# Patient Record
Sex: MALE | Race: WHITE | NOT HISPANIC OR LATINO | Employment: OTHER | URBAN - METROPOLITAN AREA
[De-identification: names, ages, dates, MRNs, and addresses within clinical notes are randomized per-mention and may not be internally consistent; named-entity substitution may affect disease eponyms.]

---

## 2023-03-01 PROBLEM — M19.90 ARTHRITIS: Status: ACTIVE | Noted: 2023-03-01

## 2023-03-01 PROBLEM — J34.89 NASAL DRYNESS: Status: ACTIVE | Noted: 2023-03-01

## 2023-03-01 PROBLEM — I25.10 ASHD (ARTERIOSCLEROTIC HEART DISEASE): Status: ACTIVE | Noted: 2023-03-01

## 2023-03-01 PROBLEM — L30.9 DERMATITIS: Status: ACTIVE | Noted: 2023-03-01

## 2023-03-01 PROBLEM — K64.8 HEMORRHOIDS, INTERNAL: Status: ACTIVE | Noted: 2023-03-01

## 2023-03-01 PROBLEM — H35.3211 EXUDATIVE AGE-RELATED MACULAR DEGENERATION, RIGHT EYE, WITH ACTIVE CHOROIDAL NEOVASCULARIZATION (MULTI): Status: ACTIVE | Noted: 2023-03-01

## 2023-03-01 PROBLEM — E78.5 HYPERLIPIDEMIA: Status: ACTIVE | Noted: 2023-03-01

## 2023-03-01 PROBLEM — K64.9 BLEEDING HEMORRHOIDS: Status: ACTIVE | Noted: 2023-03-01

## 2023-03-01 PROBLEM — R10.9 FLANK PAIN: Status: ACTIVE | Noted: 2023-03-01

## 2023-03-01 PROBLEM — M54.50 LUMBAR PAIN: Status: ACTIVE | Noted: 2023-03-01

## 2023-03-01 PROBLEM — S70.11XA HEMATOMA OF RIGHT THIGH: Status: ACTIVE | Noted: 2023-03-01

## 2023-03-01 PROBLEM — R43.8 METALLIC TASTE: Status: ACTIVE | Noted: 2023-03-01

## 2023-03-01 PROBLEM — R19.7 INTERMITTENT DIARRHEA: Status: ACTIVE | Noted: 2023-03-01

## 2023-03-01 PROBLEM — Z86.2 HISTORY OF HEMOPHILIA: Status: ACTIVE | Noted: 2023-03-01

## 2023-03-01 PROBLEM — R33.9 INCOMPLETE EMPTYING OF BLADDER: Status: ACTIVE | Noted: 2023-03-01

## 2023-03-01 PROBLEM — E83.42 HYPOMAGNESEMIA: Status: ACTIVE | Noted: 2023-03-01

## 2023-03-01 PROBLEM — M25.522 LEFT ELBOW PAIN: Status: ACTIVE | Noted: 2023-03-01

## 2023-03-01 PROBLEM — M53.3 PAIN OF RIGHT SACROILIAC JOINT: Status: ACTIVE | Noted: 2023-03-01

## 2023-03-01 PROBLEM — K59.09 CHRONIC CONSTIPATION: Status: ACTIVE | Noted: 2023-03-01

## 2023-03-01 PROBLEM — J18.9 CAP (COMMUNITY ACQUIRED PNEUMONIA): Status: ACTIVE | Noted: 2023-03-01

## 2023-03-01 PROBLEM — R39.198 ABNORMAL URINATION: Status: ACTIVE | Noted: 2023-03-01

## 2023-03-01 PROBLEM — J45.909 REACTIVE AIRWAY DISEASE (HHS-HCC): Status: ACTIVE | Noted: 2023-03-01

## 2023-03-01 PROBLEM — R31.0 GROSS HEMATURIA: Status: ACTIVE | Noted: 2023-03-01

## 2023-03-01 PROBLEM — K21.9 ESOPHAGEAL REFLUX: Status: ACTIVE | Noted: 2023-03-01

## 2023-03-01 PROBLEM — E55.9 MILD VITAMIN D DEFICIENCY: Status: ACTIVE | Noted: 2023-03-01

## 2023-03-01 PROBLEM — N39.0 URINARY TRACT INFECTION: Status: ACTIVE | Noted: 2023-03-01

## 2023-03-01 PROBLEM — I10 HYPERTENSION: Status: ACTIVE | Noted: 2023-03-01

## 2023-03-01 PROBLEM — M17.12 PRIMARY OSTEOARTHRITIS OF LEFT KNEE: Status: ACTIVE | Noted: 2023-03-01

## 2023-03-01 PROBLEM — R43.2 DYSGEUSIA: Status: ACTIVE | Noted: 2023-03-01

## 2023-03-01 PROBLEM — M79.641 HAND PAIN, RIGHT: Status: ACTIVE | Noted: 2023-03-01

## 2023-03-01 PROBLEM — E87.6 HYPOKALEMIA: Status: ACTIVE | Noted: 2023-03-01

## 2023-03-01 PROBLEM — A08.4 VIRAL GASTROENTERITIS: Status: ACTIVE | Noted: 2023-03-01

## 2023-03-01 PROBLEM — N47.1 PHIMOSIS: Status: ACTIVE | Noted: 2023-03-01

## 2023-03-01 PROBLEM — I21.4 NON-ST ELEVATION MYOCARDIAL INFARCTION (NSTEMI) IN RECOVERY PHASE (MULTI): Status: ACTIVE | Noted: 2023-03-01

## 2023-03-01 PROBLEM — S72.001A FRACTURE OF FEMORAL NECK, RIGHT (MULTI): Status: ACTIVE | Noted: 2023-03-01

## 2023-03-01 PROBLEM — R73.02 IGT (IMPAIRED GLUCOSE TOLERANCE): Status: ACTIVE | Noted: 2023-03-01

## 2023-03-01 PROBLEM — M51.36 LUMBAR DEGENERATIVE DISC DISEASE: Status: ACTIVE | Noted: 2023-03-01

## 2023-03-01 PROBLEM — K62.5 RECTAL BLEEDING: Status: ACTIVE | Noted: 2023-03-01

## 2023-03-01 PROBLEM — K64.4 ANAL SKIN TAG: Status: ACTIVE | Noted: 2023-03-01

## 2023-03-01 PROBLEM — G62.9 NEUROPATHY: Status: ACTIVE | Noted: 2023-03-01

## 2023-03-01 PROBLEM — D66 HEMOPHILIA A (MULTI): Status: ACTIVE | Noted: 2023-03-01

## 2023-03-01 PROBLEM — H35.3120 NONEXUDATIVE AGE-RELATED MACULAR DEGENERATION OF LEFT EYE: Status: ACTIVE | Noted: 2023-03-01

## 2023-03-01 PROBLEM — M25.462 KNEE EFFUSION, LEFT: Status: ACTIVE | Noted: 2023-03-01

## 2023-03-01 PROBLEM — N20.0 NEPHROLITHIASIS: Status: ACTIVE | Noted: 2023-03-01

## 2023-03-01 PROBLEM — Z95.1 HISTORY OF CORONARY ARTERY BYPASS GRAFT: Status: ACTIVE | Noted: 2023-03-01

## 2023-03-01 PROBLEM — N40.1 ENLARGED PROSTATE WITH LOWER URINARY TRACT SYMPTOMS (LUTS): Status: ACTIVE | Noted: 2023-03-01

## 2023-03-01 PROBLEM — S76.911A MUSCLE STRAIN OF RIGHT THIGH: Status: ACTIVE | Noted: 2023-03-01

## 2023-03-01 PROBLEM — M51.369 LUMBAR DEGENERATIVE DISC DISEASE: Status: ACTIVE | Noted: 2023-03-01

## 2023-03-01 PROBLEM — H61.23 IMPACTED CERUMEN OF BOTH EARS: Status: ACTIVE | Noted: 2023-03-01

## 2023-03-01 PROBLEM — R04.0 EPISTAXIS: Status: ACTIVE | Noted: 2023-03-01

## 2023-03-01 PROBLEM — L98.9 SKIN LESION: Status: ACTIVE | Noted: 2023-03-01

## 2023-03-01 RX ORDER — METOPROLOL SUCCINATE 50 MG/1
1 TABLET, EXTENDED RELEASE ORAL DAILY
COMMUNITY
Start: 2013-10-28 | End: 2023-05-12 | Stop reason: WASHOUT

## 2023-03-01 RX ORDER — ACETAMINOPHEN 500 MG
1 TABLET ORAL DAILY
COMMUNITY
Start: 2013-10-28 | End: 2023-05-12 | Stop reason: WASHOUT

## 2023-03-01 RX ORDER — TAMSULOSIN HYDROCHLORIDE 0.4 MG/1
1 CAPSULE ORAL 2 TIMES DAILY
COMMUNITY
Start: 2013-06-10 | End: 2023-05-12 | Stop reason: SDUPTHER

## 2023-03-01 RX ORDER — NYSTATIN 100000 [USP'U]/ML
SUSPENSION ORAL
COMMUNITY
End: 2023-05-12 | Stop reason: WASHOUT

## 2023-03-01 RX ORDER — POLYETHYLENE GLYCOL 3350 17 G/17G
17 POWDER, FOR SOLUTION ORAL
COMMUNITY
Start: 2013-10-28 | End: 2023-05-12 | Stop reason: WASHOUT

## 2023-03-01 RX ORDER — FINASTERIDE 5 MG/1
1 TABLET, FILM COATED ORAL DAILY
COMMUNITY
Start: 2014-05-22 | End: 2023-11-20 | Stop reason: SDUPTHER

## 2023-03-01 RX ORDER — MUPIROCIN 20 MG/G
OINTMENT TOPICAL 3 TIMES DAILY
COMMUNITY
Start: 2021-03-24 | End: 2023-05-12 | Stop reason: WASHOUT

## 2023-03-01 RX ORDER — TRAMADOL HYDROCHLORIDE 50 MG/1
1 TABLET ORAL 4 TIMES DAILY
COMMUNITY
Start: 2021-10-23 | End: 2023-05-12 | Stop reason: WASHOUT

## 2023-03-01 RX ORDER — POTASSIUM CHLORIDE 750 MG/1
1 TABLET, FILM COATED, EXTENDED RELEASE ORAL DAILY
COMMUNITY
Start: 2022-07-17 | End: 2023-04-28 | Stop reason: SDUPTHER

## 2023-03-01 RX ORDER — LANOLIN ALCOHOL/MO/W.PET/CERES
1 CREAM (GRAM) TOPICAL 2 TIMES DAILY
COMMUNITY
Start: 2022-08-10 | End: 2023-11-17 | Stop reason: WASHOUT

## 2023-03-01 RX ORDER — LOPERAMIDE HCL 2 MG
2 TABLET ORAL AS NEEDED
COMMUNITY
End: 2023-05-12 | Stop reason: WASHOUT

## 2023-03-01 RX ORDER — PANTOPRAZOLE SODIUM 40 MG/1
1 TABLET, DELAYED RELEASE ORAL DAILY
COMMUNITY
Start: 2013-01-10 | End: 2023-05-12 | Stop reason: WASHOUT

## 2023-03-01 RX ORDER — HYDROCHLOROTHIAZIDE 12.5 MG/1
1 CAPSULE ORAL DAILY
COMMUNITY
Start: 2017-04-21 | End: 2023-05-12 | Stop reason: WASHOUT

## 2023-03-01 RX ORDER — CYCLOBENZAPRINE HCL 5 MG
1 TABLET ORAL 3 TIMES DAILY PRN
COMMUNITY
Start: 2021-08-24 | End: 2023-05-12 | Stop reason: WASHOUT

## 2023-03-01 RX ORDER — CARBOXYMETHYLCELLULOSE SODIUM 5 MG/ML
1 SOLUTION/ DROPS OPHTHALMIC 4 TIMES DAILY
Status: ON HOLD | COMMUNITY
Start: 2022-01-23 | End: 2024-01-19 | Stop reason: ALTCHOICE

## 2023-03-01 RX ORDER — POTASSIUM CHLORIDE 20 MEQ/1
20 TABLET, EXTENDED RELEASE ORAL DAILY
COMMUNITY
Start: 2022-05-29 | End: 2023-07-07 | Stop reason: ALTCHOICE

## 2023-03-01 RX ORDER — ATORVASTATIN CALCIUM 40 MG/1
1 TABLET, FILM COATED ORAL DAILY
COMMUNITY
Start: 2016-04-28 | End: 2024-01-17

## 2023-03-09 ENCOUNTER — NURSING HOME VISIT (OUTPATIENT)
Dept: POST ACUTE CARE | Facility: EXTERNAL LOCATION | Age: 88
End: 2023-03-09
Payer: MEDICARE

## 2023-03-09 DIAGNOSIS — D66 HEMOPHILIA A (MULTI): Primary | ICD-10-CM

## 2023-03-09 PROCEDURE — 99309 SBSQ NF CARE MODERATE MDM 30: CPT | Performed by: FAMILY MEDICINE

## 2023-03-09 NOTE — LETTER
Patient: Ramón Flores  : 1928    Encounter Date: 2023    Nursing Home Visit  Name: Ramón Flores  YOB: 1928  MRN: [id number]    Chief Complaint  Right knee pain  Subjective  HPI:   89 year old male with hx of hemophilia A, protein C deficiency, HTN, HLD, nephrolithiasis, CAD s/p CABG, and BPH admitted for urgent Rt NOF fracture. S/p Rt Femur fixation by Ortho * Hypokalemia- received 40meq last night. Reordered 10meq daily * Anemia- Improved See below * Acute blood loss post surgery- HGB dropped to 7, had 1 unit PRBC, HGB - 9.6 * Factor VIII- scheduled repletion set up by hematology for 2x weekly, NO ANTICOAGULANT OR ASA per hematology. Patient is aware of the dates he is to have transfusions * Debility- therapy to maximize safety and independence with functional mobility and self-care. * Right Femur Fracture 2/2 Mechanical Fall s/p Rt Femur fixation, f/u as outpatient after 2 weeks, dressing will remain for 2 weeks * HTN- currently SBP 120s is not taking anything at this time * CAD s/p CABG- unable to take anticoags at this time * HLD- c/w atorvastatin 40mg daily * BPH- c/w Flomax Finasteride * GERD- c/w pantoprazole * Insomnia- c/w melatonin * Vitamin D deficiency- c/w Vitamin D supplement * Knee pain- Voltaren to bilateral knees at bedtime. Discussed with patient and niece discharge this weekend patient stated he did not need any medications for discharge. He and niece were concerned about the weight difference in the hospital and here and why was in 130's one day and 140s the next Explained to them could have been different scales, different times of the day, scale may not have been zeroed. Stated that we follow trends and keep an eye on outliers.   Review of Systems:  Reviewed chart looking at current medications, treatment, labs and x-rays and note pertinent positives or negatives, otherwise 10 point system review negative except for what is noted in  HPI.    Objective    VS: 117/70, 97.4, 78, 18, 97%There were no vitals taken for this visit.    Physical exam:   Physical Exam    Constitutional: Well developed, well nourished, no apparent distress. comfortable, underweight, clothing appropriate, well groomed and appears younger than stated age.   Head and Face: NC, AT.   Eyes: Sclera clear; conjunctiva without pallor or inflammation. No drainage.   Ears, Nose, Mouth, and Throat: No ear or nasal drainage. MMM.   Pulmonary: Air exchange and excursion adequate.   Cardiovascular: Pedal pulses palpable.No BLE edema, DPs 1-2+.   Abdomen: Soft, NT, (+) BS x 4 quads.   Musculoskeletal: No gross bony deformities, no kyphosis. RIght hip intact.   Skin: Pink, warm and dry; turgor good.   Neurologic: A & O x 3. Speech clear and conversant. Gait not observed.   Psychiatric: Calm and pleasant. Adequate insight and judgement.    Assessment/Plan    * Hypokalemia- order labs   * Anemia- Improved See below   * Acute blood loss post surgery- HGB 3/17- 11.4   * Factor VIII- scheduled repletion set up by hematology for 2x weekly, NO ANTICOAGULANT OR ASA per hematology. Patient is awareof the dates he is to have transfusions   * Debility- therapy to maximize safety and independence with functional mobility and self-care.   * Right Femur Fracture 2/2 Mechanical Fall s/p Rt Femur fixation, f/u as outpatient after 2 weeks, dressing will remain for 2 weeks   * HTN- currently SBP 120s is not taking anything at this time   * CAD s/p CABG- unable to take anticoags at this time   * HLD- c/w atorvastatin 40mg daily   * BPH- c/w Flomax Finasteride   * GERD- c/w pantoprazole   * Insomnia- c/w melatonin* Vitamin D deficiency- c/w Vitamin D supplement   * Knee pain- Voltaren to bilateral knees at bedtime.      Electronically Signed By: SUSI Ramirez-CNP   4/11/23  7:11 PM

## 2023-03-12 ENCOUNTER — NURSING HOME VISIT (OUTPATIENT)
Dept: POST ACUTE CARE | Facility: EXTERNAL LOCATION | Age: 88
End: 2023-03-12
Payer: MEDICARE

## 2023-03-12 DIAGNOSIS — M25.462 KNEE EFFUSION, LEFT: ICD-10-CM

## 2023-03-12 DIAGNOSIS — J45.20 MILD INTERMITTENT REACTIVE AIRWAY DISEASE WITHOUT COMPLICATION (HHS-HCC): Primary | ICD-10-CM

## 2023-03-12 DIAGNOSIS — I10 HYPERTENSION, UNSPECIFIED TYPE: ICD-10-CM

## 2023-03-12 DIAGNOSIS — D66 HEMOPHILIA A (MULTI): ICD-10-CM

## 2023-03-12 DIAGNOSIS — Z86.2 HISTORY OF HEMOPHILIA: ICD-10-CM

## 2023-03-12 DIAGNOSIS — E87.6 HYPOKALEMIA: ICD-10-CM

## 2023-03-12 DIAGNOSIS — I21.4 NON-ST ELEVATION MYOCARDIAL INFARCTION (NSTEMI) IN RECOVERY PHASE (MULTI): ICD-10-CM

## 2023-03-12 DIAGNOSIS — K59.09 CHRONIC CONSTIPATION: ICD-10-CM

## 2023-03-12 DIAGNOSIS — I25.10 ASHD (ARTERIOSCLEROTIC HEART DISEASE): ICD-10-CM

## 2023-03-12 PROBLEM — F48.8 PSYCHOGENIC SYNCOPE: Status: ACTIVE | Noted: 2023-03-12

## 2023-03-12 PROCEDURE — 99306 1ST NF CARE HIGH MDM 50: CPT | Performed by: FAMILY MEDICINE

## 2023-03-12 NOTE — PROGRESS NOTES
Problem List Items Addressed This Visit          Respiratory    Reactive airway disease - Primary       Circulatory    ASHD (arteriosclerotic heart disease)     Cbc bmp weekly         Hypertension     C/w meds monitor and adjust         Non-ST elevation myocardial infarction (NSTEMI) in recovery phase (CMS/[facility])       Digestive    Chronic constipation       Musculoskeletal    Knee effusion, left       Hematologic    Hemophilia A (CMS/[facility])       Other    History of hemophilia    Hypokalemia       Chief complete Syncope    HPIHPI:   89 year old male with a hx of hemophilia A, protein C deficiency, HTN, HLD, nephrolithiasis, CAD s/p CABG (2002), and BPH. Recent admission for RT NOF fracture (discharged 1/30). Presents with syncope vs seizure with left facial droop and dysarthria. Stroke workup negative, but was found to have subdural hematoma. Per neurosurgery no acute intervention needed. S/p Keppra load, 6300 u Factor VIII (per heme). EEG no epileptiform change, per neurology no AED needed. EKG and tele found to be in sinus rhythm. Orthostatic negative. However patient's BP on the softer side and received 1L LR. Per heme, currently on advate 3412 u q12 hrs, no need for factor 8 monitoring. This is more likely to be an syncope event due to soft BP/volume depletion instead of seizure. Doppler US showed acute occlusive deep vein thrombosis in the posterior tibial, peroneal and soleal veins; acute occlusive superficial thrombophlebitis within the great saphenous at the junction in right lower extremity; acute occlusive deep vein thrombosis in the posterior tibial and peroneal veins in left lower extremity. Patient was started on eliquis 2.5 BID for at least 3 months; will go home on Advate 40 units/kg daily dose until seen by Dr. Dillon and likely to decrease the dose to every other day for 3 months. PICC line places; will arrange hemophilia home nurse for daily infusion. During hospitalization, patient's Mg, K, Ca and  P was low intermittently, was repleted PRN. HCTZ DCed due to hypokalemia. He was discharged to SNF on 3/7.     Review of Systems  ROS: Reviewed chart looking at current medications, treatment, labs and x-rays and note pertinent positives or negatives, otherwise 10 point system review negative except for what is noted in HPI.       Vitals  124/66, 97.6, 70, 18, 95%, 147#      Physical Exam    Abdomen: Soft, NT, (+) BS x 4 quads. No masses, bruits.   Constitutional: Well developed, well nourished, no apparent distress. comfortable, underweight, clothing appropriate, well groomed and appears younger than stated age.  Head and Face: NC, AT.  Eyes: Sclera clear; conjunctiva without pallor or inflammation. No drainage.  Ears, Nose, Mouth, and Throat: No ear or nasal drainage. MMM.  Pulmonary: Air exchange and excursion adequate.  Cardiovascular: Pedal pulses palpable.No BLE edema, DPs 1-2+.  Abdomen: Soft, NT, (+) BS x 4 quads.  Musculoskeletal: No gross bony deformities, no kyphosis. RIght hip intact.  Skin: Pink, warm and dry; turgor good.  Neurologic: A & O x 3. Speech clear and conversant. Gait not observed.  Psychiatric: Calm and pleasant. Adequate insight and judgement.

## 2023-03-12 NOTE — LETTER
Problem List Items Addressed This Visit          Respiratory    Reactive airway disease - Primary       Circulatory    ASHD (arteriosclerotic heart disease)     Cbc bmp weekly         Hypertension     C/w meds monitor and adjust         Non-ST elevation myocardial infarction (NSTEMI) in recovery phase (CMS/[facility])       Digestive    Chronic constipation       Musculoskeletal    Knee effusion, left       Hematologic    Hemophilia A (CMS/[facility])       Other    History of hemophilia    Hypokalemia       Chief complete Syncope    HPIHPI:   89 year old male with a hx of hemophilia A, protein C deficiency, HTN, HLD, nephrolithiasis, CAD s/p CABG (2002), and BPH. Recent admission for RT NOF fracture (discharged 1/30). Presents with syncope vs seizure with left facial droop and dysarthria. Stroke workup negative, but was found to have subdural hematoma. Per neurosurgery no acute intervention needed. S/p Keppra load, 6300 u Factor VIII (per heme). EEG no epileptiform change, per neurology no AED needed. EKG and tele found to be in sinus rhythm. Orthostatic negative. However patient's BP on the softer side and received 1L LR. Per heme, currently on advate 3412 u q12 hrs, no need for factor 8 monitoring. This is more likely to be an syncope event due to soft BP/volume depletion instead of seizure. Doppler US showed acute occlusive deep vein thrombosis in the posterior tibial, peroneal and soleal veins; acute occlusive superficial thrombophlebitis within the great saphenous at the junction in right lower extremity; acute occlusive deep vein thrombosis in the posterior tibial and peroneal veins in left lower extremity. Patient was started on eliquis 2.5 BID for at least 3 months; will go home on Advate 40 units/kg daily dose until seen by Dr. Dillon and likely to decrease the dose to every other day for 3 months. PICC line places; will arrange hemophilia home nurse for daily infusion. During hospitalization, patient's Mg, K, Ca and  P was low intermittently, was repleted PRN. HCTZ DCed due to hypokalemia. He was discharged to SNF on 3/7.     Review of Systems  ROS: Reviewed chart looking at current medications, treatment, labs and x-rays and note pertinent positives or negatives, otherwise 10 point system review negative except for what is noted in HPI.       Vitals  124/66, 97.6, 70, 18, 95%, 147#      Physical Exam    Abdomen: Soft, NT, (+) BS x 4 quads. No masses, bruits.   Constitutional: Well developed, well nourished, no apparent distress. comfortable, underweight, clothing appropriate, well groomed and appears younger than stated age.  Head and Face: NC, AT.  Eyes: Sclera clear; conjunctiva without pallor or inflammation. No drainage.  Ears, Nose, Mouth, and Throat: No ear or nasal drainage. MMM.  Pulmonary: Air exchange and excursion adequate.  Cardiovascular: Pedal pulses palpable.No BLE edema, DPs 1-2+.  Abdomen: Soft, NT, (+) BS x 4 quads.  Musculoskeletal: No gross bony deformities, no kyphosis. RIght hip intact.  Skin: Pink, warm and dry; turgor good.  Neurologic: A & O x 3. Speech clear and conversant. Gait not observed.  Psychiatric: Calm and pleasant. Adequate insight and judgement.

## 2023-03-30 ENCOUNTER — NURSING HOME VISIT (OUTPATIENT)
Dept: POST ACUTE CARE | Facility: EXTERNAL LOCATION | Age: 88
End: 2023-03-30
Payer: MEDICARE

## 2023-03-30 DIAGNOSIS — D66 HEMOPHILIA A (MULTI): Primary | ICD-10-CM

## 2023-03-30 DIAGNOSIS — I10 HYPERTENSION, UNSPECIFIED TYPE: ICD-10-CM

## 2023-03-30 DIAGNOSIS — R53.81 DEBILITY: ICD-10-CM

## 2023-03-30 DIAGNOSIS — D64.9 ANEMIA, UNSPECIFIED TYPE: ICD-10-CM

## 2023-03-30 PROCEDURE — 99309 SBSQ NF CARE MODERATE MDM 30: CPT | Performed by: FAMILY MEDICINE

## 2023-03-30 NOTE — LETTER
Patient: Ramón Flores  : 1928    Encounter Date: 2023    Nursing Home Visit  Name: Ramón Flores  YOB: 1928  MRN: [id number]    Chief Complaint  Right knee pain  Subjective  HPI:   89 year old male with hx of hemophilia A, protein C deficiency, HTN, HLD, nephrolithiasis, CAD s/p CABG, and BPH admitted for urgent Rt NOF fracture. S/p Rt Femur fixation by Ortho * Hypokalemia- received 40meq last night. Reordered 10meq daily * Anemia- Improved See below * Acute blood loss post surgery- HGB dropped to 7, had 1 unit PRBC, HGB - 9.6 * Factor VIII- scheduled repletion set up by hematology for 2x weekly, NO ANTICOAGULANT OR ASA per hematology. Patient is aware of the dates he is to have transfusions * Debility- therapy to maximize safety and independence with functional mobility and self-care. * Right Femur Fracture 2/2 Mechanical Fall s/p Rt Femur fixation, f/u as outpatient after 2 weeks, dressing will remain for 2 weeks * HTN- currently SBP 120s is not taking anything at this time * CAD s/p CABG- unable to take anticoags at this time * HLD- c/w atorvastatin 40mg daily * BPH- c/w Flomax Finasteride * GERD- c/w pantoprazole * Insomnia- c/w melatonin * Vitamin D deficiency- c/w Vitamin D supplement * Knee pain- Voltaren to bilateral knees at bedtime. Discussed with patient and niece discharge this weekend patient stated he did not need any medications for discharge. He and niece were concerned about the weight difference in the hospital and here and why was in 130's one day and 140s the next Explained to them could have been different scales, different times of the day, scale may not have been zeroed. Stated that we follow trends and keep an eye on outliers.   Review of Systems:  Reviewed chart looking at current medications, treatment, labs and x-rays and note pertinent positives or negatives, otherwise 10 point system review negative except for what is noted in  HPI.    Objective    VS: 117/70, 97.4, 78, 18, 97%    Physical exam:   Physical Exam    Constitutional: Well developed, well nourished, no apparent distress. comfortable, underweight, clothing appropriate, well groomed and appears younger than stated age.   Head and Face: NC, AT.   Eyes: Sclera clear; conjunctiva without pallor or inflammation. No drainage.   Ears, Nose, Mouth, and Throat: No ear or nasal drainage. MMM.   Pulmonary: Air exchange and excursion adequate.   Cardiovascular: Pedal pulses palpable.No BLE edema, DPs 1-2+.   Abdomen: Soft, NT, (+) BS x 4 quads.   Musculoskeletal: No gross bony deformities, no kyphosis. RIght hip intact.   Skin: Pink, warm and dry; turgor good.   Neurologic: A & O x 3. Speech clear and conversant. Gait not observed.   Psychiatric: Calm and pleasant. Adequate insight and judgement.    Assessment/Plan    * Hypokalemia- order labs   * Anemia- Improved See below   * Acute blood loss post surgery- HGB 3/17- 11.4   * Factor VIII- scheduled repletion set up by hematology for 2x weekly, NO ANTICOAGULANT OR ASA per hematology. Patient is awareof the dates he is to have transfusions   * Debility- therapy to maximize safety and independence with functional mobility and self-care.   * Right Femur Fracture 2/2 Mechanical Fall s/p Rt Femur fixation, f/u as outpatient after 2 weeks, dressing will remain for 2 weeks   * HTN- currently SBP 120s is not taking anything at this time   * CAD s/p CABG- unable to take anticoags at this time   * HLD- c/w atorvastatin 40mg daily   * BPH- c/w Flomax Finasteride   * GERD- c/w pantoprazole   * Insomnia- c/w melatonin* Vitamin D deficiency- c/w Vitamin D supplement   * Knee pain- Voltaren to bilateral knees at bedtime.      Electronically Signed By: SUSI Ramirez-CNP   4/11/23  7:16 PM

## 2023-04-04 ENCOUNTER — NURSING HOME VISIT (OUTPATIENT)
Dept: POST ACUTE CARE | Facility: EXTERNAL LOCATION | Age: 88
End: 2023-04-04
Payer: MEDICARE

## 2023-04-04 DIAGNOSIS — R53.81 PHYSICAL DEBILITY: ICD-10-CM

## 2023-04-04 DIAGNOSIS — I50.33 DIASTOLIC CHF, ACUTE ON CHRONIC (MULTI): ICD-10-CM

## 2023-04-04 DIAGNOSIS — I50.31 ACUTE DIASTOLIC HEART FAILURE (MULTI): ICD-10-CM

## 2023-04-04 DIAGNOSIS — D66 HEMOPHILIA A (MULTI): Primary | ICD-10-CM

## 2023-04-04 DIAGNOSIS — I21.4 NON-ST ELEVATION MYOCARDIAL INFARCTION (NSTEMI) IN RECOVERY PHASE (MULTI): ICD-10-CM

## 2023-04-04 DIAGNOSIS — J18.9 PNEUMONIA OF LOWER LOBE DUE TO INFECTIOUS ORGANISM, UNSPECIFIED LATERALITY: ICD-10-CM

## 2023-04-04 DIAGNOSIS — I25.10 ASHD (ARTERIOSCLEROTIC HEART DISEASE): ICD-10-CM

## 2023-04-04 DIAGNOSIS — M17.12 PRIMARY OSTEOARTHRITIS OF LEFT KNEE: ICD-10-CM

## 2023-04-04 DIAGNOSIS — M25.462 KNEE EFFUSION, LEFT: ICD-10-CM

## 2023-04-04 DIAGNOSIS — Z95.1 HISTORY OF CORONARY ARTERY BYPASS GRAFT: ICD-10-CM

## 2023-04-04 PROCEDURE — 99310 SBSQ NF CARE HIGH MDM 45: CPT | Performed by: FAMILY MEDICINE

## 2023-04-04 ASSESSMENT — PAIN SCALES - GENERAL: PAINLEVEL: 0-NO PAIN

## 2023-04-04 NOTE — LETTER
Patient: Ramón Flores  : 1928    Encounter Date: 2023    Nursing Home Visit  Name: Ramón Flores  YOB: 1928  MRN: [id number]    Chief Complaint  Fall  Subjective  HPI:   89 year old male with hx of hemophilia A, protein C deficiency, HTN, HLD, nephrolithiasis, CAD s/p CABG, and BPH admitted for urgent Rt NOF fracture. S/p Rt Femur fixation by Ortho on , but had insufficient factor replacement prior to procedure. Postop patient had a hemoglobin drop, slightly more than expected but likely reasonable for having suboptimals factor replacement. He received 1 unit of packed red blood cells post-op with appropriate response and H&H is now stable. Discussed with his OP hematology and likely expected from blood loss from surgery given hemophilia A and not full repletion of factors rather than ongoing bleeding. Hematology on consult, patient was receiving twice daily therapy with Adv ate now transitioned to once daily and factor levels are stable. CT angio showed no evidence of blood extravasation. After discussion with hematology, patient will need to go to SNF with daily Adv ate for a period before transitioning to his home prophylactic therapy. Hematology is arranging. Patient should not be on any blood thinning agents or aspirin. Patient has been awaiting placement, on  working with physical therapy on  he had a syncopal episode in the setting of diarrhea. His blood pressure and mentation improved after getting back in bed and he is getting IV fluids. Denies diarrhea since resolved and he has had no further dizziness. His blood pressure is stable, however his hydrochlorothiazide and metoprolol are continued be on hold. Arrangements made by hematology to deliver Adv ate to his nursing facility where he will be doing PT OT and receiving Adv ate infusions. Per hematology, patient will continue Adv ate infusions for 5 more days and then transition to every other day  infusions. He did have a CBC monitored weekly. He will need post discharge follow-up with orthopedics and hematology. please note probably hematology service patient should never be started on aspirin or other blood thinners/anticoagulants or chemical DVT prophylaxis, if there is concern that patient does need to be started on any of these medications that should be thoroughly discussed with the hematology service and his hematologist   1/31 Patient is somewhat anxious. He states his right hip feels fine, he denies pain currently. Appetite fair, patient states he does not like the food all that much. He is aware of the schedule for his infusion, knows who is coming and when   2/2 Patient is in good spirits. Denies pain or discomfort. Patient reports he is getting his infusions as scheduled. Appetite fair, Denies HA, dizziness, SOB, CP, N&V or constipation   2/14 Patient in good spirits, saw Ortho on 2/9. staples removed. He was surprised how much bruising he had. Appetite good, denies pain or discomfort. Denies HA, dizziness, SOB, CP, N&V or constipation   2/16 Notified yesterday evening, patient's K+ was 3.1 down from 3.4 on 2/14 Ordered to give 40meq now and repeat labs in am. Today patient in great spirits, he will most likely discharge home over weekend. His niece was visiting today. Appetite is good he c/o some knee pain, he stated he had some gel that relieved it. Denies HA, dizziness, SOB, CP, N&V or constipation  3/9   Patient in fair mood, is not in any pain. Appetite only fair due to not liking the food.  States he picks and chooses.   4/4--  On 3/27 patient went to ortho to have his knee injected, he reported it did not work really at all.  On 3/29 patient was coughing ordered CXR results reported Lung emphysema, Hazy bibasilar airspace opacities. Slightly enlarged cardiac silhouette with congestion. New order for Z-pack.  He continued to worsen, on 3/31 ordered repeat CXR, flu swab, CBC/BMP  Also started  Levaquin and probiotic. Covid test, done earlier was negative.  Last evening (4/3) patient slid out of his wheelchair, no injuries.  Today patient talkative, in good spirits.  Appetite remains fair.  Weight is stable.  Denies pain or discomfort. Denies HA, dizziness, SOB, CP, N&V or constipation. Labs from yesterday, essentially stable, did not drop in HGB from 11-->8.      Review of Systems:  Reviewed chart looking at current medications, treatment, labs and x-rays and note pertinent positives or negatives, otherwise 10 point system review negative except for what is noted in HPI.    Objective  VS: There were no vitals taken for this visit.    Physical exam:   Physical Exam  Vitals reviewed.   Constitutional:       Appearance: He is normal weight.   HENT:      Head: Normocephalic.      Nose: Nose normal.      Mouth/Throat:      Mouth: Mucous membranes are moist.      Pharynx: No oropharyngeal exudate or posterior oropharyngeal erythema.   Cardiovascular:      Rate and Rhythm: Normal rate and regular rhythm.      Pulses: Normal pulses.      Heart sounds: Normal heart sounds.   Pulmonary:      Effort: Pulmonary effort is normal.      Breath sounds: Examination of the right-lower field reveals decreased breath sounds. Examination of the left-lower field reveals decreased breath sounds. Decreased breath sounds present.   Musculoskeletal:      Right knee: Decreased range of motion.      Left knee: Decreased range of motion.   Skin:     General: Skin is warm and dry.   Neurological:      Mental Status: He is alert and oriented to person, place, and time. Mental status is at baseline.      Motor: Weakness present.   Psychiatric:         Mood and Affect: Mood normal.         Thought Content: Thought content normal.    Assessment/Plan    89 year old male with hx of hemophilia A, protein C deficiency, HTN, HLD, nephrolithiasis, CAD s/p CABG, and BPH admitted for urgent Rt NOF fracture. S/p Rt Femur fixation   Physical  debility  Continue PT/OT    Hemophilia A (CMS/HCC)  C/w treatement       ASHD (arteriosclerotic heart disease)  Nov. 15, 2012: LV EF normal . Cbc bmp weekly     Acute diastolic heart failure (CMS/HCC)  C/w meds    Pneumonia  C/w ATB    Hypertension  C/w same meds, monitor labs    Diastolic CHF, acute on chronic (CMS/HCC)  Monitor labs    Persistent atrial fibrillation (CMS/HCC)  Rate controlled, no a/c 2/2 hemophilia       Electronically Signed By: SUSI Ramirez-CNP   4/22/23  2:55 PM

## 2023-04-05 ENCOUNTER — NURSING HOME VISIT (OUTPATIENT)
Dept: POST ACUTE CARE | Facility: EXTERNAL LOCATION | Age: 88
End: 2023-04-05
Payer: MEDICARE

## 2023-04-05 DIAGNOSIS — S72.001D CLOSED FRACTURE OF NECK OF RIGHT FEMUR WITH ROUTINE HEALING, SUBSEQUENT ENCOUNTER: ICD-10-CM

## 2023-04-05 DIAGNOSIS — D66 HEMOPHILIA A (MULTI): ICD-10-CM

## 2023-04-05 DIAGNOSIS — N40.1 BENIGN PROSTATIC HYPERPLASIA WITH INCOMPLETE BLADDER EMPTYING: ICD-10-CM

## 2023-04-05 DIAGNOSIS — R33.9 URINARY RETENTION: Primary | ICD-10-CM

## 2023-04-05 DIAGNOSIS — S06.5XAA SUBDURAL HEMATOMA (MULTI): ICD-10-CM

## 2023-04-05 DIAGNOSIS — K21.9 GASTROESOPHAGEAL REFLUX DISEASE WITHOUT ESOPHAGITIS: ICD-10-CM

## 2023-04-05 DIAGNOSIS — R39.14 BENIGN PROSTATIC HYPERPLASIA WITH INCOMPLETE BLADDER EMPTYING: ICD-10-CM

## 2023-04-05 PROCEDURE — 99309 SBSQ NF CARE MODERATE MDM 30: CPT | Performed by: FAMILY MEDICINE

## 2023-04-05 NOTE — LETTER
Patient: Ramón Flores  : 1928    Encounter Date: 2023    Problem List Items Addressed This Visit          Nervous    Subdural hematoma (CMS/HCC)     Monitor closely.             Digestive    Esophageal reflux       Genitourinary    Urinary retention - Primary     Kelley laced 700 ml urine drained Will leave kelley in and refer to urology. UA c and s sent         Enlarged prostate with lower urinary tract symptoms (LUTS)     C/w tamsulosin and finesteride            Musculoskeletal    Fracture of femoral neck, right (CMS/HCC)       Hematologic    Hemophilia A (CMS/HCC)     C/w treatement              Chief complete Syncope    HPIHPI:   89 year old male with a hx of hemophilia A, protein C deficiency, HTN, HLD, nephrolithiasis, CAD s/p CABG (), and BPH. Recent admission for RT NOF fracture (discharged ). Presents with syncope vs seizure with left facial droop and dysarthria. Stroke workup negative, but was found to have subdural hematoma. Per neurosurgery no acute intervention needed. S/p Keppra load, 6300 u Factor VIII (per heme). EEG no epileptiform change, per neurology no AED needed. EKG and tele found to be in sinus rhythm. Orthostatic negative. However patient's BP on the softer side and received 1L LR. Per heme, currently on advate 3412 u q12 hrs, no need for factor 8 monitoring. This is more likely to be an syncope event due to soft BP/volume depletion instead of seizure. Doppler US showed acute occlusive deep vein thrombosis in the posterior tibial, peroneal and soleal veins; acute occlusive superficial thrombophlebitis within the great saphenous at the junction in right lower extremity; acute occlusive deep vein thrombosis in the posterior tibial and peroneal veins in left lower extremity. Patient was started on eliquis 2.5 BID for at least 3 months; will go home on Advate 40 units/kg daily dose until seen by Dr. Dillon and likely to decrease the dose to every other day for 3  months. PICC line places; will arrange hemophilia home nurse for daily infusion. During hospitalization, patient's Mg, K, Ca and P was low intermittently, was repleted PRN. HCTZ DCed due to hypokalemia. He was discharged to SNF on 3/7.     4/5/2023 c/o urinary frequency and urgency. Bladder scan done shows 732 ml residue Archuleta placed clear urine drauned.     Review of Systems  ROS: Reviewed chart looking at current medications, treatment, labs and x-rays and note pertinent positives or negatives, otherwise 10 point system review negative except for what is noted in HPI.       Vitals  124/66, 97.6, 70, 18, 95%, 147#      Physical Exam    Abdomen: Soft, NT, (+) BS x 4 quads. No masses, bruits.   Constitutional: Well developed, well nourished, no apparent distress. comfortable, underweight, clothing appropriate, well groomed and appears younger than stated age.  Head and Face: NC, AT.  Eyes: Sclera clear; conjunctiva without pallor or inflammation. No drainage.  Ears, Nose, Mouth, and Throat: No ear or nasal drainage. MMM.  Pulmonary: Air exchange and excursion adequate.  Cardiovascular: Pedal pulses palpable.No BLE edema, DPs 1-2+.  Abdomen: Soft, NT, (+) BS x 4 quads.  Musculoskeletal: No gross bony deformities, no kyphosis. RIght hip intact.  Skin: Pink, warm and dry; turgor good.  Neurologic: A & O x 3. Speech clear and conversant. Gait not observed.  Psychiatric: Calm and pleasant. Adequate insight and judgement.       Electronically Signed By: Mau Al MD   4/9/23 12:31 PM

## 2023-04-06 ENCOUNTER — NURSING HOME VISIT (OUTPATIENT)
Dept: POST ACUTE CARE | Facility: EXTERNAL LOCATION | Age: 88
End: 2023-04-06
Payer: MEDICARE

## 2023-04-06 DIAGNOSIS — N13.9 OBSTRUCTIVE UROPATHY: Primary | ICD-10-CM

## 2023-04-06 DIAGNOSIS — H35.3211 EXUDATIVE AGE-RELATED MACULAR DEGENERATION, RIGHT EYE, WITH ACTIVE CHOROIDAL NEOVASCULARIZATION (MULTI): ICD-10-CM

## 2023-04-06 PROCEDURE — 99309 SBSQ NF CARE MODERATE MDM 30: CPT | Performed by: FAMILY MEDICINE

## 2023-04-06 NOTE — LETTER
Patient: Ramón Flores  : 1928    Encounter Date: 2023    Nursing Home Visit  Name: Ramón Flores  YOB: 1928  MRN: [id number]    Chief Complaint  Lab review/urinary retention  Subjective  HPI:   89 year old male with hx of hemophilia A, protein C deficiency, HTN, HLD, nephrolithiasis, CAD s/p CABG, and BPH admitted for urgent Rt NOF fracture. S/p Rt Femur fixation by Ortho on , but had insufficient factor replacement prior to procedure. Postop patient had a hemoglobin drop, slightly more than expected but likely reasonable for having suboptimals factor replacement. He received 1 unit of packed red blood cells post-op with appropriate response and H&H is now stable. Discussed with his OP hematology and likely expected from blood loss from surgery given hemophilia A and not full repletion of factors rather than ongoing bleeding. Hematology on consult, patient was receiving twice daily therapy with Adv ate now transitioned to once daily and factor levels are stable. CT angio showed no evidence of blood extravasation. After discussion with hematology, patient will need to go to SNF with daily Adv ate for a period before transitioning to his home prophylactic therapy. Hematology is arranging. Patient should not be on any blood thinning agents or aspirin. Patient has been awaiting placement, on  working with physical therapy on  he had a syncopal episode in the setting of diarrhea. His blood pressure and mentation improved after getting back in bed and he is getting IV fluids. Denies diarrhea since resolved and he has had no further dizziness. His blood pressure is stable, however his hydrochlorothiazide and metoprolol are continued be on hold. Arrangements made by hematology to deliver Adv ate to his nursing facility where he will be doing PT OT and receiving Adv ate infusions. Per hematology, patient will continue Adv ate infusions for 5 more days and then transition to  every other day infusions. He did have a CBC monitored weekly. He will need post discharge follow-up with orthopedics and hematology. please note probably hematology service patient should never be started on aspirin or other blood thinners/anticoagulants or chemical DVT prophylaxis, if there is concern that patient does need to be started on any of these medications that should be thoroughly discussed with the hematology service and his hematologist   1/31 Patient is somewhat anxious. He states his right hip feels fine, he denies pain currently. Appetite fair, patient states he does not like the food all that much. He is aware of the schedule for his infusion, knows who is coming and when   2/2 Patient is in good spirits. Denies pain or discomfort. Patient reports he is getting his infusions as scheduled. Appetite fair, Denies HA, dizziness, SOB, CP, N&V or constipation   2/14 Patient in good spirits, saw Ortho on 2/9. staples removed. He was surprised how much bruising he had. Appetite good, denies pain or discomfort. Denies HA, dizziness, SOB, CP, N&V or constipation   2/16 Notified yesterday evening, patient's K+ was 3.1 down from 3.4 on 2/14 Ordered to give 40meq now and repeat labs in am. Today patient in great spirits, he will most likely discharge home over weekend. His niece was visiting today. Appetite is good he c/o some knee pain, he stated he had some gel that relieved it. Denies HA, dizziness, SOB, CP, N&V or constipation  3/9   Patient in fair mood, is not in any pain. Appetite only fair due to not liking the food.  States he picks and chooses.   4/4--  On 3/27 patient went to ortho to have his knee injected, he reported it did not work really at all.  On 3/29 patient was coughing ordered CXR results reported Lung emphysema, Hazy bibasilar airspace opacities. Slightly enlarged cardiac silhouette with congestion. New order for Z-pack.  He continued to worsen, on 3/31 ordered repeat CXR, flu swab,  CBC/BMP  Also started Levaquin and probiotic. Covid test, done earlier was negative.  Last evening (4/3) patient slid out of his wheelchair, no injuries.  Today patient talkative, in good spirits.  Appetite remains fair.  Weight is stable.  Denies pain or discomfort. Denies HA, dizziness, SOB, CP, N&V or constipation. Labs from yesterday, essentially stable, did not drop in HGB from 11-->8.    4/6  Patient in fair mood, per nsg he  was complaining about having a kelley catheter placed.  Stated he was having difficulty urinating.  Per PCC he had 700+ residual.  Appetite is fair, 25-75% mostly.  Denies pain or discomfort.  Denies HA, dizziness, SOB, CP, N&V or constipation    Review of Systems:  Reviewed chart looking at current medications, treatment, labs and x-rays and note pertinent positives or negatives, otherwise 10 point system review negative except for what is noted in HPI.    Objective  VS: Blood pressure 114/83, pulse 106, temperature 36.7 °C (98 °F), resp. rate 20, weight 61.7 kg (136 lb), SpO2 96 %.  Physical exam:   Physical Exam  Vitals reviewed.   Constitutional:       Appearance: He is normal weight.   HENT:      Head: Normocephalic.      Nose: Nose normal.      Mouth/Throat:      Mouth: Mucous membranes are moist.      Pharynx: No oropharyngeal exudate or posterior oropharyngeal erythema.   Cardiovascular:      Rate and Rhythm: Normal rate and regular rhythm.      Pulses: Normal pulses.      Heart sounds: Normal heart sounds.   Pulmonary:      Effort: Pulmonary effort is normal.   Musculoskeletal:      Right knee: Decreased range of motion.      Left knee: Decreased range of motion.   Skin:     General: Skin is warm and dry.   Neurological:      Mental Status: He is alert and oriented to person, place, and time. Mental status is at baseline.      Motor: Weakness present.   Psychiatric:         Mood and Affect: Mood normal.         Thought Content: Thought content normal.      Assessment/Plan    89  year old male with hx of hemophilia A, protein C deficiency, HTN, HLD, nephrolithiasis, CAD s/p CABG, and BPH admitted for urgent Rt NOF fracture. S/p Rt Femur fixation    Physical debility  Continue PT/OT    Hemophilia A (CMS/HCC)  C/w treatement       ASHD (arteriosclerotic heart disease)  Nov. 15, 2012: LV EF normal . Cbc bmp weekly     Acute diastolic heart failure (CMS/HCC)  C/w meds    Pneumonia  C/w ATB    Hypertension  C/w same meds, monitor labs    Diastolic CHF, acute on chronic (CMS/HCC)  Monitor labs    Persistent atrial fibrillation (CMS/HCC)  Rate controlled, no a/c 2/2 hemophilia     Anemia  HGB back to baseline 10.7    Obstructive uropathy  Consult renal, c/w flomax and finasteride      Electronically Signed By: SSUI Ramirez-CNP   4/28/23  5:24 PM

## 2023-04-09 PROBLEM — S06.5XAA SUBDURAL HEMATOMA (MULTI): Status: ACTIVE | Noted: 2023-04-09

## 2023-04-09 NOTE — PROGRESS NOTES
Nursing Home Visit  Name: Ramón Flores  YOB: 1928  MRN: [id number]    Chief Complaint  Right knee pain  Subjective  HPI:   89 year old male with hx of hemophilia A, protein C deficiency, HTN, HLD, nephrolithiasis, CAD s/p CABG, and BPH admitted for urgent Rt NOF fracture. S/p Rt Femur fixation by Ortho * Hypokalemia- received 40meq last night. Reordered 10meq daily * Anemia- Improved See below * Acute blood loss post surgery- HGB dropped to 7, had 1 unit PRBC, HGB 2/14- 9.6 * Factor VIII- scheduled repletion set up by hematology for 2x weekly, NO ANTICOAGULANT OR ASA per hematology. Patient is aware of the dates he is to have transfusions * Debility- therapy to maximize safety and independence with functional mobility and self-care. * Right Femur Fracture 2/2 Mechanical Fall s/p Rt Femur fixation, f/u as outpatient after 2 weeks, dressing will remain for 2 weeks * HTN- currently SBP 120s is not taking anything at this time * CAD s/p CABG- unable to take anticoags at this time * HLD- c/w atorvastatin 40mg daily * BPH- c/w Flomax Finasteride * GERD- c/w pantoprazole * Insomnia- c/w melatonin * Vitamin D deficiency- c/w Vitamin D supplement * Knee pain- Voltaren to bilateral knees at bedtime. Discussed with patient and niece discharge this weekend patient stated he did not need any medications for discharge. He and niece were concerned about the weight difference in the hospital and here and why was in 130's one day and 140s the next Explained to them could have been different scales, different times of the day, scale may not have been zeroed. Stated that we follow trends and keep an eye on outliers.   Review of Systems:  Reviewed chart looking at current medications, treatment, labs and x-rays and note pertinent positives or negatives, otherwise 10 point system review negative except for what is noted in HPI.    Objective    VS: 117/70, 97.4, 78, 18, 97%There were no vitals taken for this  visit.    Physical exam:   Physical Exam    Constitutional: Well developed, well nourished, no apparent distress. comfortable, underweight, clothing appropriate, well groomed and appears younger than stated age.   Head and Face: NC, AT.   Eyes: Sclera clear; conjunctiva without pallor or inflammation. No drainage.   Ears, Nose, Mouth, and Throat: No ear or nasal drainage. MMM.   Pulmonary: Air exchange and excursion adequate.   Cardiovascular: Pedal pulses palpable.No BLE edema, DPs 1-2+.   Abdomen: Soft, NT, (+) BS x 4 quads.   Musculoskeletal: No gross bony deformities, no kyphosis. RIght hip intact.   Skin: Pink, warm and dry; turgor good.   Neurologic: A & O x 3. Speech clear and conversant. Gait not observed.   Psychiatric: Calm and pleasant. Adequate insight and judgement.    Assessment/Plan    * Hypokalemia- order labs   * Anemia- Improved See below   * Acute blood loss post surgery- HGB 3/17- 11.4   * Factor VIII- scheduled repletion set up by hematology for 2x weekly, NO ANTICOAGULANT OR ASA per hematology. Patient is awareof the dates he is to have transfusions   * Debility- therapy to maximize safety and independence with functional mobility and self-care.   * Right Femur Fracture 2/2 Mechanical Fall s/p Rt Femur fixation, f/u as outpatient after 2 weeks, dressing will remain for 2 weeks   * HTN- currently SBP 120s is not taking anything at this time   * CAD s/p CABG- unable to take anticoags at this time   * HLD- c/w atorvastatin 40mg daily   * BPH- c/w Flomax Finasteride   * GERD- c/w pantoprazole   * Insomnia- c/w melatonin* Vitamin D deficiency- c/w Vitamin D supplement   * Knee pain- Voltaren to bilateral knees at bedtime.

## 2023-04-09 NOTE — PROGRESS NOTES
Problem List Items Addressed This Visit          Nervous    Subdural hematoma (CMS/HCC)     Monitor closely.             Digestive    Esophageal reflux       Genitourinary    Urinary retention - Primary     Kelley laced 700 ml urine drained Will leave kelley in and refer to urology. UA c and s sent         Enlarged prostate with lower urinary tract symptoms (LUTS)     C/w tamsulosin and finesteride            Musculoskeletal    Fracture of femoral neck, right (CMS/HCC)       Hematologic    Hemophilia A (CMS/[facility])     C/w treatement              Chief complete Syncope    HPIHPI:   89 year old male with a hx of hemophilia A, protein C deficiency, HTN, HLD, nephrolithiasis, CAD s/p CABG (2002), and BPH. Recent admission for RT NOF fracture (discharged 1/30). Presents with syncope vs seizure with left facial droop and dysarthria. Stroke workup negative, but was found to have subdural hematoma. Per neurosurgery no acute intervention needed. S/p Keppra load, 6300 u Factor VIII (per heme). EEG no epileptiform change, per neurology no AED needed. EKG and tele found to be in sinus rhythm. Orthostatic negative. However patient's BP on the softer side and received 1L LR. Per heme, currently on advate 3412 u q12 hrs, no need for factor 8 monitoring. This is more likely to be an syncope event due to soft BP/volume depletion instead of seizure. Doppler US showed acute occlusive deep vein thrombosis in the posterior tibial, peroneal and soleal veins; acute occlusive superficial thrombophlebitis within the great saphenous at the junction in right lower extremity; acute occlusive deep vein thrombosis in the posterior tibial and peroneal veins in left lower extremity. Patient was started on eliquis 2.5 BID for at least 3 months; will go home on Advate 40 units/kg daily dose until seen by Dr. Dillon and likely to decrease the dose to every other day for 3 months. PICC line places; will arrange hemophilia home nurse for daily infusion.  During hospitalization, patient's Mg, K, Ca and P was low intermittently, was repleted PRN. HCTZ DCed due to hypokalemia. He was discharged to SNF on 3/7.     4/5/2023 c/o urinary frequency and urgency. Bladder scan done shows 732 ml residue Archuleta placed clear urine drauned.     Review of Systems  ROS: Reviewed chart looking at current medications, treatment, labs and x-rays and note pertinent positives or negatives, otherwise 10 point system review negative except for what is noted in HPI.       Vitals  124/66, 97.6, 70, 18, 95%, 147#      Physical Exam    Abdomen: Soft, NT, (+) BS x 4 quads. No masses, bruits.   Constitutional: Well developed, well nourished, no apparent distress. comfortable, underweight, clothing appropriate, well groomed and appears younger than stated age.  Head and Face: NC, AT.  Eyes: Sclera clear; conjunctiva without pallor or inflammation. No drainage.  Ears, Nose, Mouth, and Throat: No ear or nasal drainage. MMM.  Pulmonary: Air exchange and excursion adequate.  Cardiovascular: Pedal pulses palpable.No BLE edema, DPs 1-2+.  Abdomen: Soft, NT, (+) BS x 4 quads.  Musculoskeletal: No gross bony deformities, no kyphosis. RIght hip intact.  Skin: Pink, warm and dry; turgor good.  Neurologic: A & O x 3. Speech clear and conversant. Gait not observed.  Psychiatric: Calm and pleasant. Adequate insight and judgement.

## 2023-04-11 NOTE — PROGRESS NOTES
Nursing Home Visit  Name: Ramón Flores  YOB: 1928  MRN: [id number]    Chief Complaint  Right knee pain  Subjective  HPI:   89 year old male with hx of hemophilia A, protein C deficiency, HTN, HLD, nephrolithiasis, CAD s/p CABG, and BPH admitted for urgent Rt NOF fracture. S/p Rt Femur fixation by Ortho * Hypokalemia- received 40meq last night. Reordered 10meq daily * Anemia- Improved See below * Acute blood loss post surgery- HGB dropped to 7, had 1 unit PRBC, HGB 2/14- 9.6 * Factor VIII- scheduled repletion set up by hematology for 2x weekly, NO ANTICOAGULANT OR ASA per hematology. Patient is aware of the dates he is to have transfusions * Debility- therapy to maximize safety and independence with functional mobility and self-care. * Right Femur Fracture 2/2 Mechanical Fall s/p Rt Femur fixation, f/u as outpatient after 2 weeks, dressing will remain for 2 weeks * HTN- currently SBP 120s is not taking anything at this time * CAD s/p CABG- unable to take anticoags at this time * HLD- c/w atorvastatin 40mg daily * BPH- c/w Flomax Finasteride * GERD- c/w pantoprazole * Insomnia- c/w melatonin * Vitamin D deficiency- c/w Vitamin D supplement * Knee pain- Voltaren to bilateral knees at bedtime. Discussed with patient and niece discharge this weekend patient stated he did not need any medications for discharge. He and niece were concerned about the weight difference in the hospital and here and why was in 130's one day and 140s the next Explained to them could have been different scales, different times of the day, scale may not have been zeroed. Stated that we follow trends and keep an eye on outliers.   Review of Systems:  Reviewed chart looking at current medications, treatment, labs and x-rays and note pertinent positives or negatives, otherwise 10 point system review negative except for what is noted in HPI.    Objective    VS: 117/70, 97.4, 78, 18, 97%    Physical exam:   Physical Exam     Constitutional: Well developed, well nourished, no apparent distress. comfortable, underweight, clothing appropriate, well groomed and appears younger than stated age.   Head and Face: NC, AT.   Eyes: Sclera clear; conjunctiva without pallor or inflammation. No drainage.   Ears, Nose, Mouth, and Throat: No ear or nasal drainage. MMM.   Pulmonary: Air exchange and excursion adequate.   Cardiovascular: Pedal pulses palpable.No BLE edema, DPs 1-2+.   Abdomen: Soft, NT, (+) BS x 4 quads.   Musculoskeletal: No gross bony deformities, no kyphosis. RIght hip intact.   Skin: Pink, warm and dry; turgor good.   Neurologic: A & O x 3. Speech clear and conversant. Gait not observed.   Psychiatric: Calm and pleasant. Adequate insight and judgement.    Assessment/Plan    * Hypokalemia- order labs   * Anemia- Improved See below   * Acute blood loss post surgery- HGB 3/17- 11.4   * Factor VIII- scheduled repletion set up by hematology for 2x weekly, NO ANTICOAGULANT OR ASA per hematology. Patient is awareof the dates he is to have transfusions   * Debility- therapy to maximize safety and independence with functional mobility and self-care.   * Right Femur Fracture 2/2 Mechanical Fall s/p Rt Femur fixation, f/u as outpatient after 2 weeks, dressing will remain for 2 weeks   * HTN- currently SBP 120s is not taking anything at this time   * CAD s/p CABG- unable to take anticoags at this time   * HLD- c/w atorvastatin 40mg daily   * BPH- c/w Flomax Finasteride   * GERD- c/w pantoprazole   * Insomnia- c/w melatonin* Vitamin D deficiency- c/w Vitamin D supplement   * Knee pain- Voltaren to bilateral knees at bedtime.

## 2023-04-16 ENCOUNTER — NURSING HOME VISIT (OUTPATIENT)
Dept: POST ACUTE CARE | Facility: EXTERNAL LOCATION | Age: 88
End: 2023-04-16
Payer: MEDICARE

## 2023-04-16 DIAGNOSIS — I48.19 PERSISTENT ATRIAL FIBRILLATION (MULTI): ICD-10-CM

## 2023-04-16 DIAGNOSIS — I50.33 DIASTOLIC CHF, ACUTE ON CHRONIC (MULTI): ICD-10-CM

## 2023-04-16 DIAGNOSIS — E83.42 HYPOMAGNESEMIA: ICD-10-CM

## 2023-04-16 DIAGNOSIS — J96.01 ACUTE RESPIRATORY FAILURE WITH HYPOXIA AND HYPERCAPNIA (MULTI): ICD-10-CM

## 2023-04-16 DIAGNOSIS — J96.02 ACUTE RESPIRATORY FAILURE WITH HYPOXIA AND HYPERCAPNIA (MULTI): ICD-10-CM

## 2023-04-16 DIAGNOSIS — I21.4 NON-ST ELEVATION MYOCARDIAL INFARCTION (NSTEMI) IN RECOVERY PHASE (MULTI): ICD-10-CM

## 2023-04-16 DIAGNOSIS — M17.12 PRIMARY OSTEOARTHRITIS OF LEFT KNEE: ICD-10-CM

## 2023-04-16 DIAGNOSIS — N13.8 BENIGN PROSTATIC HYPERPLASIA WITH URINARY OBSTRUCTION: ICD-10-CM

## 2023-04-16 DIAGNOSIS — I25.10 ASHD (ARTERIOSCLEROTIC HEART DISEASE): ICD-10-CM

## 2023-04-16 DIAGNOSIS — D66 HEMOPHILIA A (MULTI): ICD-10-CM

## 2023-04-16 DIAGNOSIS — E87.6 HYPOKALEMIA: ICD-10-CM

## 2023-04-16 DIAGNOSIS — N40.1 BENIGN PROSTATIC HYPERPLASIA WITH URINARY OBSTRUCTION: ICD-10-CM

## 2023-04-16 DIAGNOSIS — J18.9 COMMUNITY ACQUIRED PNEUMONIA, UNSPECIFIED LATERALITY: Primary | ICD-10-CM

## 2023-04-16 DIAGNOSIS — S72.001D CLOSED FRACTURE OF NECK OF RIGHT FEMUR WITH ROUTINE HEALING, SUBSEQUENT ENCOUNTER: ICD-10-CM

## 2023-04-16 DIAGNOSIS — I50.31 ACUTE DIASTOLIC HEART FAILURE (MULTI): ICD-10-CM

## 2023-04-16 PROBLEM — J96.00 ACUTE RESPIRATORY FAILURE (MULTI): Status: ACTIVE | Noted: 2023-04-16

## 2023-04-16 PROCEDURE — 99306 1ST NF CARE HIGH MDM 50: CPT | Performed by: FAMILY MEDICINE

## 2023-04-16 NOTE — LETTER
Patient: Ramón Flores  : 1928    Encounter Date: 2023    Subjective  Patient ID: Ramón Flores is a 89 y.o. male who presents for Congestive Heart Failure (diaGNOSIS).      HPI  89 year old male with a PMHx of hemophilia A, protein C deficiency, HTN, HLD, nephrolithiasis, CAD s/p CABG (), BPH, #R NOF s/p THR, recent SDH who presentED TO er with hx of fall and forehead hematoma/laceration.  Extensively worked up in previous admission, neg EEG, TTE wnl, orthostats positive but thought to be due to dehydration given soft BPs. This admission, workup for syncope including video EEG was negative for epileptiform activity. CT PE negative. Patient found to be fluid overloaded on admission. Also found to be in new afib, rate controlled in 80s, holding apixaban given patient's high bleeding risk. Started on levofloxacin on admission for potential CAP. Heme consulted for Hemophilia A, started on advate 40 u/kg daily while inpatient. Patient's ethanol level elevated on admission  started on thiamine and folate  LISA REPLACED   HAS CARDIOLOGY FOLLOW UP  DENIES COMPLAINTS Appears confused and lethargic.   Current Outpatient Medications on File Prior to Visit   Medication Sig Dispense Refill   • antihemophil.FVIII,full length (ADVATE IV) Infuse into a venous catheter once daily as needed (hemophilia). kit; 50 unit/kg     • atorvastatin (Lipitor) 40 mg tablet Take 1 tablet (40 mg) by mouth once daily.     • carboxymethylcellulose (Refresh Plus) 0.5 % ophthalmic solution Administer 1 drop into both eyes in the morning and 1 drop at noon and 1 drop in the evening and 1 drop before bedtime.     • cholecalciferol (Vitamin D-3) 50 mcg (2,000 unit) capsule Take 1 capsule (50 mcg) by mouth once daily.     • cyclobenzaprine (Flexeril) 5 mg tablet Take 1 tablet (5 mg) by mouth 3 times a day as needed.     • finasteride (Proscar) 5 mg tablet Take 1 tablet (5 mg) by mouth once daily.     • hydroCHLOROthiazide  (Microzide) 12.5 mg capsule Take 1 capsule (12.5 mg) by mouth once daily.     • loperamide (Imodium A-D) 2 mg tablet Take 1 tablet (2 mg) by mouth if needed (after each loose stool up to 8 tabs daily).     • magnesium oxide (Mag-Ox) 400 mg (241.3 mg magnesium) tablet Take 1 tablet (400 mg) by mouth in the morning and 1 tablet (400 mg) before bedtime.     • metoprolol succinate XL (Toprol-XL) 50 mg 24 hr tablet Take 1 tablet (50 mg) by mouth once daily.     • mupirocin (Bactroban) 2 % ointment 3 times a day. apply small amount; as directed     • nystatin (Mycostatin) 100,000 unit/mL suspension Mouth/Throat     • pantoprazole (ProtoNix) 40 mg EC tablet Take 1 tablet (40 mg) by mouth once daily.     • polyethylene glycol (Miralax) 17 gram/dose powder Take 17 g by mouth. mix in 8 ounces of liquid and drink 1 to 2 times daily for constipation     • potassium chloride CR (Klor-Con M20) 20 mEq ER tablet Take 1 tablet (20 mEq) by mouth once daily. while on hydroclorothiazide     • potassium chloride CR 10 mEq ER tablet Take 1 tablet (10 mEq) by mouth once daily.     • tamsulosin (Flomax) 0.4 mg 24 hr capsule Take 1 capsule (0.4 mg) by mouth in the morning and 1 capsule (0.4 mg) before bedtime.     • traMADol (Ultram) 50 mg tablet Take 1 tablet (50 mg) by mouth in the morning and 1 tablet (50 mg) at noon and 1 tablet (50 mg) in the evening and 1 tablet (50 mg) before bedtime.     • vit A/vit C/vit E/zinc/copper (PRESERVISION AREDS ORAL) Take 1 capsule by mouth once daily.       No current facility-administered medications on file prior to visit.        Review of Systems  ROS . 14 systems reviewed and negative except as in HPI . Hospital and nursing notes reviewed.      Objective  There were no vitals taken for this visit.  BSA: There is no height or weight on file to calculate BSA.  Growth percentiles: Facility age limit for growth %shani is 20 years. Facility age limit for growth %shani is 20 years.   No visits with results  within 1 Week(s) from this visit.   Latest known visit with results is:   Legacy Encounter on 10/12/2022   Component Date Value Ref Range Status   • Retic % 10/12/2022 2.3 (H)  0.5 - 2.0 % Final   • Retic Absolute 10/12/2022 0.087  0.017 - 0.110 x10E12/L Final   • Immature Retic fraction 10/12/2022 15.7  0.0 - 16.0 % Final   • Reticulocyte Hemoglobin 10/12/2022 38  28 - 38 pg Final   • Glucose 10/12/2022 98  74 - 99 mg/dL Final   • Sodium 10/12/2022 141  136 - 145 mmol/L Final   • Potassium 10/12/2022 3.7  3.5 - 5.3 mmol/L Final   • Chloride 10/12/2022 103  98 - 107 mmol/L Final   • Bicarbonate 10/12/2022 30  21 - 32 mmol/L Final   • Anion Gap 10/12/2022 12  10 - 20 mmol/L Final   • Urea Nitrogen 10/12/2022 22  6 - 23 mg/dL Final   • Creatinine 10/12/2022 1.05  0.50 - 1.30 mg/dL Final   • GFR MALE 10/12/2022 66  >90 mL/min/1.73m2 Final    Comment:  CALCULATIONS OF ESTIMATED GFR ARE PERFORMED   USING THE 2021 CKD-EPI STUDY REFIT EQUATION   WITHOUT THE RACE VARIABLE FOR THE IDMS-TRACEABLE   CREATININE METHODS.    https://jasn.asnjournals.org/content/early/2021/09/22/ASN.9629276508     • Calcium 10/12/2022 8.7  8.6 - 10.3 mg/dL Final   • Albumin 10/12/2022 4.2  3.4 - 5.0 g/dL Final   • Alkaline Phosphatase 10/12/2022 63  33 - 136 U/L Final   • Total Protein 10/12/2022 6.8  6.4 - 8.2 g/dL Final   • AST 10/12/2022 19  9 - 39 U/L Final   • Total Bilirubin 10/12/2022 1.7 (H)  0.0 - 1.2 mg/dL Final   • ALT (SGPT) 10/12/2022 15  10 - 52 U/L Final    Comment:  Patients treated with Sulfasalazine may generate    falsely decreased results for ALT.     • Protime 10/12/2022 14.5 (H)  9.8 - 13.4 sec Final   • INR 10/12/2022 1.3 (H)  0.9 - 1.1 Final   • aPTT 10/12/2022 48 (H)  26 - 39 sec Final    Comment:   THE APTT IS NO LONGER USED FOR MONITORING     UNFRACTIONATED HEPARIN THERAPY.    FOR MONITORING HEPARIN THERAPY,     USE THE HEPARIN ASSAY.     • Uric Acid 10/12/2022 5.4  4.0 - 7.5 mg/dL Final    Comment:  Venipuncture  immediately after or during the    administration of Metamizole may lead to falsely   low results. Testing should be performed immediately   prior to Metamizole dosing.     • WBC 10/12/2022 5.9  4.4 - 11.3 x10E9/L Final   • RBC 10/12/2022 3.88 (L)  4.50 - 5.90 x10E12/L Final   • Hemoglobin 10/12/2022 12.9 (L)  13.5 - 17.5 g/dL Final   • Hematocrit 10/12/2022 37.8 (L)  41.0 - 52.0 % Final   • MCV 10/12/2022 97  80 - 100 fL Final   • MCHC 10/12/2022 34.1  32.0 - 36.0 g/dL Final   • Platelets 10/12/2022 142 (L)  150 - 450 x10E9/L Final   • RDW 10/12/2022 13.7  11.5 - 14.5 % Final   • Neutrophils % 10/12/2022 63.7  40.0 - 80.0 % Final   • Immature Granulocytes %, Automated 10/12/2022 0.3  0.0 - 0.9 % Final    Comment:  Immature Granulocyte Count (IG) includes promyelocytes,    myelocytes and metamyelocytes but does not include bands.   Percent differential counts (%) should be interpreted in the   context of the absolute cell counts (cells/L).     • Lymphocytes % 10/12/2022 29.9  13.0 - 44.0 % Final   • Monocytes % 10/12/2022 4.9  2.0 - 10.0 % Final   • Eosinophils % 10/12/2022 0.7  0.0 - 6.0 % Final   • Basophils % 10/12/2022 0.5  0.0 - 2.0 % Final   • Neutrophils Absolute 10/12/2022 3.74  1.60 - 5.50 x10E9/L Final   • Lymphocytes Absolute 10/12/2022 1.76  0.80 - 3.00 x10E9/L Final   • Monocytes Absolute 10/12/2022 0.29  0.05 - 0.80 x10E9/L Final   • Eosinophils Absolute 10/12/2022 0.04  0.00 - 0.40 x10E9/L Final   • Basophils Absolute 10/12/2022 0.03  0.00 - 0.10 x10E9/L Final   • Factor VIII Activity 10/12/2022 14 (L)  55 - 180 % Final   • Sedimentation Rate 10/12/2022 2  0 - 20 mm/h Final   • Ferritin 10/12/2022 85  20 - 300 ug/L Final   • TSH 10/12/2022 4.88 (H)  0.44 - 3.98 mIU/L Final    Comment:  TSH testing is performed using different testing    methodology at [facility] than at other    [facility] hospitals. Direct result comparisons should    only be made within the same method.     • Iron  10/12/2022 88  35 - 150 ug/dL Final   • TIBC 10/12/2022 324  240 - 445 ug/dL Final   • Iron Saturation 10/12/2022 27  25 - 45 % Final      Physical Exam  Constitutional: NAD, laying in bed Eyes: EOMI, clear sclera, normal conjunctiva ENMT: moist mucous dry Head/Neck: no appreciable JVD, no carotid bruit, stellate laceration over left forehead w/o active bleeding with slight hematoma around it, small ecchymosis over glabella, erythema over nose and ecchymosis right eyelid Respiratory/Thorax: AE equal bilaterally, +mild , adequate air movement B/L, normal WOB on NC, no wheezes Cardiovascular: +S1, +S2, irregular, no m/r/g Gastrointestinal: soft, NT, ND, +BS, no appreciable HSM Extremities: B/L 1+ edema till knee, improved Neurological: AOx2, confused, endorsing visual hallucinations, tangential speech, no signs of inattention Skin: warm and dry     Assessment:      89 year old male with a PMHx of hemophilia A, protein C deficiency, HTN, HLD,      nephrolithiasis, CAD s/p CABG (2002), BPH, #R NOF s/p THR, recent SDH who      presents with hx of fall and forehead hematoma/laceration. This is his 3rd fall      in 4 months, 1st in Jan reportedly mechanical with R hip fracture, 2nd in Feb      thought to be syncope from dehydration and now for the 3rd time without any      memory of the events around the fall. Extensively worked up in previous      admission, neg EEG, TTE wnl, orthostats positive but thought to be due to      dehydration given soft BPs.             This admission, workup for syncope including video EEG was negative for      epileptiform activity. CT PE negative. Patient found to be fluid overloaded on      admission. Also found to be in new afib, rate controlled in 80s, holding      apixaban given patient's high bleeding risk. Started on levofloxacin on      admission for potential CAP. Heme consulted for Hemophilia A, started on advate      40 u/kg daily while inpatient. Patient's ethanol level elevated  on admission,      started on thiamine and folate.               #Syncope      - Evaluated for a fall while working with PT in Feb 2022 with dysarthria/left      facial droop. CTH showed SDH. EKG NSR. ECHO no valvular lesion/EF WNL. EEG      negative, keppra was discontinued per neurology recs. Orthostats negative but      BPs soft so event was thought to be 2/2 dehydration. PT/OT recommended SNF but      discharge to home on family request.      - video EEG on this admission without evidence of seizure      - This is his 3rd fall/syncope in 4 months. CT PE negative. CTH w/o e/o      intracranial bleed.      Plan:      -     - CT PE negative for PE      - Carotid doppler with <50% stenosis bilaterally      - r ziopatch with close cardiology follow up             #Acute hypoxic respiratory failure      #Community Acquired Pneumonia, resolved      Presented to [place] ED with syncope, tachycardia, spo2 93% started on 4L O2.      CXR did nto show any consolidative process, possible pulmonary edema, left      pleural effusion      Reports cough with expectorate for 1 week. No fever      Plan:      -CT with concern for fluid overload, mesenteric edema, anasarca      -patient started on levofloxacin 04/08 - 4/10      -c/w guaifenesin q12hrs             #HFpEF (EF 60-65% in 02/2023)      - 02/2023 TTE with EF 60-65%, spectral doppler with impaired LV diastolic      filling      - 40 IV lasix on 4/10 with good UOP      - Monitor weight and labs  Not on lasix at presentn             #New onset atrial fibrillation, rate controlled      - Noted to be in atrial fibrillation on telemetry and EKG this admission, has      been noted in a prior EKG but this is largely a new diagnosis      - Largely rate controlled in 80s on metoprolol this admission      - Risk of AC outweighs benefit in this patient with falls and hemophilia      -            #Hemophilia A      #hx of BLE below knee DVT      #Recent SDH 2/23      # Scalp  hematoma and laceration      -c/w advate 40 units/ kg daily      - will check factor VIII levels once, otherwise no need for monitoring while on      advate      - SUSPENDED Eliquis 2.5 bid (planned to cont for at least 3 months which would      be till June 2023) in setting of recent fall with scalp hematoma and laceration      and below knee DVTs have usually have low risk for PE      - T+S ordered      - Started PO iron     - Per hematology: Advate 40 units/ kg daily. aPTT for dosage adjustment.                  #Electrolyte imbalance      -Had low K/Mg/Ca/Pi during last admission      - Admit Mg 0.77, K 3.0      - C/w potassium chloride 10mg daily      -Monitor electrolytes      - Replete as needed      - Monitor for refeeding syndrome             #Elevated ethanol level      -Utox ordered given elevated ethanol levels at [facility] ED      -HNO alcohol use and he has been at a SNF      - Started on thiamine and folic acid             #CAD      #HTN      #HLD      -C/w Metoprolol 50mg,      -C/w atorvastatin 40mg.             #BPH And [facility]ropathy and [facility]        -C/w finasteride 5mg,      -Will restart tamsulosin 0.4mg while inpatient and assess orthostatic BP                   Assessment/Plan  Problem List Items Addressed This Visit          Respiratory    CAP (community acquired pneumonia) - Primary    Acute respiratory failure (CMS/HCC)     Community Acquired Pneumonia, resolved      Presented to [place] ED with syncope, tachycardia, spo2 93% started on 4L O2.      CXR did nto show any consolidative process, possible pulmonary edema, left      pleural effusion      Reports cough with expectorate for 1 week. No fever      Plan:      -CT with concern for fluid overload, mesenteric edema, anasarca      -patient started on levofloxacin 04/08 - 4/10      -c/w guaifenesin q12hrs                Circulatory    ASHD (arteriosclerotic heart disease)     Non-ST elevation myocardial infarction (NSTEMI) in recovery phase (CMS/[facility])     C/w meds         Acute diastolic heart failure (CMS/[facility])    Diastolic CHF, acute on chronic (CMS/[facility])     02/2023 TTE with EF 60-65%, spectral doppler with impaired LV diastolic      filling      - 40 IV lasix on 4/10 with good UOP      - Monitor weight and labs  Not on lasix at presentn          Persistent atrial fibrillation (CMS/[facility])     - Noted to be in atrial fibrillation on telemetry and EKG this admission, has      been noted in a prior EKG but this is largely a new diagnosis      - Largely rate controlled in 80s on metoprolol this admission      - Risk of AC outweighs benefit in this patient with falls and hemophilia            Genitourinary    Enlarged prostate with lower urinary tract symptoms (LUTS)       Musculoskeletal    Primary osteoarthritis of left knee    Fracture of femoral neck, right (CMS/HCC)       Hematologic    Hemophilia A (CMS/[facility])       Other    Hypokalemia    Hypomagnesemia                  Electronically Signed By: Mau Al MD   4/16/23  2:29 PM

## 2023-04-16 NOTE — ASSESSMENT & PLAN NOTE
Community Acquired Pneumonia, resolved      Presented to [place] ED with syncope, tachycardia, spo2 93% started on 4L O2.      CXR did nto show any consolidative process, possible pulmonary edema, left      pleural effusion      Reports cough with expectorate for 1 week. No fever      Plan:      -CT with concern for fluid overload, mesenteric edema, anasarca      -patient started on levofloxacin 04/08 - 4/10      -c/w guaifenesin q12hrs

## 2023-04-16 NOTE — ASSESSMENT & PLAN NOTE
- Noted to be in atrial fibrillation on telemetry and EKG this admission, has      been noted in a prior EKG but this is largely a new diagnosis      - Largely rate controlled in 80s on metoprolol this admission      - Risk of AC outweighs benefit in this patient with falls and hemophilia

## 2023-04-16 NOTE — PROGRESS NOTES
Subjective   Patient ID: Ramón Flores is a 89 y.o. male who presents for Congestive Heart Failure (diaGNOSIS).      HPI  89 year old male with a PMHx of hemophilia A, protein C deficiency, HTN, HLD, nephrolithiasis, CAD s/p CABG (2002), BPH, #R NOF s/p THR, recent SDH who presentED TO er with hx of fall and forehead hematoma/laceration.  Extensively worked up in previous admission, neg EEG, TTE wnl, orthostats positive but thought to be due to dehydration given soft BPs. This admission, workup for syncope including video EEG was negative for epileptiform activity. CT PE negative. Patient found to be fluid overloaded on admission. Also found to be in new afib, rate controlled in 80s, holding apixaban given patient's high bleeding risk. Started on levofloxacin on admission for potential CAP. Heme consulted for Hemophilia A, started on advate 40 u/kg daily while inpatient. Patient's ethanol level elevated on admission  started on thiamine and folate  LISA REPLACED 4/12  HAS CARDIOLOGY FOLLOW UP  DENIES COMPLAINTS Appears confused and lethargic.   Current Outpatient Medications on File Prior to Visit   Medication Sig Dispense Refill    antihemophil.FVIII,full length (ADVATE IV) Infuse into a venous catheter once daily as needed (hemophilia). kit; 50 unit/kg      atorvastatin (Lipitor) 40 mg tablet Take 1 tablet (40 mg) by mouth once daily.      carboxymethylcellulose (Refresh Plus) 0.5 % ophthalmic solution Administer 1 drop into both eyes in the morning and 1 drop at noon and 1 drop in the evening and 1 drop before bedtime.      cholecalciferol (Vitamin D-3) 50 mcg (2,000 unit) capsule Take 1 capsule (50 mcg) by mouth once daily.      cyclobenzaprine (Flexeril) 5 mg tablet Take 1 tablet (5 mg) by mouth 3 times a day as needed.      finasteride (Proscar) 5 mg tablet Take 1 tablet (5 mg) by mouth once daily.      hydroCHLOROthiazide (Microzide) 12.5 mg capsule Take 1 capsule (12.5 mg) by mouth once daily.       loperamide (Imodium A-D) 2 mg tablet Take 1 tablet (2 mg) by mouth if needed (after each loose stool up to 8 tabs daily).      magnesium oxide (Mag-Ox) 400 mg (241.3 mg magnesium) tablet Take 1 tablet (400 mg) by mouth in the morning and 1 tablet (400 mg) before bedtime.      metoprolol succinate XL (Toprol-XL) 50 mg 24 hr tablet Take 1 tablet (50 mg) by mouth once daily.      mupirocin (Bactroban) 2 % ointment 3 times a day. apply small amount; as directed      nystatin (Mycostatin) 100,000 unit/mL suspension Mouth/Throat      pantoprazole (ProtoNix) 40 mg EC tablet Take 1 tablet (40 mg) by mouth once daily.      polyethylene glycol (Miralax) 17 gram/dose powder Take 17 g by mouth. mix in 8 ounces of liquid and drink 1 to 2 times daily for constipation      potassium chloride CR (Klor-Con M20) 20 mEq ER tablet Take 1 tablet (20 mEq) by mouth once daily. while on hydroclorothiazide      potassium chloride CR 10 mEq ER tablet Take 1 tablet (10 mEq) by mouth once daily.      tamsulosin (Flomax) 0.4 mg 24 hr capsule Take 1 capsule (0.4 mg) by mouth in the morning and 1 capsule (0.4 mg) before bedtime.      traMADol (Ultram) 50 mg tablet Take 1 tablet (50 mg) by mouth in the morning and 1 tablet (50 mg) at noon and 1 tablet (50 mg) in the evening and 1 tablet (50 mg) before bedtime.      vit A/vit C/vit E/zinc/copper (PRESERVISION AREDS ORAL) Take 1 capsule by mouth once daily.       No current facility-administered medications on file prior to visit.        Review of Systems  ROS . 14 systems reviewed and negative except as in HPI . Hospital and nursing notes reviewed.      Objective   There were no vitals taken for this visit.  BSA: There is no height or weight on file to calculate BSA.  Growth percentiles: Facility age limit for growth %shani is 20 years. Facility age limit for growth %shani is 20 years.   No visits with results within 1 Week(s) from this visit.   Latest known visit with results is:   Legacy Encounter  on 10/12/2022   Component Date Value Ref Range Status    Retic % 10/12/2022 2.3 (H)  0.5 - 2.0 % Final    Retic Absolute 10/12/2022 0.087  0.017 - 0.110 x10E12/L Final    Immature Retic fraction 10/12/2022 15.7  0.0 - 16.0 % Final    Reticulocyte Hemoglobin 10/12/2022 38  28 - 38 pg Final    Glucose 10/12/2022 98  74 - 99 mg/dL Final    Sodium 10/12/2022 141  136 - 145 mmol/L Final    Potassium 10/12/2022 3.7  3.5 - 5.3 mmol/L Final    Chloride 10/12/2022 103  98 - 107 mmol/L Final    Bicarbonate 10/12/2022 30  21 - 32 mmol/L Final    Anion Gap 10/12/2022 12  10 - 20 mmol/L Final    Urea Nitrogen 10/12/2022 22  6 - 23 mg/dL Final    Creatinine 10/12/2022 1.05  0.50 - 1.30 mg/dL Final    GFR MALE 10/12/2022 66  >90 mL/min/1.73m2 Final    Comment:  CALCULATIONS OF ESTIMATED GFR ARE PERFORMED   USING THE 2021 CKD-EPI STUDY REFIT EQUATION   WITHOUT THE RACE VARIABLE FOR THE IDMS-TRACEABLE   CREATININE METHODS.    https://jasn.asnjournals.org/content/early/2021/09/22/ASN.7594294758      Calcium 10/12/2022 8.7  8.6 - 10.3 mg/dL Final    Albumin 10/12/2022 4.2  3.4 - 5.0 g/dL Final    Alkaline Phosphatase 10/12/2022 63  33 - 136 U/L Final    Total Protein 10/12/2022 6.8  6.4 - 8.2 g/dL Final    AST 10/12/2022 19  9 - 39 U/L Final    Total Bilirubin 10/12/2022 1.7 (H)  0.0 - 1.2 mg/dL Final    ALT (SGPT) 10/12/2022 15  10 - 52 U/L Final    Comment:  Patients treated with Sulfasalazine may generate    falsely decreased results for ALT.      Protime 10/12/2022 14.5 (H)  9.8 - 13.4 sec Final    INR 10/12/2022 1.3 (H)  0.9 - 1.1 Final    aPTT 10/12/2022 48 (H)  26 - 39 sec Final    Comment:   THE APTT IS NO LONGER USED FOR MONITORING     UNFRACTIONATED HEPARIN THERAPY.    FOR MONITORING HEPARIN THERAPY,     USE THE HEPARIN ASSAY.      Uric Acid 10/12/2022 5.4  4.0 - 7.5 mg/dL Final    Comment:  Venipuncture immediately after or during the    administration of Metamizole may lead to falsely   low results. Testing should be  performed immediately   prior to Metamizole dosing.      WBC 10/12/2022 5.9  4.4 - 11.3 x10E9/L Final    RBC 10/12/2022 3.88 (L)  4.50 - 5.90 x10E12/L Final    Hemoglobin 10/12/2022 12.9 (L)  13.5 - 17.5 g/dL Final    Hematocrit 10/12/2022 37.8 (L)  41.0 - 52.0 % Final    MCV 10/12/2022 97  80 - 100 fL Final    MCHC 10/12/2022 34.1  32.0 - 36.0 g/dL Final    Platelets 10/12/2022 142 (L)  150 - 450 x10E9/L Final    RDW 10/12/2022 13.7  11.5 - 14.5 % Final    Neutrophils % 10/12/2022 63.7  40.0 - 80.0 % Final    Immature Granulocytes %, Automated 10/12/2022 0.3  0.0 - 0.9 % Final    Comment:  Immature Granulocyte Count (IG) includes promyelocytes,    myelocytes and metamyelocytes but does not include bands.   Percent differential counts (%) should be interpreted in the   context of the absolute cell counts (cells/L).      Lymphocytes % 10/12/2022 29.9  13.0 - 44.0 % Final    Monocytes % 10/12/2022 4.9  2.0 - 10.0 % Final    Eosinophils % 10/12/2022 0.7  0.0 - 6.0 % Final    Basophils % 10/12/2022 0.5  0.0 - 2.0 % Final    Neutrophils Absolute 10/12/2022 3.74  1.60 - 5.50 x10E9/L Final    Lymphocytes Absolute 10/12/2022 1.76  0.80 - 3.00 x10E9/L Final    Monocytes Absolute 10/12/2022 0.29  0.05 - 0.80 x10E9/L Final    Eosinophils Absolute 10/12/2022 0.04  0.00 - 0.40 x10E9/L Final    Basophils Absolute 10/12/2022 0.03  0.00 - 0.10 x10E9/L Final    Factor VIII Activity 10/12/2022 14 (L)  55 - 180 % Final    Sedimentation Rate 10/12/2022 2  0 - 20 mm/h Final    Ferritin 10/12/2022 85  20 - 300 ug/L Final    TSH 10/12/2022 4.88 (H)  0.44 - 3.98 mIU/L Final    Comment:  TSH testing is performed using different testing    methodology at [facility] than at other    [facility]. Direct result comparisons should    only be made within the same method.      Iron 10/12/2022 88  35 - 150 ug/dL Final    TIBC 10/12/2022 324  240 - 445 ug/dL Final    Iron Saturation 10/12/2022 27  25 - 45 % Final      Physical  Exam  Constitutional: NAD, laying in bed Eyes: EOMI, clear sclera, normal conjunctiva ENMT: moist mucous dry Head/Neck: no appreciable JVD, no carotid bruit, stellate laceration over left forehead w/o active bleeding with slight hematoma around it, small ecchymosis over glabella, erythema over nose and ecchymosis right eyelid Respiratory/Thorax: AE equal bilaterally, +mild , adequate air movement B/L, normal WOB on NC, no wheezes Cardiovascular: +S1, +S2, irregular, no m/r/g Gastrointestinal: soft, NT, ND, +BS, no appreciable HSM Extremities: B/L 1+ edema till knee, improved Neurological: AOx2, confused, endorsing visual hallucinations, tangential speech, no signs of inattention Skin: warm and dry     Assessment:      89 year old male with a PMHx of hemophilia A, protein C deficiency, HTN, HLD,      nephrolithiasis, CAD s/p CABG (2002), BPH, #R NOF s/p THR, recent SDH who      presents with hx of fall and forehead hematoma/laceration. This is his 3rd fall      in 4 months, 1st in Jan reportedly mechanical with R hip fracture, 2nd in Feb      thought to be syncope from dehydration and now for the 3rd time without any      memory of the events around the fall. Extensively worked up in previous      admission, neg EEG, TTE wnl, orthostats positive but thought to be due to      dehydration given soft BPs.             This admission, workup for syncope including video EEG was negative for      epileptiform activity. CT PE negative. Patient found to be fluid overloaded on      admission. Also found to be in new afib, rate controlled in 80s, holding      apixaban given patient's high bleeding risk. Started on levofloxacin on      admission for potential CAP. Heme consulted for Hemophilia A, started on advate      40 u/kg daily while inpatient. Patient's ethanol level elevated on admission,      started on thiamine and folate.               #Syncope      - Evaluated for a fall while working with PT in Feb 2022 with  dysarthria/left      facial droop. CTH showed SDH. EKG NSR. ECHO no valvular lesion/EF WNL. EEG      negative, keppra was discontinued per neurology recs. Orthostats negative but      BPs soft so event was thought to be 2/2 dehydration. PT/OT recommended SNF but      discharge to home on family request.      - video EEG on this admission without evidence of seizure      - This is his 3rd fall/syncope in 4 months. CT PE negative. CTH w/o e/o      intracranial bleed.      Plan:      -     - CT PE negative for PE      - Carotid doppler with <50% stenosis bilaterally      - r ziopatch with close cardiology follow up             #Acute hypoxic respiratory failure      #Community Acquired Pneumonia, resolved      Presented to [place] ED with syncope, tachycardia, spo2 93% started on 4L O2.      CXR did nto show any consolidative process, possible pulmonary edema, left      pleural effusion      Reports cough with expectorate for 1 week. No fever      Plan:      -CT with concern for fluid overload, mesenteric edema, anasarca      -patient started on levofloxacin 04/08 - 4/10      -c/w guaifenesin q12hrs             #HFpEF (EF 60-65% in 02/2023)      - 02/2023 TTE with EF 60-65%, spectral doppler with impaired LV diastolic      filling      - 40 IV lasix on 4/10 with good UOP      - Monitor weight and labs  Not on lasix at presentn             #New onset atrial fibrillation, rate controlled      - Noted to be in atrial fibrillation on telemetry and EKG this admission, has      been noted in a prior EKG but this is largely a new diagnosis      - Largely rate controlled in 80s on metoprolol this admission      - Risk of AC outweighs benefit in this patient with falls and hemophilia      -            #Hemophilia A      #hx of BLE below knee DVT      #Recent SDH 2/23      # Scalp hematoma and laceration      -c/w advate 40 units/ kg daily      - will check factor VIII levels once, otherwise no need for monitoring while  on      advate      - SUSPENDED Eliquis 2.5 bid (planned to cont for at least 3 months which would      be till June 2023) in setting of recent fall with scalp hematoma and laceration      and below knee DVTs have usually have low risk for PE      - T+S ordered      - Started PO iron     - Per hematology: Advate 40 units/ kg daily. aPTT for dosage adjustment.                  #Electrolyte imbalance      -Had low K/Mg/Ca/Pi during last admission      - Admit Mg 0.77, K 3.0      - C/w potassium chloride 10mg daily      -Monitor electrolytes      - Replete as needed      - Monitor for refeeding syndrome             #Elevated ethanol level      -Utox ordered given elevated ethanol levels at [facility] ED      -HNO alcohol use and he has been at a SNF      - Started on thiamine and folic acid             #CAD      #HTN      #HLD      -C/w Metoprolol 50mg,      -C/w atorvastatin 40mg.             #BPH And obstructive uropathy and urinary retention [facility]  [place] in [facility]        -C/w finasteride 5mg,      -Will restart tamsulosin 0.4mg while inpatient and assess orthostatic BP                   Assessment/Plan   Problem List Items Addressed This Visit          Respiratory    CAP (community acquired pneumonia) - Primary    Acute respiratory failure (CMS/HCC)     Community Acquired Pneumonia, resolved      Presented to [place] ED with syncope, tachycardia, spo2 93% started on 4L O2.      CXR did nto show any consolidative process, possible pulmonary edema, left      pleural effusion      Reports cough with expectorate for 1 week. No fever      Plan:      -CT with concern for fluid overload, mesenteric edema, anasarca      -patient started on levofloxacin 04/08 - 4/10      -c/w guaifenesin q12hrs                Circulatory    ASHD (arteriosclerotic heart disease)    Non-ST elevation myocardial infarction (NSTEMI) in recovery phase (CMS/[facility])     C/w meds         Acute diastolic heart failure (CMS/[facility])     Diastolic CHF, acute on chronic (CMS/HCC)     02/2023 TTE with EF 60-65%, spectral doppler with impaired LV diastolic      filling      - 40 IV lasix on 4/10 with good UOP      - Monitor weight and labs  Not on lasix at presentn          Persistent atrial fibrillation (CMS/[facility])     - Noted to be in atrial fibrillation on telemetry and EKG this admission, has      been noted in a prior EKG but this is largely a new diagnosis      - Largely rate controlled in 80s on metoprolol this admission      - Risk of AC outweighs benefit in this patient with falls and hemophilia            Genitourinary    Enlarged prostate with lower urinary tract symptoms (LUTS)       Musculoskeletal    Primary osteoarthritis of left knee    Fracture of femoral neck, right (CMS/HCC)       Hematologic    Hemophilia A (CMS/[facility])       Other    Hypokalemia    Hypomagnesemia

## 2023-04-16 NOTE — ASSESSMENT & PLAN NOTE
02/2023 TTE with EF 60-65%, spectral doppler with impaired LV diastolic      filling      - 40 IV lasix on 4/10 with good UOP      - Monitor weight and labs  Not on lasix at presentn

## 2023-04-16 NOTE — ASSESSMENT & PLAN NOTE
BPH And obstructive uropathy and urinary retention Failed voiding at [facility]  [place] in place        -C/w finasteride 5mg,      -Will restart tamsulosin 0.4mg while inpatient and assess orthostatic BP

## 2023-04-18 ENCOUNTER — NURSING HOME VISIT (OUTPATIENT)
Dept: POST ACUTE CARE | Facility: EXTERNAL LOCATION | Age: 88
End: 2023-04-18
Payer: MEDICARE

## 2023-04-18 DIAGNOSIS — Z86.2 HISTORY OF HEMOPHILIA: ICD-10-CM

## 2023-04-18 DIAGNOSIS — R53.81 PHYSICAL DEBILITY: Primary | ICD-10-CM

## 2023-04-18 PROCEDURE — 99309 SBSQ NF CARE MODERATE MDM 30: CPT | Performed by: FAMILY MEDICINE

## 2023-04-18 NOTE — LETTER
Patient: Ramón Flores  : 1928    Encounter Date: 2023    Nursing Home Visit  Name: Ramón Flores  YOB: 1928  MRN: [id number]    Chief Complaint  Lab review/urinary retention  Subjective  HPI:   89 year old male with hx of hemophilia A, protein C deficiency, HTN, HLD, nephrolithiasis, CAD s/p CABG, and BPH admitted for urgent Rt NOF fracture. S/p Rt Femur fixation by Ortho on , but had insufficient factor replacement prior to procedure. Postop patient had a hemoglobin drop, slightly more than expected but likely reasonable for having suboptimals factor replacement. He received 1 unit of packed red blood cells post-op with appropriate response and H&H is now stable. Discussed with his OP hematology and likely expected from blood loss from surgery given hemophilia A and not full repletion of factors rather than ongoing bleeding. Hematology on consult, patient was receiving twice daily therapy with Adv ate now transitioned to once daily and factor levels are stable. CT angio showed no evidence of blood extravasation. After discussion with hematology, patient will need to go to SNF with daily Adv ate for a period before transitioning to his home prophylactic therapy. Hematology is arranging. Patient should not be on any blood thinning agents or aspirin. Patient has been awaiting placement, on  working with physical therapy on  he had a syncopal episode in the setting of diarrhea. His blood pressure and mentation improved after getting back in bed and he is getting IV fluids. Denies diarrhea since resolved and he has had no further dizziness. His blood pressure is stable, however his hydrochlorothiazide and metoprolol are continued be on hold. Arrangements made by hematology to deliver Adv ate to his nursing facility where he will be doing PT OT and receiving Adv ate infusions. Per hematology, patient will continue Adv ate infusions for 5 more days and then transition to  every other day infusions. He did have a CBC monitored weekly. He will need post discharge follow-up with orthopedics and hematology. please note probably hematology service patient should never be started on aspirin or other blood thinners/anticoagulants or chemical DVT prophylaxis, if there is concern that patient does need to be started on any of these medications that should be thoroughly discussed with the hematology service and his hematologist   1/31 Patient is somewhat anxious. He states his right hip feels fine, he denies pain currently. Appetite fair, patient states he does not like the food all that much. He is aware of the schedule for his infusion, knows who is coming and when   2/2 Patient is in good spirits. Denies pain or discomfort. Patient reports he is getting his infusions as scheduled. Appetite fair, Denies HA, dizziness, SOB, CP, N&V or constipation   2/14 Patient in good spirits, saw Ortho on 2/9. staples removed. He was surprised how much bruising he had. Appetite good, denies pain or discomfort. Denies HA, dizziness, SOB, CP, N&V or constipation   2/16 Notified yesterday evening, patient's K+ was 3.1 down from 3.4 on 2/14 Ordered to give 40meq now and repeat labs in am. Today patient in great spirits, he will most likely discharge home over weekend. His niece was visiting today. Appetite is good he c/o some knee pain, he stated he had some gel that relieved it. Denies HA, dizziness, SOB, CP, N&V or constipation  3/9   Patient in fair mood, is not in any pain. Appetite only fair due to not liking the food.  States he picks and chooses.   4/4--  On 3/27 patient went to ortho to have his knee injected, he reported it did not work really at all.  On 3/29 patient was coughing ordered CXR results reported Lung emphysema, Hazy bibasilar airspace opacities. Slightly enlarged cardiac silhouette with congestion. New order for Z-pack.  He continued to worsen, on 3/31 ordered repeat CXR, flu swab,  CBC/BMP  Also started Levaquin and probiotic. Covid test, done earlier was negative.  Last evening (4/3) patient slid out of his wheelchair, no injuries.  Today patient talkative, in good spirits.  Appetite remains fair.  Weight is stable.  Denies pain or discomfort. Denies HA, dizziness, SOB, CP, N&V or constipation. Labs from yesterday, essentially stable, did not drop in HGB from 11-->8.    4/6  Patient in fair mood, per nsg he  was complaining about having a kelley catheter placed.  Stated he was having difficulty urinating.  Per PCC he had 700+ residual.  Appetite is fair, 25-75% mostly.  Denies pain or discomfort.  Denies HA, dizziness, SOB, CP, N&V or constipation    Review of Systems:  Reviewed chart looking at current medications, treatment, labs and x-rays and note pertinent positives or negatives, otherwise 10 point system review negative except for what is noted in HPI.    Objective  VS: There were no vitals taken for this visit.  Physical exam:   Physical Exam  Vitals reviewed.   Constitutional:       Appearance: He is normal weight.   HENT:      Head: Normocephalic.      Nose: Nose normal.      Mouth/Throat:      Mouth: Mucous membranes are moist.      Pharynx: No oropharyngeal exudate or posterior oropharyngeal erythema.   Cardiovascular:      Rate and Rhythm: Normal rate and regular rhythm.      Pulses: Normal pulses.      Heart sounds: Normal heart sounds.   Pulmonary:      Effort: Pulmonary effort is normal.   Musculoskeletal:      Right knee: Decreased range of motion.      Left knee: Decreased range of motion.   Skin:     General: Skin is warm and dry.   Neurological:      Mental Status: He is alert and oriented to person, place, and time. Mental status is at baseline.      Motor: Weakness present.   Psychiatric:         Mood and Affect: Mood normal.         Thought Content: Thought content normal.      Assessment/Plan    89 year old male with hx of hemophilia A, protein C deficiency, HTN, HLD,  nephrolithiasis, CAD s/p CABG, and BPH admitted for urgent Rt NOF fracture. S/p Rt Femur fixation    Physical debility  Continue PT/OT    Hemophilia A (CMS/HCC)  C/w treatement       ASHD (arteriosclerotic heart disease)  Nov. 15, 2012: LV EF normal . Cbc bmp weekly     Acute diastolic heart failure (CMS/HCC)  C/w meds    Pneumonia  C/w ATB    Hypertension  C/w same meds, monitor labs    Diastolic CHF, acute on chronic (CMS/HCC)  Monitor labs    Persistent atrial fibrillation (CMS/HCC)  Rate controlled, no a/c 2/2 hemophilia     Anemia  HGB back to baseline 10.7    Obstructive uropathy  Consult renal, c/w flomax and finasteride      Electronically Signed By: SUSI Ramirez-CNP   5/12/23  4:57 PM

## 2023-04-20 ENCOUNTER — NURSING HOME VISIT (OUTPATIENT)
Dept: POST ACUTE CARE | Facility: EXTERNAL LOCATION | Age: 88
End: 2023-04-20
Payer: MEDICARE

## 2023-04-20 DIAGNOSIS — N13.9 OBSTRUCTIVE UROPATHY: ICD-10-CM

## 2023-04-20 DIAGNOSIS — I25.10 ASHD (ARTERIOSCLEROTIC HEART DISEASE): ICD-10-CM

## 2023-04-20 DIAGNOSIS — I50.31 ACUTE DIASTOLIC HEART FAILURE (MULTI): ICD-10-CM

## 2023-04-20 DIAGNOSIS — D66 HEMOPHILIA A (MULTI): Primary | ICD-10-CM

## 2023-04-20 DIAGNOSIS — I48.19 PERSISTENT ATRIAL FIBRILLATION (MULTI): ICD-10-CM

## 2023-04-20 DIAGNOSIS — D63.8 ANEMIA IN OTHER CHRONIC DISEASES CLASSIFIED ELSEWHERE: ICD-10-CM

## 2023-04-20 DIAGNOSIS — I10 HYPERTENSION, UNSPECIFIED TYPE: ICD-10-CM

## 2023-04-20 PROCEDURE — 99310 SBSQ NF CARE HIGH MDM 45: CPT | Performed by: FAMILY MEDICINE

## 2023-04-20 PROCEDURE — G0317 PROLONG NURSING FAC EVAL 15M: HCPCS | Performed by: FAMILY MEDICINE

## 2023-04-20 NOTE — LETTER
Patient: Ramón Flores  : 1928    Encounter Date: 2023    Nursing Home Visit  Name: Ramón Flores  YOB: 1928  MRN: [id number]    Chief Complaint  Lab review/urinary retention  Subjective  HPI:   89 year old male with hx of hemophilia A, protein C deficiency, HTN, HLD, nephrolithiasis, CAD s/p CABG, and BPH admitted for urgent Rt NOF fracture. S/p Rt Femur fixation by Ortho on , but had insufficient factor replacement prior to procedure. Postop patient had a hemoglobin drop, slightly more than expected but likely reasonable for having suboptimals factor replacement. He received 1 unit of packed red blood cells post-op with appropriate response and H&H is now stable. Discussed with his OP hematology and likely expected from blood loss from surgery given hemophilia A and not full repletion of factors rather than ongoing bleeding. Hematology on consult, patient was receiving twice daily therapy with Adv ate now transitioned to once daily and factor levels are stable. CT angio showed no evidence of blood extravasation. After discussion with hematology, patient will need to go to SNF with daily Adv ate for a period before transitioning to his home prophylactic therapy. Hematology is arranging. Patient should not be on any blood thinning agents or aspirin. Patient has been awaiting placement, on  working with physical therapy on  he had a syncopal episode in the setting of diarrhea. His blood pressure and mentation improved after getting back in bed and he is getting IV fluids. Denies diarrhea since resolved and he has had no further dizziness. His blood pressure is stable, however his hydrochlorothiazide and metoprolol are continued be on hold. Arrangements made by hematology to deliver Adv ate to his nursing facility where he will be doing PT OT and receiving Adv ate infusions. Per hematology, patient will continue Adv ate infusions for 5 more days and then transition to  every other day infusions. He did have a CBC monitored weekly. He will need post discharge follow-up with orthopedics and hematology. please note probably hematology service patient should never be started on aspirin or other blood thinners/anticoagulants or chemical DVT prophylaxis, if there is concern that patient does need to be started on any of these medications that should be thoroughly discussed with the hematology service and his hematologist   --1/31 Patient is somewhat anxious. He states his right hip feels fine, he denies pain currently. Appetite fair, patient states he does not like the food all that much. He is aware of the schedule for his infusion, knows who is coming and when   --2/2 Patient is in good spirits. Denies pain or discomfort. Patient reports he is getting his infusions as scheduled. Appetite fair, Denies HA, dizziness, SOB, CP, N&V or constipation   --2/14 Patient in good spirits, saw Ortho on 2/9. staples removed. He was surprised how much bruising he had. Appetite good, denies pain or discomfort. Denies HA, dizziness, SOB, CP, N&V or constipation   --2/16 Notified yesterday evening, patient's K+ was 3.1 down from 3.4 on 2/14 Ordered to give 40meq now and repeat labs in am. Today patient in great spirits, he will most likely discharge home over weekend. His niece was visiting today. Appetite is good he c/o some knee pain, he stated he had some gel that relieved it. Denies HA, dizziness, SOB, CP, N&V or constipation  --3/9   Patient in fair mood, is not in any pain. Appetite only fair due to not liking the food.  States he picks and chooses.   --4/4-  On 3/27 patient went to ortho to have his knee injected, he reported it did not work really at all.  On 3/29 patient was coughing ordered CXR results reported Lung emphysema, Hazy bibasilar airspace opacities. Slightly enlarged cardiac silhouette with congestion. New order for Z-pack.  He continued to worsen, on 3/31 ordered repeat CXR, flu  swab, CBC/BMP  Also started Levaquin and probiotic. Covid test, done earlier was negative.  Last evening (4/3) patient slid out of his wheelchair, no injuries.  Today patient talkative, in good spirits.  Appetite remains fair.  Weight is stable.  Denies pain or discomfort. Denies HA, dizziness, SOB, CP, N&V or constipation. Labs from yesterday, essentially stable, did not drop in HGB from 11-->8.    --4/6  Patient in fair mood, per nsg he  was complaining about having a kelley catheter placed.  Stated he was having difficulty urinating.  Per PCC he had 700+ residual.  Appetite is fair, 25-75% mostly.  Denies pain or discomfort.  Denies HA, dizziness, SOB, CP, N&V or constipation  --4/18  Patient returned from hospital 4/14   ### Per hospital records: Patient presents with hx of fall and forehead hematoma/laceration. This is his 3rd fall in 4 months, 1st in Jan reportedly mechanical with R hip fracture, 2nd in Feb thought to be syncope from dehydration and now for the 3rd time without any memory of the events around the fall. Extensively worked up in previous admission, neg EEG, orthostats positive but thought to be due to dehydration given soft BPs. 02/24/23 Echo showed EF 60-65%, spectral doppler with impaired LV diastolic filling. This admission, workup for syncope including video EEG was negative for epileptiform activity. CT PE negative. Patient found to be fluid overloaded on admission, CT showed moderate bilateral pleural effusions with adjacent atelectasis, pulmonary edema, mesenteric edema and body wall anasarca.  without a baseline. Also found to be in new afib, rates controlled in 80s on metoprolol succinate 75 mg. Neuro was consulted, there was low suspicion for neurogenic cause of syncope or seizure, patient's subdural bleeds were deemed to be chronic. Hematology was consulted for patient's hemophilia, continued on Advate daily. Patient's ethanol level elevated on admission, started on thiamine and  folate, though he denied ETOH use. Also found with several electrolyte abnormalities including hypokalemia, hypomagnesemia, hypophosphatemia. Treated for CAP with three days of levofloxacin, patient weaned to RA. Diuresed with IV lasix. Patient had evidence of delirium on 4/10, UA without signs of infection, replaced kelley since patient still retaining. Delirium improved --4/11- 4/12. For his syncope, lower concern for cardiac etiology given echo in 02/24 without AS, no pauses on telemetry. However patient does have new afib and new diagnosis of HFpEF. Currently we suspect that patient's syncope is due to orthostatic hypotension, as suspected in previous admission. Repeat orthostats wnl while inpatient. Will discharge patient with event monitor and close cardiology follow up. For his afib, we decided not to start anticoagulation given his high bleeding risk from hemophilia, history of falls. We chose a rate control strategy with metoprolol succinate. For his heart failure, we deferred starting GDMT due to patient's age, history of orthostatic hypotension.  Discharge back to SNF  Today patient is fair, he does not like his kelley.  He denies pain other than his right knee that he states gives him trouble.  Appetite fair to good.  Denies HA, dizziness, SOB, CP, N&V or constipation    Review of Systems:  Reviewed chart looking at current medications, treatment, labs and x-rays and note pertinent positives or negatives, otherwise 10 point system review negative except for what is noted in HPI.    Objective  VS: Blood pressure 150/80, pulse 101, temperature 36.6 °C (97.8 °F), resp. rate 18, weight 60.4 kg (133 lb 3.2 oz), SpO2 98 %.    Physical exam:   Physical Exam  Vitals reviewed.   Constitutional:       Appearance: He is normal weight.   HENT:      Head: Normocephalic.      Comments: Laceration to left frontal head, sutures intact, no erythema or or drainage     Nose: Nose normal.      Comments: Bruised tip of nose  2/2 fall     Mouth/Throat:      Mouth: Mucous membranes are moist.   Eyes:      Comments: Left eye is ecchymotic 2/2 fall   Cardiovascular:      Rate and Rhythm: Normal rate and regular rhythm.      Pulses: Normal pulses.      Heart sounds: Normal heart sounds.   Pulmonary:      Effort: Pulmonary effort is normal.      Breath sounds: Normal breath sounds.   Abdominal:      General: Bowel sounds are normal.      Palpations: Abdomen is soft.   Musculoskeletal:         General: Normal range of motion.      Comments: Generalized weakness   Skin:     General: Skin is warm and dry.   Neurological:      Mental Status: He is alert and oriented to person, place, and time. Mental status is at baseline.      Motor: Weakness present.   Psychiatric:         Mood and Affect: Mood normal.         Behavior: Behavior normal.         Thought Content: Thought content normal.      Assessment/Plan    89 year old male with hx of hemophilia A, protein C deficiency, HTN, HLD, nephrolithiasis, CAD s/p CABG, and BPH admitted for urgent Rt NOF fracture. S/p Rt Femur fixation    --Physical debility  Continue PT/OT    --Hemophilia A (CMS/HCC)  C/w treatement     --ASHD (arteriosclerotic heart disease)  Nov. 15, 2012: LV EF normal . Cbc bmp weekly.  F/u with Cardiology     --Acute diastolic heart failure (CMS/HCC)  C/w meds.    --Hypertension  C/w same meds, monitor labs    --Persistent atrial fibrillation (CMS/HCC)  Rate controlled, no a/c 2/2 hemophilia     --Anemia  Labs for 4/18    --Obstructive uropathy  Consult renal, c/w flomax and finasteride      Electronically Signed By: SHERRY Ramirez   5/12/23  6:52 PM

## 2023-04-21 VITALS
HEART RATE: 105 BPM | RESPIRATION RATE: 20 BRPM | BODY MASS INDEX: 22.08 KG/M2 | TEMPERATURE: 97.6 F | DIASTOLIC BLOOD PRESSURE: 100 MMHG | WEIGHT: 136.8 LBS | SYSTOLIC BLOOD PRESSURE: 161 MMHG | OXYGEN SATURATION: 95 %

## 2023-04-21 PROBLEM — J01.90 ACUTE SINUSITIS: Status: ACTIVE | Noted: 2023-04-16

## 2023-04-21 PROBLEM — R53.81 PHYSICAL DEBILITY: Status: ACTIVE | Noted: 2023-04-21

## 2023-04-21 NOTE — PROGRESS NOTES
Nursing Home Visit  Name: Ramón Flores  YOB: 1928  MRN: [id number]    Chief Complaint  Fall  Subjective  HPI:   89 year old male with hx of hemophilia A, protein C deficiency, HTN, HLD, nephrolithiasis, CAD s/p CABG, and BPH admitted for urgent Rt NOF fracture. S/p Rt Femur fixation by Ortho on Jan 13th, but had insufficient factor replacement prior to procedure. Postop patient had a hemoglobin drop, slightly more than expected but likely reasonable for having suboptimals factor replacement. He received 1 unit of packed red blood cells post-op with appropriate response and H&H is now stable. Discussed with his OP hematology and likely expected from blood loss from surgery given hemophilia A and not full repletion of factors rather than ongoing bleeding. Hematology on consult, patient was receiving twice daily therapy with Adv ate now transitioned to once daily and factor levels are stable. CT angio showed no evidence of blood extravasation. After discussion with hematology, patient will need to go to SNF with daily Adv ate for a period before transitioning to his home prophylactic therapy. Hematology is arranging. Patient should not be on any blood thinning agents or aspirin. Patient has been awaiting placement, on 1/22 working with physical therapy on 1/22 he had a syncopal episode in the setting of diarrhea. His blood pressure and mentation improved after getting back in bed and he is getting IV fluids. Denies diarrhea since resolved and he has had no further dizziness. His blood pressure is stable, however his hydrochlorothiazide and metoprolol are continued be on hold. Arrangements made by hematology to deliver Adv ate to his nursing facility where he will be doing PT OT and receiving Adv ate infusions. Per hematology, patient will continue Adv ate infusions for 5 more days and then transition to every other day infusions. He did have a CBC monitored weekly. He will need post discharge  follow-up with orthopedics and hematology. please note probably hematology service patient should never be started on aspirin or other blood thinners/anticoagulants or chemical DVT prophylaxis, if there is concern that patient does need to be started on any of these medications that should be thoroughly discussed with the hematology service and his hematologist   1/31 Patient is somewhat anxious. He states his right hip feels fine, he denies pain currently. Appetite fair, patient states he does not like the food all that much. He is aware of the schedule for his infusion, knows who is coming and when   2/2 Patient is in good spirits. Denies pain or discomfort. Patient reports he is getting his infusions as scheduled. Appetite fair, Denies HA, dizziness, SOB, CP, N&V or constipation   2/14 Patient in good spirits, saw Ortho on 2/9. staples removed. He was surprised how much bruising he had. Appetite good, denies pain or discomfort. Denies HA, dizziness, SOB, CP, N&V or constipation   2/16 Notified yesterday evening, patient's K+ was 3.1 down from 3.4 on 2/14 Ordered to give 40meq now and repeat labs in am. Today patient in great spirits, he will most likely discharge home over weekend. His niece was visiting today. Appetite is good he c/o some knee pain, he stated he had some gel that relieved it. Denies HA, dizziness, SOB, CP, N&V or constipation  3/9   Patient in fair mood, is not in any pain. Appetite only fair due to not liking the food.  States he picks and chooses.   4/4--  On 3/27 patient went to ortho to have his knee injected, he reported it did not work really at all.  On 3/29 patient was coughing ordered CXR results reported Lung emphysema, Hazy bibasilar airspace opacities. Slightly enlarged cardiac silhouette with congestion. New order for Z-pack.  He continued to worsen, on 3/31 ordered repeat CXR, flu swab, CBC/BMP  Also started Levaquin and probiotic. Covid test, done earlier was negative.  Last  evening (4/3) patient slid out of his wheelchair, no injuries.  Today patient talkative, in good spirits.  Appetite remains fair.  Weight is stable.  Denies pain or discomfort. Denies HA, dizziness, SOB, CP, N&V or constipation. Labs from yesterday, essentially stable, did not drop in HGB from 11-->8.      Review of Systems:  Reviewed chart looking at current medications, treatment, labs and x-rays and note pertinent positives or negatives, otherwise 10 point system review negative except for what is noted in HPI.    Objective  VS: There were no vitals taken for this visit.    Physical exam:   Physical Exam  Vitals reviewed.   Constitutional:       Appearance: He is normal weight.   HENT:      Head: Normocephalic.      Nose: Nose normal.      Mouth/Throat:      Mouth: Mucous membranes are moist.      Pharynx: No oropharyngeal exudate or posterior oropharyngeal erythema.   Cardiovascular:      Rate and Rhythm: Normal rate and regular rhythm.      Pulses: Normal pulses.      Heart sounds: Normal heart sounds.   Pulmonary:      Effort: Pulmonary effort is normal.      Breath sounds: Examination of the right-lower field reveals decreased breath sounds. Examination of the left-lower field reveals decreased breath sounds. Decreased breath sounds present.   Musculoskeletal:      Right knee: Decreased range of motion.      Left knee: Decreased range of motion.   Skin:     General: Skin is warm and dry.   Neurological:      Mental Status: He is alert and oriented to person, place, and time. Mental status is at baseline.      Motor: Weakness present.   Psychiatric:         Mood and Affect: Mood normal.         Thought Content: Thought content normal.    Assessment/Plan    89 year old male with hx of hemophilia A, protein C deficiency, HTN, HLD, nephrolithiasis, CAD s/p CABG, and BPH admitted for urgent Rt NOF fracture. S/p Rt Femur fixation   Physical debility  Continue PT/OT    Hemophilia A (CMS/[facility])  C/w treatement        ASHD (arteriosclerotic heart disease)  Nov. 15, 2012: LV EF normal . Cbc bmp weekly     Acute diastolic heart failure (CMS/HCC)  C/w meds    Pneumonia  C/w ATB    Hypertension  C/w same meds, monitor labs    Diastolic CHF, acute on chronic (CMS/HCC)  Monitor labs    Persistent atrial fibrillation (CMS/HCC)  Rate controlled, no a/c 2/2 hemophilia

## 2023-04-27 ENCOUNTER — NURSING HOME VISIT (OUTPATIENT)
Dept: POST ACUTE CARE | Facility: EXTERNAL LOCATION | Age: 88
End: 2023-04-27
Payer: MEDICARE

## 2023-04-27 DIAGNOSIS — N39.0 URINARY TRACT INFECTION ASSOCIATED WITH INDWELLING URETHRAL CATHETER, INITIAL ENCOUNTER (CMS-HCC): Primary | ICD-10-CM

## 2023-04-27 DIAGNOSIS — R53.81 PHYSICAL DEBILITY: ICD-10-CM

## 2023-04-27 DIAGNOSIS — D66 HEMOPHILIA A (MULTI): ICD-10-CM

## 2023-04-27 DIAGNOSIS — I25.10 ASHD (ARTERIOSCLEROTIC HEART DISEASE): ICD-10-CM

## 2023-04-27 DIAGNOSIS — T83.511A URINARY TRACT INFECTION ASSOCIATED WITH INDWELLING URETHRAL CATHETER, INITIAL ENCOUNTER (CMS-HCC): Primary | ICD-10-CM

## 2023-04-27 DIAGNOSIS — I50.31 ACUTE DIASTOLIC HEART FAILURE (MULTI): ICD-10-CM

## 2023-04-27 DIAGNOSIS — Z91.81 HISTORY OF RECENT FALL: ICD-10-CM

## 2023-04-27 DIAGNOSIS — I10 HYPERTENSION, UNSPECIFIED TYPE: ICD-10-CM

## 2023-04-27 DIAGNOSIS — J18.9 PNEUMONIA OF RIGHT LOWER LOBE DUE TO INFECTIOUS ORGANISM: ICD-10-CM

## 2023-04-27 PROCEDURE — 99309 SBSQ NF CARE MODERATE MDM 30: CPT | Performed by: FAMILY MEDICINE

## 2023-04-27 NOTE — LETTER
Patient: Ramón Flores  : 1928    Encounter Date: 2023    Nursing Home Visit  Name: Ramón Flores  YOB: 1928  MRN: [id number]    Chief Complaint  Fall  Subjective  HPI:   89 year old male with hx of hemophilia A, protein C deficiency, HTN, HLD, nephrolithiasis, CAD s/p CABG, and BPH admitted for urgent Rt NOF fracture. S/p Rt Femur fixation by Ortho on , but had insufficient factor replacement prior to procedure. Postop patient had a hemoglobin drop, slightly more than expected but likely reasonable for having suboptimals factor replacement. He received 1 unit of packed red blood cells post-op with appropriate response and H&H is now stable. Discussed with his OP hematology and likely expected from blood loss from surgery given hemophilia A and not full repletion of factors rather than ongoing bleeding. Hematology on consult, patient was receiving twice daily therapy with Adv ate now transitioned to once daily and factor levels are stable. CT angio showed no evidence of blood extravasation. After discussion with hematology, patient will need to go to SNF with daily Adv ate for a period before transitioning to his home prophylactic therapy. Hematology is arranging. Patient should not be on any blood thinning agents or aspirin. Patient has been awaiting placement, on  working with physical therapy on  he had a syncopal episode in the setting of diarrhea. His blood pressure and mentation improved after getting back in bed and he is getting IV fluids. Denies diarrhea since resolved and he has had no further dizziness. His blood pressure is stable, however his hydrochlorothiazide and metoprolol are continued be on hold. Arrangements made by hematology to deliver Adv ate to his nursing facility where he will be doing PT OT and receiving Adv ate infusions. Per hematology, patient will continue Adv ate infusions for 5 more days and then transition to every other day  infusions. He did have a CBC monitored weekly. He will need post discharge follow-up with orthopedics and hematology. please note probably hematology service patient should never be started on aspirin or other blood thinners/anticoagulants or chemical DVT prophylaxis, if there is concern that patient does need to be started on any of these medications that should be thoroughly discussed with the hematology service and his hematologist   --1/31 Patient is somewhat anxious. He states his right hip feels fine, he denies pain currently. Appetite fair, patient states he does not like the food all that much. He is aware of the schedule for his infusion, knows who is coming and when   --2/2 Patient is in good spirits. Denies pain or discomfort. Patient reports he is getting his infusions as scheduled. Appetite fair, Denies HA, dizziness, SOB, CP, N&V or constipation   --2/14 Patient in good spirits, saw Ortho on 2/9. staples removed. He was surprised how much bruising he had. Appetite good, denies pain or discomfort. Denies HA, dizziness, SOB, CP, N&V or constipation   --2/16 Notified yesterday evening, patient's K+ was 3.1 down from 3.4 on 2/14 Ordered to give 40meq now and repeat labs in am. Today patient in great spirits, he will most likely discharge home over weekend. His niece was visiting today. Appetite is good he c/o some knee pain, he stated he had some gel that relieved it. Denies HA, dizziness, SOB, CP, N&V or constipation  --3/9   Patient in fair mood, is not in any pain. Appetite only fair due to not liking the food.  States he picks and chooses.   --4/4-  On 3/27 patient went to ortho to have his knee injected, he reported it did not work really at all.  On 3/29 patient was coughing ordered CXR results reported Lung emphysema, Hazy bibasilar airspace opacities. Slightly enlarged cardiac silhouette with congestion. New order for Z-pack.  He continued to worsen, on 3/31 ordered repeat CXR, flu swab, CBC/BMP   Also started Levaquin and probiotic. Covid test, done earlier was negative.  Last evening (4/3) patient slid out of his wheelchair, no injuries.  Today patient talkative, in good spirits.  Appetite remains fair.  Weight is stable.  Denies pain or discomfort. Denies HA, dizziness, SOB, CP, N&V or constipation. Labs from yesterday, essentially stable, did not drop in HGB from 11-->8.    --4/6  Patient in fair mood, per nsg he  was complaining about having a kelley catheter placed.  Stated he was having difficulty urinating.  Per PCC he had 700+ residual.  Appetite is fair, 25-75% mostly.  Denies pain or discomfort.  Denies HA, dizziness, SOB, CP, N&V or constipation  --4/18  Patient returned from hospital 4/14   ### Per hospital records: Patient presents with hx of fall and forehead hematoma/laceration. This is his 3rd fall in 4 months, 1st in Jan reportedly mechanical with R hip fracture, 2nd in Feb thought to be syncope from dehydration and now for the 3rd time without any memory of the events around the fall. Extensively worked up in previous admission, neg EEG, orthostats positive but thought to be due to dehydration given soft BPs. 02/24/23 Echo showed EF 60-65%, spectral doppler with impaired LV diastolic filling. This admission, workup for syncope including video EEG was negative for epileptiform activity. CT PE negative. Patient found to be fluid overloaded on admission, CT showed moderate bilateral pleural effusions with adjacent atelectasis, pulmonary edema, mesenteric edema and body wall anasarca.  without a baseline. Also found to be in new afib, rates controlled in 80s on metoprolol succinate 75 mg. Neuro was consulted, there was low suspicion for neurogenic cause of syncope or seizure, patient's subdural bleeds were deemed to be chronic. Hematology was consulted for patient's hemophilia, continued on Advate daily. Patient's ethanol level elevated on admission, started on thiamine and folate, though  he denied ETOH use. Also found with several electrolyte abnormalities including hypokalemia, hypomagnesemia, hypophosphatemia. Treated for CAP with three days of levofloxacin, patient weaned to RA. Diuresed with IV lasix. Patient had evidence of delirium on 4/10, UA without signs of infection, replaced kelley since patient still retaining. Delirium improved --4/11- 4/12. For his syncope, lower concern for cardiac etiology given echo in 02/24 without AS, no pauses on telemetry. However patient does have new afib and new diagnosis of HFpEF. Currently we suspect that patient's syncope is due to orthostatic hypotension, as suspected in previous admission. Repeat orthostats wnl while inpatient. Will discharge patient with event monitor and close cardiology follow up. For his afib, we decided not to start anticoagulation given his high bleeding risk from hemophilia, history of falls. We chose a rate control strategy with metoprolol succinate. For his heart failure, we deferred starting GDMT due to patient's age, history of orthostatic hypotension.  Discharge back to SNF  4/18  Today patient is fair, he does not like his kelley.  He denies pain other than his right knee that he states gives him trouble.  Appetite fair to good.  Denies HA, dizziness, SOB, CP, N&V or constipation  4/20  Remains stable, no c/o dizziness.  Does c/o about his kelley catheter,  Appetite fair.  Denies pain or discomfort. Denies HA, dizziness, SOB, CP, N&V or constipation  4/27 4/27: Patient in fair mood, leaning back in bed. States he is doing ok, appetite is good. Catheter is bothering him. Explained to patient that we are trialing his urinary voiding and so the balloon is deflated so moving around may cause some discomfort. Denies HA, dizziness, SOB, CP, N&V or constipation.     Review of Systems:  Reviewed chart looking at current medications, treatment, labs and x-rays and note pertinent positives or negatives, otherwise 10 point system review  negative except for what is noted in HPI.    Objective  VS: 92/67. 97.8, 81, 18, 94%, 133lbs    Physical exam:   Physical Exam  Vitals reviewed.   Constitutional:       Appearance: He is normal weight.   HENT:      Head: Normocephalic.      Comments: Laceration to left frontal head, sutures intact, no erythema or or drainage     Nose: Nose normal.      Comments: Bruised tip of nose 2/2 fall     Mouth/Throat:      Mouth: Mucous membranes are moist.   Eyes:      Comments: Left eye is ecchymotic 2/2 fall   Cardiovascular:      Rate and Rhythm: Normal rate and regular rhythm.      Pulses: Normal pulses.      Heart sounds: Normal heart sounds.   Pulmonary:      Effort: Pulmonary effort is normal.      Breath sounds: Normal breath sounds.   Abdominal:      General: Bowel sounds are normal.      Palpations: Abdomen is soft.   Musculoskeletal:         General: Normal range of motion.      Comments: Generalized weakness   Skin:     General: Skin is warm and dry.   Neurological:      Mental Status: He is alert and oriented to person, place, and time. Mental status is at baseline.      Motor: Weakness present.   Psychiatric:         Mood and Affect: Mood normal.         Behavior: Behavior normal.         Thought Content: Thought content normal.      Assessment/Plan    89 year old male with hx of hemophilia A, protein C deficiency, HTN, HLD, nephrolithiasis, CAD s/p CABG, and BPH admitted for urgent Rt NOF fracture. S/p Rt Femur fixation     #Physical debility  Continue PT/OT  Voltaren on right foot 4x a day  #Hemophilia A (CMS/HCC)  C/w treatment  Removed stitches on forehead  #ASHD (arteriosclerotic heart disease)  Nov. 15, 2012: LV EF normal  Cbc bmp weekly  #Acute diastolic heart failure (CMS/HCC)  C/w meds  #Pneumonia  C/w ATB  #Hypertension  C/w same meds, monitor labs  #Diastolic CHF, acute on chronic (CMS/HCC)  Monitor labs  #Persistent atrial fibrillation (CMS/HCC)  Rate controlled, no a/c 2/2 hemophilia          --Physical debility  Continue PT/OT    --Hemophilia A (CMS/[facility])  C/w treatement     --ASHD (arteriosclerotic heart disease)  Nov. 15, 2012: LV EF normal . Cbc bmp weekly.  F/u with Cardiology     --Acute diastolic heart failure (CMS/HCC)  C/w meds.    --Hypertension  C/w same meds, monitor labs    --Persistent atrial fibrillation (CMS/[facility])  Rate controlled, no a/c 2/2 hemophilia     --Anemia  Labs for 4/18    --Obstructive uropathy  Consult renal, c/w flomax and finasteride      Electronically Signed By: SUSI Ramirez-CNP   5/31/23  4:43 PM

## 2023-04-28 VITALS
BODY MASS INDEX: 21.95 KG/M2 | RESPIRATION RATE: 20 BRPM | HEART RATE: 106 BPM | SYSTOLIC BLOOD PRESSURE: 114 MMHG | OXYGEN SATURATION: 96 % | DIASTOLIC BLOOD PRESSURE: 83 MMHG | WEIGHT: 136 LBS | TEMPERATURE: 98 F

## 2023-04-28 PROBLEM — N13.9 OBSTRUCTIVE UROPATHY: Status: ACTIVE | Noted: 2023-04-28

## 2023-04-28 NOTE — PROGRESS NOTES
Nursing Home Visit  Name: Ramón Flores  YOB: 1928  MRN: [id number]    Chief Complaint  Lab review/urinary retention  Subjective  HPI:   89 year old male with hx of hemophilia A, protein C deficiency, HTN, HLD, nephrolithiasis, CAD s/p CABG, and BPH admitted for urgent Rt NOF fracture. S/p Rt Femur fixation by Ortho on Jan 13th, but had insufficient factor replacement prior to procedure. Postop patient had a hemoglobin drop, slightly more than expected but likely reasonable for having suboptimals factor replacement. He received 1 unit of packed red blood cells post-op with appropriate response and H&H is now stable. Discussed with his OP hematology and likely expected from blood loss from surgery given hemophilia A and not full repletion of factors rather than ongoing bleeding. Hematology on consult, patient was receiving twice daily therapy with Adv ate now transitioned to once daily and factor levels are stable. CT angio showed no evidence of blood extravasation. After discussion with hematology, patient will need to go to SNF with daily Adv ate for a period before transitioning to his home prophylactic therapy. Hematology is arranging. Patient should not be on any blood thinning agents or aspirin. Patient has been awaiting placement, on 1/22 working with physical therapy on 1/22 he had a syncopal episode in the setting of diarrhea. His blood pressure and mentation improved after getting back in bed and he is getting IV fluids. Denies diarrhea since resolved and he has had no further dizziness. His blood pressure is stable, however his hydrochlorothiazide and metoprolol are continued be on hold. Arrangements made by hematology to deliver Adv ate to his nursing facility where he will be doing PT OT and receiving Adv ate infusions. Per hematology, patient will continue Adv ate infusions for 5 more days and then transition to every other day infusions. He did have a CBC monitored weekly. He will need  post discharge follow-up with orthopedics and hematology. please note probably hematology service patient should never be started on aspirin or other blood thinners/anticoagulants or chemical DVT prophylaxis, if there is concern that patient does need to be started on any of these medications that should be thoroughly discussed with the hematology service and his hematologist   1/31 Patient is somewhat anxious. He states his right hip feels fine, he denies pain currently. Appetite fair, patient states he does not like the food all that much. He is aware of the schedule for his infusion, knows who is coming and when   2/2 Patient is in good spirits. Denies pain or discomfort. Patient reports he is getting his infusions as scheduled. Appetite fair, Denies HA, dizziness, SOB, CP, N&V or constipation   2/14 Patient in good spirits, saw Ortho on 2/9. staples removed. He was surprised how much bruising he had. Appetite good, denies pain or discomfort. Denies HA, dizziness, SOB, CP, N&V or constipation   2/16 Notified yesterday evening, patient's K+ was 3.1 down from 3.4 on 2/14 Ordered to give 40meq now and repeat labs in am. Today patient in great spirits, he will most likely discharge home over weekend. His niece was visiting today. Appetite is good he c/o some knee pain, he stated he had some gel that relieved it. Denies HA, dizziness, SOB, CP, N&V or constipation  3/9   Patient in fair mood, is not in any pain. Appetite only fair due to not liking the food.  States he picks and chooses.   4/4--  On 3/27 patient went to ortho to have his knee injected, he reported it did not work really at all.  On 3/29 patient was coughing ordered CXR results reported Lung emphysema, Hazy bibasilar airspace opacities. Slightly enlarged cardiac silhouette with congestion. New order for Z-pack.  He continued to worsen, on 3/31 ordered repeat CXR, flu swab, CBC/BMP  Also started Levaquin and probiotic. Covid test, done earlier was  negative.  Last evening (4/3) patient slid out of his wheelchair, no injuries.  Today patient talkative, in good spirits.  Appetite remains fair.  Weight is stable.  Denies pain or discomfort. Denies HA, dizziness, SOB, CP, N&V or constipation. Labs from yesterday, essentially stable, did not drop in HGB from 11-->8.    4/6  Patient in fair mood, per nsg he  was complaining about having a kelley catheter placed.  Stated he was having difficulty urinating.  Per PCC he had 700+ residual.  Appetite is fair, 25-75% mostly.  Denies pain or discomfort.  Denies HA, dizziness, SOB, CP, N&V or constipation    Review of Systems:  Reviewed chart looking at current medications, treatment, labs and x-rays and note pertinent positives or negatives, otherwise 10 point system review negative except for what is noted in HPI.    Objective  VS: Blood pressure 114/83, pulse 106, temperature 36.7 °C (98 °F), resp. rate 20, weight 61.7 kg (136 lb), SpO2 96 %.  Physical exam:   Physical Exam  Vitals reviewed.   Constitutional:       Appearance: He is normal weight.   HENT:      Head: Normocephalic.      Nose: Nose normal.      Mouth/Throat:      Mouth: Mucous membranes are moist.      Pharynx: No oropharyngeal exudate or posterior oropharyngeal erythema.   Cardiovascular:      Rate and Rhythm: Normal rate and regular rhythm.      Pulses: Normal pulses.      Heart sounds: Normal heart sounds.   Pulmonary:      Effort: Pulmonary effort is normal.   Musculoskeletal:      Right knee: Decreased range of motion.      Left knee: Decreased range of motion.   Skin:     General: Skin is warm and dry.   Neurological:      Mental Status: He is alert and oriented to person, place, and time. Mental status is at baseline.      Motor: Weakness present.   Psychiatric:         Mood and Affect: Mood normal.         Thought Content: Thought content normal.      Assessment/Plan    89 year old male with hx of hemophilia A, protein C deficiency, HTN, HLD,  nephrolithiasis, CAD s/p CABG, and BPH admitted for urgent Rt NOF fracture. S/p Rt Femur fixation    Physical debility  Continue PT/OT    Hemophilia A (CMS/HCC)  C/w treatement       ASHD (arteriosclerotic heart disease)  Nov. 15, 2012: LV EF normal . Cbc bmp weekly     Acute diastolic heart failure (CMS/HCC)  C/w meds    Pneumonia  C/w ATB    Hypertension  C/w same meds, monitor labs    Diastolic CHF, acute on chronic (CMS/HCC)  Monitor labs    Persistent atrial fibrillation (CMS/HCC)  Rate controlled, no a/c 2/2 hemophilia     Anemia  HGB back to baseline 10.7    Obstructive uropathy  Consult renal, c/w flomax and finasteride

## 2023-05-09 ENCOUNTER — NURSING HOME VISIT (OUTPATIENT)
Dept: POST ACUTE CARE | Facility: EXTERNAL LOCATION | Age: 88
End: 2023-05-09
Payer: MEDICARE

## 2023-05-09 DIAGNOSIS — I48.20 ATRIAL FIBRILLATION, CHRONIC (MULTI): ICD-10-CM

## 2023-05-09 DIAGNOSIS — D66 HEMOPHILIA A (MULTI): ICD-10-CM

## 2023-05-09 DIAGNOSIS — I25.10 ASHD (ARTERIOSCLEROTIC HEART DISEASE): ICD-10-CM

## 2023-05-09 DIAGNOSIS — R53.81 PHYSICAL DEBILITY: ICD-10-CM

## 2023-05-09 DIAGNOSIS — N13.9 OBSTRUCTIVE UROPATHY: ICD-10-CM

## 2023-05-09 DIAGNOSIS — I50.32 CHRONIC HEART FAILURE WITH PRESERVED EJECTION FRACTION (MULTI): Primary | ICD-10-CM

## 2023-05-09 PROCEDURE — 99309 SBSQ NF CARE MODERATE MDM 30: CPT | Performed by: FAMILY MEDICINE

## 2023-05-09 NOTE — LETTER
Patient: Ramón Flores  : 1928    Encounter Date: 2023    Nursing Home Visit  Name: Ramón Flores  YOB: 1928  MRN: [id number]    Chief Complaint  Lab Review  Subjective  HPI:   89 year old male with hx of hemophilia A, protein C deficiency, HTN, HLD, nephrolithiasis, CAD s/p CABG, and BPH admitted for urgent Rt NOF fracture. S/p Rt Femur fixation by Ortho on , but had insufficient factor replacement prior to procedure. Postop patient had a hemoglobin drop, slightly more than expected but likely reasonable for having suboptimals factor replacement. He received 1 unit of packed red blood cells post-op with appropriate response and H&H is now stable. Discussed with his OP hematology and likely expected from blood loss from surgery given hemophilia A and not full repletion of factors rather than ongoing bleeding. Hematology on consult, patient was receiving twice daily therapy with Adv ate now transitioned to once daily and factor levels are stable. CT angio showed no evidence of blood extravasation. After discussion with hematology, patient will need to go to SNF with daily Adv ate for a period before transitioning to his home prophylactic therapy. Hematology is arranging. Patient should not be on any blood thinning agents or aspirin. Patient has been awaiting placement, on  working with physical therapy on  he had a syncopal episode in the setting of diarrhea. His blood pressure and mentation improved after getting back in bed and he is getting IV fluids. Denies diarrhea since resolved and he has had no further dizziness. His blood pressure is stable, however his hydrochlorothiazide and metoprolol are continued be on hold. Arrangements made by hematology to deliver Adv ate to his nursing facility where he will be doing PT OT and receiving Adv ate infusions. Per hematology, patient will continue Adv ate infusions for 5 more days and then transition to every other day  infusions. He did have a CBC monitored weekly. He will need post discharge follow-up with orthopedics and hematology. please note probably hematology service patient should never be started on aspirin or other blood thinners/anticoagulants or chemical DVT prophylaxis, if there is concern that patient does need to be started on any of these medications that should be thoroughly discussed with the hematology service and his hematologist   --1/31 Patient is somewhat anxious. He states his right hip feels fine, he denies pain currently. Appetite fair, patient states he does not like the food all that much. He is aware of the schedule for his infusion, knows who is coming and when   --2/2 Patient is in good spirits. Denies pain or discomfort. Patient reports he is getting his infusions as scheduled. Appetite fair, Denies HA, dizziness, SOB, CP, N&V or constipation   --2/14 Patient in good spirits, saw Ortho on 2/9. staples removed. He was surprised how much bruising he had. Appetite good, denies pain or discomfort. Denies HA, dizziness, SOB, CP, N&V or constipation   --2/16 Notified yesterday evening, patient's K+ was 3.1 down from 3.4 on 2/14 Ordered to give 40meq now and repeat labs in am. Today patient in great spirits, he will most likely discharge home over weekend. His niece was visiting today. Appetite is good he c/o some knee pain, he stated he had some gel that relieved it. Denies HA, dizziness, SOB, CP, N&V or constipation  --3/9   Patient in fair mood, is not in any pain. Appetite only fair due to not liking the food.  States he picks and chooses.   --4/4-  On 3/27 patient went to ortho to have his knee injected, he reported it did not work really at all.  On 3/29 patient was coughing ordered CXR results reported Lung emphysema, Hazy bibasilar airspace opacities. Slightly enlarged cardiac silhouette with congestion. New order for Z-pack.  He continued to worsen, on 3/31 ordered repeat CXR, flu swab, CBC/BMP   Also started Levaquin and probiotic. Covid test, done earlier was negative.  Last evening (4/3) patient slid out of his wheelchair, no injuries.  Today patient talkative, in good spirits.  Appetite remains fair.  Weight is stable.  Denies pain or discomfort. Denies HA, dizziness, SOB, CP, N&V or constipation. Labs from yesterday, essentially stable, did not drop in HGB from 11-->8.    --4/6  Patient in fair mood, per nsg he  was complaining about having a kelley catheter placed.  Stated he was having difficulty urinating.  Per PCC he had 700+ residual.  Appetite is fair, 25-75% mostly.  Denies pain or discomfort.  Denies HA, dizziness, SOB, CP, N&V or constipation  --4/18  Patient returned from hospital 4/14   ### Per hospital records: Patient presents with hx of fall and forehead hematoma/laceration. This is his 3rd fall in 4 months, 1st in Jan reportedly mechanical with R hip fracture, 2nd in Feb thought to be syncope from dehydration and now for the 3rd time without any memory of the events around the fall. Extensively worked up in previous admission, neg EEG, orthostats positive but thought to be due to dehydration given soft BPs. 02/24/23 Echo showed EF 60-65%, spectral doppler with impaired LV diastolic filling. This admission, workup for syncope including video EEG was negative for epileptiform activity. CT PE negative. Patient found to be fluid overloaded on admission, CT showed moderate bilateral pleural effusions with adjacent atelectasis, pulmonary edema, mesenteric edema and body wall anasarca.  without a baseline. Also found to be in new afib, rates controlled in 80s on metoprolol succinate 75 mg. Neuro was consulted, there was low suspicion for neurogenic cause of syncope or seizure, patient's subdural bleeds were deemed to be chronic. Hematology was consulted for patient's hemophilia, continued on Advate daily. Patient's ethanol level elevated on admission, started on thiamine and folate, though  he denied ETOH use. Also found with several electrolyte abnormalities including hypokalemia, hypomagnesemia, hypophosphatemia. Treated for CAP with three days of levofloxacin, patient weaned to RA. Diuresed with IV lasix. Patient had evidence of delirium on 4/10, UA without signs of infection, replaced kelley since patient still retaining. Delirium improved --4/11- 4/12. For his syncope, lower concern for cardiac etiology given echo in 02/24 without AS, no pauses on telemetry. However patient does have new afib and new diagnosis of HFpEF. Currently we suspect that patient's syncope is due to orthostatic hypotension, as suspected in previous admission. Repeat orthostats wnl while inpatient. Will discharge patient with event monitor and close cardiology follow up. For his afib, we decided not to start anticoagulation given his high bleeding risk from hemophilia, history of falls. We chose a rate control strategy with metoprolol succinate. For his heart failure, we deferred starting GDMT due to patient's age, history of orthostatic hypotension.  Discharge back to SNF  --4/18  Today patient is fair, he does not like his kelley.  He denies pain other than his right knee that he states gives him trouble.  Appetite fair to good.  Denies HA, dizziness, SOB, CP, N&V or constipation  --4/20  Remains stable, no c/o dizziness.  Does c/o about his kelley catheter,  Appetite fair.  Denies pain or discomfort. Denies HA, dizziness, SOB, CP, N&V or constipation  --4/27 4/27: Patient in fair mood, leaning back in bed. States he is doing ok, appetite is good. Catheter is bothering him. Explained to patient that we are trialing his urinary voiding and so the balloon is deflated so moving around may cause some discomfort. Denies HA, dizziness, SOB, CP, N&V or constipation.   --5/9  Patient in fair spirits.  Had lab draw on today, BNP 6000+   Also was hypokalemic.  Appetite is good he denies pain discomfort    Review of Systems:  Reviewed  chart looking at current medications, treatment, labs and x-rays and note pertinent positives or negatives, otherwise 10 point system review negative except for what is noted in HPI.    Objective  VS: 92/67. 97.8, 81, 18, 94%, 133lbs    Physical exam:   Physical Exam  Vitals reviewed.   Constitutional:       Appearance: He is normal weight.   HENT:      Head: Normocephalic.      Comments: Laceration healed     Nose: Nose normal.      Mouth/Throat:      Mouth: Mucous membranes are moist.   Cardiovascular:      Rate and Rhythm: Normal rate and regular rhythm.      Pulses: Normal pulses.      Heart sounds: Normal heart sounds.   Pulmonary:      Effort: Pulmonary effort is normal.      Breath sounds: Normal breath sounds.   Abdominal:      General: Bowel sounds are normal.      Palpations: Abdomen is soft.   Musculoskeletal:         General: Normal range of motion.      Right lower le+ Edema present.      Left lower le+ Edema present.      Comments: Generalized weakness   Skin:     General: Skin is warm and dry.   Neurological:      Mental Status: He is alert and oriented to person, place, and time. Mental status is at baseline.      Motor: Weakness present.   Psychiatric:         Mood and Affect: Mood normal.         Behavior: Behavior normal.         Thought Content: Thought content normal.      Assessment/Plan    89 year old male with hx of hemophilia A, protein C deficiency, HTN, HLD, nephrolithiasis, CAD s/p CABG, and BPH admitted for urgent Rt NOF fracture. S/p Rt Femur fixation     Hypokalemia  KCL relpeted    (HFpEF) heart failure with preserved ejection fraction (CMS/HCC)  BNP 6,000+  Bumex ordered, monitor weight    Physical debility  Continue PT/OT    Hemophilia A (CMS/HCC)  C/w infusions, labs have been stable    ASHD (arteriosclerotic heart disease)  Nov. 15, 2012: LV EF normal . Cbc bmp weekly.  F/u with Cardiology       Hypertension  C/w same meds, monitor labs    Atrial fibrillation, chronic  (CMS/[facility])  Rate controlled, no a/c 2/2 hemophilia     Obstructive uropathy  Will consult renal,     Acute pain of right knee  Saw ortho, injected      Electronically Signed By: SHERRY Ramirez   6/8/23  4:23 PM

## 2023-05-10 ENCOUNTER — NURSING HOME VISIT (OUTPATIENT)
Dept: POST ACUTE CARE | Facility: EXTERNAL LOCATION | Age: 88
End: 2023-05-10
Payer: MEDICARE

## 2023-05-10 DIAGNOSIS — I50.31 ACUTE DIASTOLIC HEART FAILURE (MULTI): ICD-10-CM

## 2023-05-10 DIAGNOSIS — S06.5XAA SUBDURAL HEMATOMA (MULTI): ICD-10-CM

## 2023-05-10 DIAGNOSIS — I25.10 ASHD (ARTERIOSCLEROTIC HEART DISEASE): ICD-10-CM

## 2023-05-10 DIAGNOSIS — D66 HEMOPHILIA A (MULTI): ICD-10-CM

## 2023-05-10 DIAGNOSIS — R53.81 PHYSICAL DEBILITY: ICD-10-CM

## 2023-05-10 DIAGNOSIS — I48.19 PERSISTENT ATRIAL FIBRILLATION (MULTI): ICD-10-CM

## 2023-05-10 DIAGNOSIS — J18.9 PNEUMONIA OF LOWER LOBE DUE TO INFECTIOUS ORGANISM, UNSPECIFIED LATERALITY: ICD-10-CM

## 2023-05-10 DIAGNOSIS — M25.561 ACUTE PAIN OF RIGHT KNEE: ICD-10-CM

## 2023-05-10 DIAGNOSIS — I50.33 DIASTOLIC CHF, ACUTE ON CHRONIC (MULTI): ICD-10-CM

## 2023-05-10 DIAGNOSIS — F48.8 PSYCHOGENIC SYNCOPE: Primary | ICD-10-CM

## 2023-05-10 DIAGNOSIS — I10 HYPERTENSION, UNSPECIFIED TYPE: ICD-10-CM

## 2023-05-10 DIAGNOSIS — S72.001D CLOSED FRACTURE OF NECK OF RIGHT FEMUR WITH ROUTINE HEALING, SUBSEQUENT ENCOUNTER: ICD-10-CM

## 2023-05-10 DIAGNOSIS — M17.11 PRIMARY OSTEOARTHRITIS OF RIGHT KNEE: ICD-10-CM

## 2023-05-10 PROCEDURE — 99309 SBSQ NF CARE MODERATE MDM 30: CPT | Performed by: FAMILY MEDICINE

## 2023-05-10 NOTE — LETTER
Patient: Ramón Flores  : 1928    Encounter Date: 05/10/2023    Subjective  Patient ID: Ramón Flores is a 89 y.o. male who presents forCHF    HPI  89 year old male with a PMHx of hemophilia A, protein C deficiency, HTN, HLD, nephrolithiasis, CAD s/p CABG (), BPH, #R NOF s/p THR, recent SDH who presentED TO er with hx of fall and forehead hematoma/laceration.  Extensively worked up in previous admission, neg EEG, TTE wnl, orthostats positive but thought to be due to dehydration given soft BPs. This admission, workup for syncope including video EEG was negative for epileptiform activity. CT PE negative. Patient found to be fluid overloaded on admission. Also found to be in new afib, rate controlled in 80s, holding apixaban given patient's high bleeding risk. Started on levofloxacin on admission for potential CAP. Heme consulted for Hemophilia A, started on advate 40 u/kg daily while inpatient. Patient's ethanol level elevated on admission  started on thiamine and folate  Wants diuretic stopped  Has been more SOB weight up.   Current Outpatient Medications on File Prior to Visit   Medication Sig Dispense Refill   • acetaminophen (Tylenol) 325 mg tablet Take 3 tablets (975 mg) by mouth every 8 hours.     • albuterol 5 mg/mL nebulizer solution Take 0.5 mL (2.5 mg) by nebulization every 6 hours if needed for wheezing or shortness of breath.     • antihemophil.FVIII,full length (ADVATE IV) Infuse into a venous catheter once daily as needed (hemophilia). kit; 50 unit/kg     • atorvastatin (Lipitor) 40 mg tablet Take 1 tablet (40 mg) by mouth once daily.     • benzonatate (Tessalon) 200 mg capsule Take 1 capsule (200 mg) by mouth 3 times a day as needed for cough.     • carboxymethylcellulose (Refresh Plus) 0.5 % ophthalmic solution Administer 1 drop into both eyes in the morning and 1 drop at noon and 1 drop in the evening and 1 drop before bedtime.     • diclofenac sodium 1 % kit Apply 4 g topically  once daily. To bilateral knees     • ferrous sulfate 325 (65 Fe) MG tablet Take 1 tablet (65 mg of iron) by mouth once daily.     • finasteride (Proscar) 5 mg tablet Take 1 tablet (5 mg) by mouth once daily.     • folic acid (Folvite) 1 mg tablet Take 1 tablet (1 mg) by mouth once daily.     • Lactobacillus acidoph-L.bulgar 1 million cell tablet tablet Take 1 tablet by mouth once daily.     • magnesium oxide (Mag-Ox) 400 mg (241.3 mg magnesium) tablet Take 1 tablet (400 mg) by mouth in the morning and 1 tablet (400 mg) before bedtime.     • metoprolol succinate XL (Toprol-XL) 50 mg 24 hr tablet Take 1.5 tablets (75 mg) by mouth once daily.     • multivitamin with minerals (multivitamin-iron-folic acid) tablet Take 1 tablet by mouth once daily.     • omeprazole OTC (PriLOSEC OTC) 20 mg EC tablet Take 1 tablet (20 mg) by mouth once daily.     • potassium chloride CR (Klor-Con M20) 20 mEq ER tablet Take 1 tablet (20 mEq) by mouth once daily. while on hydroclorothiazide     • tamsulosin (Flomax) 0.4 mg 24 hr capsule Take 1 capsule (0.4 mg) by mouth twice a day.     • vit A/vit C/vit E/zinc/copper (PRESERVISION AREDS ORAL) Take 1 capsule by mouth once daily.     • [DISCONTINUED] cholecalciferol (Vitamin D-3) 50 mcg (2,000 unit) capsule Take 1 capsule (50 mcg) by mouth once daily.     • [DISCONTINUED] cyclobenzaprine (Flexeril) 5 mg tablet Take 1 tablet (5 mg) by mouth 3 times a day as needed.     • [DISCONTINUED] hydroCHLOROthiazide (Microzide) 12.5 mg capsule Take 1 capsule (12.5 mg) by mouth once daily.     • [DISCONTINUED] loperamide (Imodium A-D) 2 mg tablet Take 1 tablet (2 mg) by mouth if needed (after each loose stool up to 8 tabs daily).     • [DISCONTINUED] metoprolol succinate XL (Toprol-XL) 50 mg 24 hr tablet Take 1 tablet (50 mg) by mouth once daily.     • [DISCONTINUED] mupirocin (Bactroban) 2 % ointment 3 times a day. apply small amount; as directed     • [DISCONTINUED] nystatin (Mycostatin) 100,000 unit/mL  suspension Mouth/Throat     • [DISCONTINUED] pantoprazole (ProtoNix) 40 mg EC tablet Take 1 tablet (40 mg) by mouth once daily.     • [DISCONTINUED] polyethylene glycol (Miralax) 17 gram/dose powder Take 17 g by mouth. mix in 8 ounces of liquid and drink 1 to 2 times daily for constipation     • [DISCONTINUED] tamsulosin (Flomax) 0.4 mg 24 hr capsule Take 1 capsule (0.4 mg) by mouth in the morning and 1 capsule (0.4 mg) before bedtime.     • [DISCONTINUED] traMADol (Ultram) 50 mg tablet Take 1 tablet (50 mg) by mouth in the morning and 1 tablet (50 mg) at noon and 1 tablet (50 mg) in the evening and 1 tablet (50 mg) before bedtime.       No current facility-administered medications on file prior to visit.        Review of Systems  ROS . 14 systems reviewed and negative except as in HPI . Hospital and nursing notes reviewed.      Objective  There were no vitals taken for this visit.  BSA: There is no height or weight on file to calculate BSA.  Growth percentiles: Facility age limit for growth %shani is 20 years. Facility age limit for growth %shani is 20 years.   No visits with results within 1 Week(s) from this visit.   Latest known visit with results is:   Legacy Encounter on 10/12/2022   Component Date Value Ref Range Status   • Retic % 10/12/2022 2.3 (H)  0.5 - 2.0 % Final   • Retic Absolute 10/12/2022 0.087  0.017 - 0.110 x10E12/L Final   • Immature Retic fraction 10/12/2022 15.7  0.0 - 16.0 % Final   • Reticulocyte Hemoglobin 10/12/2022 38  28 - 38 pg Final   • Glucose 10/12/2022 98  74 - 99 mg/dL Final   • Sodium 10/12/2022 141  136 - 145 mmol/L Final   • Potassium 10/12/2022 3.7  3.5 - 5.3 mmol/L Final   • Chloride 10/12/2022 103  98 - 107 mmol/L Final   • Bicarbonate 10/12/2022 30  21 - 32 mmol/L Final   • Anion Gap 10/12/2022 12  10 - 20 mmol/L Final   • Urea Nitrogen 10/12/2022 22  6 - 23 mg/dL Final   • Creatinine 10/12/2022 1.05  0.50 - 1.30 mg/dL Final   • GFR MALE 10/12/2022 66  >90 mL/min/1.73m2 Final     Comment:  CALCULATIONS OF ESTIMATED GFR ARE PERFORMED   USING THE 2021 CKD-EPI STUDY REFIT EQUATION   WITHOUT THE RACE VARIABLE FOR THE IDMS-TRACEABLE   CREATININE METHODS.    https://jasn.asnjournals.org/content/early/2021/09/22/ASN.9934769808     • Calcium 10/12/2022 8.7  8.6 - 10.3 mg/dL Final   • Albumin 10/12/2022 4.2  3.4 - 5.0 g/dL Final   • Alkaline Phosphatase 10/12/2022 63  33 - 136 U/L Final   • Total Protein 10/12/2022 6.8  6.4 - 8.2 g/dL Final   • AST 10/12/2022 19  9 - 39 U/L Final   • Total Bilirubin 10/12/2022 1.7 (H)  0.0 - 1.2 mg/dL Final   • ALT (SGPT) 10/12/2022 15  10 - 52 U/L Final    Comment:  Patients treated with Sulfasalazine may generate    falsely decreased results for ALT.     • Protime 10/12/2022 14.5 (H)  9.8 - 13.4 sec Final   • INR 10/12/2022 1.3 (H)  0.9 - 1.1 Final   • aPTT 10/12/2022 48 (H)  26 - 39 sec Final    Comment:   THE APTT IS NO LONGER USED FOR MONITORING     UNFRACTIONATED HEPARIN THERAPY.    FOR MONITORING HEPARIN THERAPY,     USE THE HEPARIN ASSAY.     • Uric Acid 10/12/2022 5.4  4.0 - 7.5 mg/dL Final    Comment:  Venipuncture immediately after or during the    administration of Metamizole may lead to falsely   low results. Testing should be performed immediately   prior to Metamizole dosing.     • WBC 10/12/2022 5.9  4.4 - 11.3 x10E9/L Final   • RBC 10/12/2022 3.88 (L)  4.50 - 5.90 x10E12/L Final   • Hemoglobin 10/12/2022 12.9 (L)  13.5 - 17.5 g/dL Final   • Hematocrit 10/12/2022 37.8 (L)  41.0 - 52.0 % Final   • MCV 10/12/2022 97  80 - 100 fL Final   • MCHC 10/12/2022 34.1  32.0 - 36.0 g/dL Final   • Platelets 10/12/2022 142 (L)  150 - 450 x10E9/L Final   • RDW 10/12/2022 13.7  11.5 - 14.5 % Final   • Neutrophils % 10/12/2022 63.7  40.0 - 80.0 % Final   • Immature Granulocytes %, Automated 10/12/2022 0.3  0.0 - 0.9 % Final    Comment:  Immature Granulocyte Count (IG) includes promyelocytes,    myelocytes and metamyelocytes but does not include bands.   Percent  differential counts (%) should be interpreted in the   context of the absolute cell counts (cells/L).     • Lymphocytes % 10/12/2022 29.9  13.0 - 44.0 % Final   • Monocytes % 10/12/2022 4.9  2.0 - 10.0 % Final   • Eosinophils % 10/12/2022 0.7  0.0 - 6.0 % Final   • Basophils % 10/12/2022 0.5  0.0 - 2.0 % Final   • Neutrophils Absolute 10/12/2022 3.74  1.60 - 5.50 x10E9/L Final   • Lymphocytes Absolute 10/12/2022 1.76  0.80 - 3.00 x10E9/L Final   • Monocytes Absolute 10/12/2022 0.29  0.05 - 0.80 x10E9/L Final   • Eosinophils Absolute 10/12/2022 0.04  0.00 - 0.40 x10E9/L Final   • Basophils Absolute 10/12/2022 0.03  0.00 - 0.10 x10E9/L Final   • Factor VIII Activity 10/12/2022 14 (L)  55 - 180 % Final   • Sedimentation Rate 10/12/2022 2  0 - 20 mm/h Final   • Ferritin 10/12/2022 85  20 - 300 ug/L Final   • TSH 10/12/2022 4.88 (H)  0.44 - 3.98 mIU/L Final    Comment:  TSH testing is performed using different testing    methodology at [facility] than at other    [facility]. Direct result comparisons should    only be made within the same method.     • Iron 10/12/2022 88  35 - 150 ug/dL Final   • TIBC 10/12/2022 324  240 - 445 ug/dL Final   • Iron Saturation 10/12/2022 27  25 - 45 % Final      Physical Exam  Constitutional: NAD, laying in bed Eyes: EOMI, clear sclera, normal conjunctiva ENMT: moist mucous dry Head/Neck: no appreciable JVD, no carotid bruit, stellate laceration over left forehead w/o active bleeding with slight hematoma around it, small ecchymosis over glabella, erythema over nose and ecchymosis right eyelid Respiratory/Thorax: AE equal bilaterally, +mild , adequate air movement B/L, normal WOB on NC, no wheezes Cardiovascular: +S1, +S2, irregular, no m/r/g Gastrointestinal: soft, NT, ND, +BS, no appreciable HSM Extremities: B/L 1+ edema till knee, improved Neurological: AOx2, confused, endorsing visual hallucinations, tangential speech, no signs of inattention Skin: warm and dry      Assessment:      89 year old male with a PMHx of hemophilia A, protein C deficiency, HTN, HLD,      nephrolithiasis, CAD s/p CABG (2002), BPH, #R NOF s/p THR, recent SDH who      presents with hx of fall and forehead hematoma/laceration. This is his 3rd fall      in 4 months, 1st in Jan reportedly mechanical with R hip fracture, 2nd in Feb      thought to be syncope from dehydration and now for the 3rd time without any      memory of the events around the fall. Extensively worked up in previous      admission, neg EEG, TTE wnl, orthostats positive but thought to be due to      dehydration given soft BPs.             This admission, workup for syncope including video EEG was negative for      epileptiform activity. CT PE negative. Patient found to be fluid overloaded on      admission. Also found to be in new afib, rate controlled in 80s, holding      apixaban given patient's high bleeding risk. Started on levofloxacin on      admission for potential CAP. Heme consulted for Hemophilia A, started on advate      40 u/kg daily while inpatient. Patient's ethanol level elevated on admission,      started on thiamine and folate.               #Syncope      - Evaluated for a fall while working with PT in Feb 2022 with dysarthria/left      facial droop. CTH showed SDH. EKG NSR. ECHO no valvular lesion/EF WNL. EEG      negative, keppra was discontinued per neurology recs. Orthostats negative but      BPs soft so event was thought to be 2/2 dehydration. PT/OT recommended SNF but      discharge to home on family request.      - video EEG on this admission without evidence of seizure      - This is his 3rd fall/syncope in 4 months. CT PE negative. CTH w/o e/o      intracranial bleed.      Plan:      -     - CT PE negative for PE      - Carotid doppler with <50% stenosis bilaterally      - r ziopatch with close cardiology follow up             #Acute hypoxic respiratory failure      #Community Acquired Pneumonia,  resolved      Presented to [facility] with syncope, tachycardia, spo2 93% started on 4L O2.      CXR did nto show any consolidative process, possible pulmonary edema, left      pleural effusion      Reports cough with expectorate for 1 week. No fever      Plan:      -CT with concern for fluid overload, mesenteric edema, anasarca      -patient started on levofloxacin 04/08 - 4/10      -c/w guaifenesin q12hrs             #HFpEF (EF 60-65% in 02/2023)      - 02/2023 TTE with EF 60-65%, spectral doppler with impaired LV diastolic      filling      - c/w BUMEX 1 mg daily CBC BMP Monday. P      - Monitor weight and labs  Not on lasix at presentn             #New onset atrial fibrillation, rate controlled      - Noted to be in atrial fibrillation on telemetry and EKG this admission, has      been noted in a prior EKG but this is largely a new diagnosis      - Largely rate controlled in 80s on metoprolol this admission      - Risk of AC outweighs benefit in this patient with falls and hemophilia      -            #Hemophilia A      #hx of BLE below knee DVT      #Recent SDH 2/23      # Scalp hematoma and laceration      -c/w advate 40 units/ kg daily      - will check factor VIII levels once, otherwise no need for monitoring while on      advate      - SUSPENDED Eliquis 2.5 bid (planned to cont for at least 3 months which would      be till June 2023) in setting of recent fall with scalp hematoma and laceration      and below knee DVTs have usually have low risk for PE      - T+S ordered      - Started PO iron     - Per hematology: Advate 40 units/ kg daily. aPTT for dosage adjustment.                  #Electrolyte imbalance      -Had low K/Mg/Ca/Pi during last admission      - Admit Mg 0.77, K 3.0      - C/w potassium chloride 10mg daily      -Monitor electrolytes      - Replete as needed      - Monitor for refeeding syndrome             #Elevated ethanol level      -Utox ordered given elevated ethanol levels at  [facility]      -HNO alcohol use and he has been at a SNF      - Started on thiamine and folic acid             #CAD      #HTN      #HLD      -C/w Metoprolol 50mg,      -C/w atorvastatin 40mg.             #BPH And obstructive uropathy and urinary retention [facility]  [place] [facility]        -C/w finasteride 5mg,      -Will restart tamsulosin 0.4mg while inpatient and assess orthostatic BP                   Assessment/Plan  Problem List Items Addressed This Visit       ASHD (arteriosclerotic heart disease)    Pneumonia    Hemophilia A (CMS/HCC)    Hypertension    Fracture of femoral neck, right (CMS/HCC)    Psychogenic syncope - Primary    Subdural hematoma (CMS/HCC)    Acute diastolic heart failure (CMS/HCC)    Diastolic CHF, acute on chronic (CMS/HCC)    Persistent atrial fibrillation (CMS/HCC)    Physical debility    Acute pain of right knee    Primary osteoarthritis of right knee         Time Spent  Prep time on day of patient encounter: 25 minutes  Time spent directly with patient, family or caregiver: 15 minutes  Additional Time Spent on Patient Care Activities: 10 minutes  Documentation Time: 30 minutes  Other Time Spent: 5 minutes  Total: 85 minutes        Electronically Signed By: Mau Al MD   5/14/23  6:46 PM

## 2023-05-11 NOTE — PROGRESS NOTES
Nursing Home Visit  Name: Ramón Flores  YOB: 1928  MRN: [id number]    Chief Complaint  Fall  Subjective  HPI:   89 year old male with hx of hemophilia A, protein C deficiency, HTN, HLD, nephrolithiasis, CAD s/p CABG, and BPH admitted for urgent Rt NOF fracture. S/p Rt Femur fixation by Ortho on Jan 13th, but had insufficient factor replacement prior to procedure. Postop patient had a hemoglobin drop, slightly more than expected but likely reasonable for having suboptimals factor replacement. He received 1 unit of packed red blood cells post-op with appropriate response and H&H is now stable. Discussed with his OP hematology and likely expected from blood loss from surgery given hemophilia A and not full repletion of factors rather than ongoing bleeding. Hematology on consult, patient was receiving twice daily therapy with Adv ate now transitioned to once daily and factor levels are stable. CT angio showed no evidence of blood extravasation. After discussion with hematology, patient will need to go to SNF with daily Adv ate for a period before transitioning to his home prophylactic therapy. Hematology is arranging. Patient should not be on any blood thinning agents or aspirin. Patient has been awaiting placement, on 1/22 working with physical therapy on 1/22 he had a syncopal episode in the setting of diarrhea. His blood pressure and mentation improved after getting back in bed and he is getting IV fluids. Denies diarrhea since resolved and he has had no further dizziness. His blood pressure is stable, however his hydrochlorothiazide and metoprolol are continued be on hold. Arrangements made by hematology to deliver Adv ate to his nursing facility where he will be doing PT OT and receiving Adv ate infusions. Per hematology, patient will continue Adv ate infusions for 5 more days and then transition to every other day infusions. He did have a CBC monitored weekly. He will need post discharge  follow-up with orthopedics and hematology. please note probably hematology service patient should never be started on aspirin or other blood thinners/anticoagulants or chemical DVT prophylaxis, if there is concern that patient does need to be started on any of these medications that should be thoroughly discussed with the hematology service and his hematologist   --1/31 Patient is somewhat anxious. He states his right hip feels fine, he denies pain currently. Appetite fair, patient states he does not like the food all that much. He is aware of the schedule for his infusion, knows who is coming and when   --2/2 Patient is in good spirits. Denies pain or discomfort. Patient reports he is getting his infusions as scheduled. Appetite fair, Denies HA, dizziness, SOB, CP, N&V or constipation   --2/14 Patient in good spirits, saw Ortho on 2/9. staples removed. He was surprised how much bruising he had. Appetite good, denies pain or discomfort. Denies HA, dizziness, SOB, CP, N&V or constipation   --2/16 Notified yesterday evening, patient's K+ was 3.1 down from 3.4 on 2/14 Ordered to give 40meq now and repeat labs in am. Today patient in great spirits, he will most likely discharge home over weekend. His niece was visiting today. Appetite is good he c/o some knee pain, he stated he had some gel that relieved it. Denies HA, dizziness, SOB, CP, N&V or constipation  --3/9   Patient in fair mood, is not in any pain. Appetite only fair due to not liking the food.  States he picks and chooses.   --4/4-  On 3/27 patient went to ortho to have his knee injected, he reported it did not work really at all.  On 3/29 patient was coughing ordered CXR results reported Lung emphysema, Hazy bibasilar airspace opacities. Slightly enlarged cardiac silhouette with congestion. New order for Z-pack.  He continued to worsen, on 3/31 ordered repeat CXR, flu swab, CBC/BMP  Also started Levaquin and probiotic. Covid test, done earlier was negative.   Last evening (4/3) patient slid out of his wheelchair, no injuries.  Today patient talkative, in good spirits.  Appetite remains fair.  Weight is stable.  Denies pain or discomfort. Denies HA, dizziness, SOB, CP, N&V or constipation. Labs from yesterday, essentially stable, did not drop in HGB from 11-->8.    --4/6  Patient in fair mood, per nsg he  was complaining about having a kelley catheter placed.  Stated he was having difficulty urinating.  Per PCC he had 700+ residual.  Appetite is fair, 25-75% mostly.  Denies pain or discomfort.  Denies HA, dizziness, SOB, CP, N&V or constipation  --4/18  Patient returned from hospital 4/14   ### Per hospital records: Patient presents with hx of fall and forehead hematoma/laceration. This is his 3rd fall in 4 months, 1st in Jan reportedly mechanical with R hip fracture, 2nd in Feb thought to be syncope from dehydration and now for the 3rd time without any memory of the events around the fall. Extensively worked up in previous admission, neg EEG, orthostats positive but thought to be due to dehydration given soft BPs. 02/24/23 Echo showed EF 60-65%, spectral doppler with impaired LV diastolic filling. This admission, workup for syncope including video EEG was negative for epileptiform activity. CT PE negative. Patient found to be fluid overloaded on admission, CT showed moderate bilateral pleural effusions with adjacent atelectasis, pulmonary edema, mesenteric edema and body wall anasarca.  without a baseline. Also found to be in new afib, rates controlled in 80s on metoprolol succinate 75 mg. Neuro was consulted, there was low suspicion for neurogenic cause of syncope or seizure, patient's subdural bleeds were deemed to be chronic. Hematology was consulted for patient's hemophilia, continued on Advate daily. Patient's ethanol level elevated on admission, started on thiamine and folate, though he denied ETOH use. Also found with several electrolyte abnormalities  including hypokalemia, hypomagnesemia, hypophosphatemia. Treated for CAP with three days of levofloxacin, patient weaned to RA. Diuresed with IV lasix. Patient had evidence of delirium on 4/10, UA without signs of infection, replaced kelley since patient still retaining. Delirium improved --4/11- 4/12. For his syncope, lower concern for cardiac etiology given echo in 02/24 without AS, no pauses on telemetry. However patient does have new afib and new diagnosis of HFpEF. Currently we suspect that patient's syncope is due to orthostatic hypotension, as suspected in previous admission. Repeat orthostats wnl while inpatient. Will discharge patient with event monitor and close cardiology follow up. For his afib, we decided not to start anticoagulation given his high bleeding risk from hemophilia, history of falls. We chose a rate control strategy with metoprolol succinate. For his heart failure, we deferred starting GDMT due to patient's age, history of orthostatic hypotension.  Discharge back to SNF  4/18  Today patient is fair, he does not like his kelley.  He denies pain other than his right knee that he states gives him trouble.  Appetite fair to good.  Denies HA, dizziness, SOB, CP, N&V or constipation  4/20  Remains stable, no c/o dizziness.  Does c/o about his kelley catheter,  Appetite fair.  Denies pain or discomfort. Denies HA, dizziness, SOB, CP, N&V or constipation  4/27 4/27: Patient in fair mood, leaning back in bed. States he is doing ok, appetite is good. Catheter is bothering him. Explained to patient that we are trialing his urinary voiding and so the balloon is deflated so moving around may cause some discomfort. Denies HA, dizziness, SOB, CP, N&V or constipation.     Review of Systems:  Reviewed chart looking at current medications, treatment, labs and x-rays and note pertinent positives or negatives, otherwise 10 point system review negative except for what is noted in HPI.    Objective  VS: 92/67.  97.8, 81, 18, 94%, 133lbs    Physical exam:   Physical Exam  Vitals reviewed.   Constitutional:       Appearance: He is normal weight.   HENT:      Head: Normocephalic.      Comments: Laceration to left frontal head, sutures intact, no erythema or or drainage     Nose: Nose normal.      Comments: Bruised tip of nose 2/2 fall     Mouth/Throat:      Mouth: Mucous membranes are moist.   Eyes:      Comments: Left eye is ecchymotic 2/2 fall   Cardiovascular:      Rate and Rhythm: Normal rate and regular rhythm.      Pulses: Normal pulses.      Heart sounds: Normal heart sounds.   Pulmonary:      Effort: Pulmonary effort is normal.      Breath sounds: Normal breath sounds.   Abdominal:      General: Bowel sounds are normal.      Palpations: Abdomen is soft.   Musculoskeletal:         General: Normal range of motion.      Comments: Generalized weakness   Skin:     General: Skin is warm and dry.   Neurological:      Mental Status: He is alert and oriented to person, place, and time. Mental status is at baseline.      Motor: Weakness present.   Psychiatric:         Mood and Affect: Mood normal.         Behavior: Behavior normal.         Thought Content: Thought content normal.      Assessment/Plan    89 year old male with hx of hemophilia A, protein C deficiency, HTN, HLD, nephrolithiasis, CAD s/p CABG, and BPH admitted for urgent Rt NOF fracture. S/p Rt Femur fixation     #Physical debility  Continue PT/OT  Voltaren on right foot 4x a day  #Hemophilia A (CMS/HCC)  C/w treatment  Removed stitches on forehead  #ASHD (arteriosclerotic heart disease)  Nov. 15, 2012: LV EF normal  Cbc bmp weekly  #Acute diastolic heart failure (CMS/HCC)  C/w meds  #Pneumonia  C/w ATB  #Hypertension  C/w same meds, monitor labs  #Diastolic CHF, acute on chronic (CMS/HCC)  Monitor labs  #Persistent atrial fibrillation (CMS/HCC)  Rate controlled, no a/c 2/2 hemophilia         --Physical debility  Continue PT/OT    --Hemophilia A (CMS/HCC)  C/w  treatement     --ASHD (arteriosclerotic heart disease)  Nov. 15, 2012: LV EF normal . Cbc bmp weekly.  F/u with Cardiology     --Acute diastolic heart failure (CMS/HCC)  C/w meds.    --Hypertension  C/w same meds, monitor labs    --Persistent atrial fibrillation (CMS/HCC)  Rate controlled, no a/c 2/2 hemophilia     --Anemia  Labs for 4/18    --Obstructive uropathy  Consult renal, c/w flomax and finasteride

## 2023-05-11 NOTE — PROGRESS NOTES
Nursing Home Visit  Name: Ramón Flores  YOB: 1928  MRN: [id number]    Chief Complaint  Lab review/urinary retention  Subjective  HPI:   89 year old male with hx of hemophilia A, protein C deficiency, HTN, HLD, nephrolithiasis, CAD s/p CABG, and BPH admitted for urgent Rt NOF fracture. S/p Rt Femur fixation by Ortho on Jan 13th, but had insufficient factor replacement prior to procedure. Postop patient had a hemoglobin drop, slightly more than expected but likely reasonable for having suboptimals factor replacement. He received 1 unit of packed red blood cells post-op with appropriate response and H&H is now stable. Discussed with his OP hematology and likely expected from blood loss from surgery given hemophilia A and not full repletion of factors rather than ongoing bleeding. Hematology on consult, patient was receiving twice daily therapy with Adv ate now transitioned to once daily and factor levels are stable. CT angio showed no evidence of blood extravasation. After discussion with hematology, patient will need to go to SNF with daily Adv ate for a period before transitioning to his home prophylactic therapy. Hematology is arranging. Patient should not be on any blood thinning agents or aspirin. Patient has been awaiting placement, on 1/22 working with physical therapy on 1/22 he had a syncopal episode in the setting of diarrhea. His blood pressure and mentation improved after getting back in bed and he is getting IV fluids. Denies diarrhea since resolved and he has had no further dizziness. His blood pressure is stable, however his hydrochlorothiazide and metoprolol are continued be on hold. Arrangements made by hematology to deliver Adv ate to his nursing facility where he will be doing PT OT and receiving Adv ate infusions. Per hematology, patient will continue Adv ate infusions for 5 more days and then transition to every other day infusions. He did have a CBC monitored weekly. He will need  post discharge follow-up with orthopedics and hematology. please note probably hematology service patient should never be started on aspirin or other blood thinners/anticoagulants or chemical DVT prophylaxis, if there is concern that patient does need to be started on any of these medications that should be thoroughly discussed with the hematology service and his hematologist   --1/31 Patient is somewhat anxious. He states his right hip feels fine, he denies pain currently. Appetite fair, patient states he does not like the food all that much. He is aware of the schedule for his infusion, knows who is coming and when   --2/2 Patient is in good spirits. Denies pain or discomfort. Patient reports he is getting his infusions as scheduled. Appetite fair, Denies HA, dizziness, SOB, CP, N&V or constipation   --2/14 Patient in good spirits, saw Ortho on 2/9. staples removed. He was surprised how much bruising he had. Appetite good, denies pain or discomfort. Denies HA, dizziness, SOB, CP, N&V or constipation   --2/16 Notified yesterday evening, patient's K+ was 3.1 down from 3.4 on 2/14 Ordered to give 40meq now and repeat labs in am. Today patient in great spirits, he will most likely discharge home over weekend. His niece was visiting today. Appetite is good he c/o some knee pain, he stated he had some gel that relieved it. Denies HA, dizziness, SOB, CP, N&V or constipation  --3/9   Patient in fair mood, is not in any pain. Appetite only fair due to not liking the food.  States he picks and chooses.   --4/4-  On 3/27 patient went to ortho to have his knee injected, he reported it did not work really at all.  On 3/29 patient was coughing ordered CXR results reported Lung emphysema, Hazy bibasilar airspace opacities. Slightly enlarged cardiac silhouette with congestion. New order for Z-pack.  He continued to worsen, on 3/31 ordered repeat CXR, flu swab, CBC/BMP  Also started Levaquin and probiotic. Covid test, done earlier  was negative.  Last evening (4/3) patient slid out of his wheelchair, no injuries.  Today patient talkative, in good spirits.  Appetite remains fair.  Weight is stable.  Denies pain or discomfort. Denies HA, dizziness, SOB, CP, N&V or constipation. Labs from yesterday, essentially stable, did not drop in HGB from 11-->8.    --4/6  Patient in fair mood, per nsg he  was complaining about having a kelley catheter placed.  Stated he was having difficulty urinating.  Per PCC he had 700+ residual.  Appetite is fair, 25-75% mostly.  Denies pain or discomfort.  Denies HA, dizziness, SOB, CP, N&V or constipation  --4/18  Patient returned from hospital 4/14   ### Per hospital records: Patient presents with hx of fall and forehead hematoma/laceration. This is his 3rd fall in 4 months, 1st in Jan reportedly mechanical with R hip fracture, 2nd in Feb thought to be syncope from dehydration and now for the 3rd time without any memory of the events around the fall. Extensively worked up in previous admission, neg EEG, orthostats positive but thought to be due to dehydration given soft BPs. 02/24/23 Echo showed EF 60-65%, spectral doppler with impaired LV diastolic filling. This admission, workup for syncope including video EEG was negative for epileptiform activity. CT PE negative. Patient found to be fluid overloaded on admission, CT showed moderate bilateral pleural effusions with adjacent atelectasis, pulmonary edema, mesenteric edema and body wall anasarca.  without a baseline. Also found to be in new afib, rates controlled in 80s on metoprolol succinate 75 mg. Neuro was consulted, there was low suspicion for neurogenic cause of syncope or seizure, patient's subdural bleeds were deemed to be chronic. Hematology was consulted for patient's hemophilia, continued on Advate daily. Patient's ethanol level elevated on admission, started on thiamine and folate, though he denied ETOH use. Also found with several electrolyte  abnormalities including hypokalemia, hypomagnesemia, hypophosphatemia. Treated for CAP with three days of levofloxacin, patient weaned to RA. Diuresed with IV lasix. Patient had evidence of delirium on 4/10, UA without signs of infection, replaced kelley since patient still retaining. Delirium improved --4/11- 4/12. For his syncope, lower concern for cardiac etiology given echo in 02/24 without AS, no pauses on telemetry. However patient does have new afib and new diagnosis of HFpEF. Currently we suspect that patient's syncope is due to orthostatic hypotension, as suspected in previous admission. Repeat orthostats wnl while inpatient. Will discharge patient with event monitor and close cardiology follow up. For his afib, we decided not to start anticoagulation given his high bleeding risk from hemophilia, history of falls. We chose a rate control strategy with metoprolol succinate. For his heart failure, we deferred starting GDMT due to patient's age, history of orthostatic hypotension.  Discharge back to SNF  Today patient is fair, he does not like his kelley.  He denies pain other than his right knee that he states gives him trouble.  Appetite fair to good.  Denies HA, dizziness, SOB, CP, N&V or constipation    Review of Systems:  Reviewed chart looking at current medications, treatment, labs and x-rays and note pertinent positives or negatives, otherwise 10 point system review negative except for what is noted in HPI.    Objective  VS: Blood pressure 150/80, pulse 101, temperature 36.6 °C (97.8 °F), resp. rate 18, weight 60.4 kg (133 lb 3.2 oz), SpO2 98 %.    Physical exam:   Physical Exam  Vitals reviewed.   Constitutional:       Appearance: He is normal weight.   HENT:      Head: Normocephalic.      Comments: Laceration to left frontal head, sutures intact, no erythema or or drainage     Nose: Nose normal.      Comments: Bruised tip of nose 2/2 fall     Mouth/Throat:      Mouth: Mucous membranes are moist.    Eyes:      Comments: Left eye is ecchymotic 2/2 fall   Cardiovascular:      Rate and Rhythm: Normal rate and regular rhythm.      Pulses: Normal pulses.      Heart sounds: Normal heart sounds.   Pulmonary:      Effort: Pulmonary effort is normal.      Breath sounds: Normal breath sounds.   Abdominal:      General: Bowel sounds are normal.      Palpations: Abdomen is soft.   Musculoskeletal:         General: Normal range of motion.      Comments: Generalized weakness   Skin:     General: Skin is warm and dry.   Neurological:      Mental Status: He is alert and oriented to person, place, and time. Mental status is at baseline.      Motor: Weakness present.   Psychiatric:         Mood and Affect: Mood normal.         Behavior: Behavior normal.         Thought Content: Thought content normal.      Assessment/Plan    89 year old male with hx of hemophilia A, protein C deficiency, HTN, HLD, nephrolithiasis, CAD s/p CABG, and BPH admitted for urgent Rt NOF fracture. S/p Rt Femur fixation    --Physical debility  Continue PT/OT    --Hemophilia A (CMS/HCC)  C/w treatement     --ASHD (arteriosclerotic heart disease)  Nov. 15, 2012: LV EF normal . Cbc bmp weekly.  F/u with Cardiology     --Acute diastolic heart failure (CMS/HCC)  C/w meds.    --Hypertension  C/w same meds, monitor labs    --Persistent atrial fibrillation (CMS/HCC)  Rate controlled, no a/c 2/2 hemophilia     --Anemia  Labs for 4/18    --Obstructive uropathy  Consult renal, c/w flomax and finasteride

## 2023-05-11 NOTE — PROGRESS NOTES
Nursing Home Visit  Name: Ramón Flores  YOB: 1928  MRN: [id number]    Chief Complaint  Lab review/urinary retention  Subjective  HPI:   89 year old male with hx of hemophilia A, protein C deficiency, HTN, HLD, nephrolithiasis, CAD s/p CABG, and BPH admitted for urgent Rt NOF fracture. S/p Rt Femur fixation by Ortho on Jan 13th, but had insufficient factor replacement prior to procedure. Postop patient had a hemoglobin drop, slightly more than expected but likely reasonable for having suboptimals factor replacement. He received 1 unit of packed red blood cells post-op with appropriate response and H&H is now stable. Discussed with his OP hematology and likely expected from blood loss from surgery given hemophilia A and not full repletion of factors rather than ongoing bleeding. Hematology on consult, patient was receiving twice daily therapy with Adv ate now transitioned to once daily and factor levels are stable. CT angio showed no evidence of blood extravasation. After discussion with hematology, patient will need to go to SNF with daily Adv ate for a period before transitioning to his home prophylactic therapy. Hematology is arranging. Patient should not be on any blood thinning agents or aspirin. Patient has been awaiting placement, on 1/22 working with physical therapy on 1/22 he had a syncopal episode in the setting of diarrhea. His blood pressure and mentation improved after getting back in bed and he is getting IV fluids. Denies diarrhea since resolved and he has had no further dizziness. His blood pressure is stable, however his hydrochlorothiazide and metoprolol are continued be on hold. Arrangements made by hematology to deliver Adv ate to his nursing facility where he will be doing PT OT and receiving Adv ate infusions. Per hematology, patient will continue Adv ate infusions for 5 more days and then transition to every other day infusions. He did have a CBC monitored weekly. He will need  post discharge follow-up with orthopedics and hematology. please note probably hematology service patient should never be started on aspirin or other blood thinners/anticoagulants or chemical DVT prophylaxis, if there is concern that patient does need to be started on any of these medications that should be thoroughly discussed with the hematology service and his hematologist   1/31 Patient is somewhat anxious. He states his right hip feels fine, he denies pain currently. Appetite fair, patient states he does not like the food all that much. He is aware of the schedule for his infusion, knows who is coming and when   2/2 Patient is in good spirits. Denies pain or discomfort. Patient reports he is getting his infusions as scheduled. Appetite fair, Denies HA, dizziness, SOB, CP, N&V or constipation   2/14 Patient in good spirits, saw Ortho on 2/9. staples removed. He was surprised how much bruising he had. Appetite good, denies pain or discomfort. Denies HA, dizziness, SOB, CP, N&V or constipation   2/16 Notified yesterday evening, patient's K+ was 3.1 down from 3.4 on 2/14 Ordered to give 40meq now and repeat labs in am. Today patient in great spirits, he will most likely discharge home over weekend. His niece was visiting today. Appetite is good he c/o some knee pain, he stated he had some gel that relieved it. Denies HA, dizziness, SOB, CP, N&V or constipation  3/9   Patient in fair mood, is not in any pain. Appetite only fair due to not liking the food.  States he picks and chooses.   4/4--  On 3/27 patient went to ortho to have his knee injected, he reported it did not work really at all.  On 3/29 patient was coughing ordered CXR results reported Lung emphysema, Hazy bibasilar airspace opacities. Slightly enlarged cardiac silhouette with congestion. New order for Z-pack.  He continued to worsen, on 3/31 ordered repeat CXR, flu swab, CBC/BMP  Also started Levaquin and probiotic. Covid test, done earlier was  negative.  Last evening (4/3) patient slid out of his wheelchair, no injuries.  Today patient talkative, in good spirits.  Appetite remains fair.  Weight is stable.  Denies pain or discomfort. Denies HA, dizziness, SOB, CP, N&V or constipation. Labs from yesterday, essentially stable, did not drop in HGB from 11-->8.    4/6  Patient in fair mood, per nsg he  was complaining about having a kelley catheter placed.  Stated he was having difficulty urinating.  Per PCC he had 700+ residual.  Appetite is fair, 25-75% mostly.  Denies pain or discomfort.  Denies HA, dizziness, SOB, CP, N&V or constipation    Review of Systems:  Reviewed chart looking at current medications, treatment, labs and x-rays and note pertinent positives or negatives, otherwise 10 point system review negative except for what is noted in HPI.    Objective  VS: There were no vitals taken for this visit.  Physical exam:   Physical Exam  Vitals reviewed.   Constitutional:       Appearance: He is normal weight.   HENT:      Head: Normocephalic.      Nose: Nose normal.      Mouth/Throat:      Mouth: Mucous membranes are moist.      Pharynx: No oropharyngeal exudate or posterior oropharyngeal erythema.   Cardiovascular:      Rate and Rhythm: Normal rate and regular rhythm.      Pulses: Normal pulses.      Heart sounds: Normal heart sounds.   Pulmonary:      Effort: Pulmonary effort is normal.   Musculoskeletal:      Right knee: Decreased range of motion.      Left knee: Decreased range of motion.   Skin:     General: Skin is warm and dry.   Neurological:      Mental Status: He is alert and oriented to person, place, and time. Mental status is at baseline.      Motor: Weakness present.   Psychiatric:         Mood and Affect: Mood normal.         Thought Content: Thought content normal.      Assessment/Plan    89 year old male with hx of hemophilia A, protein C deficiency, HTN, HLD, nephrolithiasis, CAD s/p CABG, and BPH admitted for urgent Rt NOF fracture.  S/p Rt Femur fixation    Physical debility  Continue PT/OT    Hemophilia A (CMS/HCC)  C/w treatement       ASHD (arteriosclerotic heart disease)  Nov. 15, 2012: LV EF normal . Cbc bmp weekly     Acute diastolic heart failure (CMS/HCC)  C/w meds    Pneumonia  C/w ATB    Hypertension  C/w same meds, monitor labs    Diastolic CHF, acute on chronic (CMS/HCC)  Monitor labs    Persistent atrial fibrillation (CMS/HCC)  Rate controlled, no a/c 2/2 hemophilia     Anemia  HGB back to baseline 10.7    Obstructive uropathy  Consult renal, c/w flomax and finasteride

## 2023-05-12 VITALS
OXYGEN SATURATION: 98 % | RESPIRATION RATE: 18 BRPM | BODY MASS INDEX: 21.5 KG/M2 | TEMPERATURE: 97.8 F | WEIGHT: 133.2 LBS | DIASTOLIC BLOOD PRESSURE: 80 MMHG | SYSTOLIC BLOOD PRESSURE: 150 MMHG | HEART RATE: 101 BPM

## 2023-05-12 PROBLEM — R55 SYNCOPE: Status: ACTIVE | Noted: 2023-05-12

## 2023-05-12 PROBLEM — R41.82 ALTERED MENTAL STATUS: Status: ACTIVE | Noted: 2023-05-12

## 2023-05-12 PROBLEM — M17.9 OSTEOARTHRITIS OF KNEE: Status: ACTIVE | Noted: 2023-05-12

## 2023-05-12 PROBLEM — M25.561 ACUTE PAIN OF RIGHT KNEE: Status: ACTIVE | Noted: 2023-05-12

## 2023-05-12 PROBLEM — I50.30 (HFPEF) HEART FAILURE WITH PRESERVED EJECTION FRACTION (MULTI): Status: ACTIVE | Noted: 2023-05-12

## 2023-05-12 PROBLEM — M17.11 PRIMARY OSTEOARTHRITIS OF RIGHT KNEE: Status: ACTIVE | Noted: 2023-05-12

## 2023-05-12 PROBLEM — D63.8 ANEMIA IN OTHER CHRONIC DISEASES CLASSIFIED ELSEWHERE: Status: ACTIVE | Noted: 2023-05-12

## 2023-05-12 RX ORDER — FOLIC ACID 1 MG/1
1 TABLET ORAL DAILY
COMMUNITY
Start: 2023-04-12

## 2023-05-12 RX ORDER — L. ACIDOPHILUS/L.BULGARICUS 1MM CELL
1 TABLET ORAL DAILY
COMMUNITY
Start: 2023-04-21

## 2023-05-12 RX ORDER — ALBUTEROL SULFATE 5 MG/ML
2.5 SOLUTION RESPIRATORY (INHALATION) EVERY 6 HOURS PRN
COMMUNITY
Start: 2023-04-21 | End: 2024-01-25 | Stop reason: WASHOUT

## 2023-05-12 RX ORDER — FERROUS SULFATE 325(65) MG
65 TABLET ORAL DAILY
COMMUNITY
Start: 2023-04-12

## 2023-05-12 RX ORDER — METOPROLOL SUCCINATE 50 MG/1
25 TABLET, EXTENDED RELEASE ORAL DAILY
COMMUNITY
Start: 2013-10-28 | End: 2024-01-25 | Stop reason: SDUPTHER

## 2023-05-12 RX ORDER — BENZONATATE 200 MG/1
200 CAPSULE ORAL 3 TIMES DAILY PRN
COMMUNITY
Start: 2023-04-12 | End: 2023-11-17 | Stop reason: WASHOUT

## 2023-05-12 RX ORDER — OMEPRAZOLE 20 MG/1
1 TABLET, DELAYED RELEASE ORAL DAILY
COMMUNITY
End: 2023-11-17 | Stop reason: WASHOUT

## 2023-05-12 RX ORDER — ACETAMINOPHEN 325 MG/1
975 TABLET ORAL EVERY 8 HOURS
COMMUNITY
Start: 2023-04-21 | End: 2024-03-19 | Stop reason: WASHOUT

## 2023-05-12 RX ORDER — TAMSULOSIN HYDROCHLORIDE 0.4 MG/1
0.4 CAPSULE ORAL 2 TIMES DAILY
COMMUNITY
End: 2023-11-16

## 2023-05-14 NOTE — PROGRESS NOTES
Subjective   Patient ID: Ramón Flores is a 89 y.o. male who presents forCHF    HPI  89 year old male with a PMHx of hemophilia A, protein C deficiency, HTN, HLD, nephrolithiasis, CAD s/p CABG (2002), BPH, #R NOF s/p THR, recent SDH who presentED TO er with hx of fall and forehead hematoma/laceration.  Extensively worked up in previous admission, neg EEG, TTE wnl, orthostats positive but thought to be due to dehydration given soft BPs. This admission, workup for syncope including video EEG was negative for epileptiform activity. CT PE negative. Patient found to be fluid overloaded on admission. Also found to be in new afib, rate controlled in 80s, holding apixaban given patient's high bleeding risk. Started on levofloxacin on admission for potential CAP. Heme consulted for Hemophilia A, started on advate 40 u/kg daily while inpatient. Patient's ethanol level elevated on admission  started on thiamine and folate  Wants diuretic stopped  Has been more SOB weight up.   Current Outpatient Medications on File Prior to Visit   Medication Sig Dispense Refill    acetaminophen (Tylenol) 325 mg tablet Take 3 tablets (975 mg) by mouth every 8 hours.      albuterol 5 mg/mL nebulizer solution Take 0.5 mL (2.5 mg) by nebulization every 6 hours if needed for wheezing or shortness of breath.      antihemophil.FVIII,full length (ADVATE IV) Infuse into a venous catheter once daily as needed (hemophilia). kit; 50 unit/kg      atorvastatin (Lipitor) 40 mg tablet Take 1 tablet (40 mg) by mouth once daily.      benzonatate (Tessalon) 200 mg capsule Take 1 capsule (200 mg) by mouth 3 times a day as needed for cough.      carboxymethylcellulose (Refresh Plus) 0.5 % ophthalmic solution Administer 1 drop into both eyes in the morning and 1 drop at noon and 1 drop in the evening and 1 drop before bedtime.      diclofenac sodium 1 % kit Apply 4 g topically once daily. To bilateral knees      ferrous sulfate 325 (65 Fe) MG tablet Take 1  tablet (65 mg of iron) by mouth once daily.      finasteride (Proscar) 5 mg tablet Take 1 tablet (5 mg) by mouth once daily.      folic acid (Folvite) 1 mg tablet Take 1 tablet (1 mg) by mouth once daily.      Lactobacillus acidoph-L.bulgar 1 million cell tablet tablet Take 1 tablet by mouth once daily.      magnesium oxide (Mag-Ox) 400 mg (241.3 mg magnesium) tablet Take 1 tablet (400 mg) by mouth in the morning and 1 tablet (400 mg) before bedtime.      metoprolol succinate XL (Toprol-XL) 50 mg 24 hr tablet Take 1.5 tablets (75 mg) by mouth once daily.      multivitamin with minerals (multivitamin-iron-folic acid) tablet Take 1 tablet by mouth once daily.      omeprazole OTC (PriLOSEC OTC) 20 mg EC tablet Take 1 tablet (20 mg) by mouth once daily.      potassium chloride CR (Klor-Con M20) 20 mEq ER tablet Take 1 tablet (20 mEq) by mouth once daily. while on hydroclorothiazide      tamsulosin (Flomax) 0.4 mg 24 hr capsule Take 1 capsule (0.4 mg) by mouth twice a day.      vit A/vit C/vit E/zinc/copper (PRESERVISION AREDS ORAL) Take 1 capsule by mouth once daily.      [DISCONTINUED] cholecalciferol (Vitamin D-3) 50 mcg (2,000 unit) capsule Take 1 capsule (50 mcg) by mouth once daily.      [DISCONTINUED] cyclobenzaprine (Flexeril) 5 mg tablet Take 1 tablet (5 mg) by mouth 3 times a day as needed.      [DISCONTINUED] hydroCHLOROthiazide (Microzide) 12.5 mg capsule Take 1 capsule (12.5 mg) by mouth once daily.      [DISCONTINUED] loperamide (Imodium A-D) 2 mg tablet Take 1 tablet (2 mg) by mouth if needed (after each loose stool up to 8 tabs daily).      [DISCONTINUED] metoprolol succinate XL (Toprol-XL) 50 mg 24 hr tablet Take 1 tablet (50 mg) by mouth once daily.      [DISCONTINUED] mupirocin (Bactroban) 2 % ointment 3 times a day. apply small amount; as directed      [DISCONTINUED] nystatin (Mycostatin) 100,000 unit/mL suspension Mouth/Throat      [DISCONTINUED] pantoprazole (ProtoNix) 40 mg EC tablet Take 1 tablet  (40 mg) by mouth once daily.      [DISCONTINUED] polyethylene glycol (Miralax) 17 gram/dose powder Take 17 g by mouth. mix in 8 ounces of liquid and drink 1 to 2 times daily for constipation      [DISCONTINUED] tamsulosin (Flomax) 0.4 mg 24 hr capsule Take 1 capsule (0.4 mg) by mouth in the morning and 1 capsule (0.4 mg) before bedtime.      [DISCONTINUED] traMADol (Ultram) 50 mg tablet Take 1 tablet (50 mg) by mouth in the morning and 1 tablet (50 mg) at noon and 1 tablet (50 mg) in the evening and 1 tablet (50 mg) before bedtime.       No current facility-administered medications on file prior to visit.        Review of Systems  ROS . 14 systems reviewed and negative except as in HPI . Hospital and nursing notes reviewed.      Objective   There were no vitals taken for this visit.  BSA: There is no height or weight on file to calculate BSA.  Growth percentiles: Facility age limit for growth %shani is 20 years. Facility age limit for growth %shani is 20 years.   No visits with results within 1 Week(s) from this visit.   Latest known visit with results is:   Legacy Encounter on 10/12/2022   Component Date Value Ref Range Status    Retic % 10/12/2022 2.3 (H)  0.5 - 2.0 % Final    Retic Absolute 10/12/2022 0.087  0.017 - 0.110 x10E12/L Final    Immature Retic fraction 10/12/2022 15.7  0.0 - 16.0 % Final    Reticulocyte Hemoglobin 10/12/2022 38  28 - 38 pg Final    Glucose 10/12/2022 98  74 - 99 mg/dL Final    Sodium 10/12/2022 141  136 - 145 mmol/L Final    Potassium 10/12/2022 3.7  3.5 - 5.3 mmol/L Final    Chloride 10/12/2022 103  98 - 107 mmol/L Final    Bicarbonate 10/12/2022 30  21 - 32 mmol/L Final    Anion Gap 10/12/2022 12  10 - 20 mmol/L Final    Urea Nitrogen 10/12/2022 22  6 - 23 mg/dL Final    Creatinine 10/12/2022 1.05  0.50 - 1.30 mg/dL Final    GFR MALE 10/12/2022 66  >90 mL/min/1.73m2 Final    Comment:  CALCULATIONS OF ESTIMATED GFR ARE PERFORMED   USING THE 2021 CKD-EPI STUDY REFIT EQUATION   WITHOUT  THE RACE VARIABLE FOR THE IDMS-TRACEABLE   CREATININE METHODS.    https://jasn.asnjournals.org/content/early/2021/09/22/ASN.3332064298      Calcium 10/12/2022 8.7  8.6 - 10.3 mg/dL Final    Albumin 10/12/2022 4.2  3.4 - 5.0 g/dL Final    Alkaline Phosphatase 10/12/2022 63  33 - 136 U/L Final    Total Protein 10/12/2022 6.8  6.4 - 8.2 g/dL Final    AST 10/12/2022 19  9 - 39 U/L Final    Total Bilirubin 10/12/2022 1.7 (H)  0.0 - 1.2 mg/dL Final    ALT (SGPT) 10/12/2022 15  10 - 52 U/L Final    Comment:  Patients treated with Sulfasalazine may generate    falsely decreased results for ALT.      Protime 10/12/2022 14.5 (H)  9.8 - 13.4 sec Final    INR 10/12/2022 1.3 (H)  0.9 - 1.1 Final    aPTT 10/12/2022 48 (H)  26 - 39 sec Final    Comment:   THE APTT IS NO LONGER USED FOR MONITORING     UNFRACTIONATED HEPARIN THERAPY.    FOR MONITORING HEPARIN THERAPY,     USE THE HEPARIN ASSAY.      Uric Acid 10/12/2022 5.4  4.0 - 7.5 mg/dL Final    Comment:  Venipuncture immediately after or during the    administration of Metamizole may lead to falsely   low results. Testing should be performed immediately   prior to Metamizole dosing.      WBC 10/12/2022 5.9  4.4 - 11.3 x10E9/L Final    RBC 10/12/2022 3.88 (L)  4.50 - 5.90 x10E12/L Final    Hemoglobin 10/12/2022 12.9 (L)  13.5 - 17.5 g/dL Final    Hematocrit 10/12/2022 37.8 (L)  41.0 - 52.0 % Final    MCV 10/12/2022 97  80 - 100 fL Final    MCHC 10/12/2022 34.1  32.0 - 36.0 g/dL Final    Platelets 10/12/2022 142 (L)  150 - 450 x10E9/L Final    RDW 10/12/2022 13.7  11.5 - 14.5 % Final    Neutrophils % 10/12/2022 63.7  40.0 - 80.0 % Final    Immature Granulocytes %, Automated 10/12/2022 0.3  0.0 - 0.9 % Final    Comment:  Immature Granulocyte Count (IG) includes promyelocytes,    myelocytes and metamyelocytes but does not include bands.   Percent differential counts (%) should be interpreted in the   context of the absolute cell counts (cells/L).      Lymphocytes % 10/12/2022 29.9   13.0 - 44.0 % Final    Monocytes % 10/12/2022 4.9  2.0 - 10.0 % Final    Eosinophils % 10/12/2022 0.7  0.0 - 6.0 % Final    Basophils % 10/12/2022 0.5  0.0 - 2.0 % Final    Neutrophils Absolute 10/12/2022 3.74  1.60 - 5.50 x10E9/L Final    Lymphocytes Absolute 10/12/2022 1.76  0.80 - 3.00 x10E9/L Final    Monocytes Absolute 10/12/2022 0.29  0.05 - 0.80 x10E9/L Final    Eosinophils Absolute 10/12/2022 0.04  0.00 - 0.40 x10E9/L Final    Basophils Absolute 10/12/2022 0.03  0.00 - 0.10 x10E9/L Final    Factor VIII Activity 10/12/2022 14 (L)  55 - 180 % Final    Sedimentation Rate 10/12/2022 2  0 - 20 mm/h Final    Ferritin 10/12/2022 85  20 - 300 ug/L Final    TSH 10/12/2022 4.88 (H)  0.44 - 3.98 mIU/L Final    Comment:  TSH testing is performed using different testing    methodology at [facility] than at other    [facility] hospitals. Direct result comparisons should    only be made within the same method.      Iron 10/12/2022 88  35 - 150 ug/dL Final    TIBC 10/12/2022 324  240 - 445 ug/dL Final    Iron Saturation 10/12/2022 27  25 - 45 % Final      Physical Exam  Constitutional: NAD, laying in bed Eyes: EOMI, clear sclera, normal conjunctiva ENMT: moist mucous dry Head/Neck: no appreciable JVD, no carotid bruit, stellate laceration over left forehead w/o active bleeding with slight hematoma around it, small ecchymosis over glabella, erythema over nose and ecchymosis right eyelid Respiratory/Thorax: AE equal bilaterally, +mild , adequate air movement B/L, normal WOB on NC, no wheezes Cardiovascular: +S1, +S2, irregular, no m/r/g Gastrointestinal: soft, NT, ND, +BS, no appreciable HSM Extremities: B/L 1+ edema till knee, improved Neurological: AOx2, confused, endorsing visual hallucinations, tangential speech, no signs of inattention Skin: warm and dry     Assessment:      89 year old male with a PMHx of hemophilia A, protein C deficiency, HTN, HLD,      nephrolithiasis, CAD s/p CABG (2002), BPH, #R NOF s/p  THR, recent SDH who      presents with hx of fall and forehead hematoma/laceration. This is his 3rd fall      in 4 months, 1st in Jan reportedly mechanical with R hip fracture, 2nd in Feb      thought to be syncope from dehydration and now for the 3rd time without any      memory of the events around the fall. Extensively worked up in previous      admission, neg EEG, TTE wnl, orthostats positive but thought to be due to      dehydration given soft BPs.             This admission, workup for syncope including video EEG was negative for      epileptiform activity. CT PE negative. Patient found to be fluid overloaded on      admission. Also found to be in new afib, rate controlled in 80s, holding      apixaban given patient's high bleeding risk. Started on levofloxacin on      admission for potential CAP. Heme consulted for Hemophilia A, started on advate      40 u/kg daily while inpatient. Patient's ethanol level elevated on admission,      started on thiamine and folate.               #Syncope      - Evaluated for a fall while working with PT in Feb 2022 with dysarthria/left      facial droop. CTH showed SDH. EKG NSR. ECHO no valvular lesion/EF WNL. EEG      negative, keppra was discontinued per neurology recs. Orthostats negative but      BPs soft so event was thought to be 2/2 dehydration. PT/OT recommended SNF but      discharge to home on family request.      - video EEG on this admission without evidence of seizure      - This is his 3rd fall/syncope in 4 months. CT PE negative. CTH w/o e/o      intracranial bleed.      Plan:      -     - CT PE negative for PE      - Carotid doppler with <50% stenosis bilaterally      - r ziopatch with close cardiology follow up             #Acute hypoxic respiratory failure      #Community Acquired Pneumonia, resolved      Presented to [place] ED with syncope, tachycardia, spo2 93% started on 4L O2.      CXR did nto show any consolidative process, possible pulmonary edema,  left      pleural effusion      Reports cough with expectorate for 1 week. No fever      Plan:      -CT with concern for fluid overload, mesenteric edema, anasarca      -patient started on levofloxacin 04/08 - 4/10      -c/w guaifenesin q12hrs             #HFpEF (EF 60-65% in 02/2023)      - 02/2023 TTE with EF 60-65%, spectral doppler with impaired LV diastolic      filling      - c/w BUMEX 1 mg daily CBC BMP Monday. P      - Monitor weight and labs  Not on lasix at presentn             #New onset atrial fibrillation, rate controlled      - Noted to be in atrial fibrillation on telemetry and EKG this admission, has      been noted in a prior EKG but this is largely a new diagnosis      - Largely rate controlled in 80s on metoprolol this admission      - Risk of AC outweighs benefit in this patient with falls and hemophilia      -            #Hemophilia A      #hx of BLE below knee DVT      #Recent SDH 2/23      # Scalp hematoma and laceration      -c/w advate 40 units/ kg daily      - will check factor VIII levels once, otherwise no need for monitoring while on      advate      - SUSPENDED Eliquis 2.5 bid (planned to cont for at least 3 months which would      be till June 2023) in setting of recent fall with scalp hematoma and laceration      and below knee DVTs have usually have low risk for PE      - T+S ordered      - Started PO iron     - Per hematology: Advate 40 units/ kg daily. aPTT for dosage adjustment.                  #Electrolyte imbalance      -Had low K/Mg/Ca/Pi during last admission      - Admit Mg 0.77, K 3.0      - C/w potassium chloride 10mg daily      -Monitor electrolytes      - Replete as needed      - Monitor for refeeding syndrome             #Elevated ethanol level      -Utox ordered given elevated ethanol levels at [facility] ED      -HNO alcohol use and he has been at a SNF      - Started on thiamine and folic acid             #CAD      #HTN      #HLD      -C/w Metoprolol 50mg,      -C/w  atorvastatin 40mg.             #BPH And obstructive uropathy and urinary retention Failed voiding at [facility]  [place] in place        -C/w finasteride 5mg,      -Will restart tamsulosin 0.4mg while inpatient and assess orthostatic BP                   Assessment/Plan   Problem List Items Addressed This Visit       ASHD (arteriosclerotic heart disease)    Pneumonia    Hemophilia A (CMS/[facility])    Hypertension    Fracture of femoral neck, right (CMS/[facility])    Psychogenic syncope - Primary    Subdural hematoma (CMS/[facility])    Acute diastolic heart failure (CMS/[facility])    Diastolic CHF, acute on chronic (CMS/[facility])    Persistent atrial fibrillation (CMS/[facility])    Physical debility    Acute pain of right knee    Primary osteoarthritis of right knee         Time Spent  Prep time on day of patient encounter: 25 minutes  Time spent directly with patient, family or caregiver: 15 minutes  Additional Time Spent on Patient Care Activities: 10 minutes  Documentation Time: 30 minutes  Other Time Spent: 5 minutes  Total: 85 minutes

## 2023-05-16 ENCOUNTER — NURSING HOME VISIT (OUTPATIENT)
Dept: POST ACUTE CARE | Facility: EXTERNAL LOCATION | Age: 88
End: 2023-05-16
Payer: MEDICARE

## 2023-05-16 DIAGNOSIS — I48.20 ATRIAL FIBRILLATION, CHRONIC (MULTI): ICD-10-CM

## 2023-05-16 DIAGNOSIS — R33.9 URINARY RETENTION: ICD-10-CM

## 2023-05-16 DIAGNOSIS — N13.9 OBSTRUCTIVE UROPATHY: ICD-10-CM

## 2023-05-16 DIAGNOSIS — I95.9 HYPOTENSION, UNSPECIFIED HYPOTENSION TYPE: Primary | ICD-10-CM

## 2023-05-16 DIAGNOSIS — I25.10 ASHD (ARTERIOSCLEROTIC HEART DISEASE): ICD-10-CM

## 2023-05-16 DIAGNOSIS — I50.32 CHRONIC HEART FAILURE WITH PRESERVED EJECTION FRACTION (MULTI): ICD-10-CM

## 2023-05-16 PROCEDURE — 99309 SBSQ NF CARE MODERATE MDM 30: CPT | Performed by: FAMILY MEDICINE

## 2023-05-16 NOTE — LETTER
Patient: Ramón Flores  : 1928    Encounter Date: 2023    Nursing Home Visit  Name: Ramón Flores  YOB: 1928  MRN: [id number]    Chief Complaint  Lab Review  Subjective  HPI:   89 year old male with hx of hemophilia A, protein C deficiency, HTN, HLD, nephrolithiasis, CAD s/p CABG, and BPH admitted for urgent Rt NOF fracture. S/p Rt Femur fixation by Ortho on , but had insufficient factor replacement prior to procedure. Postop patient had a hemoglobin drop, slightly more than expected but likely reasonable for having suboptimals factor replacement. He received 1 unit of packed red blood cells post-op with appropriate response and H&H is now stable. Discussed with his OP hematology and likely expected from blood loss from surgery given hemophilia A and not full repletion of factors rather than ongoing bleeding. Hematology on consult, patient was receiving twice daily therapy with Adv ate now transitioned to once daily and factor levels are stable. CT angio showed no evidence of blood extravasation. After discussion with hematology, patient will need to go to SNF with daily Adv ate for a period before transitioning to his home prophylactic therapy. Hematology is arranging. Patient should not be on any blood thinning agents or aspirin. Patient has been awaiting placement, on  working with physical therapy on  he had a syncopal episode in the setting of diarrhea. His blood pressure and mentation improved after getting back in bed and he is getting IV fluids. Denies diarrhea since resolved and he has had no further dizziness. His blood pressure is stable, however his hydrochlorothiazide and metoprolol are continued be on hold. Arrangements made by hematology to deliver Adv ate to his nursing facility where he will be doing PT OT and receiving Adv ate infusions. Per hematology, patient will continue Adv ate infusions for 5 more days and then transition to every other day  infusions. He did have a CBC monitored weekly. He will need post discharge follow-up with orthopedics and hematology. please note probably hematology service patient should never be started on aspirin or other blood thinners/anticoagulants or chemical DVT prophylaxis, if there is concern that patient does need to be started on any of these medications that should be thoroughly discussed with the hematology service and his hematologist   --1/31 Patient is somewhat anxious. He states his right hip feels fine, he denies pain currently. Appetite fair, patient states he does not like the food all that much. He is aware of the schedule for his infusion, knows who is coming and when   --2/2 Patient is in good spirits. Denies pain or discomfort. Patient reports he is getting his infusions as scheduled. Appetite fair, Denies HA, dizziness, SOB, CP, N&V or constipation   --2/14 Patient in good spirits, saw Ortho on 2/9. staples removed. He was surprised how much bruising he had. Appetite good, denies pain or discomfort. Denies HA, dizziness, SOB, CP, N&V or constipation   --2/16 Notified yesterday evening, patient's K+ was 3.1 down from 3.4 on 2/14 Ordered to give 40meq now and repeat labs in am. Today patient in great spirits, he will most likely discharge home over weekend. His niece was visiting today. Appetite is good he c/o some knee pain, he stated he had some gel that relieved it. Denies HA, dizziness, SOB, CP, N&V or constipation  --3/9   Patient in fair mood, is not in any pain. Appetite only fair due to not liking the food.  States he picks and chooses.   --4/4-  On 3/27 patient went to ortho to have his knee injected, he reported it did not work really at all.  On 3/29 patient was coughing ordered CXR results reported Lung emphysema, Hazy bibasilar airspace opacities. Slightly enlarged cardiac silhouette with congestion. New order for Z-pack.  He continued to worsen, on 3/31 ordered repeat CXR, flu swab, CBC/BMP   Also started Levaquin and probiotic. Covid test, done earlier was negative.  Last evening (4/3) patient slid out of his wheelchair, no injuries.  Today patient talkative, in good spirits.  Appetite remains fair.  Weight is stable.  Denies pain or discomfort. Denies HA, dizziness, SOB, CP, N&V or constipation. Labs from yesterday, essentially stable, did not drop in HGB from 11-->8.    --4/6  Patient in fair mood, per nsg he  was complaining about having a kelley catheter placed.  Stated he was having difficulty urinating.  Per PCC he had 700+ residual.  Appetite is fair, 25-75% mostly.  Denies pain or discomfort.  Denies HA, dizziness, SOB, CP, N&V or constipation  --4/18  Patient returned from hospital 4/14   ### Per hospital records: Patient presents with hx of fall and forehead hematoma/laceration. This is his 3rd fall in 4 months, 1st in Jan reportedly mechanical with R hip fracture, 2nd in Feb thought to be syncope from dehydration and now for the 3rd time without any memory of the events around the fall. Extensively worked up in previous admission, neg EEG, orthostats positive but thought to be due to dehydration given soft BPs. 02/24/23 Echo showed EF 60-65%, spectral doppler with impaired LV diastolic filling. This admission, workup for syncope including video EEG was negative for epileptiform activity. CT PE negative. Patient found to be fluid overloaded on admission, CT showed moderate bilateral pleural effusions with adjacent atelectasis, pulmonary edema, mesenteric edema and body wall anasarca.  without a baseline. Also found to be in new afib, rates controlled in 80s on metoprolol succinate 75 mg. Neuro was consulted, there was low suspicion for neurogenic cause of syncope or seizure, patient's subdural bleeds were deemed to be chronic. Hematology was consulted for patient's hemophilia, continued on Advate daily. Patient's ethanol level elevated on admission, started on thiamine and folate, though  he denied ETOH use. Also found with several electrolyte abnormalities including hypokalemia, hypomagnesemia, hypophosphatemia. Treated for CAP with three days of levofloxacin, patient weaned to RA. Diuresed with IV lasix. Patient had evidence of delirium on 4/10, UA without signs of infection, replaced kelley since patient still retaining. Delirium improved --4/11- 4/12. For his syncope, lower concern for cardiac etiology given echo in 02/24 without AS, no pauses on telemetry. However patient does have new afib and new diagnosis of HFpEF. Currently we suspect that patient's syncope is due to orthostatic hypotension, as suspected in previous admission. Repeat orthostats wnl while inpatient. Will discharge patient with event monitor and close cardiology follow up. For his afib, we decided not to start anticoagulation given his high bleeding risk from hemophilia, history of falls. We chose a rate control strategy with metoprolol succinate. For his heart failure, we deferred starting GDMT due to patient's age, history of orthostatic hypotension.  Discharge back to SNF  --4/18  Today patient is fair, he does not like his kelley.  He denies pain other than his right knee that he states gives him trouble.  Appetite fair to good.  Denies HA, dizziness, SOB, CP, N&V or constipation  --4/20  Remains stable, no c/o dizziness.  Does c/o about his kelley catheter,  Appetite fair.  Denies pain or discomfort. Denies HA, dizziness, SOB, CP, N&V or constipation  --4/27 4/27: Patient in fair mood, leaning back in bed. States he is doing ok, appetite is good. Catheter is bothering him. Explained to patient that we are trialing his urinary voiding and so the balloon is deflated so moving around may cause some discomfort. Denies HA, dizziness, SOB, CP, N&V or constipation.   --5/9  Patient in fair spirits.  Had lab draw on today, BNP 6000+   Also was hypokalemic.  Appetite is good he denies pain discomfort  --  Patient sitting in room is  stable , he denies pain or discomfort, Appetite fair.  Blood pressure this morning 88/58.  Is asymptomatic, is currently taking Bumex, Aldactone and metoprolol.  Labs drawn today    Review of Systems:  Reviewed chart looking at current medications, treatment, labs and x-rays and note pertinent positives or negatives, otherwise 10 point system review negative except for what is noted in HPI.    Objective  VS: 92/67. 97.8, 81, 18, 94%, 133lbs    Physical exam:   Physical Exam  Vitals reviewed.   Constitutional:       Appearance: He is normal weight.   HENT:      Head: Normocephalic.      Comments: Laceration healed     Nose: Nose normal.      Mouth/Throat:      Mouth: Mucous membranes are moist.   Cardiovascular:      Rate and Rhythm: Normal rate and regular rhythm.      Pulses: Normal pulses.      Heart sounds: Normal heart sounds.   Pulmonary:      Effort: Pulmonary effort is normal.      Breath sounds: Normal breath sounds.   Abdominal:      General: Bowel sounds are normal.      Palpations: Abdomen is soft.   Musculoskeletal:         General: Normal range of motion.      Right lower leg: Edema present.      Left lower leg: Edema present.      Comments: Generalized weakness improving, trace BLE edema   Skin:     General: Skin is warm and dry.   Neurological:      Mental Status: He is alert and oriented to person, place, and time. Mental status is at baseline.      Motor: Weakness present.   Psychiatric:         Mood and Affect: Mood normal.         Behavior: Behavior normal.         Thought Content: Thought content normal.      Assessment/Plan    89 year old male with hx of hemophilia A, protein C deficiency, HTN, HLD, nephrolithiasis, CAD s/p CABG, and BPH admitted for urgent Rt NOF fracture. S/p Rt Femur fixation   Hypotension  Repeat BP better 114/78. Monitor closely.      Atrial fibrillation, chronic (CMS/HCC)  Rate controlled, no a/c 2/2 hemophilia     (HFpEF) heart failure with preserved ejection fraction  (CMS/HCC)  Weight unchanged edema improving    Hemophilia A (CMS/HCC)  C/w infusions, labs have been stable    ASHD (arteriosclerotic heart disease)  Nov. 15, 2012: LV EF normal . Cbc bmp weekly.  F/u with Cardiology       Urinary retention  Archuleta in place    Obstructive uropathy  Will consult renal,     Electronically Signed By: SHERRY Ramirez   6/9/23 11:36 AM

## 2023-05-18 ENCOUNTER — NURSING HOME VISIT (OUTPATIENT)
Dept: POST ACUTE CARE | Facility: EXTERNAL LOCATION | Age: 88
End: 2023-05-18
Payer: MEDICARE

## 2023-05-18 DIAGNOSIS — N39.0 URINARY TRACT INFECTION ASSOCIATED WITH INDWELLING URETHRAL CATHETER, INITIAL ENCOUNTER (CMS-HCC): Primary | ICD-10-CM

## 2023-05-18 DIAGNOSIS — T83.511A URINARY TRACT INFECTION ASSOCIATED WITH INDWELLING URETHRAL CATHETER, INITIAL ENCOUNTER (CMS-HCC): Primary | ICD-10-CM

## 2023-05-18 PROCEDURE — 99310 SBSQ NF CARE HIGH MDM 45: CPT | Performed by: FAMILY MEDICINE

## 2023-05-18 NOTE — LETTER
Patient: Ramón Flores  : 1928    Encounter Date: 2023    Nursing Home Visit  Name: Ramón Flores  YOB: 1928  MRN: [id number]    Chief Complaint  Lab Review  Subjective  HPI:   89 year old male with hx of hemophilia A, protein C deficiency, HTN, HLD, nephrolithiasis, CAD s/p CABG, and BPH admitted for urgent Rt NOF fracture. S/p Rt Femur fixation by Ortho on , but had insufficient factor replacement prior to procedure. Postop patient had a hemoglobin drop, slightly more than expected but likely reasonable for having suboptimals factor replacement. He received 1 unit of packed red blood cells post-op with appropriate response and H&H is now stable. Discussed with his OP hematology and likely expected from blood loss from surgery given hemophilia A and not full repletion of factors rather than ongoing bleeding. Hematology on consult, patient was receiving twice daily therapy with Adv ate now transitioned to once daily and factor levels are stable. CT angio showed no evidence of blood extravasation. After discussion with hematology, patient will need to go to SNF with daily Adv ate for a period before transitioning to his home prophylactic therapy. Hematology is arranging. Patient should not be on any blood thinning agents or aspirin. Patient has been awaiting placement, on  working with physical therapy on  he had a syncopal episode in the setting of diarrhea. His blood pressure and mentation improved after getting back in bed and he is getting IV fluids. Denies diarrhea since resolved and he has had no further dizziness. His blood pressure is stable, however his hydrochlorothiazide and metoprolol are continued be on hold. Arrangements made by hematology to deliver Adv ate to his nursing facility where he will be doing PT OT and receiving Adv ate infusions. Per hematology, patient will continue Adv ate infusions for 5 more days and then transition to every other day  infusions. He did have a CBC monitored weekly. He will need post discharge follow-up with orthopedics and hematology. please note probably hematology service patient should never be started on aspirin or other blood thinners/anticoagulants or chemical DVT prophylaxis, if there is concern that patient does need to be started on any of these medications that should be thoroughly discussed with the hematology service and his hematologist   --1/31 Patient is somewhat anxious. He states his right hip feels fine, he denies pain currently. Appetite fair, patient states he does not like the food all that much. He is aware of the schedule for his infusion, knows who is coming and when   --2/2 Patient is in good spirits. Denies pain or discomfort. Patient reports he is getting his infusions as scheduled. Appetite fair, Denies HA, dizziness, SOB, CP, N&V or constipation   --2/14 Patient in good spirits, saw Ortho on 2/9. staples removed. He was surprised how much bruising he had. Appetite good, denies pain or discomfort. Denies HA, dizziness, SOB, CP, N&V or constipation   --2/16 Notified yesterday evening, patient's K+ was 3.1 down from 3.4 on 2/14 Ordered to give 40meq now and repeat labs in am. Today patient in great spirits, he will most likely discharge home over weekend. His niece was visiting today. Appetite is good he c/o some knee pain, he stated he had some gel that relieved it. Denies HA, dizziness, SOB, CP, N&V or constipation  --3/9   Patient in fair mood, is not in any pain. Appetite only fair due to not liking the food.  States he picks and chooses.   --4/4-  On 3/27 patient went to ortho to have his knee injected, he reported it did not work really at all.  On 3/29 patient was coughing ordered CXR results reported Lung emphysema, Hazy bibasilar airspace opacities. Slightly enlarged cardiac silhouette with congestion. New order for Z-pack.  He continued to worsen, on 3/31 ordered repeat CXR, flu swab, CBC/BMP   Also started Levaquin and probiotic. Covid test, done earlier was negative.  Last evening (4/3) patient slid out of his wheelchair, no injuries.  Today patient talkative, in good spirits.  Appetite remains fair.  Weight is stable.  Denies pain or discomfort. Denies HA, dizziness, SOB, CP, N&V or constipation. Labs from yesterday, essentially stable, did not drop in HGB from 11-->8.    --4/6  Patient in fair mood, per nsg he  was complaining about having a kelley catheter placed.  Stated he was having difficulty urinating.  Per PCC he had 700+ residual.  Appetite is fair, 25-75% mostly.  Denies pain or discomfort.  Denies HA, dizziness, SOB, CP, N&V or constipation  --4/18  Patient returned from hospital 4/14   ### Per hospital records: Patient presents with hx of fall and forehead hematoma/laceration. This is his 3rd fall in 4 months, 1st in Jan reportedly mechanical with R hip fracture, 2nd in Feb thought to be syncope from dehydration and now for the 3rd time without any memory of the events around the fall. Extensively worked up in previous admission, neg EEG, orthostats positive but thought to be due to dehydration given soft BPs. 02/24/23 Echo showed EF 60-65%, spectral doppler with impaired LV diastolic filling. This admission, workup for syncope including video EEG was negative for epileptiform activity. CT PE negative. Patient found to be fluid overloaded on admission, CT showed moderate bilateral pleural effusions with adjacent atelectasis, pulmonary edema, mesenteric edema and body wall anasarca.  without a baseline. Also found to be in new afib, rates controlled in 80s on metoprolol succinate 75 mg. Neuro was consulted, there was low suspicion for neurogenic cause of syncope or seizure, patient's subdural bleeds were deemed to be chronic. Hematology was consulted for patient's hemophilia, continued on Advate daily. Patient's ethanol level elevated on admission, started on thiamine and folate, though  he denied ETOH use. Also found with several electrolyte abnormalities including hypokalemia, hypomagnesemia, hypophosphatemia. Treated for CAP with three days of levofloxacin, patient weaned to RA. Diuresed with IV lasix. Patient had evidence of delirium on 4/10, UA without signs of infection, replaced kelley since patient still retaining. Delirium improved --4/11- 4/12. For his syncope, lower concern for cardiac etiology given echo in 02/24 without AS, no pauses on telemetry. However patient does have new afib and new diagnosis of HFpEF. Currently we suspect that patient's syncope is due to orthostatic hypotension, as suspected in previous admission. Repeat orthostats wnl while inpatient. Will discharge patient with event monitor and close cardiology follow up. For his afib, we decided not to start anticoagulation given his high bleeding risk from hemophilia, history of falls. We chose a rate control strategy with metoprolol succinate. For his heart failure, we deferred starting GDMT due to patient's age, history of orthostatic hypotension.  Discharge back to SNF  --4/18  Today patient is fair, he does not like his kelley.  He denies pain other than his right knee that he states gives him trouble.  Appetite fair to good.  Denies HA, dizziness, SOB, CP, N&V or constipation  --4/20  Remains stable, no c/o dizziness.  Does c/o about his kelley catheter,  Appetite fair.  Denies pain or discomfort. Denies HA, dizziness, SOB, CP, N&V or constipation  --4/27 4/27: Patient in fair mood, leaning back in bed. States he is doing ok, appetite is good. Catheter is bothering him. Explained to patient that we are trialing his urinary voiding and so the balloon is deflated so moving around may cause some discomfort. Denies HA, dizziness, SOB, CP, N&V or constipation.   --5/9  Patient in fair spirits.  Had lab draw on today, BNP 6000+   Also was hypokalemic.  Appetite is good he denies pain discomfort  -- 5/16 Patient sitting in room  is stable , he denies pain or discomfort, Appetite fair.  Blood pressure this morning 88/58.  Is asymptomatic, is currently taking Bumex, Aldactone and metoprolol.  Labs drawn today  -- 5/18  UA/C&S done 5/16.  Patient states, he feels fair.  Appetite good.  Currently feels a bit under the weather.  Review of Systems:  Reviewed chart looking at current medications, treatment, labs and x-rays and note pertinent positives or negatives, otherwise 10 point system review negative except for what is noted in HPI.    Objective  VS: Blood pressure 120/80, pulse 78, temperature 36.6 °C (97.8 °F), resp. rate 18, SpO2 99 %.      Physical exam:   Physical Exam  Vitals reviewed.   Constitutional:       Appearance: He is normal weight.   HENT:      Head: Normocephalic.      Comments: Laceration healed     Nose: Nose normal.      Mouth/Throat:      Mouth: Mucous membranes are moist.   Cardiovascular:      Rate and Rhythm: Normal rate and regular rhythm.      Pulses: Normal pulses.      Heart sounds: Normal heart sounds.   Pulmonary:      Effort: Pulmonary effort is normal.      Breath sounds: Decreased breath sounds present.      Comments: Fine rales bilat bases  Abdominal:      General: Bowel sounds are normal.      Palpations: Abdomen is soft.   Musculoskeletal:         General: Normal range of motion.      Right lower leg: Edema present.      Left lower leg: Edema present.      Comments: Generalized weakness improving, trace BLE edema   Skin:     General: Skin is warm and dry.   Neurological:      Mental Status: He is alert and oriented to person, place, and time. Mental status is at baseline.      Motor: Weakness present.   Psychiatric:         Mood and Affect: Mood normal.         Behavior: Behavior normal.         Thought Content: Thought content normal.      Assessment/Plan    89 year old male with hx of hemophilia A, protein C deficiency, HTN, HLD, nephrolithiasis, CAD s/p CABG, and BPH admitted for urgent Rt NOF fracture.  S/p Rt Femur fixation   UTI  Start doxy    Hypotension  Repeat BP better 114/78. Monitor closely.       Atrial fibrillation, chronic (CMS/HCC)  Rate controlled, no a/c 2/2 hemophilia      (HFpEF) heart failure with preserved ejection fraction (CMS/HCC)  Weight unchanged edema improving     Hemophilia A (CMS/HCC)  C/w infusions, labs have been stable     ASHD (arteriosclerotic heart disease)  Nov. 15, 2012: LV EF normal . Cbc bmp weekly.  F/u with Cardiology          Electronically Signed By: SUSI Ramirez-CNP   6/9/23 12:21 PM

## 2023-05-21 ENCOUNTER — NURSING HOME VISIT (OUTPATIENT)
Dept: POST ACUTE CARE | Facility: EXTERNAL LOCATION | Age: 88
End: 2023-05-21
Payer: MEDICARE

## 2023-05-21 DIAGNOSIS — I50.31 ACUTE HEART FAILURE WITH PRESERVED EJECTION FRACTION (MULTI): ICD-10-CM

## 2023-05-21 DIAGNOSIS — I48.19 PERSISTENT ATRIAL FIBRILLATION (MULTI): ICD-10-CM

## 2023-05-21 DIAGNOSIS — J18.9 PNEUMONIA OF LOWER LOBE DUE TO INFECTIOUS ORGANISM, UNSPECIFIED LATERALITY: ICD-10-CM

## 2023-05-21 DIAGNOSIS — I50.33 DIASTOLIC CHF, ACUTE ON CHRONIC (MULTI): ICD-10-CM

## 2023-05-21 DIAGNOSIS — N40.1 BENIGN PROSTATIC HYPERPLASIA WITH URINARY OBSTRUCTION: ICD-10-CM

## 2023-05-21 DIAGNOSIS — D66 HEMOPHILIA A (MULTI): ICD-10-CM

## 2023-05-21 DIAGNOSIS — F48.8 PSYCHOGENIC SYNCOPE: ICD-10-CM

## 2023-05-21 DIAGNOSIS — U07.1 COVID-19: Primary | ICD-10-CM

## 2023-05-21 DIAGNOSIS — N13.8 BENIGN PROSTATIC HYPERPLASIA WITH URINARY OBSTRUCTION: ICD-10-CM

## 2023-05-21 PROBLEM — I48.20 ATRIAL FIBRILLATION, CHRONIC (MULTI): Status: ACTIVE | Noted: 2023-05-21

## 2023-05-21 PROCEDURE — 99310 SBSQ NF CARE HIGH MDM 45: CPT | Performed by: FAMILY MEDICINE

## 2023-05-21 NOTE — LETTER
Patient: Ramón Flores  : 1928    Encounter Date: 2023    Note Text: COVID 19  HPI  89 year old male with a PMHx of hemophilia A, protein C deficiency, HTN, HLD, nephrolithiasis, CAD s/p CABG (), BPH, #R NOF s/p THR, recent SDH who presentED TO er with hx of fall and forehead hematoma/laceration. Extensively worked up in previous admission, neg EEG, TTE wnl, orthostats positive but thought to be due to dehydration given soft BPs. This admission, workup for syncope including video EEG was negative for epileptiform activity. CT PE negative. Patient found to be fluid overloaded on admission. Also found to be in new afib, rate controlled in 80s, holding apixaban given patient's high bleeding risk. Started on levofloxacin on admission for potential CAP. Heme consulted for Hemophilia A, started on advate 40 u/kg daily while inpatient. Patient's ethanol level elevated on admission  started on thiamine and folate  Wants diuretic stopped  Has been more SOB weight up.  COVID 19 diagnosed t9oday presented with cough. No SOB more fatigue  Current Outpatient Medications on File Prior to Visit  Medication Sig Dispense Refill  • acetaminophen (Tylenol) 325 mg tablet Take 3 tablets (975 mg) by mouth every 8 hours.  • albuterol 5 mg/mL nebulizer solution Take 0.5 mL (2.5 mg) by nebulization every 6 hours if needed for wheezing or shortness of breath.  • antihemophil.FVIII,full length (ADVATE IV) Infuse into a venous catheter once daily as needed (hemophilia). kit; 50 unit/kg  • atorvastatin (Lipitor) 40 mg tablet Take 1 tablet (40 mg) by mouth once daily.  • benzonatate (Tessalon) 200 mg capsule Take 1 capsule (200 mg) by mouth 3 times a day as needed for cough.  • carboxymethylcellulose (Refresh Plus) 0.5 % ophthalmic solution Administer 1 drop into both eyes in the morning and 1 drop at noon and 1 drop in the evening and 1 drop before bedtime.  • diclofenac sodium 1 % kit Apply 4 g topically once daily. To  bilateral knees  • ferrous sulfate 325 (65 Fe) MG tablet Take 1 tablet (65 mg of iron) by mouth once daily.  • finasteride (Proscar) 5 mg tablet Take 1 tablet (5 mg) by mouth once daily.  • folic acid (Folvite) 1 mg tablet Take 1 tablet (1 mg) by mouth once daily.  • Lactobacillus acidoph-L.bulgar 1 million cell tablet tablet Take 1 tablet by mouth once daily.  • magnesium oxide (Mag-Ox) 400 mg (241.3 mg magnesium) tablet Take 1 tablet (400 mg) by mouth in the morning and 1 tablet (400 mg) before bedtime.  • metoprolol succinate XL (Toprol-XL) 50 mg 24 hr tablet Take 1.5 tablets (75 mg) by mouth once daily.  • multivitamin with minerals (multivitamin-iron-folic acid) tablet Take 1 tablet by mouth once daily.  • omeprazole OTC (PriLOSEC OTC) 20 mg EC tablet Take 1 tablet (20 mg) by mouth once daily.  • potassium chloride CR (Klor-Con M20) 20 mEq ER tablet Take 1 tablet (20 mEq) by mouth once daily. while on hydroclorothiazide  • tamsulosin (Flomax) 0.4 mg 24 hr capsule Take 1 capsule (0.4 mg) by mouth twice a day.  • Lacie/vit C/vit E/zinc/copper (PRESERVISION AREDS ORAL) Take 1 capsule by mouth once daily.  • No current facility-administered medications on file prior to visit.  Review of Systems  ROS . 14 systems reviewed and negative except as in HPI . Covid 19  •  Physical Exam  Constitutional:NAD, laying in bed Eyes: EOMI, clear sclera, normal conjunctiva ENMT: moist mucous dry Head/Neck: no appreciable JVD, no carotid bruit, stellate laceration over left forehead w/o active bleeding with slight hematoma around it, small ecchymosis over glabella, erythema over nose and ecchymosis right eyelid Respiratory/Thorax: AE equal bilaterally, +mild , adequate air movement B/L, normal WOB on NC, no wheezes Cardiovascular: +S1, +S2, irregular, no m/r/g Gastrointestinal: soft, NT, ND, +BS, no appreciableHSM Extremities: B/L 1+ edema till knee, improved Neurological: AOx2, confused, endorsing visual hallucinations, tangential  speech, no signs of inattention Skin: warm and dry  Assessment:  89 year old male with a PMHx of hemophilia A, protein C deficiency, HTN, HLD,  nephrolithiasis, CAD s/p CABG (2002), BPH, #R NOF s/p THR, recent SDH who  presents with hx of fall and forehead hematoma/laceration. This is his 3rd fall  in 4 months, 1st in Jan reportedly mechanical with R hip fracture, 2nd in Feb  thought to be syncope from dehydration and now for the 3rd time without any  memory of the events around the fall. Extensively worked up in previous  admission, neg EEG, TTE wnl, orthostats positive but thought to be due to  dehydration given soft BPs.  COVID 19 Start Paxlovid 150 mg bid for 5 days/ Vitals q shidt Monitor O2  [place] for cough monitor O2  new afib, rate controlled in 80s, holding  apixaban given patient's high bleeding risk. Started on levofloxacin on  admission for potential CAP. Heme consulted for Hemophilia A, started on advate  40 u/kg daily while inpatient. Patient's ethanol level elevated on admission,  started on thiamine and folate.  #Syncope  - Evaluated for a fall while working with PT in Feb 2022 with dysarthria/left  facial droop. CTH showed SDH. EKG NSR. ECHO no valvular lesion/EF WNL. EEG  negative, keppra was discontinued per neurology recs. Orthostats negative but  BPs soft so event was thought to be 2/2 dehydration. PT/OT recommended SNF but  discharge to home on family request.  - video EEG on this admission without evidence of seizure  - This is his 3rd fall/syncope in 4 months. CT PE negative. CTH w/o e/o  intracranial bleed.  Plan:  -  - CT PE negative for PE  - Carotid doppler with <50% stenosis bilaterally  - r ziopatch with close cardiologyfollow up  #Acute hypoxic respiratory failure  #Community Acquired Pneumonia, resolved  Presented to [place] ED with syncope, tachycardia, spo2 93% started on 4L O2.  CXR did nto show any consolidative process, possible pulmonary edema, left  pleural  effusion  Reports cough with expectorate for 1 week. No fever  Plan:  -CT with concern for fluid overload, mesenteric edema, anasarca  -patient started on levofloxacin 04/08 - 4/10  -c/w guaifenesin q12hrs  #HFpEF (EF 60-65% in 02/2023)  - 02/2023 TTE with EF 60-65%, spectral doppler with impaired LV diastolic  filling  - c/w BUMEX 1mg daily CBC BMP Monday. P  - Monitor weight and labs  Not on lasix at presentn  #New onset atrial fibrillation, rate controlled  - Noted to be in atrial fibrillation on telemetry and EKG this admission, has  been noted in a prior EKG but this is largely a new diagnosis  - Largely rate controlled in 80s on metoprolol this admission  - Risk of AC outweighs benefit in this patient with falls and hemophilia  -  #Hemophilia A  #hx of BLE below knee DVT  #Recent SDH 2/23  # Scalp hematoma and laceration  -c/w advate 40 units/ kg daily  - will check factor VIII levels once, otherwise no need for monitoring while on  advate  - SUSPENDED Eliquis 2.5 bid (planned to cont for at least 3 months which would  be till June 2023) in setting of recent fall with scalp hematoma and laceration  and below knee DVTs have usually have low risk for PE  - T+S ordered  - Started PO iron  - Per hematology: Advate 40 units/ kg daily. aPTT for dosage adjustment.  #Electrolyte imbalance  -Had low K/Mg/Ca/Pi during last admission  - Admit Mg 0.77, K 3.0  - C/w potassium chloride 10mg daily  -Monitor electrolytes  - Replete as needed  - Monitor for refeeding syndrome  #Elevated ethanol level  -Utox ordered given elevated ethanol levels at [facility] ED  -HNO alcohol use and he has been at a SNF  - Started on thiamine and folic acid  #CAD  #HTN  #HLD  -C/w Metoprolol 50mg,  -C/w atorvastatin 40mg.  #BPH And obstructive uropathy and urinary retention Failed voiding at [facility] in place  -C/w finasteride 5mg,  -Will restart tamsulosin 0.4mg while inpatient and assess orthostatic BP    Problem List Items Addressed  This Visit       Pneumonia    Hemophilia A (CMS/HCC)    Enlarged prostate with lower urinary tract symptoms (LUTS)    Psychogenic syncope    Diastolic CHF, acute on chronic (CMS/HCC)    Persistent atrial fibrillation (CMS/HCC)    (HFpEF) heart failure with preserved ejection fraction (CMS/HCC)    COVID-19 - Primary       Assessment/Plan  Problem List Items Addressed This Visit  COVID 19  ASHD (arteriosclerotic heart disease)  Pneumonia  Hemophilia A (CMS/HCC)  Hypertension  Fracture of femoral neck, right (CMS/HCC)  Psychogenic syncope - Primary  Subdural hematoma (CMS/HCC)  Acute diastolic heart failure (CMS/HCC)  Diastolic CHF, acute on chronic (CMS/HCC)  Persistent atrial fibrillation (CMS/HCC)  Physical debility  Acute pain of right knee  Primary osteoarthritis of right knee  Mau Al MD      Electronically Signed By: Mau Al MD   5/21/23  6:45 PM

## 2023-05-21 NOTE — PROGRESS NOTES
Note Text: COVID 19  HPI  89 year old male with a PMHx of hemophilia A, protein C deficiency, HTN, HLD, nephrolithiasis, CAD s/p CABG (2002), BPH, #R NOF s/p THR, recent SDH who presentED TO er with hx of fall and forehead hematoma/laceration. Extensively worked up in previous admission, neg EEG, TTE wnl, orthostats positive but thought to be due to dehydration given soft BPs. This admission, workup for syncope including video EEG was negative for epileptiform activity. CT PE negative. Patient found to be fluid overloaded on admission. Also found to be in new afib, rate controlled in 80s, holding apixaban given patient's high bleeding risk. Started on levofloxacin on admission for potential CAP. Heme consulted for Hemophilia A, started on advate 40 u/kg daily while inpatient. Patient's ethanol level elevated on admission  started on thiamine and folate  Wants diuretic stopped  Has been more SOB weight up.  COVID 19 diagnosed t9oday presented with cough. No SOB more fatigue  Current Outpatient Medications on File Prior to Visit  Medication Sig Dispense Refill   acetaminophen (Tylenol) 325 mg tablet Take 3 tablets (975 mg) by mouth every 8 hours.   albuterol 5 mg/mL nebulizer solution Take 0.5 mL (2.5 mg) by nebulization every 6 hours if needed for wheezing or shortness of breath.   antihemophil.FVIII,full length (ADVATE IV) Infuse into a venous catheter once daily as needed (hemophilia). kit; 50 unit/kg   atorvastatin (Lipitor) 40 mg tablet Take 1 tablet (40 mg) by mouth once daily.   benzonatate (Tessalon) 200 mg capsule Take 1 capsule (200 mg) by mouth 3 times a day as needed for cough.   carboxymethylcellulose (Refresh Plus) 0.5 % ophthalmic solution Administer 1 drop into both eyes in the morning and 1 drop at noon and 1 drop in the evening and 1 drop before bedtime.   diclofenac sodium 1 % kit Apply 4 g topically once daily. To bilateral knees   ferrous sulfate 325 (65 Fe) MG tablet Take 1 tablet (65 mg of iron)  by mouth once daily.   finasteride (Proscar) 5 mg tablet Take 1 tablet (5 mg) by mouth once daily.   folic acid (Folvite) 1 mg tablet Take 1 tablet (1 mg) by mouth once daily.   Lactobacillus acidoph-L.bulgar 1 million cell tablet tablet Take 1 tablet by mouth once daily.   magnesium oxide (Mag-Ox) 400 mg (241.3 mg magnesium) tablet Take 1 tablet (400 mg) by mouth in the morning and 1 tablet (400 mg) before bedtime.   metoprolol succinate XL (Toprol-XL) 50 mg 24 hr tablet Take 1.5 tablets (75 mg) by mouth once daily.   multivitamin with minerals (multivitamin-iron-folic acid) tablet Take 1 tablet by mouth once daily.   omeprazole OTC (PriLOSEC OTC) 20 mg EC tablet Take 1 tablet (20 mg) by mouth once daily.   potassium chloride CR (Klor-Con M20) 20 mEq ER tablet Take 1 tablet (20 mEq) by mouth once daily. while on hydroclorothiazide   tamsulosin (Flomax) 0.4 mg 24 hr capsule Take 1 capsule (0.4 mg) by mouth twice a day.   Lacie/vit C/vit E/zinc/copper (PRESERVISION AREDS ORAL) Take 1 capsule by mouth once daily.   No current facility-administered medications on file prior to visit.  Review of Systems  ROS . 14 systems reviewed and negative except as in HPI . Covid 19    Physical Exam  Constitutional:NAD, laying in bed Eyes: EOMI, clear sclera, normal conjunctiva ENMT: moist mucous dry Head/Neck: no appreciable JVD, no carotid bruit, stellate laceration over left forehead w/o active bleeding with slight hematoma around it, small ecchymosis over glabella, erythema over nose and ecchymosis right eyelid Respiratory/Thorax: AE equal bilaterally, +mild , adequate air movement B/L, normal WOB on NC, no wheezes Cardiovascular: +S1, +S2, irregular, no m/r/g Gastrointestinal: soft, NT, ND, +BS, no appreciableHSM Extremities: B/L 1+ edema till knee, improved Neurological: AOx2, confused, endorsing visual hallucinations, tangential speech, no signs of inattention Skin: warm and dry  Assessment:  89 year old male with a PMHx of  hemophilia A, protein C deficiency, HTN, HLD,  nephrolithiasis, CAD s/p CABG (2002), BPH, #R NOF s/p THR, recent SDH who  presents with hx of fall and forehead hematoma/laceration. This is his 3rd fall  in 4 months, 1st in Jan reportedly mechanical with R hip fracture, 2nd in Feb  thought to be syncope from dehydration and now for the 3rd time without any  memory of the events around the fall. Extensively worked up in previous  admission, neg EEG, TTE wnl, orthostats positive but thought to be due to  dehydration given soft BPs.  COVID 19 Start Paxlovid 150 mg bid for 5 days/ Vitals q shidt Monitor O2  [place] for cough monitor O2  new afib, rate controlled in 80s, holding  apixaban given patient's high bleeding risk. Started on levofloxacin on  admission for potential CAP. Heme consulted for Hemophilia A, started on advate  40 u/kg daily while inpatient. Patient's ethanol level elevated on admission,  started on thiamine and folate.  #Syncope  - Evaluated for a fall while working with PT in Feb 2022 with dysarthria/left  facial droop. CTH showed SDH. EKG NSR. ECHO no valvular lesion/EF WNL. EEG  negative, keppra was discontinued per neurology recs. Orthostats negative but  BPs soft so event was thought to be 2/2 dehydration. PT/OT recommended SNF but  discharge to home on family request.  - video EEG on this admission without evidence of seizure  - This is his 3rd fall/syncope in 4 months. CT PE negative. CTH w/o e/o  intracranial bleed.  Plan:  -  - CT PE negative for PE  - Carotid doppler with <50% stenosis bilaterally  - r ziopatch with close cardiologyfollow up  #Acute hypoxic respiratory failure  #Community Acquired Pneumonia, resolved  Presented to [place] ED with syncope, tachycardia, spo2 93% started on 4L O2.  CXR did nto show any consolidative process, possible pulmonary edema, left  pleural effusion  Reports cough with expectorate for 1 week. No fever  Plan:  -CT with concern for fluid overload,  mesenteric edema, anasarca  -patient started on levofloxacin 04/08 - 4/10  -c/w guaifenesin q12hrs  #HFpEF (EF 60-65% in 02/2023)  - 02/2023 TTE with EF 60-65%, spectral doppler with impaired LV diastolic  filling  - c/w BUMEX 1mg daily CBC BMP Monday. P  - Monitor weight and labs  Not on lasix at presentn  #New onset atrial fibrillation, rate controlled  - Noted to be in atrial fibrillation on telemetry and EKG this admission, has  been noted in a prior EKG but this is largely a new diagnosis  - Largely rate controlled in 80s on metoprolol this admission  - Risk of AC outweighs benefit in this patient with falls and hemophilia  -  #Hemophilia A  #hx of BLE below knee DVT  #Recent SDH 2/23  # Scalp hematoma and laceration  -c/w advate 40 units/ kg daily  - will check factor VIII levels once, otherwise no need for monitoring while on  advate  - SUSPENDED Eliquis 2.5 bid (planned to cont for at least 3 months which would  be till June 2023) in setting of recent fall with scalp hematoma and laceration  and below knee DVTs have usually have low risk for PE  - T+S ordered  - Started PO iron  - Per hematology: Advate 40 units/ kg daily. aPTT for dosage adjustment.  #Electrolyte imbalance  -Had low K/Mg/Ca/Pi during last admission  - Admit Mg 0.77, K 3.0  - C/w potassium chloride 10mg daily  -Monitor electrolytes  - Replete as needed  - Monitor for refeeding syndrome  #Elevated ethanol level  -Utox ordered given elevated ethanol levels at [facility] ED  -HNO alcohol use and he has been at a SNF  - Started on thiamine and folic acid  #CAD  #HTN  #HLD  -C/w Metoprolol 50mg,  -C/w atorvastatin 40mg.  #BPH And obstructive [facility]ropathy and [facility]  -C/w finasteride 5mg,  -Will restart tamsulosin 0.4mg while inpatient and assess orthostatic BP    Problem List Items Addressed This Visit       Pneumonia    Hemophilia A (CMS/HCC)    Enlarged prostate with lower urinary tract symptoms  (LUTS)    Psychogenic syncope    Diastolic CHF, acute on chronic (CMS/HCC)    Persistent atrial fibrillation (CMS/HCC)    (HFpEF) heart failure with preserved ejection fraction (CMS/HCC)    COVID-19 - Primary       Assessment/Plan  Problem List Items Addressed This Visit  COVID 19  ASHD (arteriosclerotic heart disease)  Pneumonia  Hemophilia A (CMS/HCC)  Hypertension  Fracture of femoral neck, right (CMS/HCC)  Psychogenic syncope - Primary  Subdural hematoma (CMS/HCC)  Acute diastolic heart failure (CMS/HCC)  Diastolic CHF, acute on chronic (CMS/HCC)  Persistent atrial fibrillation (CMS/HCC)  Physical debility  Acute pain of right knee  Primary osteoarthritis of right knee  Mau Al MD

## 2023-05-23 ENCOUNTER — NURSING HOME VISIT (OUTPATIENT)
Dept: POST ACUTE CARE | Facility: EXTERNAL LOCATION | Age: 88
End: 2023-05-23
Payer: MEDICARE

## 2023-05-23 DIAGNOSIS — T83.511A URINARY TRACT INFECTION ASSOCIATED WITH INDWELLING URETHRAL CATHETER, INITIAL ENCOUNTER (CMS-HCC): ICD-10-CM

## 2023-05-23 DIAGNOSIS — N39.0 URINARY TRACT INFECTION ASSOCIATED WITH INDWELLING URETHRAL CATHETER, INITIAL ENCOUNTER (CMS-HCC): ICD-10-CM

## 2023-05-23 DIAGNOSIS — I95.9 HYPOTENSION, UNSPECIFIED HYPOTENSION TYPE: ICD-10-CM

## 2023-05-23 DIAGNOSIS — D66 HEMOPHILIA A (MULTI): ICD-10-CM

## 2023-05-23 DIAGNOSIS — I48.20 ATRIAL FIBRILLATION, CHRONIC (MULTI): ICD-10-CM

## 2023-05-23 DIAGNOSIS — I25.10 ASHD (ARTERIOSCLEROTIC HEART DISEASE): ICD-10-CM

## 2023-05-23 DIAGNOSIS — L30.9 DERMATITIS: Primary | ICD-10-CM

## 2023-05-23 DIAGNOSIS — I50.33 DIASTOLIC CHF, ACUTE ON CHRONIC (MULTI): ICD-10-CM

## 2023-05-23 PROCEDURE — 99309 SBSQ NF CARE MODERATE MDM 30: CPT | Performed by: FAMILY MEDICINE

## 2023-05-23 NOTE — LETTER
Patient: Ramón Flores  : 1928    Encounter Date: 2023    Nursing Home Visit  Name: Ramón Flores  YOB: 1928  MRN: [id number]    Chief Complaint  Lab Review  Subjective  HPI:   89 year old male with hx of hemophilia A, protein C deficiency, HTN, HLD, nephrolithiasis, CAD s/p CABG, and BPH admitted for urgent Rt NOF fracture. S/p Rt Femur fixation by Ortho on , but had insufficient factor replacement prior to procedure. Postop patient had a hemoglobin drop, slightly more than expected but likely reasonable for having suboptimals factor replacement. He received 1 unit of packed red blood cells post-op with appropriate response and H&H is now stable. Discussed with his OP hematology and likely expected from blood loss from surgery given hemophilia A and not full repletion of factors rather than ongoing bleeding. Hematology on consult, patient was receiving twice daily therapy with Adv ate now transitioned to once daily and factor levels are stable. CT angio showed no evidence of blood extravasation. After discussion with hematology, patient will need to go to SNF with daily Adv ate for a period before transitioning to his home prophylactic therapy. Hematology is arranging. Patient should not be on any blood thinning agents or aspirin. Patient has been awaiting placement, on  working with physical therapy on  he had a syncopal episode in the setting of diarrhea. His blood pressure and mentation improved after getting back in bed and he is getting IV fluids. Denies diarrhea since resolved and he has had no further dizziness. His blood pressure is stable, however his hydrochlorothiazide and metoprolol are continued be on hold. Arrangements made by hematology to deliver Adv ate to his nursing facility where he will be doing PT OT and receiving Adv ate infusions. Per hematology, patient will continue Adv ate infusions for 5 more days and then transition to every other day  infusions. He did have a CBC monitored weekly. He will need post discharge follow-up with orthopedics and hematology. please note probably hematology service patient should never be started on aspirin or other blood thinners/anticoagulants or chemical DVT prophylaxis, if there is concern that patient does need to be started on any of these medications that should be thoroughly discussed with the hematology service and his hematologist   --1/31 Patient is somewhat anxious. He states his right hip feels fine, he denies pain currently. Appetite fair, patient states he does not like the food all that much. He is aware of the schedule for his infusion, knows who is coming and when   --2/2 Patient is in good spirits. Denies pain or discomfort. Patient reports he is getting his infusions as scheduled. Appetite fair, Denies HA, dizziness, SOB, CP, N&V or constipation   --2/14 Patient in good spirits, saw Ortho on 2/9. staples removed. He was surprised how much bruising he had. Appetite good, denies pain or discomfort. Denies HA, dizziness, SOB, CP, N&V or constipation   --2/16 Notified yesterday evening, patient's K+ was 3.1 down from 3.4 on 2/14 Ordered to give 40meq now and repeat labs in am. Today patient in great spirits, he will most likely discharge home over weekend. His niece was visiting today. Appetite is good he c/o some knee pain, he stated he had some gel that relieved it. Denies HA, dizziness, SOB, CP, N&V or constipation  --3/9   Patient in fair mood, is not in any pain. Appetite only fair due to not liking the food.  States he picks and chooses.   --4/4-  On 3/27 patient went to ortho to have his knee injected, he reported it did not work really at all.  On 3/29 patient was coughing ordered CXR results reported Lung emphysema, Hazy bibasilar airspace opacities. Slightly enlarged cardiac silhouette with congestion. New order for Z-pack.  He continued to worsen, on 3/31 ordered repeat CXR, flu swab, CBC/BMP   Also started Levaquin and probiotic. Covid test, done earlier was negative.  Last evening (4/3) patient slid out of his wheelchair, no injuries.  Today patient talkative, in good spirits.  Appetite remains fair.  Weight is stable.  Denies pain or discomfort. Denies HA, dizziness, SOB, CP, N&V or constipation. Labs from yesterday, essentially stable, did not drop in HGB from 11-->8.    --4/6  Patient in fair mood, per nsg he  was complaining about having a kelley catheter placed.  Stated he was having difficulty urinating.  Per PCC he had 700+ residual.  Appetite is fair, 25-75% mostly.  Denies pain or discomfort.  Denies HA, dizziness, SOB, CP, N&V or constipation  --4/18  Patient returned from hospital 4/14   ### Per hospital records: Patient presents with hx of fall and forehead hematoma/laceration. This is his 3rd fall in 4 months, 1st in Jan reportedly mechanical with R hip fracture, 2nd in Feb thought to be syncope from dehydration and now for the 3rd time without any memory of the events around the fall. Extensively worked up in previous admission, neg EEG, orthostats positive but thought to be due to dehydration given soft BPs. 02/24/23 Echo showed EF 60-65%, spectral doppler with impaired LV diastolic filling. This admission, workup for syncope including video EEG was negative for epileptiform activity. CT PE negative. Patient found to be fluid overloaded on admission, CT showed moderate bilateral pleural effusions with adjacent atelectasis, pulmonary edema, mesenteric edema and body wall anasarca.  without a baseline. Also found to be in new afib, rates controlled in 80s on metoprolol succinate 75 mg. Neuro was consulted, there was low suspicion for neurogenic cause of syncope or seizure, patient's subdural bleeds were deemed to be chronic. Hematology was consulted for patient's hemophilia, continued on Advate daily. Patient's ethanol level elevated on admission, started on thiamine and folate, though  he denied ETOH use. Also found with several electrolyte abnormalities including hypokalemia, hypomagnesemia, hypophosphatemia. Treated for CAP with three days of levofloxacin, patient weaned to RA. Diuresed with IV lasix. Patient had evidence of delirium on 4/10, UA without signs of infection, replaced kelley since patient still retaining. Delirium improved --4/11- 4/12. For his syncope, lower concern for cardiac etiology given echo in 02/24 without AS, no pauses on telemetry. However patient does have new afib and new diagnosis of HFpEF. Currently we suspect that patient's syncope is due to orthostatic hypotension, as suspected in previous admission. Repeat orthostats wnl while inpatient. Will discharge patient with event monitor and close cardiology follow up. For his afib, we decided not to start anticoagulation given his high bleeding risk from hemophilia, history of falls. We chose a rate control strategy with metoprolol succinate. For his heart failure, we deferred starting GDMT due to patient's age, history of orthostatic hypotension.  Discharge back to SNF  --4/18  Today patient is fair, he does not like his kelley.  He denies pain other than his right knee that he states gives him trouble.  Appetite fair to good.  Denies HA, dizziness, SOB, CP, N&V or constipation  --4/20  Remains stable, no c/o dizziness.  Does c/o about his kelley catheter,  Appetite fair.  Denies pain or discomfort. Denies HA, dizziness, SOB, CP, N&V or constipation  --4/27 4/27: Patient in fair mood, leaning back in bed. States he is doing ok, appetite is good. Catheter is bothering him. Explained to patient that we are trialing his urinary voiding and so the balloon is deflated so moving around may cause some discomfort. Denies HA, dizziness, SOB, CP, N&V or constipation.   --5/9  Patient in fair spirits.  Had lab draw on today, BNP 6000+   Also was hypokalemic.  Appetite is good he denies pain discomfort  -- 5/16 Patient sitting in room  is stable , he denies pain or discomfort, Appetite fair.  Blood pressure this morning 88/58.  Is asymptomatic, is currently taking Bumex, Aldactone and metoprolol.  Labs drawn today  -- 5/18  UA/C&S done 5/16.  Patient states, he feels fair.  Appetite good.  Currently feels a bit under the weather.   -- 5/23 Is positive for COVID and is on isolation. Is stable, on Paxlovid. + cough. He is refusing being weights  Review of Systems:  Reviewed chart looking at current medications, treatment, labs and x-rays and note pertinent positives or negatives, otherwise 10 point system review negative except for what is noted in HPI.    Objective  VS: 100/56, 98.5, 72, 20, 97%    Physical exam:   Physical Exam  Vitals reviewed.   Constitutional:       Appearance: He is normal weight.   HENT:      Head: Normocephalic.      Nose: Nose normal.      Mouth/Throat:      Mouth: Mucous membranes are moist.   Cardiovascular:      Rate and Rhythm: Normal rate and regular rhythm.      Pulses: Normal pulses.      Heart sounds: Normal heart sounds.   Pulmonary:      Effort: Pulmonary effort is normal.      Breath sounds: Decreased breath sounds present.      Comments: +cough  Abdominal:      General: Bowel sounds are normal.      Palpations: Abdomen is soft.   Musculoskeletal:         General: Normal range of motion.      Right lower leg: Edema present.      Left lower leg: Edema present.      Comments: Generalized weakness improving, trace BLE edema   Skin:     General: Skin is warm and dry.   Neurological:      Mental Status: He is alert and oriented to person, place, and time. Mental status is at baseline.      Motor: Weakness present.   Psychiatric:         Mood and Affect: Mood normal.         Behavior: Behavior normal.         Thought Content: Thought content normal.      Assessment/Plan    89 year old male with hx of hemophilia A, protein C deficiency, HTN, HLD, nephrolithiasis, CAD s/p CABG, and BPH admitted for urgent Rt NOF  fracture. S/p Rt Femur fixation     COVID  Paxlovid started +cough, tessalon Perles tid already ordered    UTI  C/w doxycycline    Hypotension  BP better, continue to monitor     Atrial fibrillation, chronic (CMS/HCC)  Rate controlled, no a/c 2/2 hemophilia      (HFpEF) heart failure with preserved ejection fraction (CMS/HCC)  Weight stable, 128#, is underweight, patient always small man, he states     Hemophilia A (CMS/HCC)  C/w infusions, labs have been stable     ASHD (arteriosclerotic heart disease)  Nov. 15, 2012: LV EF normal . Cbc bmp weekly.  F/u with Cardiology after discharge          Electronically Signed By: Petra Romero, SUSI-CNP   6/9/23  1:44 PM

## 2023-05-25 ENCOUNTER — NURSING HOME VISIT (OUTPATIENT)
Dept: POST ACUTE CARE | Facility: EXTERNAL LOCATION | Age: 88
End: 2023-05-25
Payer: MEDICARE

## 2023-05-25 DIAGNOSIS — D66 HEMOPHILIA A (MULTI): ICD-10-CM

## 2023-05-25 DIAGNOSIS — I95.9 HYPOTENSION, UNSPECIFIED HYPOTENSION TYPE: ICD-10-CM

## 2023-05-25 DIAGNOSIS — I48.20 ATRIAL FIBRILLATION, CHRONIC (MULTI): ICD-10-CM

## 2023-05-25 DIAGNOSIS — T83.511A URINARY TRACT INFECTION ASSOCIATED WITH INDWELLING URETHRAL CATHETER, INITIAL ENCOUNTER (CMS-HCC): ICD-10-CM

## 2023-05-25 DIAGNOSIS — U07.1 COVID-19: Primary | ICD-10-CM

## 2023-05-25 DIAGNOSIS — I50.32 CHRONIC HEART FAILURE WITH PRESERVED EJECTION FRACTION (MULTI): ICD-10-CM

## 2023-05-25 DIAGNOSIS — I25.10 ASHD (ARTERIOSCLEROTIC HEART DISEASE): ICD-10-CM

## 2023-05-25 DIAGNOSIS — N39.0 URINARY TRACT INFECTION ASSOCIATED WITH INDWELLING URETHRAL CATHETER, INITIAL ENCOUNTER (CMS-HCC): ICD-10-CM

## 2023-05-25 PROCEDURE — 99309 SBSQ NF CARE MODERATE MDM 30: CPT | Performed by: FAMILY MEDICINE

## 2023-05-25 NOTE — LETTER
Patient: Ramón Flores  : 1928    Encounter Date: 2023    Nursing Home Visit  Name: Ramón Flores  YOB: 1928  MRN: [id number]    Chief Complaint  Lab Review  Subjective  HPI:   89 year old male with hx of hemophilia A, protein C deficiency, HTN, HLD, nephrolithiasis, CAD s/p CABG, and BPH admitted for urgent Rt NOF fracture. S/p Rt Femur fixation by Ortho on , but had insufficient factor replacement prior to procedure. Postop patient had a hemoglobin drop, slightly more than expected but likely reasonable for having suboptimals factor replacement. He received 1 unit of packed red blood cells post-op with appropriate response and H&H is now stable. Discussed with his OP hematology and likely expected from blood loss from surgery given hemophilia A and not full repletion of factors rather than ongoing bleeding. Hematology on consult, patient was receiving twice daily therapy with Adv ate now transitioned to once daily and factor levels are stable. CT angio showed no evidence of blood extravasation. After discussion with hematology, patient will need to go to SNF with daily Adv ate for a period before transitioning to his home prophylactic therapy. Hematology is arranging. Patient should not be on any blood thinning agents or aspirin. Patient has been awaiting placement, on  working with physical therapy on  he had a syncopal episode in the setting of diarrhea. His blood pressure and mentation improved after getting back in bed and he is getting IV fluids. Denies diarrhea since resolved and he has had no further dizziness. His blood pressure is stable, however his hydrochlorothiazide and metoprolol are continued be on hold. Arrangements made by hematology to deliver Adv ate to his nursing facility where he will be doing PT OT and receiving Adv ate infusions. Per hematology, patient will continue Adv ate infusions for 5 more days and then transition to every other day  infusions. He did have a CBC monitored weekly. He will need post discharge follow-up with orthopedics and hematology. please note probably hematology service patient should never be started on aspirin or other blood thinners/anticoagulants or chemical DVT prophylaxis, if there is concern that patient does need to be started on any of these medications that should be thoroughly discussed with the hematology service and his hematologist   --1/31 Patient is somewhat anxious. He states his right hip feels fine, he denies pain currently. Appetite fair, patient states he does not like the food all that much. He is aware of the schedule for his infusion, knows who is coming and when   --2/2 Patient is in good spirits. Denies pain or discomfort. Patient reports he is getting his infusions as scheduled. Appetite fair, Denies HA, dizziness, SOB, CP, N&V or constipation   --2/14 Patient in good spirits, saw Ortho on 2/9. staples removed. He was surprised how much bruising he had. Appetite good, denies pain or discomfort. Denies HA, dizziness, SOB, CP, N&V or constipation   --2/16 Notified yesterday evening, patient's K+ was 3.1 down from 3.4 on 2/14 Ordered to give 40meq now and repeat labs in am. Today patient in great spirits, he will most likely discharge home over weekend. His niece was visiting today. Appetite is good he c/o some knee pain, he stated he had some gel that relieved it. Denies HA, dizziness, SOB, CP, N&V or constipation  --3/9   Patient in fair mood, is not in any pain. Appetite only fair due to not liking the food.  States he picks and chooses.   --4/4-  On 3/27 patient went to ortho to have his knee injected, he reported it did not work really at all.  On 3/29 patient was coughing ordered CXR results reported Lung emphysema, Hazy bibasilar airspace opacities. Slightly enlarged cardiac silhouette with congestion. New order for Z-pack.  He continued to worsen, on 3/31 ordered repeat CXR, flu swab, CBC/BMP   Also started Levaquin and probiotic. Covid test, done earlier was negative.  Last evening (4/3) patient slid out of his wheelchair, no injuries.  Today patient talkative, in good spirits.  Appetite remains fair.  Weight is stable.  Denies pain or discomfort. Denies HA, dizziness, SOB, CP, N&V or constipation. Labs from yesterday, essentially stable, did not drop in HGB from 11-->8.    --4/6  Patient in fair mood, per nsg he  was complaining about having a kelley catheter placed.  Stated he was having difficulty urinating.  Per PCC he had 700+ residual.  Appetite is fair, 25-75% mostly.  Denies pain or discomfort.  Denies HA, dizziness, SOB, CP, N&V or constipation  --4/18  Patient returned from hospital 4/14   ### Per hospital records: Patient presents with hx of fall and forehead hematoma/laceration. This is his 3rd fall in 4 months, 1st in Jan reportedly mechanical with R hip fracture, 2nd in Feb thought to be syncope from dehydration and now for the 3rd time without any memory of the events around the fall. Extensively worked up in previous admission, neg EEG, orthostats positive but thought to be due to dehydration given soft BPs. 02/24/23 Echo showed EF 60-65%, spectral doppler with impaired LV diastolic filling. This admission, workup for syncope including video EEG was negative for epileptiform activity. CT PE negative. Patient found to be fluid overloaded on admission, CT showed moderate bilateral pleural effusions with adjacent atelectasis, pulmonary edema, mesenteric edema and body wall anasarca.  without a baseline. Also found to be in new afib, rates controlled in 80s on metoprolol succinate 75 mg. Neuro was consulted, there was low suspicion for neurogenic cause of syncope or seizure, patient's subdural bleeds were deemed to be chronic. Hematology was consulted for patient's hemophilia, continued on Advate daily. Patient's ethanol level elevated on admission, started on thiamine and folate, though  he denied ETOH use. Also found with several electrolyte abnormalities including hypokalemia, hypomagnesemia, hypophosphatemia. Treated for CAP with three days of levofloxacin, patient weaned to RA. Diuresed with IV lasix. Patient had evidence of delirium on 4/10, UA without signs of infection, replaced kelley since patient still retaining. Delirium improved --4/11- 4/12. For his syncope, lower concern for cardiac etiology given echo in 02/24 without AS, no pauses on telemetry. However patient does have new afib and new diagnosis of HFpEF. Currently we suspect that patient's syncope is due to orthostatic hypotension, as suspected in previous admission. Repeat orthostats wnl while inpatient. Will discharge patient with event monitor and close cardiology follow up. For his afib, we decided not to start anticoagulation given his high bleeding risk from hemophilia, history of falls. We chose a rate control strategy with metoprolol succinate. For his heart failure, we deferred starting GDMT due to patient's age, history of orthostatic hypotension.  Discharge back to SNF  --4/18  Today patient is fair, he does not like his kelley.  He denies pain other than his right knee that he states gives him trouble.  Appetite fair to good.  Denies HA, dizziness, SOB, CP, N&V or constipation  --4/20  Remains stable, no c/o dizziness.  Does c/o about his kelley catheter,  Appetite fair.  Denies pain or discomfort. Denies HA, dizziness, SOB, CP, N&V or constipation  --4/27 4/27: Patient in fair mood, leaning back in bed. States he is doing ok, appetite is good. Catheter is bothering him. Explained to patient that we are trialing his urinary voiding and so the balloon is deflated so moving around may cause some discomfort. Denies HA, dizziness, SOB, CP, N&V or constipation.   --5/9  Patient in fair spirits.  Had lab draw on today, BNP 6000+   Also was hypokalemic.  Appetite is good he denies pain discomfort  -- 5/16 Patient sitting in room  is stable , he denies pain or discomfort, Appetite fair.  Blood pressure this morning 88/58.  Is asymptomatic, is currently taking Bumex, Aldactone and metoprolol.  Labs drawn today  -- 5/18  UA/C&S done 5/16.  Patient states, he feels fair.  Appetite good.  Currently feels a bit under the weather.   -- 5/23 Is positive for COVID and is on isolation. Is stable, on Paxlovid. + cough. He is refusing being weights  -- 5/25  Patient feeling good.  Planning for discharge soon  Review of Systems:  Reviewed chart looking at current medications, treatment, labs and x-rays and note pertinent positives or negatives, otherwise 10 point system review negative except for what is noted in HPI.    Objective  VS: 106/73, 97.7, 86, 20, 97%    Physical exam:   Physical Exam  Vitals reviewed.   Constitutional:       Appearance: He is normal weight.   HENT:      Head: Normocephalic.      Nose: Nose normal.      Mouth/Throat:      Mouth: Mucous membranes are moist.   Cardiovascular:      Rate and Rhythm: Normal rate and regular rhythm.      Pulses: Normal pulses.      Heart sounds: Normal heart sounds.   Pulmonary:      Effort: Pulmonary effort is normal.      Comments: +cough  Abdominal:      General: Bowel sounds are normal.      Palpations: Abdomen is soft.   Musculoskeletal:         General: Normal range of motion.      Right lower leg: Edema present.      Left lower leg: Edema present.      Comments: Minimal BLE edema   Skin:     General: Skin is warm and dry.   Neurological:      Mental Status: He is alert and oriented to person, place, and time. Mental status is at baseline.      Motor: Weakness present.   Psychiatric:         Mood and Affect: Mood normal.         Behavior: Behavior normal.         Thought Content: Thought content normal.      Assessment/Plan    89 year old male with hx of hemophilia A, protein C deficiency, HTN, HLD, nephrolithiasis, CAD s/p CABG, and BPH admitted for urgent Rt NOF fracture. S/p Rt Femur  fixation     COVID  Paxlovid started +cough, tessalon Perles tid already ordered    UTI  C/w doxycycline    Hypotension  BP better, continue to monitor     Atrial fibrillation, chronic (CMS/HCC)  Rate controlled, no a/c 2/2 hemophilia      (HFpEF) heart failure with preserved ejection fraction (CMS/HCC)  Weight stable, 128#, is underweight, patient always small man, he states     Hemophilia A (CMS/HCC)  C/w infusions, labs have been stable     ASHD (arteriosclerotic heart disease)  Nov. 15, 2012: LV EF normal . Cbc bmp weekly.  F/u with Cardiology after discharge          Electronically Signed By: SUSI Ramirez-CNP   6/9/23  2:05 PM

## 2023-05-30 ENCOUNTER — NURSING HOME VISIT (OUTPATIENT)
Dept: POST ACUTE CARE | Facility: EXTERNAL LOCATION | Age: 88
End: 2023-05-30
Payer: MEDICARE

## 2023-05-30 DIAGNOSIS — T83.511A URINARY TRACT INFECTION ASSOCIATED WITH INDWELLING URETHRAL CATHETER, INITIAL ENCOUNTER (CMS-HCC): ICD-10-CM

## 2023-05-30 DIAGNOSIS — E46 PROTEIN-CALORIE MALNUTRITION, UNSPECIFIED SEVERITY (MULTI): Primary | ICD-10-CM

## 2023-05-30 DIAGNOSIS — I95.9 HYPOTENSION, UNSPECIFIED HYPOTENSION TYPE: ICD-10-CM

## 2023-05-30 DIAGNOSIS — I50.33 DIASTOLIC CHF, ACUTE ON CHRONIC (MULTI): ICD-10-CM

## 2023-05-30 DIAGNOSIS — I50.31 ACUTE DIASTOLIC HEART FAILURE (MULTI): ICD-10-CM

## 2023-05-30 DIAGNOSIS — I50.32 CHRONIC HEART FAILURE WITH PRESERVED EJECTION FRACTION (MULTI): ICD-10-CM

## 2023-05-30 DIAGNOSIS — I25.10 ASHD (ARTERIOSCLEROTIC HEART DISEASE): ICD-10-CM

## 2023-05-30 DIAGNOSIS — I48.20 ATRIAL FIBRILLATION, CHRONIC (MULTI): ICD-10-CM

## 2023-05-30 DIAGNOSIS — N39.0 URINARY TRACT INFECTION ASSOCIATED WITH INDWELLING URETHRAL CATHETER, INITIAL ENCOUNTER (CMS-HCC): ICD-10-CM

## 2023-05-30 DIAGNOSIS — D66 HEMOPHILIA A (MULTI): ICD-10-CM

## 2023-05-30 PROCEDURE — 99309 SBSQ NF CARE MODERATE MDM 30: CPT | Performed by: FAMILY MEDICINE

## 2023-05-30 NOTE — LETTER
Patient: Ramón Flores  : 1928    Encounter Date: 2023    Nursing Home Visit  Name: Ramón Flores  YOB: 1928  MRN: [id number]    Chief Complaint  Lab Review  Subjective  HPI:   89 year old male with hx of hemophilia A, protein C deficiency, HTN, HLD, nephrolithiasis, CAD s/p CABG, and BPH admitted for urgent Rt NOF fracture. S/p Rt Femur fixation by Ortho on , but had insufficient factor replacement prior to procedure. Postop patient had a hemoglobin drop, slightly more than expected but likely reasonable for having suboptimals factor replacement. He received 1 unit of packed red blood cells post-op with appropriate response and H&H is now stable. Discussed with his OP hematology and likely expected from blood loss from surgery given hemophilia A and not full repletion of factors rather than ongoing bleeding. Hematology on consult, patient was receiving twice daily therapy with Adv ate now transitioned to once daily and factor levels are stable. CT angio showed no evidence of blood extravasation. After discussion with hematology, patient will need to go to SNF with daily Adv ate for a period before transitioning to his home prophylactic therapy. Hematology is arranging. Patient should not be on any blood thinning agents or aspirin. Patient has been awaiting placement, on  working with physical therapy on  he had a syncopal episode in the setting of diarrhea. His blood pressure and mentation improved after getting back in bed and he is getting IV fluids. Denies diarrhea since resolved and he has had no further dizziness. His blood pressure is stable, however his hydrochlorothiazide and metoprolol are continued be on hold. Arrangements made by hematology to deliver Adv ate to his nursing facility where he will be doing PT OT and receiving Adv ate infusions. Per hematology, patient will continue Adv ate infusions for 5 more days and then transition to every other day  infusions. He did have a CBC monitored weekly. He will need post discharge follow-up with orthopedics and hematology. please note probably hematology service patient should never be started on aspirin or other blood thinners/anticoagulants or chemical DVT prophylaxis, if there is concern that patient does need to be started on any of these medications that should be thoroughly discussed with the hematology service and his hematologist   --1/31 Patient is somewhat anxious. He states his right hip feels fine, he denies pain currently. Appetite fair, patient states he does not like the food all that much. He is aware of the schedule for his infusion, knows who is coming and when   --2/2 Patient is in good spirits. Denies pain or discomfort. Patient reports he is getting his infusions as scheduled. Appetite fair, Denies HA, dizziness, SOB, CP, N&V or constipation   --2/14 Patient in good spirits, saw Ortho on 2/9. staples removed. He was surprised how much bruising he had. Appetite good, denies pain or discomfort. Denies HA, dizziness, SOB, CP, N&V or constipation   --2/16 Notified yesterday evening, patient's K+ was 3.1 down from 3.4 on 2/14 Ordered to give 40meq now and repeat labs in am. Today patient in great spirits, he will most likely discharge home over weekend. His niece was visiting today. Appetite is good he c/o some knee pain, he stated he had some gel that relieved it. Denies HA, dizziness, SOB, CP, N&V or constipation  --3/9   Patient in fair mood, is not in any pain. Appetite only fair due to not liking the food.  States he picks and chooses.   --4/4-  On 3/27 patient went to ortho to have his knee injected, he reported it did not work really at all.  On 3/29 patient was coughing ordered CXR results reported Lung emphysema, Hazy bibasilar airspace opacities. Slightly enlarged cardiac silhouette with congestion. New order for Z-pack.  He continued to worsen, on 3/31 ordered repeat CXR, flu swab, CBC/BMP   Also started Levaquin and probiotic. Covid test, done earlier was negative.  Last evening (4/3) patient slid out of his wheelchair, no injuries.  Today patient talkative, in good spirits.  Appetite remains fair.  Weight is stable.  Denies pain or discomfort. Denies HA, dizziness, SOB, CP, N&V or constipation. Labs from yesterday, essentially stable, did not drop in HGB from 11-->8.    --4/6  Patient in fair mood, per nsg he  was complaining about having a kelley catheter placed.  Stated he was having difficulty urinating.  Per PCC he had 700+ residual.  Appetite is fair, 25-75% mostly.  Denies pain or discomfort.  Denies HA, dizziness, SOB, CP, N&V or constipation  --4/18  Patient returned from hospital 4/14   ### Per hospital records: Patient presents with hx of fall and forehead hematoma/laceration. This is his 3rd fall in 4 months, 1st in Jan reportedly mechanical with R hip fracture, 2nd in Feb thought to be syncope from dehydration and now for the 3rd time without any memory of the events around the fall. Extensively worked up in previous admission, neg EEG, orthostats positive but thought to be due to dehydration given soft BPs. 02/24/23 Echo showed EF 60-65%, spectral doppler with impaired LV diastolic filling. This admission, workup for syncope including video EEG was negative for epileptiform activity. CT PE negative. Patient found to be fluid overloaded on admission, CT showed moderate bilateral pleural effusions with adjacent atelectasis, pulmonary edema, mesenteric edema and body wall anasarca.  without a baseline. Also found to be in new afib, rates controlled in 80s on metoprolol succinate 75 mg. Neuro was consulted, there was low suspicion for neurogenic cause of syncope or seizure, patient's subdural bleeds were deemed to be chronic. Hematology was consulted for patient's hemophilia, continued on Advate daily. Patient's ethanol level elevated on admission, started on thiamine and folate, though  he denied ETOH use. Also found with several electrolyte abnormalities including hypokalemia, hypomagnesemia, hypophosphatemia. Treated for CAP with three days of levofloxacin, patient weaned to RA. Diuresed with IV lasix. Patient had evidence of delirium on 4/10, UA without signs of infection, replaced kelley since patient still retaining. Delirium improved --4/11- 4/12. For his syncope, lower concern for cardiac etiology given echo in 02/24 without AS, no pauses on telemetry. However patient does have new afib and new diagnosis of HFpEF. Currently we suspect that patient's syncope is due to orthostatic hypotension, as suspected in previous admission. Repeat orthostats wnl while inpatient. Will discharge patient with event monitor and close cardiology follow up. For his afib, we decided not to start anticoagulation given his high bleeding risk from hemophilia, history of falls. We chose a rate control strategy with metoprolol succinate. For his heart failure, we deferred starting GDMT due to patient's age, history of orthostatic hypotension.  Discharge back to SNF  --4/18  Today patient is fair, he does not like his kelley.  He denies pain other than his right knee that he states gives him trouble.  Appetite fair to good.  Denies HA, dizziness, SOB, CP, N&V or constipation  --4/20  Remains stable, no c/o dizziness.  Does c/o about his kelley catheter,  Appetite fair.  Denies pain or discomfort. Denies HA, dizziness, SOB, CP, N&V or constipation  --4/27 4/27: Patient in fair mood, leaning back in bed. States he is doing ok, appetite is good. Catheter is bothering him. Explained to patient that we are trialing his urinary voiding and so the balloon is deflated so moving around may cause some discomfort. Denies HA, dizziness, SOB, CP, N&V or constipation.   --5/9  Patient in fair spirits.  Had lab draw on today, BNP 6000+   Also was hypokalemic.  Appetite is good he denies pain discomfort  -- 5/16 Patient sitting in room  is stable , he denies pain or discomfort, Appetite fair.  Blood pressure this morning 88/58.  Is asymptomatic, is currently taking Bumex, Aldactone and metoprolol.  Labs drawn today  -- 5/18  UA/C&S done 5/16.  Patient states, he feels fair.  Appetite good.  Currently feels a bit under the weather.   -- 5/23 Is positive for COVID and is on isolation. Is stable, on Paxlovid. + cough. He is refusing being weights  -- 5/25  Patient feeling good.  Planning for discharge soon  -- 5/30 Patient is comfortable, visiting with friends, has no complaints    Review of Systems:  Reviewed chart looking at current medications, treatment, labs and x-rays and note pertinent positives or negatives, otherwise 10 point system review negative except for what is noted in HPI.    Objective  VS: Blood pressure 118/61, pulse 60, temperature 36.4 °C (97.6 °F), resp. rate 18, weight 57.6 kg (127 lb), SpO2 97 %.    Physical exam:   Physical Exam  Vitals reviewed.   Constitutional:       Appearance: He is normal weight.   HENT:      Head: Normocephalic.      Nose: Nose normal.      Mouth/Throat:      Mouth: Mucous membranes are moist.   Cardiovascular:      Rate and Rhythm: Normal rate and regular rhythm.      Pulses: Normal pulses.      Heart sounds: Normal heart sounds.   Pulmonary:      Effort: Pulmonary effort is normal.      Comments: +cough  Abdominal:      General: Bowel sounds are normal.      Palpations: Abdomen is soft.   Musculoskeletal:         General: Normal range of motion.      Right lower leg: Edema present.      Left lower leg: Edema present.      Comments: Minimal BLE edema   Skin:     General: Skin is warm and dry.   Neurological:      Mental Status: He is alert and oriented to person, place, and time. Mental status is at baseline.      Motor: Weakness present.   Psychiatric:         Mood and Affect: Mood normal.         Behavior: Behavior normal.         Thought Content: Thought content normal.      Assessment/Plan    89  year old male with hx of hemophilia A, protein C deficiency, HTN, HLD, nephrolithiasis, CAD s/p CABG, and BPH admitted for urgent Rt NOF fracture. S/p Rt Femur fixation     COVID  Paxlovid started +cough, tessalon Perles tid already ordered    UTI  C/w doxycycline    Hypotension  BP better, continue to monitor     Atrial fibrillation, chronic (CMS/HCC)  Rate controlled, no a/c 2/2 hemophilia      (HFpEF) heart failure with preserved ejection fraction (CMS/HCC)  Weight stable, 128#, is underweight, patient always small man, he states     Hemophilia A (CMS/HCC)  C/w infusions, labs have been stable     ASHD (arteriosclerotic heart disease)  Nov. 15, 2012: LV EF normal . Cbc bmp weekly.  F/u with Cardiology after discharge          Electronically Signed By: SUSI Ramirez-CNP   6/9/23  3:00 PM

## 2023-05-31 VITALS
SYSTOLIC BLOOD PRESSURE: 92 MMHG | TEMPERATURE: 97.8 F | RESPIRATION RATE: 18 BRPM | WEIGHT: 133 LBS | BODY MASS INDEX: 21.47 KG/M2 | DIASTOLIC BLOOD PRESSURE: 67 MMHG | OXYGEN SATURATION: 94 % | HEART RATE: 81 BPM

## 2023-05-31 PROBLEM — T83.511A URINARY TRACT INFECTION ASSOCIATED WITH INDWELLING URETHRAL CATHETER (CMS-HCC): Status: ACTIVE | Noted: 2023-03-01

## 2023-05-31 PROBLEM — Z91.81 HISTORY OF RECENT FALL: Status: ACTIVE | Noted: 2023-05-31

## 2023-05-31 PROBLEM — I48.19 PERSISTENT ATRIAL FIBRILLATION (MULTI): Status: RESOLVED | Noted: 2023-04-16 | Resolved: 2023-05-31

## 2023-05-31 NOTE — ASSESSMENT & PLAN NOTE
Undergoing trial for urinary voiding to remove catheter to prevent further catheter trauma. Awaiting urine culture, for ATB choice

## 2023-06-01 ENCOUNTER — NURSING HOME VISIT (OUTPATIENT)
Dept: POST ACUTE CARE | Facility: EXTERNAL LOCATION | Age: 88
End: 2023-06-01
Payer: MEDICARE

## 2023-06-01 DIAGNOSIS — E87.6 HYPOKALEMIA: ICD-10-CM

## 2023-06-01 DIAGNOSIS — U07.1 COVID-19: ICD-10-CM

## 2023-06-01 DIAGNOSIS — M25.561 ACUTE PAIN OF RIGHT KNEE: ICD-10-CM

## 2023-06-01 DIAGNOSIS — I25.10 ASHD (ARTERIOSCLEROTIC HEART DISEASE): ICD-10-CM

## 2023-06-01 DIAGNOSIS — M17.12 PRIMARY OSTEOARTHRITIS OF LEFT KNEE: ICD-10-CM

## 2023-06-01 DIAGNOSIS — I48.20 ATRIAL FIBRILLATION, CHRONIC (MULTI): ICD-10-CM

## 2023-06-01 DIAGNOSIS — R53.81 PHYSICAL DEBILITY: Primary | ICD-10-CM

## 2023-06-01 DIAGNOSIS — E55.9 MILD VITAMIN D DEFICIENCY: ICD-10-CM

## 2023-06-01 DIAGNOSIS — D66 HEMOPHILIA A (MULTI): ICD-10-CM

## 2023-06-01 DIAGNOSIS — I10 HYPERTENSION, UNSPECIFIED TYPE: ICD-10-CM

## 2023-06-01 DIAGNOSIS — I50.32 CHRONIC HEART FAILURE WITH PRESERVED EJECTION FRACTION (MULTI): ICD-10-CM

## 2023-06-01 DIAGNOSIS — E78.5 HYPERLIPIDEMIA, UNSPECIFIED HYPERLIPIDEMIA TYPE: ICD-10-CM

## 2023-06-01 PROCEDURE — 99316 NF DSCHRG MGMT 30 MIN+: CPT | Performed by: FAMILY MEDICINE

## 2023-06-01 NOTE — LETTER
Patient: Ramón Flores  : 1928    Encounter Date: 2023    Nursing Home Visit  Name: Ramón Flores  YOB: 1928  MRN: [id number]    Chief Complaint  Lab Review  Subjective  HPI:   89 year old male with hx of hemophilia A, protein C deficiency, HTN, HLD, nephrolithiasis, CAD s/p CABG, and BPH admitted for urgent Rt NOF fracture. S/p Rt Femur fixation by Ortho on , but had insufficient factor replacement prior to procedure. Postop patient had a hemoglobin drop, slightly more than expected but likely reasonable for having suboptimals factor replacement. He received 1 unit of packed red blood cells post-op with appropriate response and H&H is now stable. Discussed with his OP hematology and likely expected from blood loss from surgery given hemophilia A and not full repletion of factors rather than ongoing bleeding. Hematology on consult, patient was receiving twice daily therapy with Adv ate now transitioned to once daily and factor levels are stable. CT angio showed no evidence of blood extravasation. After discussion with hematology, patient will need to go to SNF with daily Adv ate for a period before transitioning to his home prophylactic therapy. Hematology is arranging. Patient should not be on any blood thinning agents or aspirin. Patient has been awaiting placement, on  working with physical therapy on  he had a syncopal episode in the setting of diarrhea. His blood pressure and mentation improved after getting back in bed and he is getting IV fluids. Denies diarrhea since resolved and he has had no further dizziness. His blood pressure is stable, however his hydrochlorothiazide and metoprolol are continued be on hold. Arrangements made by hematology to deliver Adv ate to his nursing facility where he will be doing PT OT and receiving Adv ate infusions. Per hematology, patient will continue Adv ate infusions for 5 more days and then transition to every other day  infusions. He did have a CBC monitored weekly. He will need post discharge follow-up with orthopedics and hematology. please note probably hematology service patient should never be started on aspirin or other blood thinners/anticoagulants or chemical DVT prophylaxis, if there is concern that patient does need to be started on any of these medications that should be thoroughly discussed with the hematology service and his hematologist   --1/31 Patient is somewhat anxious. He states his right hip feels fine, he denies pain currently. Appetite fair, patient states he does not like the food all that much. He is aware of the schedule for his infusion, knows who is coming and when   --2/2 Patient is in good spirits. Denies pain or discomfort. Patient reports he is getting his infusions as scheduled. Appetite fair, Denies HA, dizziness, SOB, CP, N&V or constipation   --2/14 Patient in good spirits, saw Ortho on 2/9. staples removed. He was surprised how much bruising he had. Appetite good, denies pain or discomfort. Denies HA, dizziness, SOB, CP, N&V or constipation   --2/16 Notified yesterday evening, patient's K+ was 3.1 down from 3.4 on 2/14 Ordered to give 40meq now and repeat labs in am. Today patient in great spirits, he will most likely discharge home over weekend. His niece was visiting today. Appetite is good he c/o some knee pain, he stated he had some gel that relieved it. Denies HA, dizziness, SOB, CP, N&V or constipation  --3/9   Patient in fair mood, is not in any pain. Appetite only fair due to not liking the food.  States he picks and chooses.   --4/4-  On 3/27 patient went to ortho to have his knee injected, he reported it did not work really at all.  On 3/29 patient was coughing ordered CXR results reported Lung emphysema, Hazy bibasilar airspace opacities. Slightly enlarged cardiac silhouette with congestion. New order for Z-pack.  He continued to worsen, on 3/31 ordered repeat CXR, flu swab, CBC/BMP   Also started Levaquin and probiotic. Covid test, done earlier was negative.  Last evening (4/3) patient slid out of his wheelchair, no injuries.  Today patient talkative, in good spirits.  Appetite remains fair.  Weight is stable.  Denies pain or discomfort. Denies HA, dizziness, SOB, CP, N&V or constipation. Labs from yesterday, essentially stable, did not drop in HGB from 11-->8.    --4/6  Patient in fair mood, per nsg he  was complaining about having a kelley catheter placed.  Stated he was having difficulty urinating.  Per PCC he had 700+ residual.  Appetite is fair, 25-75% mostly.  Denies pain or discomfort.  Denies HA, dizziness, SOB, CP, N&V or constipation  --4/18  Patient returned from hospital 4/14   ### Per hospital records: Patient presents with hx of fall and forehead hematoma/laceration. This is his 3rd fall in 4 months, 1st in Jan reportedly mechanical with R hip fracture, 2nd in Feb thought to be syncope from dehydration and now for the 3rd time without any memory of the events around the fall. Extensively worked up in previous admission, neg EEG, orthostats positive but thought to be due to dehydration given soft BPs. 02/24/23 Echo showed EF 60-65%, spectral doppler with impaired LV diastolic filling. This admission, workup for syncope including video EEG was negative for epileptiform activity. CT PE negative. Patient found to be fluid overloaded on admission, CT showed moderate bilateral pleural effusions with adjacent atelectasis, pulmonary edema, mesenteric edema and body wall anasarca.  without a baseline. Also found to be in new afib, rates controlled in 80s on metoprolol succinate 75 mg. Neuro was consulted, there was low suspicion for neurogenic cause of syncope or seizure, patient's subdural bleeds were deemed to be chronic. Hematology was consulted for patient's hemophilia, continued on Advate daily. Patient's ethanol level elevated on admission, started on thiamine and folate, though  he denied ETOH use. Also found with several electrolyte abnormalities including hypokalemia, hypomagnesemia, hypophosphatemia. Treated for CAP with three days of levofloxacin, patient weaned to RA. Diuresed with IV lasix. Patient had evidence of delirium on 4/10, UA without signs of infection, replaced kelley since patient still retaining. Delirium improved --4/11- 4/12. For his syncope, lower concern for cardiac etiology given echo in 02/24 without AS, no pauses on telemetry. However patient does have new afib and new diagnosis of HFpEF. Currently we suspect that patient's syncope is due to orthostatic hypotension, as suspected in previous admission. Repeat orthostats wnl while inpatient. Will discharge patient with event monitor and close cardiology follow up. For his afib, we decided not to start anticoagulation given his high bleeding risk from hemophilia, history of falls. We chose a rate control strategy with metoprolol succinate. For his heart failure, we deferred starting GDMT due to patient's age, history of orthostatic hypotension.  Discharge back to SNF  --4/18  Today patient is fair, he does not like his kelley.  He denies pain other than his right knee that he states gives him trouble.  Appetite fair to good.  Denies HA, dizziness, SOB, CP, N&V or constipation  --4/20  Remains stable, no c/o dizziness.  Does c/o about his kelley catheter,  Appetite fair.  Denies pain or discomfort. Denies HA, dizziness, SOB, CP, N&V or constipation  --4/27 4/27: Patient in fair mood, leaning back in bed. States he is doing ok, appetite is good. Catheter is bothering him. Explained to patient that we are trialing his urinary voiding and so the balloon is deflated so moving around may cause some discomfort. Denies HA, dizziness, SOB, CP, N&V or constipation.   --5/9  Patient in fair spirits.  Had lab draw on today, BNP 6000+   Also was hypokalemic.  Appetite is good he denies pain discomfort  -- 5/16 Patient sitting in room  is stable , he denies pain or discomfort, Appetite fair.  Blood pressure this morning 88/58.  Is asymptomatic, is currently taking Bumex, Aldactone and metoprolol.  Labs drawn today  -- 5/18  UA/C&S done 5/16.  Patient states, he feels fair.  Appetite good.  Currently feels a bit under the weather.   -- 5/23 Is positive for COVID and is on isolation. Is stable, on Paxlovid. + cough. He is refusing being weights  -- 5/25  Patient feeling good.  Planning for discharge soon  -- 5/30 Patient is comfortable, visiting with friends, has no complaints  -- 6/1 Patient planned for discharge next week, he believes he is now ready. Denies pain or discomfort. Denies HA, dizziness, SOB, CP, N&V or constipation    Review of Systems:  Reviewed chart looking at current medications, treatment, labs and x-rays and note pertinent positives or negatives, otherwise 10 point system review negative except for what is noted in HPI.    Objective  VS: Blood pressure 106/64, pulse 62, temperature 36.6 °C (97.8 °F), resp. rate 18, weight 57.6 kg (127 lb), SpO2 98 %.    Physical exam:   Physical Exam  Vitals reviewed.   HENT:      Head: Normocephalic.      Nose: Nose normal.      Mouth/Throat:      Mouth: Mucous membranes are moist.   Cardiovascular:      Rate and Rhythm: Normal rate and regular rhythm.      Pulses: Normal pulses.      Heart sounds: Normal heart sounds.   Pulmonary:      Effort: Pulmonary effort is normal.   Abdominal:      General: Bowel sounds are normal.      Palpations: Abdomen is soft.   Musculoskeletal:         General: Normal range of motion.      Comments: Minimal BLE edema   Skin:     General: Skin is warm and dry.   Neurological:      Mental Status: He is alert and oriented to person, place, and time. Mental status is at baseline.      Motor: Weakness present.   Psychiatric:         Mood and Affect: Mood normal.         Behavior: Behavior normal.         Thought Content: Thought content normal.      Assessment/Plan     89 year old male with hx of hemophilia A, protein C deficiency, HTN, HLD, nephrolithiasis, CAD s/p CABG, and BPH admitted for urgent Rt NOF fracture. S/p Rt Femur fixation     COVID  Paxlovid started +cough, tessalon Perles tid already ordered    UTI  C/w doxycycline    Hypotension  BP better, continue to monitor     Atrial fibrillation, chronic (CMS/HCC)  Rate controlled, no a/c 2/2 hemophilia      (HFpEF) heart failure with preserved ejection fraction (CMS/HCC)  Weight stable, 128#, is underweight, patient always small man, he states     Hemophilia A (CMS/HCC)  C/w infusions, labs have been stable     ASHD (arteriosclerotic heart disease)  Nov. 15, 2012: LV EF normal . Cbc bmp weekly.  F/u with Cardiology after discharge          Electronically Signed By: SUSI Ramirez-CNP   6/10/23  1:00 PM

## 2023-06-08 ENCOUNTER — NURSING HOME VISIT (OUTPATIENT)
Dept: POST ACUTE CARE | Facility: EXTERNAL LOCATION | Age: 88
End: 2023-06-08
Payer: MEDICARE

## 2023-06-08 VITALS
TEMPERATURE: 97.3 F | RESPIRATION RATE: 18 BRPM | HEART RATE: 78 BPM | SYSTOLIC BLOOD PRESSURE: 104 MMHG | OXYGEN SATURATION: 96 % | DIASTOLIC BLOOD PRESSURE: 55 MMHG

## 2023-06-08 DIAGNOSIS — U07.1 COVID-19: Primary | ICD-10-CM

## 2023-06-08 DIAGNOSIS — I50.31 ACUTE DIASTOLIC HEART FAILURE (MULTI): ICD-10-CM

## 2023-06-08 DIAGNOSIS — I48.19 PERSISTENT ATRIAL FIBRILLATION (MULTI): ICD-10-CM

## 2023-06-08 DIAGNOSIS — D66 HEMOPHILIA A (MULTI): ICD-10-CM

## 2023-06-08 DIAGNOSIS — I25.10 ASHD (ARTERIOSCLEROTIC HEART DISEASE): ICD-10-CM

## 2023-06-08 DIAGNOSIS — I50.32 CHRONIC HEART FAILURE WITH PRESERVED EJECTION FRACTION (MULTI): ICD-10-CM

## 2023-06-08 DIAGNOSIS — E87.6 HYPOKALEMIA: ICD-10-CM

## 2023-06-08 DIAGNOSIS — I10 HYPERTENSION, UNSPECIFIED TYPE: ICD-10-CM

## 2023-06-08 PROCEDURE — 99316 NF DSCHRG MGMT 30 MIN+: CPT | Performed by: FAMILY MEDICINE

## 2023-06-08 NOTE — LETTER
Patient: Ramón Flores  : 1928    Encounter Date: 2023    Nursing Home Visit  Name: Ramón Flores  YOB: 1928  MRN: [id number]    Chief Complaint  Lab Review  Subjective  HPI:   89 year old male with hx of hemophilia A, protein C deficiency, HTN, HLD, nephrolithiasis, CAD s/p CABG, and BPH admitted for urgent Rt NOF fracture. S/p Rt Femur fixation by Ortho on , but had insufficient factor replacement prior to procedure. Postop patient had a hemoglobin drop, slightly more than expected but likely reasonable for having suboptimals factor replacement. He received 1 unit of packed red blood cells post-op with appropriate response and H&H is now stable. Discussed with his OP hematology and likely expected from blood loss from surgery given hemophilia A and not full repletion of factors rather than ongoing bleeding. Hematology on consult, patient was receiving twice daily therapy with Adv ate now transitioned to once daily and factor levels are stable. CT angio showed no evidence of blood extravasation. After discussion with hematology, patient will need to go to SNF with daily Adv ate for a period before transitioning to his home prophylactic therapy. Hematology is arranging. Patient should not be on any blood thinning agents or aspirin. Patient has been awaiting placement, on  working with physical therapy on  he had a syncopal episode in the setting of diarrhea. His blood pressure and mentation improved after getting back in bed and he is getting IV fluids. Denies diarrhea since resolved and he has had no further dizziness. His blood pressure is stable, however his hydrochlorothiazide and metoprolol are continued be on hold. Arrangements made by hematology to deliver Adv ate to his nursing facility where he will be doing PT OT and receiving Adv ate infusions. Per hematology, patient will continue Adv ate infusions for 5 more days and then transition to every other day  infusions. He did have a CBC monitored weekly. He will need post discharge follow-up with orthopedics and hematology. please note probably hematology service patient should never be started on aspirin or other blood thinners/anticoagulants or chemical DVT prophylaxis, if there is concern that patient does need to be started on any of these medications that should be thoroughly discussed with the hematology service and his hematologist   --1/31 Patient is somewhat anxious. He states his right hip feels fine, he denies pain currently. Appetite fair, patient states he does not like the food all that much. He is aware of the schedule for his infusion, knows who is coming and when   --2/2 Patient is in good spirits. Denies pain or discomfort. Patient reports he is getting his infusions as scheduled. Appetite fair, Denies HA, dizziness, SOB, CP, N&V or constipation   --2/14 Patient in good spirits, saw Ortho on 2/9. staples removed. He was surprised how much bruising he had. Appetite good, denies pain or discomfort. Denies HA, dizziness, SOB, CP, N&V or constipation   --2/16 Notified yesterday evening, patient's K+ was 3.1 down from 3.4 on 2/14 Ordered to give 40meq now and repeat labs in am. Today patient in great spirits, he will most likely discharge home over weekend. His niece was visiting today. Appetite is good he c/o some knee pain, he stated he had some gel that relieved it. Denies HA, dizziness, SOB, CP, N&V or constipation  --3/9   Patient in fair mood, is not in any pain. Appetite only fair due to not liking the food.  States he picks and chooses.   --4/4-  On 3/27 patient went to ortho to have his knee injected, he reported it did not work really at all.  On 3/29 patient was coughing ordered CXR results reported Lung emphysema, Hazy bibasilar airspace opacities. Slightly enlarged cardiac silhouette with congestion. New order for Z-pack.  He continued to worsen, on 3/31 ordered repeat CXR, flu swab, CBC/BMP   Also started Levaquin and probiotic. Covid test, done earlier was negative.  Last evening (4/3) patient slid out of his wheelchair, no injuries.  Today patient talkative, in good spirits.  Appetite remains fair.  Weight is stable.  Denies pain or discomfort. Denies HA, dizziness, SOB, CP, N&V or constipation. Labs from yesterday, essentially stable, did not drop in HGB from 11-->8.    --4/6  Patient in fair mood, per nsg he  was complaining about having a kelley catheter placed.  Stated he was having difficulty urinating.  Per PCC he had 700+ residual.  Appetite is fair, 25-75% mostly.  Denies pain or discomfort.  Denies HA, dizziness, SOB, CP, N&V or constipation  --4/18  Patient returned from hospital 4/14   ### Per hospital records: Patient presents with hx of fall and forehead hematoma/laceration. This is his 3rd fall in 4 months, 1st in Jan reportedly mechanical with R hip fracture, 2nd in Feb thought to be syncope from dehydration and now for the 3rd time without any memory of the events around the fall. Extensively worked up in previous admission, neg EEG, orthostats positive but thought to be due to dehydration given soft BPs. 02/24/23 Echo showed EF 60-65%, spectral doppler with impaired LV diastolic filling. This admission, workup for syncope including video EEG was negative for epileptiform activity. CT PE negative. Patient found to be fluid overloaded on admission, CT showed moderate bilateral pleural effusions with adjacent atelectasis, pulmonary edema, mesenteric edema and body wall anasarca.  without a baseline. Also found to be in new afib, rates controlled in 80s on metoprolol succinate 75 mg. Neuro was consulted, there was low suspicion for neurogenic cause of syncope or seizure, patient's subdural bleeds were deemed to be chronic. Hematology was consulted for patient's hemophilia, continued on Advate daily. Patient's ethanol level elevated on admission, started on thiamine and folate, though  he denied ETOH use. Also found with several electrolyte abnormalities including hypokalemia, hypomagnesemia, hypophosphatemia. Treated for CAP with three days of levofloxacin, patient weaned to RA. Diuresed with IV lasix. Patient had evidence of delirium on 4/10, UA without signs of infection, replaced kelley since patient still retaining. Delirium improved --4/11- 4/12. For his syncope, lower concern for cardiac etiology given echo in 02/24 without AS, no pauses on telemetry. However patient does have new afib and new diagnosis of HFpEF. Currently we suspect that patient's syncope is due to orthostatic hypotension, as suspected in previous admission. Repeat orthostats wnl while inpatient. Will discharge patient with event monitor and close cardiology follow up. For his afib, we decided not to start anticoagulation given his high bleeding risk from hemophilia, history of falls. We chose a rate control strategy with metoprolol succinate. For his heart failure, we deferred starting GDMT due to patient's age, history of orthostatic hypotension.  Discharge back to SNF  --4/18  Today patient is fair, he does not like his kelley.  He denies pain other than his right knee that he states gives him trouble.  Appetite fair to good.  Denies HA, dizziness, SOB, CP, N&V or constipation  --4/20  Remains stable, no c/o dizziness.  Does c/o about his kelley catheter,  Appetite fair.  Denies pain or discomfort. Denies HA, dizziness, SOB, CP, N&V or constipation  --4/27 4/27: Patient in fair mood, leaning back in bed. States he is doing ok, appetite is good. Catheter is bothering him. Explained to patient that we are trialing his urinary voiding and so the balloon is deflated so moving around may cause some discomfort. Denies HA, dizziness, SOB, CP, N&V or constipation.   --5/9  Patient in fair spirits.  Had lab draw on today, BNP 6000+   Also was hypokalemic.  Appetite is good he denies pain discomfort  -- 5/16 Patient sitting in room  "is stable , he denies pain or discomfort, Appetite fair.  Blood pressure this morning 88/58.  Is asymptomatic, is currently taking Bumex, Aldactone and metoprolol.  Labs drawn today  -- 5/18  UA/C&S done 5/16.  Patient states, he feels fair.  Appetite good.  Currently feels a bit under the weather.  -- 5/23  Is positive for COVID and is on isolation.  Is stable, on Paxlovid.  + cough.  He is refusing being weights  -- 5/25  Feeling slightly better.  Labs reveal hyponatremia and elevated creatinine.  He feels \"ok\"  denies pain or discomfort.  Denies HA, dizziness, SOB, CP, N&V or constipation  -- 5/30  Patient is comfortable, visiting with friends, has no complaints  -- 6/1  Patient planned for discharge next week, he believes he is now ready.    Review of Systems:  Reviewed chart looking at current medications, treatment, labs and x-rays and note pertinent positives or negatives, otherwise 10 point system review negative except for what is noted in HPI.    Objective  VS: 116/68, 98.2, 74, 16, 95%    Physical exam:   Physical Exam  Vitals reviewed.   Constitutional:       Appearance: He is normal weight.   HENT:      Head: Normocephalic.      Comments: Laceration healed     Nose: Nose normal.      Mouth/Throat:      Mouth: Mucous membranes are moist.   Cardiovascular:      Rate and Rhythm: Normal rate and regular rhythm.      Pulses: Normal pulses.      Heart sounds: Normal heart sounds.   Pulmonary:      Effort: Pulmonary effort is normal.   Abdominal:      General: Bowel sounds are normal.      Palpations: Abdomen is soft.   Musculoskeletal:         General: Normal range of motion.      Left lower leg: No edema.      Comments: trace BLE edema   Skin:     General: Skin is warm and dry.   Neurological:      Mental Status: He is alert and oriented to person, place, and time. Mental status is at baseline.      Motor: No weakness.   Psychiatric:         Mood and Affect: Mood normal.         Behavior: Behavior normal.    "      Thought Content: Thought content normal.      Assessment/Plan    89 year old male with hx of hemophilia A, protein C deficiency, HTN, HLD, nephrolithiasis, CAD s/p CABG, and BPH admitted for urgent Rt NOF fracture. S/p Rt Femur fixation     COVID  Resolved.    UTI  resolved    Hypotension  Resolved      Atrial fibrillation, chronic (CMS/HCC)  Rate controlled, no a/c 2/2 hemophilia      (HFpEF) heart failure with preserved ejection fraction (CMS/HCC)  Weight unchanged edema improving     Hemophilia A (CMS/HCC)  C/w infusions, labs have been stable     ASHD (arteriosclerotic heart disease)  Nov. 15, 2012: LV EF normal . Cbc bmp weekly.  F/u with Cardiology      Patient requires and meets criteria for bedside commode as he is confined to a level of his multi-story home without access to a bathroom. Patient requires and meets criteria for standard manual wheelchair for use inside and/or outside the home. The following ADLs are currently impaired and would benefit from use of wheelchair: dressing, grooming, toileting, bathing. Patient suffers from respiratory failure, abnormalities of gait and mobility, muscle weakness (generalized), history of multiple falls with head injury, a-fib, subdural hemorrhage, iron deficiency, syncope and collapse, seizures, HTN, hx right femur fracture (healing). Because of all these, his mobility limitations cannot be safely managed with walker or cane accordingly. Patient is not safely ambulatory for household distances and requires wheelchair for safe mobility. Patient has a 24/7 caregiver and their home environment does accommodate wheelchair use.           Electronically Signed By: SHERRY Ramirez   6/9/23 12:47 PM

## 2023-06-08 NOTE — PROGRESS NOTES
Nursing Home Visit  Name: Ramón Flores  YOB: 1928  MRN: [id number]    Chief Complaint  Lab Review  Subjective  HPI:   89 year old male with hx of hemophilia A, protein C deficiency, HTN, HLD, nephrolithiasis, CAD s/p CABG, and BPH admitted for urgent Rt NOF fracture. S/p Rt Femur fixation by Ortho on Jan 13th, but had insufficient factor replacement prior to procedure. Postop patient had a hemoglobin drop, slightly more than expected but likely reasonable for having suboptimals factor replacement. He received 1 unit of packed red blood cells post-op with appropriate response and H&H is now stable. Discussed with his OP hematology and likely expected from blood loss from surgery given hemophilia A and not full repletion of factors rather than ongoing bleeding. Hematology on consult, patient was receiving twice daily therapy with Adv ate now transitioned to once daily and factor levels are stable. CT angio showed no evidence of blood extravasation. After discussion with hematology, patient will need to go to SNF with daily Adv ate for a period before transitioning to his home prophylactic therapy. Hematology is arranging. Patient should not be on any blood thinning agents or aspirin. Patient has been awaiting placement, on 1/22 working with physical therapy on 1/22 he had a syncopal episode in the setting of diarrhea. His blood pressure and mentation improved after getting back in bed and he is getting IV fluids. Denies diarrhea since resolved and he has had no further dizziness. His blood pressure is stable, however his hydrochlorothiazide and metoprolol are continued be on hold. Arrangements made by hematology to deliver Adv ate to his nursing facility where he will be doing PT OT and receiving Adv ate infusions. Per hematology, patient will continue Adv ate infusions for 5 more days and then transition to every other day infusions. He did have a CBC monitored weekly. He will need post discharge  follow-up with orthopedics and hematology. please note probably hematology service patient should never be started on aspirin or other blood thinners/anticoagulants or chemical DVT prophylaxis, if there is concern that patient does need to be started on any of these medications that should be thoroughly discussed with the hematology service and his hematologist   --1/31 Patient is somewhat anxious. He states his right hip feels fine, he denies pain currently. Appetite fair, patient states he does not like the food all that much. He is aware of the schedule for his infusion, knows who is coming and when   --2/2 Patient is in good spirits. Denies pain or discomfort. Patient reports he is getting his infusions as scheduled. Appetite fair, Denies HA, dizziness, SOB, CP, N&V or constipation   --2/14 Patient in good spirits, saw Ortho on 2/9. staples removed. He was surprised how much bruising he had. Appetite good, denies pain or discomfort. Denies HA, dizziness, SOB, CP, N&V or constipation   --2/16 Notified yesterday evening, patient's K+ was 3.1 down from 3.4 on 2/14 Ordered to give 40meq now and repeat labs in am. Today patient in great spirits, he will most likely discharge home over weekend. His niece was visiting today. Appetite is good he c/o some knee pain, he stated he had some gel that relieved it. Denies HA, dizziness, SOB, CP, N&V or constipation  --3/9   Patient in fair mood, is not in any pain. Appetite only fair due to not liking the food.  States he picks and chooses.   --4/4-  On 3/27 patient went to ortho to have his knee injected, he reported it did not work really at all.  On 3/29 patient was coughing ordered CXR results reported Lung emphysema, Hazy bibasilar airspace opacities. Slightly enlarged cardiac silhouette with congestion. New order for Z-pack.  He continued to worsen, on 3/31 ordered repeat CXR, flu swab, CBC/BMP  Also started Levaquin and probiotic. Covid test, done earlier was negative.   Last evening (4/3) patient slid out of his wheelchair, no injuries.  Today patient talkative, in good spirits.  Appetite remains fair.  Weight is stable.  Denies pain or discomfort. Denies HA, dizziness, SOB, CP, N&V or constipation. Labs from yesterday, essentially stable, did not drop in HGB from 11-->8.    --4/6  Patient in fair mood, per nsg he  was complaining about having a kelley catheter placed.  Stated he was having difficulty urinating.  Per PCC he had 700+ residual.  Appetite is fair, 25-75% mostly.  Denies pain or discomfort.  Denies HA, dizziness, SOB, CP, N&V or constipation  --4/18  Patient returned from hospital 4/14   ### Per hospital records: Patient presents with hx of fall and forehead hematoma/laceration. This is his 3rd fall in 4 months, 1st in Jan reportedly mechanical with R hip fracture, 2nd in Feb thought to be syncope from dehydration and now for the 3rd time without any memory of the events around the fall. Extensively worked up in previous admission, neg EEG, orthostats positive but thought to be due to dehydration given soft BPs. 02/24/23 Echo showed EF 60-65%, spectral doppler with impaired LV diastolic filling. This admission, workup for syncope including video EEG was negative for epileptiform activity. CT PE negative. Patient found to be fluid overloaded on admission, CT showed moderate bilateral pleural effusions with adjacent atelectasis, pulmonary edema, mesenteric edema and body wall anasarca.  without a baseline. Also found to be in new afib, rates controlled in 80s on metoprolol succinate 75 mg. Neuro was consulted, there was low suspicion for neurogenic cause of syncope or seizure, patient's subdural bleeds were deemed to be chronic. Hematology was consulted for patient's hemophilia, continued on Advate daily. Patient's ethanol level elevated on admission, started on thiamine and folate, though he denied ETOH use. Also found with several electrolyte abnormalities  including hypokalemia, hypomagnesemia, hypophosphatemia. Treated for CAP with three days of levofloxacin, patient weaned to RA. Diuresed with IV lasix. Patient had evidence of delirium on 4/10, UA without signs of infection, replaced kelley since patient still retaining. Delirium improved --4/11- 4/12. For his syncope, lower concern for cardiac etiology given echo in 02/24 without AS, no pauses on telemetry. However patient does have new afib and new diagnosis of HFpEF. Currently we suspect that patient's syncope is due to orthostatic hypotension, as suspected in previous admission. Repeat orthostats wnl while inpatient. Will discharge patient with event monitor and close cardiology follow up. For his afib, we decided not to start anticoagulation given his high bleeding risk from hemophilia, history of falls. We chose a rate control strategy with metoprolol succinate. For his heart failure, we deferred starting GDMT due to patient's age, history of orthostatic hypotension.  Discharge back to SNF  --4/18  Today patient is fair, he does not like his kelley.  He denies pain other than his right knee that he states gives him trouble.  Appetite fair to good.  Denies HA, dizziness, SOB, CP, N&V or constipation  --4/20  Remains stable, no c/o dizziness.  Does c/o about his kelley catheter,  Appetite fair.  Denies pain or discomfort. Denies HA, dizziness, SOB, CP, N&V or constipation  --4/27 4/27: Patient in fair mood, leaning back in bed. States he is doing ok, appetite is good. Catheter is bothering him. Explained to patient that we are trialing his urinary voiding and so the balloon is deflated so moving around may cause some discomfort. Denies HA, dizziness, SOB, CP, N&V or constipation.   --5/9  Patient in fair spirits.  Had lab draw on today, BNP 6000+   Also was hypokalemic.  Appetite is good he denies pain discomfort    Review of Systems:  Reviewed chart looking at current medications, treatment, labs and x-rays and  note pertinent positives or negatives, otherwise 10 point system review negative except for what is noted in HPI.    Objective  VS: 92/67. 97.8, 81, 18, 94%, 133lbs    Physical exam:   Physical Exam  Vitals reviewed.   Constitutional:       Appearance: He is normal weight.   HENT:      Head: Normocephalic.      Comments: Laceration healed     Nose: Nose normal.      Mouth/Throat:      Mouth: Mucous membranes are moist.   Cardiovascular:      Rate and Rhythm: Normal rate and regular rhythm.      Pulses: Normal pulses.      Heart sounds: Normal heart sounds.   Pulmonary:      Effort: Pulmonary effort is normal.      Breath sounds: Normal breath sounds.   Abdominal:      General: Bowel sounds are normal.      Palpations: Abdomen is soft.   Musculoskeletal:         General: Normal range of motion.      Right lower le+ Edema present.      Left lower le+ Edema present.      Comments: Generalized weakness   Skin:     General: Skin is warm and dry.   Neurological:      Mental Status: He is alert and oriented to person, place, and time. Mental status is at baseline.      Motor: Weakness present.   Psychiatric:         Mood and Affect: Mood normal.         Behavior: Behavior normal.         Thought Content: Thought content normal.      Assessment/Plan    89 year old male with hx of hemophilia A, protein C deficiency, HTN, HLD, nephrolithiasis, CAD s/p CABG, and BPH admitted for urgent Rt NOF fracture. S/p Rt Femur fixation     Hypokalemia  KCL relpeted    (HFpEF) heart failure with preserved ejection fraction (CMS/HCC)  BNP 6,000+  Bumex ordered, monitor weight    Physical debility  Continue PT/OT    Hemophilia A (CMS/HCC)  C/w infusions, labs have been stable    ASHD (arteriosclerotic heart disease)  Nov. 15, 2012: LV EF normal . Cbc bmp weekly.  F/u with Cardiology       Hypertension  C/w same meds, monitor labs    Atrial fibrillation, chronic (CMS/HCC)  Rate controlled, no a/c 2/2 hemophilia     Obstructive  uropathy  Will consult renal,     Acute pain of right knee  Saw ortho, injected

## 2023-06-09 VITALS
DIASTOLIC BLOOD PRESSURE: 64 MMHG | OXYGEN SATURATION: 98 % | BODY MASS INDEX: 20.5 KG/M2 | RESPIRATION RATE: 18 BRPM | SYSTOLIC BLOOD PRESSURE: 106 MMHG | TEMPERATURE: 97.8 F | HEART RATE: 62 BPM | WEIGHT: 127 LBS

## 2023-06-09 VITALS
WEIGHT: 128.6 LBS | DIASTOLIC BLOOD PRESSURE: 78 MMHG | RESPIRATION RATE: 20 BRPM | SYSTOLIC BLOOD PRESSURE: 114 MMHG | HEART RATE: 76 BPM | OXYGEN SATURATION: 95 % | BODY MASS INDEX: 20.76 KG/M2 | TEMPERATURE: 97.6 F

## 2023-06-09 VITALS
RESPIRATION RATE: 18 BRPM | HEART RATE: 72 BPM | TEMPERATURE: 98.5 F | SYSTOLIC BLOOD PRESSURE: 100 MMHG | OXYGEN SATURATION: 97 % | DIASTOLIC BLOOD PRESSURE: 56 MMHG

## 2023-06-09 VITALS
OXYGEN SATURATION: 99 % | HEART RATE: 78 BPM | RESPIRATION RATE: 18 BRPM | SYSTOLIC BLOOD PRESSURE: 120 MMHG | DIASTOLIC BLOOD PRESSURE: 80 MMHG | TEMPERATURE: 97.8 F

## 2023-06-09 VITALS
RESPIRATION RATE: 18 BRPM | OXYGEN SATURATION: 97 % | TEMPERATURE: 97.6 F | HEART RATE: 60 BPM | SYSTOLIC BLOOD PRESSURE: 118 MMHG | DIASTOLIC BLOOD PRESSURE: 61 MMHG | BODY MASS INDEX: 20.5 KG/M2 | WEIGHT: 127 LBS

## 2023-06-09 VITALS
DIASTOLIC BLOOD PRESSURE: 73 MMHG | BODY MASS INDEX: 20.6 KG/M2 | RESPIRATION RATE: 20 BRPM | WEIGHT: 127.6 LBS | OXYGEN SATURATION: 97 % | TEMPERATURE: 97.7 F | HEART RATE: 86 BPM | SYSTOLIC BLOOD PRESSURE: 106 MMHG

## 2023-06-09 PROBLEM — T83.511A URINARY TRACT INFECTION ASSOCIATED WITH INDWELLING URETHRAL CATHETER (CMS-HCC): Status: RESOLVED | Noted: 2023-03-01 | Resolved: 2023-06-09

## 2023-06-09 PROBLEM — N39.0 URINARY TRACT INFECTION ASSOCIATED WITH INDWELLING URETHRAL CATHETER (CMS-HCC): Status: RESOLVED | Noted: 2023-03-01 | Resolved: 2023-06-09

## 2023-06-09 PROBLEM — E46 PROTEIN-CALORIE MALNUTRITION, UNSPECIFIED SEVERITY (MULTI): Status: ACTIVE | Noted: 2023-06-09

## 2023-06-09 PROBLEM — I95.9 HYPOTENSION: Status: ACTIVE | Noted: 2023-06-09

## 2023-06-09 NOTE — ASSESSMENT & PLAN NOTE
Patient states too much food is served.  He would rather eat smaller portions more frequently.  Discuss with dietician

## 2023-06-09 NOTE — PROGRESS NOTES
Nursing Home Visit  Name: Ramón Flores  YOB: 1928  MRN: [id number]    Chief Complaint  Lab Review  Subjective  HPI:   89 year old male with hx of hemophilia A, protein C deficiency, HTN, HLD, nephrolithiasis, CAD s/p CABG, and BPH admitted for urgent Rt NOF fracture. S/p Rt Femur fixation by Ortho on Jan 13th, but had insufficient factor replacement prior to procedure. Postop patient had a hemoglobin drop, slightly more than expected but likely reasonable for having suboptimals factor replacement. He received 1 unit of packed red blood cells post-op with appropriate response and H&H is now stable. Discussed with his OP hematology and likely expected from blood loss from surgery given hemophilia A and not full repletion of factors rather than ongoing bleeding. Hematology on consult, patient was receiving twice daily therapy with Adv ate now transitioned to once daily and factor levels are stable. CT angio showed no evidence of blood extravasation. After discussion with hematology, patient will need to go to SNF with daily Adv ate for a period before transitioning to his home prophylactic therapy. Hematology is arranging. Patient should not be on any blood thinning agents or aspirin. Patient has been awaiting placement, on 1/22 working with physical therapy on 1/22 he had a syncopal episode in the setting of diarrhea. His blood pressure and mentation improved after getting back in bed and he is getting IV fluids. Denies diarrhea since resolved and he has had no further dizziness. His blood pressure is stable, however his hydrochlorothiazide and metoprolol are continued be on hold. Arrangements made by hematology to deliver Adv ate to his nursing facility where he will be doing PT OT and receiving Adv ate infusions. Per hematology, patient will continue Adv ate infusions for 5 more days and then transition to every other day infusions. He did have a CBC monitored weekly. He will need post discharge  follow-up with orthopedics and hematology. please note probably hematology service patient should never be started on aspirin or other blood thinners/anticoagulants or chemical DVT prophylaxis, if there is concern that patient does need to be started on any of these medications that should be thoroughly discussed with the hematology service and his hematologist   --1/31 Patient is somewhat anxious. He states his right hip feels fine, he denies pain currently. Appetite fair, patient states he does not like the food all that much. He is aware of the schedule for his infusion, knows who is coming and when   --2/2 Patient is in good spirits. Denies pain or discomfort. Patient reports he is getting his infusions as scheduled. Appetite fair, Denies HA, dizziness, SOB, CP, N&V or constipation   --2/14 Patient in good spirits, saw Ortho on 2/9. staples removed. He was surprised how much bruising he had. Appetite good, denies pain or discomfort. Denies HA, dizziness, SOB, CP, N&V or constipation   --2/16 Notified yesterday evening, patient's K+ was 3.1 down from 3.4 on 2/14 Ordered to give 40meq now and repeat labs in am. Today patient in great spirits, he will most likely discharge home over weekend. His niece was visiting today. Appetite is good he c/o some knee pain, he stated he had some gel that relieved it. Denies HA, dizziness, SOB, CP, N&V or constipation  --3/9   Patient in fair mood, is not in any pain. Appetite only fair due to not liking the food.  States he picks and chooses.   --4/4-  On 3/27 patient went to ortho to have his knee injected, he reported it did not work really at all.  On 3/29 patient was coughing ordered CXR results reported Lung emphysema, Hazy bibasilar airspace opacities. Slightly enlarged cardiac silhouette with congestion. New order for Z-pack.  He continued to worsen, on 3/31 ordered repeat CXR, flu swab, CBC/BMP  Also started Levaquin and probiotic. Covid test, done earlier was negative.   Last evening (4/3) patient slid out of his wheelchair, no injuries.  Today patient talkative, in good spirits.  Appetite remains fair.  Weight is stable.  Denies pain or discomfort. Denies HA, dizziness, SOB, CP, N&V or constipation. Labs from yesterday, essentially stable, did not drop in HGB from 11-->8.    --4/6  Patient in fair mood, per nsg he  was complaining about having a kelley catheter placed.  Stated he was having difficulty urinating.  Per PCC he had 700+ residual.  Appetite is fair, 25-75% mostly.  Denies pain or discomfort.  Denies HA, dizziness, SOB, CP, N&V or constipation  --4/18  Patient returned from hospital 4/14   ### Per hospital records: Patient presents with hx of fall and forehead hematoma/laceration. This is his 3rd fall in 4 months, 1st in Jan reportedly mechanical with R hip fracture, 2nd in Feb thought to be syncope from dehydration and now for the 3rd time without any memory of the events around the fall. Extensively worked up in previous admission, neg EEG, orthostats positive but thought to be due to dehydration given soft BPs. 02/24/23 Echo showed EF 60-65%, spectral doppler with impaired LV diastolic filling. This admission, workup for syncope including video EEG was negative for epileptiform activity. CT PE negative. Patient found to be fluid overloaded on admission, CT showed moderate bilateral pleural effusions with adjacent atelectasis, pulmonary edema, mesenteric edema and body wall anasarca.  without a baseline. Also found to be in new afib, rates controlled in 80s on metoprolol succinate 75 mg. Neuro was consulted, there was low suspicion for neurogenic cause of syncope or seizure, patient's subdural bleeds were deemed to be chronic. Hematology was consulted for patient's hemophilia, continued on Advate daily. Patient's ethanol level elevated on admission, started on thiamine and folate, though he denied ETOH use. Also found with several electrolyte abnormalities  including hypokalemia, hypomagnesemia, hypophosphatemia. Treated for CAP with three days of levofloxacin, patient weaned to RA. Diuresed with IV lasix. Patient had evidence of delirium on 4/10, UA without signs of infection, replaced kelley since patient still retaining. Delirium improved --4/11- 4/12. For his syncope, lower concern for cardiac etiology given echo in 02/24 without AS, no pauses on telemetry. However patient does have new afib and new diagnosis of HFpEF. Currently we suspect that patient's syncope is due to orthostatic hypotension, as suspected in previous admission. Repeat orthostats wnl while inpatient. Will discharge patient with event monitor and close cardiology follow up. For his afib, we decided not to start anticoagulation given his high bleeding risk from hemophilia, history of falls. We chose a rate control strategy with metoprolol succinate. For his heart failure, we deferred starting GDMT due to patient's age, history of orthostatic hypotension.  Discharge back to SNF  --4/18  Today patient is fair, he does not like his kelley.  He denies pain other than his right knee that he states gives him trouble.  Appetite fair to good.  Denies HA, dizziness, SOB, CP, N&V or constipation  --4/20  Remains stable, no c/o dizziness.  Does c/o about his kelley catheter,  Appetite fair.  Denies pain or discomfort. Denies HA, dizziness, SOB, CP, N&V or constipation  --4/27 4/27: Patient in fair mood, leaning back in bed. States he is doing ok, appetite is good. Catheter is bothering him. Explained to patient that we are trialing his urinary voiding and so the balloon is deflated so moving around may cause some discomfort. Denies HA, dizziness, SOB, CP, N&V or constipation.   --5/9  Patient in fair spirits.  Had lab draw on today, BNP 6000+   Also was hypokalemic.  Appetite is good he denies pain discomfort  -- 5/16 Patient sitting in room is stable , he denies pain or discomfort, Appetite fair.  Blood  pressure this morning 88/58.  Is asymptomatic, is currently taking Bumex, Aldactone and metoprolol.  Labs drawn today  -- 5/18  UA/C&S done 5/16.  Patient states, he feels fair.  Appetite good.  Currently feels a bit under the weather.   -- 5/23 Is positive for COVID and is on isolation. Is stable, on Paxlovid. + cough. He is refusing being weights  -- 5/25  Patient feeling good.  Planning for discharge soon  -- 5/30 Patient is comfortable, visiting with friends, has no complaints    Review of Systems:  Reviewed chart looking at current medications, treatment, labs and x-rays and note pertinent positives or negatives, otherwise 10 point system review negative except for what is noted in HPI.    Objective  VS: Blood pressure 118/61, pulse 60, temperature 36.4 °C (97.6 °F), resp. rate 18, weight 57.6 kg (127 lb), SpO2 97 %.    Physical exam:   Physical Exam  Vitals reviewed.   Constitutional:       Appearance: He is normal weight.   HENT:      Head: Normocephalic.      Nose: Nose normal.      Mouth/Throat:      Mouth: Mucous membranes are moist.   Cardiovascular:      Rate and Rhythm: Normal rate and regular rhythm.      Pulses: Normal pulses.      Heart sounds: Normal heart sounds.   Pulmonary:      Effort: Pulmonary effort is normal.      Comments: +cough  Abdominal:      General: Bowel sounds are normal.      Palpations: Abdomen is soft.   Musculoskeletal:         General: Normal range of motion.      Right lower leg: Edema present.      Left lower leg: Edema present.      Comments: Minimal BLE edema   Skin:     General: Skin is warm and dry.   Neurological:      Mental Status: He is alert and oriented to person, place, and time. Mental status is at baseline.      Motor: Weakness present.   Psychiatric:         Mood and Affect: Mood normal.         Behavior: Behavior normal.         Thought Content: Thought content normal.      Assessment/Plan    89 year old male with hx of hemophilia A, protein C deficiency, HTN,  HLD, nephrolithiasis, CAD s/p CABG, and BPH admitted for urgent Rt NOF fracture. S/p Rt Femur fixation     COVID  Paxlovid started +cough, tessalon Perles tid already ordered    UTI  C/w doxycycline    Hypotension  BP better, continue to monitor     Atrial fibrillation, chronic (CMS/HCC)  Rate controlled, no a/c 2/2 hemophilia      (HFpEF) heart failure with preserved ejection fraction (CMS/HCC)  Weight stable, 128#, is underweight, patient always small man, he states     Hemophilia A (CMS/HCC)  C/w infusions, labs have been stable     ASHD (arteriosclerotic heart disease)  Nov. 15, 2012: LV EF normal . Cbc bmp weekly.  F/u with Cardiology after discharge

## 2023-06-09 NOTE — PROGRESS NOTES
Nursing Home Visit  Name: Ramón Flores  YOB: 1928  MRN: [id number]    Chief Complaint  Lab Review  Subjective  HPI:   89 year old male with hx of hemophilia A, protein C deficiency, HTN, HLD, nephrolithiasis, CAD s/p CABG, and BPH admitted for urgent Rt NOF fracture. S/p Rt Femur fixation by Ortho on Jan 13th, but had insufficient factor replacement prior to procedure. Postop patient had a hemoglobin drop, slightly more than expected but likely reasonable for having suboptimals factor replacement. He received 1 unit of packed red blood cells post-op with appropriate response and H&H is now stable. Discussed with his OP hematology and likely expected from blood loss from surgery given hemophilia A and not full repletion of factors rather than ongoing bleeding. Hematology on consult, patient was receiving twice daily therapy with Adv ate now transitioned to once daily and factor levels are stable. CT angio showed no evidence of blood extravasation. After discussion with hematology, patient will need to go to SNF with daily Adv ate for a period before transitioning to his home prophylactic therapy. Hematology is arranging. Patient should not be on any blood thinning agents or aspirin. Patient has been awaiting placement, on 1/22 working with physical therapy on 1/22 he had a syncopal episode in the setting of diarrhea. His blood pressure and mentation improved after getting back in bed and he is getting IV fluids. Denies diarrhea since resolved and he has had no further dizziness. His blood pressure is stable, however his hydrochlorothiazide and metoprolol are continued be on hold. Arrangements made by hematology to deliver Adv ate to his nursing facility where he will be doing PT OT and receiving Adv ate infusions. Per hematology, patient will continue Adv ate infusions for 5 more days and then transition to every other day infusions. He did have a CBC monitored weekly. He will need post discharge  follow-up with orthopedics and hematology. please note probably hematology service patient should never be started on aspirin or other blood thinners/anticoagulants or chemical DVT prophylaxis, if there is concern that patient does need to be started on any of these medications that should be thoroughly discussed with the hematology service and his hematologist   --1/31 Patient is somewhat anxious. He states his right hip feels fine, he denies pain currently. Appetite fair, patient states he does not like the food all that much. He is aware of the schedule for his infusion, knows who is coming and when   --2/2 Patient is in good spirits. Denies pain or discomfort. Patient reports he is getting his infusions as scheduled. Appetite fair, Denies HA, dizziness, SOB, CP, N&V or constipation   --2/14 Patient in good spirits, saw Ortho on 2/9. staples removed. He was surprised how much bruising he had. Appetite good, denies pain or discomfort. Denies HA, dizziness, SOB, CP, N&V or constipation   --2/16 Notified yesterday evening, patient's K+ was 3.1 down from 3.4 on 2/14 Ordered to give 40meq now and repeat labs in am. Today patient in great spirits, he will most likely discharge home over weekend. His niece was visiting today. Appetite is good he c/o some knee pain, he stated he had some gel that relieved it. Denies HA, dizziness, SOB, CP, N&V or constipation  --3/9   Patient in fair mood, is not in any pain. Appetite only fair due to not liking the food.  States he picks and chooses.   --4/4-  On 3/27 patient went to ortho to have his knee injected, he reported it did not work really at all.  On 3/29 patient was coughing ordered CXR results reported Lung emphysema, Hazy bibasilar airspace opacities. Slightly enlarged cardiac silhouette with congestion. New order for Z-pack.  He continued to worsen, on 3/31 ordered repeat CXR, flu swab, CBC/BMP  Also started Levaquin and probiotic. Covid test, done earlier was negative.   Last evening (4/3) patient slid out of his wheelchair, no injuries.  Today patient talkative, in good spirits.  Appetite remains fair.  Weight is stable.  Denies pain or discomfort. Denies HA, dizziness, SOB, CP, N&V or constipation. Labs from yesterday, essentially stable, did not drop in HGB from 11-->8.    --4/6  Patient in fair mood, per nsg he  was complaining about having a kelley catheter placed.  Stated he was having difficulty urinating.  Per PCC he had 700+ residual.  Appetite is fair, 25-75% mostly.  Denies pain or discomfort.  Denies HA, dizziness, SOB, CP, N&V or constipation  --4/18  Patient returned from hospital 4/14   ### Per hospital records: Patient presents with hx of fall and forehead hematoma/laceration. This is his 3rd fall in 4 months, 1st in Jan reportedly mechanical with R hip fracture, 2nd in Feb thought to be syncope from dehydration and now for the 3rd time without any memory of the events around the fall. Extensively worked up in previous admission, neg EEG, orthostats positive but thought to be due to dehydration given soft BPs. 02/24/23 Echo showed EF 60-65%, spectral doppler with impaired LV diastolic filling. This admission, workup for syncope including video EEG was negative for epileptiform activity. CT PE negative. Patient found to be fluid overloaded on admission, CT showed moderate bilateral pleural effusions with adjacent atelectasis, pulmonary edema, mesenteric edema and body wall anasarca.  without a baseline. Also found to be in new afib, rates controlled in 80s on metoprolol succinate 75 mg. Neuro was consulted, there was low suspicion for neurogenic cause of syncope or seizure, patient's subdural bleeds were deemed to be chronic. Hematology was consulted for patient's hemophilia, continued on Advate daily. Patient's ethanol level elevated on admission, started on thiamine and folate, though he denied ETOH use. Also found with several electrolyte abnormalities  including hypokalemia, hypomagnesemia, hypophosphatemia. Treated for CAP with three days of levofloxacin, patient weaned to RA. Diuresed with IV lasix. Patient had evidence of delirium on 4/10, UA without signs of infection, replaced kelley since patient still retaining. Delirium improved --4/11- 4/12. For his syncope, lower concern for cardiac etiology given echo in 02/24 without AS, no pauses on telemetry. However patient does have new afib and new diagnosis of HFpEF. Currently we suspect that patient's syncope is due to orthostatic hypotension, as suspected in previous admission. Repeat orthostats wnl while inpatient. Will discharge patient with event monitor and close cardiology follow up. For his afib, we decided not to start anticoagulation given his high bleeding risk from hemophilia, history of falls. We chose a rate control strategy with metoprolol succinate. For his heart failure, we deferred starting GDMT due to patient's age, history of orthostatic hypotension.  Discharge back to SNF  --4/18  Today patient is fair, he does not like his kelley.  He denies pain other than his right knee that he states gives him trouble.  Appetite fair to good.  Denies HA, dizziness, SOB, CP, N&V or constipation  --4/20  Remains stable, no c/o dizziness.  Does c/o about his kelley catheter,  Appetite fair.  Denies pain or discomfort. Denies HA, dizziness, SOB, CP, N&V or constipation  --4/27 4/27: Patient in fair mood, leaning back in bed. States he is doing ok, appetite is good. Catheter is bothering him. Explained to patient that we are trialing his urinary voiding and so the balloon is deflated so moving around may cause some discomfort. Denies HA, dizziness, SOB, CP, N&V or constipation.   --5/9  Patient in fair spirits.  Had lab draw on today, BNP 6000+   Also was hypokalemic.  Appetite is good he denies pain discomfort  -- 5/16 Patient sitting in room is stable , he denies pain or discomfort, Appetite fair.  Blood  pressure this morning 88/58.  Is asymptomatic, is currently taking Bumex, Aldactone and metoprolol.  Labs drawn today  -- 5/18  UA/C&S done 5/16.  Patient states, he feels fair.  Appetite good.  Currently feels a bit under the weather.   -- 5/23 Is positive for COVID and is on isolation. Is stable, on Paxlovid. + cough. He is refusing being weights  Review of Systems:  Reviewed chart looking at current medications, treatment, labs and x-rays and note pertinent positives or negatives, otherwise 10 point system review negative except for what is noted in HPI.    Objective  VS: 100/56, 98.5, 72, 20, 97%    Physical exam:   Physical Exam  Vitals reviewed.   Constitutional:       Appearance: He is normal weight.   HENT:      Head: Normocephalic.      Nose: Nose normal.      Mouth/Throat:      Mouth: Mucous membranes are moist.   Cardiovascular:      Rate and Rhythm: Normal rate and regular rhythm.      Pulses: Normal pulses.      Heart sounds: Normal heart sounds.   Pulmonary:      Effort: Pulmonary effort is normal.      Breath sounds: Decreased breath sounds present.      Comments: +cough  Abdominal:      General: Bowel sounds are normal.      Palpations: Abdomen is soft.   Musculoskeletal:         General: Normal range of motion.      Right lower leg: Edema present.      Left lower leg: Edema present.      Comments: Generalized weakness improving, trace BLE edema   Skin:     General: Skin is warm and dry.   Neurological:      Mental Status: He is alert and oriented to person, place, and time. Mental status is at baseline.      Motor: Weakness present.   Psychiatric:         Mood and Affect: Mood normal.         Behavior: Behavior normal.         Thought Content: Thought content normal.      Assessment/Plan    89 year old male with hx of hemophilia A, protein C deficiency, HTN, HLD, nephrolithiasis, CAD s/p CABG, and BPH admitted for urgent Rt NOF fracture. S/p Rt Femur fixation     COVID  Paxlovid started +cough,  tessalon Perles tid already ordered    UTI  C/w doxycycline    Hypotension  BP better, continue to monitor     Atrial fibrillation, chronic (CMS/HCC)  Rate controlled, no a/c 2/2 hemophilia      (HFpEF) heart failure with preserved ejection fraction (CMS/HCC)  Weight stable, 128#, is underweight, patient always small man, he states     Hemophilia A (CMS/HCC)  C/w infusions, labs have been stable     ASHD (arteriosclerotic heart disease)  Nov. 15, 2012: LV EF normal . Cbc bmp weekly.  F/u with Cardiology after discharge

## 2023-06-09 NOTE — PROGRESS NOTES
Nursing Home Visit  Name: Ramón Flores  YOB: 1928  MRN: [id number]    Chief Complaint  Lab Review  Subjective  HPI:   89 year old male with hx of hemophilia A, protein C deficiency, HTN, HLD, nephrolithiasis, CAD s/p CABG, and BPH admitted for urgent Rt NOF fracture. S/p Rt Femur fixation by Ortho on Jan 13th, but had insufficient factor replacement prior to procedure. Postop patient had a hemoglobin drop, slightly more than expected but likely reasonable for having suboptimals factor replacement. He received 1 unit of packed red blood cells post-op with appropriate response and H&H is now stable. Discussed with his OP hematology and likely expected from blood loss from surgery given hemophilia A and not full repletion of factors rather than ongoing bleeding. Hematology on consult, patient was receiving twice daily therapy with Adv ate now transitioned to once daily and factor levels are stable. CT angio showed no evidence of blood extravasation. After discussion with hematology, patient will need to go to SNF with daily Adv ate for a period before transitioning to his home prophylactic therapy. Hematology is arranging. Patient should not be on any blood thinning agents or aspirin. Patient has been awaiting placement, on 1/22 working with physical therapy on 1/22 he had a syncopal episode in the setting of diarrhea. His blood pressure and mentation improved after getting back in bed and he is getting IV fluids. Denies diarrhea since resolved and he has had no further dizziness. His blood pressure is stable, however his hydrochlorothiazide and metoprolol are continued be on hold. Arrangements made by hematology to deliver Adv ate to his nursing facility where he will be doing PT OT and receiving Adv ate infusions. Per hematology, patient will continue Adv ate infusions for 5 more days and then transition to every other day infusions. He did have a CBC monitored weekly. He will need post discharge  follow-up with orthopedics and hematology. please note probably hematology service patient should never be started on aspirin or other blood thinners/anticoagulants or chemical DVT prophylaxis, if there is concern that patient does need to be started on any of these medications that should be thoroughly discussed with the hematology service and his hematologist   --1/31 Patient is somewhat anxious. He states his right hip feels fine, he denies pain currently. Appetite fair, patient states he does not like the food all that much. He is aware of the schedule for his infusion, knows who is coming and when   --2/2 Patient is in good spirits. Denies pain or discomfort. Patient reports he is getting his infusions as scheduled. Appetite fair, Denies HA, dizziness, SOB, CP, N&V or constipation   --2/14 Patient in good spirits, saw Ortho on 2/9. staples removed. He was surprised how much bruising he had. Appetite good, denies pain or discomfort. Denies HA, dizziness, SOB, CP, N&V or constipation   --2/16 Notified yesterday evening, patient's K+ was 3.1 down from 3.4 on 2/14 Ordered to give 40meq now and repeat labs in am. Today patient in great spirits, he will most likely discharge home over weekend. His niece was visiting today. Appetite is good he c/o some knee pain, he stated he had some gel that relieved it. Denies HA, dizziness, SOB, CP, N&V or constipation  --3/9   Patient in fair mood, is not in any pain. Appetite only fair due to not liking the food.  States he picks and chooses.   --4/4-  On 3/27 patient went to ortho to have his knee injected, he reported it did not work really at all.  On 3/29 patient was coughing ordered CXR results reported Lung emphysema, Hazy bibasilar airspace opacities. Slightly enlarged cardiac silhouette with congestion. New order for Z-pack.  He continued to worsen, on 3/31 ordered repeat CXR, flu swab, CBC/BMP  Also started Levaquin and probiotic. Covid test, done earlier was negative.   Last evening (4/3) patient slid out of his wheelchair, no injuries.  Today patient talkative, in good spirits.  Appetite remains fair.  Weight is stable.  Denies pain or discomfort. Denies HA, dizziness, SOB, CP, N&V or constipation. Labs from yesterday, essentially stable, did not drop in HGB from 11-->8.    --4/6  Patient in fair mood, per nsg he  was complaining about having a kelley catheter placed.  Stated he was having difficulty urinating.  Per PCC he had 700+ residual.  Appetite is fair, 25-75% mostly.  Denies pain or discomfort.  Denies HA, dizziness, SOB, CP, N&V or constipation  --4/18  Patient returned from hospital 4/14   ### Per hospital records: Patient presents with hx of fall and forehead hematoma/laceration. This is his 3rd fall in 4 months, 1st in Jan reportedly mechanical with R hip fracture, 2nd in Feb thought to be syncope from dehydration and now for the 3rd time without any memory of the events around the fall. Extensively worked up in previous admission, neg EEG, orthostats positive but thought to be due to dehydration given soft BPs. 02/24/23 Echo showed EF 60-65%, spectral doppler with impaired LV diastolic filling. This admission, workup for syncope including video EEG was negative for epileptiform activity. CT PE negative. Patient found to be fluid overloaded on admission, CT showed moderate bilateral pleural effusions with adjacent atelectasis, pulmonary edema, mesenteric edema and body wall anasarca.  without a baseline. Also found to be in new afib, rates controlled in 80s on metoprolol succinate 75 mg. Neuro was consulted, there was low suspicion for neurogenic cause of syncope or seizure, patient's subdural bleeds were deemed to be chronic. Hematology was consulted for patient's hemophilia, continued on Advate daily. Patient's ethanol level elevated on admission, started on thiamine and folate, though he denied ETOH use. Also found with several electrolyte abnormalities  including hypokalemia, hypomagnesemia, hypophosphatemia. Treated for CAP with three days of levofloxacin, patient weaned to RA. Diuresed with IV lasix. Patient had evidence of delirium on 4/10, UA without signs of infection, replaced kelley since patient still retaining. Delirium improved --4/11- 4/12. For his syncope, lower concern for cardiac etiology given echo in 02/24 without AS, no pauses on telemetry. However patient does have new afib and new diagnosis of HFpEF. Currently we suspect that patient's syncope is due to orthostatic hypotension, as suspected in previous admission. Repeat orthostats wnl while inpatient. Will discharge patient with event monitor and close cardiology follow up. For his afib, we decided not to start anticoagulation given his high bleeding risk from hemophilia, history of falls. We chose a rate control strategy with metoprolol succinate. For his heart failure, we deferred starting GDMT due to patient's age, history of orthostatic hypotension.  Discharge back to SNF  --4/18  Today patient is fair, he does not like his kelley.  He denies pain other than his right knee that he states gives him trouble.  Appetite fair to good.  Denies HA, dizziness, SOB, CP, N&V or constipation  --4/20  Remains stable, no c/o dizziness.  Does c/o about his kelley catheter,  Appetite fair.  Denies pain or discomfort. Denies HA, dizziness, SOB, CP, N&V or constipation  --4/27 4/27: Patient in fair mood, leaning back in bed. States he is doing ok, appetite is good. Catheter is bothering him. Explained to patient that we are trialing his urinary voiding and so the balloon is deflated so moving around may cause some discomfort. Denies HA, dizziness, SOB, CP, N&V or constipation.   --5/9  Patient in fair spirits.  Had lab draw on today, BNP 6000+   Also was hypokalemic.  Appetite is good he denies pain discomfort  -- 5/16 Patient sitting in room is stable , he denies pain or discomfort, Appetite fair.  Blood  "pressure this morning 88/58.  Is asymptomatic, is currently taking Bumex, Aldactone and metoprolol.  Labs drawn today  -- 5/18  UA/C&S done 5/16.  Patient states, he feels fair.  Appetite good.  Currently feels a bit under the weather.  -- 5/23  Is positive for COVID and is on isolation.  Is stable, on Paxlovid.  + cough.  He is refusing being weights  -- 5/25  Feeling slightly better.  Labs reveal hyponatremia and elevated creatinine.  He feels \"ok\"  denies pain or discomfort.  Denies HA, dizziness, SOB, CP, N&V or constipation  -- 5/30  Patient is comfortable, visiting with friends, has no complaints  -- 6/1  Patient planned for discharge next week, he believes he is now ready.    Review of Systems:  Reviewed chart looking at current medications, treatment, labs and x-rays and note pertinent positives or negatives, otherwise 10 point system review negative except for what is noted in HPI.    Objective  VS: 116/68, 98.2, 74, 16, 95%    Physical exam:   Physical Exam  Vitals reviewed.   Constitutional:       Appearance: He is normal weight.   HENT:      Head: Normocephalic.      Comments: Laceration healed     Nose: Nose normal.      Mouth/Throat:      Mouth: Mucous membranes are moist.   Cardiovascular:      Rate and Rhythm: Normal rate and regular rhythm.      Pulses: Normal pulses.      Heart sounds: Normal heart sounds.   Pulmonary:      Effort: Pulmonary effort is normal.   Abdominal:      General: Bowel sounds are normal.      Palpations: Abdomen is soft.   Musculoskeletal:         General: Normal range of motion.      Left lower leg: No edema.      Comments: trace BLE edema   Skin:     General: Skin is warm and dry.   Neurological:      Mental Status: He is alert and oriented to person, place, and time. Mental status is at baseline.      Motor: No weakness.   Psychiatric:         Mood and Affect: Mood normal.         Behavior: Behavior normal.         Thought Content: Thought content normal.      " Assessment/Plan    89 year old male with hx of hemophilia A, protein C deficiency, HTN, HLD, nephrolithiasis, CAD s/p CABG, and BPH admitted for urgent Rt NOF fracture. S/p Rt Femur fixation     COVID  Resolved.    UTI  resolved    Hypotension  Resolved      Atrial fibrillation, chronic (CMS/HCC)  Rate controlled, no a/c 2/2 hemophilia      (HFpEF) heart failure with preserved ejection fraction (CMS/HCC)  Weight unchanged edema improving     Hemophilia A (CMS/HCC)  C/w infusions, labs have been stable     ASHD (arteriosclerotic heart disease)  Nov. 15, 2012: LV EF normal . Cbc bmp weekly.  F/u with Cardiology      Patient requires and meets criteria for bedside commode as he is confined to a level of his multi-story home without access to a bathroom. Patient requires and meets criteria for standard manual wheelchair for use inside and/or outside the home. The following ADLs are currently impaired and would benefit from use of wheelchair: dressing, grooming, toileting, bathing. Patient suffers from respiratory failure, abnormalities of gait and mobility, muscle weakness (generalized), history of multiple falls with head injury, a-fib, subdural hemorrhage, iron deficiency, syncope and collapse, seizures, HTN, hx right femur fracture (healing). Because of all these, his mobility limitations cannot be safely managed with walker or cane accordingly. Patient is not safely ambulatory for household distances and requires wheelchair for safe mobility. Patient has a 24/7 caregiver and their home environment does accommodate wheelchair use.

## 2023-06-09 NOTE — PROGRESS NOTES
Nursing Home Visit  Name: Ramón Flores  YOB: 1928  MRN: [id number]    Chief Complaint  Lab Review  Subjective  HPI:   89 year old male with hx of hemophilia A, protein C deficiency, HTN, HLD, nephrolithiasis, CAD s/p CABG, and BPH admitted for urgent Rt NOF fracture. S/p Rt Femur fixation by Ortho on Jan 13th, but had insufficient factor replacement prior to procedure. Postop patient had a hemoglobin drop, slightly more than expected but likely reasonable for having suboptimals factor replacement. He received 1 unit of packed red blood cells post-op with appropriate response and H&H is now stable. Discussed with his OP hematology and likely expected from blood loss from surgery given hemophilia A and not full repletion of factors rather than ongoing bleeding. Hematology on consult, patient was receiving twice daily therapy with Adv ate now transitioned to once daily and factor levels are stable. CT angio showed no evidence of blood extravasation. After discussion with hematology, patient will need to go to SNF with daily Adv ate for a period before transitioning to his home prophylactic therapy. Hematology is arranging. Patient should not be on any blood thinning agents or aspirin. Patient has been awaiting placement, on 1/22 working with physical therapy on 1/22 he had a syncopal episode in the setting of diarrhea. His blood pressure and mentation improved after getting back in bed and he is getting IV fluids. Denies diarrhea since resolved and he has had no further dizziness. His blood pressure is stable, however his hydrochlorothiazide and metoprolol are continued be on hold. Arrangements made by hematology to deliver Adv ate to his nursing facility where he will be doing PT OT and receiving Adv ate infusions. Per hematology, patient will continue Adv ate infusions for 5 more days and then transition to every other day infusions. He did have a CBC monitored weekly. He will need post discharge  follow-up with orthopedics and hematology. please note probably hematology service patient should never be started on aspirin or other blood thinners/anticoagulants or chemical DVT prophylaxis, if there is concern that patient does need to be started on any of these medications that should be thoroughly discussed with the hematology service and his hematologist   --1/31 Patient is somewhat anxious. He states his right hip feels fine, he denies pain currently. Appetite fair, patient states he does not like the food all that much. He is aware of the schedule for his infusion, knows who is coming and when   --2/2 Patient is in good spirits. Denies pain or discomfort. Patient reports he is getting his infusions as scheduled. Appetite fair, Denies HA, dizziness, SOB, CP, N&V or constipation   --2/14 Patient in good spirits, saw Ortho on 2/9. staples removed. He was surprised how much bruising he had. Appetite good, denies pain or discomfort. Denies HA, dizziness, SOB, CP, N&V or constipation   --2/16 Notified yesterday evening, patient's K+ was 3.1 down from 3.4 on 2/14 Ordered to give 40meq now and repeat labs in am. Today patient in great spirits, he will most likely discharge home over weekend. His niece was visiting today. Appetite is good he c/o some knee pain, he stated he had some gel that relieved it. Denies HA, dizziness, SOB, CP, N&V or constipation  --3/9   Patient in fair mood, is not in any pain. Appetite only fair due to not liking the food.  States he picks and chooses.   --4/4-  On 3/27 patient went to ortho to have his knee injected, he reported it did not work really at all.  On 3/29 patient was coughing ordered CXR results reported Lung emphysema, Hazy bibasilar airspace opacities. Slightly enlarged cardiac silhouette with congestion. New order for Z-pack.  He continued to worsen, on 3/31 ordered repeat CXR, flu swab, CBC/BMP  Also started Levaquin and probiotic. Covid test, done earlier was negative.   Last evening (4/3) patient slid out of his wheelchair, no injuries.  Today patient talkative, in good spirits.  Appetite remains fair.  Weight is stable.  Denies pain or discomfort. Denies HA, dizziness, SOB, CP, N&V or constipation. Labs from yesterday, essentially stable, did not drop in HGB from 11-->8.    --4/6  Patient in fair mood, per nsg he  was complaining about having a kelley catheter placed.  Stated he was having difficulty urinating.  Per PCC he had 700+ residual.  Appetite is fair, 25-75% mostly.  Denies pain or discomfort.  Denies HA, dizziness, SOB, CP, N&V or constipation  --4/18  Patient returned from hospital 4/14   ### Per hospital records: Patient presents with hx of fall and forehead hematoma/laceration. This is his 3rd fall in 4 months, 1st in Jan reportedly mechanical with R hip fracture, 2nd in Feb thought to be syncope from dehydration and now for the 3rd time without any memory of the events around the fall. Extensively worked up in previous admission, neg EEG, orthostats positive but thought to be due to dehydration given soft BPs. 02/24/23 Echo showed EF 60-65%, spectral doppler with impaired LV diastolic filling. This admission, workup for syncope including video EEG was negative for epileptiform activity. CT PE negative. Patient found to be fluid overloaded on admission, CT showed moderate bilateral pleural effusions with adjacent atelectasis, pulmonary edema, mesenteric edema and body wall anasarca.  without a baseline. Also found to be in new afib, rates controlled in 80s on metoprolol succinate 75 mg. Neuro was consulted, there was low suspicion for neurogenic cause of syncope or seizure, patient's subdural bleeds were deemed to be chronic. Hematology was consulted for patient's hemophilia, continued on Advate daily. Patient's ethanol level elevated on admission, started on thiamine and folate, though he denied ETOH use. Also found with several electrolyte abnormalities  including hypokalemia, hypomagnesemia, hypophosphatemia. Treated for CAP with three days of levofloxacin, patient weaned to RA. Diuresed with IV lasix. Patient had evidence of delirium on 4/10, UA without signs of infection, replaced kelley since patient still retaining. Delirium improved --4/11- 4/12. For his syncope, lower concern for cardiac etiology given echo in 02/24 without AS, no pauses on telemetry. However patient does have new afib and new diagnosis of HFpEF. Currently we suspect that patient's syncope is due to orthostatic hypotension, as suspected in previous admission. Repeat orthostats wnl while inpatient. Will discharge patient with event monitor and close cardiology follow up. For his afib, we decided not to start anticoagulation given his high bleeding risk from hemophilia, history of falls. We chose a rate control strategy with metoprolol succinate. For his heart failure, we deferred starting GDMT due to patient's age, history of orthostatic hypotension.  Discharge back to SNF  --4/18  Today patient is fair, he does not like his kelley.  He denies pain other than his right knee that he states gives him trouble.  Appetite fair to good.  Denies HA, dizziness, SOB, CP, N&V or constipation  --4/20  Remains stable, no c/o dizziness.  Does c/o about his kelley catheter,  Appetite fair.  Denies pain or discomfort. Denies HA, dizziness, SOB, CP, N&V or constipation  --4/27 4/27: Patient in fair mood, leaning back in bed. States he is doing ok, appetite is good. Catheter is bothering him. Explained to patient that we are trialing his urinary voiding and so the balloon is deflated so moving around may cause some discomfort. Denies HA, dizziness, SOB, CP, N&V or constipation.   --5/9  Patient in fair spirits.  Had lab draw on today, BNP 6000+   Also was hypokalemic.  Appetite is good he denies pain discomfort  -- 5/16 Patient sitting in room is stable , he denies pain or discomfort, Appetite fair.  Blood  pressure this morning 88/58.  Is asymptomatic, is currently taking Bumex, Aldactone and metoprolol.  Labs drawn today  -- 5/18  UA/C&S done 5/16.  Patient states, he feels fair.  Appetite good.  Currently feels a bit under the weather.   -- 5/23 Is positive for COVID and is on isolation. Is stable, on Paxlovid. + cough. He is refusing being weights  -- 5/25  Patient feeling good.  Planning for discharge soon  Review of Systems:  Reviewed chart looking at current medications, treatment, labs and x-rays and note pertinent positives or negatives, otherwise 10 point system review negative except for what is noted in HPI.    Objective  VS: 106/73, 97.7, 86, 20, 97%    Physical exam:   Physical Exam  Vitals reviewed.   Constitutional:       Appearance: He is normal weight.   HENT:      Head: Normocephalic.      Nose: Nose normal.      Mouth/Throat:      Mouth: Mucous membranes are moist.   Cardiovascular:      Rate and Rhythm: Normal rate and regular rhythm.      Pulses: Normal pulses.      Heart sounds: Normal heart sounds.   Pulmonary:      Effort: Pulmonary effort is normal.      Comments: +cough  Abdominal:      General: Bowel sounds are normal.      Palpations: Abdomen is soft.   Musculoskeletal:         General: Normal range of motion.      Right lower leg: Edema present.      Left lower leg: Edema present.      Comments: Minimal BLE edema   Skin:     General: Skin is warm and dry.   Neurological:      Mental Status: He is alert and oriented to person, place, and time. Mental status is at baseline.      Motor: Weakness present.   Psychiatric:         Mood and Affect: Mood normal.         Behavior: Behavior normal.         Thought Content: Thought content normal.      Assessment/Plan    89 year old male with hx of hemophilia A, protein C deficiency, HTN, HLD, nephrolithiasis, CAD s/p CABG, and BPH admitted for urgent Rt NOF fracture. S/p Rt Femur fixation     COVID  Paxlovid started +cough, tessalon Perles tid  already ordered    UTI  C/w doxycycline    Hypotension  BP better, continue to monitor     Atrial fibrillation, chronic (CMS/[facility])  Rate controlled, no a/c 2/2 hemophilia      (HFpEF) heart failure with preserved ejection fraction (CMS/[facility])  Weight stable, 128#, is underweight, patient always small man, he states     Hemophilia A (CMS/HCC)  C/w infusions, labs have been stable     ASHD (arteriosclerotic heart disease)  Nov. 15, 2012: LV EF normal . Cbc bmp weekly.  F/u with Cardiology after discharge

## 2023-06-09 NOTE — PROGRESS NOTES
Nursing Home Visit  Name: Ramón Flores  YOB: 1928  MRN: [id number]    Chief Complaint  Lab Review  Subjective  HPI:   89 year old male with hx of hemophilia A, protein C deficiency, HTN, HLD, nephrolithiasis, CAD s/p CABG, and BPH admitted for urgent Rt NOF fracture. S/p Rt Femur fixation by Ortho on Jan 13th, but had insufficient factor replacement prior to procedure. Postop patient had a hemoglobin drop, slightly more than expected but likely reasonable for having suboptimals factor replacement. He received 1 unit of packed red blood cells post-op with appropriate response and H&H is now stable. Discussed with his OP hematology and likely expected from blood loss from surgery given hemophilia A and not full repletion of factors rather than ongoing bleeding. Hematology on consult, patient was receiving twice daily therapy with Adv ate now transitioned to once daily and factor levels are stable. CT angio showed no evidence of blood extravasation. After discussion with hematology, patient will need to go to SNF with daily Adv ate for a period before transitioning to his home prophylactic therapy. Hematology is arranging. Patient should not be on any blood thinning agents or aspirin. Patient has been awaiting placement, on 1/22 working with physical therapy on 1/22 he had a syncopal episode in the setting of diarrhea. His blood pressure and mentation improved after getting back in bed and he is getting IV fluids. Denies diarrhea since resolved and he has had no further dizziness. His blood pressure is stable, however his hydrochlorothiazide and metoprolol are continued be on hold. Arrangements made by hematology to deliver Adv ate to his nursing facility where he will be doing PT OT and receiving Adv ate infusions. Per hematology, patient will continue Adv ate infusions for 5 more days and then transition to every other day infusions. He did have a CBC monitored weekly. He will need post discharge  follow-up with orthopedics and hematology. please note probably hematology service patient should never be started on aspirin or other blood thinners/anticoagulants or chemical DVT prophylaxis, if there is concern that patient does need to be started on any of these medications that should be thoroughly discussed with the hematology service and his hematologist   --1/31 Patient is somewhat anxious. He states his right hip feels fine, he denies pain currently. Appetite fair, patient states he does not like the food all that much. He is aware of the schedule for his infusion, knows who is coming and when   --2/2 Patient is in good spirits. Denies pain or discomfort. Patient reports he is getting his infusions as scheduled. Appetite fair, Denies HA, dizziness, SOB, CP, N&V or constipation   --2/14 Patient in good spirits, saw Ortho on 2/9. staples removed. He was surprised how much bruising he had. Appetite good, denies pain or discomfort. Denies HA, dizziness, SOB, CP, N&V or constipation   --2/16 Notified yesterday evening, patient's K+ was 3.1 down from 3.4 on 2/14 Ordered to give 40meq now and repeat labs in am. Today patient in great spirits, he will most likely discharge home over weekend. His niece was visiting today. Appetite is good he c/o some knee pain, he stated he had some gel that relieved it. Denies HA, dizziness, SOB, CP, N&V or constipation  --3/9   Patient in fair mood, is not in any pain. Appetite only fair due to not liking the food.  States he picks and chooses.   --4/4-  On 3/27 patient went to ortho to have his knee injected, he reported it did not work really at all.  On 3/29 patient was coughing ordered CXR results reported Lung emphysema, Hazy bibasilar airspace opacities. Slightly enlarged cardiac silhouette with congestion. New order for Z-pack.  He continued to worsen, on 3/31 ordered repeat CXR, flu swab, CBC/BMP  Also started Levaquin and probiotic. Covid test, done earlier was negative.   Last evening (4/3) patient slid out of his wheelchair, no injuries.  Today patient talkative, in good spirits.  Appetite remains fair.  Weight is stable.  Denies pain or discomfort. Denies HA, dizziness, SOB, CP, N&V or constipation. Labs from yesterday, essentially stable, did not drop in HGB from 11-->8.    --4/6  Patient in fair mood, per nsg he  was complaining about having a kelley catheter placed.  Stated he was having difficulty urinating.  Per PCC he had 700+ residual.  Appetite is fair, 25-75% mostly.  Denies pain or discomfort.  Denies HA, dizziness, SOB, CP, N&V or constipation  --4/18  Patient returned from hospital 4/14   ### Per hospital records: Patient presents with hx of fall and forehead hematoma/laceration. This is his 3rd fall in 4 months, 1st in Jan reportedly mechanical with R hip fracture, 2nd in Feb thought to be syncope from dehydration and now for the 3rd time without any memory of the events around the fall. Extensively worked up in previous admission, neg EEG, orthostats positive but thought to be due to dehydration given soft BPs. 02/24/23 Echo showed EF 60-65%, spectral doppler with impaired LV diastolic filling. This admission, workup for syncope including video EEG was negative for epileptiform activity. CT PE negative. Patient found to be fluid overloaded on admission, CT showed moderate bilateral pleural effusions with adjacent atelectasis, pulmonary edema, mesenteric edema and body wall anasarca.  without a baseline. Also found to be in new afib, rates controlled in 80s on metoprolol succinate 75 mg. Neuro was consulted, there was low suspicion for neurogenic cause of syncope or seizure, patient's subdural bleeds were deemed to be chronic. Hematology was consulted for patient's hemophilia, continued on Advate daily. Patient's ethanol level elevated on admission, started on thiamine and folate, though he denied ETOH use. Also found with several electrolyte abnormalities  including hypokalemia, hypomagnesemia, hypophosphatemia. Treated for CAP with three days of levofloxacin, patient weaned to RA. Diuresed with IV lasix. Patient had evidence of delirium on 4/10, UA without signs of infection, replaced kelley since patient still retaining. Delirium improved --4/11- 4/12. For his syncope, lower concern for cardiac etiology given echo in 02/24 without AS, no pauses on telemetry. However patient does have new afib and new diagnosis of HFpEF. Currently we suspect that patient's syncope is due to orthostatic hypotension, as suspected in previous admission. Repeat orthostats wnl while inpatient. Will discharge patient with event monitor and close cardiology follow up. For his afib, we decided not to start anticoagulation given his high bleeding risk from hemophilia, history of falls. We chose a rate control strategy with metoprolol succinate. For his heart failure, we deferred starting GDMT due to patient's age, history of orthostatic hypotension.  Discharge back to SNF  --4/18  Today patient is fair, he does not like his kelley.  He denies pain other than his right knee that he states gives him trouble.  Appetite fair to good.  Denies HA, dizziness, SOB, CP, N&V or constipation  --4/20  Remains stable, no c/o dizziness.  Does c/o about his kelley catheter,  Appetite fair.  Denies pain or discomfort. Denies HA, dizziness, SOB, CP, N&V or constipation  --4/27 4/27: Patient in fair mood, leaning back in bed. States he is doing ok, appetite is good. Catheter is bothering him. Explained to patient that we are trialing his urinary voiding and so the balloon is deflated so moving around may cause some discomfort. Denies HA, dizziness, SOB, CP, N&V or constipation.   --5/9  Patient in fair spirits.  Had lab draw on today, BNP 6000+   Also was hypokalemic.  Appetite is good he denies pain discomfort  -- 5/16 Patient sitting in room is stable , he denies pain or discomfort, Appetite fair.  Blood  pressure this morning 88/58.  Is asymptomatic, is currently taking Bumex, Aldactone and metoprolol.  Labs drawn today  -- 5/18  UA/C&S done 5/16.  Patient states, he feels fair.  Appetite good.  Currently feels a bit under the weather.   -- 5/23 Is positive for COVID and is on isolation. Is stable, on Paxlovid. + cough. He is refusing being weights  -- 5/25  Patient feeling good.  Planning for discharge soon  -- 5/30 Patient is comfortable, visiting with friends, has no complaints  -- 6/1 Patient planned for discharge next week, he believes he is now ready. Denies pain or discomfort. Denies HA, dizziness, SOB, CP, N&V or constipation    Review of Systems:  Reviewed chart looking at current medications, treatment, labs and x-rays and note pertinent positives or negatives, otherwise 10 point system review negative except for what is noted in HPI.    Objective  VS: Blood pressure 106/64, pulse 62, temperature 36.6 °C (97.8 °F), resp. rate 18, weight 57.6 kg (127 lb), SpO2 98 %.    Physical exam:   Physical Exam  Vitals reviewed.   HENT:      Head: Normocephalic.      Nose: Nose normal.      Mouth/Throat:      Mouth: Mucous membranes are moist.   Cardiovascular:      Rate and Rhythm: Normal rate and regular rhythm.      Pulses: Normal pulses.      Heart sounds: Normal heart sounds.   Pulmonary:      Effort: Pulmonary effort is normal.   Abdominal:      General: Bowel sounds are normal.      Palpations: Abdomen is soft.   Musculoskeletal:         General: Normal range of motion.      Comments: Minimal BLE edema   Skin:     General: Skin is warm and dry.   Neurological:      Mental Status: He is alert and oriented to person, place, and time. Mental status is at baseline.      Motor: Weakness present.   Psychiatric:         Mood and Affect: Mood normal.         Behavior: Behavior normal.         Thought Content: Thought content normal.      Assessment/Plan    89 year old male with hx of hemophilia A, protein C deficiency,  HTN, HLD, nephrolithiasis, CAD s/p CABG, and BPH admitted for urgent Rt NOF fracture. S/p Rt Femur fixation     COVID  Paxlovid started +cough, tessalon Perles tid already ordered    UTI  C/w doxycycline    Hypotension  BP better, continue to monitor     Atrial fibrillation, chronic (CMS/HCC)  Rate controlled, no a/c 2/2 hemophilia      (HFpEF) heart failure with preserved ejection fraction (CMS/HCC)  Weight stable, 128#, is underweight, patient always small man, he states     Hemophilia A (CMS/HCC)  C/w infusions, labs have been stable     ASHD (arteriosclerotic heart disease)  Nov. 15, 2012: LV EF normal . Cbc bmp weekly.  F/u with Cardiology after discharge

## 2023-06-09 NOTE — PROGRESS NOTES
Nursing Home Visit  Name: Ramón Flores  YOB: 1928  MRN: [id number]    Chief Complaint  Lab Review  Subjective  HPI:   89 year old male with hx of hemophilia A, protein C deficiency, HTN, HLD, nephrolithiasis, CAD s/p CABG, and BPH admitted for urgent Rt NOF fracture. S/p Rt Femur fixation by Ortho on Jan 13th, but had insufficient factor replacement prior to procedure. Postop patient had a hemoglobin drop, slightly more than expected but likely reasonable for having suboptimals factor replacement. He received 1 unit of packed red blood cells post-op with appropriate response and H&H is now stable. Discussed with his OP hematology and likely expected from blood loss from surgery given hemophilia A and not full repletion of factors rather than ongoing bleeding. Hematology on consult, patient was receiving twice daily therapy with Adv ate now transitioned to once daily and factor levels are stable. CT angio showed no evidence of blood extravasation. After discussion with hematology, patient will need to go to SNF with daily Adv ate for a period before transitioning to his home prophylactic therapy. Hematology is arranging. Patient should not be on any blood thinning agents or aspirin. Patient has been awaiting placement, on 1/22 working with physical therapy on 1/22 he had a syncopal episode in the setting of diarrhea. His blood pressure and mentation improved after getting back in bed and he is getting IV fluids. Denies diarrhea since resolved and he has had no further dizziness. His blood pressure is stable, however his hydrochlorothiazide and metoprolol are continued be on hold. Arrangements made by hematology to deliver Adv ate to his nursing facility where he will be doing PT OT and receiving Adv ate infusions. Per hematology, patient will continue Adv ate infusions for 5 more days and then transition to every other day infusions. He did have a CBC monitored weekly. He will need post discharge  follow-up with orthopedics and hematology. please note probably hematology service patient should never be started on aspirin or other blood thinners/anticoagulants or chemical DVT prophylaxis, if there is concern that patient does need to be started on any of these medications that should be thoroughly discussed with the hematology service and his hematologist   --1/31 Patient is somewhat anxious. He states his right hip feels fine, he denies pain currently. Appetite fair, patient states he does not like the food all that much. He is aware of the schedule for his infusion, knows who is coming and when   --2/2 Patient is in good spirits. Denies pain or discomfort. Patient reports he is getting his infusions as scheduled. Appetite fair, Denies HA, dizziness, SOB, CP, N&V or constipation   --2/14 Patient in good spirits, saw Ortho on 2/9. staples removed. He was surprised how much bruising he had. Appetite good, denies pain or discomfort. Denies HA, dizziness, SOB, CP, N&V or constipation   --2/16 Notified yesterday evening, patient's K+ was 3.1 down from 3.4 on 2/14 Ordered to give 40meq now and repeat labs in am. Today patient in great spirits, he will most likely discharge home over weekend. His niece was visiting today. Appetite is good he c/o some knee pain, he stated he had some gel that relieved it. Denies HA, dizziness, SOB, CP, N&V or constipation  --3/9   Patient in fair mood, is not in any pain. Appetite only fair due to not liking the food.  States he picks and chooses.   --4/4-  On 3/27 patient went to ortho to have his knee injected, he reported it did not work really at all.  On 3/29 patient was coughing ordered CXR results reported Lung emphysema, Hazy bibasilar airspace opacities. Slightly enlarged cardiac silhouette with congestion. New order for Z-pack.  He continued to worsen, on 3/31 ordered repeat CXR, flu swab, CBC/BMP  Also started Levaquin and probiotic. Covid test, done earlier was negative.   Last evening (4/3) patient slid out of his wheelchair, no injuries.  Today patient talkative, in good spirits.  Appetite remains fair.  Weight is stable.  Denies pain or discomfort. Denies HA, dizziness, SOB, CP, N&V or constipation. Labs from yesterday, essentially stable, did not drop in HGB from 11-->8.    --4/6  Patient in fair mood, per nsg he  was complaining about having a kelley catheter placed.  Stated he was having difficulty urinating.  Per PCC he had 700+ residual.  Appetite is fair, 25-75% mostly.  Denies pain or discomfort.  Denies HA, dizziness, SOB, CP, N&V or constipation  --4/18  Patient returned from hospital 4/14   ### Per hospital records: Patient presents with hx of fall and forehead hematoma/laceration. This is his 3rd fall in 4 months, 1st in Jan reportedly mechanical with R hip fracture, 2nd in Feb thought to be syncope from dehydration and now for the 3rd time without any memory of the events around the fall. Extensively worked up in previous admission, neg EEG, orthostats positive but thought to be due to dehydration given soft BPs. 02/24/23 Echo showed EF 60-65%, spectral doppler with impaired LV diastolic filling. This admission, workup for syncope including video EEG was negative for epileptiform activity. CT PE negative. Patient found to be fluid overloaded on admission, CT showed moderate bilateral pleural effusions with adjacent atelectasis, pulmonary edema, mesenteric edema and body wall anasarca.  without a baseline. Also found to be in new afib, rates controlled in 80s on metoprolol succinate 75 mg. Neuro was consulted, there was low suspicion for neurogenic cause of syncope or seizure, patient's subdural bleeds were deemed to be chronic. Hematology was consulted for patient's hemophilia, continued on Advate daily. Patient's ethanol level elevated on admission, started on thiamine and folate, though he denied ETOH use. Also found with several electrolyte abnormalities  including hypokalemia, hypomagnesemia, hypophosphatemia. Treated for CAP with three days of levofloxacin, patient weaned to RA. Diuresed with IV lasix. Patient had evidence of delirium on 4/10, UA without signs of infection, replaced kelley since patient still retaining. Delirium improved --4/11- 4/12. For his syncope, lower concern for cardiac etiology given echo in 02/24 without AS, no pauses on telemetry. However patient does have new afib and new diagnosis of HFpEF. Currently we suspect that patient's syncope is due to orthostatic hypotension, as suspected in previous admission. Repeat orthostats wnl while inpatient. Will discharge patient with event monitor and close cardiology follow up. For his afib, we decided not to start anticoagulation given his high bleeding risk from hemophilia, history of falls. We chose a rate control strategy with metoprolol succinate. For his heart failure, we deferred starting GDMT due to patient's age, history of orthostatic hypotension.  Discharge back to SNF  --4/18  Today patient is fair, he does not like his kelley.  He denies pain other than his right knee that he states gives him trouble.  Appetite fair to good.  Denies HA, dizziness, SOB, CP, N&V or constipation  --4/20  Remains stable, no c/o dizziness.  Does c/o about his kelley catheter,  Appetite fair.  Denies pain or discomfort. Denies HA, dizziness, SOB, CP, N&V or constipation  --4/27 4/27: Patient in fair mood, leaning back in bed. States he is doing ok, appetite is good. Catheter is bothering him. Explained to patient that we are trialing his urinary voiding and so the balloon is deflated so moving around may cause some discomfort. Denies HA, dizziness, SOB, CP, N&V or constipation.   --5/9  Patient in fair spirits.  Had lab draw on today, BNP 6000+   Also was hypokalemic.  Appetite is good he denies pain discomfort  -- 5/16 Patient sitting in room is stable , he denies pain or discomfort, Appetite fair.  Blood  pressure this morning 88/58.  Is asymptomatic, is currently taking Bumex, Aldactone and metoprolol.  Labs drawn today  -- 5/18  UA/C&S done 5/16.  Patient appears tired.  Appetite good.  Currently feels a bit under the weather.  Review of Systems:  Reviewed chart looking at current medications, treatment, labs and x-rays and note pertinent positives or negatives, otherwise 10 point system review negative except for what is noted in HPI.    Objective  VS: Blood pressure 120/80, pulse 78, temperature 36.6 °C (97.8 °F), resp. rate 18, SpO2 99 %.      Physical exam:   Physical Exam  Vitals reviewed.   Constitutional:       Appearance: He is normal weight.   HENT:      Head: Normocephalic.      Comments: Laceration healed     Nose: Nose normal.      Mouth/Throat:      Mouth: Mucous membranes are moist.   Cardiovascular:      Rate and Rhythm: Normal rate and regular rhythm.      Pulses: Normal pulses.      Heart sounds: Normal heart sounds.   Pulmonary:      Effort: Pulmonary effort is normal.      Breath sounds: Decreased breath sounds present.      Comments: Fine rales bilat bases  Abdominal:      General: Bowel sounds are normal.      Palpations: Abdomen is soft.   Musculoskeletal:         General: Normal range of motion.      Right lower leg: Edema present.      Left lower leg: Edema present.      Comments: Generalized weakness improving, trace BLE edema   Skin:     General: Skin is warm and dry.   Neurological:      Mental Status: He is alert and oriented to person, place, and time. Mental status is at baseline.      Motor: Weakness present.   Psychiatric:         Mood and Affect: Mood normal.         Behavior: Behavior normal.         Thought Content: Thought content normal.      Assessment/Plan    89 year old male with hx of hemophilia A, protein C deficiency, HTN, HLD, nephrolithiasis, CAD s/p CABG, and BPH admitted for urgent Rt NOF fracture. S/p Rt Femur fixation   UTI  Start doxy    Hypotension  Repeat BP  better 114/78. Monitor closely.       Atrial fibrillation, chronic (CMS/HCC)  Rate controlled, no a/c 2/2 hemophilia      (HFpEF) heart failure with preserved ejection fraction (CMS/HCC)  Weight unchanged edema improving     Hemophilia A (CMS/HCC)  C/w infusions, labs have been stable     ASHD (arteriosclerotic heart disease)  Nov. 15, 2012: LV EF normal . Cbc bmp weekly.  F/u with Cardiology

## 2023-06-09 NOTE — PROGRESS NOTES
Nursing Home Visit  Name: Ramón Flores  YOB: 1928  MRN: [id number]    Chief Complaint  Lab Review  Subjective  HPI:   89 year old male with hx of hemophilia A, protein C deficiency, HTN, HLD, nephrolithiasis, CAD s/p CABG, and BPH admitted for urgent Rt NOF fracture. S/p Rt Femur fixation by Ortho on Jan 13th, but had insufficient factor replacement prior to procedure. Postop patient had a hemoglobin drop, slightly more than expected but likely reasonable for having suboptimals factor replacement. He received 1 unit of packed red blood cells post-op with appropriate response and H&H is now stable. Discussed with his OP hematology and likely expected from blood loss from surgery given hemophilia A and not full repletion of factors rather than ongoing bleeding. Hematology on consult, patient was receiving twice daily therapy with Adv ate now transitioned to once daily and factor levels are stable. CT angio showed no evidence of blood extravasation. After discussion with hematology, patient will need to go to SNF with daily Adv ate for a period before transitioning to his home prophylactic therapy. Hematology is arranging. Patient should not be on any blood thinning agents or aspirin. Patient has been awaiting placement, on 1/22 working with physical therapy on 1/22 he had a syncopal episode in the setting of diarrhea. His blood pressure and mentation improved after getting back in bed and he is getting IV fluids. Denies diarrhea since resolved and he has had no further dizziness. His blood pressure is stable, however his hydrochlorothiazide and metoprolol are continued be on hold. Arrangements made by hematology to deliver Adv ate to his nursing facility where he will be doing PT OT and receiving Adv ate infusions. Per hematology, patient will continue Adv ate infusions for 5 more days and then transition to every other day infusions. He did have a CBC monitored weekly. He will need post discharge  follow-up with orthopedics and hematology. please note probably hematology service patient should never be started on aspirin or other blood thinners/anticoagulants or chemical DVT prophylaxis, if there is concern that patient does need to be started on any of these medications that should be thoroughly discussed with the hematology service and his hematologist   --1/31 Patient is somewhat anxious. He states his right hip feels fine, he denies pain currently. Appetite fair, patient states he does not like the food all that much. He is aware of the schedule for his infusion, knows who is coming and when   --2/2 Patient is in good spirits. Denies pain or discomfort. Patient reports he is getting his infusions as scheduled. Appetite fair, Denies HA, dizziness, SOB, CP, N&V or constipation   --2/14 Patient in good spirits, saw Ortho on 2/9. staples removed. He was surprised how much bruising he had. Appetite good, denies pain or discomfort. Denies HA, dizziness, SOB, CP, N&V or constipation   --2/16 Notified yesterday evening, patient's K+ was 3.1 down from 3.4 on 2/14 Ordered to give 40meq now and repeat labs in am. Today patient in great spirits, he will most likely discharge home over weekend. His niece was visiting today. Appetite is good he c/o some knee pain, he stated he had some gel that relieved it. Denies HA, dizziness, SOB, CP, N&V or constipation  --3/9   Patient in fair mood, is not in any pain. Appetite only fair due to not liking the food.  States he picks and chooses.   --4/4-  On 3/27 patient went to ortho to have his knee injected, he reported it did not work really at all.  On 3/29 patient was coughing ordered CXR results reported Lung emphysema, Hazy bibasilar airspace opacities. Slightly enlarged cardiac silhouette with congestion. New order for Z-pack.  He continued to worsen, on 3/31 ordered repeat CXR, flu swab, CBC/BMP  Also started Levaquin and probiotic. Covid test, done earlier was negative.   Last evening (4/3) patient slid out of his wheelchair, no injuries.  Today patient talkative, in good spirits.  Appetite remains fair.  Weight is stable.  Denies pain or discomfort. Denies HA, dizziness, SOB, CP, N&V or constipation. Labs from yesterday, essentially stable, did not drop in HGB from 11-->8.    --4/6  Patient in fair mood, per nsg he  was complaining about having a kelley catheter placed.  Stated he was having difficulty urinating.  Per PCC he had 700+ residual.  Appetite is fair, 25-75% mostly.  Denies pain or discomfort.  Denies HA, dizziness, SOB, CP, N&V or constipation  --4/18  Patient returned from hospital 4/14   ### Per hospital records: Patient presents with hx of fall and forehead hematoma/laceration. This is his 3rd fall in 4 months, 1st in Jan reportedly mechanical with R hip fracture, 2nd in Feb thought to be syncope from dehydration and now for the 3rd time without any memory of the events around the fall. Extensively worked up in previous admission, neg EEG, orthostats positive but thought to be due to dehydration given soft BPs. 02/24/23 Echo showed EF 60-65%, spectral doppler with impaired LV diastolic filling. This admission, workup for syncope including video EEG was negative for epileptiform activity. CT PE negative. Patient found to be fluid overloaded on admission, CT showed moderate bilateral pleural effusions with adjacent atelectasis, pulmonary edema, mesenteric edema and body wall anasarca.  without a baseline. Also found to be in new afib, rates controlled in 80s on metoprolol succinate 75 mg. Neuro was consulted, there was low suspicion for neurogenic cause of syncope or seizure, patient's subdural bleeds were deemed to be chronic. Hematology was consulted for patient's hemophilia, continued on Advate daily. Patient's ethanol level elevated on admission, started on thiamine and folate, though he denied ETOH use. Also found with several electrolyte abnormalities  including hypokalemia, hypomagnesemia, hypophosphatemia. Treated for CAP with three days of levofloxacin, patient weaned to RA. Diuresed with IV lasix. Patient had evidence of delirium on 4/10, UA without signs of infection, replaced kelley since patient still retaining. Delirium improved --4/11- 4/12. For his syncope, lower concern for cardiac etiology given echo in 02/24 without AS, no pauses on telemetry. However patient does have new afib and new diagnosis of HFpEF. Currently we suspect that patient's syncope is due to orthostatic hypotension, as suspected in previous admission. Repeat orthostats wnl while inpatient. Will discharge patient with event monitor and close cardiology follow up. For his afib, we decided not to start anticoagulation given his high bleeding risk from hemophilia, history of falls. We chose a rate control strategy with metoprolol succinate. For his heart failure, we deferred starting GDMT due to patient's age, history of orthostatic hypotension.  Discharge back to SNF  --4/18  Today patient is fair, he does not like his kelley.  He denies pain other than his right knee that he states gives him trouble.  Appetite fair to good.  Denies HA, dizziness, SOB, CP, N&V or constipation  --4/20  Remains stable, no c/o dizziness.  Does c/o about his kelley catheter,  Appetite fair.  Denies pain or discomfort. Denies HA, dizziness, SOB, CP, N&V or constipation  --4/27 4/27: Patient in fair mood, leaning back in bed. States he is doing ok, appetite is good. Catheter is bothering him. Explained to patient that we are trialing his urinary voiding and so the balloon is deflated so moving around may cause some discomfort. Denies HA, dizziness, SOB, CP, N&V or constipation.   --5/9  Patient in fair spirits.  Had lab draw on today, BNP 6000+   Also was hypokalemic.  Appetite is good he denies pain discomfort  -- 5/16 Patient sitting in room is stable , he denies pain or discomfort, Appetite fair.  Blood  pressure this morning 88/58.  Is asymptomatic, is currently taking Bumex, Aldactone and metoprolol.  Labs drawn today    Review of Systems:  Reviewed chart looking at current medications, treatment, labs and x-rays and note pertinent positives or negatives, otherwise 10 point system review negative except for what is noted in HPI.    Objective  VS: 92/67. 97.8, 81, 18, 94%, 133lbs    Physical exam:   Physical Exam  Vitals reviewed.   Constitutional:       Appearance: He is normal weight.   HENT:      Head: Normocephalic.      Comments: Laceration healed     Nose: Nose normal.      Mouth/Throat:      Mouth: Mucous membranes are moist.   Cardiovascular:      Rate and Rhythm: Normal rate and regular rhythm.      Pulses: Normal pulses.      Heart sounds: Normal heart sounds.   Pulmonary:      Effort: Pulmonary effort is normal.      Breath sounds: Normal breath sounds.   Abdominal:      General: Bowel sounds are normal.      Palpations: Abdomen is soft.   Musculoskeletal:         General: Normal range of motion.      Right lower leg: Edema present.      Left lower leg: Edema present.      Comments: Generalized weakness improving, trace BLE edema   Skin:     General: Skin is warm and dry.   Neurological:      Mental Status: He is alert and oriented to person, place, and time. Mental status is at baseline.      Motor: Weakness present.   Psychiatric:         Mood and Affect: Mood normal.         Behavior: Behavior normal.         Thought Content: Thought content normal.      Assessment/Plan    89 year old male with hx of hemophilia A, protein C deficiency, HTN, HLD, nephrolithiasis, CAD s/p CABG, and BPH admitted for urgent Rt NOF fracture. S/p Rt Femur fixation     Hypotension  Repeat BP better 114/78. Monitor closely.      Atrial fibrillation, chronic (CMS/HCC)  Rate controlled, no a/c 2/2 hemophilia     (HFpEF) heart failure with preserved ejection fraction (CMS/HCC)  Weight unchanged edema improving    Hemophilia A  (CMS/HCC)  C/w infusions, labs have been stable    ASHD (arteriosclerotic heart disease)  Nov. 15, 2012: LV EF normal . Cbc bmp weekly.  F/u with Cardiology       Urinary retention  [place]ta in place    Obstructive uropathy  Will consult renal,

## 2023-06-28 ENCOUNTER — TELEMEDICINE (OUTPATIENT)
Dept: PRIMARY CARE | Facility: CLINIC | Age: 88
End: 2023-06-28
Payer: MEDICARE

## 2023-06-28 DIAGNOSIS — I50.32 CHRONIC HEART FAILURE WITH PRESERVED EJECTION FRACTION (MULTI): ICD-10-CM

## 2023-06-28 DIAGNOSIS — Z91.81 HISTORY OF RECENT FALL: ICD-10-CM

## 2023-06-28 DIAGNOSIS — I48.20 ATRIAL FIBRILLATION, CHRONIC (MULTI): ICD-10-CM

## 2023-06-28 DIAGNOSIS — R53.81 PHYSICAL DEBILITY: ICD-10-CM

## 2023-06-28 DIAGNOSIS — E87.6 HYPOKALEMIA: Primary | ICD-10-CM

## 2023-06-28 DIAGNOSIS — D66 HEMOPHILIA A (MULTI): ICD-10-CM

## 2023-06-28 PROBLEM — A08.4 VIRAL GASTROENTERITIS: Status: RESOLVED | Noted: 2023-03-01 | Resolved: 2023-06-28

## 2023-06-28 PROBLEM — N39.0 UTI (URINARY TRACT INFECTION): Status: RESOLVED | Noted: 2023-03-01 | Resolved: 2023-06-28

## 2023-06-28 PROBLEM — U07.1 COVID-19: Status: RESOLVED | Noted: 2023-05-21 | Resolved: 2023-06-28

## 2023-06-28 PROCEDURE — 99214 OFFICE O/P EST MOD 30 MIN: CPT | Performed by: FAMILY MEDICINE

## 2023-06-28 NOTE — ASSESSMENT & PLAN NOTE
Significant limitations in mobility secondary to recent hospitalization, fall and fracture  Discussed with pt and his family that he would be a great candidate for in home physician care, will try to establish with [facility] home program   Goal is to avoid hospitalization and further deconditioning

## 2023-06-28 NOTE — ASSESSMENT & PLAN NOTE
Appears euvolemic and denies any symptoms but difficult to determine via virtual visit  Would benefit from in person visitation

## 2023-06-28 NOTE — ASSESSMENT & PLAN NOTE
Discussed at length with pt the concern from home health and the risk of bathing alone  Encourage him to allow assistance

## 2023-06-28 NOTE — PROGRESS NOTES
Subjective   Patient ID: Ramón Flores is a 89 y.o. male who presents for \[facility]\"" Care.    HPI Virtual visit done secondary to patient's ambulatory limitations    Patient presents to [facility] as his previous physician is no longer with .    Patient has medical history significant for Hemophilia A, protein C deficiency  (followed by hematology), BPH, GERD, HLD, HTN, nephrolithiasis and CAD s/p CABG.   His recent history was complicated by a fall that resulted in hip fracture requiring surgical repair.  He then had complications related to his hemophilia A and insufficient factor replacement during surgery.  He was in the SNF from 1/2023-6/1/2023.  During his course at SNF he had CAP as well as course of COVID.  He was found to have Afib during the stay as well.  Goal was for rate control because of inability to take AC due to increased risk for bleed.     Pt was discharge home with 24/7 assistance.    Today, he voices frustration in his limited mobility compared to previous.  Frustrated that he cannot ambulate as easily and not able to get up and down the stairs.  Does not like having someone there to bathe him and prefers to do it on his own.    There is some question of his medications and what doses he should be taking.      Comparison between previous and current list shows some changes in his potassium supplement and metoprolol.    Pt denies any chest pain, SOB, LE swelling.  Denies any increased bleeding.    Review of Systems  Negative unless noted in HPI    Objective   Physical Exam  Exam limited due to virtual appointment    Gen: NAD, A&Ox3  Pulm: no conversational dyspnea  Skin: well perfused, no pallor noted  Psych: appropriate mood and affect    Assessment/Plan   Problem List Items Addressed This Visit       Hemophilia A (CMS/[facility])     Follows with hematology regularly         Relevant Orders    CBC    Hypokalemia - Primary     Recheck potassium         Relevant Orders    Basic Metabolic Panel     Physical debility     Significant limitations in mobility secondary to recent hospitalization, fall and fracture  Discussed with pt and his family that he would be a great candidate for in home physician care, will try to establish with  home program   Goal is to avoid hospitalization and further deconditioning         (HFpEF) heart failure with preserved ejection fraction (CMS/HCC)     Appears euvolemic and denies any symptoms but difficult to determine via virtual visit  Would benefit from in person visitation         Atrial fibrillation, chronic (CMS/HCC)     Not able to anticoagulate  Metoprolol increased during hospitalization therefore should be monitored regularly for appropriate rate control to avoid bradycardia or hypotension         History of recent fall     Discussed at length with pt the concern from home health and the risk of bathing alone  Encourage him to allow assistance

## 2023-06-28 NOTE — ASSESSMENT & PLAN NOTE
Not able to anticoagulate  Metoprolol increased during hospitalization therefore should be monitored regularly for appropriate rate control to avoid bradycardia or hypotension

## 2023-06-28 NOTE — ASSESSMENT & PLAN NOTE
No vitals available  Would benefit from regular monitoring to avoid hypotension as pt is at risk for fall  Currently on essential meds based on review

## 2023-06-30 ENCOUNTER — LAB (OUTPATIENT)
Dept: LAB | Facility: LAB | Age: 88
End: 2023-06-30
Payer: MEDICARE

## 2023-06-30 DIAGNOSIS — D66 HEMOPHILIA A (MULTI): ICD-10-CM

## 2023-06-30 DIAGNOSIS — E87.6 HYPOKALEMIA: ICD-10-CM

## 2023-06-30 LAB
ERYTHROCYTE DISTRIBUTION WIDTH (RATIO) BY AUTOMATED COUNT: 16.7 % (ref 11.5–14.5)
ERYTHROCYTE MEAN CORPUSCULAR HEMOGLOBIN CONCENTRATION (G/DL) BY AUTOMATED: 32.6 G/DL (ref 32–36)
ERYTHROCYTE MEAN CORPUSCULAR VOLUME (FL) BY AUTOMATED COUNT: 96 FL (ref 80–100)
ERYTHROCYTES (10*6/UL) IN BLOOD BY AUTOMATED COUNT: 3.56 X10E12/L (ref 4.5–5.9)
HEMATOCRIT (%) IN BLOOD BY AUTOMATED COUNT: 34 % (ref 41–52)
HEMOGLOBIN (G/DL) IN BLOOD: 11.1 G/DL (ref 13.5–17.5)
LEUKOCYTES (10*3/UL) IN BLOOD BY AUTOMATED COUNT: 6.3 X10E9/L (ref 4.4–11.3)
NRBC (PER 100 WBCS) BY AUTOMATED COUNT: 0 /100 WBC (ref 0–0)
PLATELETS (10*3/UL) IN BLOOD AUTOMATED COUNT: 164 X10E9/L (ref 150–450)

## 2023-06-30 PROCEDURE — 36415 COLL VENOUS BLD VENIPUNCTURE: CPT

## 2023-06-30 PROCEDURE — 80048 BASIC METABOLIC PNL TOTAL CA: CPT

## 2023-06-30 PROCEDURE — 85027 COMPLETE CBC AUTOMATED: CPT

## 2023-07-01 LAB
ANION GAP IN SER/PLAS: 17 MMOL/L (ref 10–20)
CALCIUM (MG/DL) IN SER/PLAS: 6.2 MG/DL (ref 8.6–10.6)
CARBON DIOXIDE, TOTAL (MMOL/L) IN SER/PLAS: 23 MMOL/L (ref 21–32)
CHLORIDE (MMOL/L) IN SER/PLAS: 105 MMOL/L (ref 98–107)
CREATININE (MG/DL) IN SER/PLAS: 0.98 MG/DL (ref 0.5–1.3)
GFR MALE: 71 ML/MIN/1.73M2
GLUCOSE (MG/DL) IN SER/PLAS: 87 MG/DL (ref 74–99)
POTASSIUM (MMOL/L) IN SER/PLAS: 3.5 MMOL/L (ref 3.5–5.3)
SODIUM (MMOL/L) IN SER/PLAS: 141 MMOL/L (ref 136–145)
UREA NITROGEN (MG/DL) IN SER/PLAS: 16 MG/DL (ref 6–23)

## 2023-07-07 ENCOUNTER — TELEPHONE (OUTPATIENT)
Dept: PRIMARY CARE | Facility: CLINIC | Age: 88
End: 2023-07-07
Payer: COMMERCIAL

## 2023-07-07 DIAGNOSIS — E87.6 HYPOKALEMIA: Primary | ICD-10-CM

## 2023-07-07 RX ORDER — POTASSIUM CHLORIDE 20 MEQ/1
40 TABLET, EXTENDED RELEASE ORAL DAILY
Qty: 60 TABLET | Refills: 1 | Status: SHIPPED | OUTPATIENT
Start: 2023-07-07 | End: 2023-11-17 | Stop reason: WASHOUT

## 2023-07-24 LAB
AMORPHOUS CRYSTALS, URINE: ABNORMAL /HPF
APPEARANCE, URINE: ABNORMAL
BILIRUBIN, URINE: NEGATIVE
BLOOD, URINE: ABNORMAL
COLOR, URINE: ABNORMAL
ERYTHROCYTE DISTRIBUTION WIDTH (RATIO) BY AUTOMATED COUNT: 16.5 % (ref 11.5–14.5)
ERYTHROCYTE MEAN CORPUSCULAR HEMOGLOBIN CONCENTRATION (G/DL) BY AUTOMATED: 32.4 G/DL (ref 32–36)
ERYTHROCYTE MEAN CORPUSCULAR VOLUME (FL) BY AUTOMATED COUNT: 99 FL (ref 80–100)
ERYTHROCYTES (10*6/UL) IN BLOOD BY AUTOMATED COUNT: 3.55 X10E12/L (ref 4.5–5.9)
GLUCOSE, URINE: NEGATIVE MG/DL
HEMATOCRIT (%) IN BLOOD BY AUTOMATED COUNT: 35.2 % (ref 41–52)
HEMOGLOBIN (G/DL) IN BLOOD: 11.4 G/DL (ref 13.5–17.5)
KETONES, URINE: NEGATIVE MG/DL
LEUKOCYTE ESTERASE, URINE: ABNORMAL
LEUKOCYTES (10*3/UL) IN BLOOD BY AUTOMATED COUNT: 4.8 X10E9/L (ref 4.4–11.3)
NATRIURETIC PEPTIDE B (PG/ML) IN SER/PLAS: 524 PG/ML (ref 0–99)
NITRITE, URINE: POSITIVE
NRBC (PER 100 WBCS) BY AUTOMATED COUNT: 0 /100 WBC (ref 0–0)
PH, URINE: 6 (ref 5–8)
PLATELETS (10*3/UL) IN BLOOD AUTOMATED COUNT: 167 X10E9/L (ref 150–450)
PROTEIN, URINE: ABNORMAL MG/DL
RBC, URINE: 3 /HPF (ref 0–5)
SPECIFIC GRAVITY, URINE: 1.02 (ref 1–1.03)
THYROTROPIN (MIU/L) IN SER/PLAS BY DETECTION LIMIT <= 0.05 MIU/L: 6.8 MIU/L (ref 0.44–3.98)
UROBILINOGEN, URINE: <2 MG/DL (ref 0–1.9)
WBC CLUMPS, URINE: ABNORMAL /HPF
WBC, URINE: 488 /HPF (ref 0–5)

## 2023-07-25 ENCOUNTER — TELEPHONE (OUTPATIENT)
Dept: PRIMARY CARE | Facility: CLINIC | Age: 88
End: 2023-07-25
Payer: COMMERCIAL

## 2023-07-25 LAB
ALANINE AMINOTRANSFERASE (SGPT) (U/L) IN SER/PLAS: 19 U/L (ref 10–52)
ALBUMIN (G/DL) IN SER/PLAS: 3.5 G/DL (ref 3.4–5)
ALKALINE PHOSPHATASE (U/L) IN SER/PLAS: 70 U/L (ref 33–136)
ANION GAP IN SER/PLAS: 15 MMOL/L (ref 10–20)
ASPARTATE AMINOTRANSFERASE (SGOT) (U/L) IN SER/PLAS: 15 U/L (ref 9–39)
BILIRUBIN TOTAL (MG/DL) IN SER/PLAS: 1.8 MG/DL (ref 0–1.2)
CALCIUM (MG/DL) IN SER/PLAS: 5.7 MG/DL (ref 8.6–10.6)
CARBON DIOXIDE, TOTAL (MMOL/L) IN SER/PLAS: 23 MMOL/L (ref 21–32)
CHLORIDE (MMOL/L) IN SER/PLAS: 107 MMOL/L (ref 98–107)
CREATININE (MG/DL) IN SER/PLAS: 1.04 MG/DL (ref 0.5–1.3)
GFR MALE: 66 ML/MIN/1.73M2
GLUCOSE (MG/DL) IN SER/PLAS: 122 MG/DL (ref 74–99)
POTASSIUM (MMOL/L) IN SER/PLAS: 3.5 MMOL/L (ref 3.5–5.3)
PROTEIN TOTAL: 5.7 G/DL (ref 6.4–8.2)
SODIUM (MMOL/L) IN SER/PLAS: 141 MMOL/L (ref 136–145)
THYROXINE (T4) FREE (NG/DL) IN SER/PLAS: 1.04 NG/DL (ref 0.78–1.48)
UREA NITROGEN (MG/DL) IN SER/PLAS: 22 MG/DL (ref 6–23)

## 2023-07-25 NOTE — TELEPHONE ENCOUNTER
RECEIVED CALL FROM PHYS THERAPIST IN REGARDS TO [facility]IN[facility] AND LOOSE STOOLS. THIS IS NOT LIKE HIM AND THEY WOULD LIKE SOME GUIDANCE.

## 2023-07-26 LAB — URINE CULTURE: ABNORMAL

## 2023-07-27 LAB
ALBUMIN (G/DL) IN SER/PLAS: 3.4 G/DL (ref 3.4–5)
ANION GAP IN SER/PLAS: 16 MMOL/L (ref 10–20)
CALCIDIOL (25 OH VITAMIN D3) (NG/ML) IN SER/PLAS: 43 NG/ML
CALCIUM (MG/DL) IN SER/PLAS: 5.6 MG/DL (ref 8.6–10.3)
CARBON DIOXIDE, TOTAL (MMOL/L) IN SER/PLAS: 24 MMOL/L (ref 21–32)
CHLORIDE (MMOL/L) IN SER/PLAS: 103 MMOL/L (ref 98–107)
CREATININE (MG/DL) IN SER/PLAS: 0.92 MG/DL (ref 0.5–1.3)
GFR MALE: 77 ML/MIN/1.73M2
GLUCOSE (MG/DL) IN SER/PLAS: 99 MG/DL (ref 74–99)
PHOSPHATE (MG/DL) IN SER/PLAS: 3.8 MG/DL (ref 2.5–4.9)
POTASSIUM (MMOL/L) IN SER/PLAS: 3.3 MMOL/L (ref 3.5–5.3)
SODIUM (MMOL/L) IN SER/PLAS: 140 MMOL/L (ref 136–145)
UREA NITROGEN (MG/DL) IN SER/PLAS: 16 MG/DL (ref 6–23)

## 2023-08-01 LAB — PARATHYRIN INTACT (PG/ML) IN SER/PLAS: NORMAL

## 2023-08-02 LAB
ALBUMIN (G/DL) IN SER/PLAS: 3.6 G/DL (ref 3.4–5)
ANION GAP IN SER/PLAS: 15 MMOL/L (ref 10–20)
CALCIUM (MG/DL) IN SER/PLAS: 6.3 MG/DL (ref 8.6–10.3)
CARBON DIOXIDE, TOTAL (MMOL/L) IN SER/PLAS: 30 MMOL/L (ref 21–32)
CHLORIDE (MMOL/L) IN SER/PLAS: 99 MMOL/L (ref 98–107)
CREATININE (MG/DL) IN SER/PLAS: 0.91 MG/DL (ref 0.5–1.3)
GFR MALE: 78 ML/MIN/1.73M2
GLUCOSE (MG/DL) IN SER/PLAS: 95 MG/DL (ref 74–99)
PARATHYRIN INTACT (PG/ML) IN SER/PLAS: 30 PG/ML (ref 18.5–88)
PHOSPHATE (MG/DL) IN SER/PLAS: 3.9 MG/DL (ref 2.5–4.9)
POTASSIUM (MMOL/L) IN SER/PLAS: 3.9 MMOL/L (ref 3.5–5.3)
SODIUM (MMOL/L) IN SER/PLAS: 140 MMOL/L (ref 136–145)
UREA NITROGEN (MG/DL) IN SER/PLAS: 19 MG/DL (ref 6–23)

## 2023-08-28 LAB
ALBUMIN (G/DL) IN SER/PLAS: 4 G/DL (ref 3.4–5)
ANION GAP IN SER/PLAS: 10 MMOL/L (ref 10–20)
CALCIUM (MG/DL) IN SER/PLAS: 9.7 MG/DL (ref 8.6–10.6)
CARBON DIOXIDE, TOTAL (MMOL/L) IN SER/PLAS: 28 MMOL/L (ref 21–32)
CHLORIDE (MMOL/L) IN SER/PLAS: 99 MMOL/L (ref 98–107)
CREATININE (MG/DL) IN SER/PLAS: 1.09 MG/DL (ref 0.5–1.3)
GFR MALE: 62 ML/MIN/1.73M2
GLUCOSE (MG/DL) IN SER/PLAS: 94 MG/DL (ref 74–99)
PHOSPHATE (MG/DL) IN SER/PLAS: 4 MG/DL (ref 2.5–4.9)
POTASSIUM (MMOL/L) IN SER/PLAS: 5.1 MMOL/L (ref 3.5–5.3)
SODIUM (MMOL/L) IN SER/PLAS: 132 MMOL/L (ref 136–145)
UREA NITROGEN (MG/DL) IN SER/PLAS: 32 MG/DL (ref 6–23)

## 2023-09-05 PROBLEM — R56.9 SEIZURE (MULTI): Status: ACTIVE | Noted: 2023-09-05

## 2023-09-05 PROBLEM — M25.469 KNEE EFFUSION: Status: ACTIVE | Noted: 2023-09-05

## 2023-09-05 PROBLEM — S52.125A CLOSED NONDISPLACED FRACTURE OF HEAD OF LEFT RADIUS: Status: ACTIVE | Noted: 2023-09-05

## 2023-09-05 PROBLEM — K83.8 HEMOBILIA: Status: ACTIVE | Noted: 2023-09-05

## 2023-09-05 PROBLEM — S72.009A FRACTURE OF BONE OF HIP (MULTI): Status: ACTIVE | Noted: 2023-09-05

## 2023-09-05 PROBLEM — D68.59 PROTEIN C DEFICIENCY (MULTI): Status: ACTIVE | Noted: 2023-09-05

## 2023-09-05 PROBLEM — N40.0: Status: ACTIVE | Noted: 2022-05-09

## 2023-09-05 PROBLEM — H11.30 SUBCONJUNCTIVAL BLEED: Status: ACTIVE | Noted: 2023-09-05

## 2023-09-05 PROBLEM — I95.1 ORTHOSTATIC SYNCOPE: Status: ACTIVE | Noted: 2023-09-05

## 2023-09-05 PROBLEM — D69.6 THROMBOCYTOPENIA, ACQUIRED (CMS-HCC): Status: ACTIVE | Noted: 2023-09-05

## 2023-09-05 PROBLEM — K52.9 CHRONIC DIARRHEA: Status: ACTIVE | Noted: 2023-09-05

## 2023-09-05 PROBLEM — S80.10XA HEMATOMA OF LOWER EXTREMITY: Status: ACTIVE | Noted: 2023-09-05

## 2023-09-05 PROBLEM — N47.2 PARAPHIMOSIS: Status: ACTIVE | Noted: 2023-09-05

## 2023-09-05 PROBLEM — M25.073: Status: ACTIVE | Noted: 2019-04-18

## 2023-09-05 PROBLEM — H35.30 MACULAR DEGENERATION: Status: ACTIVE | Noted: 2022-08-01

## 2023-09-05 PROBLEM — D66 FACTOR VIII DEFICIENCY (MULTI): Status: ACTIVE | Noted: 2022-08-01

## 2023-09-05 PROBLEM — K92.1 HEMATOCHEZIA: Status: ACTIVE | Noted: 2023-09-05

## 2023-09-05 PROBLEM — M25.069 HEMARTHROSIS OF KNEE: Status: ACTIVE | Noted: 2022-05-09

## 2023-09-05 PROBLEM — S69.90XA FINGER INJURY: Status: ACTIVE | Noted: 2023-09-05

## 2023-09-05 PROBLEM — H59.319: Status: ACTIVE | Noted: 2023-09-05

## 2023-09-05 PROBLEM — E83.51 HYPOCALCEMIA: Status: ACTIVE | Noted: 2023-09-05

## 2023-09-05 PROBLEM — K92.2 GASTROINTESTINAL HEMORRHAGE: Status: ACTIVE | Noted: 2023-09-05

## 2023-09-05 PROBLEM — R35.0 URINARY FREQUENCY: Status: ACTIVE | Noted: 2023-09-05

## 2023-09-05 PROBLEM — M54.30 SCIATICA: Status: ACTIVE | Noted: 2023-09-05

## 2023-09-05 PROBLEM — K06.8 GINGIVA HEMORRHAGE: Status: ACTIVE | Noted: 2023-09-05

## 2023-09-05 RX ORDER — NYSTATIN 100000 [USP'U]/G
POWDER TOPICAL
COMMUNITY
End: 2023-11-17 | Stop reason: WASHOUT

## 2023-09-05 RX ORDER — POTASSIUM CHLORIDE 750 MG/1
10 TABLET, FILM COATED, EXTENDED RELEASE ORAL
COMMUNITY
Start: 2023-01-31 | End: 2023-11-17 | Stop reason: WASHOUT

## 2023-09-05 RX ORDER — GLUCOSAMINE/CHONDR SU A SOD 750-600 MG
1 TABLET ORAL
COMMUNITY
Start: 2023-01-31 | End: 2023-11-17 | Stop reason: WASHOUT

## 2023-09-05 RX ORDER — PANTOPRAZOLE SODIUM 40 MG/1
40 TABLET, DELAYED RELEASE ORAL
COMMUNITY
Start: 2021-09-22 | End: 2023-11-17 | Stop reason: SDUPTHER

## 2023-09-05 RX ORDER — ANTIHEMOPHILIC FACTOR (RECOMBINANT), FC-VWF-XTEN FUSION PROTEIN-EHTL 250 (+/-)
3450 KIT INTRAVENOUS
Status: ON HOLD | COMMUNITY
End: 2024-01-19 | Stop reason: ENTERED-IN-ERROR

## 2023-09-05 RX ORDER — SPIRONOLACTONE 25 MG/1
25 TABLET ORAL DAILY
COMMUNITY
End: 2024-01-15

## 2023-09-05 RX ORDER — TRANEXAMIC ACID 650 MG/1
650 TABLET ORAL
COMMUNITY
Start: 2022-03-19 | End: 2023-11-17 | Stop reason: WASHOUT

## 2023-09-05 RX ORDER — BUMETANIDE 1 MG/1
1 TABLET ORAL DAILY PRN
COMMUNITY
End: 2024-03-19 | Stop reason: WASHOUT

## 2023-09-05 RX ORDER — SODIUM CHLORIDE 0.9 % (FLUSH) 0.9 %
10 SYRINGE (ML) INJECTION
COMMUNITY
Start: 2021-10-25 | End: 2023-10-19 | Stop reason: SDUPTHER

## 2023-09-05 RX ORDER — SODIUM CHLORIDE 0.9 G/100ML
50 IRRIGANT IRRIGATION
COMMUNITY
End: 2023-11-17 | Stop reason: WASHOUT

## 2023-09-05 RX ORDER — HYDROCHLOROTHIAZIDE 12.5 MG/1
12.5 CAPSULE ORAL
COMMUNITY
Start: 2021-09-04 | End: 2024-01-27 | Stop reason: WASHOUT

## 2023-09-05 RX ORDER — CICLOPIROX OLAMINE 7.7 MG/G
CREAM TOPICAL 2 TIMES DAILY
COMMUNITY
End: 2023-11-17 | Stop reason: ALTCHOICE

## 2023-09-05 RX ORDER — CALCIUM CARBONATE 600 MG
1 TABLET ORAL
COMMUNITY
Start: 2023-05-18

## 2023-09-10 PROBLEM — R31.9 HEMATURIA: Status: ACTIVE | Noted: 2023-09-10

## 2023-09-10 PROBLEM — Z95.1 PRESENCE OF AORTOCORONARY BYPASS GRAFT: Status: ACTIVE | Noted: 2022-11-28

## 2023-09-10 PROBLEM — G62.9 POLYNEUROPATHY: Status: ACTIVE | Noted: 2022-08-01

## 2023-09-10 PROBLEM — I82.412 ACUTE DEEP VEIN THROMBOSIS (DVT) OF LEFT FEMORAL VEIN (MULTI): Status: ACTIVE | Noted: 2023-09-10

## 2023-09-10 PROBLEM — Z87.442 PERSONAL HISTORY OF URINARY CALCULI: Status: ACTIVE | Noted: 2022-11-28

## 2023-09-10 PROBLEM — K57.50 DIVERTICULOSIS OF BOTH SMALL AND LARGE INTESTINE WITHOUT PERFORATION OR ABSCESS WITHOUT BLEEDING: Status: ACTIVE | Noted: 2022-11-28

## 2023-09-10 PROBLEM — N40.0 BENIGN PROSTATIC HYPERPLASIA WITHOUT LOWER URINARY TRACT SYMPTOMS: Status: ACTIVE | Noted: 2022-08-01

## 2023-09-10 PROBLEM — D66: Status: ACTIVE | Noted: 2023-09-10

## 2023-09-10 PROBLEM — M36.2: Status: ACTIVE | Noted: 2023-09-10

## 2023-09-10 PROBLEM — R79.89 OTHER SPECIFIED ABNORMAL FINDINGS OF BLOOD CHEMISTRY: Status: ACTIVE | Noted: 2023-09-10

## 2023-10-05 PROCEDURE — 80069 RENAL FUNCTION PANEL: CPT

## 2023-10-11 ENCOUNTER — OFFICE VISIT (OUTPATIENT)
Dept: OPHTHALMOLOGY | Facility: CLINIC | Age: 88
End: 2023-10-11
Payer: MEDICARE

## 2023-10-11 DIAGNOSIS — H52.4 HYPEROPIA WITH PRESBYOPIA OF BOTH EYES: Primary | ICD-10-CM

## 2023-10-11 DIAGNOSIS — H52.03 HYPEROPIA WITH PRESBYOPIA OF BOTH EYES: Primary | ICD-10-CM

## 2023-10-11 PROCEDURE — 92015 DETERMINE REFRACTIVE STATE: CPT | Performed by: STUDENT IN AN ORGANIZED HEALTH CARE EDUCATION/TRAINING PROGRAM

## 2023-10-11 PROCEDURE — 92012 INTRM OPH EXAM EST PATIENT: CPT | Performed by: STUDENT IN AN ORGANIZED HEALTH CARE EDUCATION/TRAINING PROGRAM

## 2023-10-11 ASSESSMENT — ENCOUNTER SYMPTOMS
EYES NEGATIVE: 0
HEMATOLOGIC/LYMPHATIC NEGATIVE: 0
RESPIRATORY NEGATIVE: 0
CARDIOVASCULAR NEGATIVE: 0
PSYCHIATRIC NEGATIVE: 0
ALLERGIC/IMMUNOLOGIC NEGATIVE: 0
MUSCULOSKELETAL NEGATIVE: 0
CONSTITUTIONAL NEGATIVE: 0
GASTROINTESTINAL NEGATIVE: 0
ENDOCRINE NEGATIVE: 0
NEUROLOGICAL NEGATIVE: 0

## 2023-10-11 ASSESSMENT — VISUAL ACUITY
OD_CC: 20/50
OD_CC+: -2
CORRECTION_TYPE: GLASSES
OS_CC: 20/100
METHOD: SNELLEN - LINEAR

## 2023-10-11 ASSESSMENT — REFRACTION_MANIFEST
OD_ADD: +3.00
OS_AXIS: 031
OD_AXIS: 125
METHOD_AUTOREFRACTION: 1
OS_SPHERE: +3.50
OD_SPHERE: +1.00
OS_CYLINDER: -1.00
OS_AXIS: 031
OS_ADD: +3.00
OD_SPHERE: +0.25
OD_AXIS: 160
OS_CYLINDER: -0.75
OS_SPHERE: +3.50
OD_CYLINDER: -1.00
OD_CYLINDER: -0.75

## 2023-10-11 ASSESSMENT — CUP TO DISC RATIO
OD_RATIO: .30
OS_RATIO: .30

## 2023-10-11 ASSESSMENT — TONOMETRY
OD_IOP_MMHG: 11
OS_IOP_MMHG: 11
IOP_METHOD: GOLDMANN APPLANATION

## 2023-10-11 ASSESSMENT — SLIT LAMP EXAM - LIDS
COMMENTS: DERMATOCHALASIS UL
COMMENTS: DERMATOCHALASIS UL

## 2023-10-11 ASSESSMENT — CONF VISUAL FIELD
OS_SUPERIOR_NASAL_RESTRICTION: 0
OS_INFERIOR_NASAL_RESTRICTION: 0
OS_SUPERIOR_TEMPORAL_RESTRICTION: 0
OD_NORMAL: 1
METHOD: COUNTING FINGERS
OS_INFERIOR_TEMPORAL_RESTRICTION: 0
OD_INFERIOR_TEMPORAL_RESTRICTION: 0
OD_INFERIOR_NASAL_RESTRICTION: 0
OD_SUPERIOR_TEMPORAL_RESTRICTION: 0
OS_NORMAL: 1
OD_SUPERIOR_NASAL_RESTRICTION: 0

## 2023-10-11 ASSESSMENT — EXTERNAL EXAM - RIGHT EYE: OD_EXAM: NORMAL

## 2023-10-11 ASSESSMENT — REFRACTION_WEARINGRX
OD_SPHERE: +2.50
OS_SPHERE: +2.50

## 2023-10-11 ASSESSMENT — EXTERNAL EXAM - LEFT EYE: OS_EXAM: NORMAL

## 2023-10-11 NOTE — ASSESSMENT & PLAN NOTE
Patient here for vision exam referred by Dr. Ackerman. 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 Patient reports overall stable visual acuity (VA).     Dispensed RX for specs today with BCVA right eye (OD) 20/40 left eye (OS) 20/80. FTW with polycarbonate.     Continue care as directed by Dr. Ackerman.

## 2023-10-11 NOTE — PROGRESS NOTES
Assessment/Plan   Problem List Items Addressed This Visit          Eye/Vision problems    Hyperopia with presbyopia of both eyes - Primary     Patient here for vision exam referred by Dr. Ackerman. 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 Patient reports overall stable visual acuity (VA).     Dispensed RX for specs today with BCVA right eye (OD) 20/40 left eye (OS) 20/80. FTW with polycarbonate.     Continue care as directed by Dr. Ackerman.           RTC with Dr. Ackerman for age-related macular degeneration (AMD) monitoring

## 2023-10-18 DIAGNOSIS — D66 HEMOPHILIA A (MULTI): Primary | ICD-10-CM

## 2023-10-19 RX ORDER — SODIUM CHLORIDE 0.9 % (FLUSH) 0.9 %
10 SYRINGE (ML) INJECTION 2 TIMES WEEKLY
Qty: 120 ML | Refills: 5 | Status: SHIPPED | OUTPATIENT
Start: 2023-10-19 | End: 2023-11-28

## 2023-11-13 DIAGNOSIS — R35.1 BPH ASSOCIATED WITH NOCTURIA: Primary | ICD-10-CM

## 2023-11-13 DIAGNOSIS — N40.1 BPH ASSOCIATED WITH NOCTURIA: Primary | ICD-10-CM

## 2023-11-15 ENCOUNTER — OFFICE VISIT (OUTPATIENT)
Dept: GERIATRIC MEDICINE | Facility: CLINIC | Age: 88
End: 2023-11-15
Payer: MEDICARE

## 2023-11-15 DIAGNOSIS — I50.32 CHRONIC HEART FAILURE WITH PRESERVED EJECTION FRACTION (MULTI): ICD-10-CM

## 2023-11-15 DIAGNOSIS — K21.9 GASTROESOPHAGEAL REFLUX DISEASE WITHOUT ESOPHAGITIS: ICD-10-CM

## 2023-11-15 DIAGNOSIS — R60.0 BILATERAL LEG EDEMA: ICD-10-CM

## 2023-11-15 DIAGNOSIS — R53.81 PHYSICAL DEBILITY: ICD-10-CM

## 2023-11-15 DIAGNOSIS — D66 HEMOPHILIA A (MULTI): ICD-10-CM

## 2023-11-15 DIAGNOSIS — I48.20 ATRIAL FIBRILLATION, CHRONIC (MULTI): ICD-10-CM

## 2023-11-15 DIAGNOSIS — I25.10 ASHD (ARTERIOSCLEROTIC HEART DISEASE): ICD-10-CM

## 2023-11-15 DIAGNOSIS — N40.0 BENIGN PROSTATIC HYPERPLASIA WITHOUT LOWER URINARY TRACT SYMPTOMS: ICD-10-CM

## 2023-11-15 DIAGNOSIS — D63.8 ANEMIA IN OTHER CHRONIC DISEASES CLASSIFIED ELSEWHERE: ICD-10-CM

## 2023-11-15 DIAGNOSIS — I10 PRIMARY HYPERTENSION: ICD-10-CM

## 2023-11-15 DIAGNOSIS — E83.51 HYPOCALCEMIA: Primary | ICD-10-CM

## 2023-11-15 DIAGNOSIS — L57.0 ACTINIC KERATOSIS: ICD-10-CM

## 2023-11-15 PROCEDURE — 1159F MED LIST DOCD IN RCRD: CPT | Performed by: NURSE PRACTITIONER

## 2023-11-15 PROCEDURE — 1126F AMNT PAIN NOTED NONE PRSNT: CPT | Performed by: NURSE PRACTITIONER

## 2023-11-15 PROCEDURE — 99349 HOME/RES VST EST MOD MDM 40: CPT | Performed by: NURSE PRACTITIONER

## 2023-11-15 PROCEDURE — 1160F RVW MEDS BY RX/DR IN RCRD: CPT | Performed by: NURSE PRACTITIONER

## 2023-11-15 PROCEDURE — 3078F DIAST BP <80 MM HG: CPT | Performed by: NURSE PRACTITIONER

## 2023-11-15 PROCEDURE — 1036F TOBACCO NON-USER: CPT | Performed by: NURSE PRACTITIONER

## 2023-11-15 PROCEDURE — 3075F SYST BP GE 130 - 139MM HG: CPT | Performed by: NURSE PRACTITIONER

## 2023-11-16 RX ORDER — TAMSULOSIN HYDROCHLORIDE 0.4 MG/1
0.4 CAPSULE ORAL 2 TIMES DAILY
Qty: 180 CAPSULE | Refills: 3 | Status: SHIPPED | OUTPATIENT
Start: 2023-11-16 | End: 2023-11-20 | Stop reason: SDUPTHER

## 2023-11-17 VITALS
TEMPERATURE: 97.3 F | HEART RATE: 61 BPM | SYSTOLIC BLOOD PRESSURE: 132 MMHG | RESPIRATION RATE: 18 BRPM | DIASTOLIC BLOOD PRESSURE: 60 MMHG

## 2023-11-17 PROBLEM — N13.9 OBSTRUCTIVE UROPATHY: Status: RESOLVED | Noted: 2023-04-28 | Resolved: 2023-11-17

## 2023-11-17 PROBLEM — E87.6 HYPOKALEMIA: Status: RESOLVED | Noted: 2023-03-01 | Resolved: 2023-11-17

## 2023-11-17 PROBLEM — S70.11XA HEMATOMA OF RIGHT THIGH: Status: RESOLVED | Noted: 2023-03-01 | Resolved: 2023-11-17

## 2023-11-17 PROBLEM — M25.462 KNEE EFFUSION, LEFT: Status: RESOLVED | Noted: 2023-03-01 | Resolved: 2023-11-17

## 2023-11-17 PROBLEM — M54.30 SCIATICA: Status: RESOLVED | Noted: 2023-09-05 | Resolved: 2023-11-17

## 2023-11-17 PROBLEM — R43.2 DYSGEUSIA: Status: RESOLVED | Noted: 2023-03-01 | Resolved: 2023-11-17

## 2023-11-17 PROBLEM — Z91.81 HISTORY OF RECENT FALL: Status: RESOLVED | Noted: 2023-05-31 | Resolved: 2023-11-17

## 2023-11-17 PROBLEM — S72.009A FRACTURE OF BONE OF HIP (MULTI): Status: RESOLVED | Noted: 2023-09-05 | Resolved: 2023-11-17

## 2023-11-17 PROBLEM — M53.3 PAIN OF RIGHT SACROILIAC JOINT: Status: RESOLVED | Noted: 2023-03-01 | Resolved: 2023-11-17

## 2023-11-17 PROBLEM — R73.02 IGT (IMPAIRED GLUCOSE TOLERANCE): Status: RESOLVED | Noted: 2023-03-01 | Resolved: 2023-11-17

## 2023-11-17 PROBLEM — M79.641 HAND PAIN, RIGHT: Status: RESOLVED | Noted: 2023-03-01 | Resolved: 2023-11-17

## 2023-11-17 PROBLEM — S06.5XAA SUBDURAL HEMATOMA (MULTI): Status: RESOLVED | Noted: 2023-04-09 | Resolved: 2023-11-17

## 2023-11-17 PROBLEM — R31.9 HEMATURIA: Status: RESOLVED | Noted: 2023-09-10 | Resolved: 2023-11-17

## 2023-11-17 PROBLEM — K92.2 GASTROINTESTINAL HEMORRHAGE: Status: RESOLVED | Noted: 2023-09-05 | Resolved: 2023-11-17

## 2023-11-17 PROBLEM — K62.5 RECTAL BLEEDING: Status: RESOLVED | Noted: 2023-03-01 | Resolved: 2023-11-17

## 2023-11-17 PROBLEM — S80.10XA HEMATOMA OF LOWER EXTREMITY: Status: RESOLVED | Noted: 2023-09-05 | Resolved: 2023-11-17

## 2023-11-17 PROBLEM — R79.89 OTHER SPECIFIED ABNORMAL FINDINGS OF BLOOD CHEMISTRY: Status: RESOLVED | Noted: 2023-09-10 | Resolved: 2023-11-17

## 2023-11-17 PROBLEM — S52.125A CLOSED NONDISPLACED FRACTURE OF HEAD OF LEFT RADIUS: Status: RESOLVED | Noted: 2023-09-05 | Resolved: 2023-11-17

## 2023-11-17 PROBLEM — M25.522 LEFT ELBOW PAIN: Status: RESOLVED | Noted: 2023-03-01 | Resolved: 2023-11-17

## 2023-11-17 PROBLEM — S72.001A FRACTURE OF FEMORAL NECK, RIGHT (MULTI): Status: RESOLVED | Noted: 2023-03-01 | Resolved: 2023-11-17

## 2023-11-17 PROBLEM — K64.9 BLEEDING HEMORRHOIDS: Status: RESOLVED | Noted: 2023-03-01 | Resolved: 2023-11-17

## 2023-11-17 PROBLEM — I50.31 ACUTE DIASTOLIC HEART FAILURE (MULTI): Status: RESOLVED | Noted: 2023-04-16 | Resolved: 2023-11-17

## 2023-11-17 PROBLEM — L30.9 DERMATITIS: Status: RESOLVED | Noted: 2023-03-01 | Resolved: 2023-11-17

## 2023-11-17 PROBLEM — L98.9 SKIN LESION: Status: RESOLVED | Noted: 2023-03-01 | Resolved: 2023-11-17

## 2023-11-17 PROBLEM — J18.9 PNEUMONIA OF RIGHT LUNG DUE TO INFECTIOUS ORGANISM: Status: RESOLVED | Noted: 2023-03-01 | Resolved: 2023-11-17

## 2023-11-17 PROBLEM — N40.1 ENLARGED PROSTATE WITH LOWER URINARY TRACT SYMPTOMS (LUTS): Status: RESOLVED | Noted: 2023-03-01 | Resolved: 2023-11-17

## 2023-11-17 PROBLEM — S76.911A MUSCLE STRAIN OF RIGHT THIGH: Status: RESOLVED | Noted: 2023-03-01 | Resolved: 2023-11-17

## 2023-11-17 PROBLEM — K92.1 HEMATOCHEZIA: Status: RESOLVED | Noted: 2023-09-05 | Resolved: 2023-11-17

## 2023-11-17 PROBLEM — M25.073: Status: RESOLVED | Noted: 2019-04-18 | Resolved: 2023-11-17

## 2023-11-17 PROBLEM — M54.50 LUMBAR PAIN: Status: RESOLVED | Noted: 2023-03-01 | Resolved: 2023-11-17

## 2023-11-17 PROBLEM — I50.33: Status: RESOLVED | Noted: 2023-04-16 | Resolved: 2023-11-17

## 2023-11-17 PROBLEM — F48.8 PSYCHOGENIC SYNCOPE: Status: RESOLVED | Noted: 2023-03-12 | Resolved: 2023-11-17

## 2023-11-17 PROBLEM — K59.09 CHRONIC CONSTIPATION: Status: RESOLVED | Noted: 2023-03-01 | Resolved: 2023-11-17

## 2023-11-17 PROBLEM — R60.0 BILATERAL LEG EDEMA: Status: ACTIVE | Noted: 2023-11-17

## 2023-11-17 PROBLEM — I95.1 ORTHOSTATIC SYNCOPE: Status: RESOLVED | Noted: 2023-09-05 | Resolved: 2023-11-17

## 2023-11-17 PROBLEM — M25.069 HEMARTHROSIS OF KNEE: Status: RESOLVED | Noted: 2022-05-09 | Resolved: 2023-11-17

## 2023-11-17 PROBLEM — R55 SYNCOPE: Status: RESOLVED | Noted: 2023-05-12 | Resolved: 2023-11-17

## 2023-11-17 PROBLEM — R31.0 GROSS HEMATURIA: Status: RESOLVED | Noted: 2023-03-01 | Resolved: 2023-11-17

## 2023-11-17 PROBLEM — I82.412 ACUTE DEEP VEIN THROMBOSIS (DVT) OF LEFT FEMORAL VEIN (MULTI): Status: RESOLVED | Noted: 2023-09-10 | Resolved: 2023-11-17

## 2023-11-17 PROBLEM — R19.7 INTERMITTENT DIARRHEA: Status: RESOLVED | Noted: 2023-03-01 | Resolved: 2023-11-17

## 2023-11-17 PROBLEM — E83.42 HYPOMAGNESEMIA: Status: RESOLVED | Noted: 2023-03-01 | Resolved: 2023-11-17

## 2023-11-17 PROBLEM — R04.0 EPISTAXIS: Status: RESOLVED | Noted: 2023-03-01 | Resolved: 2023-11-17

## 2023-11-17 PROBLEM — R33.9 URINARY RETENTION: Status: RESOLVED | Noted: 2023-03-01 | Resolved: 2023-11-17

## 2023-11-17 PROBLEM — K64.4 ANAL SKIN TAG: Status: RESOLVED | Noted: 2023-03-01 | Resolved: 2023-11-17

## 2023-11-17 PROBLEM — I95.9 HYPOTENSION: Status: RESOLVED | Noted: 2023-06-09 | Resolved: 2023-11-17

## 2023-11-17 PROBLEM — K06.8 GINGIVA HEMORRHAGE: Status: RESOLVED | Noted: 2023-09-05 | Resolved: 2023-11-17

## 2023-11-17 PROBLEM — H61.23 IMPACTED CERUMEN OF BOTH EARS: Status: RESOLVED | Noted: 2023-03-01 | Resolved: 2023-11-17

## 2023-11-17 PROBLEM — R41.82 ALTERED MENTAL STATUS: Status: RESOLVED | Noted: 2023-05-12 | Resolved: 2023-11-17

## 2023-11-17 PROBLEM — H11.30 SUBCONJUNCTIVAL BLEED: Status: RESOLVED | Noted: 2023-09-05 | Resolved: 2023-11-17

## 2023-11-17 PROBLEM — R10.9 FLANK PAIN: Status: RESOLVED | Noted: 2023-03-01 | Resolved: 2023-11-17

## 2023-11-17 PROBLEM — M25.469 KNEE EFFUSION: Status: RESOLVED | Noted: 2023-09-05 | Resolved: 2023-11-17

## 2023-11-17 PROBLEM — R43.8 METALLIC TASTE: Status: RESOLVED | Noted: 2023-03-01 | Resolved: 2023-11-17

## 2023-11-17 PROBLEM — J01.90 ACUTE SINUSITIS: Status: RESOLVED | Noted: 2023-04-16 | Resolved: 2023-11-17

## 2023-11-17 PROBLEM — J34.89 NASAL DRYNESS: Status: RESOLVED | Noted: 2023-03-01 | Resolved: 2023-11-17

## 2023-11-17 PROBLEM — K52.9 CHRONIC DIARRHEA: Status: RESOLVED | Noted: 2023-09-05 | Resolved: 2023-11-17

## 2023-11-17 PROBLEM — R39.198 ABNORMAL URINATION: Status: RESOLVED | Noted: 2023-03-01 | Resolved: 2023-11-17

## 2023-11-17 RX ORDER — PANTOPRAZOLE SODIUM 20 MG/1
20 TABLET, DELAYED RELEASE ORAL
COMMUNITY
End: 2024-01-25 | Stop reason: DRUGHIGH

## 2023-11-17 NOTE — PROGRESS NOTES
"Subjective   Patient ID: Ramón Flores is a 89 y.o. male who presents for Follow-up.    HPI  Patient reports that he has been doing well, the biggest issue is still not really able to walk much  He is able to transfer independently from the wheelchair to the bed, chair and to the toilet  He gets up by himself, dresses and undresses himself and toilets independently    Patient reports \"arthritis\" in his knees-denies any pain, but Knees will not straighten completely    Patient has not noticed any edema  No open areas or rashes-the rash in his groin has resolved    No cough or shortness of breath    Patient reports his \"normal appetite\"-reports he has never been a big eater  Bowels moving well  Denies any stomach pain or issues and has not had any heartburn  Taking his pantoprazole    Patient has noticed a lesion on his right hand  Does not cause any pain or bleeding  Would like to have it looked at by a dermatologist    Objective   /60   Pulse 61   Temp 36.3 °C (97.3 °F)   Resp 18       Chemistry    Lab Results   Component Value Date/Time     10/05/2023 1245    K 4.5 10/05/2023 1245     10/05/2023 1245    CO2 26 10/05/2023 1245    BUN 27 (H) 10/05/2023 1245    CREATININE 1.15 10/05/2023 1245    Lab Results   Component Value Date/Time    CALCIUM 9.7 10/05/2023 1245    ALKPHOS 68 07/28/2023 1203    AST 22 07/28/2023 1203    ALT 16 07/28/2023 1203    BILITOT 1.5 (H) 07/28/2023 1203        Physical Exam  alert, pleasant, cooperative, elderly wm in nad  seen in w/c in kitchen  eyes without erythema or drainage  mm moist  no jvd  rrr  bs cta bilat, not labored  soft, nt,nd  Unable to straighten either knee past 120 degrees  ankle edema 1+, trace pretibial, wearing regular socks  R hand dorsum with an actinic keratosis with a small cutaneous horn-no erythema or bleeding   Assessment/Plan     Hypocalcemia  -10/5/2023 calcium = 9.7  - Has resumed pantoprazole-will need to recheck calcium level to be " sure that has not caused any additional issues with hypocalcemia  - Comprehensive metabolic panel; Future    Actinic keratosis  - Referral to Dermatology    Chronic heart failure with preserved ejection fraction/Bilateral leg edema  -Minimal edema, probably mostly dependent edema from sitting in his chair, no S/S CHF  -Continue spironolactone 25 mg daily  - Check labs    Atrial fibrillation, chronic (CMS/HCC)  -Rate controlled with metoprolol  - No anticoagulation because of hemophilia    Primary hypertension  -BP well controlled  - Continue metoprolol 25 mg daily    Gastroesophageal reflux disease without esophagitis  -No further S/S since pantoprazole was resumed    Benign prostatic hyperplasia without lower urinary tract symptoms-stable  -Continue finasteride 5 mg daily  - Continue tamsulosin 0.4 mg twice daily    ASHD (arteriosclerotic heart disease)  -Continue atorvastatin 40 mg daily    Anemia in other chronic diseases classified elsewhere  -7/28/2023 H/H = 11.4/34.6-stable  - Continue iron supplement    Hemophilia A (CMS/HCC)  -Follow-up per hematology, receives weekly infusions    Physical debility  -No recent falls reported  -Continue PT/OT

## 2023-11-20 ENCOUNTER — TELEMEDICINE (OUTPATIENT)
Dept: UROLOGY | Facility: CLINIC | Age: 88
End: 2023-11-20
Payer: MEDICARE

## 2023-11-20 DIAGNOSIS — R35.1 BPH ASSOCIATED WITH NOCTURIA: Primary | ICD-10-CM

## 2023-11-20 DIAGNOSIS — N40.1 BPH ASSOCIATED WITH NOCTURIA: Primary | ICD-10-CM

## 2023-11-20 PROCEDURE — 99213 OFFICE O/P EST LOW 20 MIN: CPT | Performed by: UROLOGY

## 2023-11-20 RX ORDER — TAMSULOSIN HYDROCHLORIDE 0.4 MG/1
0.4 CAPSULE ORAL 2 TIMES DAILY
Qty: 180 CAPSULE | Refills: 3 | Status: SHIPPED | OUTPATIENT
Start: 2023-11-20 | End: 2024-01-25 | Stop reason: SDUPTHER

## 2023-11-20 RX ORDER — FINASTERIDE 5 MG/1
5 TABLET, FILM COATED ORAL DAILY
Qty: 90 TABLET | Refills: 3 | Status: SHIPPED | OUTPATIENT
Start: 2023-11-20 | End: 2024-01-25 | Stop reason: SDUPTHER

## 2023-11-20 NOTE — PROGRESS NOTES
Subjective     This visit was completed via telemedicine. All issues as below were discussed and addressed but no physical exam was performed unless allowed by visual confirmation. If it was felt that the patient should be evaluated in clinic, then they were directed there. Patient verbally consented to visit.    Ramón Flores is a 89 y.o.  male, the oldest living hemophiliac, with a history of nephrolithiasis and BPH on Tamsulosin 0.4 mg and Finasteride 5 mg. He presents today for reevaluation and medication refill. Patient has no new urinary complaints today. He denies flank pain, gross hematuria, kidney stones, and recurrent UTI.    Objective   Past Medical History:   Diagnosis Date    Acute deep vein thrombosis (DVT) of left femoral vein (CMS/[facility]) 09/10/2023    ACUTE DEEP VEIN THROMBOSIS, L FEMORAL, HEMOPHILIA    Acute diastolic heart failure (CMS/[facility]) 04/16/2023    Benign prostatic hyperplasia without lower urinary tract symptoms 01/07/2016    Enlarged prostate without lower urinary tract symptoms (luts)    Bleeding hemorrhoids 03/01/2023    Closed nondisplaced fracture of head of left radius 09/05/2023    COVID-19 05/21/2023    Enlarged prostate with lower urinary tract symptoms (LUTS) 03/01/2023    Fracture of bone of hip (CMS/HCC) 09/05/2023    Fracture of femoral neck, right (CMS/[facility]) 03/01/2023    Gastrointestinal hemorrhage 09/05/2023    LOWER GI BLEED    Gingiva hemorrhage 09/05/2023    Gross hematuria 03/01/2023    Hemarthrosis of ankle joint 04/18/2019    Hemarthrosis of knee 05/09/2022    Hematochezia 09/05/2023    Hematoma of lower extremity 09/05/2023    Hematoma of right thigh 03/01/2023    History of recent fall 05/31/2023    Knee effusion 09/05/2023    Knee effusion, left 03/01/2023    Lumbar pain 03/01/2023    Obstructive uropathy 04/28/2023    Orthostatic syncope 09/05/2023    Personal history of other endocrine, nutritional and metabolic disease     History of hyperlipidemia    Pneumonia  of right lung due to infectious organism 03/01/2023    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      albuterol 5 mg/mL nebulizer solution Take 0.5 mL (2.5 mg) by nebulization every 6 hours if needed for wheezing or shortness of breath.      antihemophil.FVIII,full length (ADVATE IV) Infuse into a venous catheter once daily as needed (hemophilia). kit; 50 unit/kg      antihemophilic factor rcmb PEG (Adynovate) 2,000 (+/-) unit solution 4000 unit 2 TIMES A WEEK (route: intravenous)      antihemophilic factor, recomb, (Recombinate) 250 (+/-) unit recon soln Infuse into a venous catheter.      atorvastatin (Lipitor) 40 mg tablet Take 1 tablet (40 mg) by mouth once daily.      bumetanide (Bumex) 1 mg tablet Take 1 tablet (1 mg) by mouth once daily as needed (edema).      calcium carbonate 600 mg calcium (1,500 mg) tablet Take 1 tablet (600 mg) by mouth 2 times a day with meals.      carboxymethylcellulose (Refresh Plus) 0.5 % ophthalmic solution Administer 1 drop into both eyes in the morning and 1 drop at noon and 1 drop in the evening and 1 drop before bedtime.      ferrous sulfate 325 (65 Fe) MG tablet Take 1 tablet (65 mg of iron) by mouth once daily.      finasteride (Proscar) 5 mg tablet Take 1 tablet (5 mg) by mouth once daily.      folic acid (Folvite) 1 mg tablet Take 1 tablet (1 mg) by mouth once daily.      FVIII rec,Fc-VWF-XTEN,BDD-ehtl (Altuviiio) 250 (+/-) unit recon soln Infuse 3,450 Int'l Units/day into a venous catheter.      hydroCHLOROthiazide (Microzide) 12.5 mg capsule Take  1 capsule (12.5 mg) by mouth once daily.      Lactobacillus acidoph-L.bulgar 1 million cell tablet tablet Take 1 tablet by mouth once daily.      metoprolol succinate XL (Toprol-XL) 50 mg 24 hr tablet Take 0.5 tablets (25 mg) by mouth once daily.      multivitamin with minerals (multivitamin-iron-folic acid) tablet Take 1 tablet by mouth once daily.      pantoprazole (ProtoNix) 20 mg EC tablet Take 1 tablet (20 mg) by mouth once daily in the morning. Take before meals. Do not crush, chew, or split.      propylene glycol-glycerin 1-0.3 % drops Administer 1 drop into affected eye(s) 4 times a day.      sodium chloride 0.9% (sodium chloride) flush Infuse 10 mL into a venous catheter 2 times a week for 12 doses. 5-10 ml normal saline flush before and after Altuviiio infusion  and as needed. 120 mL 5    spironolactone (Aldactone) 25 mg tablet Take 1 tablet (25 mg) by mouth once daily.      tamsulosin (Flomax) 0.4 mg 24 hr capsule TAKE 1 CAPSULE BY MOUTH  TWICE DAILY 180 capsule 3    vit A/vit C/vit E/zinc/copper (PRESERVISION AREDS ORAL) Take 1 capsule by mouth once daily.      [DISCONTINUED] apixaban (Eliquis) 2.5 mg tablet Take 1 tablet (2.5 mg) by mouth 2 times a day.      [DISCONTINUED] benzonatate (Tessalon) 200 mg capsule Take 1 capsule (200 mg) by mouth 3 times a day as needed for cough.      [DISCONTINUED] ciclopirox (Loprox) 0.77 % cream Apply topically 2 times a day.      [DISCONTINUED] diclofenac sodium 1 % kit Apply 4 g topically once daily. To bilateral knees      [DISCONTINUED] magnesium oxide (Mag-Ox) 400 mg (241.3 mg magnesium) tablet Take 1 tablet (400 mg) by mouth in the morning and 1 tablet (400 mg) before bedtime.      [DISCONTINUED] melatonin-pyridoxine HCl, B6, 5-10 mg tablet extended release Take 1 tablet by mouth once daily.      [DISCONTINUED] NON FORMULARY Take 1 each by mouth once daily.      [DISCONTINUED] nystatin (Mycostatin) 100,000 unit/gram powder Apply topically.      [DISCONTINUED]  omeprazole OTC (PriLOSEC OTC) 20 mg EC tablet Take 1 tablet (20 mg) by mouth once daily.      [DISCONTINUED] pantoprazole (ProtoNix) 40 mg EC tablet Take 1 tablet (40 mg) by mouth once daily.      [DISCONTINUED] potassium chloride CR (Klor-Con M20) 20 mEq ER tablet Take 2 tablets (40 mEq) by mouth once daily. while on hydroclorothiazide 60 tablet 1    [DISCONTINUED] potassium chloride CR 10 mEq ER tablet Take 1 tablet (10 mEq) by mouth once daily.      [DISCONTINUED] sodium chloride 0.9 % irrigation solution Irrigate with 50 mL as directed.      [DISCONTINUED] tamsulosin (Flomax) 0.4 mg 24 hr capsule Take 1 capsule (0.4 mg) by mouth twice a day.      [DISCONTINUED] tranexamic acid (Lysteda) 650 mg tablet tablet Take 1 tablet (650 mg) by mouth once daily.      [DISCONTINUED] zinc oxide 10 % cream Apply topically.       No current facility-administered medications on file prior to visit.     Allergies   Allergen Reactions    Aspirin Unknown    Penicillins Rash       There were no vitals taken for this visit.  Physical Exam      Lab Results   Component Value Date    PSA 6.86 (H) 07/12/2021       Assessment/Plan   There are no diagnoses linked to this encounter.    BPH on Tamsulosin 0.4 mg and Finasteride 5 mg     Urinary symptoms are stable and not bothersome enough at this point, the patient does not desire any further intervention of BPH. We will refill his Tamsulosin and Finasteride for 1 year with follow up at that time for reevaluation.      All questions were answered to the patient's satisfaction. Patient agrees with the plan and wishes to proceed. Follow-up will be scheduled appropriately.     Scribed for Dr. Sierra by Mildred Gloria. I , Dr iSerra, have personally reviewed and agreed with the information entered by the Virtual Scribe.

## 2023-11-29 ENCOUNTER — APPOINTMENT (OUTPATIENT)
Dept: ORTHOPEDIC SURGERY | Facility: HOSPITAL | Age: 88
End: 2023-11-29
Payer: MEDICARE

## 2023-12-06 ENCOUNTER — OFFICE VISIT (OUTPATIENT)
Dept: OPHTHALMOLOGY | Facility: CLINIC | Age: 88
End: 2023-12-06
Payer: MEDICARE

## 2023-12-06 DIAGNOSIS — D31.32 CHOROIDAL NEVUS, LEFT EYE: ICD-10-CM

## 2023-12-06 DIAGNOSIS — H35.3122 INTERMEDIATE STAGE NONEXUDATIVE AGE-RELATED MACULAR DEGENERATION OF LEFT EYE: ICD-10-CM

## 2023-12-06 DIAGNOSIS — H35.3211 EXUDATIVE AGE-RELATED MACULAR DEGENERATION, RIGHT EYE, WITH ACTIVE CHOROIDAL NEOVASCULARIZATION (MULTI): Primary | ICD-10-CM

## 2023-12-06 PROCEDURE — 99213 OFFICE O/P EST LOW 20 MIN: CPT | Performed by: OPHTHALMOLOGY

## 2023-12-06 PROCEDURE — 92134 CPTRZ OPH DX IMG PST SGM RTA: CPT | Mod: BILATERAL PROCEDURE | Performed by: STUDENT IN AN ORGANIZED HEALTH CARE EDUCATION/TRAINING PROGRAM

## 2023-12-06 ASSESSMENT — REFRACTION_WEARINGRX
OS_AXIS: 031
OD_SPHERE: +1.00
OS_SPHERE: +3.50
OD_ADD: +2.50
OS_CYLINDER: -0.75
OD_CYLINDER: -0.75
OS_ADD: +2.50
OD_AXIS: 125

## 2023-12-06 ASSESSMENT — ENCOUNTER SYMPTOMS
GASTROINTESTINAL NEGATIVE: 0
CARDIOVASCULAR NEGATIVE: 0
PSYCHIATRIC NEGATIVE: 0
HEMATOLOGIC/LYMPHATIC NEGATIVE: 0
ENDOCRINE NEGATIVE: 0
ALLERGIC/IMMUNOLOGIC NEGATIVE: 0
RESPIRATORY NEGATIVE: 0
CONSTITUTIONAL NEGATIVE: 0
NEUROLOGICAL NEGATIVE: 0
EYES NEGATIVE: 0
MUSCULOSKELETAL NEGATIVE: 0

## 2023-12-06 ASSESSMENT — CUP TO DISC RATIO
OD_RATIO: .30
OS_RATIO: .30

## 2023-12-06 ASSESSMENT — VISUAL ACUITY
CORRECTION_TYPE: GLASSES
OS_PH_CC: 20/60
OS_CC: 20/80
OD_CC: 20/20
METHOD: SNELLEN - LINEAR
OS_PH_CC+: -2
OD_CC+: -2

## 2023-12-06 ASSESSMENT — TONOMETRY
IOP_METHOD: GOLDMANN APPLANATION
OS_IOP_MMHG: 14
OD_IOP_MMHG: 14

## 2023-12-06 ASSESSMENT — EXTERNAL EXAM - LEFT EYE: OS_EXAM: NORMAL

## 2023-12-06 ASSESSMENT — EXTERNAL EXAM - RIGHT EYE: OD_EXAM: NORMAL

## 2023-12-06 ASSESSMENT — SLIT LAMP EXAM - LIDS
COMMENTS: DERMATOCHALASIS UL
COMMENTS: DERMATOCHALASIS UL

## 2023-12-06 NOTE — PROGRESS NOTES
Assessment/Plan   Diagnoses and all orders for this visit:  Exudative age-related macular degeneration, right eye, with active choroidal neovascularization (CMS/HCC)  -     OCT, Retina - OU - Both Eyes  Intermediate stage nonexudative age-related macular degeneration of left eye  Choroidal nevus, left eye    Exudative age-related macular degeneration, right eye, with active choroidal [id number]  Nonexudative age-related macular degeneration of left [id number]    - exam stable both eyes today  - RTC 3 months for DFE/OCT. If an injection is needed, will need to schedule a separate visit to coordinate with Factor VIII infusion.    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 Santoyo at [facility] after a second Eylea injection for wet AMD OD led to excessive subconj hemorrhage  - Have been coordinating with patient`s hematologist Dr. Dillon to have Factor VIII infusion in preparation for injections. Factor VIII infusions need to occur within 12 hours of injections  - Patient reports he has been receiving twice weekly infusions recently through home care  - No need for RISA injections OD today, no significant edema and good visual acuity (VA)        Choroidal nevus left eye  -Stable, lesions is about 1.5DD and flat, (-) orange pigment/heme/subretinal fluid (SRF)  - continue regular exams

## 2023-12-13 ENCOUNTER — OFFICE VISIT (OUTPATIENT)
Dept: UROLOGY | Facility: CLINIC | Age: 88
End: 2023-12-13
Payer: MEDICARE

## 2023-12-13 VITALS — TEMPERATURE: 97.3 F

## 2023-12-13 DIAGNOSIS — R21 RASH: ICD-10-CM

## 2023-12-13 DIAGNOSIS — N40.1 BPH ASSOCIATED WITH NOCTURIA: Primary | ICD-10-CM

## 2023-12-13 DIAGNOSIS — R35.1 BPH ASSOCIATED WITH NOCTURIA: Primary | ICD-10-CM

## 2023-12-13 PROCEDURE — 99213 OFFICE O/P EST LOW 20 MIN: CPT | Performed by: UROLOGY

## 2023-12-13 PROCEDURE — 1126F AMNT PAIN NOTED NONE PRSNT: CPT | Performed by: UROLOGY

## 2023-12-13 PROCEDURE — 1036F TOBACCO NON-USER: CPT | Performed by: UROLOGY

## 2023-12-13 PROCEDURE — 1160F RVW MEDS BY RX/DR IN RCRD: CPT | Performed by: UROLOGY

## 2023-12-13 PROCEDURE — 1159F MED LIST DOCD IN RCRD: CPT | Performed by: UROLOGY

## 2023-12-13 RX ORDER — NYSTATIN 100000 U/G
CREAM TOPICAL EVERY 12 HOURS
Qty: 42 G | Refills: 1 | Status: SHIPPED | OUTPATIENT
Start: 2023-12-13 | End: 2024-01-27 | Stop reason: WASHOUT

## 2024-01-03 ENCOUNTER — OFFICE VISIT (OUTPATIENT)
Dept: ORTHOPEDIC SURGERY | Facility: HOSPITAL | Age: 89
End: 2024-01-03
Payer: MEDICARE

## 2024-01-03 ENCOUNTER — HOSPITAL ENCOUNTER (OUTPATIENT)
Dept: RADIOLOGY | Facility: HOSPITAL | Age: 89
Discharge: HOME | End: 2024-01-03
Payer: MEDICARE

## 2024-01-03 DIAGNOSIS — S72.001D CLOSED FRACTURE OF RIGHT HIP WITH ROUTINE HEALING, SUBSEQUENT ENCOUNTER: ICD-10-CM

## 2024-01-03 DIAGNOSIS — S72.001D CLOSED FRACTURE OF NECK OF RIGHT FEMUR WITH ROUTINE HEALING, SUBSEQUENT ENCOUNTER: Primary | ICD-10-CM

## 2024-01-03 PROCEDURE — 99213 OFFICE O/P EST LOW 20 MIN: CPT | Performed by: ORTHOPAEDIC SURGERY

## 2024-01-03 PROCEDURE — 1159F MED LIST DOCD IN RCRD: CPT | Performed by: ORTHOPAEDIC SURGERY

## 2024-01-03 PROCEDURE — 1036F TOBACCO NON-USER: CPT | Performed by: ORTHOPAEDIC SURGERY

## 2024-01-03 PROCEDURE — 1126F AMNT PAIN NOTED NONE PRSNT: CPT | Performed by: ORTHOPAEDIC SURGERY

## 2024-01-03 PROCEDURE — 73502 X-RAY EXAM HIP UNI 2-3 VIEWS: CPT | Mod: RT

## 2024-01-03 PROCEDURE — 1160F RVW MEDS BY RX/DR IN RCRD: CPT | Performed by: ORTHOPAEDIC SURGERY

## 2024-01-03 PROCEDURE — 73502 X-RAY EXAM HIP UNI 2-3 VIEWS: CPT | Mod: RIGHT SIDE | Performed by: STUDENT IN AN ORGANIZED HEALTH CARE EDUCATION/TRAINING PROGRAM

## 2024-01-03 NOTE — PROGRESS NOTES
Ramón Flores is  post-op from right hip hemiarthroplasty for femoral neck fracture on 1/13/2023.  he is doing well at this point.  Pain is well controlled  Denies fevers or chills.  Denies drainage from the wound.  he reports no additional symptoms or concerns. No shortness of breath or chest pain. No calf swelling or pain.    The patients full medical history, surgical history, medications, allergies, family, medical history, social history, and a complete 14 point review of systems is documented in the medical record on the signed, scanned medical intake sheet or reviewed in the history of present illness. Review of systems otherwise negative    Past Medical History:   Diagnosis Date    Acute deep vein thrombosis (DVT) of left femoral vein (CMS/HCC) 09/10/2023    ACUTE DEEP VEIN THROMBOSIS, L FEMORAL, HEMOPHILIA    Acute diastolic heart failure (CMS/[facility]) 04/16/2023    Benign prostatic hyperplasia without lower urinary tract symptoms 01/07/2016    Enlarged prostate without lower urinary tract symptoms (luts)    Bleeding hemorrhoids 03/01/2023    Closed nondisplaced fracture of head of left radius 09/05/2023    COVID-19 05/21/2023    Enlarged prostate with lower urinary tract symptoms (LUTS) 03/01/2023    Fracture of bone of hip (CMS/HCC) 09/05/2023    Fracture of femoral neck, right (CMS/[facility]) 03/01/2023    Gastrointestinal hemorrhage 09/05/2023    LOWER GI BLEED    Gingiva hemorrhage 09/05/2023    Gross hematuria 03/01/2023    Hemarthrosis of ankle joint 04/18/2019    Hemarthrosis of knee 05/09/2022    Hematochezia 09/05/2023    Hematoma of lower extremity 09/05/2023    Hematoma of right thigh 03/01/2023    History of recent fall 05/31/2023    Knee effusion 09/05/2023    Knee effusion, left 03/01/2023    Lumbar pain 03/01/2023    Obstructive uropathy 04/28/2023    Orthostatic syncope 09/05/2023    Personal history of other endocrine, nutritional and metabolic disease     History of hyperlipidemia    Pneumonia  of right lung due to infectious organism 03/01/2023    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 Sig Documenting Provider Last Dose Status   acetaminophen (Tylenol) 325 mg tablet [id number] Yes Take 3 tablets (975 mg) by mouth every 8 hours. Parish Jerry MD Taking Active   albuterol 5 mg/mL nebulizer solution [id number] No Take 0.5 mL (2.5 mg) by nebulization every 6 hours if needed for wheezing or shortness of breath. Parish Jerry MD Not Taking Active   antihemophil.FVIII,full length (ADVATE IV) [id number] Yes Infuse into a venous catheter once daily as needed (hemophilia). kit; 50 unit/kg Parish Jerry MD Taking Active   antihemophilic factor rcmb PEG (Adynovate) 2,000 (+/-) unit solution [id number] Yes 4000 unit 2 TIMES A WEEK (route: intravenous) Parish Jerry MD Taking Active   antihemophilic factor, recomb, (Recombinate) 250 (+/-) unit recon soln [id number]  Infuse into a venous catheter. Parish Jerry MD  Active   atorvastatin (Lipitor) 40 mg tablet [id number] Yes Take 1 tablet (40 mg) by mouth once daily. Parish Jerry MD Taking Active   bumetanide (Bumex) 1 mg tablet [id number] No Take 1 tablet (1 mg) by mouth once daily as needed (edema). Parish Jerry MD Not Taking Active   calcium carbonate 600 mg calcium (1,500 mg) tablet [id number] No Take 1 tablet (1,500 mg) by mouth 2 times a day with meals. Parish Jerry MD Unknown Active   carboxymethylcellulose (Refresh Plus) 0.5 % ophthalmic solution [id number]  Administer 1 drop into both eyes 4 times a day. Parish Jerry MD  Active   ferrous sulfate 325 (65 Fe) MG tablet [id number]  Take 1 tablet by mouth  once daily. Parish Jerry MD  Active   finasteride (Proscar) 5 mg tablet [id number]  Take 1 tablet (5 mg) by mouth once daily. Raisa Sierra MD  Active   folic acid (Folvite) 1 mg tablet [id number]  Take 1 tablet (1 mg) by mouth once daily. Parish Jerry MD  Active   FVIII rec,Fc-VWF-XTEN,BDD-ehtl (Altuviiio) 250 (+/-) unit recon soln [id number]  Infuse 3,450 Int'l Units/day into a venous catheter. Parish Jerry MD  Active   hydroCHLOROthiazide (Microzide) 12.5 mg capsule [id number]  Take 1 capsule (12.5 mg) by mouth once daily. Parish Jerry MD  Active   Lactobacillus acidoph-L.bulgar 1 million cell tablet tablet [id number]  Take 1 tablet by mouth once daily. Parish Jerry MD  Active   metoprolol succinate XL (Toprol-XL) 50 mg 24 hr tablet [id number]  Take 0.5 tablets (25 mg) by mouth once daily. Parish Jerry MD  Active   multivitamin with minerals (multivitamin-iron-folic acid) tablet [id number]  Take 1 tablet by mouth once daily. Parish Jerry MD  Active   pantoprazole (ProtoNix) 20 mg EC tablet [id number]  Take 1 tablet (20 mg) by mouth once daily in the morning. Take before meals. Do not crush, chew, or split. Parish Jerry MD  Active   propylene glycol-glycerin 1-0.3 % drops [id number]  Administer 1 drop into affected eye(s) 4 times a day. Parish Jerry MD  Active   spironolactone (Aldactone) 25 mg tablet [id number]  Take 1 tablet (25 mg) by mouth once daily. Parish Jerry MD  Active   tamsulosin (Flomax) 0.4 mg 24 hr capsule [id number]  Take 1 capsule (0.4 mg) by mouth 2 times a day. Raisa Sierra MD  Active   vit A/vit C/vit E/zinc/copper (PRESERVISION AREDS ORAL) [id number]  Take 1 capsule by mouth once daily. Parish Jerry MD  Active                    Allergies   Allergen Reactions    Aspirin Unknown    Penicillins Rash       Social History     Socioeconomic History    Marital status:      Spouse name: Not on file    Number of children: Not on  file    Years of education: Not on file    Highest education level: Not on file   Occupational History    Not on file   Tobacco Use    Smoking status: Never    Smokeless tobacco: Never   Substance and Sexual Activity    Alcohol use: Defer    Drug use: Never    Sexual activity: Not on file   Other Topics Concern    Not on file   Social History Narrative    Not on file     Social Determinants of Health     Financial Resource Strain: Not on file   Food Insecurity: Not on file   Transportation Needs: Not on file   Physical Activity: Not on file   Stress: Not on file   Social Connections: Not on file   Intimate Partner Violence: Not on file   Housing Stability: Not on file       Past Surgical History:   Procedure Laterality Date    CORONARY ARTERY BYPASS GRAFT         Gen: The patient is alert and oriented ×3, is in no acute distress, and appear their stated age and weight.    Psychiatric: Mood and affect are appropriate.    Eyes: Sclera are white, and pupils are round and symmetric.    ENT: Mucous membranes are moist.     Neck: Supple. Thyroid is midline.    Respiratory: Respirations are nonlabored, chest rise is symmetric.    Cardiac: Rate is regular by palpation of distal pulses.     Abdomen: Nondistended.    Integument: No obvious cutaneous lesions are noted. No signs of lymphangitis. No signs of systemic edema.  side: right lower extremity :  his  surgical incisions are healing well, without evidence of erythema, fluctuance, drainage, or infection.  The skin around the incision is intact.  Distally neurovascular exam is stable.  There is appropriate tenderness to palpation in the edin-incisional area. No calf swelling or tenderness to palpation.      I personally reviewed multiple views of pelvis and hip were obtained in the office today demonstrate maintenance of reduction, interval healing, and a stable position of the hardware.      Ramón Flores is a 89 y.o. male patient status post right hip  hemiarthroplasty for femoral neck fracture on 1/13/2023   I went over his x-rays in detail today.   he is WBAT of the side: right lower extremity. ~He/she~ is range of motion as tolerated of the side: right lower extremity.  Posterior hip precautions for life.  I stressed the importance of physical therapy on overall functional outcome. I answered all patient's questions he agrees with treatment plan.  I will see him back in Follow-up as necessary with repeat AP pelvis and 2 views of the right hip.        Pankaj Sanches    Department of Orthopaedic Trauma Surgery

## 2024-01-14 DIAGNOSIS — I10 PRIMARY HYPERTENSION: Primary | ICD-10-CM

## 2024-01-15 RX ORDER — SPIRONOLACTONE 25 MG/1
25 TABLET ORAL DAILY
Qty: 90 TABLET | Refills: 1 | Status: SHIPPED | OUTPATIENT
Start: 2024-01-15 | End: 2024-01-25 | Stop reason: SDUPTHER

## 2024-01-16 DIAGNOSIS — E78.5 HYPERLIPIDEMIA, UNSPECIFIED HYPERLIPIDEMIA TYPE: Primary | ICD-10-CM

## 2024-01-17 ENCOUNTER — TELEPHONE (OUTPATIENT)
Dept: GERIATRIC MEDICINE | Facility: CLINIC | Age: 89
End: 2024-01-17
Payer: MEDICARE

## 2024-01-17 DIAGNOSIS — I50.32 CHRONIC HEART FAILURE WITH PRESERVED EJECTION FRACTION (MULTI): ICD-10-CM

## 2024-01-17 DIAGNOSIS — R06.09 DYSPNEA ON EXERTION: Primary | ICD-10-CM

## 2024-01-17 DIAGNOSIS — I10 PRIMARY HYPERTENSION: ICD-10-CM

## 2024-01-17 DIAGNOSIS — D63.8 ANEMIA IN OTHER CHRONIC DISEASES CLASSIFIED ELSEWHERE: ICD-10-CM

## 2024-01-17 RX ORDER — ATORVASTATIN CALCIUM 40 MG/1
40 TABLET, FILM COATED ORAL DAILY
Qty: 90 TABLET | Refills: 1 | Status: SHIPPED | OUTPATIENT
Start: 2024-01-17 | End: 2024-01-25 | Stop reason: SDUPTHER

## 2024-01-17 NOTE — TELEPHONE ENCOUNTER
Patient reports sob with exertion, but none at rest  No PND  Mild nonproductive cough  No f/c  Pt reports that he feels well, just gets sob with ambulation or other exertion  No edema    Pt able to speak in full sentences without sounding dyspneic over the phone  Will get cxr and have nurses draw labs

## 2024-01-18 ENCOUNTER — APPOINTMENT (OUTPATIENT)
Dept: RADIOLOGY | Facility: HOSPITAL | Age: 89
DRG: 193 | End: 2024-01-18
Payer: MEDICARE

## 2024-01-18 ENCOUNTER — HOSPITAL ENCOUNTER (INPATIENT)
Facility: HOSPITAL | Age: 89
LOS: 2 days | Discharge: ANOTHER HOSPITAL | DRG: 193 | End: 2024-01-20
Attending: GENERAL PRACTICE | Admitting: FAMILY MEDICINE
Payer: MEDICARE

## 2024-01-18 DIAGNOSIS — D66 HEMOPHILIA A (MULTI): ICD-10-CM

## 2024-01-18 DIAGNOSIS — D68.59 PROTEIN C DEFICIENCY (MULTI): ICD-10-CM

## 2024-01-18 DIAGNOSIS — I87.003 POSTTHROMBOTIC SYNDROME WITHOUT COMPLICATIONS OF BILATERAL LOWER EXTREMITY: ICD-10-CM

## 2024-01-18 DIAGNOSIS — R60.0 LOCALIZED EDEMA: ICD-10-CM

## 2024-01-18 DIAGNOSIS — J18.9 COMMUNITY ACQUIRED PNEUMONIA, UNSPECIFIED LATERALITY: Primary | ICD-10-CM

## 2024-01-18 DIAGNOSIS — D66 FACTOR VIII DEFICIENCY (MULTI): ICD-10-CM

## 2024-01-18 LAB
ALBUMIN SERPL BCP-MCNC: 3.9 G/DL (ref 3.4–5)
ALP SERPL-CCNC: 82 U/L (ref 33–136)
ALT SERPL W P-5'-P-CCNC: 34 U/L (ref 10–52)
ANION GAP SERPL CALC-SCNC: 13 MMOL/L (ref 10–20)
AST SERPL W P-5'-P-CCNC: 31 U/L (ref 9–39)
BASOPHILS # BLD AUTO: 0.02 X10*3/UL (ref 0–0.1)
BASOPHILS NFR BLD AUTO: 0.4 %
BILIRUB SERPL-MCNC: 1.8 MG/DL (ref 0–1.2)
BNP SERPL-MCNC: 613 PG/ML (ref 0–99)
BUN SERPL-MCNC: 21 MG/DL (ref 6–23)
CALCIUM SERPL-MCNC: 8.7 MG/DL (ref 8.6–10.3)
CARDIAC TROPONIN I PNL SERPL HS: 93 NG/L (ref 0–20)
CARDIAC TROPONIN I PNL SERPL HS: 97 NG/L (ref 0–20)
CHLORIDE SERPL-SCNC: 105 MMOL/L (ref 98–107)
CO2 SERPL-SCNC: 24 MMOL/L (ref 21–32)
CREAT SERPL-MCNC: 1.05 MG/DL (ref 0.5–1.3)
D DIMER PPP FEU-MCNC: 2248 NG/ML FEU
EGFRCR SERPLBLD CKD-EPI 2021: 65 ML/MIN/1.73M*2
EOSINOPHIL # BLD AUTO: 0.06 X10*3/UL (ref 0–0.4)
EOSINOPHIL NFR BLD AUTO: 1.3 %
ERYTHROCYTE [DISTWIDTH] IN BLOOD BY AUTOMATED COUNT: 14.8 % (ref 11.5–14.5)
FLUAV RNA RESP QL NAA+PROBE: NOT DETECTED
FLUBV RNA RESP QL NAA+PROBE: NOT DETECTED
GLUCOSE SERPL-MCNC: 129 MG/DL (ref 74–99)
HCT VFR BLD AUTO: 38.7 % (ref 41–52)
HGB BLD-MCNC: 12.4 G/DL (ref 13.5–17.5)
IMM GRANULOCYTES # BLD AUTO: 0.01 X10*3/UL (ref 0–0.5)
IMM GRANULOCYTES NFR BLD AUTO: 0.2 % (ref 0–0.9)
LYMPHOCYTES # BLD AUTO: 1.23 X10*3/UL (ref 0.8–3)
LYMPHOCYTES NFR BLD AUTO: 26 %
MAGNESIUM SERPL-MCNC: 1.1 MG/DL (ref 1.6–2.4)
MCH RBC QN AUTO: 33.4 PG (ref 26–34)
MCHC RBC AUTO-ENTMCNC: 32 G/DL (ref 32–36)
MCV RBC AUTO: 104 FL (ref 80–100)
MONOCYTES # BLD AUTO: 0.36 X10*3/UL (ref 0.05–0.8)
MONOCYTES NFR BLD AUTO: 7.6 %
NEUTROPHILS # BLD AUTO: 3.05 X10*3/UL (ref 1.6–5.5)
NEUTROPHILS NFR BLD AUTO: 64.5 %
NRBC BLD-RTO: 0 /100 WBCS (ref 0–0)
PLATELET # BLD AUTO: 141 X10*3/UL (ref 150–450)
POTASSIUM SERPL-SCNC: 3.8 MMOL/L (ref 3.5–5.3)
PROT SERPL-MCNC: 6.8 G/DL (ref 6.4–8.2)
RBC # BLD AUTO: 3.71 X10*6/UL (ref 4.5–5.9)
RSV RNA RESP QL NAA+PROBE: NOT DETECTED
SARS-COV-2 RNA RESP QL NAA+PROBE: NOT DETECTED
SODIUM SERPL-SCNC: 138 MMOL/L (ref 136–145)
WBC # BLD AUTO: 4.7 X10*3/UL (ref 4.4–11.3)

## 2024-01-18 PROCEDURE — 84484 ASSAY OF TROPONIN QUANT: CPT | Performed by: GENERAL PRACTICE

## 2024-01-18 PROCEDURE — 71045 X-RAY EXAM CHEST 1 VIEW: CPT | Performed by: RADIOLOGY

## 2024-01-18 PROCEDURE — 96367 TX/PROPH/DG ADDL SEQ IV INF: CPT

## 2024-01-18 PROCEDURE — 2500000004 HC RX 250 GENERAL PHARMACY W/ HCPCS (ALT 636 FOR OP/ED): Performed by: GENERAL PRACTICE

## 2024-01-18 PROCEDURE — 1100000001 HC PRIVATE ROOM DAILY

## 2024-01-18 PROCEDURE — 71275 CT ANGIOGRAPHY CHEST: CPT | Performed by: RADIOLOGY

## 2024-01-18 PROCEDURE — 36415 COLL VENOUS BLD VENIPUNCTURE: CPT | Performed by: GENERAL PRACTICE

## 2024-01-18 PROCEDURE — 80053 COMPREHEN METABOLIC PANEL: CPT | Performed by: GENERAL PRACTICE

## 2024-01-18 PROCEDURE — 85025 COMPLETE CBC W/AUTO DIFF WBC: CPT | Performed by: GENERAL PRACTICE

## 2024-01-18 PROCEDURE — 99285 EMERGENCY DEPT VISIT HI MDM: CPT | Performed by: GENERAL PRACTICE

## 2024-01-18 PROCEDURE — 96365 THER/PROPH/DIAG IV INF INIT: CPT

## 2024-01-18 PROCEDURE — 87637 SARSCOV2&INF A&B&RSV AMP PRB: CPT | Performed by: GENERAL PRACTICE

## 2024-01-18 PROCEDURE — 71275 CT ANGIOGRAPHY CHEST: CPT

## 2024-01-18 PROCEDURE — 83735 ASSAY OF MAGNESIUM: CPT | Performed by: GENERAL PRACTICE

## 2024-01-18 PROCEDURE — 83880 ASSAY OF NATRIURETIC PEPTIDE: CPT | Performed by: GENERAL PRACTICE

## 2024-01-18 PROCEDURE — 71045 X-RAY EXAM CHEST 1 VIEW: CPT

## 2024-01-18 PROCEDURE — 85379 FIBRIN DEGRADATION QUANT: CPT | Performed by: GENERAL PRACTICE

## 2024-01-18 PROCEDURE — 2550000001 HC RX 255 CONTRASTS: Performed by: GENERAL PRACTICE

## 2024-01-18 RX ORDER — MAGNESIUM SULFATE HEPTAHYDRATE 40 MG/ML
2 INJECTION, SOLUTION INTRAVENOUS ONCE
Status: COMPLETED | OUTPATIENT
Start: 2024-01-18 | End: 2024-01-18

## 2024-01-18 RX ADMIN — MAGNESIUM SULFATE HEPTAHYDRATE 2 G: 40 INJECTION, SOLUTION INTRAVENOUS at 15:41

## 2024-01-18 RX ADMIN — IOHEXOL 75 ML: 350 INJECTION, SOLUTION INTRAVENOUS at 17:11

## 2024-01-18 RX ADMIN — AZITHROMYCIN MONOHYDRATE 500 MG: 500 INJECTION, POWDER, LYOPHILIZED, FOR SOLUTION INTRAVENOUS at 18:01

## 2024-01-18 RX ADMIN — CEFTRIAXONE 2 G: 2 INJECTION, POWDER, FOR SOLUTION INTRAMUSCULAR; INTRAVENOUS at 16:48

## 2024-01-18 SDOH — SOCIAL STABILITY: SOCIAL INSECURITY: DO YOU FEEL ANYONE HAS EXPLOITED OR TAKEN ADVANTAGE OF YOU FINANCIALLY OR OF YOUR PERSONAL PROPERTY?: NO

## 2024-01-18 SDOH — SOCIAL STABILITY: SOCIAL INSECURITY: DO YOU FEEL UNSAFE GOING BACK TO THE PLACE WHERE YOU ARE LIVING?: NO

## 2024-01-18 SDOH — SOCIAL STABILITY: SOCIAL INSECURITY: ARE YOU OR HAVE YOU BEEN THREATENED OR ABUSED PHYSICALLY, EMOTIONALLY, OR SEXUALLY BY ANYONE?: NO

## 2024-01-18 SDOH — SOCIAL STABILITY: SOCIAL INSECURITY: DOES ANYONE TRY TO KEEP YOU FROM HAVING/CONTACTING OTHER FRIENDS OR DOING THINGS OUTSIDE YOUR HOME?: NO

## 2024-01-18 SDOH — SOCIAL STABILITY: SOCIAL INSECURITY: ABUSE: ADULT

## 2024-01-18 SDOH — SOCIAL STABILITY: SOCIAL INSECURITY: WERE YOU ABLE TO COMPLETE ALL THE BEHAVIORAL HEALTH SCREENINGS?: YES

## 2024-01-18 SDOH — SOCIAL STABILITY: SOCIAL INSECURITY: HAVE YOU HAD THOUGHTS OF HARMING ANYONE ELSE?: NO

## 2024-01-18 SDOH — SOCIAL STABILITY: SOCIAL INSECURITY: HAS ANYONE EVER THREATENED TO HURT YOUR FAMILY OR YOUR PETS?: NO

## 2024-01-18 SDOH — SOCIAL STABILITY: SOCIAL INSECURITY: ARE THERE ANY APPARENT SIGNS OF INJURIES/BEHAVIORS THAT COULD BE RELATED TO ABUSE/NEGLECT?: NO

## 2024-01-18 ASSESSMENT — COGNITIVE AND FUNCTIONAL STATUS - GENERAL
CLIMB 3 TO 5 STEPS WITH RAILING: A LITTLE
TOILETING: A LITTLE
TURNING FROM BACK TO SIDE WHILE IN FLAT BAD: A LITTLE
MOVING TO AND FROM BED TO CHAIR: A LITTLE
DRESSING REGULAR UPPER BODY CLOTHING: A LITTLE
WALKING IN HOSPITAL ROOM: A LITTLE
HELP NEEDED FOR BATHING: A LITTLE
STANDING UP FROM CHAIR USING ARMS: A LITTLE
MOBILITY SCORE: 19
PATIENT BASELINE BEDBOUND: NO
DAILY ACTIVITIY SCORE: 20
DRESSING REGULAR LOWER BODY CLOTHING: A LITTLE

## 2024-01-18 ASSESSMENT — COLUMBIA-SUICIDE SEVERITY RATING SCALE - C-SSRS
1. IN THE PAST MONTH, HAVE YOU WISHED YOU WERE DEAD OR WISHED YOU COULD GO TO SLEEP AND NOT WAKE UP?: NO
2. HAVE YOU ACTUALLY HAD ANY THOUGHTS OF KILLING YOURSELF?: NO
6. HAVE YOU EVER DONE ANYTHING, STARTED TO DO ANYTHING, OR PREPARED TO DO ANYTHING TO END YOUR LIFE?: NO

## 2024-01-18 ASSESSMENT — ACTIVITIES OF DAILY LIVING (ADL)
HEARING - RIGHT EAR: FUNCTIONAL
JUDGMENT_ADEQUATE_SAFELY_COMPLETE_DAILY_ACTIVITIES: YES
HEARING - LEFT EAR: FUNCTIONAL
GROOMING: INDEPENDENT
ASSISTIVE_DEVICE: WHEELCHAIR;CANE
ADEQUATE_TO_COMPLETE_ADL: YES
WALKS IN HOME: NEEDS ASSISTANCE
BATHING: NEEDS ASSISTANCE
PATIENT'S MEMORY ADEQUATE TO SAFELY COMPLETE DAILY ACTIVITIES?: YES
FEEDING YOURSELF: INDEPENDENT
LACK_OF_TRANSPORTATION: NO
DRESSING YOURSELF: INDEPENDENT
TOILETING: NEEDS ASSISTANCE

## 2024-01-18 ASSESSMENT — LIFESTYLE VARIABLES
HOW OFTEN DO YOU HAVE 6 OR MORE DRINKS ON ONE OCCASION: NEVER
SKIP TO QUESTIONS 9-10: 1
AUDIT-C TOTAL SCORE: 0
HOW MANY STANDARD DRINKS CONTAINING ALCOHOL DO YOU HAVE ON A TYPICAL DAY: PATIENT DOES NOT DRINK
HOW OFTEN DO YOU HAVE A DRINK CONTAINING ALCOHOL: NEVER
AUDIT-C TOTAL SCORE: 0

## 2024-01-18 ASSESSMENT — PATIENT HEALTH QUESTIONNAIRE - PHQ9
SUM OF ALL RESPONSES TO PHQ9 QUESTIONS 1 & 2: 0
2. FEELING DOWN, DEPRESSED OR HOPELESS: NOT AT ALL
1. LITTLE INTEREST OR PLEASURE IN DOING THINGS: NOT AT ALL

## 2024-01-18 NOTE — ED TRIAGE NOTES
Pt to ED for a cough patient states he has no hx of asthma, COPD, or CHF. Pt sating at 93% upon arrival.

## 2024-01-18 NOTE — ED PROVIDER NOTES
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  Over the past 2 to 3 days he reports exertional dyspnea and a nonproductive cough.  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  He denies leg swelling, fever, chills, chest pain.      Limitations to history: None  Independent Historians: Patient  External Records Reviewed: HIE, outpatient notes, inpatient notes  ------------------------------------------------------------------------------------------------------------------------------------------  ROS: a ten point review of systems was performed and was negative except as per HPI.  ------------------------------------------------------------------------------------------------------------------------------------------  PMH / PSH: as per HPI, otherwise reviewed in EMR  MEDS: as per HPI, otherwise reviewed in EMR  ALLERGIES: as per HPI, otherwise reviewed in EMR  SocH:  as per HPI, otherwise reviewed in EMR  FH:  as per HPI, otherwise reviewed in EMR  ------------------------------------------------------------------------------------------------------------------------------------------  Physical Exam:  VS: As documented in the triage note and EMR flowsheet from this visit was reviewed  General: Well appearing. No acute distress.   Eyes:  Extraocular movements grossly intact. No scleral icterus. No discharge  HEENT:  Normocephalic.  Atraumatic  Neck: Moves neck freely. No gross masses  CV: Regular rhythm. No murmurs, rubs or gallops   Resp: Clear to auscultation bilaterally. No respiratory distress.    GI: Soft, no masses, nontender. No rebound tenderness or guarding  MSK: Symmetric muscle bulk. No deformities. No lower extremity edema.    Skin: Warm, dry, intact.   Neuro: No focal deficits.  A&O x3.   Psych: Appropriate for  situation  ------------------------------------------------------------------------------------------------------------------------------------------  Hospital Course / Medical Decision Making:  Independent Interpretations: Chest x-ray, CT chest  EKG as interpreted by me: Atrial fibrillation with an approximate ventricular rate of 73 bpm with a normal axis, no bundle branch block and no signs of acute ischemia    MDM: This is a 89-year-old male with a history of atrial fibrillation and hemophilia presenting with exertional dyspnea.  BNP is moderately elevated.  Troponin is elevated but stable.  EKG is nonischemic.  CT shows no pulmonary embolism.  There are groundglass opacities.  He was given Rocephin and azithromycin.  He was provided with a copy of his imaging report.  The patient was admitted to the medicine service for further management.    Discussion of Management with Other Providers:   I discussed the patient/results with: Emergency medicine team    Final diagnosis and disposition as below.    Labs Reviewed  CBC WITH AUTO DIFFERENTIAL - Abnormal     WBC                           4.7                    nRBC                          0.0                    RBC                           3.71 (*)               Hemoglobin                    12.4 (*)               Hematocrit                    38.7 (*)               MCV                           104 (*)                MCH                           33.4                   MCHC                          32.0                   RDW                           14.8 (*)               Platelets                     141 (*)                Neutrophils %                 64.5                   Immature Granulocytes %, Automated   0.2                    Lymphocytes %                 26.0                   Monocytes %                   7.6                    Eosinophils %                 1.3                    Basophils %                   0.4                    Neutrophils  Absolute          3.05                   Immature Granulocytes Absolute, Au*   0.01                   Lymphocytes Absolute          1.23                   Monocytes Absolute            0.36                   Eosinophils Absolute          0.06                   Basophils Absolute            0.02                MAGNESIUM - Abnormal     Magnesium                     1.10 (*)            COMPREHENSIVE METABOLIC PANEL - Abnormal     Glucose                       129 (*)                Sodium                        138                    Potassium                     3.8                    Chloride                      105                    Bicarbonate                   24                     Anion Gap                     13                     Urea Nitrogen                 21                     Creatinine                    1.05                   eGFR                          65                     Calcium                       8.7                    Albumin                       3.9                    Alkaline Phosphatase          82                     Total Protein                 6.8                    AST                           31                     Bilirubin, Total              1.8 (*)                ALT                           34                  TROPONIN I, HIGH SENSITIVITY - Abnormal     Troponin I, High Sensitivity   93 (*)                     Narrative: Less than 99th percentile of normal range cutoff-                  Female and children under 18 years old <14 ng/L; Male <21 ng/L: Negative                  Repeat testing should be performed if clinically indicated.                                     Female and children under 18 years old 14-50 ng/L; Male 21-50 ng/L:                  Consistent with possible cardiac damage and possible increased clinical                   risk. Serial measurements may help to assess extent of myocardial damage.                                     >50 ng/L: Consistent  with cardiac damage, increased clinical risk and                  myocardial infarction. Serial measurements may help assess extent of                   myocardial damage.                                      NOTE: Children less than 1 year old may have higher baseline troponin                   levels and results should be interpreted in conjunction with the overall                   clinical context.                                     NOTE: Troponin I testing is performed using a different                   testing methodology at [facility] than at other                   [facility]. Direct result comparisons should only                   be made within the same method.  B-TYPE NATRIURETIC PEPTIDE - Abnormal     BNP                           613 (*)                    Narrative:    <100 pg/mL - Heart failure unlikely                  100-299 pg/mL - Intermediate probability of acute heart                                  failure exacerbation. Correlate with clinical                                  context and patient history.                    >=300 pg/mL - Heart Failure likely. Correlate with clinical                                  context and patient history.                                    BNP testing is performed using different testing methodology at [facility] than at other [facility] hospitals. Direct result comparisons should only be made within the same method.                     D-DIMER, NON VTE - Abnormal     D-Dimer Non VTE, Quant (ng/mL FEU)   2,248 (*)                   Narrative: The D-Dimer assay is reported in ng/mL Fibrinogen Equivalent Units (FEU). The results of this assay should NOT be used for the exclusion of Deep Vein Thrombosis and/or Pulmonary Embolism.  TROPONIN I, HIGH SENSITIVITY - Abnormal     Troponin I, High Sensitivity   97 (*)                     Narrative: Less than 99th percentile of normal range cutoff-                  Female and children  under 18 years old <14 ng/L; Male <21 ng/L: Negative                  Repeat testing should be performed if clinically indicated.                                     Female and children under 18 years old 14-50 ng/L; Male 21-50 ng/L:                  Consistent with possible cardiac damage and possible increased clinical                   risk. Serial measurements may help to assess extent of myocardial damage.                                     >50 ng/L: Consistent with cardiac damage, increased clinical risk and                  myocardial infarction. Serial measurements may help assess extent of                   myocardial damage.                                      NOTE: Children less than 1 year old may have higher baseline troponin                   levels and results should be interpreted in conjunction with the overall                   clinical context.                                     NOTE: Troponin I testing is performed using a different                   testing methodology at [facility] than at other                   [facility]. Direct result comparisons should only                   be made within the same method.  SARS-COV-2 AND INFLUENZA A/B PCR - Normal     Flu A Result                                         Flu B Result                                         Coronavirus 2019, PCR                                    Narrative: This assay has received FDA Emergency Use Authorization (EUA) and  is only authorized for the duration of time that circumstances exist to justify the authorization of the emergency use of in vitro diagnostic tests for the detection of SARS-CoV-2 virus and/or diagnosis of COVID-19 infection under section 564(b)(1) of the Act, 21 U.S.C. 360bbb-3(b)(1). Testing for SARS-CoV-2 is only recommended for patients who meet current clinical and/or epidemiological criteria as defined by federal, state, or local public health directives. This assay is an in  vitro diagnostic nucleic acid amplification test for the qualitative detection of SARS-CoV-2, Influenza A, and Influenza B from nasopharyngeal specimens and has been validated for use at [facility]. Negative results do not preclude COVID-19 infections or Influenza A/B infections, and should not be used as the sole basis for diagnosis, treatment, or other management decisions. If Influenza A/B and RSV PCR results are negative, testing for Parainfluenza virus, Adenovirus and Metapneumovirus is routinely performed for [facility] [place] and intensive care inpatients, and is available on other patients by placing an add-on request.   RSV PCR - Normal    CT angio chest for pulmonary embolism   Final Result    1. No acute pulmonary embolism to the segmental arterial level.    2. Cardiomegaly with mild interlobular septal thickening and    ground-glass opacities suggestive of developing interstitial edema.    3. Small bilateral pleural effusions with adjacent airspace opacity    favored to relate to compressive atelectasis. Superimposed infection    not excluded. Continued radiographic follow-up is advised to ensure    complete resolution and exclude underlying lesion.    4. Main pulmonary artery is dilated up to 3.5 cm which can be seen    with pulmonary arterial hypertension.    5. Moderate-size hiatal hernia with thickening of the distal    esophagus which can be seen in the setting of esophagitis. Correlate    with symptomatology for the need for further evaluation with direct    visualization.    6. Additional findings as described above.                MACRO:    None          Signed by: Negrito Vora 1/18/2024 6:21 PM    Dictation workstation:   [id number]     XR chest 1 view   Final Result    Bibasilar airspace opacities, as above. Clinical correlation and    continued follow-up until clearing is recommended.          MACRO:    None.          Signed by: Andrews Ross 1/18/2024 2:10 PM     Dictation workstation:   [id number]                               No data recorded                Patient History   Past Medical History:   Diagnosis Date    Acute deep vein thrombosis (DVT) of left femoral vein (CMS/HCC) 09/10/2023    ACUTE DEEP VEIN THROMBOSIS, L FEMORAL, HEMOPHILIA    Acute diastolic heart failure (CMS/[facility]) 04/16/2023    Benign prostatic hyperplasia without lower urinary tract symptoms 01/07/2016    Enlarged prostate without lower urinary tract symptoms (luts)    Bleeding hemorrhoids 03/01/2023    Closed nondisplaced fracture of head of left radius 09/05/2023    COVID-19 05/21/2023    Enlarged prostate with lower urinary tract symptoms (LUTS) 03/01/2023    Fracture of bone of hip (CMS/HCC) 09/05/2023    Fracture of femoral neck, right (CMS/HCC) 03/01/2023    Gastrointestinal hemorrhage 09/05/2023    LOWER GI BLEED    Gingiva hemorrhage 09/05/2023    Gross hematuria 03/01/2023    Hemarthrosis of ankle joint 04/18/2019    Hemarthrosis of knee 05/09/2022    Hematochezia 09/05/2023    Hematoma of lower extremity 09/05/2023    Hematoma of right thigh 03/01/2023    History of recent fall 05/31/2023    Knee effusion 09/05/2023    Knee effusion, left 03/01/2023    Lumbar pain 03/01/2023    Obstructive uropathy 04/28/2023    Orthostatic syncope 09/05/2023    Personal history of other endocrine, nutritional and metabolic disease     History of hyperlipidemia    Pneumonia of right lung due to infectious organism 03/01/2023    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    Subdural hematoma (CMS/[facility]) 04/09/2023    Syncope 05/12/2023    Urinary retention 03/01/2023    UTI (urinary tract infection) 03/01/2023    Viral gastroenteritis 03/01/2023     Past Surgical History:   Procedure Laterality Date    CORONARY ARTERY BYPASS GRAFT       Family History   Problem Relation Name Age of Onset    Hemophilia Mother       Social History     Tobacco Use    Smoking status: Never    Smokeless tobacco: Never   Substance Use Topics     Alcohol use: Defer    Drug use: Never       Physical Exam   ED Triage Vitals [01/18/24 1325]   Temp Heart Rate Resp BP   36.4 °C (97.6 °F) 68 16 161/77      SpO2 Temp src Heart Rate Source Patient Position   93 % -- -- --      BP Location FiO2 (%)     -- --       Physical Exam    ED Course & [facility]   ED Course as of 01/18/24 1908   Thu Jan 18, 2024   1754 Troponin I, High Sensitivity(!!): 97 [CC]      ED Course User Index  [CC] Vinod Zuniga DO         Diagnoses as of 01/18/24 1908   Community acquired pneumonia, unspecified laterality       Medical Decision Making      Procedure  Procedures     Vinod Zuniga DO  01/18/24 1910

## 2024-01-19 ENCOUNTER — APPOINTMENT (OUTPATIENT)
Dept: VASCULAR MEDICINE | Facility: HOSPITAL | Age: 89
DRG: 193 | End: 2024-01-19
Payer: MEDICARE

## 2024-01-19 ENCOUNTER — TELEPHONE (OUTPATIENT)
Dept: HEMATOLOGY/ONCOLOGY | Facility: HOSPITAL | Age: 89
End: 2024-01-19

## 2024-01-19 LAB
ANION GAP SERPL CALC-SCNC: 13 MMOL/L (ref 10–20)
BUN SERPL-MCNC: 21 MG/DL (ref 6–23)
CALCIUM SERPL-MCNC: 8.3 MG/DL (ref 8.6–10.3)
CHLORIDE SERPL-SCNC: 106 MMOL/L (ref 98–107)
CO2 SERPL-SCNC: 23 MMOL/L (ref 21–32)
CREAT SERPL-MCNC: 1.26 MG/DL (ref 0.5–1.3)
EGFRCR SERPLBLD CKD-EPI 2021: 53 ML/MIN/1.73M*2
ERYTHROCYTE [DISTWIDTH] IN BLOOD BY AUTOMATED COUNT: 14.7 % (ref 11.5–14.5)
GLUCOSE SERPL-MCNC: 96 MG/DL (ref 74–99)
HCT VFR BLD AUTO: 35.1 % (ref 41–52)
HGB BLD-MCNC: 11.4 G/DL (ref 13.5–17.5)
MCH RBC QN AUTO: 33.5 PG (ref 26–34)
MCHC RBC AUTO-ENTMCNC: 32.5 G/DL (ref 32–36)
MCV RBC AUTO: 103 FL (ref 80–100)
NRBC BLD-RTO: 0 /100 WBCS (ref 0–0)
PLATELET # BLD AUTO: 127 X10*3/UL (ref 150–450)
POTASSIUM SERPL-SCNC: 4.1 MMOL/L (ref 3.5–5.3)
RBC # BLD AUTO: 3.4 X10*6/UL (ref 4.5–5.9)
SODIUM SERPL-SCNC: 138 MMOL/L (ref 136–145)
WBC # BLD AUTO: 4.5 X10*3/UL (ref 4.4–11.3)

## 2024-01-19 PROCEDURE — 1100000001 HC PRIVATE ROOM DAILY

## 2024-01-19 PROCEDURE — 93970 EXTREMITY STUDY: CPT

## 2024-01-19 PROCEDURE — 36415 COLL VENOUS BLD VENIPUNCTURE: CPT | Performed by: NURSE PRACTITIONER

## 2024-01-19 PROCEDURE — 99223 1ST HOSP IP/OBS HIGH 75: CPT | Performed by: NURSE PRACTITIONER

## 2024-01-19 PROCEDURE — 80048 BASIC METABOLIC PNL TOTAL CA: CPT | Performed by: NURSE PRACTITIONER

## 2024-01-19 PROCEDURE — 93970 EXTREMITY STUDY: CPT | Performed by: INTERNAL MEDICINE

## 2024-01-19 PROCEDURE — 2500000004 HC RX 250 GENERAL PHARMACY W/ HCPCS (ALT 636 FOR OP/ED): Performed by: FAMILY MEDICINE

## 2024-01-19 PROCEDURE — G0316 PR PROLONGED INPATIENT/OBSERVATION EM SVC EA 15 MIN: HCPCS | Performed by: NURSE PRACTITIONER

## 2024-01-19 PROCEDURE — 85027 COMPLETE CBC AUTOMATED: CPT | Performed by: NURSE PRACTITIONER

## 2024-01-19 PROCEDURE — 2500000001 HC RX 250 WO HCPCS SELF ADMINISTERED DRUGS (ALT 637 FOR MEDICARE OP): Performed by: PEDIATRICS

## 2024-01-19 PROCEDURE — 2500000001 HC RX 250 WO HCPCS SELF ADMINISTERED DRUGS (ALT 637 FOR MEDICARE OP): Performed by: FAMILY MEDICINE

## 2024-01-19 RX ORDER — TAMSULOSIN HYDROCHLORIDE 0.4 MG/1
0.4 CAPSULE ORAL 2 TIMES DAILY
Status: DISCONTINUED | OUTPATIENT
Start: 2024-01-19 | End: 2024-01-20 | Stop reason: HOSPADM

## 2024-01-19 RX ORDER — GUAIFENESIN 600 MG/1
600 TABLET, EXTENDED RELEASE ORAL EVERY 12 HOURS PRN
Status: CANCELLED | OUTPATIENT
Start: 2024-01-19

## 2024-01-19 RX ORDER — ONDANSETRON HYDROCHLORIDE 2 MG/ML
4 INJECTION, SOLUTION INTRAVENOUS EVERY 8 HOURS PRN
Status: CANCELLED | OUTPATIENT
Start: 2024-01-19

## 2024-01-19 RX ORDER — ACETAMINOPHEN 325 MG/1
975 TABLET ORAL EVERY 8 HOURS
Status: DISCONTINUED | OUTPATIENT
Start: 2024-01-19 | End: 2024-01-20 | Stop reason: HOSPADM

## 2024-01-19 RX ORDER — POLYETHYLENE GLYCOL 3350 17 G/17G
17 POWDER, FOR SOLUTION ORAL DAILY
Status: CANCELLED | OUTPATIENT
Start: 2024-01-19

## 2024-01-19 RX ORDER — HYDROCHLOROTHIAZIDE 12.5 MG/1
12.5 TABLET ORAL
Status: DISCONTINUED | OUTPATIENT
Start: 2024-01-19 | End: 2024-01-20 | Stop reason: HOSPADM

## 2024-01-19 RX ORDER — METOPROLOL SUCCINATE 25 MG/1
25 TABLET, EXTENDED RELEASE ORAL DAILY
Status: DISCONTINUED | OUTPATIENT
Start: 2024-01-19 | End: 2024-01-20 | Stop reason: HOSPADM

## 2024-01-19 RX ORDER — SODIUM CHLORIDE, SODIUM LACTATE, POTASSIUM CHLORIDE, CALCIUM CHLORIDE 600; 310; 30; 20 MG/100ML; MG/100ML; MG/100ML; MG/100ML
100 INJECTION, SOLUTION INTRAVENOUS CONTINUOUS
Status: CANCELLED | OUTPATIENT
Start: 2024-01-19 | End: 2024-01-19

## 2024-01-19 RX ORDER — L. ACIDOPHILUS/L.BULGARICUS 1MM CELL
1 TABLET ORAL DAILY
Status: DISCONTINUED | OUTPATIENT
Start: 2024-01-19 | End: 2024-01-19

## 2024-01-19 RX ORDER — ONDANSETRON 4 MG/1
4 TABLET, FILM COATED ORAL EVERY 8 HOURS PRN
Status: CANCELLED | OUTPATIENT
Start: 2024-01-19

## 2024-01-19 RX ORDER — FINASTERIDE 5 MG/1
5 TABLET, FILM COATED ORAL DAILY
Status: DISCONTINUED | OUTPATIENT
Start: 2024-01-19 | End: 2024-01-20 | Stop reason: HOSPADM

## 2024-01-19 RX ORDER — FERROUS SULFATE 325(65) MG
65 TABLET ORAL DAILY
Status: DISCONTINUED | OUTPATIENT
Start: 2024-01-19 | End: 2024-01-20 | Stop reason: HOSPADM

## 2024-01-19 RX ORDER — ALBUTEROL SULFATE 0.83 MG/ML
2.5 SOLUTION RESPIRATORY (INHALATION) EVERY 6 HOURS PRN
Status: DISCONTINUED | OUTPATIENT
Start: 2024-01-19 | End: 2024-01-20 | Stop reason: HOSPADM

## 2024-01-19 RX ORDER — FOLIC ACID 1 MG/1
1 TABLET ORAL DAILY
Status: DISCONTINUED | OUTPATIENT
Start: 2024-01-19 | End: 2024-01-20 | Stop reason: HOSPADM

## 2024-01-19 RX ORDER — MULTIVIT-MIN/IRON FUM/FOLIC AC 7.5 MG-4
1 TABLET ORAL DAILY
Status: DISCONTINUED | OUTPATIENT
Start: 2024-01-19 | End: 2024-01-20 | Stop reason: HOSPADM

## 2024-01-19 RX ORDER — ATORVASTATIN CALCIUM 40 MG/1
40 TABLET, FILM COATED ORAL DAILY
Status: DISCONTINUED | OUTPATIENT
Start: 2024-01-19 | End: 2024-01-20 | Stop reason: HOSPADM

## 2024-01-19 RX ORDER — CALCIUM CARBONATE 200(500)MG
500 TABLET,CHEWABLE ORAL
Status: DISCONTINUED | OUTPATIENT
Start: 2024-01-19 | End: 2024-01-20 | Stop reason: HOSPADM

## 2024-01-19 RX ORDER — SPIRONOLACTONE 25 MG/1
25 TABLET ORAL DAILY
Status: DISCONTINUED | OUTPATIENT
Start: 2024-01-19 | End: 2024-01-20 | Stop reason: HOSPADM

## 2024-01-19 RX ORDER — ALBUTEROL SULFATE 5 MG/ML
2.5 SOLUTION RESPIRATORY (INHALATION) EVERY 6 HOURS PRN
Status: DISCONTINUED | OUTPATIENT
Start: 2024-01-19 | End: 2024-01-19

## 2024-01-19 RX ORDER — PANTOPRAZOLE SODIUM 20 MG/1
20 TABLET, DELAYED RELEASE ORAL
Status: DISCONTINUED | OUTPATIENT
Start: 2024-01-19 | End: 2024-01-20 | Stop reason: HOSPADM

## 2024-01-19 RX ADMIN — ACETAMINOPHEN 975 MG: 325 TABLET ORAL at 06:39

## 2024-01-19 RX ADMIN — CEFTRIAXONE 2 G: 2 INJECTION, POWDER, FOR SOLUTION INTRAMUSCULAR; INTRAVENOUS at 17:22

## 2024-01-19 RX ADMIN — PANTOPRAZOLE SODIUM 20 MG: 20 TABLET, DELAYED RELEASE ORAL at 06:39

## 2024-01-19 RX ADMIN — METOPROLOL SUCCINATE 25 MG: 25 TABLET, EXTENDED RELEASE ORAL at 08:43

## 2024-01-19 RX ADMIN — FINASTERIDE 5 MG: 5 TABLET, FILM COATED ORAL at 08:43

## 2024-01-19 RX ADMIN — FERROUS SULFATE TAB 325 MG (65 MG ELEMENTAL FE) 1 TABLET: 325 (65 FE) TAB at 08:43

## 2024-01-19 RX ADMIN — ATORVASTATIN CALCIUM 40 MG: 40 TABLET, FILM COATED ORAL at 08:43

## 2024-01-19 RX ADMIN — TAMSULOSIN HYDROCHLORIDE 0.4 MG: 0.4 CAPSULE ORAL at 08:42

## 2024-01-19 RX ADMIN — FOLIC ACID 1 MG: 1 TABLET ORAL at 08:43

## 2024-01-19 RX ADMIN — CALCIUM CARBONATE (ANTACID) CHEW TAB 500 MG 500 MG: 500 CHEW TAB at 08:43

## 2024-01-19 RX ADMIN — Medication 1 TABLET: at 08:43

## 2024-01-19 RX ADMIN — ACETAMINOPHEN 975 MG: 325 TABLET ORAL at 20:56

## 2024-01-19 RX ADMIN — AZITHROMYCIN 500 MG: 500 INJECTION, POWDER, LYOPHILIZED, FOR SOLUTION INTRAVENOUS at 18:19

## 2024-01-19 RX ADMIN — HYDROCHLOROTHIAZIDE 12.5 MG: 12.5 TABLET ORAL at 06:39

## 2024-01-19 RX ADMIN — ACETAMINOPHEN 975 MG: 325 TABLET ORAL at 12:50

## 2024-01-19 RX ADMIN — CALCIUM CARBONATE (ANTACID) CHEW TAB 500 MG 500 MG: 500 CHEW TAB at 17:22

## 2024-01-19 RX ADMIN — TAMSULOSIN HYDROCHLORIDE 0.4 MG: 0.4 CAPSULE ORAL at 21:00

## 2024-01-19 RX ADMIN — SPIRONOLACTONE 25 MG: 25 TABLET ORAL at 08:43

## 2024-01-19 RX ADMIN — Medication 1 CAPSULE: at 08:43

## 2024-01-19 ASSESSMENT — ENCOUNTER SYMPTOMS
ACTIVITY CHANGE: 1
DIZZINESS: 0
ADENOPATHY: 0
CHILLS: 0
VOMITING: 0
NECK PAIN: 0
ARTHRALGIAS: 1
FEVER: 1
BRUISES/BLEEDS EASILY: 1
SPEECH DIFFICULTY: 0
HEADACHES: 0
HEMATURIA: 0
PALPITATIONS: 0
JOINT SWELLING: 0
COUGH: 1
MYALGIAS: 0
FEVER: 0
NAUSEA: 0
SHORTNESS OF BREATH: 1
ABDOMINAL PAIN: 0
WEAKNESS: 0
FATIGUE: 1

## 2024-01-19 ASSESSMENT — COGNITIVE AND FUNCTIONAL STATUS - GENERAL
DRESSING REGULAR UPPER BODY CLOTHING: A LITTLE
DAILY ACTIVITIY SCORE: 19
DRESSING REGULAR LOWER BODY CLOTHING: A LITTLE
MOVING FROM LYING ON BACK TO SITTING ON SIDE OF FLAT BED WITH BEDRAILS: A LITTLE
CLIMB 3 TO 5 STEPS WITH RAILING: A LITTLE
STANDING UP FROM CHAIR USING ARMS: A LITTLE
DRESSING REGULAR LOWER BODY CLOTHING: A LITTLE
HELP NEEDED FOR BATHING: A LITTLE
WALKING IN HOSPITAL ROOM: A LITTLE
TOILETING: A LOT
WALKING IN HOSPITAL ROOM: A LOT
HELP NEEDED FOR BATHING: A LITTLE
TOILETING: A LITTLE
CLIMB 3 TO 5 STEPS WITH RAILING: TOTAL
DAILY ACTIVITIY SCORE: 20
DRESSING REGULAR UPPER BODY CLOTHING: A LITTLE
MOVING TO AND FROM BED TO CHAIR: A LITTLE
TURNING FROM BACK TO SIDE WHILE IN FLAT BAD: A LITTLE
STANDING UP FROM CHAIR USING ARMS: A LITTLE
MOBILITY SCORE: 18
MOBILITY SCORE: 16
MOVING TO AND FROM BED TO CHAIR: A LOT

## 2024-01-19 ASSESSMENT — PAIN SCALES - GENERAL
PAINLEVEL_OUTOF10: 0 - NO PAIN

## 2024-01-19 ASSESSMENT — PAIN - FUNCTIONAL ASSESSMENT
PAIN_FUNCTIONAL_ASSESSMENT: 0-10

## 2024-01-19 NOTE — H&P
History Of Present Illness  Ramón Flores is a 89 y.o. male presenting with history of hemophilia presents for cough and shortness of breath.  Over the past 2 to 3 days he reports exertional dyspnea and a nonproductive cough.  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 Work up in ER noted for pneumonia. Admitted for further evaluation and treatment .     Past Medical History  He has a past medical history of Acute deep vein thrombosis (DVT) of left femoral vein (CMS/[facility]) (09/10/2023), Acute diastolic heart failure (CMS/[facility]) (04/16/2023), Benign prostatic hyperplasia without lower urinary tract symptoms (01/07/2016), Bleeding hemorrhoids (03/01/2023), Closed nondisplaced fracture of head of left radius (09/05/2023), COVID-19 (05/21/2023), Enlarged prostate with lower urinary tract symptoms (LUTS) (03/01/2023), Fracture of bone of hip (CMS/[facility]) (09/05/2023), Fracture of femoral neck, right (CMS/[facility]) (03/01/2023), Gastrointestinal hemorrhage (09/05/2023), Gingiva hemorrhage (09/05/2023), Gross hematuria (03/01/2023), Hemarthrosis of ankle joint (04/18/2019), Hemarthrosis of knee (05/09/2022), Hematochezia (09/05/2023), Hematoma of lower extremity (09/05/2023), Hematoma of right thigh (03/01/2023), History of recent fall (05/31/2023), Knee effusion (09/05/2023), Knee effusion, left (03/01/2023), Lumbar pain (03/01/2023), Obstructive uropathy (04/28/2023), Orthostatic syncope (09/05/2023), Personal history of other endocrine, nutritional and metabolic disease, Pneumonia of right lung due to infectious organism (03/01/2023), Psychogenic syncope (03/12/2023), Subdural hematoma (CMS/[facility]) (04/09/2023), Syncope (05/12/2023), Urinary retention (03/01/2023), UTI (urinary tract infection) (03/01/2023), and Viral gastroenteritis (03/01/2023).    Surgical History  He has a past surgical history that includes Coronary artery bypass graft.     Social History  He reports  that he has never smoked. He has never used smokeless tobacco. Alcohol use questions deferred to the physician. He reports that he does not use drugs.    Family History  Family History   Problem Relation Name Age of Onset    Hemophilia Mother          Allergies  Aspirin and Penicillins    Review of Systems   Constitutional:  Positive for activity change, fatigue and fever.   Respiratory:  Positive for cough and shortness of breath.         Physical Exam  HENT:      Mouth/Throat:      Mouth: Mucous membranes are moist.   Cardiovascular:      Rate and Rhythm: Normal rate and regular rhythm.   Pulmonary:      Breath sounds: Rhonchi present.   Abdominal:      General: Bowel sounds are normal.      Palpations: Abdomen is soft.   Musculoskeletal:         General: Normal range of motion.      Cervical back: Normal range of motion.   Neurological:      General: No focal deficit present.      Mental Status: He is alert and oriented to person, place, and time.          Last Recorded Vitals  BP 91/66 (BP Location: Right arm, Patient Position: Lying)   Pulse 62   Temp 36.7 °C (98 °F) (Oral)   Resp 18   Wt 69.1 kg (152 lb 5.4 oz)   SpO2 94%     Relevant Results      No current facility-administered medications on file prior to encounter.     Current Outpatient Medications on File Prior to Encounter   Medication Sig Dispense Refill    acetaminophen (Tylenol) 325 mg tablet Take 3 tablets (975 mg) by mouth every 8 hours.      albuterol 5 mg/mL nebulizer solution Take 0.5 mL (2.5 mg) by nebulization every 6 hours if needed for wheezing or shortness of breath.      antihemophil.FVIII,full length (ADVATE IV) Infuse into a venous catheter once daily as needed (hemophilia). kit; 50 unit/kg      antihemophilic factor rcmb PEG (Adynovate) 2,000 (+/-) unit solution 4000 unit 2 TIMES A WEEK (route: intravenous)      antihemophilic factor, recomb, (Recombinate) 250 (+/-) unit recon soln Infuse into a venous catheter.      atorvastatin  (Lipitor) 40 mg tablet TAKE 1 TABLET BY MOUTH DAILY 90 tablet 1    bumetanide (Bumex) 1 mg tablet Take 1 tablet (1 mg) by mouth once daily as needed (edema).      calcium carbonate 600 mg calcium (1,500 mg) tablet Take 1 tablet (1,500 mg) by mouth 2 times a day with meals.      carboxymethylcellulose (Refresh Plus) 0.5 % ophthalmic solution Administer 1 drop into both eyes 4 times a day.      ferrous sulfate 325 (65 Fe) MG tablet Take 1 tablet by mouth once daily.      finasteride (Proscar) 5 mg tablet Take 1 tablet (5 mg) by mouth once daily. 90 tablet 3    folic acid (Folvite) 1 mg tablet Take 1 tablet (1 mg) by mouth once daily.      FVIII rec,Fc-VWF-XTEN,BDD-ehtl (Altuviiio) 250 (+/-) unit recon soln Infuse 3,450 Int'l Units/day into a venous catheter.      hydroCHLOROthiazide (Microzide) 12.5 mg capsule Take 1 capsule (12.5 mg) by mouth once daily.      Lactobacillus acidoph-L.bulgar 1 million cell tablet tablet Take 1 tablet by mouth once daily.      metoprolol succinate XL (Toprol-XL) 50 mg 24 hr tablet Take 0.5 tablets (25 mg) by mouth once daily.      multivitamin with minerals (multivitamin-iron-folic acid) tablet Take 1 tablet by mouth once daily.      nystatin (Mycostatin) cream Apply topically every 12 hours. 42 g 1    pantoprazole (ProtoNix) 20 mg EC tablet Take 1 tablet (20 mg) by mouth once daily in the morning. Take before meals. Do not crush, chew, or split.      propylene glycol-glycerin 1-0.3 % drops Administer 1 drop into affected eye(s) 4 times a day.      spironolactone (Aldactone) 25 mg tablet TAKE 1 TABLET BY MOUTH DAILY 90 tablet 1    tamsulosin (Flomax) 0.4 mg 24 hr capsule Take 1 capsule (0.4 mg) by mouth 2 times a day. 180 capsule 3    vit A/vit C/vit E/zinc/copper (PRESERVISION AREDS ORAL) Take 1 capsule by mouth once daily.      [DISCONTINUED] atorvastatin (Lipitor) 40 mg tablet Take 1 tablet (40 mg) by mouth once daily.      [DISCONTINUED] spironolactone (Aldactone) 25 mg tablet Take  1 tablet (25 mg) by mouth once daily.         Results for orders placed or performed during the hospital encounter of 01/18/24 (from the past 24 hour(s))   CBC and Auto Differential   Result Value Ref Range    WBC 4.7 4.4 - 11.3 x10*3/uL    nRBC 0.0 0.0 - 0.0 /100 WBCs    RBC 3.71 (L) 4.50 - 5.90 x10*6/uL    Hemoglobin 12.4 (L) 13.5 - 17.5 g/dL    Hematocrit 38.7 (L) 41.0 - 52.0 %     (H) 80 - 100 fL    MCH 33.4 26.0 - 34.0 pg    MCHC 32.0 32.0 - 36.0 g/dL    RDW 14.8 (H) 11.5 - 14.5 %    Platelets 141 (L) 150 - 450 x10*3/uL    Neutrophils % 64.5 40.0 - 80.0 %    Immature Granulocytes %, Automated 0.2 0.0 - 0.9 %    Lymphocytes % 26.0 13.0 - 44.0 %    Monocytes % 7.6 2.0 - 10.0 %    Eosinophils % 1.3 0.0 - 6.0 %    Basophils % 0.4 0.0 - 2.0 %    Neutrophils Absolute 3.05 1.60 - 5.50 x10*3/uL    Immature Granulocytes Absolute, Automated 0.01 0.00 - 0.50 x10*3/uL    Lymphocytes Absolute 1.23 0.80 - 3.00 x10*3/uL    Monocytes Absolute 0.36 0.05 - 0.80 x10*3/uL    Eosinophils Absolute 0.06 0.00 - 0.40 x10*3/uL    Basophils Absolute 0.02 0.00 - 0.10 x10*3/uL   Magnesium   Result Value Ref Range    Magnesium 1.10 (L) 1.60 - 2.40 mg/dL   Comprehensive metabolic panel   Result Value Ref Range    Glucose 129 (H) 74 - 99 mg/dL    Sodium 138 136 - 145 mmol/L    Potassium 3.8 3.5 - 5.3 mmol/L    Chloride 105 98 - 107 mmol/L    Bicarbonate 24 21 - 32 mmol/L    Anion Gap 13 10 - 20 mmol/L    Urea Nitrogen 21 6 - 23 mg/dL    Creatinine 1.05 0.50 - 1.30 mg/dL    eGFR 65 >60 mL/min/1.73m*2    Calcium 8.7 8.6 - 10.3 mg/dL    Albumin 3.9 3.4 - 5.0 g/dL    Alkaline Phosphatase 82 33 - 136 U/L    Total Protein 6.8 6.4 - 8.2 g/dL    AST 31 9 - 39 U/L    Bilirubin, Total 1.8 (H) 0.0 - 1.2 mg/dL    ALT 34 10 - 52 U/L   Troponin I, High Sensitivity   Result Value Ref Range    Troponin I, High Sensitivity 93 (HH) 0 - 20 ng/L   B-Type Natriuretic Peptide   Result Value Ref Range     (H) 0 - 99 pg/mL   Sars-CoV-2 and Influenza  A/B PCR   Result Value Ref Range    Flu A Result Not Detected Not Detected    Flu B Result Not Detected Not Detected    Coronavirus 2019, PCR Not Detected Not Detected   RSV PCR   Result Value Ref Range    RSV PCR Not Detected Not Detected   D-dimer, quantitative   Result Value Ref Range    D-Dimer Non VTE, Quant (ng/mL FEU) 2,248 (H) <=500 ng/mL FEU   Troponin I, High Sensitivity   Result Value Ref Range    Troponin I, High Sensitivity 97 (HH) 0 - 20 ng/L              Assessment/Plan   Principal Problem:    Community acquired pneumonia, unspecified laterality      Pneumonia  -IV abx and supportive care    Hemophilia   -consult to Heme, await clarity and recommendations of his out patient regimen    HFpEF  Afib, not on AC 2/2 above  HTN    Resume home medicatiosn    -Continue current treatment as ordered. Will make adjustments as necessary.    VTE Prophylaxis  -See Orders       Patient case and plan of care discussed with Dr. MAYKEL Lee.    SUSI Murdock - CNP  -In collaboration with Dr. MAYKEL Lee    [facility]  Office: [phone]  Fax: [phone]  I have reviewed the above note obtained and documented by the NP/PA and I personally participated in the key components. I have discussed the case and management of the patient's care. Changes made to the note, and all key components of history and physical/progress note done by me.  dw nursing  Admitted for general weakness, cough,   Does have hemophilia has been seeing [facility] at [facility],   Per pt getting factor 8 every week  No h/o spontaneous bleed  Consulted [facility] and nikki NP  Will plan on transfer to [facility] for ongoing hemophilia treatment   Cont with antibiotics for cap  US of legs to eval for DVT  Detailed dw pt  Enrique Lee MD

## 2024-01-19 NOTE — TELEPHONE ENCOUNTER
[facility] Nurse Note: Niece (Jovita Wallace) called asking for update on whether her uncle is being transferred to [facility] from [facility].  States she cannot reach nurses at [facility] for update.  Is worried about her uncle being alone but said she has medical issues and cannot be with him all damir and out in this weather.  I do not know the status of transfer.  Confirmed her family and with [facility] that he is getting Altuviiio at home via Home Infusion Nurse every 7 days.  Usually on Tuesday, but this last week dose was on Wed 1/17 around 4 pm.

## 2024-01-19 NOTE — CARE PLAN
The patient's goals for the shift include safety    The clinical goals for the shift include to remain on room air

## 2024-01-19 NOTE — CARE PLAN
The clinical goals for the shift include pt to sit up in chair for 2 hours this shift, not met. Order in for pt to transfer due to blood condition, Hemophilia. Pt being treated pneumonia with atb. Pt low 02 in the am, place on 2 liters which has been effective through day. Us lower extremities done results negative.

## 2024-01-19 NOTE — PROGRESS NOTES
Pharmacy Medication History Review    Ramón Flores is a 89 y.o. male admitted for Community acquired pneumonia, unspecified laterality. Pharmacy reviewed the patient's [id number] medications and allergies for accuracy.    The list below reflectives the updated PTA list. Please review each medication in order reconciliation for additional clarification and justification.  Medications Prior to Admission   Medication Sig Dispense Refill Last Dose    acetaminophen (Tylenol) 325 mg tablet Take 3 tablets (975 mg) by mouth every 8 hours.       albuterol 5 mg/mL nebulizer solution Take 0.5 mL (2.5 mg) by nebulization every 6 hours if needed for wheezing or shortness of breath.       antihemophil.FVIII,full length (ADVATE IV) Infuse into a venous catheter once daily as needed (hemophilia). kit; 50 unit/kg       antihemophilic factor rcmb PEG (Adynovate) 2,000 (+/-) unit solution 4000 unit 2 TIMES A WEEK (route: intravenous)       antihemophilic factor, recomb, (Recombinate) 250 (+/-) unit recon soln Infuse into a venous catheter.       atorvastatin (Lipitor) 40 mg tablet TAKE 1 TABLET BY MOUTH DAILY 90 tablet 1     bumetanide (Bumex) 1 mg tablet Take 1 tablet (1 mg) by mouth once daily as needed (edema).       calcium carbonate 600 mg calcium (1,500 mg) tablet Take 1 tablet (1,500 mg) by mouth 2 times a day with meals.       carboxymethylcellulose (Refresh Plus) 0.5 % ophthalmic solution Administer 1 drop into both eyes 4 times a day.       ferrous sulfate 325 (65 Fe) MG tablet Take 1 tablet by mouth once daily.       finasteride (Proscar) 5 mg tablet Take 1 tablet (5 mg) by mouth once daily. 90 tablet 3     folic acid (Folvite) 1 mg tablet Take 1 tablet (1 mg) by mouth once daily.       FVIII rec,Fc-VWF-XTEN,BDD-ehtl (Altuviiio) 250 (+/-) unit recon soln Infuse 3,450 Int'l Units/day into a venous catheter.       hydroCHLOROthiazide (Microzide) 12.5 mg capsule Take 1 capsule (12.5 mg) by mouth once daily.        Lactobacillus acidoph-L.bulgar 1 million cell tablet tablet Take 1 tablet by mouth once daily.       metoprolol succinate XL (Toprol-XL) 50 mg 24 hr tablet Take 0.5 tablets (25 mg) by mouth once daily.       multivitamin with minerals (multivitamin-iron-folic acid) tablet Take 1 tablet by mouth once daily.       nystatin (Mycostatin) cream Apply topically every 12 hours. 42 g 1     pantoprazole (ProtoNix) 20 mg EC tablet Take 1 tablet (20 mg) by mouth once daily in the morning. Take before meals. Do not crush, chew, or split.       propylene glycol-glycerin 1-0.3 % drops Administer 1 drop into affected eye(s) 4 times a day.       spironolactone (Aldactone) 25 mg tablet TAKE 1 TABLET BY MOUTH DAILY 90 tablet 1     tamsulosin (Flomax) 0.4 mg 24 hr capsule Take 1 capsule (0.4 mg) by mouth 2 times a day. 180 capsule 3     vit A/vit C/vit E/zinc/copper (PRESERVISION AREDS ORAL) Take 1 capsule by mouth once daily.        Spoke to the patient. Patient does not do the hemophilic infusions    The list below reflectives the updated allergy list. Please review each documented allergy for additional clarification and justification.  Allergies  Reviewed by Elizabeth Bassett RN on 1/18/2024        Severity Reactions Comments    Aspirin Not Specified Unknown     Penicillins Low Rash             Below are additional concerns with the patient's PTA list.      Whit Short CPhT

## 2024-01-19 NOTE — PROGRESS NOTES
01/19/24 7324   Discharge Planning   Living Arrangements Alone   Support Systems Children;Home care staff   Assistance Needed Independent with ADLS/ Family assists with IADLS. Ambulatory at home. Follow up on name of HHC vs Private C. Family drives to appointments. Anticipate [facility] needs.   Type of Residence Private residence   Number of Stairs to Enter Residence 2   Number of Stairs Within Residence 0   Do you have animals or pets at home? No   Who is requesting discharge planning? Other (Comment)  (Admit RN/ [facility] Initial Assessment)   Patient expects to be discharged to: [facility] with [facility].   Does the patient need discharge transport arranged? Yes   RoundTrip coordination needed? Yes     Patient with Hematology consult. Skilled needs TBD. Will need to speak with son for collaboration of anticipated needs. Will need PT/OT kavitha.   Lora ROY RN [facility] CCM

## 2024-01-20 ENCOUNTER — HOSPITAL ENCOUNTER (INPATIENT)
Facility: HOSPITAL | Age: 89
LOS: 2 days | Discharge: HOME | DRG: 193 | End: 2024-01-22
Attending: INTERNAL MEDICINE | Admitting: NURSE PRACTITIONER
Payer: MEDICARE

## 2024-01-20 ENCOUNTER — HOSPITAL ENCOUNTER (EMERGENCY)
Facility: HOSPITAL | Age: 89
Discharge: HOME | DRG: 193 | End: 2024-01-20
Payer: MEDICARE

## 2024-01-20 ENCOUNTER — APPOINTMENT (OUTPATIENT)
Dept: CARDIOLOGY | Facility: HOSPITAL | Age: 89
DRG: 193 | End: 2024-01-20
Payer: MEDICARE

## 2024-01-20 VITALS
HEART RATE: 61 BPM | DIASTOLIC BLOOD PRESSURE: 75 MMHG | OXYGEN SATURATION: 98 % | SYSTOLIC BLOOD PRESSURE: 129 MMHG | WEIGHT: 152.34 LBS | RESPIRATION RATE: 18 BRPM | TEMPERATURE: 97.5 F | HEIGHT: 66 IN | BODY MASS INDEX: 24.48 KG/M2

## 2024-01-20 DIAGNOSIS — D66 HEMOPHILIA A (MULTI): ICD-10-CM

## 2024-01-20 DIAGNOSIS — J18.9 COMMUNITY ACQUIRED PNEUMONIA, UNSPECIFIED LATERALITY: ICD-10-CM

## 2024-01-20 DIAGNOSIS — J16.0 CAP (COMMUNITY ACQUIRED PNEUMONIA) DUE TO CHLAMYDIA SPECIES: Primary | ICD-10-CM

## 2024-01-20 LAB
ALBUMIN SERPL BCP-MCNC: 3.5 G/DL (ref 3.4–5)
ANION GAP SERPL CALC-SCNC: 14 MMOL/L (ref 10–20)
APTT PPP: 49 SECONDS (ref 27–38)
BASOPHILS # BLD AUTO: 0.03 X10*3/UL (ref 0–0.1)
BASOPHILS NFR BLD AUTO: 0.7 %
BUN SERPL-MCNC: 21 MG/DL (ref 6–23)
CALCIUM SERPL-MCNC: 8.4 MG/DL (ref 8.6–10.3)
CARDIAC TROPONIN I PNL SERPL HS: 70 NG/L (ref 0–20)
CHLORIDE SERPL-SCNC: 104 MMOL/L (ref 98–107)
CO2 SERPL-SCNC: 22 MMOL/L (ref 21–32)
CREAT SERPL-MCNC: 1.31 MG/DL (ref 0.5–1.3)
EGFRCR SERPLBLD CKD-EPI 2021: 50 ML/MIN/1.73M*2
EOSINOPHIL # BLD AUTO: 0.12 X10*3/UL (ref 0–0.4)
EOSINOPHIL NFR BLD AUTO: 2.7 %
ERYTHROCYTE [DISTWIDTH] IN BLOOD BY AUTOMATED COUNT: 14.5 % (ref 11.5–14.5)
GLUCOSE SERPL-MCNC: 143 MG/DL (ref 74–99)
HCT VFR BLD AUTO: 37.6 % (ref 41–52)
HGB BLD-MCNC: 12.4 G/DL (ref 13.5–17.5)
HOLD SPECIMEN: NORMAL
HOLD SPECIMEN: NORMAL
IMM GRANULOCYTES # BLD AUTO: 0.01 X10*3/UL (ref 0–0.5)
IMM GRANULOCYTES NFR BLD AUTO: 0.2 % (ref 0–0.9)
INR PPP: 1.3 (ref 0.9–1.1)
LYMPHOCYTES # BLD AUTO: 1.5 X10*3/UL (ref 0.8–3)
LYMPHOCYTES NFR BLD AUTO: 33.5 %
MAGNESIUM SERPL-MCNC: 1.5 MG/DL (ref 1.6–2.4)
MCH RBC QN AUTO: 34.1 PG (ref 26–34)
MCHC RBC AUTO-ENTMCNC: 33 G/DL (ref 32–36)
MCV RBC AUTO: 103 FL (ref 80–100)
MONOCYTES # BLD AUTO: 0.39 X10*3/UL (ref 0.05–0.8)
MONOCYTES NFR BLD AUTO: 8.7 %
NEUTROPHILS # BLD AUTO: 2.43 X10*3/UL (ref 1.6–5.5)
NEUTROPHILS NFR BLD AUTO: 54.2 %
NRBC BLD-RTO: 0 /100 WBCS (ref 0–0)
PHOSPHATE SERPL-MCNC: 3.4 MG/DL (ref 2.5–4.9)
PLATELET # BLD AUTO: 144 X10*3/UL (ref 150–450)
POTASSIUM SERPL-SCNC: 3.8 MMOL/L (ref 3.5–5.3)
PROTHROMBIN TIME: 14.4 SECONDS (ref 9.8–12.8)
RBC # BLD AUTO: 3.64 X10*6/UL (ref 4.5–5.9)
SODIUM SERPL-SCNC: 136 MMOL/L (ref 136–145)
WBC # BLD AUTO: 4.5 X10*3/UL (ref 4.4–11.3)

## 2024-01-20 PROCEDURE — 2500000001 HC RX 250 WO HCPCS SELF ADMINISTERED DRUGS (ALT 637 FOR MEDICARE OP): Performed by: NURSE PRACTITIONER

## 2024-01-20 PROCEDURE — 99223 1ST HOSP IP/OBS HIGH 75: CPT | Performed by: STUDENT IN AN ORGANIZED HEALTH CARE EDUCATION/TRAINING PROGRAM

## 2024-01-20 PROCEDURE — 2500000004 HC RX 250 GENERAL PHARMACY W/ HCPCS (ALT 636 FOR OP/ED): Performed by: NURSE PRACTITIONER

## 2024-01-20 PROCEDURE — 80069 RENAL FUNCTION PANEL: CPT | Performed by: FAMILY MEDICINE

## 2024-01-20 PROCEDURE — 99283 EMERGENCY DEPT VISIT LOW MDM: CPT

## 2024-01-20 PROCEDURE — 87081 CULTURE SCREEN ONLY: CPT | Performed by: NURSE PRACTITIONER

## 2024-01-20 PROCEDURE — 2500000004 HC RX 250 GENERAL PHARMACY W/ HCPCS (ALT 636 FOR OP/ED): Mod: MUE | Performed by: FAMILY MEDICINE

## 2024-01-20 PROCEDURE — 2500000001 HC RX 250 WO HCPCS SELF ADMINISTERED DRUGS (ALT 637 FOR MEDICARE OP): Performed by: FAMILY MEDICINE

## 2024-01-20 PROCEDURE — 1170000001 HC PRIVATE ONCOLOGY ROOM DAILY

## 2024-01-20 PROCEDURE — 93010 ELECTROCARDIOGRAM REPORT: CPT | Performed by: INTERNAL MEDICINE

## 2024-01-20 PROCEDURE — 84484 ASSAY OF TROPONIN QUANT: CPT | Performed by: NURSE PRACTITIONER

## 2024-01-20 PROCEDURE — 83735 ASSAY OF MAGNESIUM: CPT | Performed by: NURSE PRACTITIONER

## 2024-01-20 PROCEDURE — 93005 ELECTROCARDIOGRAM TRACING: CPT

## 2024-01-20 PROCEDURE — 36415 COLL VENOUS BLD VENIPUNCTURE: CPT | Performed by: FAMILY MEDICINE

## 2024-01-20 PROCEDURE — 85730 THROMBOPLASTIN TIME PARTIAL: CPT | Performed by: NURSE PRACTITIONER

## 2024-01-20 PROCEDURE — 36415 COLL VENOUS BLD VENIPUNCTURE: CPT | Performed by: NURSE PRACTITIONER

## 2024-01-20 PROCEDURE — 99223 1ST HOSP IP/OBS HIGH 75: CPT | Performed by: NURSE PRACTITIONER

## 2024-01-20 PROCEDURE — 2500000001 HC RX 250 WO HCPCS SELF ADMINISTERED DRUGS (ALT 637 FOR MEDICARE OP): Performed by: PEDIATRICS

## 2024-01-20 PROCEDURE — 85025 COMPLETE CBC W/AUTO DIFF WBC: CPT | Performed by: NURSE PRACTITIONER

## 2024-01-20 RX ORDER — ALBUTEROL SULFATE 0.83 MG/ML
2.5 SOLUTION RESPIRATORY (INHALATION) EVERY 6 HOURS PRN
Status: DISCONTINUED | OUTPATIENT
Start: 2024-01-20 | End: 2024-01-22 | Stop reason: HOSPADM

## 2024-01-20 RX ORDER — ALUMINUM HYDROXIDE, MAGNESIUM HYDROXIDE, AND SIMETHICONE 1200; 120; 1200 MG/30ML; MG/30ML; MG/30ML
20 SUSPENSION ORAL 4 TIMES DAILY PRN
Status: DISCONTINUED | OUTPATIENT
Start: 2024-01-20 | End: 2024-01-22 | Stop reason: HOSPADM

## 2024-01-20 RX ORDER — FOLIC ACID 1 MG/1
1 TABLET ORAL DAILY
Status: DISCONTINUED | OUTPATIENT
Start: 2024-01-20 | End: 2024-01-22 | Stop reason: HOSPADM

## 2024-01-20 RX ORDER — METOPROLOL SUCCINATE 25 MG/1
25 TABLET, EXTENDED RELEASE ORAL DAILY
Status: DISCONTINUED | OUTPATIENT
Start: 2024-01-20 | End: 2024-01-22 | Stop reason: HOSPADM

## 2024-01-20 RX ORDER — FINASTERIDE 5 MG/1
5 TABLET, FILM COATED ORAL DAILY
Status: DISCONTINUED | OUTPATIENT
Start: 2024-01-20 | End: 2024-01-22 | Stop reason: HOSPADM

## 2024-01-20 RX ORDER — TAMSULOSIN HYDROCHLORIDE 0.4 MG/1
0.4 CAPSULE ORAL DAILY
Status: DISCONTINUED | OUTPATIENT
Start: 2024-01-20 | End: 2024-01-22 | Stop reason: HOSPADM

## 2024-01-20 RX ORDER — FERROUS SULFATE 325(65) MG
65 TABLET ORAL DAILY
Status: DISCONTINUED | OUTPATIENT
Start: 2024-01-20 | End: 2024-01-22 | Stop reason: HOSPADM

## 2024-01-20 RX ORDER — FAMOTIDINE 10 MG/ML
20 INJECTION INTRAVENOUS ONCE
Status: COMPLETED | OUTPATIENT
Start: 2024-01-20 | End: 2024-01-20

## 2024-01-20 RX ORDER — TALC
3 POWDER (GRAM) TOPICAL ONCE
Status: DISCONTINUED | OUTPATIENT
Start: 2024-01-20 | End: 2024-01-22 | Stop reason: HOSPADM

## 2024-01-20 RX ORDER — ACETAMINOPHEN 325 MG/1
650 TABLET ORAL EVERY 6 HOURS PRN
Status: DISCONTINUED | OUTPATIENT
Start: 2024-01-20 | End: 2024-01-22 | Stop reason: HOSPADM

## 2024-01-20 RX ORDER — MAGNESIUM SULFATE HEPTAHYDRATE 40 MG/ML
2 INJECTION, SOLUTION INTRAVENOUS ONCE
Status: DISCONTINUED | OUTPATIENT
Start: 2024-01-20 | End: 2024-01-20

## 2024-01-20 RX ORDER — PANTOPRAZOLE SODIUM 20 MG/1
20 TABLET, DELAYED RELEASE ORAL
Status: DISCONTINUED | OUTPATIENT
Start: 2024-01-21 | End: 2024-01-21

## 2024-01-20 RX ORDER — SPIRONOLACTONE 25 MG/1
25 TABLET ORAL DAILY
Status: DISCONTINUED | OUTPATIENT
Start: 2024-01-20 | End: 2024-01-22 | Stop reason: HOSPADM

## 2024-01-20 RX ORDER — BENZONATATE 100 MG/1
100 CAPSULE ORAL 3 TIMES DAILY PRN
Status: DISCONTINUED | OUTPATIENT
Start: 2024-01-20 | End: 2024-01-21

## 2024-01-20 RX ORDER — MULTIVIT-MIN/IRON FUM/FOLIC AC 7.5 MG-4
1 TABLET ORAL DAILY
Status: DISCONTINUED | OUTPATIENT
Start: 2024-01-20 | End: 2024-01-22 | Stop reason: HOSPADM

## 2024-01-20 RX ORDER — GUAIFENESIN 600 MG/1
600 TABLET, EXTENDED RELEASE ORAL 2 TIMES DAILY PRN
Status: DISCONTINUED | OUTPATIENT
Start: 2024-01-20 | End: 2024-01-21

## 2024-01-20 RX ORDER — CEFTRIAXONE 1 G/50ML
1 INJECTION, SOLUTION INTRAVENOUS EVERY 24 HOURS
Status: DISCONTINUED | OUTPATIENT
Start: 2024-01-20 | End: 2024-01-22 | Stop reason: HOSPADM

## 2024-01-20 RX ORDER — MAGNESIUM SULFATE HEPTAHYDRATE 40 MG/ML
2 INJECTION, SOLUTION INTRAVENOUS ONCE
Status: COMPLETED | OUTPATIENT
Start: 2024-01-20 | End: 2024-01-20

## 2024-01-20 RX ORDER — CALCIUM CARBONATE 500(1250)
1250 TABLET ORAL
Status: DISCONTINUED | OUTPATIENT
Start: 2024-01-20 | End: 2024-01-22 | Stop reason: HOSPADM

## 2024-01-20 RX ORDER — HYDROCHLOROTHIAZIDE 25 MG/1
12.5 TABLET ORAL DAILY
Status: DISCONTINUED | OUTPATIENT
Start: 2024-01-20 | End: 2024-01-22 | Stop reason: HOSPADM

## 2024-01-20 RX ORDER — ATORVASTATIN CALCIUM 40 MG/1
40 TABLET, FILM COATED ORAL DAILY
Status: DISCONTINUED | OUTPATIENT
Start: 2024-01-20 | End: 2024-01-22 | Stop reason: HOSPADM

## 2024-01-20 RX ADMIN — FINASTERIDE 5 MG: 5 TABLET, FILM COATED ORAL at 08:28

## 2024-01-20 RX ADMIN — SPIRONOLACTONE 25 MG: 25 TABLET ORAL at 08:28

## 2024-01-20 RX ADMIN — GUAIFENESIN 600 MG: 600 TABLET ORAL at 20:16

## 2024-01-20 RX ADMIN — CALCIUM 1250 MG: 500 TABLET ORAL at 17:00

## 2024-01-20 RX ADMIN — Medication 1 CAPSULE: at 08:28

## 2024-01-20 RX ADMIN — CALCIUM CARBONATE (ANTACID) CHEW TAB 500 MG 500 MG: 500 CHEW TAB at 08:28

## 2024-01-20 RX ADMIN — HYDROCHLOROTHIAZIDE 12.5 MG: 12.5 TABLET ORAL at 06:32

## 2024-01-20 RX ADMIN — PANTOPRAZOLE SODIUM 20 MG: 20 TABLET, DELAYED RELEASE ORAL at 06:32

## 2024-01-20 RX ADMIN — ATORVASTATIN CALCIUM 40 MG: 40 TABLET, FILM COATED ORAL at 08:29

## 2024-01-20 RX ADMIN — TAMSULOSIN HYDROCHLORIDE 0.4 MG: 0.4 CAPSULE ORAL at 08:29

## 2024-01-20 RX ADMIN — METOPROLOL SUCCINATE 25 MG: 25 TABLET, EXTENDED RELEASE ORAL at 08:28

## 2024-01-20 RX ADMIN — SODIUM CHLORIDE, SODIUM LACTATE, POTASSIUM CHLORIDE, AND CALCIUM CHLORIDE 500 ML: 600; 310; 30; 20 INJECTION, SOLUTION INTRAVENOUS at 15:00

## 2024-01-20 RX ADMIN — FERROUS SULFATE TAB 325 MG (65 MG ELEMENTAL FE) 1 TABLET: 325 (65 FE) TAB at 08:28

## 2024-01-20 RX ADMIN — FAMOTIDINE 20 MG: 10 INJECTION INTRAVENOUS at 21:03

## 2024-01-20 RX ADMIN — FOLIC ACID 1 MG: 1 TABLET ORAL at 08:28

## 2024-01-20 RX ADMIN — CEFTRIAXONE SODIUM 1 G: 1 INJECTION, SOLUTION INTRAVENOUS at 17:42

## 2024-01-20 RX ADMIN — ACETAMINOPHEN 975 MG: 325 TABLET ORAL at 06:32

## 2024-01-20 RX ADMIN — BENZONATATE 100 MG: 100 CAPSULE ORAL at 20:15

## 2024-01-20 RX ADMIN — ACETAMINOPHEN 975 MG: 325 TABLET ORAL at 13:03

## 2024-01-20 RX ADMIN — Medication 1 TABLET: at 08:28

## 2024-01-20 RX ADMIN — AZITHROMYCIN 500 MG: 500 INJECTION, POWDER, LYOPHILIZED, FOR SOLUTION INTRAVENOUS at 18:00

## 2024-01-20 RX ADMIN — MAGNESIUM SULFATE HEPTAHYDRATE 2 G: 40 INJECTION, SOLUTION INTRAVENOUS at 12:21

## 2024-01-20 ASSESSMENT — COGNITIVE AND FUNCTIONAL STATUS - GENERAL
TOILETING: A LITTLE
MOVING FROM LYING ON BACK TO SITTING ON SIDE OF FLAT BED WITH BEDRAILS: A LITTLE
DRESSING REGULAR LOWER BODY CLOTHING: A LITTLE
PATIENT BASELINE BEDBOUND: NO
WALKING IN HOSPITAL ROOM: A LITTLE
MOBILITY SCORE: 18
TURNING FROM BACK TO SIDE WHILE IN FLAT BAD: A LITTLE
CLIMB 3 TO 5 STEPS WITH RAILING: A LOT
CLIMB 3 TO 5 STEPS WITH RAILING: A LITTLE
DRESSING REGULAR UPPER BODY CLOTHING: A LITTLE
HELP NEEDED FOR BATHING: A LITTLE
TURNING FROM BACK TO SIDE WHILE IN FLAT BAD: A LITTLE
MOBILITY SCORE: 17
WALKING IN HOSPITAL ROOM: A LITTLE
MOVING TO AND FROM BED TO CHAIR: A LITTLE
DRESSING REGULAR LOWER BODY CLOTHING: A LITTLE
MOVING FROM LYING ON BACK TO SITTING ON SIDE OF FLAT BED WITH BEDRAILS: A LITTLE
MOBILITY SCORE: 17
TURNING FROM BACK TO SIDE WHILE IN FLAT BAD: A LITTLE
MOVING TO AND FROM BED TO CHAIR: A LITTLE
CLIMB 3 TO 5 STEPS WITH RAILING: A LOT
MOVING FROM LYING ON BACK TO SITTING ON SIDE OF FLAT BED WITH BEDRAILS: A LITTLE
STANDING UP FROM CHAIR USING ARMS: A LITTLE
DAILY ACTIVITIY SCORE: 20
DRESSING REGULAR UPPER BODY CLOTHING: A LITTLE
DRESSING REGULAR UPPER BODY CLOTHING: A LITTLE
STANDING UP FROM CHAIR USING ARMS: A LITTLE
STANDING UP FROM CHAIR USING ARMS: A LITTLE
HELP NEEDED FOR BATHING: A LITTLE
MOVING TO AND FROM BED TO CHAIR: A LITTLE
DAILY ACTIVITIY SCORE: 21
HELP NEEDED FOR BATHING: A LITTLE
DAILY ACTIVITIY SCORE: 21
DRESSING REGULAR LOWER BODY CLOTHING: A LITTLE
WALKING IN HOSPITAL ROOM: A LITTLE

## 2024-01-20 ASSESSMENT — COLUMBIA-SUICIDE SEVERITY RATING SCALE - C-SSRS
2. HAVE YOU ACTUALLY HAD ANY THOUGHTS OF KILLING YOURSELF?: NO
6. HAVE YOU EVER DONE ANYTHING, STARTED TO DO ANYTHING, OR PREPARED TO DO ANYTHING TO END YOUR LIFE?: NO
1. IN THE PAST MONTH, HAVE YOU WISHED YOU WERE DEAD OR WISHED YOU COULD GO TO SLEEP AND NOT WAKE UP?: NO

## 2024-01-20 ASSESSMENT — ACTIVITIES OF DAILY LIVING (ADL)
HEARING - RIGHT EAR: FUNCTIONAL
TOILETING: INDEPENDENT
FEEDING YOURSELF: INDEPENDENT
GROOMING: INDEPENDENT
WALKS IN HOME: NEEDS ASSISTANCE
ASSISTIVE_DEVICE: EYEGLASSES;WALKER;WHEELCHAIR
BATHING: NEEDS ASSISTANCE
DRESSING YOURSELF: NEEDS ASSISTANCE
ADEQUATE_TO_COMPLETE_ADL: YES
HEARING - LEFT EAR: FUNCTIONAL
LACK_OF_TRANSPORTATION: PATIENT DECLINED
LACK_OF_TRANSPORTATION: NO
JUDGMENT_ADEQUATE_SAFELY_COMPLETE_DAILY_ACTIVITIES: YES
LACK_OF_TRANSPORTATION: NO
PATIENT'S MEMORY ADEQUATE TO SAFELY COMPLETE DAILY ACTIVITIES?: YES
LACK_OF_TRANSPORTATION: NO

## 2024-01-20 ASSESSMENT — PAIN SCALES - GENERAL
PAINLEVEL_OUTOF10: 0 - NO PAIN

## 2024-01-20 ASSESSMENT — PAIN - FUNCTIONAL ASSESSMENT: PAIN_FUNCTIONAL_ASSESSMENT: 0-10

## 2024-01-20 NOTE — CARE PLAN
Problem: Fall/Injury  Goal: Not fall by end of shift  Outcome: Progressing  Goal: Be free from injury by end of the shift  Outcome: Progressing  Goal: Verbalize understanding of personal risk factors for fall in the hospital  Outcome: Progressing     Problem: Respiratory  Goal: No signs of respiratory distress (eg. Use of accessory muscles. Peds grunting)  Outcome: Progressing  Goal: Patent airway maintained this shift  Outcome: Progressing     Problem: Skin  Goal: Participates in plan/prevention/treatment measures  Outcome: Progressing     Problem: Skin  Goal: Participates in plan/prevention/treatment measures  Outcome: Progressing  Goal: Prevent/manage excess moisture  Outcome: Progressing  Goal: Promote skin healing  Outcome: Progressing   The patient's goals for the shift include      The clinical goals for the shift include pt to sit up in chair for 2 hours this shift

## 2024-01-20 NOTE — CARE PLAN
The patient's goals for the shift include      The clinical goals for the shift include patient will remain safe throughout the shift    Over the shift, the patient did make progress toward the following goals.    14-Oct-2018 02:46

## 2024-01-20 NOTE — H&P
"History Of Present Illness  Ramón Flores is a 89 y.o. male with PMH Hemophilia A, Protein C deficiency, HTN, Afib, HFpEF, HLD, nephrolithiasis, CAD s/p CABG (2002), BPH, and R fem neck fx s/p THR 1/13/23), who initially presented to [facility] 1/18 with c/o mild non-productive cough x3 days and a temperature \"above his baseline\" but afebrile. CXR (1/18) showing bibasilar opacities. CT angio chest (1/18) showing GGOs favoring developing interstitial edema, and small bilateral pleural effusions with adjacent airspace opacity favoring to represent atelectasis, however cannot exclude infectious process. Patient was started on Ceftriaxone and Azithromycin (1/18- current). Flu A/B, RSV, and COVID negative 1/18. D-Dimer 2,248 (1/18) without obvious s/sx bleeding. Duplex BLE (1/19) negative for DVT. Heme consulted at [facility]. Given patient's history of Hemophilia A, he was transferred to [facility] for further management.    Upon admission, patient states he feels great. He says he doesn't really feel \"sick\" and just sought medical care because his temperature was higher than his baseline, although still afebrile. He denies any bleeding or bruising. Says he gets around ok at home with the help of a walker and home care services. No recent falls. Denies any HA, dizziness/lightheadedness, fevers/chills, congestion, rhinorrhea, sore throat, SOB, CP, palpitations, abdominal pain, n/v/d/c, melena, dysuria, urgency/frequency, hematuria, or numbness/tingling. Denies any sick contacts. ROS otherwise negative.    Past Medical History  He has a past medical history of Acute deep vein thrombosis (DVT) of left femoral vein (CMS/HCC) (09/10/2023), Acute diastolic heart failure (CMS/HCC) (04/16/2023), Benign prostatic hyperplasia without lower urinary tract symptoms (01/07/2016), Bleeding hemorrhoids (03/01/2023), Closed nondisplaced fracture of head of left radius (09/05/2023), COVID-19 (05/21/2023), Enlarged prostate with lower urinary " tract symptoms (LUTS) (03/01/2023), Fracture of bone of hip (CMS/HCC) (09/05/2023), Fracture of femoral neck, right (CMS/HCC) (03/01/2023), Gastrointestinal hemorrhage (09/05/2023), Gingiva hemorrhage (09/05/2023), Gross hematuria (03/01/2023), Hemarthrosis of ankle joint (04/18/2019), Hemarthrosis of knee (05/09/2022), Hematochezia (09/05/2023), Hematoma of lower extremity (09/05/2023), Hematoma of right thigh (03/01/2023), History of recent fall (05/31/2023), Knee effusion (09/05/2023), Knee effusion, left (03/01/2023), Lumbar pain (03/01/2023), Obstructive uropathy (04/28/2023), Orthostatic syncope (09/05/2023), Personal history of other endocrine, nutritional and metabolic disease, Pneumonia of right lung due to infectious organism (03/01/2023), 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    Surgical History  He has a past surgical history that includes Coronary artery bypass graft.    Oncology History    No history exists.        Social History  He reports that he has never smoked. He has never used smokeless tobacco. Alcohol use questions deferred to the physician. He reports that he does not use drugs.     Allergies  Aspirin and Penicillins    Physical Exam  Vitals reviewed.   Constitutional:       Comments: frail   HENT:      Head: Normocephalic and atraumatic.      Nose: Nose normal.      Mouth/Throat:      Mouth: Mucous membranes are moist.      Pharynx: Oropharynx is clear.   Eyes:      Extraocular Movements: Extraocular movements intact.      Pupils: Pupils are equal, round, and reactive to light.   Cardiovascular:      Rate and Rhythm: Normal rate and regular rhythm.      Pulses: Normal pulses.      Heart sounds: Normal heart sounds.   Pulmonary:      Effort: Pulmonary effort is normal.      Breath sounds: Normal breath sounds.    Abdominal:      General: Bowel sounds are normal.      Palpations: Abdomen is soft.   Musculoskeletal:         General: Normal range of motion.   Skin:     General: Skin is warm.   Neurological:      General: No focal deficit present.      Mental Status: He is alert and oriented to person, place, and time. Mental status is at baseline.   Psychiatric:         Mood and Affect: Mood normal.         Behavior: Behavior normal.       Results for orders placed or performed during the hospital encounter of 01/18/24 (from the past 24 hour(s))   Lavender Top   Result Value Ref Range    Extra Tube Hold for add-ons.    Magnesium   Result Value Ref Range    Magnesium 1.50 (L) 1.60 - 2.40 mg/dL   Renal function panel   Result Value Ref Range    Glucose 143 (H) 74 - 99 mg/dL    Sodium 136 136 - 145 mmol/L    Potassium 3.8 3.5 - 5.3 mmol/L    Chloride 104 98 - 107 mmol/L    Bicarbonate 22 21 - 32 mmol/L    Anion Gap 14 10 - 20 mmol/L    Urea Nitrogen 21 6 - 23 mg/dL    Creatinine 1.31 (H) 0.50 - 1.30 mg/dL    eGFR 50 (L) >60 mL/min/1.73m*2    Calcium 8.4 (L) 8.6 - 10.3 mg/dL    Phosphorus 3.4 2.5 - 4.9 mg/dL    Albumin 3.5 3.4 - 5.0 g/dL     Scheduled medications  atorvastatin, 40 mg, oral, Daily  azithromycin, 500 mg, intravenous, q24h  calcium carbonate, 1,250 mg, oral, BID with meals  cefTRIAXone, 1 g, intravenous, q24h  ferrous sulfate (325 mg ferrous sulfate), 65 mg of iron, oral, Daily  finasteride, 5 mg, oral, Daily  folic acid, 1 mg, oral, Daily  hydroCHLOROthiazide, 12.5 mg, oral, Daily  lactated Ringer's, 500 mL, intravenous, Once  metoprolol succinate XL, 25 mg, oral, Daily  multivitamin with minerals, 1 tablet, oral, Daily  [START ON 1/21/2024] pantoprazole, 20 mg, oral, Daily before breakfast  spironolactone, 25 mg, oral, Daily  tamsulosin, 0.4 mg, oral, Daily      Continuous medications     PRN medications  PRN medications: acetaminophen, albuterol      Assessment/Plan   Active Problems:    CAP (community acquired  "pneumonia)    Ramón Flores is a 89 y.o. male with PMH Hemophilia A, Protein C deficiency, HTN, Afib, HFpEF, HLD, nephrolithiasis, CAD s/p CABG (2002), BPH, and R fem neck fx s/p THR 1/13/23), who initially presented to [facility] 1/18 with c/o SOB and non-productive cough x3 days. CXR (1/18) showing bibasilar opacities. CT angio chest (1/18) showing GGOs favoring developing interstitial edema, and small bilateral pleural effusions with adjacent airspace opacity favoring to represent atelectasis, however cannot exclude infectious process. Patient was started on Ceftriaxone and Azithromycin (1/18- current). Flu A/B, RSV, and COVID negative 1/18. D-Dimer 2,248 (1/18) without obvious s/sx bleeding. Duplex BLE (1/19) negative for DVT. Heme consulted at [facility]. Given patient's history of Hemophilia A, he was transferred to  [facility] for further management. Pt non-toxic without resp symptoms or c/f bleeding on admit. sCr mildly elevated at 1.31 (1/20), urine lytes sent, 500mL LR bolus ordered. Plan to likely transition to PO atb regimen tomorrow and possible dc 1/21 pending improvement in sCr.    # CAP  - Presented to [facility] 1/18 with c/o non-productive cough x3 days and a temperature \"above his baseline\" but afebrile  - CXR (1/18) showing bibasilar opacities  - CT angio chest (1/18) showing GGOs favoring developing interstitial edema, and small bilateral pleural effusions with adjacent airspace opacity favoring to represent atelectasis, however cannot exclude infectious process  - Flu A/B, RSV, and COVID negative 1/18  - Urine legionella/strep pneumo and MRSA nares pending (1/20)  - No leukocytosis, WBC 4.5 (1/19)  - Started on Ceftriaxone and Azithromycin (1/18- current); plan to transition to PO regimen 1/21  - Continue home Albuterol PRN SOB/wheezing  - Incentive spirometer ordered q2hrs for atelectasis    # Hemophilia A, Protein C deficiency  - Follows with Dr. Dillon  - FVIII activity <1%, on prophylactic altuviiio " 250 weekly on Wednesdays  - Last factor infusion with altuviiio 250 on 1/17/24, next due 1/24  - Hgb 12.4 (1/18)--> 11.4 (1/19)  - Plt 141 (1/18)--> 127 (1/19)  - Patient requires factor for any bleeding, or prior to any procedure   - D-Dimer 2,248 (1/18) without obvious s/sx bleeding  - Duplex BLE (1/19) negative for DVT  - Heme consulted on admit, rec CBC/coags, PNA tx, and if still inpt 1/24 will receive Altuviiio  - Continue home folic acid 1mg daily, ferrous sulfate 325mg daily, calcium carbonate 1,250mg BID, and multivitamin daily    # RONNIE  - Likely 2/2 IV atbs vs poor PO intake  - sCr baseline ~1, sCr 1.31 (1/20)  - Urine lytes pending (1/20)  - 500mL NS bolus ordered    # Hypomagnesia  - Mag 1.1 (1/18)--> received unclear amount of mag replacement at OSH  - Mag 1.5 (1/20)--> 2g Mag IV ordered    # HTN/ Afib/ HFpEF/ HLD  - Last ECHO (2/24/23): EF 60-65%  - BNP (1/18): 613  - Admission EKG pending 1/20  - Trop (1/18): 93-->97; 1/20 pending; hx elevated trops consistent with pt's baseline  - Continue home Metop succs 25mg daily and HCTS 12.5mg daily  - Continue home Spironolactone 25mg daily  - Continue home Lipitor 40mg daily    # BPH  - Continue home Flomax 0.4mg daily and Finasteride 5mg daily    DVT prophy: SCDs only, no pharmacologic prophy I/s/o hemophilia, encourage ambulation    DISPO:  - Full Code  - DC pending improvement in PNA, possibly tomorrow 1/21  - DC with resumed HC services, receives weekly factor replacement  - Ophthalmology FUV 4/3, Urology FUV 11/26    I spent >60 minutes in the professional and overall care of this patient.    Assessment and plan discussed with attending physician Dr. Gallito Pan, SUSI-CNP

## 2024-01-20 NOTE — PROGRESS NOTES
Transitional Care Coordinator Note: Met with patient to discuss discharge planning s/p admission.  Patient lives home alone.  Independent in some ADL's. Requires assist devices for ambulation( walker/wheelchair).  Patient with active home care with [facility], per patient he previously had 24/7 aides but decreased his number of hours as he did not feel he needed to have care 24/7.  Previously had rehab at [facility]  after hip surgery. Demographics and contact information confirmed.  Will continue to monitor patient for all home going needs.  Jes Lao RN TCC via [employer].    Falls- 1 yr ago  Equipment- walker/wheelchair  DM-no  Dialysis- no  O2/Cpap- none   Pharm- [facility]  PCP- Becky Santoyo CNP  Transport- Family/ may need roundtrip unsure at time assessment

## 2024-01-20 NOTE — NURSING NOTE
transfer center called, Pt will be transferred to [facility] 4th floor rm 4008-A. Pt to be transported by [facility] at 0700.

## 2024-01-20 NOTE — CONSULTS
Reason For Consult  hemophilia    History Of Present Illness  Ramón Flores is a 89 y.o. 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 Hematology c/s for Hemophilia A management.     Pt presented to [facility] 1/18  with 2-3 days of cough and shortness of breath. CBC 4.7 12.4 --> 11.4 141 --> 127 Cr 1.26 --> 1.31 Tbili 1.8 Ddimer 2248. Started on IV atbx CFTX+Azithromycin.     CTPE 1/18  1. No acute pulmonary embolism to the segmental arterial level. 2. Cardiomegaly with mild interlobular septal thickening and ground-glass opacities suggestive of developing interstitial edema. 3. Small bilateral pleural effusions with adjacent airspace opacity favored to relate to compressive atelectasis. Superimposed infection not excluded. Continued radiographic follow-up is advised to ensure complete resolution and exclude underlying lesion. 4. Main pulmonary artery is dilated up to 3.5 cm which can be seen  with pulmonary arterial hypertension. 5. Moderate-size hiatal hernia with thickening of the distal esophagus which can be seen in the setting of esophagitis. Correlate with symptomatology for the need for further evaluation with direct visualization.  6. Additional findings as described above.    US LLEE 1/18  Right Lower Venous: No evidence of acute deep vein thrombus visualized in the right lower extremity. Lymph nodes noted in the right groin. Technically difficult study due to patient's positioning. Left Lower Venous: No evidence of acute deep vein thrombus visualized in the left lower extremity. Technically difficult study due to patient's positioning.    Patient feels much better. No recent fevers, sob better. No bleeding events. Patient on weekly Altuviiio. Last Altuviiio dose 50 international unit /kg 01/17/2024 PM.     Past Medical History  He has a past medical history of  Acute deep vein thrombosis (DVT) of left femoral vein (CMS/[facility]) (09/10/2023), Acute diastolic heart failure (CMS/[facility]) (04/16/2023), Benign prostatic hyperplasia without lower urinary tract symptoms (01/07/2016), Bleeding hemorrhoids (03/01/2023), Closed nondisplaced fracture of head of left radius (09/05/2023), COVID-19 (05/21/2023), Enlarged prostate with lower urinary tract symptoms (LUTS) (03/01/2023), Fracture of bone of hip (CMS/[facility]) (09/05/2023), Fracture of femoral neck, right (CMS/[facility]) (03/01/2023), Gastrointestinal hemorrhage (09/05/2023), Gingiva hemorrhage (09/05/2023), Gross hematuria (03/01/2023), Hemarthrosis of ankle joint (04/18/2019), Hemarthrosis of knee (05/09/2022), Hematochezia (09/05/2023), Hematoma of lower extremity (09/05/2023), Hematoma of right thigh (03/01/2023), History of recent fall (05/31/2023), Knee effusion (09/05/2023), Knee effusion, left (03/01/2023), Lumbar pain (03/01/2023), Obstructive uropathy (04/28/2023), Orthostatic syncope (09/05/2023), Personal history of other endocrine, nutritional and metabolic disease, Pneumonia of right lung due to infectious organism (03/01/2023), 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    Surgical History  He has a past surgical history that includes Coronary artery bypass graft.     Social History  He reports that he has never smoked. He has never used smokeless tobacco. Alcohol use questions deferred to the physician. He reports that he does not use drugs.    Family History  Family History   Problem Relation Name Age of Onset    Hemophilia Mother          Allergies  Aspirin and Penicillins    Review of Systems  12 ROS neg     Physical Exam   Gen: awake, alert, in no acute distress  CV: RRR, no m/r/g  Pulm: decreased breath sounds bibasilar, on RA 97%  Abd: soft, NT/ND, no  HSM  Ext: no LE edema  Skin: warm and dry  Neuro: A&Ox4, moves all 4 extremities spontaneously     Last Recorded Vitals  There were no vitals taken for this visit.    Relevant Results  Lab Results   Component Value Date    WBC 4.5 01/19/2024    HGB 11.4 (L) 01/19/2024    HCT 35.1 (L) 01/19/2024     (H) 01/19/2024     (L) 01/19/2024     Lab Results   Component Value Date    CREATININE 1.31 (H) 01/20/2024    BUN 21 01/20/2024     01/20/2024    K 3.8 01/20/2024     01/20/2024    CO2 22 01/20/2024     Lab Results   Component Value Date    ALT 34 01/18/2024    AST 31 01/18/2024    ALKPHOS 82 01/18/2024    BILITOT 1.8 (H) 01/18/2024       Assessment/Plan     89 y.o. 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 CT-PE and US LLEE neg for VTE. Last Altuviiio 1/17 PM. Hematology c/s for Hemophilia A management.     Recommendations:   -Please obtain CBC w diff, CMP and coags on admission  -Pt due for Altuviiio 1/24, if still inpatient will re-dose  -Treatment for infection/small effusions/edema per primary team  -Consider incentive spirometer for atelectasis  -If any concerns for bleeding call Hematology ASAP     Thank you for this consult. Hematology will continue to follow. Pt discussed with Dr Hummel.     Brigette Cedillo MD  Hematology Oncology fellow, PGY-5  Pager [phone]

## 2024-01-20 NOTE — PROGRESS NOTES
Kristin Day is a 89 y.o. male on day 2 of admission presenting with CAP (community acquired pneumonia).      Subjective   Feels ok  No pain   No nausea vomiting   No bleeding       Objective     Last Recorded Vitals  /75   Pulse 61   Temp 36.4 °C (97.5 °F) (Oral)   Resp 18   Wt 69.1 kg (152 lb 5.4 oz)   SpO2 98%   Intake/Output last 3 Shifts:    Intake/Output Summary (Last 24 hours) at 1/20/2024 1214  Last data filed at 1/20/2024 1016  Gross per 24 hour   Intake 1230 ml   Output 400 ml   Net 830 ml       Admission Weight  Weight: 61.2 kg (135 lb) (01/18/24 1325)    Daily Weight  01/19/24 : 69.1 kg (152 lb 5.4 oz)    Image Results  Vascular US lower extremity venous duplex bilateral               [place] [address]    Tel [phone] and Fax [phone]       [facility]  [facility]       Patient Name:      KRISTIN DAY   Reading Physician: [id number] Machelle Marquis MD  Study Date:        1/19/2024           Ordering           [id number] NINO                                         Physician:         JOSY  MRN/PID:           [id number]            Technologist:      Sher Peace RVT  Accession#:        [id number]        Technologist 2:  Date of Birth/Age: 7/24/1928 / 89      Encounter#:        [id number]                     years  Gender:            M  Admission Status:  Inpatient           Location           [facility]                                         Performed:       Diagnosis/ICD: Localized (leg) edema-R60.0; 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 Lymph nodes noted in the right groin. Technically difficult study due to patient's  positioning.  Left Lower Venous: No evidence of acute deep vein thrombus visualized in the left lower extremity. Technically difficult study due to patient's positioning.     Imaging & Doppler Findings:     Right                 Compressible Thrombus   Flow  Distal External Iliac     Yes        None   Pulsatile  CFV                       Yes        None   Pulsatile  PFV                       Yes        None  FV Proximal               Yes        None   Pulsatile  FV Mid                    Yes        None  FV Distal                 Yes        None  Popliteal                 Yes        None   Pulsatile  Peroneal                  Yes        None  PTV                       Yes        None       Left                  Compress Thrombus   Flow  Distal External Iliac   Yes      None   Pulsatile  CFV                     Yes      None   Pulsatile  PFV                     Yes      None  FV Proximal             Yes      None   Pulsatile  FV Mid                  Yes      None  FV Distal               Yes      None  Popliteal               Yes      None   Pulsatile  Peroneal                Yes      None  PTV                     Yes      None       [id number] Machelle Marquis MD  Electronically signed by [id number] Machelle Marquis MD on 1/19/2024 at 3:37:25 PM       ** Final **      Physical Exam  No distress  Chest basilar rales  Cvs regular  Ext noedema  Abdo soft nontender bs active, no masses  Cns alert oriented x 3    Relevant Results  Scheduled medications  acetaminophen, 975 mg, oral, q8h  atorvastatin, 40 mg, oral, Daily  azithromycin, 500 mg, intravenous, q24h  calcium carbonate, 500 mg, oral, BID with meals  cefTRIAXone, 2 g, intravenous, q24h  ferrous sulfate (325 mg ferrous sulfate), 65 mg of iron, oral, Daily  finasteride, 5 mg, oral, Daily  folic acid, 1 mg, oral, Daily  hydroCHLOROthiazide, 12.5 mg, oral, Daily  lactobacillus acidophilus, 1 capsule, oral, Daily  magnesium sulfate, 2 g, intravenous, Once  metoprolol succinate XL, 25  mg, oral, Daily  multivitamin with minerals, 1 tablet, oral, Daily  pantoprazole, 20 mg, oral, Daily before breakfast  spironolactone, 25 mg, oral, Daily  tamsulosin, 0.4 mg, oral, BID      Continuous medications     PRN medications  PRN medications: albuterol  Results for orders placed or performed during the hospital encounter of 01/18/24 (from the past 96 hour(s))   CBC and Auto Differential   Result Value Ref Range    WBC 4.7 4.4 - 11.3 x10*3/uL    nRBC 0.0 0.0 - 0.0 /100 WBCs    RBC 3.71 (L) 4.50 - 5.90 x10*6/uL    Hemoglobin 12.4 (L) 13.5 - 17.5 g/dL    Hematocrit 38.7 (L) 41.0 - 52.0 %     (H) 80 - 100 fL    MCH 33.4 26.0 - 34.0 pg    MCHC 32.0 32.0 - 36.0 g/dL    RDW 14.8 (H) 11.5 - 14.5 %    Platelets 141 (L) 150 - 450 x10*3/uL    Neutrophils % 64.5 40.0 - 80.0 %    Immature Granulocytes %, Automated 0.2 0.0 - 0.9 %    Lymphocytes % 26.0 13.0 - 44.0 %    Monocytes % 7.6 2.0 - 10.0 %    Eosinophils % 1.3 0.0 - 6.0 %    Basophils % 0.4 0.0 - 2.0 %    Neutrophils Absolute 3.05 1.60 - 5.50 x10*3/uL    Immature Granulocytes Absolute, Automated 0.01 0.00 - 0.50 x10*3/uL    Lymphocytes Absolute 1.23 0.80 - 3.00 x10*3/uL    Monocytes Absolute 0.36 0.05 - 0.80 x10*3/uL    Eosinophils Absolute 0.06 0.00 - 0.40 x10*3/uL    Basophils Absolute 0.02 0.00 - 0.10 x10*3/uL   Magnesium   Result Value Ref Range    Magnesium 1.10 (L) 1.60 - 2.40 mg/dL   Comprehensive metabolic panel   Result Value Ref Range    Glucose 129 (H) 74 - 99 mg/dL    Sodium 138 136 - 145 mmol/L    Potassium 3.8 3.5 - 5.3 mmol/L    Chloride 105 98 - 107 mmol/L    Bicarbonate 24 21 - 32 mmol/L    Anion Gap 13 10 - 20 mmol/L    Urea Nitrogen 21 6 - 23 mg/dL    Creatinine 1.05 0.50 - 1.30 mg/dL    eGFR 65 >60 mL/min/1.73m*2    Calcium 8.7 8.6 - 10.3 mg/dL    Albumin 3.9 3.4 - 5.0 g/dL    Alkaline Phosphatase 82 33 - 136 U/L    Total Protein 6.8 6.4 - 8.2 g/dL    AST 31 9 - 39 U/L    Bilirubin, Total 1.8 (H) 0.0 - 1.2 mg/dL    ALT 34 10 - 52 U/L    Troponin I, High Sensitivity   Result Value Ref Range    Troponin I, High Sensitivity 93 (HH) 0 - 20 ng/L   B-Type Natriuretic Peptide   Result Value Ref Range     (H) 0 - 99 pg/mL   Sars-CoV-2 and Influenza A/B PCR   Result Value Ref Range    Flu A Result Not Detected Not Detected    Flu B Result Not Detected Not Detected    Coronavirus 2019, PCR Not Detected Not Detected   RSV PCR   Result Value Ref Range    RSV PCR Not Detected Not Detected   D-dimer, quantitative   Result Value Ref Range    D-Dimer Non VTE, Quant (ng/mL FEU) 2,248 (H) <=500 ng/mL FEU   Troponin I, High Sensitivity   Result Value Ref Range    Troponin I, High Sensitivity 97 (HH) 0 - 20 ng/L   Basic Metabolic Panel   Result Value Ref Range    Glucose 96 74 - 99 mg/dL    Sodium 138 136 - 145 mmol/L    Potassium 4.1 3.5 - 5.3 mmol/L    Chloride 106 98 - 107 mmol/L    Bicarbonate 23 21 - 32 mmol/L    Anion Gap 13 10 - 20 mmol/L    Urea Nitrogen 21 6 - 23 mg/dL    Creatinine 1.26 0.50 - 1.30 mg/dL    eGFR 53 (L) >60 mL/min/1.73m*2    Calcium 8.3 (L) 8.6 - 10.3 mg/dL   CBC   Result Value Ref Range    WBC 4.5 4.4 - 11.3 x10*3/uL    nRBC 0.0 0.0 - 0.0 /100 WBCs    RBC 3.40 (L) 4.50 - 5.90 x10*6/uL    Hemoglobin 11.4 (L) 13.5 - 17.5 g/dL    Hematocrit 35.1 (L) 41.0 - 52.0 %     (H) 80 - 100 fL    MCH 33.5 26.0 - 34.0 pg    MCHC 32.5 32.0 - 36.0 g/dL    RDW 14.7 (H) 11.5 - 14.5 %    Platelets 127 (L) 150 - 450 x10*3/uL   Lavender Top   Result Value Ref Range    Extra Tube Hold for add-ons.    Magnesium   Result Value Ref Range    Magnesium 1.50 (L) 1.60 - 2.40 mg/dL                Assessment/Plan      Principal Problem:    CAP (community acquired pneumonia)    Pneumonia  -IV abx and supportive care     Hemophilia   -consult to Heme, await clarity and recommendations of his out patient regimen     HFpEF  Afib, not on AC 2/2 above  HTN    Hypomagnesemia  Supplement  Ok for transfer   Time > 30 min in discharge planning            Enrique  MD Rosa

## 2024-01-20 NOTE — CARE PLAN
The patient's goals for the shift include      The clinical goals for the shift include        Problem: Skin  Goal: Decreased wound size/increased tissue granulation at next dressing change  Outcome: Progressing  Flowsheets (Taken 1/20/2024 1507)  Decreased wound size/increased tissue granulation at next dressing change:   Promote sleep for wound healing   Protective dressings over bony prominences  Goal: Participates in plan/prevention/treatment measures  Outcome: Progressing  Flowsheets (Taken 1/20/2024 1507)  Participates in plan/prevention/treatment measures: Elevate heels  Goal: Prevent/manage excess moisture  Outcome: Progressing  Flowsheets (Taken 1/20/2024 1507)  Prevent/manage excess moisture:   Moisturize dry skin   Monitor for/manage infection if present  Goal: Prevent/minimize sheer/friction injuries  Outcome: Progressing  Flowsheets (Taken 1/20/2024 1507)  Prevent/minimize sheer/friction injuries:   Use pull sheet   Turn/reposition every 2 hours/use positioning/transfer devices  Goal: Promote/optimize nutrition  Outcome: Progressing  Flowsheets (Taken 1/20/2024 1507)  Promote/optimize nutrition:   Offer water/supplements/favorite foods   Consume > 50% meals/supplements  Goal: Promote skin healing  Outcome: Progressing  Flowsheets (Taken 1/20/2024 1507)  Promote skin healing:   Turn/reposition every 2 hours/use positioning/transfer devices   Rotate device position/do not position patient on device     Problem: Fall/Injury  Goal: Not fall by end of shift  Outcome: Progressing  Goal: Be free from injury by end of the shift  Outcome: Progressing  Goal: Verbalize understanding of personal risk factors for fall in the hospital  Outcome: Progressing  Goal: Verbalize understanding of risk factor reduction measures to prevent injury from fall in the home  Outcome: Progressing  Goal: Use assistive devices by end of the shift  Outcome: Progressing  Goal: Pace activities to prevent fatigue by end of the  shift  Outcome: Progressing     Problem: Pain  Goal: Takes deep breaths with improved pain control throughout the shift  Outcome: Progressing  Goal: Turns in bed with improved pain control throughout the shift  Outcome: Progressing  Goal: Walks with improved pain control throughout the shift  Outcome: Progressing  Goal: Performs ADL's with improved pain control throughout shift  Outcome: Progressing  Goal: Participates in PT with improved pain control throughout the shift  Outcome: Progressing  Goal: Free from opioid side effects throughout the shift  Outcome: Progressing  Goal: Free from acute confusion related to pain meds throughout the shift  Outcome: Progressing     Problem: Daily Care  Goal: Daily care needs are met  Outcome: Progressing     Problem: Psychosocial Needs  Goal: Demonstrates ability to cope with hospitalization/illness  Outcome: Progressing  Goal: Collaborate with me, my family, and caregiver to identify my specific goals  Outcome: Progressing

## 2024-01-21 ENCOUNTER — PHARMACY VISIT (OUTPATIENT)
Dept: PHARMACY | Facility: CLINIC | Age: 89
End: 2024-01-21
Payer: COMMERCIAL

## 2024-01-21 LAB
ALBUMIN SERPL BCP-MCNC: 3.8 G/DL (ref 3.4–5)
ALP SERPL-CCNC: 81 U/L (ref 33–136)
ALT SERPL W P-5'-P-CCNC: 44 U/L (ref 10–52)
ANION GAP SERPL CALC-SCNC: 13 MMOL/L (ref 10–20)
AST SERPL W P-5'-P-CCNC: 29 U/L (ref 9–39)
BASOPHILS # BLD AUTO: 0.03 X10*3/UL (ref 0–0.1)
BASOPHILS NFR BLD AUTO: 0.7 %
BILIRUB SERPL-MCNC: 1 MG/DL (ref 0–1.2)
BUN SERPL-MCNC: 22 MG/DL (ref 6–23)
CALCIUM SERPL-MCNC: 9 MG/DL (ref 8.6–10.6)
CHLORIDE SERPL-SCNC: 104 MMOL/L (ref 98–107)
CO2 SERPL-SCNC: 25 MMOL/L (ref 21–32)
CREAT SERPL-MCNC: 1.26 MG/DL (ref 0.5–1.3)
EGFRCR SERPLBLD CKD-EPI 2021: 53 ML/MIN/1.73M*2
EOSINOPHIL # BLD AUTO: 0.18 X10*3/UL (ref 0–0.4)
EOSINOPHIL NFR BLD AUTO: 4 %
ERYTHROCYTE [DISTWIDTH] IN BLOOD BY AUTOMATED COUNT: 14.6 % (ref 11.5–14.5)
GLUCOSE SERPL-MCNC: 82 MG/DL (ref 74–99)
HCT VFR BLD AUTO: 36 % (ref 41–52)
HGB BLD-MCNC: 12.1 G/DL (ref 13.5–17.5)
IMM GRANULOCYTES # BLD AUTO: 0.01 X10*3/UL (ref 0–0.5)
IMM GRANULOCYTES NFR BLD AUTO: 0.2 % (ref 0–0.9)
LEGIONELLA AG UR QL: NEGATIVE
LYMPHOCYTES # BLD AUTO: 1.31 X10*3/UL (ref 0.8–3)
LYMPHOCYTES NFR BLD AUTO: 28.8 %
MAGNESIUM SERPL-MCNC: 2.17 MG/DL (ref 1.6–2.4)
MCH RBC QN AUTO: 35.2 PG (ref 26–34)
MCHC RBC AUTO-ENTMCNC: 33.6 G/DL (ref 32–36)
MCV RBC AUTO: 105 FL (ref 80–100)
MONOCYTES # BLD AUTO: 0.41 X10*3/UL (ref 0.05–0.8)
MONOCYTES NFR BLD AUTO: 9 %
NEUTROPHILS # BLD AUTO: 2.61 X10*3/UL (ref 1.6–5.5)
NEUTROPHILS NFR BLD AUTO: 57.3 %
NRBC BLD-RTO: 0 /100 WBCS (ref 0–0)
PLATELET # BLD AUTO: 131 X10*3/UL (ref 150–450)
POTASSIUM SERPL-SCNC: 4.1 MMOL/L (ref 3.5–5.3)
PROT SERPL-MCNC: 6.3 G/DL (ref 6.4–8.2)
RBC # BLD AUTO: 3.44 X10*6/UL (ref 4.5–5.9)
S PNEUM AG UR QL: NEGATIVE
SODIUM SERPL-SCNC: 138 MMOL/L (ref 136–145)
WBC # BLD AUTO: 4.6 X10*3/UL (ref 4.4–11.3)

## 2024-01-21 PROCEDURE — 1170000001 HC PRIVATE ONCOLOGY ROOM DAILY

## 2024-01-21 PROCEDURE — 84075 ASSAY ALKALINE PHOSPHATASE: CPT | Performed by: NURSE PRACTITIONER

## 2024-01-21 PROCEDURE — 2500000002 HC RX 250 W HCPCS SELF ADMINISTERED DRUGS (ALT 637 FOR MEDICARE OP, ALT 636 FOR OP/ED): Performed by: NURSE PRACTITIONER

## 2024-01-21 PROCEDURE — 94640 AIRWAY INHALATION TREATMENT: CPT

## 2024-01-21 PROCEDURE — 87449 NOS EACH ORGANISM AG IA: CPT | Performed by: NURSE PRACTITIONER

## 2024-01-21 PROCEDURE — 87899 AGENT NOS ASSAY W/OPTIC: CPT | Performed by: NURSE PRACTITIONER

## 2024-01-21 PROCEDURE — 85025 COMPLETE CBC W/AUTO DIFF WBC: CPT | Performed by: NURSE PRACTITIONER

## 2024-01-21 PROCEDURE — 99233 SBSQ HOSP IP/OBS HIGH 50: CPT | Performed by: INTERNAL MEDICINE

## 2024-01-21 PROCEDURE — 2500000004 HC RX 250 GENERAL PHARMACY W/ HCPCS (ALT 636 FOR OP/ED): Performed by: NURSE PRACTITIONER

## 2024-01-21 PROCEDURE — 36415 COLL VENOUS BLD VENIPUNCTURE: CPT | Performed by: NURSE PRACTITIONER

## 2024-01-21 PROCEDURE — 2500000001 HC RX 250 WO HCPCS SELF ADMINISTERED DRUGS (ALT 637 FOR MEDICARE OP): Performed by: NURSE PRACTITIONER

## 2024-01-21 PROCEDURE — 83735 ASSAY OF MAGNESIUM: CPT | Performed by: NURSE PRACTITIONER

## 2024-01-21 PROCEDURE — RXMED WILLOW AMBULATORY MEDICATION CHARGE

## 2024-01-21 RX ORDER — LEVOFLOXACIN 750 MG/1
750 TABLET ORAL DAILY
Qty: 3 TABLET | Refills: 0 | Status: SHIPPED | OUTPATIENT
Start: 2024-01-21 | End: 2024-01-27 | Stop reason: ALTCHOICE

## 2024-01-21 RX ORDER — GUAIFENESIN 600 MG/1
600 TABLET, EXTENDED RELEASE ORAL 2 TIMES DAILY
Status: DISCONTINUED | OUTPATIENT
Start: 2024-01-21 | End: 2024-01-22 | Stop reason: HOSPADM

## 2024-01-21 RX ORDER — ONDANSETRON HYDROCHLORIDE 8 MG/1
8 TABLET, FILM COATED ORAL EVERY 8 HOURS PRN
Status: DISCONTINUED | OUTPATIENT
Start: 2024-01-21 | End: 2024-01-22 | Stop reason: HOSPADM

## 2024-01-21 RX ORDER — PANTOPRAZOLE SODIUM 20 MG/1
20 TABLET, DELAYED RELEASE ORAL ONCE
Status: COMPLETED | OUTPATIENT
Start: 2024-01-21 | End: 2024-01-21

## 2024-01-21 RX ORDER — BENZONATATE 100 MG/1
100 CAPSULE ORAL 3 TIMES DAILY
Status: DISCONTINUED | OUTPATIENT
Start: 2024-01-21 | End: 2024-01-22 | Stop reason: HOSPADM

## 2024-01-21 RX ORDER — PANTOPRAZOLE SODIUM 40 MG/1
40 TABLET, DELAYED RELEASE ORAL
Status: DISCONTINUED | OUTPATIENT
Start: 2024-01-22 | End: 2024-01-22 | Stop reason: HOSPADM

## 2024-01-21 RX ADMIN — ALBUTEROL SULFATE 2.5 MG: 5 SOLUTION RESPIRATORY (INHALATION) at 10:44

## 2024-01-21 RX ADMIN — FERROUS SULFATE TAB 325 MG (65 MG ELEMENTAL FE) 1 TABLET: 325 (65 FE) TAB at 08:45

## 2024-01-21 RX ADMIN — PANTOPRAZOLE SODIUM 20 MG: 20 TABLET, DELAYED RELEASE ORAL at 08:44

## 2024-01-21 RX ADMIN — SPIRONOLACTONE 25 MG: 25 TABLET, FILM COATED ORAL at 08:45

## 2024-01-21 RX ADMIN — BENZONATATE 100 MG: 100 CAPSULE ORAL at 15:53

## 2024-01-21 RX ADMIN — PANTOPRAZOLE SODIUM 20 MG: 20 TABLET, DELAYED RELEASE ORAL at 11:00

## 2024-01-21 RX ADMIN — Medication 1 TABLET: at 08:44

## 2024-01-21 RX ADMIN — BENZONATATE 100 MG: 100 CAPSULE ORAL at 08:44

## 2024-01-21 RX ADMIN — GUAIFENESIN 600 MG: 600 TABLET ORAL at 20:10

## 2024-01-21 RX ADMIN — BENZONATATE 100 MG: 100 CAPSULE ORAL at 20:10

## 2024-01-21 RX ADMIN — FOLIC ACID 1 MG: 1 TABLET ORAL at 08:45

## 2024-01-21 RX ADMIN — CALCIUM 1250 MG: 500 TABLET ORAL at 08:45

## 2024-01-21 RX ADMIN — CALCIUM 1250 MG: 500 TABLET ORAL at 16:00

## 2024-01-21 RX ADMIN — CEFTRIAXONE SODIUM 1 G: 1 INJECTION, SOLUTION INTRAVENOUS at 16:00

## 2024-01-21 RX ADMIN — GUAIFENESIN 600 MG: 600 TABLET ORAL at 08:44

## 2024-01-21 RX ADMIN — FINASTERIDE 5 MG: 5 TABLET, FILM COATED ORAL at 08:45

## 2024-01-21 RX ADMIN — HYDROCHLOROTHIAZIDE 12.5 MG: 25 TABLET ORAL at 08:45

## 2024-01-21 RX ADMIN — BENZOCAINE AND MENTHOL 1 LOZENGE: 15; 3.6 LOZENGE ORAL at 06:13

## 2024-01-21 RX ADMIN — TAMSULOSIN HYDROCHLORIDE 0.4 MG: 0.4 CAPSULE ORAL at 08:45

## 2024-01-21 RX ADMIN — METOPROLOL SUCCINATE 25 MG: 25 TABLET, EXTENDED RELEASE ORAL at 08:45

## 2024-01-21 ASSESSMENT — COGNITIVE AND FUNCTIONAL STATUS - GENERAL
HELP NEEDED FOR BATHING: A LITTLE
CLIMB 3 TO 5 STEPS WITH RAILING: A LOT
DAILY ACTIVITIY SCORE: 21
TURNING FROM BACK TO SIDE WHILE IN FLAT BAD: A LITTLE
MOVING FROM LYING ON BACK TO SITTING ON SIDE OF FLAT BED WITH BEDRAILS: A LITTLE
DRESSING REGULAR LOWER BODY CLOTHING: A LITTLE
WALKING IN HOSPITAL ROOM: A LITTLE
MOVING TO AND FROM BED TO CHAIR: A LITTLE
STANDING UP FROM CHAIR USING ARMS: A LITTLE
DRESSING REGULAR UPPER BODY CLOTHING: A LITTLE
MOBILITY SCORE: 17
STANDING UP FROM CHAIR USING ARMS: A LITTLE
WALKING IN HOSPITAL ROOM: A LITTLE
DRESSING REGULAR LOWER BODY CLOTHING: A LITTLE
MOVING TO AND FROM BED TO CHAIR: A LITTLE
MOVING FROM LYING ON BACK TO SITTING ON SIDE OF FLAT BED WITH BEDRAILS: A LITTLE
TURNING FROM BACK TO SIDE WHILE IN FLAT BAD: A LITTLE
DRESSING REGULAR UPPER BODY CLOTHING: A LITTLE
DAILY ACTIVITIY SCORE: 21
MOBILITY SCORE: 17
CLIMB 3 TO 5 STEPS WITH RAILING: A LOT
HELP NEEDED FOR BATHING: A LITTLE

## 2024-01-21 ASSESSMENT — PAIN SCALES - GENERAL
PAINLEVEL_OUTOF10: 0 - NO PAIN

## 2024-01-21 NOTE — CARE PLAN
Problem: Skin  Goal: Decreased wound size/increased tissue granulation at next dressing change  Outcome: Progressing  Flowsheets (Taken 1/21/2024 0012)  Decreased wound size/increased tissue granulation at next dressing change: Promote sleep for wound healing  Goal: Participates in plan/prevention/treatment measures  Outcome: Progressing  Flowsheets (Taken 1/21/2024 0012)  Participates in plan/prevention/treatment measures: Elevate heels  Goal: Prevent/manage excess moisture  Outcome: Progressing  Flowsheets (Taken 1/21/2024 0012)  Prevent/manage excess moisture:   Moisturize dry skin   Monitor for/manage infection if present  Goal: Prevent/minimize sheer/friction injuries  Outcome: Progressing  Flowsheets (Taken 1/21/2024 0012)  Prevent/minimize sheer/friction injuries:   HOB 30 degrees or less   Use pull sheet  Goal: Promote/optimize nutrition  Outcome: Progressing  Flowsheets (Taken 1/21/2024 0012)  Promote/optimize nutrition: Consume > 50% meals/supplements  Goal: Promote skin healing  Outcome: Progressing  Flowsheets (Taken 1/21/2024 0012)  Promote skin healing: Assess skin/pad under line(s)/device(s)     Problem: Fall/Injury  Goal: Not fall by end of shift  Outcome: Progressing  Goal: Be free from injury by end of the shift  Outcome: Progressing  Goal: Verbalize understanding of personal risk factors for fall in the hospital  Outcome: Progressing  Goal: Verbalize understanding of risk factor reduction measures to prevent injury from fall in the home  Outcome: Progressing  Goal: Use assistive devices by end of the shift  Outcome: Progressing  Goal: Pace activities to prevent fatigue by end of the shift  Outcome: Progressing     Problem: Daily Care  Goal: Daily care needs are met  Outcome: Progressing     Problem: Psychosocial Needs  Goal: Demonstrates ability to cope with hospitalization/illness  Outcome: Progressing  Goal: Collaborate with me, my family, and caregiver to identify my specific  goals  Outcome: Progressing

## 2024-01-21 NOTE — NURSING NOTE
"RN went to check on patient for hourly rounds. RN heard the patient say \"hello.\" RN entered room and found pt on the floor. RN asked pt if he hit his head or hurt himself. Pt stated \"no I'm not hurt, I just slid to the floor.\" RN checked pt skin and there were no open areas, bumps, bruises, etc. RN called for help and got pt off of the floor with a second nurse. Pt placed in bed, RN turned the bed alarm on and gave pt call light. and RN took vitals. RN called CNP and told her about the event and the pt vitals. CNP came to bedside and assessed pt. No new interventions at this time. RN filed pass report and started post fall protocols.  "

## 2024-01-21 NOTE — CONSULTS
[facility]   [facility]  INITIAL CONSULT NOTE       Reason For Consult: epigastric pain, known hiatal hernia    SUBJECTIVE     History Of Present Illness  Ramón Flores is a 89 y.o. male with a past medical history of hemophilia A admitted on 1/20/2024 with community acquired pneumonia. GI is consulted for epigastric pain in the setting of known hiatal hernia.  Patient states that he is feeling well and improved since he was admitted. He denies shortness of breath and chest pain. He does have some epigastric burning but is able to tolerate his regular diet. He states that he has been on low dose PPI for many years. He denies nausea/ vomiting, sour taste in his mouth, regurgitation.     Review of Systems: negative except as per HPI     Past Medical History:    Past Medical History:   Diagnosis Date    Acute deep vein thrombosis (DVT) of left femoral vein (CMS/[facility]) 09/10/2023    ACUTE DEEP VEIN THROMBOSIS, L FEMORAL, HEMOPHILIA    Acute diastolic heart failure (CMS/[facility]) 04/16/2023    Benign prostatic hyperplasia without lower urinary tract symptoms 01/07/2016    Enlarged prostate without lower urinary tract symptoms (luts)    Bleeding hemorrhoids 03/01/2023    Closed nondisplaced fracture of head of left radius 09/05/2023    COVID-19 05/21/2023    Enlarged prostate with lower urinary tract symptoms (LUTS) 03/01/2023    Fracture of bone of hip (CMS/[facility]) 09/05/2023    Fracture of femoral neck, right (CMS/HCC) 03/01/2023    Gastrointestinal hemorrhage 09/05/2023    LOWER GI BLEED    Gingiva hemorrhage 09/05/2023    Gross hematuria 03/01/2023    Hemarthrosis of ankle joint 04/18/2019    Hemarthrosis of knee 05/09/2022    Hematochezia 09/05/2023    Hematoma of lower extremity 09/05/2023    Hematoma of right thigh 03/01/2023    History of recent fall 05/31/2023    Knee effusion 09/05/2023    Knee effusion, left 03/01/2023    Lumbar pain 03/01/2023    Obstructive  uropathy 04/28/2023    Orthostatic syncope 09/05/2023    Personal history of other endocrine, nutritional and metabolic disease     History of hyperlipidemia    Pneumonia of right lung due to infectious organism 03/01/2023    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       albuterol 5 mg/mL nebulizer solution Take 0.5 mL (2.5 mg) by nebulization every 6 hours if needed for wheezing or shortness of breath.       atorvastatin (Lipitor) 40 mg tablet TAKE 1 TABLET BY MOUTH DAILY 90 tablet 1 1/20/2024    bumetanide (Bumex) 1 mg tablet Take 1 tablet (1 mg) by mouth once daily as needed (edema).   1/20/2024    calcium carbonate 600 mg calcium (1,500 mg) tablet Take 1 tablet (1,500 mg) by mouth 2 times a day with meals.       ferrous sulfate 325 (65 Fe) MG tablet Take 1 tablet by mouth once daily.   1/20/2024    finasteride (Proscar) 5 mg tablet Take 1 tablet (5 mg) by mouth once daily. 90 tablet 3 1/20/2024    folic acid (Folvite) 1 mg tablet Take 1 tablet (1 mg) by mouth once daily.       FVIII rec,Fc-VWF-XTEN,BDD-ehtl (ALTUVIIIO IV) Infuse 50 Units/kg into a venous catheter every 7 days. Last dose via Home Nurse ([facility])  on Wednesday 1/17/24. Peripheral access   Past Week    hydroCHLOROthiazide (Microzide) 12.5 mg capsule Take 1 capsule (12.5 mg) by mouth once daily.   1/20/2024    Lactobacillus acidoph-L.bulgar 1 million cell tablet tablet Take 1 tablet by mouth once daily.   1/20/2024    metoprolol succinate XL (Toprol-XL) 50 mg 24 hr tablet Take 0.5 tablets (25 mg) by mouth once daily.   1/20/2024    multivitamin with minerals (multivitamin-iron-folic acid) tablet  Take 1 tablet by mouth once daily.   1/20/2024    nystatin (Mycostatin) cream Apply topically every 12 hours. 42 g 1     pantoprazole (ProtoNix) 20 mg EC tablet Take 1 tablet (20 mg) by mouth once daily in the morning. Take before meals. Do not crush, chew, or split.   1/20/2024    propylene glycol-glycerin 1-0.3 % drops Administer 1 drop into affected eye(s) 4 times a day.       spironolactone (Aldactone) 25 mg tablet TAKE 1 TABLET BY MOUTH DAILY 90 tablet 1 1/20/2024    tamsulosin (Flomax) 0.4 mg 24 hr capsule Take 1 capsule (0.4 mg) by mouth 2 times a day. 180 capsule 3 1/20/2024    vit A/vit C/vit E/zinc/copper (PRESERVISION AREDS ORAL) Take 1 capsule by mouth once daily.            Surgical History:    Past Surgical History:   Procedure Laterality Date    CORONARY ARTERY BYPASS GRAFT         Allergies:    Allergies   Allergen Reactions    Aspirin Unknown    Penicillins Rash       Social History:    Social History     Socioeconomic History    Marital status:      Spouse name: Not on file    Number of children: Not on file    Years of education: Not on file    Highest education level: Not on file   Occupational History    Not on file   Tobacco Use    Smoking status: Never    Smokeless tobacco: Never   Substance and Sexual Activity    Alcohol use: Defer    Drug use: Never    Sexual activity: Not on file   Other Topics Concern    Not on file   Social History Narrative    Not on file     Social Determinants of Health     Financial Resource Strain: Low Risk  (1/20/2024)    Overall Financial Resource Strain (CARDIA)     Difficulty of Paying Living Expenses: Not hard at all   Food Insecurity: Not on file   Transportation Needs: No Transportation Needs (1/20/2024)    PRAPARE - Transportation     Lack of Transportation (Medical): No     Lack of Transportation (Non-Medical): No   Physical Activity: Not on file   Stress: Not on file   Social Connections: Not on file   Intimate Partner Violence: Not on file   Housing  Stability: Low Risk  (1/20/2024)    Housing Stability Vital Sign     Unable to Pay for Housing in the Last Year: No     Number of Places Lived in the Last Year: 1     Unstable Housing in the Last Year: No       Family History:    Family History   Problem Relation Name Age of Onset    Hemophilia Mother         EXAM     Vitals:    Vitals:    01/21/24 0547 01/21/24 0854 01/21/24 1337 01/21/24 1530   BP: 131/76 130/72 106/65 133/67   Pulse: 65 71 61 75   Resp: 18 18 18 18   Temp: 36.4 °C (97.5 °F) 36.3 °C (97.3 °F) 36.2 °C (97.2 °F) 36.8 °C (98.3 °F)   TempSrc:       SpO2: 97% 95% 97% 96%   Weight:       Height:             Intake/Output Summary (Last 24 hours) at 1/21/2024 1939  Last data filed at 1/21/2024 1337  Gross per 24 hour   Intake 50 ml   Output 525 ml   Net -475 ml         Physical Exam  General: well-nourished, no acute distress  HEENT: EOM intact, no scleral icterus, moist MM  Respiratory: nonlabored breathing on room air  Cardiovascular: RRR, no murmurs/rubs/gallops  Abdomen: Soft, nontender, nondistended, bowel sounds present. No masses palpated  Extremities: no edema, no asterixis  Neuro: alert and oriented, CNII-XII grossly intact, moves all 4 extremities with no focal deficits  Psych: calm and cooperative    OBJECTIVE                                                                              Medications       Current Facility-Administered Medications:     acetaminophen (Tylenol) tablet 650 mg, 650 mg, oral, q6h PRN, SHERRY Klein    albuterol 2.5 mg /3 mL (0.083 %) nebulizer solution 2.5 mg, 2.5 mg, nebulization, q6h PRN, SHERRY Klein, 2.5 mg at 01/21/24 1044    alum-mag hydroxide-simeth (Mylanta) 200-200-20 mg/5 mL oral suspension 20 mL, 20 mL, oral, 4x daily PRN, SHERRY Ferrera    atorvastatin (Lipitor) tablet 40 mg, 40 mg, oral, Daily, SHERRY Klein    benzonatate (Tessalon) capsule 100 mg, 100 mg, oral, TID, SUSI Klein-CNP, 100 mg at  01/21/24 1553    calcium carbonate (Oscal) tablet 1,250 mg, 1,250 mg, oral, BID with meals, SHERRY Klein, 1,250 mg at 01/21/24 1600    cefTRIAXone (Rocephin) IVPB 1 g, 1 g, intravenous, q24h, SHERRY Klein, Stopped at 01/21/24 1630    ferrous sulfate (325 mg ferrous sulfate) tablet 1 tablet, 65 mg of iron, oral, Daily, SHERRY Klein, 1 tablet at 01/21/24 0845    finasteride (Proscar) tablet 5 mg, 5 mg, oral, Daily, SHERRY Klein, 5 mg at 01/21/24 0845    folic acid (Folvite) tablet 1 mg, 1 mg, oral, Daily, SHERRY Klein, 1 mg at 01/21/24 0845    guaiFENesin (Mucinex) 12 hr tablet 600 mg, 600 mg, oral, BID, SHERRY Klein    hydroCHLOROthiazide (HYDRODiuril) tablet 12.5 mg, 12.5 mg, oral, Daily, SHERRY Klein, 12.5 mg at 01/21/24 0845    melatonin tablet 3 mg, 3 mg, oral, Once, SHERRY Ferrera    metoprolol succinate XL (Toprol-XL) 24 hr tablet 25 mg, 25 mg, oral, Daily, SHERRY Klein, 25 mg at 01/21/24 0845    multivitamin with minerals 1 tablet, 1 tablet, oral, Daily, SHERRY Klein, 1 tablet at 01/21/24 0844    ondansetron (Zofran) tablet 8 mg, 8 mg, oral, q8h PRN, SHERRY Klein    [START ON 1/22/2024] pantoprazole (ProtoNix) EC tablet 40 mg, 40 mg, oral, Daily before breakfast, SHERRY Klein    spironolactone (Aldactone) tablet 25 mg, 25 mg, oral, Daily, RACHEL KleinCNP, 25 mg at 01/21/24 0845    tamsulosin (Flomax) 24 hr capsule 0.4 mg, 0.4 mg, oral, Daily, SHERRY Klein, 0.4 mg at 01/21/24 0845                                                                            Labs     Results for orders placed or performed during the hospital encounter of 01/20/24 (from the past 24 hour(s))   Legionella Antigen, Urine    Specimen: Urine   Result Value Ref Range    L. pneumophila Urine Ag Negative Negative   Streptococcus pneumoniae Antigen, Urine     Specimen: Urine   Result Value Ref Range    Streptococcus pneumoniae Ag, Urine Negative Negative   CBC and Auto Differential   Result Value Ref Range    WBC 4.6 4.4 - 11.3 x10*3/uL    nRBC 0.0 0.0 - 0.0 /100 WBCs    RBC 3.44 (L) 4.50 - 5.90 x10*6/uL    Hemoglobin 12.1 (L) 13.5 - 17.5 g/dL    Hematocrit 36.0 (L) 41.0 - 52.0 %     (H) 80 - 100 fL    MCH 35.2 (H) 26.0 - 34.0 pg    MCHC 33.6 32.0 - 36.0 g/dL    RDW 14.6 (H) 11.5 - 14.5 %    Platelets 131 (L) 150 - 450 x10*3/uL    Neutrophils % 57.3 40.0 - 80.0 %    Immature Granulocytes %, Automated 0.2 0.0 - 0.9 %    Lymphocytes % 28.8 13.0 - 44.0 %    Monocytes % 9.0 2.0 - 10.0 %    Eosinophils % 4.0 0.0 - 6.0 %    Basophils % 0.7 0.0 - 2.0 %    Neutrophils Absolute 2.61 1.60 - 5.50 x10*3/uL    Immature Granulocytes Absolute, Automated 0.01 0.00 - 0.50 x10*3/uL    Lymphocytes Absolute 1.31 0.80 - 3.00 x10*3/uL    Monocytes Absolute 0.41 0.05 - 0.80 x10*3/uL    Eosinophils Absolute 0.18 0.00 - 0.40 x10*3/uL    Basophils Absolute 0.03 0.00 - 0.10 x10*3/uL   Comprehensive Metabolic Panel   Result Value Ref Range    Glucose 82 74 - 99 mg/dL    Sodium 138 136 - 145 mmol/L    Potassium 4.1 3.5 - 5.3 mmol/L    Chloride 104 98 - 107 mmol/L    Bicarbonate 25 21 - 32 mmol/L    Anion Gap 13 10 - 20 mmol/L    Urea Nitrogen 22 6 - 23 mg/dL    Creatinine 1.26 0.50 - 1.30 mg/dL    eGFR 53 (L) >60 mL/min/1.73m*2    Calcium 9.0 8.6 - 10.6 mg/dL    Albumin 3.8 3.4 - 5.0 g/dL    Alkaline Phosphatase 81 33 - 136 U/L    Total Protein 6.3 (L) 6.4 - 8.2 g/dL    AST 29 9 - 39 U/L    Bilirubin, Total 1.0 0.0 - 1.2 mg/dL    ALT 44 10 - 52 U/L   Magnesium   Result Value Ref Range    Magnesium 2.17 1.60 - 2.40 mg/dL                                                                              Imaging           CT chest abdomen and pelvis  IMPRESSION:  1. No acute pulmonary embolism to the segmental arterial level.  2. Cardiomegaly with mild interlobular septal thickening and ground-glass  opacities suggestive of developing interstitial edema.  3. Small bilateral pleural effusions with adjacent airspace opacity favored to relate to compressive atelectasis. Superimposed infection  not excluded. Continued radiographic follow-up is advised to ensure complete resolution and exclude underlying lesion.  4. Main pulmonary artery is dilated up to 3.5 cm which can be seen with pulmonary arterial hypertension.  5. Moderate-size hiatal hernia with thickening of the distal esophagus which can be seen in the setting of esophagitis. Correlate  with symptomatology for the need for further evaluation with direct visualization.  6. Additional findings as described above.                                                                           GI Procedures     Colonoscopy 2018  Findings:       The perianal exam findings include non-thrombosed external hemorrhoids.       The terminal ileum appeared normal.       A diminutive polyp was found in the sigmoid colon. The polyp was        sessile. The polyp was benign-appearing and measured 3 mm. Polypectomy        was not attempted because of patient age and comorbid conditions.       A localized area of scarred mucosa was found in the distal rectum.       Non-bleeding internal hemorrhoids were found during retroflexion. The        hemorrhoids were moderate.       Non-bleeding external hemorrhoids were found during retroflexion. The        hemorrhoids were large.    EGD 2016  Impression:            - 3 cm hiatus hernia.                         - Atrophic gastritis.    ASSESSMENT / PLAN                  ASSESSMENT/PLAN:  Ramón Flores is a 89 y.o. 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 GI is consulted for esophageal thickening on imaging and epigastric burning. Patient has a long standing history of GERD with a known hiatal hernia. He states that his symptoms are slightly worse than baseline and  this can be seen in the setting of pneumonia.    Recommendations:  -PPI 40mg once daily  -Anti-reflux lifestyle modifiations were discussed with patient at bedside.  -If symptoms persist, or patient begins to experience dysphagia, can consider barium esophagram as an outpatient.  -Patient can follow up in GI clinic as an outpatient if desired.    Patient was discussed with Dr. Gan. To be seen with Dr. Nelson 1/22/24.    Thank you for the consultation.  GI will continue to follow.  Please do not hesitate to contact us again on Epic Chat or by [place]aging the [place] at [phone] between the weekday hours of 7 AM - 5 PM.  If there is an urgent concern during the weekend, after-hours, or holidays; then please page the on-call GI fellow at [phone]. Thank you.      Josselin Cordova MD  Gastroenterology and Hepatology Fellow  [facility]

## 2024-01-21 NOTE — PROGRESS NOTES
Ramón Flores is a 89 y.o. male on day 1 of admission presenting with CAP (community acquired pneumonia).    Subjective   Patient reports overall feeling okay today however he has been coughing a lot. He feels like he has something stuck in his throat that he can't bring up, however cough is mostly non-productive. He threw up over night because he coughed so hard. Encouraged use of IS and also Mucinex and Tessalon. We discussed plan for GI consult d/t moderate hiatal hernia seen on CT. Also discussed PT/OT consult, however patient says he enjoys being independent and would like to go home with his continued home care services. Otherwise no bleeding or other concerns. He denies any fevers/chills, SOB, or CP.    Objective     Physical Exam  Vitals reviewed.   Constitutional:       Appearance: Normal appearance.      Comments: frail   HENT:      Head: Normocephalic and atraumatic.      Nose: Nose normal.      Mouth/Throat:      Mouth: Mucous membranes are moist.      Pharynx: Oropharynx is clear.   Eyes:      Extraocular Movements: Extraocular movements intact.      Pupils: Pupils are equal, round, and reactive to light.   Cardiovascular:      Rate and Rhythm: Normal rate and regular rhythm.      Pulses: Normal pulses.      Heart sounds: Normal heart sounds.   Pulmonary:      Effort: Pulmonary effort is normal.      Breath sounds: Normal breath sounds.   Abdominal:      General: Bowel sounds are normal.      Palpations: Abdomen is soft.   Musculoskeletal:         General: Normal range of motion.   Skin:     General: Skin is warm.   Neurological:      General: No focal deficit present.      Mental Status: He is alert and oriented to person, place, and time. Mental status is at baseline.      Motor: Weakness present.   Psychiatric:         Mood and Affect: Mood normal.         Behavior: Behavior normal.         Last Recorded Vitals  Blood pressure 130/72, pulse 71, temperature 36.3 °C (97.3 °F), resp. rate 18, height  "1.525 m (5' 0.04\"), weight 69.5 kg (153 lb 3.5 oz), SpO2 95 %.  Intake/Output last 3 Shifts:  I/O last 3 completed shifts:  In: - (0 mL/kg)   Out: 200 (2.9 mL/kg) [Urine:200 (0.1 mL/kg/hr)]  Weight: 69.5 kg     Scheduled medications  atorvastatin, 40 mg, oral, Daily  azithromycin, 500 mg, intravenous, q24h  calcium carbonate, 1,250 mg, oral, BID with meals  cefTRIAXone, 1 g, intravenous, q24h  ferrous sulfate (325 mg ferrous sulfate), 65 mg of iron, oral, Daily  finasteride, 5 mg, oral, Daily  folic acid, 1 mg, oral, Daily  hydroCHLOROthiazide, 12.5 mg, oral, Daily  melatonin, 3 mg, oral, Once  metoprolol succinate XL, 25 mg, oral, Daily  multivitamin with minerals, 1 tablet, oral, Daily  pantoprazole, 20 mg, oral, Daily before breakfast  spironolactone, 25 mg, oral, Daily  tamsulosin, 0.4 mg, oral, Daily      Continuous medications     PRN medications  PRN medications: acetaminophen, albuterol, alum-mag hydroxide-simeth, benzocaine-menthol, benzonatate, guaiFENesin    Results for orders placed or performed during the hospital encounter of 01/20/24 (from the past 24 hour(s))   Troponin I, High Sensitivity   Result Value Ref Range    Troponin I, High Sensitivity 70 (HH) 0 - 20 ng/L   CBC and Auto Differential   Result Value Ref Range    WBC 4.5 4.4 - 11.3 x10*3/uL    nRBC 0.0 0.0 - 0.0 /100 WBCs    RBC 3.64 (L) 4.50 - 5.90 x10*6/uL    Hemoglobin 12.4 (L) 13.5 - 17.5 g/dL    Hematocrit 37.6 (L) 41.0 - 52.0 %     (H) 80 - 100 fL    MCH 34.1 (H) 26.0 - 34.0 pg    MCHC 33.0 32.0 - 36.0 g/dL    RDW 14.5 11.5 - 14.5 %    Platelets 144 (L) 150 - 450 x10*3/uL    Neutrophils % 54.2 40.0 - 80.0 %    Immature Granulocytes %, Automated 0.2 0.0 - 0.9 %    Lymphocytes % 33.5 13.0 - 44.0 %    Monocytes % 8.7 2.0 - 10.0 %    Eosinophils % 2.7 0.0 - 6.0 %    Basophils % 0.7 0.0 - 2.0 %    Neutrophils Absolute 2.43 1.60 - 5.50 x10*3/uL    Immature Granulocytes Absolute, Automated 0.01 0.00 - 0.50 x10*3/uL    Lymphocytes " Absolute 1.50 0.80 - 3.00 x10*3/uL    Monocytes Absolute 0.39 0.05 - 0.80 x10*3/uL    Eosinophils Absolute 0.12 0.00 - 0.40 x10*3/uL    Basophils Absolute 0.03 0.00 - 0.10 x10*3/uL   Coagulation Screen   Result Value Ref Range    Protime 14.4 (H) 9.8 - 12.8 seconds    INR 1.3 (H) 0.9 - 1.1    aPTT 49 (H) 27 - 38 seconds   Red Top   Result Value Ref Range    Extra Tube Hold for add-ons.    CBC and Auto Differential   Result Value Ref Range    WBC 4.6 4.4 - 11.3 x10*3/uL    nRBC 0.0 0.0 - 0.0 /100 WBCs    RBC 3.44 (L) 4.50 - 5.90 x10*6/uL    Hemoglobin 12.1 (L) 13.5 - 17.5 g/dL    Hematocrit 36.0 (L) 41.0 - 52.0 %     (H) 80 - 100 fL    MCH 35.2 (H) 26.0 - 34.0 pg    MCHC 33.6 32.0 - 36.0 g/dL    RDW 14.6 (H) 11.5 - 14.5 %    Platelets 131 (L) 150 - 450 x10*3/uL    Neutrophils % 57.3 40.0 - 80.0 %    Immature Granulocytes %, Automated 0.2 0.0 - 0.9 %    Lymphocytes % 28.8 13.0 - 44.0 %    Monocytes % 9.0 2.0 - 10.0 %    Eosinophils % 4.0 0.0 - 6.0 %    Basophils % 0.7 0.0 - 2.0 %    Neutrophils Absolute 2.61 1.60 - 5.50 x10*3/uL    Immature Granulocytes Absolute, Automated 0.01 0.00 - 0.50 x10*3/uL    Lymphocytes Absolute 1.31 0.80 - 3.00 x10*3/uL    Monocytes Absolute 0.41 0.05 - 0.80 x10*3/uL    Eosinophils Absolute 0.18 0.00 - 0.40 x10*3/uL    Basophils Absolute 0.03 0.00 - 0.10 x10*3/uL   Comprehensive Metabolic Panel   Result Value Ref Range    Glucose 82 74 - 99 mg/dL    Sodium 138 136 - 145 mmol/L    Potassium 4.1 3.5 - 5.3 mmol/L    Chloride 104 98 - 107 mmol/L    Bicarbonate 25 21 - 32 mmol/L    Anion Gap 13 10 - 20 mmol/L    Urea Nitrogen 22 6 - 23 mg/dL    Creatinine 1.26 0.50 - 1.30 mg/dL    eGFR 53 (L) >60 mL/min/1.73m*2    Calcium 9.0 8.6 - 10.6 mg/dL    Albumin 3.8 3.4 - 5.0 g/dL    Alkaline Phosphatase 81 33 - 136 U/L    Total Protein 6.3 (L) 6.4 - 8.2 g/dL    AST 29 9 - 39 U/L    Bilirubin, Total 1.0 0.0 - 1.2 mg/dL    ALT 44 10 - 52 U/L   Magnesium   Result Value Ref Range    Magnesium 2.17  "1.60 - 2.40 mg/dL       Assessment/Plan   Principal Problem:    CAP (community acquired pneumonia)    Ramón Flores is a 89 y.o. male with PMH Hemophilia A, Protein C deficiency, HTN, Afib, HFpEF, HLD, nephrolithiasis, CAD s/p CABG (2002), BPH, and R fem neck fx s/p THR 1/13/23), who initially presented to [facility] 1/18 with c/o SOB and non-productive cough x3 days. CXR (1/18) showing bibasilar opacities. CT angio chest (1/18) showing GGOs favoring developing interstitial edema, and small bilateral pleural effusions with adjacent airspace opacity favoring to represent atelectasis, however cannot exclude infectious process. Patient was started on Ceftriaxone (1/18- current) and Azithromycin (1/18-1/21). Flu A/B, RSV, and COVID negative 1/18. D-Dimer 2,248 (1/18) without obvious s/sx bleeding. Duplex BLE (1/19) negative for DVT. Heme consulted at [facility]. Given patient's history of Hemophilia A, he was transferred to  [facility] for further management. Pt non-toxic without c/f bleeding. sCr mildly elevated at 1.31 (1/20), urine lytes sent, s/p 500mL LR bolus with improvement in sCr to 1.26 (1/21). Pt with significant cough 1/21 inducing emesis, given findings of moderate hiatal hernia with c/f esophagitis seen on CT 1/18, GI consulted with formal recs pending. Plan to likely transition to PO atb regimen tomorrow and possible dc 1/22 with resumed HC pending improvement in cough.    # CAP  - Presented to [facility] 1/18 with c/o non-productive cough x3 days and a temperature \"above his baseline\" but afebrile  - CXR (1/18) showing bibasilar opacities  - CT angio chest (1/18) showing GGOs favoring developing interstitial edema, and small bilateral pleural effusions with adjacent airspace opacity favoring to represent atelectasis, however cannot exclude infectious process  - Flu A/B, RSV, and COVID negative 1/18  - Urine legionella/strep pneumo and MRSA nares pending (1/20)  - No leukocytosis, WBC 4.6 (1/21)  - s/p Azithromycin " "(1/18-1/21) and continues on Ceftriaxone (1/18- current); plan to transition to Levaquin 1/22-planned stop 1/24 for total 7 day tx of atbs; allergy to PCNs  - Continue home Albuterol PRN SOB/wheezing  - Started Mucinex 600mg BID and Tessalon Perles 100mg TID for cough/congestion  - Incentive spirometer ordered q2hrs for atelectasis     # Hemophilia A, Protein C deficiency  - Follows with Dr. Dillon  - FVIII activity <1%, on prophylactic altuviiio 250 weekly on Wednesdays  - Last factor infusion with altuviiio 250 on 1/17/24, next due 1/24  - Labs stable; Hgb 12.1, Plt 131 (1/21), INR 1.3 (1/20)  - D-Dimer 2,248 (1/18) without obvious s/sx bleeding  - Duplex BLE (1/19) negative for DVT  - Patient requires factor for any bleeding, or prior to any procedure   - Heme consulted on admit, rec CBC/coags, PNA tx, and if still inpt 1/24 will receive Altuviiio (page heme)  - Continue home folic acid 1mg daily, ferrous sulfate 325mg daily, calcium carbonate 1,250mg BID, and multivitamin daily    # Hiatal Hernia  # GERD  - Pt with known hiatal hernia seen on previous imaging reported as \"small\", and GERD managed with protonix 20mg daily  - CT chest (1/18) showing moderate hiatal hernia with thickening of the distal esophagus which can be seen I/s/o esophagitis  - 1/21 pt with significant cough inducing emesis, given image findings, consulted GI  - GI consulted 1/21, formal recs pending  - Increased home protonix from 20mg to 40mg daily (1/21) per verbal GI recs; ok to increase to 40mg BID per GI  - PO Zofran 8mg q8hrs PRN nausea/vomiting  - Dietician consulted     # RONNIE-- improved  - Likely 2/2 IV atbs  - sCr baseline ~1, sCr 1.31 (1/20)--> s/p 500mL LR bolus with improvement in sCr to 1.26 (1/21)  - Urine lytes pending (1/20)     # Chronic Hypomagnesia  - Mag 1.1 (1/18)--> received unclear amount of mag replacement at OSH  - Mag 1.5 (1/20)--> 2g Mag IV ordered  - Mag 2.17 (1/21); no need for replacement  - Keep Mag >2 I/s/o " cardiac hx     # HTN/ Afib/ HFpEF/ HLD/ CAD  - s/p CABG in 2002  - Last ECHO (2/24/23): EF 60-65%  - BNP (1/18): 613  - Admission EKG (1/20): Afib rate controlled, consistent with pt's baseline  - Hx elevated trops consistent with pt's baseline given cardiac hx  - Trop (1/18): 93-->97-->70  - Continue home Metop succs 25mg daily and HCTS 12.5mg daily  - Continue home Spironolactone 25mg daily  - Continue home Lipitor 40mg daily     # BPH  - Continue home Flomax 0.4mg daily and Finasteride 5mg daily     DVT prophy: SCDs only, no pharmacologic prophy I/s/o hemophilia, encourage ambulation     DISPO:  - Full Code  - DC pending improvement in cough and transition to PO atbs, possibly tomorrow 1/22  - DC with resumed HC services through [facility], receives weekly factor replacement through home care  - PT/OT recs pending but pt does not want to go to a facility as he values his independence  - Ophthalmology FUV 4/3, Urology FUV 11/26    Assessment and plan discussed with attending physician Dr. Gallito Layne    I spent >60 minutes in the professional and overall care of this patient.    SUSI Klein-CNP

## 2024-01-21 NOTE — CARE PLAN
The patient's goals for the shift include      The clinical goals for the shift include pt will remain safe and free from falls 1/21 @1900      Problem: Skin  Goal: Decreased wound size/increased tissue granulation at next dressing change  Outcome: Progressing  Flowsheets (Taken 1/21/2024 0807)  Decreased wound size/increased tissue granulation at next dressing change: Protective dressings over bony prominences  Goal: Participates in plan/prevention/treatment measures  Outcome: Progressing  Flowsheets (Taken 1/21/2024 0807)  Participates in plan/prevention/treatment measures: Elevate heels  Goal: Prevent/manage excess moisture  Outcome: Progressing  Flowsheets (Taken 1/21/2024 0807)  Prevent/manage excess moisture:   Moisturize dry skin   Cleanse incontinence/protect with barrier cream  Goal: Prevent/minimize sheer/friction injuries  Outcome: Progressing  Flowsheets (Taken 1/21/2024 0807)  Prevent/minimize sheer/friction injuries:   Turn/reposition every 2 hours/use positioning/transfer devices   Use pull sheet  Goal: Promote/optimize nutrition  Outcome: Progressing  Flowsheets (Taken 1/21/2024 0807)  Promote/optimize nutrition:   Offer water/supplements/favorite foods   Consume > 50% meals/supplements  Goal: Promote skin healing  Outcome: Progressing  Flowsheets (Taken 1/21/2024 0807)  Promote skin healing:   Turn/reposition every 2 hours/use positioning/transfer devices   Protective dressings over bony prominences     Problem: Fall/Injury  Goal: Not fall by end of shift  Outcome: Progressing  Goal: Be free from injury by end of the shift  Outcome: Progressing  Goal: Verbalize understanding of personal risk factors for fall in the hospital  Outcome: Progressing  Goal: Verbalize understanding of risk factor reduction measures to prevent injury from fall in the home  Outcome: Progressing  Goal: Use assistive devices by end of the shift  Outcome: Progressing  Goal: Pace activities to prevent fatigue by end of the  shift  Outcome: Progressing     Problem: Pain  Goal: Takes deep breaths with improved pain control throughout the shift  Outcome: Progressing  Goal: Turns in bed with improved pain control throughout the shift  Outcome: Progressing  Goal: Walks with improved pain control throughout the shift  Outcome: Progressing  Goal: Performs ADL's with improved pain control throughout shift  Outcome: Progressing  Goal: Participates in PT with improved pain control throughout the shift  Outcome: Progressing  Goal: Free from opioid side effects throughout the shift  Outcome: Progressing  Goal: Free from acute confusion related to pain meds throughout the shift  Outcome: Progressing     Problem: Daily Care  Goal: Daily care needs are met  Outcome: Progressing     Problem: Psychosocial Needs  Goal: Demonstrates ability to cope with hospitalization/illness  Outcome: Progressing  Goal: Collaborate with me, my family, and caregiver to identify my specific goals  Outcome: Progressing

## 2024-01-22 ENCOUNTER — TELEPHONE (OUTPATIENT)
Dept: ADMISSION | Facility: HOSPITAL | Age: 89
End: 2024-01-22
Payer: MEDICARE

## 2024-01-22 VITALS
RESPIRATION RATE: 18 BRPM | BODY MASS INDEX: 30.08 KG/M2 | OXYGEN SATURATION: 93 % | WEIGHT: 153.22 LBS | HEIGHT: 60 IN | HEART RATE: 60 BPM | DIASTOLIC BLOOD PRESSURE: 89 MMHG | TEMPERATURE: 98.2 F | SYSTOLIC BLOOD PRESSURE: 145 MMHG

## 2024-01-22 DIAGNOSIS — D66 HEMOPHILIA A (MULTI): Primary | ICD-10-CM

## 2024-01-22 LAB
ALBUMIN SERPL BCP-MCNC: 4 G/DL (ref 3.4–5)
ALP SERPL-CCNC: 76 U/L (ref 33–136)
ALT SERPL W P-5'-P-CCNC: 37 U/L (ref 10–52)
ANION GAP SERPL CALC-SCNC: 14 MMOL/L (ref 10–20)
AST SERPL W P-5'-P-CCNC: 26 U/L (ref 9–39)
BASOPHILS # BLD AUTO: 0.02 X10*3/UL (ref 0–0.1)
BASOPHILS NFR BLD AUTO: 0.4 %
BILIRUB SERPL-MCNC: 1.1 MG/DL (ref 0–1.2)
BUN SERPL-MCNC: 21 MG/DL (ref 6–23)
CALCIUM SERPL-MCNC: 9.1 MG/DL (ref 8.6–10.6)
CHLORIDE SERPL-SCNC: 105 MMOL/L (ref 98–107)
CO2 SERPL-SCNC: 23 MMOL/L (ref 21–32)
CREAT SERPL-MCNC: 1.22 MG/DL (ref 0.5–1.3)
EGFRCR SERPLBLD CKD-EPI 2021: 55 ML/MIN/1.73M*2
EOSINOPHIL # BLD AUTO: 0.18 X10*3/UL (ref 0–0.4)
EOSINOPHIL NFR BLD AUTO: 3.8 %
ERYTHROCYTE [DISTWIDTH] IN BLOOD BY AUTOMATED COUNT: 14.5 % (ref 11.5–14.5)
FACT VIII ACT/NOR PPP: 7 % (ref 55–180)
GLUCOSE SERPL-MCNC: 90 MG/DL (ref 74–99)
HCT VFR BLD AUTO: 36.2 % (ref 41–52)
HGB BLD-MCNC: 11.9 G/DL (ref 13.5–17.5)
IMM GRANULOCYTES # BLD AUTO: 0.01 X10*3/UL (ref 0–0.5)
IMM GRANULOCYTES NFR BLD AUTO: 0.2 % (ref 0–0.9)
LYMPHOCYTES # BLD AUTO: 1.51 X10*3/UL (ref 0.8–3)
LYMPHOCYTES NFR BLD AUTO: 32 %
MAGNESIUM SERPL-MCNC: 2.22 MG/DL (ref 1.6–2.4)
MCH RBC QN AUTO: 33.9 PG (ref 26–34)
MCHC RBC AUTO-ENTMCNC: 32.9 G/DL (ref 32–36)
MCV RBC AUTO: 103 FL (ref 80–100)
MONOCYTES # BLD AUTO: 0.42 X10*3/UL (ref 0.05–0.8)
MONOCYTES NFR BLD AUTO: 8.9 %
NEUTROPHILS # BLD AUTO: 2.58 X10*3/UL (ref 1.6–5.5)
NEUTROPHILS NFR BLD AUTO: 54.7 %
NRBC BLD-RTO: 0 /100 WBCS (ref 0–0)
PLATELET # BLD AUTO: 143 X10*3/UL (ref 150–450)
POTASSIUM SERPL-SCNC: 4 MMOL/L (ref 3.5–5.3)
PROT SERPL-MCNC: 6.7 G/DL (ref 6.4–8.2)
RBC # BLD AUTO: 3.51 X10*6/UL (ref 4.5–5.9)
SODIUM SERPL-SCNC: 138 MMOL/L (ref 136–145)
STAPHYLOCOCCUS SPEC CULT: NORMAL
WBC # BLD AUTO: 4.7 X10*3/UL (ref 4.4–11.3)

## 2024-01-22 PROCEDURE — 97161 PT EVAL LOW COMPLEX 20 MIN: CPT | Mod: GP

## 2024-01-22 PROCEDURE — 83735 ASSAY OF MAGNESIUM: CPT | Performed by: NURSE PRACTITIONER

## 2024-01-22 PROCEDURE — 2500000004 HC RX 250 GENERAL PHARMACY W/ HCPCS (ALT 636 FOR OP/ED): Performed by: NURSE PRACTITIONER

## 2024-01-22 PROCEDURE — 36415 COLL VENOUS BLD VENIPUNCTURE: CPT | Performed by: STUDENT IN AN ORGANIZED HEALTH CARE EDUCATION/TRAINING PROGRAM

## 2024-01-22 PROCEDURE — 85240 CLOT FACTOR VIII AHG 1 STAGE: CPT | Performed by: STUDENT IN AN ORGANIZED HEALTH CARE EDUCATION/TRAINING PROGRAM

## 2024-01-22 PROCEDURE — 80053 COMPREHEN METABOLIC PANEL: CPT | Performed by: NURSE PRACTITIONER

## 2024-01-22 PROCEDURE — 36415 COLL VENOUS BLD VENIPUNCTURE: CPT | Performed by: NURSE PRACTITIONER

## 2024-01-22 PROCEDURE — 85025 COMPLETE CBC W/AUTO DIFF WBC: CPT | Performed by: NURSE PRACTITIONER

## 2024-01-22 PROCEDURE — 99239 HOSP IP/OBS DSCHRG MGMT >30: CPT | Performed by: NURSE PRACTITIONER

## 2024-01-22 PROCEDURE — 97165 OT EVAL LOW COMPLEX 30 MIN: CPT | Mod: GO

## 2024-01-22 PROCEDURE — 2500000001 HC RX 250 WO HCPCS SELF ADMINISTERED DRUGS (ALT 637 FOR MEDICARE OP): Performed by: NURSE PRACTITIONER

## 2024-01-22 RX ADMIN — HYDROCHLOROTHIAZIDE 12.5 MG: 25 TABLET ORAL at 08:27

## 2024-01-22 RX ADMIN — FOLIC ACID 1 MG: 1 TABLET ORAL at 08:27

## 2024-01-22 RX ADMIN — FINASTERIDE 5 MG: 5 TABLET, FILM COATED ORAL at 08:27

## 2024-01-22 RX ADMIN — ATORVASTATIN CALCIUM 40 MG: 40 TABLET, FILM COATED ORAL at 08:27

## 2024-01-22 RX ADMIN — CALCIUM 1250 MG: 500 TABLET ORAL at 08:27

## 2024-01-22 RX ADMIN — PANTOPRAZOLE SODIUM 40 MG: 40 TABLET, DELAYED RELEASE ORAL at 08:26

## 2024-01-22 RX ADMIN — BENZONATATE 100 MG: 100 CAPSULE ORAL at 08:31

## 2024-01-22 RX ADMIN — GUAIFENESIN 600 MG: 600 TABLET ORAL at 08:27

## 2024-01-22 RX ADMIN — Medication 1 TABLET: at 08:27

## 2024-01-22 RX ADMIN — METOPROLOL SUCCINATE 25 MG: 25 TABLET, EXTENDED RELEASE ORAL at 08:27

## 2024-01-22 RX ADMIN — SPIRONOLACTONE 25 MG: 25 TABLET, FILM COATED ORAL at 08:27

## 2024-01-22 RX ADMIN — FERROUS SULFATE TAB 325 MG (65 MG ELEMENTAL FE) 1 TABLET: 325 (65 FE) TAB at 08:27

## 2024-01-22 RX ADMIN — TAMSULOSIN HYDROCHLORIDE 0.4 MG: 0.4 CAPSULE ORAL at 08:26

## 2024-01-22 ASSESSMENT — COGNITIVE AND FUNCTIONAL STATUS - GENERAL
STANDING UP FROM CHAIR USING ARMS: A LOT
TOILETING: A LITTLE
PERSONAL GROOMING: A LITTLE
DRESSING REGULAR LOWER BODY CLOTHING: A LITTLE
WALKING IN HOSPITAL ROOM: A LITTLE
MOVING TO AND FROM BED TO CHAIR: A LITTLE
MOVING FROM LYING ON BACK TO SITTING ON SIDE OF FLAT BED WITH BEDRAILS: A LITTLE
CLIMB 3 TO 5 STEPS WITH RAILING: TOTAL
HELP NEEDED FOR BATHING: A LITTLE
DRESSING REGULAR UPPER BODY CLOTHING: A LITTLE
MOBILITY SCORE: 15
TURNING FROM BACK TO SIDE WHILE IN FLAT BAD: A LITTLE
EATING MEALS: A LITTLE
DAILY ACTIVITIY SCORE: 18

## 2024-01-22 ASSESSMENT — ACTIVITIES OF DAILY LIVING (ADL)
ADL_ASSISTANCE: NEEDS ASSISTANCE
ADL_ASSISTANCE: NEEDS ASSISTANCE

## 2024-01-22 ASSESSMENT — PAIN SCALES - GENERAL
PAINLEVEL_OUTOF10: 0 - NO PAIN
PAINLEVEL_OUTOF10: 0 - NO PAIN

## 2024-01-22 ASSESSMENT — PAIN - FUNCTIONAL ASSESSMENT
PAIN_FUNCTIONAL_ASSESSMENT: 0-10
PAIN_FUNCTIONAL_ASSESSMENT: 0-10

## 2024-01-22 NOTE — PROGRESS NOTES
Transitional Care Coordinator Note: Met with patient to discuss discharge planning s/p admission.  Patient lives home alone.  Independent in some ADL's. Requires assist devices for ambulation( walker/wheelchair).  Patient with active home care with [facility], per patient he previously had 24/7 aides but decreased his number of hours as he did not feel he needed to have care 24/7.  Previously had rehab at [facility]  after hip surgery. Demographics and contact information confirmed.  Will continue to monitor patient for all home going needs.  Jes Lao RN TCC via [employer].    Falls- 1 yr ago  Equipment- walker/wheelchair  DM-no  Dialysis- no  O2/Cpap- none   Pharm- [facility]  PCP- Becky Santoyo CNP  Transport- Family/ may need roundtrip unsure at time assessment    1/22/24 @ 1030   Spoke with [facility]. Referral placed in [facility] and sent updated clinicals.  Patient currently receives services with them for a HHA.  If any skilled needs, they are able to help. PT recommending SNF.  NILESH Pagan NP made aware. Will continue to follow patient for discharge needs.  Cesilia Gomez RN TCC

## 2024-01-22 NOTE — PROGRESS NOTES
[facility]  [facility] [place]  CONSULT FOLLOW-UP         SUBJECTIVE     Reason for Consult: Esophageal thickness seen in CT     Interval Events/Subjective:   Tolerate PO without issues. No early satiety, dysphagia, weight loss. Unsure about family history. Feels ready to go home.     ROS: Complete ROS obtained, negative unless otherwise indicated above.     Medications:  atorvastatin, 40 mg, oral, Daily  benzonatate, 100 mg, oral, TID  calcium carbonate, 1,250 mg, oral, BID with meals  cefTRIAXone, 1 g, intravenous, q24h  ferrous sulfate (325 mg ferrous sulfate), 65 mg of iron, oral, Daily  finasteride, 5 mg, oral, Daily  folic acid, 1 mg, oral, Daily  guaiFENesin, 600 mg, oral, BID  hydroCHLOROthiazide, 12.5 mg, oral, Daily  melatonin, 3 mg, oral, Once  metoprolol succinate XL, 25 mg, oral, Daily  multivitamin with minerals, 1 tablet, oral, Daily  pantoprazole, 40 mg, oral, Daily before breakfast  spironolactone, 25 mg, oral, Daily  tamsulosin, 0.4 mg, oral, Daily      PRN medications: acetaminophen, albuterol, alum-mag hydroxide-simeth, ondansetron      EXAM     Physical Exam       1/21/2024     8:54 AM 1/21/2024     1:37 PM 1/21/2024     3:30 PM 1/21/2024     8:24 PM 1/22/2024     1:10 AM 1/22/2024     6:34 AM 1/22/2024     8:24 AM   Vitals   Systolic 130 106 133 113 117 135 145   Diastolic 72 65 67 59 68 68 89   Heart Rate 71 61 75 60 68 62 60   Temp 36.3 °C (97.3 °F) 36.2 °C (97.2 °F) 36.8 °C (98.3 °F) 36.8 °C (98.2 °F) 36.9 °C (98.4 °F) 36.7 °C (98.1 °F) 36.8 °C (98.2 °F)   Resp 18 18 18 18 18 18 18         General: NAD, AA&O x 3  Eyes: EOMI, PERRLA  ENT: MMM  Heart: RRR  Lungs: No respiratory distress  Abdomen: Soft, non tender, non distended  Skin: No jaundice   Neuro: Appropriately responds to questions/commands         DATA                                                                            Labs     No results found for this or any previous visit (from  the past 24 hour(s)).                                                                             Imaging     ECG 12 Lead    Result Date: 1/22/2024  Atrial fibrillation with slow ventricular response Nonspecific ST abnormality , probably digitalis effect Abnormal ECG When compared with ECG of 18-JAN-2024 13:33, (unconfirmed) No significant change was found    Vascular US lower extremity venous duplex bilateral    Result Date: 1/19/2024             [place] [address]   Tel [phone] and Fax [phone]  Vascular Lab Report [facility] US LOWER EXTREMITY VENOUS DUPLEX BILATERAL  Patient Name:      KRISTIN SUEALEM Simpson Physician: [id number] Machelle Marquis MD Study Date:        1/19/2024           Ordering           [id number] NINO                                        Physician:         JOSY MRN/PID:           [id number]            Technologist:      Sher Peace SAKSHI Accession#:        [id number]        Technologist 2: Date of Birth/Age: 7/24/1928 / 89      Encounter#:        [id number]                    years Gender:            M Admission Status:  Inpatient           Location           [facility]                                        Performed:  Diagnosis/ICD: Localized (leg) edema-R60.0; Postthrombotic syndrome without                complications of bilateral lower extremities-I87.003 CPT Codes:     87766 Peripheral venous duplex scan for DVT complete  CONCLUSIONS: Right Lower Venous: No evidence of acute deep vein thrombus visualized in the right lower extremity. Lymph nodes noted in the right groin. Technically difficult study due to patient's positioning. Left Lower Venous: No evidence of acute deep vein thrombus visualized in the left lower extremity. Technically difficult study due to patient's positioning.  Imaging & Doppler Findings:  Right                 Compressible Thrombus   Flow Distal External Iliac     Yes        None   Pulsatile CFV                        Yes        None   Pulsatile PFV                       Yes        None FV Proximal               Yes        None   Pulsatile FV Mid                    Yes        None FV Distal                 Yes        None Popliteal                 Yes        None   Pulsatile Peroneal                  Yes        None PTV                       Yes        None  Left                  Compress Thrombus   Flow Distal External Iliac   Yes      None   Pulsatile CFV                     Yes      None   Pulsatile PFV                     Yes      None FV Proximal             Yes      None   Pulsatile FV Mid                  Yes      None FV Distal               Yes      None Popliteal               Yes      None   Pulsatile Peroneal                Yes      None PTV                     Yes      None  [id number] Machelle Marquis MD Electronically signed by [id number] Machelle Marquis MD on 1/19/2024 at 3:37:25 PM  ** Final **     CT angio chest for pulmonary embolism    Result Date: 1/18/2024  Interpreted By:  Negrito Vora, STUDY: CT ANGIO CHEST FOR PULMONARY EMBOLISM;  1/18/2024 5:24 pm   INDICATION: Signs/Symptoms:exertional dyspnea, elevated dimer.   COMPARISON: CT scan of the chest 04/08/2023.   ACCESSION NUMBER(S): [id number]   ORDERING CLINICIAN: MARILY RODGERS   TECHNIQUE: Helical data acquisition of the chest was obtained after intravenous administration of  75 mL of Omnipaque 350. Images were reformatted in axial, coronal, and sagittal planes. Axial and coronal MIPS were reconstructed and reviewed.   FINDINGS: HEART AND VESSELS: No acute pulmonary embolism to the  segmental arterial level.   Main pulmonary artery is dilated up to 3.5 cm which can be seen with pulmonary arterial hypertension.   The thoracic aorta is of normal course and caliber  with scattered vascular calcifications.   Moderate coronary artery calcifications are seen. Coronary artery stent suspected in the LAD territory.The study is not optimized for evaluation of  coronary arteries.   The cardiac chambers are enlarged. Midline sternotomy wires and mediastinal clips noted.   Trace pericardial fluid.   MEDIASTINUM AND DANIEL, LOWER NECK AND AXILLA: The visualized thyroid gland is within normal limits.   No evidence of thoracic lymphadenopathy by CT criteria.   Esophagus appears within normal limits as seen.   LUNGS AND AIRWAYS: The trachea and central airways are patent. No endobronchial lesion.   Small bilateral pleural effusions. Adjacent airspace opacity may relate to compressive atelectasis with superimposed infection not excluded. There is mild interlobular septal thickening and ground-glass opacities suggestive of developing interstitial edema/CHF.   UPPER ABDOMEN: Moderate-size hiatal hernia with thickening of the distal esophagus. Reflux of contrast into the IVC and intrahepatic veins.   CHEST WALL AND OSSEOUS STRUCTURES: Generalized diffuse osteopenia. Mild compression deformity of the T3 vertebral body is progressed from prior exam with approximate 25% loss of vertebral body height. Stable mild compression deformity of the T4 superior endplate. Multilevel degenerative changes are present.       1. No acute pulmonary embolism to the segmental arterial level. 2. Cardiomegaly with mild interlobular septal thickening and ground-glass opacities suggestive of developing interstitial edema. 3. Small bilateral pleural effusions with adjacent airspace opacity favored to relate to compressive atelectasis. Superimposed infection not excluded. Continued radiographic follow-up is advised to ensure complete resolution and exclude underlying lesion. 4. Main pulmonary artery is dilated up to 3.5 cm which can be seen with pulmonary arterial hypertension. 5. Moderate-size hiatal hernia with thickening of the distal esophagus which can be seen in the setting of esophagitis. Correlate with symptomatology for the need for further evaluation with direct visualization. 6. Additional findings  as described above.     MACRO: None   Signed by: Negrito Vora 1/18/2024 6:21 PM Dictation workstation:   [id number]    XR chest 1 view    Result Date: 1/18/2024  Interpreted By:  Andrews Ross, STUDY: XR CHEST 1 VIEW  1/18/2024 1:42 pm   INDICATION: Signs/Symptoms:cough, SOB   COMPARISON: 04/12/2023   ACCESSION NUMBER(S): [id number]   ORDERING CLINICIAN: MARILY RODGERS   TECHNIQUE: A single AP portable radiograph of the chest was obtained.   FINDINGS: Sternal wires and mediastinal surgical clips are present. Hazy opacities are seen at the lung bases bilaterally, and may represent scarring, atelectasis and/or pneumonia. No pneumothorax is identified. The cardiac silhouette is within normal limits for size.       Bibasilar airspace opacities, as above. Clinical correlation and continued follow-up until clearing is recommended.   MACRO: None.   Signed by: Andrews Ross 1/18/2024 2:10 PM Dictation workstation:   [id number]    XR hip right with pelvis when performed 2 or 3 views    Result Date: 1/4/2024  Interpreted By:  Shad Disla, STUDY: XR HIP RIGHT WITH PELVIS WHEN PERFORMED 2 OR 3 VIEWS; ;  1/3/2024 11:42 am   INDICATION: Signs/Symptoms:Hip Fracture.   COMPARISON: 05/31/2023   ACCESSION NUMBER(S): [id number]   ORDERING CLINICIAN: ISABELLA CHACKO   FINDINGS: AP pelvis along with two views of the right hip.   Postsurgical changes status post right hip hemiarthroplasty with cerclage wire. No hardware complication. Mild left hip arthrosis. Lower lumbar degenerative change. Surgical clips are seen in the right groin region.       Right hip hemiarthroplasty without hardware complication.   MACRO: None   Signed by: Shad Disla 1/4/2024 10:50 AM Dictation workstation:   [id number]                                                                              GI Procedures     Colonoscopy 2018  Findings:       The perianal exam findings include non-thrombosed external hemorrhoids.       The terminal ileum appeared  normal.       A diminutive polyp was found in the sigmoid colon. The polyp was        sessile. The polyp was benign-appearing and measured 3 mm. Polypectomy        was not attempted because of patient age and comorbid conditions.       A localized area of scarred mucosa was found in the distal rectum.       Non-bleeding internal hemorrhoids were found during retroflexion. The        hemorrhoids were moderate.       Non-bleeding external hemorrhoids were found during retroflexion. The        hemorrhoids were large.    5/2016 EGD   Findings:       A 3 cm hiatus hernia was present.       The exam of the esophagus was otherwise normal.       Diffuse atrophic mucosa was found in the entire examined stomach.       The exam of the stomach was otherwise normal.    ASSESSMENT / PLAN     Assessment and Recommendations:     Ramón Flores is a 89 y.o. 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 GI is consulted for esophageal thickening on imaging and epigastric burning.     #Esophageal thickness on CT   #GERD on PPI daily at home   #3cm hiatal hernia on EGD 2016  ::Patient has a long standing history of GERD with a known hiatal hernia.   ::No reg flag symptoms (no weight change, dysphagia, early satiety).   -Symptoms likely from esophagitis in the setting of known GERD/hiatal hernia    Plan/Recommendations  -C/w PPI daily   -Patient can follow up in GI clinic as an outpatient if desired.     CHIOMA per primary team     ------------------------------------------------------------------------  Jenny Li MD  Gastroenterology Fellow  After 5PM and on Weekends, please page on-call fellow.    To be discussed with service attending Dr. Nelson   Final recommendations pending attending attestation.

## 2024-01-22 NOTE — TELEPHONE ENCOUNTER
Pharmacy calling to confirm okay to administer prophylactic altuviiio 250  2 days early, with remaining doses on Mondays.     No documentation reflects this, only that patient is to receive infusions through [facility] pharmacy on Wednesdays, next dose 1/24/2024.     Message sent to oncologist and evening fellow.   No new orders.     Call back to specialty pharmacy notified team will reach out tomorrow if response from provider is different than current documentation. However at this time, treatment needs to resume 1/24/2024.

## 2024-01-22 NOTE — TELEPHONE ENCOUNTER
Notified [facility] of plans for discharge and to resume Altuviiio dosing every 7 days as provided bu Home infusion Nurse.   Fallon Stark , RN  will resume infusions at home.  [facility] required a ne Rx to resume since he had been in hospital.  [facility]  [phone].  [facility] manager  cell [phone]  fax [phone].

## 2024-01-22 NOTE — CARE PLAN
Problem: Skin  Goal: Decreased wound size/increased tissue granulation at next dressing change  Outcome: Progressing  Flowsheets (Taken 1/21/2024 2343)  Decreased wound size/increased tissue granulation at next dressing change: Promote sleep for wound healing  Goal: Participates in plan/prevention/treatment measures  Outcome: Progressing  Flowsheets (Taken 1/21/2024 0807 by Abby Gallagher RN)  Participates in plan/prevention/treatment measures: Elevate heels  Goal: Prevent/manage excess moisture  Outcome: Progressing  Flowsheets (Taken 1/21/2024 2343)  Prevent/manage excess moisture: Cleanse incontinence/protect with barrier cream  Goal: Prevent/minimize sheer/friction injuries  Outcome: Progressing  Flowsheets (Taken 1/21/2024 2343)  Prevent/minimize sheer/friction injuries: HOB 30 degrees or less  Goal: Promote/optimize nutrition  Outcome: Progressing  Flowsheets (Taken 1/21/2024 2343)  Promote/optimize nutrition: Assist with feeding  Goal: Promote skin healing  Outcome: Progressing  Flowsheets (Taken 1/21/2024 2343)  Promote skin healing: Assess skin/pad under line(s)/device(s)

## 2024-01-22 NOTE — NURSING NOTE
1050: Pt called niece and stated that he doesn't want to be on [facility] anymore. Pt stated that if he has to go to rehab he will kill himself. Pt doesn't have any active plans to harm himself. Pt stated he was just upset and have a fear of going to nursing home. This nurse reach out to Gianna AVELAR. Pt plan is to discharge home. Pt notified of plan. Pt said he is grateful he had just felt that way because he thought he had to go to rehab. 1 on 1 given and effective. Pt currently laying in bed in better spirits. Nurse to continue to monitor. Pt called his niece and let her know of his plan to discharge home instead.

## 2024-01-22 NOTE — CARE PLAN
The patient's goals for the shift include      The clinical goals for the shift include pt will remain safe and free from injuries and falls throughout shift.      Problem: Skin  Goal: Decreased wound size/increased tissue granulation at next dressing change  Outcome: Progressing  Goal: Participates in plan/prevention/treatment measures  Outcome: Progressing  Goal: Prevent/manage excess moisture  Outcome: Progressing  Goal: Prevent/minimize sheer/friction injuries  Outcome: Progressing  Goal: Promote/optimize nutrition  Outcome: Progressing  Goal: Promote skin healing  Outcome: Progressing     Problem: Fall/Injury  Goal: Not fall by end of shift  Outcome: Progressing  Goal: Be free from injury by end of the shift  Outcome: Progressing  Goal: Verbalize understanding of personal risk factors for fall in the hospital  Outcome: Progressing  Goal: Verbalize understanding of risk factor reduction measures to prevent injury from fall in the home  Outcome: Progressing  Goal: Use assistive devices by end of the shift  Outcome: Progressing  Goal: Pace activities to prevent fatigue by end of the shift  Outcome: Progressing     Problem: Pain  Goal: Takes deep breaths with improved pain control throughout the shift  Outcome: Progressing  Goal: Turns in bed with improved pain control throughout the shift  Outcome: Progressing  Goal: Walks with improved pain control throughout the shift  Outcome: Progressing  Goal: Performs ADL's with improved pain control throughout shift  Outcome: Progressing  Goal: Participates in PT with improved pain control throughout the shift  Outcome: Progressing  Goal: Free from opioid side effects throughout the shift  Outcome: Progressing  Goal: Free from acute confusion related to pain meds throughout the shift  Outcome: Progressing     Problem: Daily Care  Goal: Daily care needs are met  Outcome: Progressing     Problem: Psychosocial Needs  Goal: Demonstrates ability to cope with  hospitalization/illness  Outcome: Progressing  Goal: Collaborate with me, my family, and caregiver to identify my specific goals  Outcome: Progressing

## 2024-01-22 NOTE — DISCHARGE SUMMARY
Discharge Diagnosis  CAP (community acquired pneumonia)    Hospital Course   Ramón Flores is a 89 y.o. male with PMH Hemophilia A, Protein C deficiency, HTN, Afib, HFpEF, HLD, nephrolithiasis, CAD s/p CABG (2002), BPH, and R fem neck fx s/p THR 1/13/23), who initially presented to [facility] 1/18 with c/o SOB and non-productive cough x3 days. CXR (1/18) showing bibasilar opacities. CT angio chest (1/18) showing GGOs favoring developing interstitial edema, and small bilateral pleural effusions with adjacent airspace opacity favoring to represent atelectasis, however cannot exclude infectious process. Patient was started on Ceftriaxone (1/18- current) and Azithromycin (1/18-1/21). Flu A/B, RSV, and COVID negative 1/18. D-Dimer 2,248 (1/18) without obvious s/sx bleeding. Duplex BLE (1/19) negative for DVT. Heme consulted at [facility]. Given patient's history of Hemophilia A, he was transferred to  SCC for further management. Pt non-toxic without c/f bleeding. sCr mildly elevated at 1.31 (1/20), urine lytes sent, s/p 500mL LR bolus with improvement in sCr to 1.26 (1/21). Pt with significant cough 1/21 inducing emesis, given findings of moderate hiatal hernia with c/f esophagitis seen on CT 1/18, GI consulted with recs to increase home PPI. Transitioned to Levaquin for discharge with planned stop date of 1/24, medication delivered to pt's bedside prior to discharge.       PT/OT recommended SNF but patient declined and wished to resume [facility] with additional services of PT/OT ordered. Will resume [facility] with new service.     Receives weekly prophylactic altuviiio 250 weekly on Wednesdays- next dose due 1/24/24       DC with resumed HC services through [facility], receives weekly factor replacement through home care. Ophthalmology FUV 4/3, Urology FUV 11/26    Pertinent Physical Exam At Time of Discharge  Physical Exam  Vitals reviewed.   Constitutional:       Appearance: Normal  appearance.   HENT:      Head: Normocephalic and atraumatic.      Nose: Nose normal.      Mouth/Throat:      Mouth: Mucous membranes are moist.      Pharynx: Oropharynx is clear.   Eyes:      Extraocular Movements: Extraocular movements intact.      Pupils: Pupils are equal, round, and reactive to light.   Cardiovascular:      Rate and Rhythm: Normal rate and regular rhythm.      Pulses: Normal pulses.      Heart sounds: Normal heart sounds.   Pulmonary:      Effort: Pulmonary effort is normal.      Breath sounds: Normal breath sounds.   Abdominal:      General: Bowel sounds are normal.      Palpations: Abdomen is soft.   Musculoskeletal:         General: Normal range of motion.   Skin:     General: Skin is warm.   Neurological:      General: No focal deficit present.      Mental Status: He is alert and oriented to person, place, and time. Mental status is at baseline.   Psychiatric:         Mood and Affect: Mood normal.         Behavior: Behavior normal.       Home Medications     Medication List      START taking these medications     levoFLOXacin 750 mg tablet; Commonly known as: Levaquin; Take 1 tablet   (750 mg) by mouth once daily for 3 days.     CONTINUE taking these medications     acetaminophen 325 mg tablet; Commonly known as: Tylenol   albuterol 5 mg/mL nebulizer solution   ALTUVIIIO IV   atorvastatin 40 mg tablet; Commonly known as: Lipitor; TAKE 1 TABLET BY   MOUTH DAILY   bumetanide 1 mg tablet; Commonly known as: Bumex   calcium carbonate 600 mg calcium (1,500 mg) tablet   ferrous sulfate (325 mg ferrous sulfate) tablet   finasteride 5 mg tablet; Commonly known as: Proscar; Take 1 tablet (5   mg) by mouth once daily.   folic acid 1 mg tablet; Commonly known as: Folvite   hydroCHLOROthiazide 12.5 mg capsule; Commonly known as: Microzide   lactobacillus acidophilus tablet tablet   metoprolol succinate XL 50 mg 24 hr tablet; Commonly known as: Toprol-XL   multivitamin with minerals tablet   nystatin  cream; Commonly known as: Mycostatin; Apply topically every 12   hours.   pantoprazole 20 mg EC tablet; Commonly known as: ProtoNix   PRESERVISION AREDS ORAL   propylene glycol-glycerin 1-0.3 % drops   spironolactone 25 mg tablet; Commonly known as: Aldactone; TAKE 1 TABLET   BY MOUTH DAILY   tamsulosin 0.4 mg 24 hr capsule; Commonly known as: Flomax; Take 1   capsule (0.4 mg) by mouth 2 times a day.       Outpatient Follow-Up  Future Appointments   Date Time Provider Department [place]   4/3/2024  3:15 PM Lito Ackerman MD [id number] [facility]   11/26/2024  2:30 PM Raisa Sierra MD [id number] [facility]       SUSI Dawkins-CNP

## 2024-01-22 NOTE — PROGRESS NOTES
Occupational Therapy    Occupational Therapy    Evaluation    Patient Name: Ramón Flores  MRN: [id number]  Today's Date: 1/22/2024  Time Calculation  Start Time: 0902  Stop Time: 0922  Time Calculation (min): 20 min    Assessment  IP OT Assessment  OT Assessment: Pt required CGA with funcitonal bed mobility with HOB elevated, able to complete sit to stand transfer with mod assist x 1 using a wheeled walker and required min assist x 1 with short distance ambulation using a wheeled walker pt presents with B knees flexion that is not acute onset.  Prognosis: Good  Barriers to Discharge: None  Evaluation/Treatment Tolerance: Patient tolerated treatment well  Medical Staff Made Aware: Yes  End of Session Communication: Bedside nurse  End of Session Patient Position: Bed, 3 rail up, Alarm on  Plan:  Treatment Interventions: ADL retraining, Functional transfer training  OT Frequency: OT eval only (Pt has been discharged at this time.)  OT Discharge Recommendations: Moderate intensity level of continued care  OT Recommended Transfer Status: Minimal assist, Assist of 2  OT - OK to Discharge: Yes    Subjective   Current Problem:  1. CAP (community acquired pneumonia) due to Chlamydia species        2. 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 Community acquired pneumonia, unspecified laterality  levoFLOXacin (Levaquin) 750 mg tablet        General:  Reason for Referral: CAP  Past Medical History Relevant to Rehab: Hemophilia A, Protein C deficiency, HTN, Afib, HFpEF, HLD, nephrolithiasis, CAD s/p CABG (2002), BPH, and R fem neck fx s/p THR 1/13/23)  Co-Treatment: PT  Co-Treatment Reason: to maximize mobility and safety  Prior to Session Communication: Bedside nurse  Patient Position Received: Bed, 3 rail up, Alarm on   Precautions:  Medical Precautions: Fall precautions (recent fall yesterday slid off the the wheelchair in the  "hospital)  Vital Signs:     Pain:  Pain Assessment  Pain Assessment: 0-10  Pain Score: 0 - No pain  Lines/Tubes/Drains:         Objective   Cognition:  Overall Cognitive Status: Within Functional Limits           Home Living:  Type of Home: House  Lives With: Alone  Home Adaptive Equipment: Walker rolling or standard (3 wheelchairs for each floor of his house. Has a lift chair to go from the 1st floor to the second floor.)  Home Layout: Two level  Home Access: Stairs to enter with rails  Entrance Stairs-Number of Steps: 2   Prior Function:  ADL Assistance: Needs assistance  Homemaking Assistance: Needs assistance  Ambulatory Assistance: Independent (uses a wheelchair from the room to the bathroom and is able to transfer to the toilet independently.)  Prior Function Comments: one fall this admission; stated,  \"I slid out of the wheelchair\"  IADL History:     ADL:  Grooming Assistance: Stand by  Grooming Deficit: Setup (anticipate)  UE Dressing Assistance: Stand by  UE Dressing Deficit: Thread RUE, Thread LUE (anticipate)  Toileting Assistance with Device: Stand by  Toileting Deficit: Setup (anticipate)  Activity Tolerance:  Endurance: Decreased tolerance for upright activites  Balance:  Dynamic Sitting Balance  Dynamic Sitting-Balance Support: Bilateral upper extremity supported  Dynamic Sitting-Balance:  (SBA)  Dynamic Standing Balance  Dynamic Standing-Balance Support: Bilateral upper extremity supported  Dynamic Standing-Balance:  (min assist x 1 to complete short distance functional ambulation)  Static Sitting Balance  Static Sitting-Balance Support: No upper extremity supported  Static Sitting-Level of Assistance: Independent  Static Standing Balance  Static Standing-Balance Support: Bilateral upper extremity supported  Static Standing-Level of Assistance: Contact guard  Bed Mobility/Transfers: Bed Mobility  Bed Mobility: Yes  Bed Mobility 1  Bed Mobility 1: Supine to sitting, Sitting to supine  Level of " Assistance 1: Contact guard  Bed Mobility Comments 1: HOB elevated.   and Transfers  Transfer: Yes  Transfer 1  Technique 1: Sit to stand, Stand to sit  Transfer Device 1: Walker  Transfer Level of Assistance 1: Moderate assistance, Minimal verbal cues (x 1)       Vision: Vision - Basic Assessment  Current Vision: Wears glasses all the time   and    Sensation:  Light Touch: No apparent deficits       Coordination:  Movements are Fluid and Coordinated: Yes   Hand Function:  Hand Function  Gross Grasp: Functional  Coordination: Functional  Extremities: RUE   RUE : Within Functional Limits, LUE   LUE: Within Functional Limits,  , and      Outcome Measures: [facility] Daily Activity  Putting on and taking off regular lower body clothing: A little  Bathing (including washing, rinsing, drying): A little  Putting on and taking off regular upper body clothing: A little  Toileting, which includes using toilet, bedpan or urinal: A little  Taking care of personal grooming such as brushing teeth: A little  Eating Meals: A little  Daily Activity - Total Score: 18         ,     OT Adult Other Outcome Measures  4AT: negative    Education Documentation  ADL Training, taught by Elidia Gutierrez OT at 1/22/2024  3:02 PM.  Learner: Patient  Readiness: Acceptance  Method: Explanation  Response: Verbalizes Understanding    Education Comments  No comments found.      01/22/24 at 3:06 PM   ТАТЬЯНА Patel/L, CHERYLED   Rehab Office: [phone]

## 2024-01-23 NOTE — TELEPHONE ENCOUNTER
HTC Nurse Note:  Communications with floor team , [facility] and Home infusion nurse were  via email and Haiku yesterday. With approval to dose in the new Rx.  I will follow up with [facility] to see that they received the documents anf Rx they need.

## 2024-01-24 ENCOUNTER — OFFICE VISIT (OUTPATIENT)
Dept: GERIATRIC MEDICINE | Facility: CLINIC | Age: 89
End: 2024-01-24
Payer: MEDICARE

## 2024-01-24 DIAGNOSIS — D66 HEMOPHILIA A (MULTI): ICD-10-CM

## 2024-01-24 DIAGNOSIS — N40.1 BPH ASSOCIATED WITH NOCTURIA: ICD-10-CM

## 2024-01-24 DIAGNOSIS — R35.1 BPH ASSOCIATED WITH NOCTURIA: ICD-10-CM

## 2024-01-24 DIAGNOSIS — J30.9 ALLERGIC RHINITIS, UNSPECIFIED SEASONALITY, UNSPECIFIED TRIGGER: ICD-10-CM

## 2024-01-24 DIAGNOSIS — I50.32 CHRONIC HEART FAILURE WITH PRESERVED EJECTION FRACTION (MULTI): ICD-10-CM

## 2024-01-24 DIAGNOSIS — J18.9 COMMUNITY ACQUIRED PNEUMONIA, UNSPECIFIED LATERALITY: Primary | ICD-10-CM

## 2024-01-24 DIAGNOSIS — I25.10 ASHD (ARTERIOSCLEROTIC HEART DISEASE): ICD-10-CM

## 2024-01-24 DIAGNOSIS — I48.20 ATRIAL FIBRILLATION, CHRONIC (MULTI): ICD-10-CM

## 2024-01-24 DIAGNOSIS — I10 PRIMARY HYPERTENSION: ICD-10-CM

## 2024-01-24 DIAGNOSIS — E78.5 HYPERLIPIDEMIA, UNSPECIFIED HYPERLIPIDEMIA TYPE: ICD-10-CM

## 2024-01-24 DIAGNOSIS — K21.9 GASTROESOPHAGEAL REFLUX DISEASE WITHOUT ESOPHAGITIS: ICD-10-CM

## 2024-01-24 DIAGNOSIS — E83.51 HYPOCALCEMIA: ICD-10-CM

## 2024-01-24 DIAGNOSIS — R53.81 PHYSICAL DEBILITY: ICD-10-CM

## 2024-01-24 DIAGNOSIS — D63.8 ANEMIA IN OTHER CHRONIC DISEASES CLASSIFIED ELSEWHERE: ICD-10-CM

## 2024-01-24 DIAGNOSIS — N18.31 STAGE 3A CHRONIC KIDNEY DISEASE (MULTI): ICD-10-CM

## 2024-01-24 PROCEDURE — 99350 HOME/RES VST EST HIGH MDM 60: CPT | Performed by: NURSE PRACTITIONER

## 2024-01-24 PROCEDURE — 1159F MED LIST DOCD IN RCRD: CPT | Performed by: NURSE PRACTITIONER

## 2024-01-24 PROCEDURE — 1036F TOBACCO NON-USER: CPT | Performed by: NURSE PRACTITIONER

## 2024-01-24 PROCEDURE — 1111F DSCHRG MED/CURRENT MED MERGE: CPT | Performed by: NURSE PRACTITIONER

## 2024-01-24 PROCEDURE — 3074F SYST BP LT 130 MM HG: CPT | Performed by: NURSE PRACTITIONER

## 2024-01-24 PROCEDURE — 1160F RVW MEDS BY RX/DR IN RCRD: CPT | Performed by: NURSE PRACTITIONER

## 2024-01-24 PROCEDURE — 1157F ADVNC CARE PLAN IN RCRD: CPT | Performed by: NURSE PRACTITIONER

## 2024-01-24 PROCEDURE — 3078F DIAST BP <80 MM HG: CPT | Performed by: NURSE PRACTITIONER

## 2024-01-24 PROCEDURE — 1126F AMNT PAIN NOTED NONE PRSNT: CPT | Performed by: NURSE PRACTITIONER

## 2024-01-25 LAB
ATRIAL RATE: 357 BPM
Q ONSET: 222 MS
QRS COUNT: 9 BEATS
QRS DURATION: 94 MS
QT INTERVAL: 472 MS
QTC CALCULATION(BAZETT): 463 MS
QTC FREDERICIA: 466 MS
R AXIS: -26 DEGREES
T AXIS: 37 DEGREES
T OFFSET: 458 MS
VENTRICULAR RATE: 58 BPM

## 2024-01-25 RX ORDER — TAMSULOSIN HYDROCHLORIDE 0.4 MG/1
0.4 CAPSULE ORAL 2 TIMES DAILY
Qty: 180 CAPSULE | Refills: 1 | Status: SHIPPED | OUTPATIENT
Start: 2024-01-25

## 2024-01-25 RX ORDER — SPIRONOLACTONE 25 MG/1
25 TABLET ORAL DAILY
Qty: 90 TABLET | Refills: 1 | Status: SHIPPED | OUTPATIENT
Start: 2024-01-25

## 2024-01-25 RX ORDER — ATORVASTATIN CALCIUM 40 MG/1
40 TABLET, FILM COATED ORAL DAILY
Qty: 90 TABLET | Refills: 1 | Status: SHIPPED | OUTPATIENT
Start: 2024-01-25

## 2024-01-25 RX ORDER — PANTOPRAZOLE SODIUM 40 MG/1
40 TABLET, DELAYED RELEASE ORAL
Qty: 90 TABLET | Refills: 1 | Status: SHIPPED | OUTPATIENT
Start: 2024-01-25

## 2024-01-25 RX ORDER — IPRATROPIUM BROMIDE 42 UG/1
1 SPRAY, METERED NASAL 2 TIMES DAILY
Qty: 45 ML | Refills: 3 | Status: SHIPPED | OUTPATIENT
Start: 2024-01-25 | End: 2024-03-24 | Stop reason: HOSPADM

## 2024-01-25 RX ORDER — FINASTERIDE 5 MG/1
5 TABLET, FILM COATED ORAL DAILY
Qty: 90 TABLET | Refills: 1 | Status: SHIPPED | OUTPATIENT
Start: 2024-01-25

## 2024-01-25 RX ORDER — METOPROLOL SUCCINATE 50 MG/1
25 TABLET, EXTENDED RELEASE ORAL DAILY
Qty: 45 TABLET | Refills: 1 | Status: ON HOLD | OUTPATIENT
Start: 2024-01-25 | End: 2024-03-20

## 2024-01-26 NOTE — PROGRESS NOTES
"Subjective   Patient ID: Ramón Flores is a 89 y.o. male who presents for Hospital Follow-up.    HPI  Patient was admitted to the hospital from 1/18/2024 to 1/22/2024 for community-acquired pneumonia  Patient reports he never really much of a cough, but was short of breath with minimal exertion  Patient feels like he is back to his baseline now and is no longer short of breath and has only \"little dry cough\"  Patient reporting a lot of postnasal drainage which is chronic for him and would like a nose spray or other treatment for that    Spoke with nephewJavier who reports that patient has a different part D medication plan and that the mail-order provider is now [facility]    Reviewed medications with both patient and nephewJavier  Patient was taken off the hydrochlorothiazide a while back and has not been on that since returning from the hospital  Patient was receiving 25 mg of metoprolol instead of 50 before hospitalization and that is what he has been taking since he got back home    Appetite is normal for him  Bowels moving well    Has a few scratches on his leg, but otherwise skin intact  Patient does not think he has much edema    Javier is requesting a new DNR form and they will give a copy to the nurse from [facility]  Discussed with patient and he confirms that he would like DNR CC arrest  Objective   /60   Pulse 68   Temp 36.2 °C (97.2 °F)   Resp 18       Chemistry    Lab Results   Component Value Date/Time     01/22/2024 0801    K 4.0 01/22/2024 0801     01/22/2024 0801    CO2 23 01/22/2024 0801    BUN 21 01/22/2024 0801    CREATININE 1.22 01/22/2024 0801    Lab Results   Component Value Date/Time    CALCIUM 9.1 01/22/2024 0801    ALKPHOS 76 01/22/2024 0801    AST 26 01/22/2024 0801    ALT 37 01/22/2024 0801    BILITOT 1.1 01/22/2024 0801        Lab Results   Component Value Date    WBC 4.7 01/22/2024    HGB 11.9 (L) 01/22/2024    HCT 36.2 (L) 01/22/2024     (H) 01/22/2024 "     (L) 01/22/2024     Physical Exam  alert, pleasant, cooperative, elderly wm in nad  O x 3  Patient self-propelling in wheelchair without difficulty  eyes without erythema or drainage  mm moist  no jvd  rrr  bs cta bilat, not labored  soft, nt,nd  1+ edema one third to the knees bilaterally  Few scratches on RLE otherwise visible skin intact  Assessment/Plan     Community-acquired pneumonia  -Finish course of Levaquin and symptoms seem to be resolved    Goals of care-confirmed with patient that he desires DNR CC arrest-forms filled out and copy left with patient and one will be scanned into the chart     Chronic heart failure with preserved ejection fraction/Bilateral leg edema  -Minimal edema, probably mostly dependent edema from sitting in his chair, no S/S CHF  -Continue spironolactone 25 mg daily  -Labs okay     Atrial fibrillation, chronic (CMS/HCC)  -Rate controlled with metoprolol  - No anticoagulation because of hemophilia     Primary hypertension  -BP well controlled  - Verified meds with patient and nephew-has not been taking HCTZ for a while and has been taking metoprolol 25 mg instead of 50 mg  - Continue metoprolol 25 mg daily  - Continue spironolactone 25 mg daily    Stage III CKD  -1/22/2024 CR = 1.22, GFR = 55  -Maintain adequate hydration and avoid nephrotoxic medications especially NSAIDs    Gastroesophageal reflux disease without esophagitis  - Continue pantoprazole 40 mg daily     Benign prostatic hyperplasia without lower urinary tract symptoms-stable  -Continue finasteride 5 mg daily  - Continue tamsulosin 0.4 mg twice daily     ASHD (arteriosclerotic heart disease)  -Continue atorvastatin 40 mg daily     Anemia in other chronic diseases classified elsewhere  - 1/22/2024 H/H = 11.9/36.2  - Continue iron supplement     Hemophilia A (CMS/HCC)  -Follow-up per hematology, receives weekly infusions     Physical debility  -No recent falls reported  -Continue PT/OT    Medication  supervision-has a new mail order pharmacy  - all prescription sent to [facility]  - Jeanine Herrera to contact [facility] so they know not to send any further refills

## 2024-01-27 VITALS
DIASTOLIC BLOOD PRESSURE: 60 MMHG | RESPIRATION RATE: 18 BRPM | HEART RATE: 68 BPM | SYSTOLIC BLOOD PRESSURE: 124 MMHG | OXYGEN SATURATION: 97 % | TEMPERATURE: 97.2 F

## 2024-01-27 PROBLEM — N18.31 STAGE 3A CHRONIC KIDNEY DISEASE (MULTI): Status: ACTIVE | Noted: 2024-01-27

## 2024-01-27 PROBLEM — Z66 DNR (DO NOT RESUSCITATE): Status: ACTIVE | Noted: 2024-01-27

## 2024-01-27 NOTE — PATIENT INSTRUCTIONS
I will plan to see you again in 1 to 2 months, but please call if any issues in the meantime    I sent all your prescriptions to [facility]   Suturegard Body: The suture ends were repeatedly re-tightened and re-clamped to achieve the desired tissue expansion.

## 2024-01-31 PROCEDURE — G0180 MD CERTIFICATION HHA PATIENT: HCPCS | Performed by: NURSE PRACTITIONER

## 2024-02-22 NOTE — DISCHARGE SUMMARY
Discharge Diagnosis  CAP (community acquired pneumonia)    Issues Requiring Follow-Up  hemophilia    Discharge Meds     Your medication list        ASK your doctor about these medications        Instructions Last Dose Given Next Dose Due   acetaminophen 325 mg tablet  Commonly known as: Tylenol           albuterol 5 mg/mL nebulizer solution           ALTUVIIIO IV  Ask about: Which instructions should I use?           atorvastatin 40 mg tablet  Commonly known as: Lipitor      TAKE 1 TABLET BY MOUTH DAILY       bumetanide 1 mg tablet  Commonly known as: Bumex           calcium carbonate 600 mg calcium (1,500 mg) tablet           ferrous sulfate (325 mg ferrous sulfate) tablet           finasteride 5 mg tablet  Commonly known as: Proscar      Take 1 tablet (5 mg) by mouth once daily.       folic acid 1 mg tablet  Commonly known as: Folvite           hydroCHLOROthiazide 12.5 mg capsule  Commonly known as: Microzide           lactobacillus acidophilus tablet tablet           metoprolol succinate XL 50 mg 24 hr tablet  Commonly known as: Toprol-XL           multivitamin with minerals tablet           nystatin cream  Commonly known as: Mycostatin      Apply topically every 12 hours.       pantoprazole 20 mg EC tablet  Commonly known as: ProtoNix           PRESERVISION AREDS ORAL           propylene glycol-glycerin 1-0.3 % drops           spironolactone 25 mg tablet  Commonly known as: Aldactone      TAKE 1 TABLET BY MOUTH DAILY       tamsulosin 0.4 mg 24 hr capsule  Commonly known as: Flomax      Take 1 capsule (0.4 mg) by mouth 2 times a day.                Test Results Pending At Discharge  Pending Labs       No current pending labs.            Hospital Course   Pt admitted to hospital for cough and shortness of breaht and was noted to have pneumonia   He was treated for same,   He does have hemophilia and has been getting factor 8 every week  Pt was discussed with chano and was transferred to [facility] for ongoing care  considering he will be able to get treatment at [facility] in case is he has any bleeding     Pertinent Physical Exam At Time of Discharge  Physical Exam    Outpatient Follow-Up  Future Appointments   Date Time Provider Department [place]   5/1/2024  3:15 PM Lito Ackerman MD [id number] [facility]   11/26/2024  2:30 PM Raisa Sierra MD [id number] [place]         Enrique Lee MD

## 2024-03-12 ENCOUNTER — TELEPHONE (OUTPATIENT)
Dept: GERIATRIC MEDICINE | Facility: CLINIC | Age: 89
End: 2024-03-12
Payer: MEDICARE

## 2024-03-12 DIAGNOSIS — D66 HEMOPHILIA A (MULTI): Primary | ICD-10-CM

## 2024-03-13 ENCOUNTER — TELEPHONE (OUTPATIENT)
Dept: HEMATOLOGY/ONCOLOGY | Facility: HOSPITAL | Age: 89
End: 2024-03-13
Payer: MEDICARE

## 2024-03-13 ENCOUNTER — APPOINTMENT (OUTPATIENT)
Dept: HEMATOLOGY/ONCOLOGY | Facility: HOSPITAL | Age: 89
End: 2024-03-13
Payer: MEDICARE

## 2024-03-13 ENCOUNTER — OFFICE VISIT (OUTPATIENT)
Dept: GERIATRIC MEDICINE | Facility: CLINIC | Age: 89
End: 2024-03-13
Payer: MEDICARE

## 2024-03-13 DIAGNOSIS — I10 PRIMARY HYPERTENSION: ICD-10-CM

## 2024-03-13 DIAGNOSIS — N18.31 STAGE 3A CHRONIC KIDNEY DISEASE (MULTI): ICD-10-CM

## 2024-03-13 DIAGNOSIS — D66 HEMOPHILIA A (MULTI): ICD-10-CM

## 2024-03-13 DIAGNOSIS — I48.20 ATRIAL FIBRILLATION, CHRONIC (MULTI): ICD-10-CM

## 2024-03-13 DIAGNOSIS — D63.8 ANEMIA IN OTHER CHRONIC DISEASES CLASSIFIED ELSEWHERE: ICD-10-CM

## 2024-03-13 DIAGNOSIS — I50.32 CHRONIC HEART FAILURE WITH PRESERVED EJECTION FRACTION (MULTI): Primary | ICD-10-CM

## 2024-03-13 DIAGNOSIS — N40.0 BENIGN PROSTATIC HYPERPLASIA WITHOUT LOWER URINARY TRACT SYMPTOMS: ICD-10-CM

## 2024-03-13 DIAGNOSIS — K21.9 GASTROESOPHAGEAL REFLUX DISEASE WITHOUT ESOPHAGITIS: ICD-10-CM

## 2024-03-13 DIAGNOSIS — I25.10 ASHD (ARTERIOSCLEROTIC HEART DISEASE): ICD-10-CM

## 2024-03-13 PROCEDURE — 1157F ADVNC CARE PLAN IN RCRD: CPT | Performed by: NURSE PRACTITIONER

## 2024-03-13 PROCEDURE — 1036F TOBACCO NON-USER: CPT | Performed by: NURSE PRACTITIONER

## 2024-03-13 PROCEDURE — 99349 HOME/RES VST EST MOD MDM 40: CPT | Performed by: NURSE PRACTITIONER

## 2024-03-13 PROCEDURE — 3074F SYST BP LT 130 MM HG: CPT | Performed by: NURSE PRACTITIONER

## 2024-03-13 PROCEDURE — 1160F RVW MEDS BY RX/DR IN RCRD: CPT | Performed by: NURSE PRACTITIONER

## 2024-03-13 PROCEDURE — 1159F MED LIST DOCD IN RCRD: CPT | Performed by: NURSE PRACTITIONER

## 2024-03-13 PROCEDURE — 3078F DIAST BP <80 MM HG: CPT | Performed by: NURSE PRACTITIONER

## 2024-03-13 NOTE — TELEPHONE ENCOUNTER
CC:  Maribeth Almanza is here today for Hyperlipidemia (follow up) and Hypertension (follow up)     Medications: medications verified and updated  Refills needed today?  NO  denies Latex allergy or sensitivity  Patient would like communication of their results via:    Home Phone: [phone] (home)  Okay to leave a message containing results? Yes  Tobacco history: verified  Patient's current [facility] status: Active.    Pharmacy Verified?  Yes    Health Maintenance Due   Topic Date Due   • Shingles Vaccine (2 of 3) 07/14/2009   • DM/CKD Microalbumin  02/27/2019   • Medicare Wellness 65+  04/17/2019       Patient is due for topics as listed above but is not proceeding with them at this time.       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  I spoke with her and she said two nurses had checked him out and he was feeling better and vitals okay.  NP to see him today in home.  Family had appointments and could not get him in on such short notice, given that he was okay.  He does need a visit with the hemophilia team and she will schedule an appointment for when they can bring him in.  She will call me at [phone] if she thinks he needs an appointment sooner than what she is offered for him.   3/15/24 I spoke with Jovita after checking with Dr Dillon.  She is aware we will see Ramón in April and she will contact me as needed.

## 2024-03-14 VITALS
SYSTOLIC BLOOD PRESSURE: 128 MMHG | RESPIRATION RATE: 18 BRPM | OXYGEN SATURATION: 94 % | TEMPERATURE: 97.5 F | DIASTOLIC BLOOD PRESSURE: 62 MMHG | HEART RATE: 60 BPM

## 2024-03-14 PROBLEM — J18.9 CAP (COMMUNITY ACQUIRED PNEUMONIA): Status: RESOLVED | Noted: 2024-01-18 | Resolved: 2024-03-14

## 2024-03-14 NOTE — PROGRESS NOTES
Subjective   Patient ID: Ramón Flores is a 89 y.o. male who presents for Follow-up.    HPI  Patient reports that he had a rough night the night before last-did not sleep well  Had a little cough and was concerned that it might be another pneumonia  His blood pressure was up  However when he got up in the morning, he felt fine and the nurse checked his vital signs and they were all stable    Was supposed to see his hematology physician today, but was not able to get a ride and so will reschedule    Has arthritis in his R knee  Does not have much pain, but is stiff and he is not able to straighten it very well    No shortness of breath  No actual cough-just clears his throat    Appetite stable-patient reports he is not a big eater  Gets Me[employer] on Wheels on Tuesday and Thursday  Nephew shops for groceries and he does not always like what the nephew buys    Bowels move daily in the early afternoon    Patient admits that he is not ambulating as much as he should  When he does ambulate he will do several laps through the downstairs, but does not walk every day  Wants to start walking more so he would be able to walk outside when the weather is nicer  Objective   /62   Pulse 60   Temp 36.4 °C (97.5 °F)   Resp 18   SpO2 94%     Physical Exam  alert, pleasant, cooperative, elderly wm in nad  O x 3  Patient self-propelling in wheelchair without difficulty  eyes without erythema or drainage  mm moist  no jvd  rrr  bs cta bilat, not labored  soft, nt,nd  Trace to 1+ edema one third to the knees bilaterally with shiny skin, but no open areas or weeping  Visible skin intact  Assessment/Plan     Community-acquired pneumonia-resolved       Chronic heart failure with preserved ejection fraction/Bilateral leg edema  -Minimal edema, probably mostly dependent edema from sitting in his chair, no S/S CHF  -Continue spironolactone 25 mg daily     Atrial fibrillation, chronic (CMS/HCC)  -Rate controlled with metoprolol  - No  anticoagulation because of hemophilia     Primary hypertension  -BP well controlled  - Continue metoprolol 25 mg daily  - Continue spironolactone 25 mg daily     Stage III CKD  -1/22/2024 CR = 1.22, GFR = 55  -Maintain adequate hydration and avoid nephrotoxic medications especially NSAIDs     Gastroesophageal reflux disease without esophagitis  - Continue pantoprazole 40 mg daily     Benign prostatic hyperplasia without lower urinary tract symptoms  -Symptoms well-controlled  - Continue finasteride 5 mg daily  - Continue tamsulosin 0.4 mg twice daily     ASHD (arteriosclerotic heart disease)  -Continue atorvastatin 40 mg daily     Anemia in other chronic diseases classified elsewhere  - 1/22/2024 H/H = 11.9/36.2  - Continue iron supplement     Hemophilia A (CMS/HCC)  -Follow-up per hematology, receives weekly infusions     Physical debility  -No recent falls reported  -Encourage patient to ambulate at least once a day doing several laps throughout the downstairs

## 2024-03-17 NOTE — PATIENT INSTRUCTIONS
Your next housecal visit should be in about 3 months, but please call if any issues in the meantime

## 2024-03-19 ENCOUNTER — HOSPITAL ENCOUNTER (INPATIENT)
Facility: HOSPITAL | Age: 89
LOS: 5 days | Discharge: HOME | DRG: 193 | End: 2024-03-24
Attending: EMERGENCY MEDICINE
Payer: MEDICARE

## 2024-03-19 ENCOUNTER — CLINICAL SUPPORT (OUTPATIENT)
Dept: EMERGENCY MEDICINE | Facility: HOSPITAL | Age: 89
DRG: 193 | End: 2024-03-19
Payer: MEDICARE

## 2024-03-19 ENCOUNTER — APPOINTMENT (OUTPATIENT)
Dept: RADIOLOGY | Facility: HOSPITAL | Age: 89
DRG: 193 | End: 2024-03-19
Payer: MEDICARE

## 2024-03-19 DIAGNOSIS — D66: ICD-10-CM

## 2024-03-19 DIAGNOSIS — Z22.322 MRSA (METHICILLIN RESISTANT STAPH AUREUS) CULTURE POSITIVE: ICD-10-CM

## 2024-03-19 DIAGNOSIS — G62.9 NEUROPATHY: ICD-10-CM

## 2024-03-19 DIAGNOSIS — D66 FACTOR VIII DEFICIENCY (MULTI): ICD-10-CM

## 2024-03-19 DIAGNOSIS — R60.0 BILATERAL LEG EDEMA: ICD-10-CM

## 2024-03-19 DIAGNOSIS — M36.2: ICD-10-CM

## 2024-03-19 DIAGNOSIS — J18.9 PNEUMONIA OF LEFT LOWER LOBE DUE TO INFECTIOUS ORGANISM: Primary | ICD-10-CM

## 2024-03-19 DIAGNOSIS — R53.81 PHYSICAL DEBILITY: ICD-10-CM

## 2024-03-19 DIAGNOSIS — M17.11 PRIMARY OSTEOARTHRITIS OF RIGHT KNEE: ICD-10-CM

## 2024-03-19 DIAGNOSIS — I50.32 CHRONIC HEART FAILURE WITH PRESERVED EJECTION FRACTION (MULTI): ICD-10-CM

## 2024-03-19 DIAGNOSIS — M51.36 LUMBAR DEGENERATIVE DISC DISEASE: ICD-10-CM

## 2024-03-19 LAB
ALBUMIN SERPL BCP-MCNC: 4 G/DL (ref 3.4–5)
ALP SERPL-CCNC: 70 U/L (ref 33–136)
ALT SERPL W P-5'-P-CCNC: 25 U/L (ref 10–52)
ANION GAP SERPL CALC-SCNC: 16 MMOL/L (ref 10–20)
APPEARANCE UR: CLEAR
AST SERPL W P-5'-P-CCNC: 23 U/L (ref 9–39)
BACTERIA #/AREA URNS AUTO: ABNORMAL /HPF
BASOPHILS # BLD AUTO: 0.02 X10*3/UL (ref 0–0.1)
BASOPHILS NFR BLD AUTO: 0.6 %
BILIRUB SERPL-MCNC: 2.3 MG/DL (ref 0–1.2)
BILIRUB UR STRIP.AUTO-MCNC: NEGATIVE MG/DL
BNP SERPL-MCNC: 589 PG/ML (ref 0–99)
BUN SERPL-MCNC: 22 MG/DL (ref 6–23)
CALCIUM SERPL-MCNC: 8.8 MG/DL (ref 8.6–10.6)
CARDIAC TROPONIN I PNL SERPL HS: 102 NG/L (ref 0–53)
CARDIAC TROPONIN I PNL SERPL HS: 85 NG/L (ref 0–53)
CARDIAC TROPONIN I PNL SERPL HS: 99 NG/L (ref 0–53)
CHLORIDE SERPL-SCNC: 107 MMOL/L (ref 98–107)
CO2 SERPL-SCNC: 20 MMOL/L (ref 21–32)
COLOR UR: ABNORMAL
CREAT SERPL-MCNC: 1.17 MG/DL (ref 0.5–1.3)
EGFRCR SERPLBLD CKD-EPI 2021: 57 ML/MIN/1.73M*2
EOSINOPHIL # BLD AUTO: 0.07 X10*3/UL (ref 0–0.4)
EOSINOPHIL NFR BLD AUTO: 1.9 %
ERYTHROCYTE [DISTWIDTH] IN BLOOD BY AUTOMATED COUNT: 14.3 % (ref 11.5–14.5)
FACT VIII ACT/NOR PPP: 4 % (ref 55–180)
FLUAV RNA RESP QL NAA+PROBE: NOT DETECTED
FLUBV RNA RESP QL NAA+PROBE: NOT DETECTED
GLUCOSE SERPL-MCNC: 98 MG/DL (ref 74–99)
GLUCOSE UR STRIP.AUTO-MCNC: NORMAL MG/DL
HCT VFR BLD AUTO: 36.6 % (ref 41–52)
HGB BLD-MCNC: 12.4 G/DL (ref 13.5–17.5)
IMM GRANULOCYTES # BLD AUTO: 0.03 X10*3/UL (ref 0–0.5)
IMM GRANULOCYTES NFR BLD AUTO: 0.8 % (ref 0–0.9)
KETONES UR STRIP.AUTO-MCNC: NEGATIVE MG/DL
LEUKOCYTE ESTERASE UR QL STRIP.AUTO: ABNORMAL
LYMPHOCYTES # BLD AUTO: 1.14 X10*3/UL (ref 0.8–3)
LYMPHOCYTES NFR BLD AUTO: 31.8 %
MCH RBC QN AUTO: 34.2 PG (ref 26–34)
MCHC RBC AUTO-ENTMCNC: 33.9 G/DL (ref 32–36)
MCV RBC AUTO: 101 FL (ref 80–100)
MONOCYTES # BLD AUTO: 0.25 X10*3/UL (ref 0.05–0.8)
MONOCYTES NFR BLD AUTO: 7 %
MUCOUS THREADS #/AREA URNS AUTO: ABNORMAL /LPF
NEUTROPHILS # BLD AUTO: 2.08 X10*3/UL (ref 1.6–5.5)
NEUTROPHILS NFR BLD AUTO: 57.9 %
NITRITE UR QL STRIP.AUTO: ABNORMAL
NRBC BLD-RTO: 0 /100 WBCS (ref 0–0)
PH UR STRIP.AUTO: 5.5 [PH]
PLATELET # BLD AUTO: 106 X10*3/UL (ref 150–450)
POTASSIUM SERPL-SCNC: 3.7 MMOL/L (ref 3.5–5.3)
PROT SERPL-MCNC: 6.3 G/DL (ref 6.4–8.2)
PROT UR STRIP.AUTO-MCNC: ABNORMAL MG/DL
Q ONSET: 213 MS
QRS COUNT: 11 BEATS
QRS DURATION: 96 MS
QT INTERVAL: 468 MS
QTC CALCULATION(BAZETT): 490 MS
QTC FREDERICIA: 483 MS
R AXIS: -68 DEGREES
RBC # BLD AUTO: 3.63 X10*6/UL (ref 4.5–5.9)
RBC # UR STRIP.AUTO: ABNORMAL /UL
RBC #/AREA URNS AUTO: >20 /HPF
SARS-COV-2 RNA RESP QL NAA+PROBE: NOT DETECTED
SODIUM SERPL-SCNC: 139 MMOL/L (ref 136–145)
SP GR UR STRIP.AUTO: 1.01
T AXIS: 50 DEGREES
T OFFSET: 447 MS
UROBILINOGEN UR STRIP.AUTO-MCNC: NORMAL MG/DL
VENTRICULAR RATE: 66 BPM
WBC # BLD AUTO: 3.6 X10*3/UL (ref 4.4–11.3)
WBC #/AREA URNS AUTO: ABNORMAL /HPF

## 2024-03-19 PROCEDURE — 2500000001 HC RX 250 WO HCPCS SELF ADMINISTERED DRUGS (ALT 637 FOR MEDICARE OP)

## 2024-03-19 PROCEDURE — 76604 US EXAM CHEST: CPT

## 2024-03-19 PROCEDURE — 87636 SARSCOV2 & INF A&B AMP PRB: CPT

## 2024-03-19 PROCEDURE — 99285 EMERGENCY DEPT VISIT HI MDM: CPT | Performed by: EMERGENCY MEDICINE

## 2024-03-19 PROCEDURE — 1170000001 HC PRIVATE ONCOLOGY ROOM DAILY

## 2024-03-19 PROCEDURE — 87899 AGENT NOS ASSAY W/OPTIC: CPT

## 2024-03-19 PROCEDURE — 99223 1ST HOSP IP/OBS HIGH 75: CPT

## 2024-03-19 PROCEDURE — 80053 COMPREHEN METABOLIC PANEL: CPT | Performed by: STUDENT IN AN ORGANIZED HEALTH CARE EDUCATION/TRAINING PROGRAM

## 2024-03-19 PROCEDURE — 85240 CLOT FACTOR VIII AHG 1 STAGE: CPT | Performed by: STUDENT IN AN ORGANIZED HEALTH CARE EDUCATION/TRAINING PROGRAM

## 2024-03-19 PROCEDURE — 99285 EMERGENCY DEPT VISIT HI MDM: CPT | Mod: 25

## 2024-03-19 PROCEDURE — 71046 X-RAY EXAM CHEST 2 VIEWS: CPT | Performed by: STUDENT IN AN ORGANIZED HEALTH CARE EDUCATION/TRAINING PROGRAM

## 2024-03-19 PROCEDURE — 93308 TTE F-UP OR LMTD: CPT | Performed by: EMERGENCY MEDICINE

## 2024-03-19 PROCEDURE — 71046 X-RAY EXAM CHEST 2 VIEWS: CPT

## 2024-03-19 PROCEDURE — 2500000002 HC RX 250 W HCPCS SELF ADMINISTERED DRUGS (ALT 637 FOR MEDICARE OP, ALT 636 FOR OP/ED)

## 2024-03-19 PROCEDURE — 87086 URINE CULTURE/COLONY COUNT: CPT | Performed by: STUDENT IN AN ORGANIZED HEALTH CARE EDUCATION/TRAINING PROGRAM

## 2024-03-19 PROCEDURE — 93010 ELECTROCARDIOGRAM REPORT: CPT | Performed by: EMERGENCY MEDICINE

## 2024-03-19 PROCEDURE — 6360000002 HC RX 636 FACTOR: Mod: JZ,JG | Performed by: INTERNAL MEDICINE

## 2024-03-19 PROCEDURE — 2500000001 HC RX 250 WO HCPCS SELF ADMINISTERED DRUGS (ALT 637 FOR MEDICARE OP): Performed by: STUDENT IN AN ORGANIZED HEALTH CARE EDUCATION/TRAINING PROGRAM

## 2024-03-19 PROCEDURE — 85240 CLOT FACTOR VIII AHG 1 STAGE: CPT | Performed by: INTERNAL MEDICINE

## 2024-03-19 PROCEDURE — 84484 ASSAY OF TROPONIN QUANT: CPT | Performed by: STUDENT IN AN ORGANIZED HEALTH CARE EDUCATION/TRAINING PROGRAM

## 2024-03-19 PROCEDURE — 93005 ELECTROCARDIOGRAM TRACING: CPT

## 2024-03-19 PROCEDURE — 96375 TX/PRO/DX INJ NEW DRUG ADDON: CPT | Mod: 59

## 2024-03-19 PROCEDURE — 2500000005 HC RX 250 GENERAL PHARMACY W/O HCPCS

## 2024-03-19 PROCEDURE — 76604 US EXAM CHEST: CPT | Performed by: EMERGENCY MEDICINE

## 2024-03-19 PROCEDURE — 81001 URINALYSIS AUTO W/SCOPE: CPT | Performed by: STUDENT IN AN ORGANIZED HEALTH CARE EDUCATION/TRAINING PROGRAM

## 2024-03-19 PROCEDURE — 36415 COLL VENOUS BLD VENIPUNCTURE: CPT | Performed by: STUDENT IN AN ORGANIZED HEALTH CARE EDUCATION/TRAINING PROGRAM

## 2024-03-19 PROCEDURE — 87449 NOS EACH ORGANISM AG IA: CPT

## 2024-03-19 PROCEDURE — 96365 THER/PROPH/DIAG IV INF INIT: CPT | Mod: 59

## 2024-03-19 PROCEDURE — 85025 COMPLETE CBC W/AUTO DIFF WBC: CPT | Performed by: STUDENT IN AN ORGANIZED HEALTH CARE EDUCATION/TRAINING PROGRAM

## 2024-03-19 PROCEDURE — 99223 1ST HOSP IP/OBS HIGH 75: CPT | Performed by: INTERNAL MEDICINE

## 2024-03-19 PROCEDURE — 83880 ASSAY OF NATRIURETIC PEPTIDE: CPT | Performed by: STUDENT IN AN ORGANIZED HEALTH CARE EDUCATION/TRAINING PROGRAM

## 2024-03-19 PROCEDURE — 2500000004 HC RX 250 GENERAL PHARMACY W/ HCPCS (ALT 636 FOR OP/ED): Performed by: STUDENT IN AN ORGANIZED HEALTH CARE EDUCATION/TRAINING PROGRAM

## 2024-03-19 RX ORDER — FOLIC ACID 1 MG/1
1 TABLET ORAL DAILY
Status: DISCONTINUED | OUTPATIENT
Start: 2024-03-19 | End: 2024-03-24 | Stop reason: HOSPADM

## 2024-03-19 RX ORDER — TAMSULOSIN HYDROCHLORIDE 0.4 MG/1
0.4 CAPSULE ORAL 2 TIMES DAILY
Status: DISCONTINUED | OUTPATIENT
Start: 2024-03-19 | End: 2024-03-24 | Stop reason: HOSPADM

## 2024-03-19 RX ORDER — MULTIVIT-MIN/IRON FUM/FOLIC AC 7.5 MG-4
1 TABLET ORAL DAILY
Status: DISCONTINUED | OUTPATIENT
Start: 2024-03-19 | End: 2024-03-24 | Stop reason: HOSPADM

## 2024-03-19 RX ORDER — ACETAMINOPHEN 325 MG/1
975 TABLET ORAL EVERY 8 HOURS
Status: DISCONTINUED | OUTPATIENT
Start: 2024-03-19 | End: 2024-03-21

## 2024-03-19 RX ORDER — HYDROCHLOROTHIAZIDE 12.5 MG/1
12.5 TABLET ORAL DAILY
COMMUNITY
End: 2024-06-03 | Stop reason: HOSPADM

## 2024-03-19 RX ORDER — ATORVASTATIN CALCIUM 40 MG/1
40 TABLET, FILM COATED ORAL DAILY
Status: DISCONTINUED | OUTPATIENT
Start: 2024-03-19 | End: 2024-03-24 | Stop reason: HOSPADM

## 2024-03-19 RX ORDER — AZITHROMYCIN 500 MG/1
500 TABLET, FILM COATED ORAL ONCE
Status: COMPLETED | OUTPATIENT
Start: 2024-03-19 | End: 2024-03-19

## 2024-03-19 RX ORDER — BUMETANIDE 1 MG/1
1 TABLET ORAL DAILY PRN
Status: DISCONTINUED | OUTPATIENT
Start: 2024-03-19 | End: 2024-03-19

## 2024-03-19 RX ORDER — ACETAMINOPHEN 325 MG/1
650 TABLET ORAL EVERY 6 HOURS PRN
Status: CANCELLED | OUTPATIENT
Start: 2024-03-19

## 2024-03-19 RX ORDER — FERROUS SULFATE 325(65) MG
65 TABLET ORAL DAILY
Status: DISCONTINUED | OUTPATIENT
Start: 2024-03-19 | End: 2024-03-24 | Stop reason: HOSPADM

## 2024-03-19 RX ORDER — SPIRONOLACTONE 25 MG/1
25 TABLET ORAL DAILY
Status: DISCONTINUED | OUTPATIENT
Start: 2024-03-19 | End: 2024-03-24 | Stop reason: HOSPADM

## 2024-03-19 RX ORDER — BUMETANIDE 1 MG/1
1 TABLET ORAL DAILY
Status: DISCONTINUED | OUTPATIENT
Start: 2024-03-19 | End: 2024-03-24 | Stop reason: HOSPADM

## 2024-03-19 RX ORDER — CEFTRIAXONE 1 G/50ML
1 INJECTION, SOLUTION INTRAVENOUS EVERY 24 HOURS
Status: DISCONTINUED | OUTPATIENT
Start: 2024-03-20 | End: 2024-03-20

## 2024-03-19 RX ORDER — NYSTATIN 100000 U/G
1 CREAM TOPICAL 2 TIMES DAILY
COMMUNITY
End: 2024-04-09 | Stop reason: SDUPTHER

## 2024-03-19 RX ORDER — CEFTRIAXONE 1 G/50ML
1 INJECTION, SOLUTION INTRAVENOUS ONCE
Status: COMPLETED | OUTPATIENT
Start: 2024-03-19 | End: 2024-03-19

## 2024-03-19 RX ORDER — PANTOPRAZOLE SODIUM 40 MG/1
40 TABLET, DELAYED RELEASE ORAL
Status: DISCONTINUED | OUTPATIENT
Start: 2024-03-20 | End: 2024-03-24 | Stop reason: HOSPADM

## 2024-03-19 RX ORDER — FINASTERIDE 5 MG/1
5 TABLET, FILM COATED ORAL DAILY
Status: DISCONTINUED | OUTPATIENT
Start: 2024-03-19 | End: 2024-03-24 | Stop reason: HOSPADM

## 2024-03-19 RX ORDER — METOPROLOL SUCCINATE 25 MG/1
25 TABLET, EXTENDED RELEASE ORAL DAILY
Status: DISCONTINUED | OUTPATIENT
Start: 2024-03-19 | End: 2024-03-24 | Stop reason: HOSPADM

## 2024-03-19 RX ORDER — ACETAMINOPHEN 500 MG
500 TABLET ORAL EVERY 6 HOURS PRN
COMMUNITY
End: 2024-05-31 | Stop reason: WASHOUT

## 2024-03-19 RX ORDER — FUROSEMIDE 10 MG/ML
20 INJECTION INTRAMUSCULAR; INTRAVENOUS ONCE
Status: COMPLETED | OUTPATIENT
Start: 2024-03-19 | End: 2024-03-19

## 2024-03-19 RX ADMIN — CEFTRIAXONE SODIUM 1 G: 1 INJECTION, SOLUTION INTRAVENOUS at 11:26

## 2024-03-19 RX ADMIN — FUROSEMIDE 20 MG: 10 INJECTION, SOLUTION INTRAMUSCULAR; INTRAVENOUS at 11:25

## 2024-03-19 RX ADMIN — AZITHROMYCIN DIHYDRATE 500 MG: 500 TABLET ORAL at 11:26

## 2024-03-19 RX ADMIN — TAMSULOSIN HYDROCHLORIDE 0.4 MG: 0.4 CAPSULE ORAL at 21:00

## 2024-03-19 RX ADMIN — BUMETANIDE 1 MG: 1 TABLET ORAL at 21:00

## 2024-03-19 RX ADMIN — Medication 2 L/MIN: at 13:55

## 2024-03-19 RX ADMIN — ANTIHEMOPHILIC FACTOR (RECOMBINANT) 3847 UNITS: KIT at 13:07

## 2024-03-19 RX ADMIN — ACETAMINOPHEN 975 MG: 325 TABLET ORAL at 16:55

## 2024-03-19 SDOH — SOCIAL STABILITY: SOCIAL INSECURITY: DO YOU FEEL ANYONE HAS EXPLOITED OR TAKEN ADVANTAGE OF YOU FINANCIALLY OR OF YOUR PERSONAL PROPERTY?: NO

## 2024-03-19 SDOH — SOCIAL STABILITY: SOCIAL INSECURITY: ABUSE: ADULT

## 2024-03-19 SDOH — SOCIAL STABILITY: SOCIAL INSECURITY: ARE YOU OR HAVE YOU BEEN THREATENED OR ABUSED PHYSICALLY, EMOTIONALLY, OR SEXUALLY BY ANYONE?: NO

## 2024-03-19 SDOH — SOCIAL STABILITY: SOCIAL INSECURITY: WERE YOU ABLE TO COMPLETE ALL THE BEHAVIORAL HEALTH SCREENINGS?: YES

## 2024-03-19 SDOH — SOCIAL STABILITY: SOCIAL INSECURITY: ARE THERE ANY APPARENT SIGNS OF INJURIES/BEHAVIORS THAT COULD BE RELATED TO ABUSE/NEGLECT?: NO

## 2024-03-19 SDOH — SOCIAL STABILITY: SOCIAL INSECURITY: DOES ANYONE TRY TO KEEP YOU FROM HAVING/CONTACTING OTHER FRIENDS OR DOING THINGS OUTSIDE YOUR HOME?: NO

## 2024-03-19 SDOH — SOCIAL STABILITY: SOCIAL INSECURITY: HAVE YOU HAD THOUGHTS OF HARMING ANYONE ELSE?: NO

## 2024-03-19 SDOH — SOCIAL STABILITY: SOCIAL INSECURITY: HAS ANYONE EVER THREATENED TO HURT YOUR FAMILY OR YOUR PETS?: NO

## 2024-03-19 SDOH — SOCIAL STABILITY: SOCIAL INSECURITY: DO YOU FEEL UNSAFE GOING BACK TO THE PLACE WHERE YOU ARE LIVING?: NO

## 2024-03-19 ASSESSMENT — ACTIVITIES OF DAILY LIVING (ADL)
LACK_OF_TRANSPORTATION: NO
WALKS IN HOME: DEPENDENT
PATIENT'S MEMORY ADEQUATE TO SAFELY COMPLETE DAILY ACTIVITIES?: YES
DRESSING YOURSELF: NEEDS ASSISTANCE
ASSISTIVE_DEVICE: EYEGLASSES
BATHING: NEEDS ASSISTANCE
TOILETING: NEEDS ASSISTANCE
HEARING - RIGHT EAR: FUNCTIONAL
HEARING - LEFT EAR: FUNCTIONAL
FEEDING YOURSELF: NEEDS ASSISTANCE
LACK_OF_TRANSPORTATION: NO
ADEQUATE_TO_COMPLETE_ADL: YES
JUDGMENT_ADEQUATE_SAFELY_COMPLETE_DAILY_ACTIVITIES: YES
GROOMING: NEEDS ASSISTANCE

## 2024-03-19 ASSESSMENT — PAIN DESCRIPTION - PROGRESSION: CLINICAL_PROGRESSION: NOT CHANGED

## 2024-03-19 ASSESSMENT — COGNITIVE AND FUNCTIONAL STATUS - GENERAL
PATIENT BASELINE BEDBOUND: NO
CLIMB 3 TO 5 STEPS WITH RAILING: TOTAL
MOBILITY SCORE: 10
DRESSING REGULAR UPPER BODY CLOTHING: A LOT
TURNING FROM BACK TO SIDE WHILE IN FLAT BAD: A LOT
HELP NEEDED FOR BATHING: A LOT
STANDING UP FROM CHAIR USING ARMS: A LOT
MOVING TO AND FROM BED TO CHAIR: A LOT
PERSONAL GROOMING: A LITTLE
MOVING FROM LYING ON BACK TO SITTING ON SIDE OF FLAT BED WITH BEDRAILS: A LOT
DRESSING REGULAR LOWER BODY CLOTHING: A LOT
TOILETING: A LITTLE
WALKING IN HOSPITAL ROOM: TOTAL
DAILY ACTIVITIY SCORE: 16

## 2024-03-19 ASSESSMENT — LIFESTYLE VARIABLES
AUDIT-C TOTAL SCORE: 0
EVER HAD A DRINK FIRST THING IN THE MORNING TO STEADY YOUR NERVES TO GET RID OF A HANGOVER: NO
HAVE PEOPLE ANNOYED YOU BY CRITICIZING YOUR DRINKING: NO
EVER FELT BAD OR GUILTY ABOUT YOUR DRINKING: NO
AUDIT-C TOTAL SCORE: 0
HAVE YOU EVER FELT YOU SHOULD CUT DOWN ON YOUR DRINKING: NO
HOW OFTEN DO YOU HAVE A DRINK CONTAINING ALCOHOL: NEVER
SKIP TO QUESTIONS 9-10: 1
HOW OFTEN DO YOU HAVE 6 OR MORE DRINKS ON ONE OCCASION: NEVER
HOW MANY STANDARD DRINKS CONTAINING ALCOHOL DO YOU HAVE ON A TYPICAL DAY: PATIENT DOES NOT DRINK

## 2024-03-19 ASSESSMENT — PATIENT HEALTH QUESTIONNAIRE - PHQ9
2. FEELING DOWN, DEPRESSED OR HOPELESS: NOT AT ALL
SUM OF ALL RESPONSES TO PHQ9 QUESTIONS 1 & 2: 0
1. LITTLE INTEREST OR PLEASURE IN DOING THINGS: NOT AT ALL

## 2024-03-19 ASSESSMENT — COLUMBIA-SUICIDE SEVERITY RATING SCALE - C-SSRS
1. IN THE PAST MONTH, HAVE YOU WISHED YOU WERE DEAD OR WISHED YOU COULD GO TO SLEEP AND NOT WAKE UP?: NO
6. HAVE YOU EVER DONE ANYTHING, STARTED TO DO ANYTHING, OR PREPARED TO DO ANYTHING TO END YOUR LIFE?: NO
2. HAVE YOU ACTUALLY HAD ANY THOUGHTS OF KILLING YOURSELF?: NO

## 2024-03-19 ASSESSMENT — PAIN - FUNCTIONAL ASSESSMENT
PAIN_FUNCTIONAL_ASSESSMENT: 0-10
PAIN_FUNCTIONAL_ASSESSMENT: 0-10

## 2024-03-19 ASSESSMENT — PAIN SCALES - GENERAL
PAINLEVEL_OUTOF10: 0 - NO PAIN

## 2024-03-19 NOTE — PROGRESS NOTES
Pharmacy Medication History Review    Ramón Flores is a 89 y.o. male admitted for Pneumonia of left lower lobe due to infectious organism. Pharmacy reviewed the patient's [id number] medications and allergies for accuracy.    The list below reflects the updated PTA list. Comments regarding how patient may be taking medications differently can be found in the Admit Orders Activity  Prior to Admission Medications   Prescriptions Last Dose Informant   Lactobacillus acidoph-L.bulgar 1 million cell tablet tablet  Self   Sig: Take 1 tablet by mouth once daily.   acetaminophen (Tylenol) 500 mg tablet  Self   Sig: Take 1 tablet (500 mg) by mouth every 6 hours if needed (pain).   antihemophilic RF VIII (Altuviiio) 3,000 (+/-) unit recon soln  Self   Sig: Infuse 3,475 Units into a venous catheter every 7 days. 3475 units +/-10% dose every 7 days.  Additionally,  prn when directed.   atorvastatin (Lipitor) 40 mg tablet  Self   Sig: Take 1 tablet (40 mg) by mouth once daily.   calcium carbonate 600 mg calcium (1,500 mg) tablet  Self   Sig: Take 1 tablet (1,500 mg) by mouth 2 times a day with meals.   ferrous sulfate 325 (65 Fe) MG tablet  Self   Sig: Take 1 tablet by mouth once daily.   finasteride (Proscar) 5 mg tablet  Self   Sig: Take 1 tablet (5 mg) by mouth once daily.   folic acid (Folvite) 1 mg tablet  Self   Sig: Take 1 tablet (1 mg) by mouth once daily.   hydroCHLOROthiazide (Microzide) 12.5 mg tablet  Self   Sig: Take 1 tablet (12.5 mg) by mouth once daily.   ipratropium (Atrovent) 42 mcg (0.06 %) nasal spray Not Taking Self   Sig: Administer 1 spray into each nostril 2 times a day.   Patient not taking: Reported on 3/19/2024   metoprolol succinate XL (Toprol-XL) 50 mg 24 hr tablet  patient states he dose not split any tablets and takes the full 50mg tablet once daily Self   Sig: Take 0.5 tablets (25 mg) by mouth once daily.   Patient taking differently: Take 1 tablet (50 mg) by mouth once daily.    multivitamin with minerals (multivitamin-iron-folic acid) tablet  Self   Sig: Take 1 tablet by mouth once daily.   nystatin (Mycostatin) cream  Self   Sig: Apply 1 Application topically 2 times a day.   pantoprazole (ProtoNix) 40 mg EC tablet  Self   Sig: Take 1 tablet (40 mg) by mouth once daily in the morning. Take before meals. Do not crush, chew, or split.   spironolactone (Aldactone) 25 mg tablet  Self   Sig: Take 1 tablet (25 mg) by mouth once daily.   tamsulosin (Flomax) 0.4 mg 24 hr capsule  Self   Sig: Take 1 capsule (0.4 mg) by mouth 2 times a day.   vit A/vit C/vit E/zinc/copper (PRESERVISION AREDS ORAL)  Self   Sig: Take 1 capsule by mouth once daily.      Facility-Administered Medications: None        The list below reflects the updated allergy list. Please review each documented allergy for additional clarification and justification.  Allergies  Reviewed by Corrina Smith RN on 3/19/2024        Severity Reactions Comments    Aspirin High Unknown hemophiliac    Penicillins Low Rash             Patient declines M2B at discharge. Pharmacy has been updated to [address]    Sources:    -patient interview (moderate historian, 2 family members present to assist)  -outpatient pharmacy dispense history  -Care Everywhere medication lists  -[place]ARRS  -office visit notes/med list with PCP Becky Santoyo on 1/24 & 3/13    Below are additional concerns with the patient's PTA list:    -PCP note states patient is off hydrochlorothiazide 12.5mg daily but patient reports he is still taking (last filled 10/12/23 x 90 day supply)    -PCP note states patient/family reported taking metoprolol 25mg daily, but only confirmed tablet strength dispensed at pharmacy was 50mg tablet and patient states  he does not split any tablets and takes full tablets only      Jake Vargas, [place]  Transitions of Care Pharmacist  [facility]  Please reach out via Secure Chat for  questions, or if no response call [phone] or vocera [facility]

## 2024-03-19 NOTE — ED TRIAGE NOTES
Pt presents to ED via EMS from home with c/c dry cough x 3 days and hematuria since last night. Hx hemophilia on Factor 8 injections on Tuesdays, last injection last week. Per EMS 90% on RA, placed on 3L NC, up to 96% on arrival to ED.

## 2024-03-19 NOTE — H&P
History Of Present Illness  Ramón Flores is a 89 y.o. male with PMH Hemophilia A, Protein C deficiency, HTN, Afib, HFpEF, HLD, nephrolithiasis, CAD s/p CABG (2002), BPH, and R fem neck fx s/p THR 1/13/23) that presents to the ED with 2x days SOB, dry cough and hematuria.     The patient states he has been feeling more short of breath, chest tightness, and a dry cough over the past few days, and that his symptoms are similar to those he had before his last admission in January. He also noticed some blood on his pajamas last night that he states came from his urethra. He is currently voiding clear, yellow urine and stated he has not had any kate bleeding since his incident last night. He also receives Altuviio home infusions every Tuesday for his Hemophilia A history, and he did not receive it today.     ED course:  - Labs: WBC 3.6, Hgb 12.4 (baseline 10-11), plts 106 (baseline 100-200), tbili 2.3 (baseline 1-2),  (baseline 500-600), creatinine 1.17 (baseline 0.80-1.30), Factor VIII activity 4  - Imaging: CXR: Small bibasilar pleural effusions and atelectasis, L>R, Superimposed left basilar developing consolidation not excluded. Cardiac ultrasound: The pericardial space was visualized and was negative for a significant pericardial effusion, LV systolic function decreased, RV size was normal.  - Medications: azithromycin 500mg, IV ceftriaxone 1g, furosemide 20mg     Past Medical History  He has a past medical history of Acute deep vein thrombosis (DVT) of left femoral vein (CMS/[facility]) (09/10/2023), Acute diastolic heart failure (CMS/HCC) (04/16/2023), Benign prostatic hyperplasia without lower urinary tract symptoms (01/07/2016), Bleeding hemorrhoids (03/01/2023), CAP (community acquired pneumonia) (01/18/2024), Closed nondisplaced fracture of head of left radius (09/05/2023), COVID-19 (05/21/2023), Enlarged prostate with lower urinary tract symptoms (LUTS) (03/01/2023), Fracture of bone of hip (CMS/[facility])  (09/05/2023), Fracture of femoral neck, right (CMS/HCC) (03/01/2023), Gastrointestinal hemorrhage (09/05/2023), Gingiva hemorrhage (09/05/2023), Gross hematuria (03/01/2023), Hemarthrosis of ankle joint (04/18/2019), Hemarthrosis of knee (05/09/2022), Hematochezia (09/05/2023), Hematoma of lower extremity (09/05/2023), Hematoma of right thigh (03/01/2023), History of recent fall (05/31/2023), Knee effusion (09/05/2023), Knee effusion, left (03/01/2023), Lumbar pain (03/01/2023), Obstructive uropathy (04/28/2023), Orthostatic syncope (09/05/2023), Personal history of other endocrine, nutritional and metabolic disease, Pneumonia of right lung due to infectious organism (03/01/2023), 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    Surgical History  He has a past surgical history that includes Coronary artery bypass graft.    Social History  He reports that he has never smoked. He has never used smokeless tobacco. Alcohol use questions deferred to the physician. He reports that he does not use drugs.     Allergies  Aspirin and Penicillins    Review of Systems     Physical Exam     Last Recorded Vitals  Blood pressure 109/63, pulse 69, temperature 36.2 °C (97.2 °F), temperature source Temporal, resp. rate 16, weight 63.5 kg (140 lb), SpO2 98 %.    Relevant Results  Scheduled medications  antihemophilic factor rAHF-PFM, 3,847 Units, intravenous, q12h  [START ON 3/20/2024] antihemophilic RF VIII, 50 Units/kg, intravenous, Once  [START ON 3/20/2024] azithromycin, 500 mg, intravenous, q24h  [START ON 3/20/2024] cefTRIAXone, 1 g, intravenous, q24h  oxygen, , inhalation, Continuous - 02/gases      Continuous medications     PRN medications      Assessment/Plan   Principal Problem:    Pneumonia of left lower lobe due to infectious organism  Ramón Flores is  a 89 y.o. 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 In the ED, CXR (3/19) showed small bibasilar pleural effusions and atelectasis, L>R, and superimposed left basilar developing consolidation not excluded. Cardiac US (3/19) negative for a significant pericardial effusion, LV systolic function decreased, RV size normal. Patient started on Azithromycin and Ceftriaxone (3/19- ). UA (3/19) +blood, nitrites, leukocytes, urine culture pending (3/19). Admitted for further management.    # CAP  - Presented to ED with 2x days of SOB, dry cough  - CXR (3/19) small bibasilar pleural effusions and atelectasis, L>R, superimposed left basilar developing consolidation not excluded  - Cardiac US POC (3/19) the pericardial space was visualized and was negative for a significant pericardial effusion, LV systolic function decreased, RV size was normal  - Flu A/B, RSV, and COVID pending (3/19)  - Urine legionella/strep pneumo pending (3/19)  - No leukocytosis, WBC 3.6 (3/19)  - Started on Ceftriaxone and Azithromycin (3/19- )  - wean off supplemental O2 as tolerated (on 2 L NC for support, not on O2 at home)      # Hemophilia A, Protein C deficiency  # Hematuria/UTI  - Follows with Dr. Dillon  - FVIII activity 4% (3/19), on prophylactic altuviiio weekly on Tuesdays  - Last factor infusion with altuviiio 3/12, home infusion due today 3/19  - Hgb 12.4 (baseline 10-11) (3/19)  - Plt 106 (baseline 100-200) (3/19)  - UA (3/19) +blood, nitrites, leukocytes (pt already on antibiotics), urine culture pending (3/19)  - Patient requires factor for any bleeding, or prior to any procedure   - Heme consulted on admit (3/19), rec start antihemophilic factor rAHF-PFM (Advate) q12, plan for altuviiio 50u/kg infusion Wednesday (once in stock in pharmacy), and obtain peak factor 8 activity  level 1 hr post infusion, along with daily factor 8 levels  - continue home folic acid 1mg daily  - continue home ferrous sulfate 325mg daily  - continue home multivitamin daily    # HTN/ Afib/ HFpEF/ HLD  - Last ECHO (2/24/23): EF 60-65%  - ECHO ordered (3/19), pending  - BNP (3/19): 589  - Trop (3/19): 102; hx elevated trops consistent with pt's baseline  - Admission EKG (3/19) rate controlled afib  - s/p one dose Lasix 20mg administered in ED  - continue home Metoprolol XL 25mg daily  - continue home Spironolactone 25mg daily  - continue home Lipitor 40mg daily  - continue home Bumex 1mg daily (ordered scheduled, pt takes PRN at home)     # BPH  - continue home Flomax 0.4mg bid  - continue home Finasteride 5mg daily     #DVT prophy   - SCDs only, no pharmacologic prophy I/s/o hemophilia, encourage ambulation    #DISPO:  - DNR/DNI (ICU ok)  - NOK: David Wallace (nephew) #[phone]  - DC pending improvement of respiratory symptoms and hematologic stability      I spent >60 minutes in the professional and overall care of this patient.      Joanne Vega, APRN-CNP

## 2024-03-19 NOTE — ED PROVIDER NOTES
CC: Cough and Blood in Urine     HPI:  89-year-old male with history of hemophilia A on factor VIII transfusions every Tuesday, HFpEF, CAD with prior CABG, CKD 3, presents to the emergency department with concern for shortness of breath and hematuria.  Patient states has had increased work of breathing over the last few days along with dry cough, found to be satting 90% on room air and placed on 2 to 3 L nasal cannula with improvement of saturations.  No chest pain or tightness.  No increased leg swelling.  Compliant with his spironolactone.    Also with hemophilia A, noticed some blood from penis last night which had soaked into his pants.  No bleeding this morning, but did notice dark but not red discoloration of urine.  Receives ALTUVIIIO infusions every Tuesday, has not received his infusion today.  No other mucosal bleeding.    Records Reviewed:  Recent available ED and inpatient notes reviewed in EMR.    PMHx/PSHx:  Per HPI.   - has a past surgical history that includes Coronary artery bypass graft.  - has Arthritis; ASHD (arteriosclerotic heart disease); Esophageal reflux; Exudative age-related macular degeneration, right eye, with active choroidal neovascularization (CMS/HCC); Hemophilia A (CMS/HCC); Hemorrhoids, internal; Hyperlipidemia; Hypertension; Primary osteoarthritis of left knee; Lumbar degenerative disc disease; Mild vitamin D deficiency; Nephrolithiasis; Neuropathy; Non-ST elevation myocardial infarction (NSTEMI) in recovery phase (CMS/HCC); Nonexudative age-related macular degeneration of left eye; Phimosis; Reactive airway disease; History of coronary artery bypass graft; Physical debility; (HFpEF) heart failure with preserved ejection fraction (CMS/HCC); Acute pain of right knee; Osteoarthritis of knee; Primary osteoarthritis of right knee; Anemia in other chronic diseases classified elsewhere; Atrial fibrillation, chronic (CMS/HCC); Protein-calorie malnutrition, unspecified severity (CMS/HCC);  Hemobilia; Hypocalcemia; Paraphimosis; Primary localized osteoarthrosis, ankle and foot; Protein C deficiency (CMS/[facility]); Thrombocytopenia, acquired (CMS/[facility]); Adenomyomatous hyperplasia of prostate gland; Postoperative hemorrhage of eye following ophthalmic procedure; Factor VIII deficiency (CMS/[facility]); Seizure (CMS/[facility]); Urinary frequency; Benign prostatic hyperplasia without lower urinary tract symptoms; Diverticulosis of both small and large intestine without perforation or abscess without bleeding; Hemophilic arthritis; Personal history of urinary calculi; Polyneuropathy; Presence of aortocoronary bypass graft; Hyperopia with presbyopia of both eyes; Bilateral leg edema; DNR (do not resuscitate); and Stage 3a chronic kidney disease (CMS/[facility]) on their problem list.    Medications:  Reviewed in EMR. See EMR for complete list of medications and doses.    Allergies:  Aspirin and Penicillins    Social History:  - Tobacco:  reports that he has never smoked. He has never used smokeless tobacco.   - Alcohol: Alcohol use questions deferred to the physician.   - Illicit Drugs:  reports no history of drug use.     ROS:  Per HPI.       ???????????????????????????????????????????????????????????????  Triage Vitals:  T 36.2 °C (97.2 °F)  HR 68  BP (!) 160/48  RR 16  O2 96 % Supplemental oxygen    Physical Exam  Vitals and nursing note reviewed.   Constitutional:       Appearance: Normal appearance.   Eyes:      Conjunctiva/sclera: Conjunctivae normal.   Cardiovascular:      Rate and Rhythm: Normal rate and regular rhythm.      Heart sounds: Normal heart sounds.   Pulmonary:      Breath sounds: Rales (Fine crackles left base) present.   Abdominal:      Palpations: Abdomen is soft.      Tenderness: There is no abdominal tenderness.   Musculoskeletal:      Right lower leg: Edema (1+ pitting) present.      Left lower leg: Edema (1+ pitting) present.      Comments: Right knee chronic swelling   Neurological:      General: No  focal deficit present.      Mental Status: He is alert and oriented to person, place, and time.   Psychiatric:         Mood and Affect: Mood normal.         Behavior: Behavior normal.       ???????????????????????????????????????????????????????????????  EKG:  3/19/2024 1002 -- atrial fibrillation rate 66.  LAFB.  Normal intervals.  No ST-T changes or signs of ischemia, appears unchanged from prior EKG dated 1/20/2024.  Abnormal EKG.    Assessment and Plan:  89-year-old male with history of HFpEF and hemophilia A presents to the emergency department for cough and shortness of breath, as well as concerns for hematuria.  Hemodynamically stable on arrival, satting 96% on new 3 L nasal cannula.  Cardiopulmonary workup with EKG, chest x-ray, and serial high-sensitivity troponins were ordered along with a BNP to evaluate for ACS, heart failure exacerbation, or pneumonia.  He is not tachycardic, no history of VTE and has hemophilia a, so low suspicion for PE, do not feel that he needs CT imaging of his chest unless the rest of his workup is negative.    Regarding the hematuria, has no bleeding currently and denies any other mucosal bleeding.  Urinalysis was ordered to rule out infection and to evaluate for continued hematuria.  The patient is scheduled for Factor VIII infusion today.  Factor VIII level was drawn.  Discussed with hematology who will see the patient as consult.    ED Course:  Chest x-ray independently reviewed by me and interpreted by radiology demonstrates bibasilar pleural effusions with what appears to be a new infiltrate in the left lower lobe concerning for new pneumonia.  Patient started on ceftriaxone and azithromycin.  Also appears slightly fluid overloaded on exam, with bilateral B-lines on POCUS, received 20 mg IV Lasix.    Initial troponin is 85, repeat is 102.  Will hold off on aspirin given the patient's hemophilia and low factor VIII level.  On review of chart appears to be similar to his  baseline, will continue to trend troponins until down-trended.  Continues to have no chest pain on reevaluation, and EKG is nonischemic.    I discussed with hematology, who placed order for factor VIII, will obtain a repeat factor VIII activity 1 hour after.    Discussed with admissions coordinator, admitted to hematology for further management of pneumonia and fluid retention with new O2 requirement.    Social Determinants Limiting Care:  None identified    Disposition:  Admit to medicine    --  Niko Saleh MD  Emergency Medicine, PGY-3      Diagnoses as of 03/19/24 1549   Pneumonia of left lower lobe due to infectious organism     Procedures ? [employer] last updated 3/19/2024 9:44 AM         iNko Saleh MD  Resident  03/19/24 3230

## 2024-03-19 NOTE — ED PROCEDURE NOTE
Procedure    Performed by: Austin Woo DO  Authorized by: Rodger Javier DO  Cardiac Indications: chest pain and orthopnea dyspnea                Procedure: Cardiac Ultrasound    Findings:   Views: parasternal long, parasternal short, apical four and subxiphoid  The pericardial space was visualized and was NEGATIVE for a significant pericardial effusion.  Activity: Ventricular contractions were visualized.  LV: LV systolic function was DECREASED.  RV: RV size was NORMAL.    Impression:  Cardiac: The focused cardiac ultrasound was INDETERMINATE given inadequate visualization.  Procedure: Thoracic Ultrasound    Findings:  R Lung Sliding: The RIGHT chest was evaluated and LUNG SLIDING was visualized.  L Lung Sliding: The LEFT chest was evaluated and LUNG SLIDING was visualized.  R Effusion: The RIGHT chest was evaluated and there was NO PLEURAL EFFUSION.  L Effusion: The LEFT chest was evaluated and there was NO PLEURAL EFFUSION.  A-lines: The RIGHT chest was evaluated and there were NO A-LINES visualized  B-lines: The RIGHT chest was evaluated and multiple B-LINES were visualized  R Consolidation: The RIGHT chest was evaluated and there was NO RIGHT CONSOLIDATION.  L Consolidation: The LEFT chest was evaluated and there was NO LEFT CONSOLIDATION.    Impression:  Thorax: The focused thoracic ultrasound exam had ABNORMAL findings as specified.                   Austin Woo DO  Resident  03/19/24 0956

## 2024-03-19 NOTE — CONSULTS
Name: Ramón Flores  MRN: [id number]  Encounter Date: 3/19/2024  PCP: Becky Mejia, APRN-CNP  Heme-Onc: Dr Dillon    Hematology Consult Note  Reason for consult: Hemophilia A    History Of Present Illness  Ramón Flores is a 89 year old male with a history of Hemophila A (factor 8 activity <1%), protein C deficiency, DVT (9/2023, s/p 3 months of Elequis), CAD (s/p CABG), HFpEF, HTN, HLD, BPH who presented to ED for dry cough. Pt was noted to be hypoxic to 90s on RA, now on 2-3L NC. CXR revealed bilateral pleural effusions with developing left basilar consolidation. Cardiopulmonary workup is pending. Hematology was consulted for management of hemophilia A.    Pt sees Dr Dillon outpatient and is on weekly prophylactic altuviiio administered by home care. His last dose was on 3/12 and he was due to receive his next dose today. He was scheduled to see Dr Talamantes last week but missed his appointment due to lack of transport. Per pt, he has been having a cough and chest congestion for the past few days. He was also concerned there was blood in his urine last night as it was beige in color. Denied pink or red tinged urine. Urinalysis performed in ED positive for blood (3+), nitrites (1+), leuk esterase. Denies any other concerns for bleeding, bruising, chest pain, light-headedness.     Past Medical History  Hemophilia A (factor 8 activity <1%, receives weekly prophylatic altuviiio)  Protein C deficiency  Left femoral DVT (9/2023, s/p 3 month course Elequis)  CAD (s/p CABG)  HFpEF  HTN  HLD  BPH    Surgical History  CABG     Social History  He reports that he has never smoked. He has never used smokeless tobacco. Alcohol use questions deferred to the physician. He reports that he does not use drugs.    Family History  Mother with Hemophilia A    Allergies  Aspirin and Penicillins    Review of Systems  ROS otherwise negative other than that listed in HPI     Physical Exam  GEN: awake, alert, in no  distress  HEENT: atraumatic, normocephalic  CV: RRR/ no M/R/G  PULM: crackles bilaterally, on 3L oxygen via NC  ABD: soft, nondistended  EXT: mild peripheral edema  NEURO: CN II-XII grossly intact  PSYCH: appropriate mood and affect     Last Recorded Vitals  Blood pressure 142/86, pulse 88, temperature 36.2 °C (97.2 °F), temperature source Temporal, resp. rate 16, weight 63.5 kg (140 lb), SpO2 98 %.    Relevant Results    Lab Results   Component Value Date    GLUCOSE 98 03/19/2024    CALCIUM 8.8 03/19/2024     03/19/2024    K 3.7 03/19/2024    CO2 20 (L) 03/19/2024     03/19/2024    BUN 22 03/19/2024    CREATININE 1.17 03/19/2024       Lab Results   Component Value Date    WBC 3.6 (L) 03/19/2024    HGB 12.4 (L) 03/19/2024    HCT 36.6 (L) 03/19/2024     (H) 03/19/2024     (L) 03/19/2024       Lab Results   Component Value Date    ALT 25 03/19/2024    AST 23 03/19/2024    ALKPHOS 70 03/19/2024    BILITOT 2.3 (H) 03/19/2024     Factor VIII Activity  55 - 180 % 4 Low      Assessment/Plan   Ramón Flores is a 89 year old male with a history of Hemophila A (factor 8 activity <1%), protein C deficiency, DVT (left femoral, 9/2023, s/p 3 months of Elequis), CAD (s/p CABG), HFpEF, HTN, HLD, BPH who presented for cough with new oxygen requirement. Pending cardiopulmonary workup. Hematology was consulted for management of hemophilia A. No evidence of bleeding on exam. Hgb at baseline. Pt is due for his prophylactic Altuviiio today; however, pharmacy does not currently have altuviiio available. Recommend starting Advate 60 units/kg Q12H. Plan for Altuviio 50 units/kg tomorrow once pharmacy has it in stock. Will obtain peak factor 8 activity level post-infusion and trend factor 8 activity levels daily.    Recommendations:  - Start Advate 60 units/kg Q12H.   - Plan for Altuviiio 50 units/kg tomorrow once pharmacy has it in stock.  - Obtain peak factor 8 activity level 1 hr post infusion.  - Please  obtain daily factor 8 activity levels.    Thank you for this consult, we will continue to follow. Pt seen and discussed with Dr. Hummel.    Latanya Abbasi, MS4  Hematology Consult Pager: [phone]

## 2024-03-20 ENCOUNTER — APPOINTMENT (OUTPATIENT)
Dept: CARDIOLOGY | Facility: HOSPITAL | Age: 89
DRG: 193 | End: 2024-03-20
Payer: MEDICARE

## 2024-03-20 DIAGNOSIS — I10 PRIMARY HYPERTENSION: ICD-10-CM

## 2024-03-20 LAB
ALBUMIN SERPL BCP-MCNC: 3.8 G/DL (ref 3.4–5)
ALP SERPL-CCNC: 65 U/L (ref 33–136)
ALT SERPL W P-5'-P-CCNC: 21 U/L (ref 10–52)
ANION GAP SERPL CALC-SCNC: 14 MMOL/L (ref 10–20)
AORTIC VALVE MEAN GRADIENT: 2 MMHG
AORTIC VALVE PEAK VELOCITY: 0.97 M/S
AST SERPL W P-5'-P-CCNC: 20 U/L (ref 9–39)
AV PEAK GRADIENT: 3.8 MMHG
AVA (PEAK VEL): 2.32 CM2
AVA (VTI): 2.21 CM2
BASOPHILS # BLD AUTO: 0.02 X10*3/UL (ref 0–0.1)
BASOPHILS NFR BLD AUTO: 0.5 %
BILIRUB SERPL-MCNC: 1.5 MG/DL (ref 0–1.2)
BUN SERPL-MCNC: 21 MG/DL (ref 6–23)
CALCIUM SERPL-MCNC: 8.3 MG/DL (ref 8.6–10.6)
CHLORIDE SERPL-SCNC: 104 MMOL/L (ref 98–107)
CO2 SERPL-SCNC: 27 MMOL/L (ref 21–32)
CREAT SERPL-MCNC: 1.39 MG/DL (ref 0.5–1.3)
EGFRCR SERPLBLD CKD-EPI 2021: 47 ML/MIN/1.73M*2
EJECTION FRACTION APICAL 4 CHAMBER: 51.2
EJECTION FRACTION: 51 %
EOSINOPHIL # BLD AUTO: 0.15 X10*3/UL (ref 0–0.4)
EOSINOPHIL NFR BLD AUTO: 3.4 %
ERYTHROCYTE [DISTWIDTH] IN BLOOD BY AUTOMATED COUNT: 14.5 % (ref 11.5–14.5)
FACT VIII ACT/NOR PPP: 52 % (ref 55–180)
FACT VIII ACT/NOR PPP: 54 % (ref 55–180)
GLUCOSE SERPL-MCNC: 133 MG/DL (ref 74–99)
HCT VFR BLD AUTO: 36.1 % (ref 41–52)
HGB BLD-MCNC: 12 G/DL (ref 13.5–17.5)
HOLD SPECIMEN: NORMAL
IMM GRANULOCYTES # BLD AUTO: 0.04 X10*3/UL (ref 0–0.5)
IMM GRANULOCYTES NFR BLD AUTO: 0.9 % (ref 0–0.9)
LEFT ATRIUM VOLUME AREA LENGTH INDEX BSA: 41.9 ML/M2
LEFT VENTRICLE INTERNAL DIMENSION DIASTOLE: 4 CM (ref 3.5–6)
LEFT VENTRICULAR OUTFLOW TRACT DIAMETER: 2 CM
LEGIONELLA AG UR QL: NEGATIVE
LYMPHOCYTES # BLD AUTO: 1.4 X10*3/UL (ref 0.8–3)
LYMPHOCYTES NFR BLD AUTO: 31.6 %
MAGNESIUM SERPL-MCNC: 1.2 MG/DL (ref 1.6–2.4)
MCH RBC QN AUTO: 33.8 PG (ref 26–34)
MCHC RBC AUTO-ENTMCNC: 33.2 G/DL (ref 32–36)
MCV RBC AUTO: 102 FL (ref 80–100)
MITRAL VALVE E/E' RATIO: 11.03
MONOCYTES # BLD AUTO: 0.39 X10*3/UL (ref 0.05–0.8)
MONOCYTES NFR BLD AUTO: 8.8 %
NEUTROPHILS # BLD AUTO: 2.43 X10*3/UL (ref 1.6–5.5)
NEUTROPHILS NFR BLD AUTO: 54.8 %
NRBC BLD-RTO: 0 /100 WBCS (ref 0–0)
PLATELET # BLD AUTO: 107 X10*3/UL (ref 150–450)
POTASSIUM SERPL-SCNC: 3.5 MMOL/L (ref 3.5–5.3)
PROT SERPL-MCNC: 6.3 G/DL (ref 6.4–8.2)
RBC # BLD AUTO: 3.55 X10*6/UL (ref 4.5–5.9)
RIGHT VENTRICLE FREE WALL PEAK S': 5.57 CM/S
RIGHT VENTRICLE PEAK SYSTOLIC PRESSURE: 34.8 MMHG
S PNEUM AG UR QL: NEGATIVE
SODIUM SERPL-SCNC: 141 MMOL/L (ref 136–145)
TRICUSPID ANNULAR PLANE SYSTOLIC EXCURSION: 1.2 CM
WBC # BLD AUTO: 4.4 X10*3/UL (ref 4.4–11.3)

## 2024-03-20 PROCEDURE — 99232 SBSQ HOSP IP/OBS MODERATE 35: CPT | Performed by: INTERNAL MEDICINE

## 2024-03-20 PROCEDURE — 85240 CLOT FACTOR VIII AHG 1 STAGE: CPT | Performed by: INTERNAL MEDICINE

## 2024-03-20 PROCEDURE — 85025 COMPLETE CBC W/AUTO DIFF WBC: CPT

## 2024-03-20 PROCEDURE — 6360000002 HC RX 636 FACTOR: Mod: JZ | Performed by: INTERNAL MEDICINE

## 2024-03-20 PROCEDURE — 83735 ASSAY OF MAGNESIUM: CPT

## 2024-03-20 PROCEDURE — 2500000004 HC RX 250 GENERAL PHARMACY W/ HCPCS (ALT 636 FOR OP/ED)

## 2024-03-20 PROCEDURE — 87040 BLOOD CULTURE FOR BACTERIA: CPT

## 2024-03-20 PROCEDURE — 2500000004 HC RX 250 GENERAL PHARMACY W/ HCPCS (ALT 636 FOR OP/ED): Mod: JZ | Performed by: INTERNAL MEDICINE

## 2024-03-20 PROCEDURE — 93306 TTE W/DOPPLER COMPLETE: CPT

## 2024-03-20 PROCEDURE — 80053 COMPREHEN METABOLIC PANEL: CPT

## 2024-03-20 PROCEDURE — 1170000001 HC PRIVATE ONCOLOGY ROOM DAILY

## 2024-03-20 PROCEDURE — 36415 COLL VENOUS BLD VENIPUNCTURE: CPT

## 2024-03-20 PROCEDURE — 85240 CLOT FACTOR VIII AHG 1 STAGE: CPT

## 2024-03-20 PROCEDURE — 93306 TTE W/DOPPLER COMPLETE: CPT | Performed by: STUDENT IN AN ORGANIZED HEALTH CARE EDUCATION/TRAINING PROGRAM

## 2024-03-20 PROCEDURE — 97530 THERAPEUTIC ACTIVITIES: CPT | Mod: GO

## 2024-03-20 PROCEDURE — 2500000001 HC RX 250 WO HCPCS SELF ADMINISTERED DRUGS (ALT 637 FOR MEDICARE OP)

## 2024-03-20 PROCEDURE — 2500000002 HC RX 250 W HCPCS SELF ADMINISTERED DRUGS (ALT 637 FOR MEDICARE OP, ALT 636 FOR OP/ED)

## 2024-03-20 PROCEDURE — 97165 OT EVAL LOW COMPLEX 30 MIN: CPT | Mod: GO

## 2024-03-20 RX ORDER — METOPROLOL SUCCINATE 50 MG/1
50 TABLET, EXTENDED RELEASE ORAL DAILY
Qty: 90 TABLET | Refills: 3 | Status: SHIPPED | OUTPATIENT
Start: 2024-03-20

## 2024-03-20 RX ORDER — CEFEPIME 1 G/50ML
1 INJECTION, SOLUTION INTRAVENOUS EVERY 24 HOURS
Status: DISCONTINUED | OUTPATIENT
Start: 2024-03-20 | End: 2024-03-21

## 2024-03-20 RX ORDER — MAGNESIUM SULFATE HEPTAHYDRATE 40 MG/ML
2 INJECTION, SOLUTION INTRAVENOUS ONCE
Status: COMPLETED | OUTPATIENT
Start: 2024-03-20 | End: 2024-03-20

## 2024-03-20 RX ADMIN — ACETAMINOPHEN 975 MG: 325 TABLET ORAL at 09:31

## 2024-03-20 RX ADMIN — SPIRONOLACTONE 25 MG: 25 TABLET, FILM COATED ORAL at 09:14

## 2024-03-20 RX ADMIN — FOLIC ACID 1 MG: 1 TABLET ORAL at 09:14

## 2024-03-20 RX ADMIN — TAMSULOSIN HYDROCHLORIDE 0.4 MG: 0.4 CAPSULE ORAL at 20:13

## 2024-03-20 RX ADMIN — CEFTRIAXONE SODIUM 1 G: 1 INJECTION, SOLUTION INTRAVENOUS at 09:15

## 2024-03-20 RX ADMIN — CEFEPIME 1 G: 1 INJECTION, SOLUTION INTRAVENOUS at 17:49

## 2024-03-20 RX ADMIN — TAMSULOSIN HYDROCHLORIDE 0.4 MG: 0.4 CAPSULE ORAL at 09:14

## 2024-03-20 RX ADMIN — AZITHROMYCIN MONOHYDRATE 500 MG: 500 INJECTION, POWDER, LYOPHILIZED, FOR SOLUTION INTRAVENOUS at 11:37

## 2024-03-20 RX ADMIN — ACETAMINOPHEN 975 MG: 325 TABLET ORAL at 16:15

## 2024-03-20 RX ADMIN — METOPROLOL SUCCINATE 25 MG: 25 TABLET, EXTENDED RELEASE ORAL at 09:15

## 2024-03-20 RX ADMIN — Medication 1 TABLET: at 09:14

## 2024-03-20 RX ADMIN — MAGNESIUM SULFATE HEPTAHYDRATE 2 G: 40 INJECTION, SOLUTION INTRAVENOUS at 14:13

## 2024-03-20 RX ADMIN — SODIUM CHLORIDE, POTASSIUM CHLORIDE, SODIUM LACTATE AND CALCIUM CHLORIDE 500 ML: 600; 310; 30; 20 INJECTION, SOLUTION INTRAVENOUS at 16:15

## 2024-03-20 RX ADMIN — ATORVASTATIN CALCIUM 40 MG: 40 TABLET, FILM COATED ORAL at 09:14

## 2024-03-20 RX ADMIN — ANTIHEMOPHILIC FACTOR (RECOMBINANT) 3825 UNITS: KIT at 01:06

## 2024-03-20 RX ADMIN — BUMETANIDE 1 MG: 1 TABLET ORAL at 09:14

## 2024-03-20 RX ADMIN — FERROUS SULFATE TAB 325 MG (65 MG ELEMENTAL FE) 1 TABLET: 325 (65 FE) TAB at 09:14

## 2024-03-20 RX ADMIN — FINASTERIDE 5 MG: 5 TABLET, FILM COATED ORAL at 09:15

## 2024-03-20 RX ADMIN — ACETAMINOPHEN 975 MG: 325 TABLET ORAL at 22:54

## 2024-03-20 RX ADMIN — ANTIHEMOPHILIC FACTOR (RECOMBINANT), FC-VWF-XTEN FUSION PROTEIN-EHTL 3263 UNITS: KIT at 14:13

## 2024-03-20 RX ADMIN — PANTOPRAZOLE SODIUM 40 MG: 40 TABLET, DELAYED RELEASE ORAL at 09:31

## 2024-03-20 ASSESSMENT — COGNITIVE AND FUNCTIONAL STATUS - GENERAL
EATING MEALS: A LITTLE
MOVING TO AND FROM BED TO CHAIR: A LOT
MOVING TO AND FROM BED TO CHAIR: A LOT
EATING MEALS: A LITTLE
DRESSING REGULAR LOWER BODY CLOTHING: A LOT
PERSONAL GROOMING: A LITTLE
STANDING UP FROM CHAIR USING ARMS: A LOT
PERSONAL GROOMING: A LITTLE
CLIMB 3 TO 5 STEPS WITH RAILING: TOTAL
HELP NEEDED FOR BATHING: A LOT
TOILETING: A LITTLE
HELP NEEDED FOR BATHING: A LOT
WALKING IN HOSPITAL ROOM: TOTAL
CLIMB 3 TO 5 STEPS WITH RAILING: TOTAL
MOBILITY SCORE: 11
DAILY ACTIVITIY SCORE: 16
PERSONAL GROOMING: A LITTLE
STANDING UP FROM CHAIR USING ARMS: A LOT
DRESSING REGULAR UPPER BODY CLOTHING: A LITTLE
TOILETING: A LOT
WALKING IN HOSPITAL ROOM: TOTAL
DAILY ACTIVITIY SCORE: 15
MOVING FROM LYING ON BACK TO SITTING ON SIDE OF FLAT BED WITH BEDRAILS: A LOT
DRESSING REGULAR UPPER BODY CLOTHING: A LOT
TURNING FROM BACK TO SIDE WHILE IN FLAT BAD: A LOT
DRESSING REGULAR UPPER BODY CLOTHING: A LITTLE
MOBILITY SCORE: 10
DRESSING REGULAR LOWER BODY CLOTHING: A LOT
HELP NEEDED FOR BATHING: A LOT
TURNING FROM BACK TO SIDE WHILE IN FLAT BAD: A LOT
MOVING FROM LYING ON BACK TO SITTING ON SIDE OF FLAT BED WITH BEDRAILS: A LITTLE
DRESSING REGULAR LOWER BODY CLOTHING: A LOT
DAILY ACTIVITIY SCORE: 15
TOILETING: A LOT

## 2024-03-20 ASSESSMENT — PAIN - FUNCTIONAL ASSESSMENT
PAIN_FUNCTIONAL_ASSESSMENT: 0-10
PAIN_FUNCTIONAL_ASSESSMENT: 0-10

## 2024-03-20 ASSESSMENT — PAIN SCALES - GENERAL
PAINLEVEL_OUTOF10: 0 - NO PAIN

## 2024-03-20 ASSESSMENT — ACTIVITIES OF DAILY LIVING (ADL): ADL_ASSISTANCE: NEEDS ASSISTANCE

## 2024-03-20 NOTE — PROGRESS NOTES
Ramón Flores is a 89 y.o. male on day 1 of admission presenting with Pneumonia of left lower lobe due to infectious organism.    Subjective   Seen at bedside this morning. Patient is up eating breakfast with no new concerns since admission. States that hematuria has improved and urine is now more clear. He is off oxygen and denies SOB, increased WOB, or productive cough. He states that he feels comfortable going back home once he is ready for discharge. We discussed working with PT/OT today. He discussed being admitted early this year for a similar issue and stated that he went home on PO antibiotics. We discussed further talking to hematology this morning for further recs. He had no further questions. Denies any other concerns for bleeding, bruising, chest pain, light-headedness.     Objective     Physical Exam  Vitals reviewed.   Constitutional:       Appearance: Normal appearance.   HENT:      Head: Normocephalic and atraumatic.      Nose: Nose normal.      Mouth/Throat:      Mouth: Mucous membranes are moist.      Pharynx: Oropharynx is clear.   Eyes:      Extraocular Movements: Extraocular movements intact.      Pupils: Pupils are equal, round, and reactive to light.   Cardiovascular:      Rate and Rhythm: Normal rate and regular rhythm.      Pulses: Normal pulses.      Heart sounds: Normal heart sounds.   Pulmonary:      Effort: Pulmonary effort is normal.      Breath sounds: Normal breath sounds.   Abdominal:      General: Bowel sounds are normal.      Palpations: Abdomen is soft.   Musculoskeletal:         General: Normal range of motion.   Skin:     General: Skin is warm.   Neurological:      General: No focal deficit present.      Mental Status: He is alert and oriented to person, place, and time. Mental status is at baseline.   Psychiatric:         Mood and Affect: Mood normal.         Behavior: Behavior normal.         Last Recorded Vitals  Blood pressure 127/74, pulse 62, temperature 36 °C (96.8  °F), temperature source Temporal, resp. rate 18, weight 63.5 kg (140 lb), SpO2 95 %.  Intake/Output last 3 Shifts:  I/O last 3 completed shifts:  In: 50 (0.8 mL/kg) [IV Piggyback:50]  Out: 1150 (18.1 mL/kg) [Urine:1150 (0.5 mL/kg/hr)]  Weight: 63.5 kg     Relevant Results  Scheduled medications  acetaminophen, 975 mg, oral, q8h  antihemophilic factor rAHF-PFM, 3,825 Units, intravenous, q12h  antihemophilic RF VIII, 50 Units/kg, intravenous, Once  atorvastatin, 40 mg, oral, Daily  azithromycin, 500 mg, intravenous, q24h  bumetanide, 1 mg, oral, Daily  cefTRIAXone, 1 g, intravenous, q24h  ferrous sulfate (325 mg ferrous sulfate), 65 mg of iron, oral, Daily  finasteride, 5 mg, oral, Daily  folic acid, 1 mg, oral, Daily  metoprolol succinate XL, 25 mg, oral, Daily  multivitamin with minerals, 1 tablet, oral, Daily  oxygen, , inhalation, Continuous - 02/gases  pantoprazole, 40 mg, oral, Daily before breakfast  spironolactone, 25 mg, oral, Daily  tamsulosin, 0.4 mg, oral, BID    Continuous medications     PRN medications         Point of Care Ultrasound    Result Date: 3/19/2024  Austin Woo DO     3/19/2024  9:56 AM Performed by: Austin Woo DO Authorized by: Rodger Javier DO  Cardiac Indications: chest pain and orthopnea dyspnea Procedure: Cardiac Ultrasound Findings:  Views: parasternal long, parasternal short, apical four and subxiphoid The pericardial space was visualized and was NEGATIVE for a significant pericardial effusion. Activity: Ventricular contractions were visualized. LV: LV systolic function was DECREASED. RV: RV size was NORMAL. Impression: Cardiac: The focused cardiac ultrasound was INDETERMINATE given inadequate visualization. Procedure: Thoracic Ultrasound Findings: R Lung Sliding: The RIGHT chest was evaluated and LUNG SLIDING was visualized. L Lung Sliding: The LEFT chest was evaluated and LUNG SLIDING was visualized. R Effusion: The RIGHT chest was evaluated and there was NO PLEURAL  EFFUSION. L Effusion: The LEFT chest was evaluated and there was NO PLEURAL EFFUSION. A-lines: The RIGHT chest was evaluated and there were NO A-LINES visualized B-lines: The RIGHT chest was evaluated and multiple B-LINES were visualized R Consolidation: The RIGHT chest was evaluated and there was NO RIGHT CONSOLIDATION. L Consolidation: The LEFT chest was evaluated and there was NO LEFT CONSOLIDATION. Impression: Thorax: The focused thoracic ultrasound exam had ABNORMAL findings as specified.         XR chest 2 views    Result Date: 3/19/2024  Interpreted By:  Shad Disla,  and Afia Greer STUDY: XR CHEST 2 VIEWS;  3/19/2024 9:18 am   INDICATION: Signs/Symptoms:cough, new O2 requirement 2L NC.   COMPARISON: Single-view chest 01/18/2024, CTA chest 01/18/2024   ACCESSION NUMBER(S): [id number]   ORDERING CLINICIAN: PHILIP VÁZQUEZ   FINDINGS: PA and lateral radiographs of the chest were provided.   Median sternotomy wires surgical clips project over the mediastinum.   CARDIOMEDIASTINAL SILHOUETTE: Cardiomediastinal silhouette is enlarged and similar in size and configuration.   LUNGS: Prominent interstitial markings. Hazy opacities in left-greater-than-right lung fields. There are bibasilar opacities, left greater than right, suggestive of trace pleural effusions and atelectasis. Superimposed interstitial opacities are not excluded. No pneumothorax.   ABDOMEN: No remarkable upper abdominal findings.   BONES: No acute osseous changes.       1.  Small bibasilar pleural effusions and atelectasis, left greater than right. Superimposed left basilar developing consolidation not excluded. Correlate with volume status. 2. Unchanged cardiomediastinal enlargement.       MACRO: None   Signed by: Shad Disla 3/19/2024 9:39 AM Dictation workstation:   [id number]          Assessment/Plan   Principal Problem:    Pneumonia of left lower lobe due to infectious organism    Ramón Flores is a 89 y.o. male with PMH  Hemophilia A, Protein C deficiency, HTN, Afib, HFpEF, HLD, nephrolithiasis, CAD s/p CABG (2002), BPH, and R fem neck fx s/p THR 1/13/23) that presents to the ED with 2x days SOB and hematuria. In the ED, CXR (3/19) showed small bibasilar pleural effusions and atelectasis, L>R, and superimposed left basilar developing consolidation not excluded. Cardiac US (3/19) negative for a significant pericardial effusion, LV systolic function decreased, RV size normal. Patient started on Azithromycin and Ceftriaxone (3/19- ). UA (3/19) +blood, nitrites, leukocytes, urine culture pending (3/19). Heme consulted, recommended Advate Q12 with daily factor VIII levels. Admitted for further management.     # CAP  - Presented to ED with 2x days of SOB, dry cough  - CXR (3/19) small bibasilar pleural effusions and atelectasis, L>R, superimposed left basilar developing consolidation not excluded  - Cardiac US POC (3/19) the pericardial space was visualized and was negative for a significant pericardial effusion, LV systolic function decreased, RV size was normal  - Flu A/B, RSV, and COVID negative (3/19)  - Urine legionella/strep pneumo negative (3/19)  - No leukocytosis, WBC 3.6 (3/19)  - Started on Ceftriaxone and Azithromycin (3/19- )  - Initially on 2L, now weaned to RA (3/20)     # Hemophilia A, Protein C deficiency  # Hematuria/UTI  - Follows with Dr. Dillon  - FVIII activity 4% (3/19), on prophylactic altuviiio weekly on Tuesdays  - Last factor infusion with altuviiio 3/12, home infusion due today 3/19  - Hgb 12.4 (baseline 10-11) (3/19)  - Plt 106 (baseline 100-200) (3/19)  - UA (3/19) +blood, nitrites, leukocytes (pt already on antibiotics), urine culture pending (3/19)  - Patient requires factor for any bleeding, or prior to any procedure   - Heme consulted on admit (3/19), rec start antihemophilic factor rAHF-PFM (Advate) q12, plan for altuviiio 50u/kg infusion Wednesday (once in stock in pharmacy), and obtain peak factor 8  activity level 1 hr post infusion, along with daily factor 8 levels  - Plan for Altuviiio afternoon of 3/20  - continue home folic acid 1mg daily  - continue home ferrous sulfate 325mg daily  - continue home multivitamin daily     # HTN/ Afib/ HFpEF/ HLD  - Last ECHO (2/24/23): EF 60-65%  - Cardiac US (3/19) negative for a significant pericardial effusion, LV systolic function decreased, RV size normal  - ECHO ordered (3/19), pending  - BNP (3/19): 589 (-600)  - Trop (3/19): 102; hx elevated trops consistent with pt's baseline  - Admission EKG (3/19) rate controlled afib  - s/p one dose Lasix 20mg administered in ED  - continue home Metoprolol XL 25mg daily  - continue home Spironolactone 25mg daily  - continue home Lipitor 40mg daily  - continue home Bumex 1mg daily (ordered scheduled, pt takes PRN at home)     # BPH  - continue home Flomax 0.4mg bid  - continue home Finasteride 5mg daily     #DVT prophy   - SCDs only, no pharmacologic prophy I/s/o hemophilia, encourage ambulation     #DISPO:  - DNR/DNI (ICU ok)  - NOK: David Wallace (nephew) #[phone]  - DC pending improvement of respiratory symptoms and hematologic stability      I spent >60 minutes in the professional and overall care of this patient.    Assessment and plan discussed with attending physician Dr. Faizan Llamas, APRN-CNP

## 2024-03-20 NOTE — PROGRESS NOTES
Occupational Therapy    Evaluation/Treatment    Patient Name: Ramón Flores  MRN: [id number]  : 1928  Today's Date: 24  Time Calculation  Start Time:   Stop Time: 1606  Time Calculation (min): 31 min       Assessment:  OT Assessment: Pt would benefit from moderate intensity OT to address ADLs, mobility, and endurance. If patient refuses, would recommend at least 24/7 supervision/assist and low intensity OT due to being a high falls risk.  End of Session Communication: Bedside nurse  End of Session Patient Position: Bed, 3 rail up, Alarm on  OT Assessment Results: Decreased ADL status, Decreased endurance, Decreased IADLs, Decreased functional mobility  Plan:  Treatment Interventions: ADL retraining, Functional transfer training, Endurance training, Patient/family training  OT Frequency: 2 times per week  OT Discharge Recommendations: Moderate intensity level of continued care (if patient refuses, would recommend at least 24/7 supervision/assist and low intensity OT due to being a high falls risk.)  OT - OK to Discharge: Yes  Treatment Interventions: ADL retraining, Functional transfer training, Endurance training, Patient/family training    Subjective   Current Problem:  1. Pneumonia of left lower lobe due to infectious organism  Referral to Home Health      2. Chronic heart failure with preserved ejection fraction (CMS/HCC)  Transthoracic Echo (TTE) Complete    Transthoracic Echo (TTE) Complete    Referral to Home Health      3. Hemophilic arthritis  Referral to Home Health      4. Factor VIII deficiency (CMS/HCC)  Referral to Home Health      5. Primary osteoarthritis of right knee  Referral to Home Health      6. Physical debility  Referral to Home Health      7. Neuropathy  Referral to Home Health      8. Lumbar degenerative disc disease  Referral to Home Health        General:   OT Received On: 24  General  Reason for Referral: presents to the ED with 2x days SOB and hematuria  Past  Medical History Relevant to Rehab: Hemophilia A, Protein C deficiency, HTN, Afib, HFpEF, HLD, nephrolithiasis, CAD s/p CABG (2002), BPH, and R fem neck fx s/p THR 1/13/23)  Family/Caregiver Present: No  Prior to Session Communication: Bedside nurse  Patient Position Received: Bed, 3 rail up, Alarm on  Preferred Learning Style: verbal, visual  Precautions:  Medical Precautions: Fall precautions     Pain:  Pain Assessment  Pain Assessment: 0-10  Pain Score: 0 - No pain    Objective   Cognition:  Overall Cognitive Status: Within Functional Limits           Home Living:  Type of Home: House  Lives With: Alone (3 home health aides that come daily from 9a-6p)  Home Adaptive Equipment: Walker rolling or standard, Wheelchair-manual (tub transfer bench, canes)  Home Layout:  (split level house, chair lift in house for the 2nd floor)  Home Access: Stairs to enter with rails  Entrance Stairs-Number of Steps: 2 (ramp available)  Bathroom Shower/Tub: Tub/shower unit  Bathroom Equipment: Grab bars in shower, Tub transfer bench  Prior Function:  ADL Assistance: Needs assistance (HHA assists with showering)  Homemaking Assistance: Needs assistance (meals on wheels)  Ambulatory Assistance:  (pt reports able to ambulate with wheeled walker short distances, pt has 2 wheelchairs in the house; one on the 1st level and second on the 2nd level)  IADL History:  IADL Comments:  (+driving)  ADL:  LE Dressing Assistance: Minimal  LE Dressing Deficit: Don/doff R shoe, Don/doff L shoe (seated EOB, pt performed cross leg tech to juliette/doff shoes)     Bed Mobility/Transfers: Bed Mobility  Bed Mobility: Yes  Bed Mobility 1  Bed Mobility 1: Supine to sitting, Sitting to supine  Level of Assistance 1: Minimum assistance (x1)  Bed Mobility Comments 1:  (increased time/effort to complete)    Transfers  Transfer: Yes  Transfer 1  Technique 1: Sit to stand, Stand to sit  Transfer Device 1: Walker  Transfer Level of Assistance 1: Maximum assistance  (x1)  Trials/Comments 1:  (pt completed ~75% of stand, only able to stand for ~5 seconds)    Sitting Balance:  Static Sitting Balance  Static Sitting-Level of Assistance: Close supervision  Standing Balance:  Static Standing Balance  Static Standing-Level of Assistance: Maximum assistance (x1)  Static Standing-Comment/Number of Minutes:  (wheeled walker, ~75% of stand completed)    Strength:  Strength Comments:  ((B) UE ~3/5 in all planes)      Outcome Measures: [facility] Daily Activity  Putting on and taking off regular lower body clothing: A lot  Bathing (including washing, rinsing, drying): A lot  Putting on and taking off regular upper body clothing: A little  Toileting, which includes using toilet, bedpan or urinal: A lot  Taking care of personal grooming such as brushing teeth: A little  Eating Meals: A little  Daily Activity - Total Score: 15   and Brief Confusion Assessment Method (bCAM)  CAM Result: CAM -    Education Documentation  Precautions, taught by Nida Velasco OT at 3/20/2024  4:16 PM.  Learner: Patient  Readiness: Acceptance  Method: Explanation  Response: Verbalizes Understanding    ADL Training, taught by Nida Velasco OT at 3/20/2024  4:16 PM.  Learner: Patient  Readiness: Acceptance  Method: Explanation  Response: Verbalizes Understanding    Education Comments  No comments found.           Goals:  Encounter Problems       Encounter Problems (Active)       ADLs       Pt will be (S) juliette/doffing pants/shoes at chair level       Start:  03/20/24    Expected End:  04/03/24               BALANCE       Pt will be (S) standing at the sink w LRD to complete grooming ADLs       Start:  03/20/24    Expected End:  04/03/24               MOBILITY       Pt will be (S) ambulating to/from bathroom w LRD to complete toileting ADLs       Start:  03/20/24    Expected End:  04/03/24

## 2024-03-20 NOTE — CARE PLAN
The patient's goals for the shift include      The clinical goals for the shift include patient will remain safe and free from injury thiss shift 3/20/24 0700      Problem: Pain  Goal: My pain/discomfort is manageable  Outcome: Progressing     Problem: Safety  Goal: Patient will be injury free during hospitalization  Outcome: Progressing

## 2024-03-20 NOTE — CARE PLAN
The patient's goals for the shift include      The clinical goals for the shift include pt will remain safe and free from falls and injuries throughout shift.      Problem: Pain  Goal: My pain/discomfort is manageable  Outcome: Progressing     Problem: Safety  Goal: Patient will be injury free during hospitalization  Outcome: Progressing  Goal: I will remain free of falls  Outcome: Progressing     Problem: Daily Care  Goal: Daily care needs are met  Outcome: Progressing     Problem: Psychosocial Needs  Goal: Demonstrates ability to cope with hospitalization/illness  Outcome: Progressing  Goal: Collaborate with me, my family, and caregiver to identify my specific goals  Outcome: Progressing     Problem: Discharge Barriers  Goal: My discharge needs are met  Outcome: Progressing

## 2024-03-20 NOTE — PROGRESS NOTES
03/20/24 1200   Discharge Planning   Living Arrangements Alone   Support Systems Family members;Friends/neighbors;Home care staff   Assistance Needed yes   Type of Residence Private residence   Home or Post Acute Services In home services   Type of Home Care Services Home health aide;Home nursing visits;Home PT   Patient expects to be discharged to: home   Does the patient need discharge transport arranged? Yes   RoundTrip coordination needed? No       3/20/24 @ 1250  TCC Note    Plan per Medical/Surgical Team: Heme consulted, recommended Advate Q12 with daily factor VIII levels   Status: Inpatient  Payor Source: Medicare   Discharge disposition: home with home care services  Expected date of discharge: 3/23  Barriers: none  Preferred home care agency: [facility]    Spoke with patient at bedside. He is a Holocaust survivor. He lives in a private residence with 4 floors. No bathroom on the first floor, so he has a lift chair to take him up to the second floor bathroom. He already has home care services. Spoke with [employer]. Patient is active with them for HHA and skilled needs- RN/PT. Referral started in [facility]. He gets meals on wheels. His point of contact is his nephew, who helps with buying food and whatever the patient needs.  He has a cane and walker at home. Will continue to follow patient for any discharge needs.   Cesilia Gomez RN TCC

## 2024-03-21 ENCOUNTER — APPOINTMENT (OUTPATIENT)
Dept: RADIOLOGY | Facility: HOSPITAL | Age: 89
DRG: 193 | End: 2024-03-21
Payer: MEDICARE

## 2024-03-21 LAB
ALBUMIN SERPL BCP-MCNC: 4.2 G/DL (ref 3.4–5)
ALP SERPL-CCNC: 66 U/L (ref 33–136)
ALT SERPL W P-5'-P-CCNC: 20 U/L (ref 10–52)
ANION GAP SERPL CALC-SCNC: 15 MMOL/L (ref 10–20)
APPEARANCE UR: ABNORMAL
AST SERPL W P-5'-P-CCNC: 22 U/L (ref 9–39)
BACTERIA #/AREA URNS AUTO: ABNORMAL /HPF
BACTERIA UR CULT: ABNORMAL
BASOPHILS # BLD AUTO: 0.03 X10*3/UL (ref 0–0.1)
BASOPHILS NFR BLD AUTO: 0.6 %
BILIRUB SERPL-MCNC: 1.1 MG/DL (ref 0–1.2)
BILIRUB UR STRIP.AUTO-MCNC: NEGATIVE MG/DL
BUN SERPL-MCNC: 21 MG/DL (ref 6–23)
CALCIUM SERPL-MCNC: 8.5 MG/DL (ref 8.6–10.6)
CHLORIDE SERPL-SCNC: 104 MMOL/L (ref 98–107)
CHLORIDE UR-SCNC: 53 MMOL/L
CHLORIDE/CREATININE (MMOL/G) IN URINE: 49 MMOL/G CREAT (ref 23–275)
CO2 SERPL-SCNC: 25 MMOL/L (ref 21–32)
COLOR UR: YELLOW
CREAT SERPL-MCNC: 1.35 MG/DL (ref 0.5–1.3)
CREAT UR-MCNC: 107.5 MG/DL (ref 20–370)
EGFRCR SERPLBLD CKD-EPI 2021: 48 ML/MIN/1.73M*2
EOSINOPHIL # BLD AUTO: 0.16 X10*3/UL (ref 0–0.4)
EOSINOPHIL NFR BLD AUTO: 3.4 %
ERYTHROCYTE [DISTWIDTH] IN BLOOD BY AUTOMATED COUNT: 14.2 % (ref 11.5–14.5)
FACT VIII ACT/NOR PPP: 53 % (ref 55–180)
FACT VIII ACT/NOR PPP: 74 % (ref 55–180)
GLUCOSE SERPL-MCNC: 95 MG/DL (ref 74–99)
GLUCOSE UR STRIP.AUTO-MCNC: NORMAL MG/DL
HCT VFR BLD AUTO: 37 % (ref 41–52)
HGB BLD-MCNC: 12.5 G/DL (ref 13.5–17.5)
IMM GRANULOCYTES # BLD AUTO: 0 X10*3/UL (ref 0–0.5)
IMM GRANULOCYTES NFR BLD AUTO: 0 % (ref 0–0.9)
KETONES UR STRIP.AUTO-MCNC: NEGATIVE MG/DL
LEUKOCYTE ESTERASE UR QL STRIP.AUTO: ABNORMAL
LYMPHOCYTES # BLD AUTO: 2.33 X10*3/UL (ref 0.8–3)
LYMPHOCYTES NFR BLD AUTO: 49.3 %
MAGNESIUM SERPL-MCNC: 1.72 MG/DL (ref 1.6–2.4)
MCH RBC QN AUTO: 34 PG (ref 26–34)
MCHC RBC AUTO-ENTMCNC: 33.8 G/DL (ref 32–36)
MCV RBC AUTO: 101 FL (ref 80–100)
MONOCYTES # BLD AUTO: 0.44 X10*3/UL (ref 0.05–0.8)
MONOCYTES NFR BLD AUTO: 9.3 %
MUCOUS THREADS #/AREA URNS AUTO: ABNORMAL /LPF
NEUTROPHILS # BLD AUTO: 1.77 X10*3/UL (ref 1.6–5.5)
NEUTROPHILS NFR BLD AUTO: 37.4 %
NITRITE UR QL STRIP.AUTO: NEGATIVE
NRBC BLD-RTO: 0 /100 WBCS (ref 0–0)
PH UR STRIP.AUTO: 6 [PH]
PLATELET # BLD AUTO: 123 X10*3/UL (ref 150–450)
POTASSIUM SERPL-SCNC: 3.8 MMOL/L (ref 3.5–5.3)
POTASSIUM UR-SCNC: 38 MMOL/L
POTASSIUM/CREAT UR-RTO: 35 MMOL/G CREAT
PROT SERPL-MCNC: 6.4 G/DL (ref 6.4–8.2)
PROT UR STRIP.AUTO-MCNC: ABNORMAL MG/DL
RBC # BLD AUTO: 3.68 X10*6/UL (ref 4.5–5.9)
RBC # UR STRIP.AUTO: NEGATIVE /UL
RBC #/AREA URNS AUTO: ABNORMAL /HPF
SODIUM SERPL-SCNC: 140 MMOL/L (ref 136–145)
SODIUM UR-SCNC: 89 MMOL/L
SODIUM/CREAT UR-RTO: 83 MMOL/G CREAT
SP GR UR STRIP.AUTO: 1.02
UROBILINOGEN UR STRIP.AUTO-MCNC: NORMAL MG/DL
WBC # BLD AUTO: 4.7 X10*3/UL (ref 4.4–11.3)
WBC #/AREA URNS AUTO: >50 /HPF
WBC CLUMPS #/AREA URNS AUTO: ABNORMAL /HPF

## 2024-03-21 PROCEDURE — 97530 THERAPEUTIC ACTIVITIES: CPT | Mod: GP

## 2024-03-21 PROCEDURE — 76770 US EXAM ABDO BACK WALL COMP: CPT

## 2024-03-21 PROCEDURE — 80053 COMPREHEN METABOLIC PANEL: CPT

## 2024-03-21 PROCEDURE — 84300 ASSAY OF URINE SODIUM: CPT

## 2024-03-21 PROCEDURE — 81003 URINALYSIS AUTO W/O SCOPE: CPT

## 2024-03-21 PROCEDURE — 83735 ASSAY OF MAGNESIUM: CPT

## 2024-03-21 PROCEDURE — 85240 CLOT FACTOR VIII AHG 1 STAGE: CPT | Performed by: INTERNAL MEDICINE

## 2024-03-21 PROCEDURE — 76770 US EXAM ABDO BACK WALL COMP: CPT | Performed by: STUDENT IN AN ORGANIZED HEALTH CARE EDUCATION/TRAINING PROGRAM

## 2024-03-21 PROCEDURE — 2500000002 HC RX 250 W HCPCS SELF ADMINISTERED DRUGS (ALT 637 FOR MEDICARE OP, ALT 636 FOR OP/ED)

## 2024-03-21 PROCEDURE — 87086 URINE CULTURE/COLONY COUNT: CPT

## 2024-03-21 PROCEDURE — 36415 COLL VENOUS BLD VENIPUNCTURE: CPT

## 2024-03-21 PROCEDURE — 1170000001 HC PRIVATE ONCOLOGY ROOM DAILY

## 2024-03-21 PROCEDURE — 97162 PT EVAL MOD COMPLEX 30 MIN: CPT | Mod: GP

## 2024-03-21 PROCEDURE — 2500000004 HC RX 250 GENERAL PHARMACY W/ HCPCS (ALT 636 FOR OP/ED)

## 2024-03-21 PROCEDURE — 99233 SBSQ HOSP IP/OBS HIGH 50: CPT

## 2024-03-21 PROCEDURE — 2500000001 HC RX 250 WO HCPCS SELF ADMINISTERED DRUGS (ALT 637 FOR MEDICARE OP)

## 2024-03-21 PROCEDURE — 85025 COMPLETE CBC W/AUTO DIFF WBC: CPT

## 2024-03-21 RX ORDER — LEVOFLOXACIN 500 MG/1
500 TABLET, FILM COATED ORAL
Status: DISCONTINUED | OUTPATIENT
Start: 2024-03-21 | End: 2024-03-21

## 2024-03-21 RX ORDER — VANCOMYCIN HYDROCHLORIDE 1 G/200ML
1000 INJECTION, SOLUTION INTRAVENOUS ONCE
Status: COMPLETED | OUTPATIENT
Start: 2024-03-21 | End: 2024-03-21

## 2024-03-21 RX ORDER — VANCOMYCIN HYDROCHLORIDE 1 G/20ML
INJECTION, POWDER, LYOPHILIZED, FOR SOLUTION INTRAVENOUS DAILY PRN
Status: DISCONTINUED | OUTPATIENT
Start: 2024-03-21 | End: 2024-03-23 | Stop reason: ALTCHOICE

## 2024-03-21 RX ORDER — LEVOFLOXACIN 250 MG/1
250 TABLET ORAL
Status: DISCONTINUED | OUTPATIENT
Start: 2024-03-21 | End: 2024-03-21

## 2024-03-21 RX ADMIN — SODIUM CHLORIDE, POTASSIUM CHLORIDE, SODIUM LACTATE AND CALCIUM CHLORIDE 500 ML: 600; 310; 30; 20 INJECTION, SOLUTION INTRAVENOUS at 11:41

## 2024-03-21 RX ADMIN — FINASTERIDE 5 MG: 5 TABLET, FILM COATED ORAL at 08:41

## 2024-03-21 RX ADMIN — TAMSULOSIN HYDROCHLORIDE 0.4 MG: 0.4 CAPSULE ORAL at 21:11

## 2024-03-21 RX ADMIN — SPIRONOLACTONE 25 MG: 25 TABLET, FILM COATED ORAL at 08:41

## 2024-03-21 RX ADMIN — BUMETANIDE 1 MG: 1 TABLET ORAL at 08:41

## 2024-03-21 RX ADMIN — METOPROLOL SUCCINATE 25 MG: 25 TABLET, EXTENDED RELEASE ORAL at 08:41

## 2024-03-21 RX ADMIN — VANCOMYCIN HYDROCHLORIDE 1000 MG: 1 INJECTION, SOLUTION INTRAVENOUS at 13:32

## 2024-03-21 RX ADMIN — Medication 1 TABLET: at 08:41

## 2024-03-21 RX ADMIN — TAMSULOSIN HYDROCHLORIDE 0.4 MG: 0.4 CAPSULE ORAL at 08:41

## 2024-03-21 RX ADMIN — FERROUS SULFATE TAB 325 MG (65 MG ELEMENTAL FE) 1 TABLET: 325 (65 FE) TAB at 08:41

## 2024-03-21 RX ADMIN — ATORVASTATIN CALCIUM 40 MG: 40 TABLET, FILM COATED ORAL at 08:41

## 2024-03-21 RX ADMIN — FOLIC ACID 1 MG: 1 TABLET ORAL at 08:41

## 2024-03-21 RX ADMIN — AZITHROMYCIN MONOHYDRATE 500 MG: 500 INJECTION, POWDER, LYOPHILIZED, FOR SOLUTION INTRAVENOUS at 10:00

## 2024-03-21 RX ADMIN — PANTOPRAZOLE SODIUM 40 MG: 40 TABLET, DELAYED RELEASE ORAL at 06:23

## 2024-03-21 RX ADMIN — ACETAMINOPHEN 975 MG: 325 TABLET ORAL at 06:23

## 2024-03-21 ASSESSMENT — COGNITIVE AND FUNCTIONAL STATUS - GENERAL
DRESSING REGULAR LOWER BODY CLOTHING: A LITTLE
TURNING FROM BACK TO SIDE WHILE IN FLAT BAD: A LITTLE
MOBILITY SCORE: 17
CLIMB 3 TO 5 STEPS WITH RAILING: TOTAL
WALKING IN HOSPITAL ROOM: A LOT
DAILY ACTIVITIY SCORE: 20
HELP NEEDED FOR BATHING: A LITTLE
STANDING UP FROM CHAIR USING ARMS: A LITTLE
MOVING TO AND FROM BED TO CHAIR: A LITTLE
TOILETING: A LITTLE
CLIMB 3 TO 5 STEPS WITH RAILING: TOTAL
WALKING IN HOSPITAL ROOM: A LITTLE
MOVING FROM LYING ON BACK TO SITTING ON SIDE OF FLAT BED WITH BEDRAILS: A LITTLE
MOVING TO AND FROM BED TO CHAIR: A LITTLE
DRESSING REGULAR UPPER BODY CLOTHING: A LITTLE
STANDING UP FROM CHAIR USING ARMS: A LITTLE
MOBILITY SCORE: 16

## 2024-03-21 ASSESSMENT — PAIN - FUNCTIONAL ASSESSMENT
PAIN_FUNCTIONAL_ASSESSMENT: 0-10
PAIN_FUNCTIONAL_ASSESSMENT: 0-10

## 2024-03-21 ASSESSMENT — PAIN SCALES - GENERAL
PAINLEVEL_OUTOF10: 0 - NO PAIN
PAINLEVEL_OUTOF10: 0 - NO PAIN

## 2024-03-21 NOTE — PROGRESS NOTES
3/21/24 1:40pm  ADOD 3/22/24 with the assistance of [facility] family support services as prior to admission. Pt to continue with home nursing, aide,pt/ot. No DME required at this time.  Azeb Valdez LPN

## 2024-03-21 NOTE — PROGRESS NOTES
Ramón Flores is a 89 y.o. male on day 2 of admission presenting with Pneumonia of left lower lobe due to infectious organism.    Subjective   Seen at bedside this morning. Continues to deny hematuria, fevers, flank pain, or dysuria. Patient does not have new complaints today denies pain. Discussed discontinuing tylenol to monitor for fevers. We also discussed getting a repeat urine culture today. He was agreeable to this plan. We discussed working with PT/OT today. He had no further questions. Denies any other concerns for bleeding, bruising, chest pain, light-headedness.     Objective     Physical Exam  Vitals reviewed.   Constitutional:       Appearance: Normal appearance.   HENT:      Head: Normocephalic and atraumatic.      Nose: Nose normal.      Mouth/Throat:      Mouth: Mucous membranes are moist.      Pharynx: Oropharynx is clear.   Eyes:      Extraocular Movements: Extraocular movements intact.      Pupils: Pupils are equal, round, and reactive to light.   Cardiovascular:      Rate and Rhythm: Normal rate and regular rhythm.      Pulses: Normal pulses.      Heart sounds: Normal heart sounds.   Pulmonary:      Effort: Pulmonary effort is normal.      Breath sounds: Normal breath sounds.   Abdominal:      General: Bowel sounds are normal.      Palpations: Abdomen is soft.   Musculoskeletal:         General: Normal range of motion.   Skin:     General: Skin is warm.   Neurological:      General: No focal deficit present.      Mental Status: He is alert and oriented to person, place, and time. Mental status is at baseline.   Psychiatric:         Mood and Affect: Mood normal.         Behavior: Behavior normal.         Last Recorded Vitals  Blood pressure 138/81, pulse 59, temperature 36.5 °C (97.7 °F), temperature source Temporal, resp. rate 16, weight 63.5 kg (140 lb), SpO2 98 %.  Intake/Output last 3 Shifts:  I/O last 3 completed shifts:  In: 1150 (18.1 mL/kg) [P.O.:300; IV Piggyback:850]  Out: 3400 (53.5  mL/kg) [Urine:3400 (1.5 mL/kg/hr)]  Weight: 63.5 kg     Relevant Results  Scheduled medications  atorvastatin, 40 mg, oral, Daily  bumetanide, 1 mg, oral, Daily  ferrous sulfate (325 mg ferrous sulfate), 65 mg of iron, oral, Daily  finasteride, 5 mg, oral, Daily  folic acid, 1 mg, oral, Daily  lactated Ringer's, 500 mL, intravenous, Once  levoFLOXacin, 250 mg, oral, q24h KELLY  metoprolol succinate XL, 25 mg, oral, Daily  multivitamin with minerals, 1 tablet, oral, Daily  oxygen, , inhalation, Continuous - 02/gases  pantoprazole, 40 mg, oral, Daily before breakfast  spironolactone, 25 mg, oral, Daily  tamsulosin, 0.4 mg, oral, BID    Continuous medications     PRN medications         Transthoracic Echo (TTE) Complete    Result Date: 3/20/2024  PHYSICIAN INTERPRETATION: Left Ventricle: The left ventricular systolic function is mildly decreased, with an estimated ejection fraction of 50%. There is global hypokinesis of the left ventricle with minor regional variations. The left ventricular cavity size is normal. Left ventricular diastolic filling was not assessed. Left Atrium: The left atrium is moderately dilated. Right Ventricle: The right ventricle is normal in size. There is mildly reduced right ventricular systolic function. Right Atrium: The right atrium is normal in size. Aortic Valve: The aortic valve is trileaflet. There is minimal aortic valve cusp calcification. There is mild aortic valve thickening. There is no evidence of aortic valve regurgitation. The peak instantaneous gradient of the aortic valve is 3.8 mmHg. The mean gradient of the aortic valve is 2.0 mmHg. Mitral Valve: The mitral valve is normal in structure. There is trace mitral valve regurgitation. Tricuspid Valve: The tricuspid valve is structurally normal. There is mild to moderate tricuspid regurgitation. The Doppler estimated RVSP is mildly elevated at 34.8 mmHg. Pulmonic Valve: The pulmonic valve is structurally normal. There is trace  pulmonic valve regurgitation. Pericardium: There is no pericardial effusion noted. Aorta: The aortic root is normal. Systemic Veins: The inferior vena cava appears dilated. There is IVC inspiratory collapse greater than 50%. The hepatic vein shows a normal flow pattern. In comparison to the previous echocardiogram(s): Compared with study from 2/24/2023, the LVEF appears reduced (50%) when compared with the prior assessment (60%). Also, TR appears mild to moderate on today's study compared to trace.  CONCLUSIONS:  1. Left ventricular systolic function is mildly decreased with a 50% estimated ejection fraction.  2. There is global hypokinesis of the left ventricle with minor regional variations.  3. There is mildly reduced right ventricular systolic function.  4. Mild to moderate tricuspid regurgitation visualized.  5. Mildly elevated RVSP.  6. The left atrium is moderately dilated.  7. Compared with study from 2/24/2023, the LVEF appears reduced (50%) when compared with the prior assessment (60%). Also, TR appears mild to moderate on today's study compared to trace.       Assessment/Plan   Principal Problem:    Pneumonia of left lower lobe due to infectious organism  Active Problems:    Hemophilia A (CMS/HCC)    Hyperlipidemia    Hypertension    (HFpEF) heart failure with preserved ejection fraction (CMS/HCC)    Atrial fibrillation, chronic (CMS/HCC)    Benign prostatic hyperplasia without lower urinary tract symptoms    Ramón Flores is a 89 y.o. 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 In the ED, CXR (3/19) showed small bibasilar pleural effusions and atelectasis, L>R, and superimposed left basilar developing consolidation not excluded. Cardiac US (3/19) negative for a significant pericardial effusion, LV systolic function decreased, RV size  normal. Patient started on Azithromycin and Ceftriaxone (3/19-3/21) for CAP and admitted for further management. Heme consulted (3/19), s/p Advate x2 (3/19) and planned weekly Altuviiio (3/20) with daily factor VIII levels. UA (3/19) +blood, nitrites, leukocytes; urine culture (3/20) prelim positive S. Aureus. Final urine culture positive MRSA (3/21), discontinued ceftriaxone/azithromycin and started vancomycin (3/21- ). Denies recurrent hematuria since admission, denies flank pain, recent urinary catheters, fevers, or dysuria. Repeat urine culture pending. Developed new RONNIE, sCr 1.39 3/20, s/p LR bolus, will continue to monitor. Renal US and urine lytes pending. Discharge home with continued home care pending improvement of RONNIE and final cultures.        # CAP  # MRSA UTI   - Presented to ED with 2x days of SOB, dry cough, hematuria   - CXR (3/19) small bibasilar pleural effusions and atelectasis, L>R, superimposed left basilar developing consolidation not excluded  - Cardiac US POC (3/19) the pericardial space was visualized and was negative for a significant pericardial effusion, LV systolic function decreased, RV size was normal  - Flu A/B, RSV, and COVID negative (3/19)  - Urine legionella/strep pneumo negative (3/19)  - No leukocytosis, WBC 4.7 (3/21)  - Initially on 2L, now weaned to RA (3/20)  - UA (3/19) +blood, nitrites, leukocytes (pt already on antibiotics), Urine culture (3/20) positive MRSA   - Blood cultures (3/20) NGTD   - Denies recurrent hematuria since admission, denies flank pain, recent urinary catheters, fevers, or dysuria  - Repeat urine culture pending (3/21)   - S/p cefepime x1 dose (3/20) and s/p Azithromycin (3/19-3/21)  - Started on Vancomycin (3/21- )     # RONNIE   # BPH   - Developed mild RONNIE 3/20, sCr 1.39 --> 1.35 (3/21)   - s/p 500 LR bolus (3/20 & 3/21)  - Urinating okay, bladder scans BID   - urine lytes pending   - Renal US pending   - continue home Flomax 0.4mg bid  - continue home  Finasteride 5mg daily  - Strict I&Os      # Hemophilia A, Protein C deficiency  # Hematuria  - Follows with Dr. Dillon  - FVIII activity 4% (3/19), on prophylactic altuviiio weekly on Tuesdays  - Last factor infusion with altuviiio 3/12, home infusion due 3/19  - Hgb 12.4 (baseline 10-11) (3/19)  - Plt 106 (baseline 100-200) (3/19)  - UA (3/19) +blood, nitrites, leukocytes (pt already on antibiotics), urine culture (3/20) prelim positive S. Aureus.  - Patient requires factor for any bleeding, or prior to any procedure   - Heme consulted on admit (3/19), rec start antihemophilic factor rAHF-PFM (Advate) q12 until altuviiio 50u/kg infusion was in stock from pharmacy. To obtain peak factor 8 activity level 1 hr post infusion, along with daily factor 8 levels  - s/p Advate x2 (3/19)  - s/p Altuviiio (3/20)  - Factor VIII level 53 (3/21)   - continue home folic acid 1mg daily  - continue home ferrous sulfate 325mg daily  - continue home multivitamin daily     # HTN/ Afib/ HFpEF/ HLD  - Last ECHO (2/24/23): EF 60-65%  - Cardiac US (3/19) negative for a significant pericardial effusion, LV systolic function decreased, RV size normal  - ECHO 3/20, EF 50%   - BNP (3/19): 589 (-600)  - Trop (3/19): 102; hx elevated trops consistent with pt's baseline  - Admission EKG (3/19) rate controlled afib  - s/p one dose Lasix 20mg administered in ED  - continue home Metoprolol XL 25mg daily  - continue home Spironolactone 25mg daily  - continue home Lipitor 40mg daily  - continue home Bumex 1mg daily (ordered scheduled, pt takes PRN at home)     #DVT prophy   - SCDs only, no pharmacologic prophy I/s/o hemophilia, encourage ambulation     #DISPO:  - DNR/DNI (ICU ok)  - NOK: David Wallace (nephew) #[phone]  - DC pending improvement of respiratory symptoms and hematologic stability        I spent >60 minutes in the professional and overall care of this patient.    Assessment and plan discussed with attending physician   Homeshyam Llamas, APRN-CNP

## 2024-03-21 NOTE — CARE PLAN
The patient's goals for the shift include      The clinical goals for the shift include pt will remain safe and free from injury and falls throughout shift      Problem: Pain  Goal: My pain/discomfort is manageable  3/21/2024 1135 by Josh Pizarro RN  Outcome: Progressing  3/21/2024 1134 by Josh Pizarro RN  Outcome: Progressing  3/21/2024 0724 by Josh Pizarro RN  Outcome: Progressing     Problem: Safety  Goal: Patient will be injury free during hospitalization  3/21/2024 1135 by Josh Pizarro RN  Outcome: Progressing  3/21/2024 1134 by Josh Pizarro RN  Outcome: Progressing  3/21/2024 0724 by Josh Pizarro RN  Outcome: Progressing  Goal: I will remain free of falls  3/21/2024 1135 by Josh Pizarro RN  Outcome: Progressing  3/21/2024 1134 by Josh Pizarro RN  Outcome: Progressing  3/21/2024 0724 by Josh Pizarro RN  Outcome: Progressing     Problem: Daily Care  Goal: Daily care needs are met  3/21/2024 1135 by Josh Pizarro RN  Outcome: Progressing  3/21/2024 1134 by Josh Pizarro RN  Outcome: Progressing  3/21/2024 0724 by Josh Pizarro RN  Outcome: Progressing     Problem: Psychosocial Needs  Goal: Demonstrates ability to cope with hospitalization/illness  3/21/2024 1135 by Josh Pizarro RN  Outcome: Progressing  3/21/2024 1134 by Josh Pizarro RN  Outcome: Progressing  3/21/2024 0724 by Josh Pizarro RN  Outcome: Progressing  Goal: Collaborate with me, my family, and caregiver to identify my specific goals  3/21/2024 1135 by Josh Pizarro RN  Outcome: Progressing  3/21/2024 1134 by Josh Pizarro RN  Outcome: Progressing  3/21/2024 0724 by Josh Pizarro RN  Outcome: Progressing     Problem: Discharge Barriers  Goal: My discharge needs are met  3/21/2024 1135 by Josh Pizarro RN  Outcome: Progressing  3/21/2024 1134 by Josh Pizarro RN  Outcome: Progressing  3/21/2024 0724 by Josh Pizarro RN  Outcome: Progressing

## 2024-03-21 NOTE — CARE PLAN
Problem: Pain  Goal: My pain/discomfort is manageable  Outcome: Progressing     Problem: Safety  Goal: Patient will be injury free during hospitalization  Outcome: Progressing  Goal: I will remain free of falls  Outcome: Progressing     Problem: Daily Care  Goal: Daily care needs are met  Outcome: Progressing     Problem: Psychosocial Needs  Goal: Demonstrates ability to cope with hospitalization/illness  Outcome: Progressing  Goal: Collaborate with me, my family, and caregiver to identify my specific goals  Outcome: Progressing     Problem: Discharge Barriers  Goal: My discharge needs are met  Outcome: Progressing       The clinical goals for the shift include pt will remain VSS throughout shift    Over the shift, the patient remained safe and free from injury. Had no complaints of pain. Remained VSS throughout shift. No acute events overnight

## 2024-03-21 NOTE — PROGRESS NOTES
Physical Therapy    Physical Therapy Evaluation & Treatment    Patient Name: Ramón Flores  MRN: [id number]  Today's Date: 3/21/2024   Time Calculation  Start Time: 0901  Stop Time: 0930  Time Calculation (min): 29 min    Assessment/Plan   PT Assessment  PT Assessment Results: Decreased strength, Decreased range of motion, Decreased endurance, Decreased mobility, Impaired balance  Rehab Prognosis: Good  Medical Staff Made Aware: Yes  End of Session Communication: Bedside nurse  Assessment Comment: Patient is a 89yo M presenting with SOB. Patient has assist at home from aides each day but no overnight coverage. Patient currently min A for transfers and ambulation in room. Patient is near baseline, dc low with rec to increase hours of HHA to further assist after 6pm.  End of Session Patient Position: Bed, 3 rail up, Alarm on (chair position; pt room originally had no chair, PT brought chair into room for pt but declined getting back up at this time.)   IP OR SWING BED PT PLAN  Inpatient or Swing Bed: Inpatient  PT Plan  Treatment/Interventions: Bed mobility, Transfer training, Gait training, Stair training, Balance training, Endurance training, Strengthening, Range of motion, Therapeutic exercise, Therapeutic activity  PT Plan: Skilled PT  PT Frequency: 3 times per week  PT Discharge Recommendations: Low intensity level of continued care  PT - OK to Discharge: Yes (indicates PT eval complete and dc rec determined)      Subjective     General Visit Information:  General  Reason for Referral: SOB  Past Medical History Relevant to Rehab: Hemophilia A, Protein C deficiency, HTN, Afib, HFpEF, HLD, nephrolithiasis, CAD s/p CABG (2002), BPH, and 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 very pleasant and cooperative  Home Living:  Home Living  Type of Home: House  Lives With:  (HHA daily  9am-6pm.)  Home Adaptive Equipment: Walker rolling or standard, Wheelchair-manual, Cane (tub bench, 3 wheelchairs)  Home Layout: Multi-level (split level house, chair lift in house for the 2nd floor)  Home Access: Ramped entrance (2 BALBIR but uses ramp)  Prior Level of Function:  Prior Function Per Pt/Caregiver Report  Prior Function Comments: Patient has HHA assists with bathing and meals, has meals on wheels 2 days week. Patient reports using FWW to ambulate short distances around his home. otherwise uses wheelchair  Precautions:  Precautions  Medical Precautions: Fall precautions      Objective   Pain:  Pain Assessment  Pain Assessment: 0-10  Pain Score: 0 - No pain  Cognition:  Cognition  Overall Cognitive Status: Within Functional Limits    General Assessments:     Activity Tolerance  Endurance: Tolerates 10 - 20 min exercise with multiple rests       Static Sitting Balance  Static Sitting-Level of Assistance: Close supervision  Static Sitting-Comment/Number of Minutes: 15  Functional Assessments:  Bed Mobility  Bed Mobility: Yes  Bed Mobility 1  Bed Mobility 1: Supine to sitting, Sitting to supine  Level of Assistance 1: Contact guard  Bed Mobility Comments 1: HOB elevated slightly    Transfers  Transfer: Yes  Transfer 1  Transfer From 1: Sit to, Stand to  Transfer to 1: Stand, Sit  Technique 1: Sit to stand, Stand to sit  Transfer Device 1: Walker  Transfer Level of Assistance 1: Minimum assistance  Trials/Comments 1: bed height elevated to assist. Patient stood from EOB with cues for hand placement with FWW.    Ambulation/Gait Training  Ambulation/Gait Training Performed: Yes  Ambulation/Gait Training 1  Surface 1: Level tile  Device 1: Rolling walker  Assistance 1: Minimum assistance  Quality of Gait 1: Decreased step length, Diminished heel strike (decreased jese,)  Comments/Distance (ft) 1: pt ambulated laterally L/R along EOB and then 10' x2 with FWW in room  Extremity/Trunk Assessments:  RLE   RLE :   (limited ROM and unable to complete full knee extension. but > 3/5 strength)  LLE   LLE :  (>4/5)  Treatments:  Therapeutic Exercise  Therapeutic Exercise Performed: Yes  Therapeutic Exercise Activity 1: sitting EOB ankle pumps, LAQs x10 BLE    Therapeutic Activity  Therapeutic Activity Performed: Yes  Therapeutic Activity 1: pt required increased time to complete. pt complete functional mobility with cues. stood from EOB with FWW, able to complete lateral steps both L/R and then ambulated in room.  Outcome Measures:  [facility] Basic Mobility  Turning from your back to your side while in a flat bed without using bedrails: A little  Moving from lying on your back to sitting on the side of a flat bed without using bedrails: A little  Moving to and from bed to chair (including a wheelchair): A little  Standing up from a chair using your arms (e.g. wheelchair or bedside chair): A little  To walk in hospital room: A little  Climbing 3-5 steps with railing: Total  Basic Mobility - Total Score: 16    Encounter Problems       Encounter Problems (Active)       Mobility       STG - Patient will ambulate > 100' with LRAD SBA (Progressing)       Start:  03/21/24    Expected End:  04/11/24               PT Transfers       STG - Patient will perform bed mobility indep (Progressing)       Start:  03/21/24    Expected End:  04/11/24            STG - Patient will transfer sit to and from stand modif indep LRAD (Progressing)       Start:  03/21/24    Expected End:  04/11/24                   Education Documentation  Precautions, taught by Juliann Castano PT at 3/21/2024  9:58 AM.  Learner: Patient  Readiness: Acceptance  Method: Explanation  Response: Verbalizes Understanding  Comment: edu on role of PT, dc planning and OOB for meals    Mobility Training, taught by Juliann Castano PT at 3/21/2024  9:58 AM.  Learner: Patient  Readiness: Acceptance  Method: Explanation  Response: Verbalizes Understanding  Comment: edu on role of PT, dc  planning and OOB for meals    Education Comments  No comments found.

## 2024-03-21 NOTE — CONSULTS
"Vancomycin Dosing by Pharmacy- INITIAL    Ramón Flores is a 89 y.o. year old male who Pharmacy has been consulted for vancomycin dosing for other UTI . Based on the patient's indication and renal status this patient will be dosed based on a goal trough/random level of 10-15.     Renal function is currently declining.    Visit Vitals  /81 (BP Location: Right arm, Patient Position: Lying)   Pulse 59   Temp 36.5 °C (97.7 °F) (Temporal)   Resp 16        Lab Results   Component Value Date    CREATININE 1.35 (H) 03/21/2024    CREATININE 1.39 (H) 03/20/2024    CREATININE 1.17 03/19/2024    CREATININE 1.22 01/22/2024        Patient weight is No results found for: \"PTWEIGHT\"    No results found for: \"CULTURE\"     I/O last 3 completed shifts:  In: 1150 (18.1 mL/kg) [P.O.:300; IV Piggyback:850]  Out: 3400 (53.5 mL/kg) [Urine:3400 (1.5 mL/kg/hr)]  Weight: 63.5 kg   [unfilled]    Lab Results   Component Value Date    PATIENTTEMP 37.0 01/13/2023    PATIENTTEMP 37.0 05/28/2022          Assessment/Plan     Patient will be given a loading dose of 1000 mg.  Will initiate vancomycin maintenance, dosing off levels.    Follow-up level will be ordered on 3/22 at 1100 unless clinically indicated sooner.  Will continue to monitor renal function daily while on vancomycin and order serum creatinine at least every 48 hours if not already ordered.  Follow for continued vancomycin needs, clinical response, and signs/symptoms of toxicity.       Pasquale Gil, PharmD       "

## 2024-03-22 LAB
ALBUMIN SERPL BCP-MCNC: 3.7 G/DL (ref 3.4–5)
ALP SERPL-CCNC: 62 U/L (ref 33–136)
ALT SERPL W P-5'-P-CCNC: 30 U/L (ref 10–52)
ANION GAP SERPL CALC-SCNC: 12 MMOL/L (ref 10–20)
AST SERPL W P-5'-P-CCNC: 33 U/L (ref 9–39)
BASOPHILS # BLD AUTO: 0.01 X10*3/UL (ref 0–0.1)
BASOPHILS NFR BLD AUTO: 0.2 %
BILIRUB SERPL-MCNC: 1.3 MG/DL (ref 0–1.2)
BUN SERPL-MCNC: 17 MG/DL (ref 6–23)
CALCIUM SERPL-MCNC: 8.3 MG/DL (ref 8.6–10.6)
CHLORIDE SERPL-SCNC: 105 MMOL/L (ref 98–107)
CO2 SERPL-SCNC: 26 MMOL/L (ref 21–32)
CREAT SERPL-MCNC: 1.1 MG/DL (ref 0.5–1.3)
EGFRCR SERPLBLD CKD-EPI 2021: 62 ML/MIN/1.73M*2
EOSINOPHIL # BLD AUTO: 0.11 X10*3/UL (ref 0–0.4)
EOSINOPHIL NFR BLD AUTO: 2.5 %
ERYTHROCYTE [DISTWIDTH] IN BLOOD BY AUTOMATED COUNT: 14.5 % (ref 11.5–14.5)
FACT VIII ACT/NOR PPP: 37 % (ref 55–180)
GLUCOSE SERPL-MCNC: 90 MG/DL (ref 74–99)
HCT VFR BLD AUTO: 35.2 % (ref 41–52)
HGB BLD-MCNC: 12 G/DL (ref 13.5–17.5)
HOLD SPECIMEN: NORMAL
HOLD SPECIMEN: NORMAL
IMM GRANULOCYTES # BLD AUTO: 0.07 X10*3/UL (ref 0–0.5)
IMM GRANULOCYTES NFR BLD AUTO: 1.6 % (ref 0–0.9)
LYMPHOCYTES # BLD AUTO: 1.46 X10*3/UL (ref 0.8–3)
LYMPHOCYTES NFR BLD AUTO: 33.5 %
MAGNESIUM SERPL-MCNC: 1.5 MG/DL (ref 1.6–2.4)
MCH RBC QN AUTO: 34.2 PG (ref 26–34)
MCHC RBC AUTO-ENTMCNC: 34.1 G/DL (ref 32–36)
MCV RBC AUTO: 100 FL (ref 80–100)
MONOCYTES # BLD AUTO: 0.33 X10*3/UL (ref 0.05–0.8)
MONOCYTES NFR BLD AUTO: 7.6 %
NEUTROPHILS # BLD AUTO: 2.38 X10*3/UL (ref 1.6–5.5)
NEUTROPHILS NFR BLD AUTO: 54.6 %
NRBC BLD-RTO: 0 /100 WBCS (ref 0–0)
PLATELET # BLD AUTO: 109 X10*3/UL (ref 150–450)
POTASSIUM SERPL-SCNC: 4.2 MMOL/L (ref 3.5–5.3)
PROT SERPL-MCNC: 6.1 G/DL (ref 6.4–8.2)
RBC # BLD AUTO: 3.51 X10*6/UL (ref 4.5–5.9)
SODIUM SERPL-SCNC: 139 MMOL/L (ref 136–145)
VANCOMYCIN SERPL-MCNC: 6.5 UG/ML (ref 5–20)
WBC # BLD AUTO: 4.4 X10*3/UL (ref 4.4–11.3)

## 2024-03-22 PROCEDURE — 2500000004 HC RX 250 GENERAL PHARMACY W/ HCPCS (ALT 636 FOR OP/ED)

## 2024-03-22 PROCEDURE — 83735 ASSAY OF MAGNESIUM: CPT

## 2024-03-22 PROCEDURE — A4217 STERILE WATER/SALINE, 500 ML: HCPCS

## 2024-03-22 PROCEDURE — 1170000001 HC PRIVATE ONCOLOGY ROOM DAILY

## 2024-03-22 PROCEDURE — 80202 ASSAY OF VANCOMYCIN: CPT

## 2024-03-22 PROCEDURE — 36415 COLL VENOUS BLD VENIPUNCTURE: CPT | Performed by: INTERNAL MEDICINE

## 2024-03-22 PROCEDURE — 84075 ASSAY ALKALINE PHOSPHATASE: CPT

## 2024-03-22 PROCEDURE — 85240 CLOT FACTOR VIII AHG 1 STAGE: CPT | Performed by: INTERNAL MEDICINE

## 2024-03-22 PROCEDURE — 99233 SBSQ HOSP IP/OBS HIGH 50: CPT

## 2024-03-22 PROCEDURE — 85025 COMPLETE CBC W/AUTO DIFF WBC: CPT

## 2024-03-22 PROCEDURE — 36415 COLL VENOUS BLD VENIPUNCTURE: CPT

## 2024-03-22 PROCEDURE — 2500000002 HC RX 250 W HCPCS SELF ADMINISTERED DRUGS (ALT 637 FOR MEDICARE OP, ALT 636 FOR OP/ED)

## 2024-03-22 PROCEDURE — 2500000001 HC RX 250 WO HCPCS SELF ADMINISTERED DRUGS (ALT 637 FOR MEDICARE OP)

## 2024-03-22 RX ORDER — MAGNESIUM SULFATE HEPTAHYDRATE 40 MG/ML
2 INJECTION, SOLUTION INTRAVENOUS ONCE
Status: COMPLETED | OUTPATIENT
Start: 2024-03-22 | End: 2024-03-22

## 2024-03-22 RX ORDER — BENZONATATE 100 MG/1
100 CAPSULE ORAL 3 TIMES DAILY
Status: DISCONTINUED | OUTPATIENT
Start: 2024-03-22 | End: 2024-03-24 | Stop reason: HOSPADM

## 2024-03-22 RX ORDER — TALC
3 POWDER (GRAM) TOPICAL NIGHTLY PRN
Status: DISCONTINUED | OUTPATIENT
Start: 2024-03-22 | End: 2024-03-24 | Stop reason: HOSPADM

## 2024-03-22 RX ADMIN — BENZONATATE 100 MG: 100 CAPSULE ORAL at 10:27

## 2024-03-22 RX ADMIN — FERROUS SULFATE TAB 325 MG (65 MG ELEMENTAL FE) 1 TABLET: 325 (65 FE) TAB at 08:45

## 2024-03-22 RX ADMIN — PANTOPRAZOLE SODIUM 40 MG: 40 TABLET, DELAYED RELEASE ORAL at 06:24

## 2024-03-22 RX ADMIN — MAGNESIUM SULFATE HEPTAHYDRATE 2 G: 40 INJECTION, SOLUTION INTRAVENOUS at 10:28

## 2024-03-22 RX ADMIN — BENZONATATE 100 MG: 100 CAPSULE ORAL at 14:04

## 2024-03-22 RX ADMIN — TAMSULOSIN HYDROCHLORIDE 0.4 MG: 0.4 CAPSULE ORAL at 08:45

## 2024-03-22 RX ADMIN — BUMETANIDE 1 MG: 1 TABLET ORAL at 08:46

## 2024-03-22 RX ADMIN — FOLIC ACID 1 MG: 1 TABLET ORAL at 08:45

## 2024-03-22 RX ADMIN — MELATONIN 3 MG: 3 TAB ORAL at 20:36

## 2024-03-22 RX ADMIN — TAMSULOSIN HYDROCHLORIDE 0.4 MG: 0.4 CAPSULE ORAL at 20:36

## 2024-03-22 RX ADMIN — FINASTERIDE 5 MG: 5 TABLET, FILM COATED ORAL at 08:45

## 2024-03-22 RX ADMIN — SPIRONOLACTONE 25 MG: 25 TABLET, FILM COATED ORAL at 08:45

## 2024-03-22 RX ADMIN — BENZONATATE 100 MG: 100 CAPSULE ORAL at 20:36

## 2024-03-22 RX ADMIN — METOPROLOL SUCCINATE 25 MG: 25 TABLET, EXTENDED RELEASE ORAL at 08:45

## 2024-03-22 RX ADMIN — VANCOMYCIN HYDROCHLORIDE 1250 MG: 5 INJECTION, POWDER, LYOPHILIZED, FOR SOLUTION INTRAVENOUS at 15:31

## 2024-03-22 RX ADMIN — Medication 1 TABLET: at 08:46

## 2024-03-22 RX ADMIN — MELATONIN 3 MG: 3 TAB ORAL at 00:31

## 2024-03-22 RX ADMIN — ATORVASTATIN CALCIUM 40 MG: 40 TABLET, FILM COATED ORAL at 08:45

## 2024-03-22 ASSESSMENT — COGNITIVE AND FUNCTIONAL STATUS - GENERAL
CLIMB 3 TO 5 STEPS WITH RAILING: A LOT
MOBILITY SCORE: 13
MOBILITY SCORE: 17
MOVING FROM LYING ON BACK TO SITTING ON SIDE OF FLAT BED WITH BEDRAILS: A LITTLE
STANDING UP FROM CHAIR USING ARMS: A LOT
DRESSING REGULAR LOWER BODY CLOTHING: A LITTLE
HELP NEEDED FOR BATHING: A LITTLE
MOVING TO AND FROM BED TO CHAIR: A LOT
MOVING TO AND FROM BED TO CHAIR: A LITTLE
DRESSING REGULAR LOWER BODY CLOTHING: A LITTLE
TOILETING: A LITTLE
TOILETING: A LITTLE
DAILY ACTIVITIY SCORE: 19
DRESSING REGULAR UPPER BODY CLOTHING: A LITTLE
PERSONAL GROOMING: A LITTLE
WALKING IN HOSPITAL ROOM: A LOT
HELP NEEDED FOR BATHING: A LITTLE
STANDING UP FROM CHAIR USING ARMS: A LITTLE
DRESSING REGULAR UPPER BODY CLOTHING: A LITTLE
CLIMB 3 TO 5 STEPS WITH RAILING: TOTAL
WALKING IN HOSPITAL ROOM: A LOT
DAILY ACTIVITIY SCORE: 20
TURNING FROM BACK TO SIDE WHILE IN FLAT BAD: A LOT

## 2024-03-22 ASSESSMENT — PAIN - FUNCTIONAL ASSESSMENT: PAIN_FUNCTIONAL_ASSESSMENT: 0-10

## 2024-03-22 ASSESSMENT — PAIN SCALES - GENERAL
PAINLEVEL_OUTOF10: 0 - NO PAIN
PAINLEVEL_OUTOF10: 0 - NO PAIN

## 2024-03-22 NOTE — PROGRESS NOTES
Ramón Flores is a 89 y.o. male on day 3 of admission presenting with Pneumonia of left lower lobe due to infectious organism.    Subjective   Seen at bedside this morning. Patient is up and eating breakfast. Continues to deny hematuria, fevers, flank pain, or dysuria. Denies pain or fevers. Discussed continuing IV vancomycin today for positive MRSA urine culture while we wait for repeat urine culture. Patient is agreeable to this plan. He had no further questions. Denies any other concerns for bleeding, bruising, chest pain, light-headedness.     Objective     Physical Exam  Vitals reviewed.   Constitutional:       Appearance: Normal appearance.   HENT:      Head: Normocephalic and atraumatic.      Nose: Nose normal.      Mouth/Throat:      Mouth: Mucous membranes are moist.      Pharynx: Oropharynx is clear.   Eyes:      Extraocular Movements: Extraocular movements intact.      Pupils: Pupils are equal, round, and reactive to light.   Cardiovascular:      Rate and Rhythm: Normal rate and regular rhythm.      Pulses: Normal pulses.      Heart sounds: Normal heart sounds.   Pulmonary:      Effort: Pulmonary effort is normal.      Breath sounds: Normal breath sounds.   Abdominal:      General: Bowel sounds are normal.      Palpations: Abdomen is soft.   Musculoskeletal:         General: Normal range of motion.   Skin:     General: Skin is warm.   Neurological:      General: No focal deficit present.      Mental Status: He is alert and oriented to person, place, and time. Mental status is at baseline.   Psychiatric:         Mood and Affect: Mood normal.         Behavior: Behavior normal.         Last Recorded Vitals  Blood pressure 100/85, pulse 73, temperature 36.4 °C (97.5 °F), temperature source Temporal, resp. rate 18, weight 63.5 kg (140 lb), SpO2 93 %.  Intake/Output last 3 Shifts:  I/O last 3 completed shifts:  In: 1050 (16.5 mL/kg) [P.O.:100; IV Piggyback:950]  Out: 1375 (21.7 mL/kg) [Urine:1375 (0.6  mL/kg/hr)]  Weight: 63.5 kg     Relevant Results  Scheduled medications  atorvastatin, 40 mg, oral, Daily  benzonatate, 100 mg, oral, TID  bumetanide, 1 mg, oral, Daily  ferrous sulfate (325 mg ferrous sulfate), 65 mg of iron, oral, Daily  finasteride, 5 mg, oral, Daily  folic acid, 1 mg, oral, Daily  magnesium sulfate, 2 g, intravenous, Once  metoprolol succinate XL, 25 mg, oral, Daily  multivitamin with minerals, 1 tablet, oral, Daily  oxygen, , inhalation, Continuous - 02/gases  pantoprazole, 40 mg, oral, Daily before breakfast  spironolactone, 25 mg, oral, Daily  tamsulosin, 0.4 mg, oral, BID    Continuous medications     PRN medications       Assessment/Plan   Principal Problem:    Pneumonia of left lower lobe due to infectious organism  Active Problems:    Hemophilia A (CMS/HCC)    Hyperlipidemia    Hypertension    (HFpEF) heart failure with preserved ejection fraction (CMS/HCC)    Atrial fibrillation, chronic (CMS/HCC)    Benign prostatic hyperplasia without lower urinary tract symptoms    Ramón Flores is a 89 y.o. 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 In the ED, CXR (3/19) showed small bibasilar pleural effusions and atelectasis, L>R, and superimposed left basilar developing consolidation not excluded. Cardiac US (3/19) negative for a significant pericardial effusion, LV systolic function decreased, RV size normal. Patient started on Azithromycin and Ceftriaxone (3/19-3/21) for CAP and admitted for further management. Heme consulted (3/19), s/p Advate x2 (3/19) and planned weekly Altuviiio (3/20) with daily factor VIII levels. UA (3/19) +blood, nitrites, leukocytes; urine culture (3/21) positive for MRSA. Discontinued ceftriaxone/azithromycin and started vancomycin (3/21- ). Denies recurrent hematuria since admission, denies flank pain,  recent urinary catheters, fevers, or dysuria. Repeat urine culture (3/21) pending. Developed new RONNIE, sCr 1.39 3/20, s/p LR bolus, now improved (3/22) sCr 1.1. Renal US negative (3/21) for hydronephrosis. Discharge home with continued home care pending repeat urine cultures and antibiotic plan.        # CAP  - Presented to ED with 2x days of SOB, dry cough, hematuria   - CXR (3/19) small bibasilar pleural effusions and atelectasis, L>R, superimposed left basilar developing consolidation not excluded  - Cardiac US POC (3/19) the pericardial space was visualized and was negative for a significant pericardial effusion, LV systolic function decreased, RV size was normal  - Flu A/B, RSV, and COVID negative (3/19)  - Urine legionella/strep pneumo negative (3/19)  - No leukocytosis, WBC 4.7 (3/21)  - Initially on 2L, now weaned to RA (3/20)  - S/p cefepime x1 dose (3/20) and s/p Azithromycin (3/19-3/21)  - Started on Vancomycin (3/21- ) I/s/o positive MRSA urine cultures     # Cystitis + MRSA   # RONNIE - resolved   # BPH   - UA (3/19) +blood, nitrites, leukocytes (pt already on antibiotics)  - Urine culture (3/21) positive MRSA   - Blood cultures (3/20) NGTD   - Started on Vancomycin (3/21- )  - Repeat urine culture pending (3/21)   - Developed mild RONNIE 3/20, sCr 1.39 --> 1.1 (3/22); continue to monitor   - urine lytes negative   - FENA score 0.7% indicating most likely prerenal   - s/p 500 LR bolus (3/20 & 3/21)  - bladder scans BID negative   - Renal US negative for hydronephrosis, Left-sided nonobstructive punctate calculus  - continue home Flomax 0.4mg bid  - continue home Finasteride 5mg daily  - Strict I&Os   - Denies recurrent hematuria since admission, denies flank pain, recent urinary catheters, fevers, or dysuria     # Hemophilia A, Protein C deficiency  # Hematuria  - Follows with Dr. Dillon  - FVIII activity 4% (3/19), on prophylactic altuviiio weekly on Tuesdays  - Last factor infusion with altuviiio 3/12 with  scheduled home infusion 3/19 but was in ED  - Heme consulted on admit (3/19), rec start antihemophilic factor rAHF-PFM (Advate) q12 until altuviiio 50u/kg infusion was in stock from pharmacy. To obtain peak factor 8 activity level 1 hr post infusion, along with daily factor 8 levels  - s/p Advate x2 (3/19)  - s/p Altuviiio (3/20); will need scheduled Altuviiio on 3/26 if still admitted   - Hgb 12 (baseline 10-12) (3/22)  - Plt 106 (baseline 100-200) (3/19)  - UA (3/19) +blood, nitrites, leukocytes, urine culture (3/21) positive MRSA   - Patient requires factor for any bleeding, or prior to any procedure   - Factor VIII level 53 (3/21), factor VIII pending 3/22    - continue home folic acid 1mg daily  - continue home ferrous sulfate 325mg daily  - continue home multivitamin daily     # HTN/ Afib/ HFpEF/ HLD  - Last ECHO (2/24/23): EF 60-65%  - Cardiac US (3/19) negative for a significant pericardial effusion, LV systolic function decreased, RV size normal  - ECHO 3/20, EF 50%   - BNP (3/19): 589 (-600)  - Trop (3/19): 102; hx elevated trops consistent with pt's baseline  - Admission EKG (3/19) rate controlled afib  - s/p one dose Lasix 20mg administered in ED  - continue home Metoprolol XL 25mg daily  - continue home Spironolactone 25mg daily  - continue home Lipitor 40mg daily  - continue home Bumex 1mg daily (ordered scheduled, pt takes PRN at home)     #DVT prophy   - SCDs only, no pharmacologic prophy I/s/o hemophilia, encourage ambulation     #DISPO:  - DNR/DNI (ICU ok)  - NOK: David Wallace (nephew) #[phone]  - DC pending improvement of respiratory symptoms and hematologic stability        I spent >60 minutes in the professional and overall care of this patient.    Assessment and plan discussed with attending physician Dr. Faizan Llamas, APRN-CNP

## 2024-03-22 NOTE — PROGRESS NOTES
Physical Therapy                 Therapy Communication Note    Patient Name: Ramón Flores  MRN: [id number]  Today's Date: 3/22/2024     Discipline: Physical Therapy    Missed Visit Reason: Missed Visit Reason: Other (Comment)    Missed Time: Attempt    Comment:  PT treatment attempted; pt resting in bed and politely declines. Will re-attempt at later time as schedule permits.

## 2024-03-22 NOTE — CARE PLAN
Problem: Pain  Goal: My pain/discomfort is manageable  Outcome: Progressing     Problem: Safety  Goal: Patient will be injury free during hospitalization  Outcome: Progressing  Goal: I will remain free of falls  Outcome: Progressing     Problem: Daily Care  Goal: Daily care needs are met  Outcome: Progressing     Problem: Psychosocial Needs  Goal: Demonstrates ability to cope with hospitalization/illness  Outcome: Progressing  Goal: Collaborate with me, my family, and caregiver to identify my specific goals  Outcome: Progressing     Problem: Discharge Barriers  Goal: My discharge needs are met  Outcome: Progressing     Problem: Skin  Goal: Promote/optimize nutrition  Outcome: Progressing  Flowsheets (Taken 3/22/2024 0920)  Promote/optimize nutrition: Offer water/supplements/favorite foods       The clinical goals for the shift include Pt will remain VSS throughout shift.

## 2024-03-22 NOTE — PROGRESS NOTES
3/21/24 1:40pm  ADOD 3/22/24 with the assistance of [facility] as prior to admission. Pt to continue with home nursing, aide,pt/ot. No DME required at this time.  Azeb Valdez LPN     3/22/24 @ [id number]  [facility] updated on plan to possibly discharge patient over the weekend. [facility] If patient does discharge this weekend, they requested that TCC or SW call the on call nurse, Teresa, at [phone].  Cesilia Gomez RN TCC

## 2024-03-22 NOTE — PROGRESS NOTES
"Vancomycin Dosing by Pharmacy- FOLLOW UP    Ramón Flores is a 89 y.o. year old male who Pharmacy has been consulted for vancomycin dosing for other complicated UTI . Based on the patient's indication and renal status this patient is being dosed based on a goal trough/random level of 10-15.     Renal function is currently improving.    Current vancomycin dose: 1000 mg x1    Most recent trough level: 6.5 mcg/mL    Visit Vitals  /73   Pulse 64   Temp 36.2 °C (97.2 °F)   Resp 16        Lab Results   Component Value Date    CREATININE 1.10 03/22/2024    CREATININE 1.35 (H) 03/21/2024    CREATININE 1.39 (H) 03/20/2024    CREATININE 1.17 03/19/2024        Patient weight is No results found for: \"PTWEIGHT\"    No results found for: \"CULTURE\"     I/O last 3 completed shifts:  In: 1050 (16.5 mL/kg) [P.O.:100; IV Piggyback:950]  Out: 1375 (21.7 mL/kg) [Urine:1375 (0.6 mL/kg/hr)]  Weight: 63.5 kg   [unfilled]    Lab Results   Component Value Date    PATIENTTEMP 37.0 01/13/2023    PATIENTTEMP 37.0 05/28/2022        Assessment/Plan    Below goal random/trough level. Orders placed for new vancomycin regimen of 1250 mg x1 scheduled @1500   The next level will be obtained on 3/23 at 1400. May be obtained sooner if clinically indicated.   Will continue to monitor renal function daily while on vancomycin and order serum creatinine at least every 48 hours if not already ordered.  Follow for continued vancomycin needs, clinical response, and signs/symptoms of toxicity.       Machelle Daniel, PharmD           "

## 2024-03-23 LAB
ALBUMIN SERPL BCP-MCNC: 3.6 G/DL (ref 3.4–5)
ALP SERPL-CCNC: 60 U/L (ref 33–136)
ALT SERPL W P-5'-P-CCNC: 25 U/L (ref 10–52)
ANION GAP SERPL CALC-SCNC: 13 MMOL/L (ref 10–20)
AST SERPL W P-5'-P-CCNC: 22 U/L (ref 9–39)
BACTERIA UR CULT: ABNORMAL
BASOPHILS # BLD AUTO: 0.03 X10*3/UL (ref 0–0.1)
BASOPHILS NFR BLD AUTO: 0.8 %
BILIRUB SERPL-MCNC: 1.7 MG/DL (ref 0–1.2)
BUN SERPL-MCNC: 14 MG/DL (ref 6–23)
CALCIUM SERPL-MCNC: 8.7 MG/DL (ref 8.6–10.6)
CHLORIDE SERPL-SCNC: 106 MMOL/L (ref 98–107)
CO2 SERPL-SCNC: 25 MMOL/L (ref 21–32)
CREAT SERPL-MCNC: 1.11 MG/DL (ref 0.5–1.3)
EGFRCR SERPLBLD CKD-EPI 2021: 61 ML/MIN/1.73M*2
EOSINOPHIL # BLD AUTO: 0.18 X10*3/UL (ref 0–0.4)
EOSINOPHIL NFR BLD AUTO: 4.6 %
ERYTHROCYTE [DISTWIDTH] IN BLOOD BY AUTOMATED COUNT: 14.1 % (ref 11.5–14.5)
GLUCOSE SERPL-MCNC: 86 MG/DL (ref 74–99)
HCT VFR BLD AUTO: 35.7 % (ref 41–52)
HGB BLD-MCNC: 12.4 G/DL (ref 13.5–17.5)
IMM GRANULOCYTES # BLD AUTO: 0.02 X10*3/UL (ref 0–0.5)
IMM GRANULOCYTES NFR BLD AUTO: 0.5 % (ref 0–0.9)
LYMPHOCYTES # BLD AUTO: 1.68 X10*3/UL (ref 0.8–3)
LYMPHOCYTES NFR BLD AUTO: 42.6 %
MAGNESIUM SERPL-MCNC: 1.97 MG/DL (ref 1.6–2.4)
MCH RBC QN AUTO: 34.3 PG (ref 26–34)
MCHC RBC AUTO-ENTMCNC: 34.7 G/DL (ref 32–36)
MCV RBC AUTO: 99 FL (ref 80–100)
MONOCYTES # BLD AUTO: 0.33 X10*3/UL (ref 0.05–0.8)
MONOCYTES NFR BLD AUTO: 8.4 %
NEUTROPHILS # BLD AUTO: 1.7 X10*3/UL (ref 1.6–5.5)
NEUTROPHILS NFR BLD AUTO: 43.1 %
NRBC BLD-RTO: 0 /100 WBCS (ref 0–0)
PLATELET # BLD AUTO: 110 X10*3/UL (ref 150–450)
POTASSIUM SERPL-SCNC: 4.3 MMOL/L (ref 3.5–5.3)
PROT SERPL-MCNC: 5.9 G/DL (ref 6.4–8.2)
RBC # BLD AUTO: 3.61 X10*6/UL (ref 4.5–5.9)
SODIUM SERPL-SCNC: 140 MMOL/L (ref 136–145)
WBC # BLD AUTO: 3.9 X10*3/UL (ref 4.4–11.3)

## 2024-03-23 PROCEDURE — 2500000002 HC RX 250 W HCPCS SELF ADMINISTERED DRUGS (ALT 637 FOR MEDICARE OP, ALT 636 FOR OP/ED)

## 2024-03-23 PROCEDURE — 36415 COLL VENOUS BLD VENIPUNCTURE: CPT | Performed by: INTERNAL MEDICINE

## 2024-03-23 PROCEDURE — 2500000001 HC RX 250 WO HCPCS SELF ADMINISTERED DRUGS (ALT 637 FOR MEDICARE OP)

## 2024-03-23 PROCEDURE — 85025 COMPLETE CBC W/AUTO DIFF WBC: CPT

## 2024-03-23 PROCEDURE — 1170000001 HC PRIVATE ONCOLOGY ROOM DAILY

## 2024-03-23 PROCEDURE — 80053 COMPREHEN METABOLIC PANEL: CPT

## 2024-03-23 PROCEDURE — 85240 CLOT FACTOR VIII AHG 1 STAGE: CPT | Performed by: INTERNAL MEDICINE

## 2024-03-23 PROCEDURE — 99232 SBSQ HOSP IP/OBS MODERATE 35: CPT | Performed by: NURSE PRACTITIONER

## 2024-03-23 PROCEDURE — 83735 ASSAY OF MAGNESIUM: CPT

## 2024-03-23 RX ORDER — DOXYCYCLINE HYCLATE 100 MG
100 TABLET ORAL EVERY 12 HOURS SCHEDULED
Status: DISCONTINUED | OUTPATIENT
Start: 2024-03-24 | End: 2024-03-24 | Stop reason: HOSPADM

## 2024-03-23 RX ORDER — DOXYCYCLINE 100 MG/1
100 CAPSULE ORAL EVERY 12 HOURS SCHEDULED
Qty: 20 CAPSULE | Refills: 0 | Status: SHIPPED | OUTPATIENT
Start: 2024-03-24 | End: 2024-04-03

## 2024-03-23 RX ADMIN — TAMSULOSIN HYDROCHLORIDE 0.4 MG: 0.4 CAPSULE ORAL at 20:49

## 2024-03-23 RX ADMIN — FINASTERIDE 5 MG: 5 TABLET, FILM COATED ORAL at 08:34

## 2024-03-23 RX ADMIN — BENZONATATE 100 MG: 100 CAPSULE ORAL at 08:33

## 2024-03-23 RX ADMIN — BUMETANIDE 1 MG: 1 TABLET ORAL at 08:33

## 2024-03-23 RX ADMIN — PANTOPRAZOLE SODIUM 40 MG: 40 TABLET, DELAYED RELEASE ORAL at 09:36

## 2024-03-23 RX ADMIN — TAMSULOSIN HYDROCHLORIDE 0.4 MG: 0.4 CAPSULE ORAL at 08:33

## 2024-03-23 RX ADMIN — METOPROLOL SUCCINATE 25 MG: 25 TABLET, EXTENDED RELEASE ORAL at 08:34

## 2024-03-23 RX ADMIN — Medication 1 TABLET: at 08:34

## 2024-03-23 RX ADMIN — FOLIC ACID 1 MG: 1 TABLET ORAL at 08:34

## 2024-03-23 RX ADMIN — SPIRONOLACTONE 25 MG: 25 TABLET, FILM COATED ORAL at 08:34

## 2024-03-23 RX ADMIN — FERROUS SULFATE TAB 325 MG (65 MG ELEMENTAL FE) 1 TABLET: 325 (65 FE) TAB at 08:33

## 2024-03-23 RX ADMIN — BENZONATATE 100 MG: 100 CAPSULE ORAL at 15:31

## 2024-03-23 RX ADMIN — BENZONATATE 100 MG: 100 CAPSULE ORAL at 20:49

## 2024-03-23 RX ADMIN — ATORVASTATIN CALCIUM 40 MG: 40 TABLET, FILM COATED ORAL at 08:34

## 2024-03-23 ASSESSMENT — COGNITIVE AND FUNCTIONAL STATUS - GENERAL
WALKING IN HOSPITAL ROOM: A LOT
MOBILITY SCORE: 13
DRESSING REGULAR LOWER BODY CLOTHING: A LITTLE
STANDING UP FROM CHAIR USING ARMS: A LOT
TOILETING: A LITTLE
CLIMB 3 TO 5 STEPS WITH RAILING: A LOT
MOVING FROM LYING ON BACK TO SITTING ON SIDE OF FLAT BED WITH BEDRAILS: A LITTLE
DRESSING REGULAR UPPER BODY CLOTHING: A LITTLE
MOVING FROM LYING ON BACK TO SITTING ON SIDE OF FLAT BED WITH BEDRAILS: A LITTLE
HELP NEEDED FOR BATHING: A LITTLE
DAILY ACTIVITIY SCORE: 19
HELP NEEDED FOR BATHING: A LITTLE
PERSONAL GROOMING: A LITTLE
DAILY ACTIVITIY SCORE: 18
STANDING UP FROM CHAIR USING ARMS: A LOT
DRESSING REGULAR UPPER BODY CLOTHING: A LITTLE
TURNING FROM BACK TO SIDE WHILE IN FLAT BAD: A LOT
MOVING TO AND FROM BED TO CHAIR: A LOT
WALKING IN HOSPITAL ROOM: A LOT
MOVING TO AND FROM BED TO CHAIR: A LOT
MOBILITY SCORE: 13
PERSONAL GROOMING: A LITTLE
TOILETING: A LITTLE
DRESSING REGULAR LOWER BODY CLOTHING: A LOT
CLIMB 3 TO 5 STEPS WITH RAILING: A LOT
TURNING FROM BACK TO SIDE WHILE IN FLAT BAD: A LOT

## 2024-03-23 ASSESSMENT — PAIN SCALES - GENERAL
PAINLEVEL_OUTOF10: 0 - NO PAIN

## 2024-03-23 ASSESSMENT — PAIN - FUNCTIONAL ASSESSMENT: PAIN_FUNCTIONAL_ASSESSMENT: 0-10

## 2024-03-23 NOTE — CARE PLAN
The patient's goals for the shift include      The clinical goals for the shift include Pt will remain safe and free from injuries or falls throughout shift.      Problem: Pain  Goal: My pain/discomfort is manageable  Outcome: Progressing     Problem: Safety  Goal: Patient will be injury free during hospitalization  Outcome: Progressing  Goal: I will remain free of falls  Outcome: Progressing     Problem: Daily Care  Goal: Daily care needs are met  Outcome: Progressing     Problem: Psychosocial Needs  Goal: Demonstrates ability to cope with hospitalization/illness  Outcome: Progressing  Goal: Collaborate with me, my family, and caregiver to identify my specific goals  Outcome: Progressing     Problem: Discharge Barriers  Goal: My discharge needs are met  Outcome: Progressing     Problem: Skin  Goal: Promote/optimize nutrition  Outcome: Progressing

## 2024-03-23 NOTE — CARE PLAN
Problem: Pain  Goal: My pain/discomfort is manageable  Outcome: Progressing     Problem: Safety  Goal: Patient will be injury free during hospitalization  Outcome: Progressing  Goal: I will remain free of falls  Outcome: Progressing     Problem: Daily Care  Goal: Daily care needs are met  Outcome: Progressing     Problem: Psychosocial Needs  Goal: Demonstrates ability to cope with hospitalization/illness  Outcome: Progressing  Goal: Collaborate with me, my family, and caregiver to identify my specific goals  Outcome: Progressing     Problem: Discharge Barriers  Goal: My discharge needs are met  Outcome: Progressing     Problem: Skin  Goal: Promote/optimize nutrition  Outcome: Progressing  Flowsheets (Taken 3/23/2024 0021)  Promote/optimize nutrition: Offer water/supplements/favorite foods

## 2024-03-23 NOTE — PROGRESS NOTES
Ramón Flores is a 89 y.o. male on day 4 of admission presenting with Pneumonia of left lower lobe due to infectious organism and UTI.    Subjective   Pt denies specific complaints today. Denies pain, SOB, dysuria, hematuria or other signs of bleeding. Pt eating breakfast without difficulty.        Objective     Physical Exam  Vitals reviewed.   Constitutional:       Appearance: Normal appearance.   HENT:      Head: Normocephalic and atraumatic.      Mouth/Throat:      Mouth: Mucous membranes are moist.      Pharynx: Oropharynx is clear.   Eyes:      Pupils: Pupils are equal, round, and reactive to light.   Cardiovascular:      Rate and Rhythm: Normal rate and regular rhythm.      Pulses: Normal pulses.      Heart sounds: Normal heart sounds.   Pulmonary:      Effort: Pulmonary effort is normal.      Breath sounds: Normal breath sounds.   Abdominal:      General: Bowel sounds are normal.      Palpations: Abdomen is soft.   Musculoskeletal:         General: Normal range of motion.      Cervical back: Normal range of motion and neck supple.   Skin:     General: Skin is warm and dry.   Neurological:      General: No focal deficit present.      Mental Status: He is alert and oriented to person, place, and time.   Psychiatric:         Mood and Affect: Mood normal.         Behavior: Behavior normal.         Last Recorded Vitals  Blood pressure 112/70, pulse 66, temperature 36 °C (96.8 °F), temperature source Temporal, resp. rate 18, weight 63.5 kg (140 lb), SpO2 96 %.  Intake/Output last 3 Shifts:  I/O last 3 completed shifts:  In: 770 (12.1 mL/kg) [P.O.:720; I.V.:50 (0.8 mL/kg)]  Out: 3070 (48.3 mL/kg) [Urine:3070 (1.3 mL/kg/hr)]  Weight: 63.5 kg     Relevant Results    Scheduled medications  atorvastatin, 40 mg, oral, Daily  benzonatate, 100 mg, oral, TID  bumetanide, 1 mg, oral, Daily  [START ON 3/24/2024] doxycycline, 100 mg, oral, q12h KELLY  ferrous sulfate (325 mg ferrous sulfate), 65 mg of iron, oral,  Daily  finasteride, 5 mg, oral, Daily  folic acid, 1 mg, oral, Daily  metoprolol succinate XL, 25 mg, oral, Daily  multivitamin with minerals, 1 tablet, oral, Daily  oxygen, , inhalation, Continuous - 02/gases  pantoprazole, 40 mg, oral, Daily before breakfast  spironolactone, 25 mg, oral, Daily  tamsulosin, 0.4 mg, oral, BID      Continuous medications     PRN medications  PRN medications: melatonin  .             Assessment/Plan   Principal Problem:    Pneumonia of left lower lobe due to infectious organism  Active Problems:    Hemophilia A (CMS/HCC)    Hyperlipidemia    Hypertension    (HFpEF) heart failure with preserved ejection fraction (CMS/HCC)    Atrial fibrillation, chronic (CMS/HCC)    Benign prostatic hyperplasia without lower urinary tract symptoms    Ramón Flores is a 89 y.o. 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 In the ED, CXR (3/19) showed small bibasilar pleural effusions and atelectasis, L>R, and superimposed left basilar developing consolidation not excluded. Cardiac US (3/19) negative for a significant pericardial effusion, LV systolic function decreased, RV size normal. Patient started on Azithromycin and Ceftriaxone (3/19-3/21) for CAP and admitted for further management. Heme consulted (3/19), s/p Advate x2 (3/19) and planned weekly Altuviiio (3/20) with daily factor VIII levels. UA (3/19) +blood, nitrites, leukocytes; urine culture (3/21) positive for MRSA. Discontinued ceftriaxone/azithromycin and started vancomycin (3/21-3/23 ). Denies recurrent hematuria since admission, denies flank pain, recent urinary catheters, fevers, or dysuria. Repeat urine culture (3/21) +MRSA (sensitive to doxycycline) Developed new RONNIE, sCr 1.39 3/20, s/p LR bolus, now improved (3/22) sCr 1.1. Renal US negative (3/21) for hydronephrosis.  Discharge home with continued home care on oral antibiotics. Plan for discharge to home on 3/24.        # CAP  - Presented to ED with 2x days of SOB, dry cough, hematuria   - CXR (3/19) small bibasilar pleural effusions and atelectasis, L>R, superimposed left basilar developing consolidation not excluded  - Cardiac US POC (3/19) the pericardial space was visualized and was negative for a significant pericardial effusion, LV systolic function decreased, RV size was normal  - Flu A/B, RSV, and COVID negative (3/19)  - Urine legionella/strep pneumo negative (3/19)  - No leukocytosis, WBC 4.7 (3/21)  - Initially on 2L, now weaned to RA (3/20)  - S/p cefepime x1 dose (3/20) and s/p Azithromycin (3/19-3/21)  - Started on Vancomycin (3/21-3/23  ) I/s/o positive MRSA urine cultures      # Cystitis + MRSA   # RONNIE - resolved   # BPH   - UA (3/19) +blood, nitrites, leukocytes (pt already on antibiotics)  - Urine culture (3/21) positive MRSA   - Blood cultures (3/20) NGTD   - Started on Vancomycin (3/21-3/23 )  - Repeat urine culture + MRSA (sensitive to doxycycline); will start doxycycline 100 mg bid x 10 days on 3/24  - Developed mild RONNIE 3/20, sCr 1.39 --> 1.1 (3/22); continue to monitor   - urine lytes negative   - FENA score 0.7% indicating most likely prerenal   - s/p 500 LR bolus (3/20 & 3/21)  - bladder scans BID negative   - Renal US negative for hydronephrosis, Left-sided nonobstructive punctate calculus  - continue home Flomax 0.4mg bid  - continue home Finasteride 5mg daily  - Strict I&Os   - Denies recurrent hematuria since admission, denies flank pain, recent urinary catheters, fevers, or dysuria     # Hemophilia A, Protein C deficiency  # Hematuria  - Follows with Dr. Dillon  - FVIII activity 4% (3/19), on prophylactic altuviiio weekly on Tuesdays  - Last factor infusion with altuviiio 3/12 with scheduled home infusion 3/19 but was in ED  - Heme consulted on admit (3/19), rec start antihemophilic factor  rAHF-PFM (Advate) q12 until altuviiio 50u/kg infusion was in stock from pharmacy. To obtain peak factor 8 activity level 1 hr post infusion, along with daily factor 8 levels  - s/p Advate x2 (3/19)  - s/p Altuviiio (3/20); will need scheduled Altuviiio on 3/26 if still admitted   - Hgb 12 (baseline 10-12) (3/22)  - Plt 106 (baseline 100-200) (3/19)  - UA (3/19) +blood, nitrites, leukocytes, urine culture (3/21) positive MRSA   - Patient requires factor for any bleeding, or prior to any procedure   - Factor VIII level 53 (3/21), factor VIII pending 3/22    - continue home folic acid 1mg daily  - continue home ferrous sulfate 325mg daily  - continue home multivitamin daily  - to resume weekly home factor injection at time of discharge per Heme Fellow on 3/23     # HTN/ Afib/ HFpEF/ HLD  - Last ECHO (2/24/23): EF 60-65%  - Cardiac US (3/19) negative for a significant pericardial effusion, LV systolic function decreased, RV size normal  - ECHO 3/20, EF 50%   - BNP (3/19): 589 (-600)  - Trop (3/19): 102; hx elevated trops consistent with pt's baseline  - Admission EKG (3/19) rate controlled afib  - s/p one dose Lasix 20mg administered in ED  - continue home Metoprolol XL 25mg daily  - continue home Spironolactone 25mg daily  - continue home Lipitor 40mg daily  - continue home Bumex 1mg daily (ordered scheduled, pt takes PRN at home)     #DVT prophy   - SCDs only, no pharmacologic prophy I/s/o hemophilia, encourage ambulation     #DISPO:  - DNR/DNI (ICU ok)  - NOK: David Wallace (nephew) #[phone]  - DC to home planned for 3/24, nephew aware.            I spent 45 minutes in the professional and overall care of this patient.      Nicolasa Barclay, APRN-CNP

## 2024-03-23 NOTE — PROGRESS NOTES
Vancomycin Dosing by Pharmacy- Cessation of Therapy    Consult to pharmacy for vancomycin dosing has been discontinued by the prescriber, pharmacy will sign off at this time.    Please call pharmacy if there are further questions or re-enter a consult if vancomycin is resumed.     SUYEON YEO, WesleyD

## 2024-03-23 NOTE — NURSING NOTE
Upon arrival the patient was laying in bed AO+4, vss, gcs+15, talking with his nephew at his bedside denying pain , sob, or any distress. Head to toe findings recorded,care transferred to oncoming G[facility] [place] without any changes of condition.

## 2024-03-24 VITALS
RESPIRATION RATE: 16 BRPM | WEIGHT: 140 LBS | SYSTOLIC BLOOD PRESSURE: 122 MMHG | OXYGEN SATURATION: 99 % | BODY MASS INDEX: 27.31 KG/M2 | TEMPERATURE: 97.7 F | DIASTOLIC BLOOD PRESSURE: 56 MMHG | HEART RATE: 71 BPM

## 2024-03-24 LAB
ALBUMIN SERPL BCP-MCNC: 3.9 G/DL (ref 3.4–5)
ALP SERPL-CCNC: 65 U/L (ref 33–136)
ALT SERPL W P-5'-P-CCNC: 24 U/L (ref 10–52)
ANION GAP SERPL CALC-SCNC: 14 MMOL/L (ref 10–20)
AST SERPL W P-5'-P-CCNC: 22 U/L (ref 9–39)
BACTERIA BLD CULT: NORMAL
BACTERIA BLD CULT: NORMAL
BASOPHILS # BLD AUTO: 0.02 X10*3/UL (ref 0–0.1)
BASOPHILS NFR BLD AUTO: 0.4 %
BILIRUB SERPL-MCNC: 1.8 MG/DL (ref 0–1.2)
BUN SERPL-MCNC: 19 MG/DL (ref 6–23)
CALCIUM SERPL-MCNC: 9.1 MG/DL (ref 8.6–10.6)
CHLORIDE SERPL-SCNC: 103 MMOL/L (ref 98–107)
CO2 SERPL-SCNC: 26 MMOL/L (ref 21–32)
CREAT SERPL-MCNC: 1.36 MG/DL (ref 0.5–1.3)
EGFRCR SERPLBLD CKD-EPI 2021: 48 ML/MIN/1.73M*2
EOSINOPHIL # BLD AUTO: 0.18 X10*3/UL (ref 0–0.4)
EOSINOPHIL NFR BLD AUTO: 3.9 %
ERYTHROCYTE [DISTWIDTH] IN BLOOD BY AUTOMATED COUNT: 14.2 % (ref 11.5–14.5)
GLUCOSE SERPL-MCNC: 82 MG/DL (ref 74–99)
HCT VFR BLD AUTO: 40.8 % (ref 41–52)
HGB BLD-MCNC: 13.6 G/DL (ref 13.5–17.5)
IMM GRANULOCYTES # BLD AUTO: 0.01 X10*3/UL (ref 0–0.5)
IMM GRANULOCYTES NFR BLD AUTO: 0.2 % (ref 0–0.9)
LYMPHOCYTES # BLD AUTO: 2.1 X10*3/UL (ref 0.8–3)
LYMPHOCYTES NFR BLD AUTO: 45.5 %
MAGNESIUM SERPL-MCNC: 2.05 MG/DL (ref 1.6–2.4)
MCH RBC QN AUTO: 33.3 PG (ref 26–34)
MCHC RBC AUTO-ENTMCNC: 33.3 G/DL (ref 32–36)
MCV RBC AUTO: 100 FL (ref 80–100)
MONOCYTES # BLD AUTO: 0.5 X10*3/UL (ref 0.05–0.8)
MONOCYTES NFR BLD AUTO: 10.8 %
NEUTROPHILS # BLD AUTO: 1.81 X10*3/UL (ref 1.6–5.5)
NEUTROPHILS NFR BLD AUTO: 39.2 %
NRBC BLD-RTO: 0 /100 WBCS (ref 0–0)
PLATELET # BLD AUTO: 127 X10*3/UL (ref 150–450)
POTASSIUM SERPL-SCNC: 4.8 MMOL/L (ref 3.5–5.3)
PROT SERPL-MCNC: 6.5 G/DL (ref 6.4–8.2)
RBC # BLD AUTO: 4.08 X10*6/UL (ref 4.5–5.9)
SODIUM SERPL-SCNC: 138 MMOL/L (ref 136–145)
WBC # BLD AUTO: 4.6 X10*3/UL (ref 4.4–11.3)

## 2024-03-24 PROCEDURE — 2500000001 HC RX 250 WO HCPCS SELF ADMINISTERED DRUGS (ALT 637 FOR MEDICARE OP)

## 2024-03-24 PROCEDURE — 2500000002 HC RX 250 W HCPCS SELF ADMINISTERED DRUGS (ALT 637 FOR MEDICARE OP, ALT 636 FOR OP/ED)

## 2024-03-24 PROCEDURE — 85025 COMPLETE CBC W/AUTO DIFF WBC: CPT

## 2024-03-24 PROCEDURE — 85240 CLOT FACTOR VIII AHG 1 STAGE: CPT | Performed by: INTERNAL MEDICINE

## 2024-03-24 PROCEDURE — 99239 HOSP IP/OBS DSCHRG MGMT >30: CPT

## 2024-03-24 PROCEDURE — 80053 COMPREHEN METABOLIC PANEL: CPT

## 2024-03-24 PROCEDURE — 2500000001 HC RX 250 WO HCPCS SELF ADMINISTERED DRUGS (ALT 637 FOR MEDICARE OP): Performed by: NURSE PRACTITIONER

## 2024-03-24 PROCEDURE — 83735 ASSAY OF MAGNESIUM: CPT

## 2024-03-24 PROCEDURE — 36415 COLL VENOUS BLD VENIPUNCTURE: CPT | Performed by: INTERNAL MEDICINE

## 2024-03-24 RX ORDER — BUMETANIDE 1 MG/1
1 TABLET ORAL DAILY PRN
Start: 2024-03-24 | End: 2024-06-03 | Stop reason: HOSPADM

## 2024-03-24 RX ADMIN — PANTOPRAZOLE SODIUM 40 MG: 40 TABLET, DELAYED RELEASE ORAL at 06:23

## 2024-03-24 RX ADMIN — SPIRONOLACTONE 25 MG: 25 TABLET, FILM COATED ORAL at 08:58

## 2024-03-24 RX ADMIN — Medication 1 TABLET: at 08:58

## 2024-03-24 RX ADMIN — FINASTERIDE 5 MG: 5 TABLET, FILM COATED ORAL at 08:58

## 2024-03-24 RX ADMIN — BUMETANIDE 1 MG: 1 TABLET ORAL at 08:58

## 2024-03-24 RX ADMIN — ATORVASTATIN CALCIUM 40 MG: 40 TABLET, FILM COATED ORAL at 08:58

## 2024-03-24 RX ADMIN — BENZONATATE 100 MG: 100 CAPSULE ORAL at 08:58

## 2024-03-24 RX ADMIN — METOPROLOL SUCCINATE 25 MG: 25 TABLET, EXTENDED RELEASE ORAL at 08:58

## 2024-03-24 RX ADMIN — FERROUS SULFATE TAB 325 MG (65 MG ELEMENTAL FE) 1 TABLET: 325 (65 FE) TAB at 08:58

## 2024-03-24 RX ADMIN — TAMSULOSIN HYDROCHLORIDE 0.4 MG: 0.4 CAPSULE ORAL at 08:58

## 2024-03-24 RX ADMIN — DOXYCYCLINE HYCLATE 100 MG: 100 TABLET, COATED ORAL at 08:58

## 2024-03-24 RX ADMIN — FOLIC ACID 1 MG: 1 TABLET ORAL at 08:58

## 2024-03-24 ASSESSMENT — PAIN - FUNCTIONAL ASSESSMENT: PAIN_FUNCTIONAL_ASSESSMENT: 0-10

## 2024-03-24 ASSESSMENT — PAIN SCALES - GENERAL: PAINLEVEL_OUTOF10: 0 - NO PAIN

## 2024-03-24 NOTE — DISCHARGE SUMMARY
Discharge Diagnosis  Pneumonia of left lower lobe due to infectious organism    Issues Requiring Follow-Up  Hemophilia A     Test Results Pending At Discharge  Pending Labs       Order Current Status    CBC and Auto Differential In process    Comprehensive metabolic panel In process    Factor 8 Activity In process    Factor 8 Activity In process    Magnesium In process    Blood Culture Preliminary result    Blood Culture Preliminary result            Hospital Course  Ramón Flores is a 89 y.o. 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 In the ED, CXR (3/19) showed small bibasilar pleural effusions and atelectasis, L>R, and superimposed left basilar developing consolidation not excluded. Cardiac US (3/19) negative for a significant pericardial effusion, LV systolic function decreased, RV size normal. Patient started on Azithromycin and Ceftriaxone (3/19-3/21) for CAP and admitted for further management. Heme consulted (3/19), s/p Advate x2 (3/19) and planned weekly Altuviiio (3/20) with daily factor VIII levels. UA (3/19) +blood, nitrites, leukocytes; urine culture (3/21) positive for MRSA. Discontinued ceftriaxone/azithromycin and started vancomycin (3/21-3/23 ). Denies recurrent hematuria since admission, denies flank pain, recent urinary catheters, fevers, or dysuria. Repeat urine culture (3/21) +MRSA (sensitive to doxycycline) Developed new RONNIE, sCr 1.39 3/20, s/p LR bolus, now improved (3/23) sCr 1.1. Renal US negative (3/21) for hydronephrosis. No further heme recommendations while inpatient, continued home Altuviiio infusions weekly and FUV outpatient. Discharge home with continued home care (RN, HHA, and PT) and on oral antibiotics, doxycycline x10 days. On the day of discharge, 3/24, the patient reported feeling well and pain was controlled.  Vitals and labs were stable.     Patient requested antibiotics to be send to [facility]. 100mg Doxycycline BID for 10 days sent to [facility].     FUV PCP 4/5, Dr. Dillon 4/10, ophthalmology 5/1, urology 11/26        Pertinent Physical Exam At Time of Discharge  Physical Exam  Vitals reviewed.   Constitutional:       Appearance: Normal appearance.   HENT:      Head: Normocephalic and atraumatic.      Nose: Nose normal.      Mouth/Throat:      Mouth: Mucous membranes are moist.      Pharynx: Oropharynx is clear.   Eyes:      Extraocular Movements: Extraocular movements intact.      Pupils: Pupils are equal, round, and reactive to light.   Cardiovascular:      Rate and Rhythm: Normal rate and regular rhythm.      Pulses: Normal pulses.      Heart sounds: Normal heart sounds.   Pulmonary:      Effort: Pulmonary effort is normal.      Breath sounds: Normal breath sounds.   Abdominal:      General: Bowel sounds are normal.      Palpations: Abdomen is soft.   Musculoskeletal:         General: Normal range of motion.   Skin:     General: Skin is warm.   Neurological:      General: No focal deficit present.      Mental Status: He is alert and oriented to person, place, and time. Mental status is at baseline.   Psychiatric:         Mood and Affect: Mood normal.         Behavior: Behavior normal.         Home Medications     Medication List      START taking these medications     doxycycline 100 mg capsule; Commonly known as: Vibramycin; Take 1   capsule (100 mg) by mouth every 12 hours for 20 doses. Take with a full   glass of water and do not lie down for at least 30 minutes after. Do not   start before March 24, 2024.     CONTINUE taking these medications     acetaminophen 500 mg tablet; Commonly known as: Tylenol   antihemophilic RF VIII 3,000 (+/-) unit recon soln; Commonly known as:   Altuviiio; Infuse 3,475 Units into a venous catheter every 7 days. 3475   units +/-10% dose every 7 days.  Additionally,  prn when directed.    atorvastatin 40 mg tablet; Commonly known as: Lipitor; Take 1 tablet (40   mg) by mouth once daily.   bumetanide 1 mg tablet; Commonly known as: Bumex; Take 1 tablet (1 mg)   by mouth once daily as needed (edema).   calcium carbonate 600 mg calcium (1,500 mg) tablet   ferrous sulfate (325 mg ferrous sulfate) tablet   finasteride 5 mg tablet; Commonly known as: Proscar; Take 1 tablet (5   mg) by mouth once daily.   folic acid 1 mg tablet; Commonly known as: Folvite   hydroCHLOROthiazide 12.5 mg tablet; Commonly known as: Microzide   lactobacillus acidophilus tablet tablet   metoprolol succinate XL 50 mg 24 hr tablet; Commonly known as:   Toprol-XL; TAKE 1 TABLET BY MOUTH DAILY   multivitamin with minerals tablet   nystatin cream; Commonly known as: Mycostatin   pantoprazole 40 mg EC tablet; Commonly known as: ProtoNix; Take 1 tablet   (40 mg) by mouth once daily in the morning. Take before meals. Do not   crush, chew, or split.   PRESERVISION AREDS ORAL   spironolactone 25 mg tablet; Commonly known as: Aldactone; Take 1 tablet   (25 mg) by mouth once daily.   tamsulosin 0.4 mg 24 hr capsule; Commonly known as: Flomax; Take 1   capsule (0.4 mg) by mouth 2 times a day.     STOP taking these medications     ipratropium 42 mcg (0.06 %) nasal spray; Commonly known as: Atrovent       Outpatient Follow-Up  Future Appointments   Date Time Provider Department [place]   4/4/2024 12:30 PM Elham Moran MD [id number] [facility]   4/10/2024  9:00 AM Rufino Dillon MD [id number] [facility]   5/1/2024  3:15 PM Lito Ackerman MD [id number] [facility]   11/26/2024  2:30 PM Raisa Sierra MD [id number] [facility]         I spent >30 minutes in the overall care and discharge planning of this patient     Assessment and plan discussed with attending physician Dr. Faizan Llamas, APRN-CNP

## 2024-03-24 NOTE — PROGRESS NOTES
Discharge Planning note:      Ramón Flores is a 89 y.o. male on day 5 of admission presenting with Pneumonia of left lower lobe due to infectious organism.      Patient will discharge home today son to . S home care to call patient today to set up a time for soc tonight. Skilled nursing to start tomorrow.  Will send discharge information via careport. Patient called and made aware.                      Melissa Marsh RN T[facility]

## 2024-03-24 NOTE — CARE PLAN
The patient's goals for the shift include      The clinical goals for the shift include Patient will remain safe and free from injuries throughout the shift 3/24/24 @ 0700      Problem: Pain  Goal: My pain/discomfort is manageable  Outcome: Progressing     Problem: Safety  Goal: Patient will be injury free during hospitalization  Outcome: Progressing  Goal: I will remain free of falls  Outcome: Progressing     Problem: Daily Care  Goal: Daily care needs are met  Outcome: Progressing     Problem: Psychosocial Needs  Goal: Demonstrates ability to cope with hospitalization/illness  Outcome: Progressing  Goal: Collaborate with me, my family, and caregiver to identify my specific goals  Outcome: Progressing     Problem: Discharge Barriers  Goal: My discharge needs are met  Outcome: Progressing     Problem: Skin  Goal: Promote/optimize nutrition  Outcome: Progressing

## 2024-03-25 LAB
FACT VIII ACT/NOR PPP: 17 % (ref 55–180)
FACT VIII ACT/NOR PPP: 25 % (ref 55–180)

## 2024-03-26 DIAGNOSIS — D66 HEMOPHILIA A (MULTI): ICD-10-CM

## 2024-03-26 NOTE — TELEPHONE ENCOUNTER
[facility] needs new Rx for altuviiio and home infusion nurse to restart post hospitalization

## 2024-04-03 ENCOUNTER — APPOINTMENT (OUTPATIENT)
Dept: OPHTHALMOLOGY | Facility: CLINIC | Age: 89
End: 2024-04-03
Payer: MEDICARE

## 2024-04-04 ENCOUNTER — APPOINTMENT (OUTPATIENT)
Dept: PRIMARY CARE | Facility: CLINIC | Age: 89
End: 2024-04-04
Payer: MEDICARE

## 2024-04-04 ENCOUNTER — TELEPHONE (OUTPATIENT)
Dept: ADMISSION | Facility: HOSPITAL | Age: 89
End: 2024-04-04

## 2024-04-04 NOTE — TELEPHONE ENCOUNTER
[facility] is requesting clinic notes supporting patient's diagnosis be sent to them in order to fill Altuviiio.     Fax #: [phone].

## 2024-04-06 PROCEDURE — G0180 MD CERTIFICATION HHA PATIENT: HCPCS | Performed by: NURSE PRACTITIONER

## 2024-04-08 ENCOUNTER — OFFICE VISIT (OUTPATIENT)
Dept: GERIATRIC MEDICINE | Facility: CLINIC | Age: 89
End: 2024-04-08
Payer: MEDICARE

## 2024-04-08 VITALS
SYSTOLIC BLOOD PRESSURE: 126 MMHG | RESPIRATION RATE: 16 BRPM | TEMPERATURE: 97.6 F | BODY MASS INDEX: 25.9 KG/M2 | DIASTOLIC BLOOD PRESSURE: 66 MMHG | WEIGHT: 132.8 LBS | HEART RATE: 55 BPM

## 2024-04-08 DIAGNOSIS — K21.9 GASTROESOPHAGEAL REFLUX DISEASE WITHOUT ESOPHAGITIS: ICD-10-CM

## 2024-04-08 DIAGNOSIS — N40.0 BENIGN PROSTATIC HYPERPLASIA WITHOUT LOWER URINARY TRACT SYMPTOMS: ICD-10-CM

## 2024-04-08 DIAGNOSIS — E78.5 HYPERLIPIDEMIA, UNSPECIFIED HYPERLIPIDEMIA TYPE: ICD-10-CM

## 2024-04-08 DIAGNOSIS — R60.0 BILATERAL LEG EDEMA: ICD-10-CM

## 2024-04-08 DIAGNOSIS — I50.32 CHRONIC HEART FAILURE WITH PRESERVED EJECTION FRACTION (MULTI): ICD-10-CM

## 2024-04-08 DIAGNOSIS — D66 HEMOPHILIA A (MULTI): ICD-10-CM

## 2024-04-08 DIAGNOSIS — M19.90 ARTHRITIS: Primary | ICD-10-CM

## 2024-04-08 PROCEDURE — 1126F AMNT PAIN NOTED NONE PRSNT: CPT | Performed by: NURSE PRACTITIONER

## 2024-04-08 PROCEDURE — 1160F RVW MEDS BY RX/DR IN RCRD: CPT | Performed by: NURSE PRACTITIONER

## 2024-04-08 PROCEDURE — 99215 OFFICE O/P EST HI 40 MIN: CPT | Performed by: NURSE PRACTITIONER

## 2024-04-08 PROCEDURE — 1159F MED LIST DOCD IN RCRD: CPT | Performed by: NURSE PRACTITIONER

## 2024-04-08 PROCEDURE — 1157F ADVNC CARE PLAN IN RCRD: CPT | Performed by: NURSE PRACTITIONER

## 2024-04-08 PROCEDURE — 3078F DIAST BP <80 MM HG: CPT | Performed by: NURSE PRACTITIONER

## 2024-04-08 PROCEDURE — 1111F DSCHRG MED/CURRENT MED MERGE: CPT | Performed by: NURSE PRACTITIONER

## 2024-04-08 PROCEDURE — 1036F TOBACCO NON-USER: CPT | Performed by: NURSE PRACTITIONER

## 2024-04-08 PROCEDURE — 3074F SYST BP LT 130 MM HG: CPT | Performed by: NURSE PRACTITIONER

## 2024-04-08 RX ORDER — ACETAMINOPHEN 500 MG
1000 TABLET ORAL EVERY MORNING
Qty: 60 TABLET | Refills: 11
Start: 2024-04-08 | End: 2025-04-08

## 2024-04-08 ASSESSMENT — PAIN SCALES - GENERAL: PAINLEVEL: 0-NO PAIN

## 2024-04-08 NOTE — PROGRESS NOTES
Subjective   Mr. Flores 89 y.o. year old male is homebound due to chronic heart failure with preserved ejection fraction, atrial fibrillation, hypertension, stage IIIa chronic kidney disease and leaving the home requires considerable taxing effort.     is being seen in the home in leiu of the office due to medical necessity.  has a history of Hemophilia A,Protein C deficiency, HTN, Afib, HFpEF, HLD, nephrolithiasis, CAD s/p CABG (2002), BPH, and R fem neck fx s/p THR 1/13/23). Seen together with Dr. Sarah Butterfield, hired caregiver MAOmid Flores is a 89 y.o. male who is seen today following recent hospitalization for CAP. He reports feeling much better following admission. Reports main concern is persistent knee pain, R greater than L. Takes acetaminophen infrequently, usually only 1-2 tablets once a day. Dr. Sarah Butterfield present for visit, discussed patient's pain concerns, recommended taking acetaminophen 500 mg tablet 2 tablets every morning. Patient reports often getting up during the night to urinate. May also consider taking another 1000 mg at bedtime for improved sleep. Instructions shared with hired caregiver to share with RN who fills weekly pill box.         Reason for visit:  Follow up visit: [X]  Caregivers present during Home Care visit: Hired Caregiver    Interval History:   Recent illness, hospitalizations or surgeries since last visit: Ed to Hospital admission.  Pneumonia of left lower lung due to infectious organism,   Previous hospitalizations: Yes    Date: 3/19-3/24/2024    Hospital Course significant for:  Pt presented w/ 2d SOB, hematuria. In the ED, CXR (3/19) pos small bibasilar pleural effusions, atelectasis, L>R, superimposed left basilar developing consolidation not excluded. Cardiac US (3/19) neg significant pericardial effusion, LV sys function dec RV size normal. Started on Azithromycin and Ceftriaxone (3/19-3/21) for CAP, admitted for further management. Heme consulted  (3/19), s/p Advate x2 (3/19) and planned weekly Altuviiio (3/20) with daily factor VIII levels. UA (3/19) +blood, nitrites, leukocytes; urine culture (3/21) positive for MRSA. Discontinued ceftriaxone/azithromycin, started vancomycin (3/21-3/23 ). Repeat UC (3/21) +MRSA (sensitive to doxycycline). Developed new RONNIE, sCr 1.39 (3/20), s/p LR bolus, improved sCr 1.1 (3/23). Renal US neg (3/21) for hydronephrosis. No further heme recommendations while inpt, cont home Altuviiio infusions weekly, FUV outpt. Discharge home w/ cont HHC: RN/HHA/PT; oral abx: doxycycline x10d.     Current Outpatient Medications on File Prior to Visit   Medication Sig Dispense Refill    acetaminophen (Tylenol) 500 mg tablet Take 1 tablet (500 mg) by mouth every 6 hours if needed (pain).      antihemophilic RF VIII (Altuviiio) 3,000 (+/-) unit recon soln Infuse 3,175 Units into a venous catheter every 7 days. 3175 units +/-10% dose every 7 days.  Additionally,  prn when directed. 5 each 12    atorvastatin (Lipitor) 40 mg tablet Take 1 tablet (40 mg) by mouth once daily. 90 tablet 1    bumetanide (Bumex) 1 mg tablet Take 1 tablet (1 mg) by mouth once daily as needed (edema).      calcium carbonate 600 mg calcium (1,500 mg) tablet Take 1 tablet (1,500 mg) by mouth 2 times a day with meals.      ferrous sulfate 325 (65 Fe) MG tablet Take 1 tablet by mouth once daily.      finasteride (Proscar) 5 mg tablet Take 1 tablet (5 mg) by mouth once daily. 90 tablet 1    folic acid (Folvite) 1 mg tablet Take 1 tablet (1 mg) by mouth once daily.      hydroCHLOROthiazide (Microzide) 12.5 mg tablet Take 1 tablet (12.5 mg) by mouth once daily.      Lactobacillus acidoph-L.bulgar 1 million cell tablet tablet Take 1 tablet by mouth once daily.      metoprolol succinate XL (Toprol-XL) 50 mg 24 hr tablet TAKE 1 TABLET BY MOUTH DAILY 90 tablet 3    multivitamin with minerals (multivitamin-iron-folic acid) tablet Take 1 tablet by mouth once daily.      pantoprazole  (ProtoNix) 40 mg EC tablet Take 1 tablet (40 mg) by mouth once daily in the morning. Take before meals. Do not crush, chew, or split. 90 tablet 1    spironolactone (Aldactone) 25 mg tablet Take 1 tablet (25 mg) by mouth once daily. 90 tablet 1    tamsulosin (Flomax) 0.4 mg 24 hr capsule Take 1 capsule (0.4 mg) by mouth 2 times a day. 180 capsule 1    vit A/vit C/vit E/zinc/copper (PRESERVISION AREDS ORAL) Take 1 capsule by mouth once daily.       No current facility-administered medications on file prior to visit.         Diet and Physical Activity:  Current Diet:  Regular, low salt - MOW, Tues and Thurs, Nephew does grocery shopping  Dietary Supplements: None mentioned  Appetite: good  Food Consistency: Regular  Liquids Consistency: thin   Gait/Mobility: Uses Assistance Device Wheelchair, chairlift    Sensory impairments:  Visual: glasses Yes Last exam: 12/6/2023  Hearing: hearing N/A Last exam: NA  Dental: dentures No Last exam: unk    Activities of Daily Living  Bathing:  Needs Assistance  Dressing:  Needs Assistance  Toileting:  Needs Assistance  Feeding:   Dependent  Personal Hygiene:  Needs Assistance    Continence:       Bowels: Continent       Bladder:  Continent    Instrumental Activities of Daily Living  Managing Finances:  Needs Assistance  Shopping:   Dependent  Managing Medications:   Dependent  Basic Home Maintenance:   Dependent  Housework:   Dependent  Laundry:       Dependent  Preparing Meals:   Dependent  Other Assistance: Yes, Aid present most of day, but no longer overnight    Falls Risk Screening:  Have you fallen in last 6 months? No   If yes, did your fall result in injury? No Last fall was 9/5/2023, sustained hip fx, fx head of left radius    Home Safety Risk Factors:  none    Advance Directives:  Discussed, verbal or written information provided if indicated  Living will: No  , Placed in chart: No   Healthcare POA: No   Placed in chart: No   Patient's End of Life Decisions:  Reviewed with  pt: No   Provider Concerns:   Patient's DNR status:DNR and No Intubation noted in EMR as of 3/19/2024    Additional information:       [facility] Controlled Substance Monitoring: No Controlled Substances     Review of Systems   Constitutional: Negative.    HENT: Negative.     Eyes: Negative.    Respiratory: Negative.     Gastrointestinal: Negative.    Endocrine: Negative.    Genitourinary: Negative.    Musculoskeletal:  Positive for arthralgias.        Joint contracture of Right knee   Skin: Negative.    Allergic/Immunologic: Negative.    Neurological: Negative.    Hematological: Negative.    Psychiatric/Behavioral: Negative.         Objective     Physical Exam  Vitals reviewed.   Constitutional:       General: He is awake.      Appearance: Normal appearance. He is well-developed and well-groomed.   HENT:      Head: Normocephalic and atraumatic.   Cardiovascular:      Rate and Rhythm: Regular rhythm. Bradycardia present.      Pulses: Normal pulses.      Heart sounds: Normal heart sounds.   Pulmonary:      Effort: Pulmonary effort is normal.      Breath sounds: Normal breath sounds.   Abdominal:      General: Bowel sounds are normal.      Palpations: Abdomen is soft.   Musculoskeletal:      Right lower le+ Pitting Edema present.      Left lower le+ Pitting Edema present.   Neurological:      Mental Status: He is alert and oriented to person, place, and time.   Psychiatric:         Mood and Affect: Mood normal.         Behavior: Behavior normal. Behavior is cooperative.         Thought Content: Thought content normal.         Judgment: Judgment normal.         Lab Results   Component Value Date    TSH 6.80 (H) 2023    TSH 4.88 (H) 10/12/2022    TSH 6.68 (H) 08/10/2022     Lab Results   Component Value Date    FREET4 1.04 2023    FREET4 1.03 08/10/2022    FREET4 1.31 2021     Lab Results   Component Value Date    MRUBENMK64 503 2023    TIVNVDOM45 313 2023    YEEYYHPQ58 436 2017      Lab Results   Component Value Date    HGBA1C 5.2 08/10/2022    HGBA1C 5.5 07/12/2021    HGBA1C 5.4 12/14/2017     Lab Results   Component Value Date    VITD25 43 07/27/2023    VITD25 32 04/08/2023    VITD25 26 (A) 02/27/2023          Assessment/Plan   Problem List Items Addressed This Visit             ICD-10-CM    Arthritis - Primary M19.90     Continue with acetaminophen 1000 mg every morning.  May take an additional 1000 mg at bedtime.  Not to exceed 3000 mg in a 24-hour period.         Relevant Medications    acetaminophen (Tylenol Extra Strength) 500 mg tablet    Esophageal reflux K21.9     Continue with pantoprazole 40 mg once daily 30 minutes before meal.         Hemophilia A (Multi) D66     Continue with antihemophilic RF VIII (Altuviiio) 3,175 units every 7 days and PRN when directed.  Followed by hematology         Hyperlipidemia E78.5     Continue with atorvastatin 40 mg once daily         (HFpEF) heart failure with preserved ejection fraction (Multi) I50.30     Continue with metoprolol succinate 50 mg once daily, spironolactone 25 mg once daily, hydrochlorothiazide 12.5 mg once daily, bumetanide 1 mg once daily as needed for edema  Followed by cardiology         Benign prostatic hyperplasia without lower urinary tract symptoms N40.0     Continue with finasteride 5 mg once daily, continue with tamsulosin 0.4 mg at bedtime         Bilateral leg edema R60.0     Bilateral pitting edema, mix dependent & HF   Continue with hydrochlorothiazide 12.5 mg once daily, spironolactone 25 mg once daily, bumetanide 1 mg once daily as needed  Wear compression socks daily           Annmarie Khanna, APRN-CNP

## 2024-04-09 DIAGNOSIS — B37.2 YEAST INFECTION OF THE SKIN: Primary | ICD-10-CM

## 2024-04-09 RX ORDER — NYSTATIN 100000 U/G
1 CREAM TOPICAL 2 TIMES DAILY
Qty: 1 G | Refills: 3 | Status: SHIPPED | OUTPATIENT
Start: 2024-04-09 | End: 2024-06-11 | Stop reason: SDUPTHER

## 2024-04-10 ENCOUNTER — DOCUMENTATION (OUTPATIENT)
Dept: HEMATOLOGY/ONCOLOGY | Facility: HOSPITAL | Age: 89
End: 2024-04-10
Payer: MEDICARE

## 2024-04-10 ENCOUNTER — OFFICE VISIT (OUTPATIENT)
Dept: HEMATOLOGY/ONCOLOGY | Facility: HOSPITAL | Age: 89
End: 2024-04-10
Payer: MEDICARE

## 2024-04-10 ENCOUNTER — LAB (OUTPATIENT)
Dept: LAB | Facility: HOSPITAL | Age: 89
End: 2024-04-10
Payer: MEDICARE

## 2024-04-10 VITALS
DIASTOLIC BLOOD PRESSURE: 63 MMHG | OXYGEN SATURATION: 100 % | HEART RATE: 69 BPM | RESPIRATION RATE: 20 BRPM | SYSTOLIC BLOOD PRESSURE: 131 MMHG | TEMPERATURE: 97.3 F

## 2024-04-10 DIAGNOSIS — D69.6 THROMBOCYTOPENIA (CMS-HCC): ICD-10-CM

## 2024-04-10 DIAGNOSIS — D45 POLYCYTHEMIA VERA (MULTI): ICD-10-CM

## 2024-04-10 DIAGNOSIS — D66 HEMOPHILIA A (MULTI): ICD-10-CM

## 2024-04-10 DIAGNOSIS — D66 FACTOR VIII DEFICIENCY (MULTI): ICD-10-CM

## 2024-04-10 LAB
ALBUMIN SERPL BCP-MCNC: 4.3 G/DL (ref 3.4–5)
ALP SERPL-CCNC: 62 U/L (ref 33–136)
ALT SERPL W P-5'-P-CCNC: 29 U/L (ref 10–52)
ANION GAP SERPL CALC-SCNC: 12 MMOL/L (ref 10–20)
APTT PPP: 39 SECONDS (ref 27–38)
AST SERPL W P-5'-P-CCNC: 26 U/L (ref 9–39)
BASOPHILS # BLD AUTO: 0.01 X10*3/UL (ref 0–0.1)
BASOPHILS NFR BLD AUTO: 0.2 %
BILIRUB DIRECT SERPL-MCNC: 0.3 MG/DL (ref 0–0.3)
BILIRUB SERPL-MCNC: 1.9 MG/DL (ref 0–1.2)
BUN SERPL-MCNC: 31 MG/DL (ref 6–23)
CALCIUM SERPL-MCNC: 9.1 MG/DL (ref 8.6–10.3)
CHLORIDE SERPL-SCNC: 108 MMOL/L (ref 98–107)
CO2 SERPL-SCNC: 26 MMOL/L (ref 21–32)
CREAT SERPL-MCNC: 1.31 MG/DL (ref 0.5–1.3)
DAT-POLYSPECIFIC: NORMAL
EGFRCR SERPLBLD CKD-EPI 2021: 50 ML/MIN/1.73M*2
EOSINOPHIL # BLD AUTO: 0.05 X10*3/UL (ref 0–0.4)
EOSINOPHIL NFR BLD AUTO: 1.2 %
ERYTHROCYTE [DISTWIDTH] IN BLOOD BY AUTOMATED COUNT: 13.7 % (ref 11.5–14.5)
ERYTHROCYTE [SEDIMENTATION RATE] IN BLOOD BY WESTERGREN METHOD: 1 MM/H (ref 0–20)
FACT VIII ACT/NOR PPP: 51 % (ref 55–180)
FERRITIN SERPL-MCNC: 147 NG/ML (ref 20–300)
GLUCOSE SERPL-MCNC: 94 MG/DL (ref 74–99)
HCT VFR BLD AUTO: 37.2 % (ref 41–52)
HGB BLD-MCNC: 12.6 G/DL (ref 13.5–17.5)
HGB RETIC QN: 37 PG (ref 28–38)
IGA SERPL-MCNC: 383 MG/DL (ref 70–400)
IGG SERPL-MCNC: 827 MG/DL (ref 700–1600)
IGM SERPL-MCNC: 79 MG/DL (ref 40–230)
IMM GRANULOCYTES # BLD AUTO: 0.01 X10*3/UL (ref 0–0.5)
IMM GRANULOCYTES NFR BLD AUTO: 0.2 % (ref 0–0.9)
IMMATURE RETIC FRACTION: 11.5 %
INR PPP: 1.2 (ref 0.9–1.1)
IRON SATN MFR SERPL: 30 % (ref 25–45)
IRON SERPL-MCNC: 96 UG/DL (ref 35–150)
LYMPHOCYTES # BLD AUTO: 1.35 X10*3/UL (ref 0.8–3)
LYMPHOCYTES NFR BLD AUTO: 32.8 %
MCH RBC QN AUTO: 34.1 PG (ref 26–34)
MCHC RBC AUTO-ENTMCNC: 33.9 G/DL (ref 32–36)
MCV RBC AUTO: 101 FL (ref 80–100)
MONOCYTES # BLD AUTO: 0.34 X10*3/UL (ref 0.05–0.8)
MONOCYTES NFR BLD AUTO: 8.3 %
NEUTROPHILS # BLD AUTO: 2.36 X10*3/UL (ref 1.6–5.5)
NEUTROPHILS NFR BLD AUTO: 57.3 %
NRBC BLD-RTO: 0 /100 WBCS (ref 0–0)
PLATELET # BLD AUTO: 103 X10*3/UL (ref 150–450)
POTASSIUM SERPL-SCNC: 4.3 MMOL/L (ref 3.5–5.3)
PROT SERPL-MCNC: 6.5 G/DL (ref 6.4–8.2)
PROT SERPL-MCNC: 6.8 G/DL (ref 6.4–8.2)
PROTHROMBIN TIME: 13.6 SECONDS (ref 9.8–12.8)
RBC # BLD AUTO: 3.7 X10*6/UL (ref 4.5–5.9)
RETICS #: 0.05 X10*6/UL (ref 0.02–0.11)
RETICS/RBC NFR AUTO: 1.3 % (ref 0.5–2)
SODIUM SERPL-SCNC: 142 MMOL/L (ref 136–145)
TIBC SERPL-MCNC: 319 UG/DL (ref 240–445)
UIBC SERPL-MCNC: 223 UG/DL (ref 110–370)
WBC # BLD AUTO: 4.1 X10*3/UL (ref 4.4–11.3)

## 2024-04-10 PROCEDURE — 85045 AUTOMATED RETICULOCYTE COUNT: CPT

## 2024-04-10 PROCEDURE — 82728 ASSAY OF FERRITIN: CPT | Performed by: INTERNAL MEDICINE

## 2024-04-10 PROCEDURE — 83521 IG LIGHT CHAINS FREE EACH: CPT | Performed by: INTERNAL MEDICINE

## 2024-04-10 PROCEDURE — 1160F RVW MEDS BY RX/DR IN RCRD: CPT | Performed by: INTERNAL MEDICINE

## 2024-04-10 PROCEDURE — 82784 ASSAY IGA/IGD/IGG/IGM EACH: CPT | Performed by: INTERNAL MEDICINE

## 2024-04-10 PROCEDURE — 82248 BILIRUBIN DIRECT: CPT | Performed by: INTERNAL MEDICINE

## 2024-04-10 PROCEDURE — 36415 COLL VENOUS BLD VENIPUNCTURE: CPT

## 2024-04-10 PROCEDURE — 85652 RBC SED RATE AUTOMATED: CPT | Performed by: INTERNAL MEDICINE

## 2024-04-10 PROCEDURE — 3078F DIAST BP <80 MM HG: CPT | Performed by: INTERNAL MEDICINE

## 2024-04-10 PROCEDURE — 86023 IMMUNOGLOBULIN ASSAY: CPT

## 2024-04-10 PROCEDURE — 1157F ADVNC CARE PLAN IN RCRD: CPT | Performed by: INTERNAL MEDICINE

## 2024-04-10 PROCEDURE — 1159F MED LIST DOCD IN RCRD: CPT | Performed by: INTERNAL MEDICINE

## 2024-04-10 PROCEDURE — 3075F SYST BP GE 130 - 139MM HG: CPT | Performed by: INTERNAL MEDICINE

## 2024-04-10 PROCEDURE — 86880 COOMBS TEST DIRECT: CPT

## 2024-04-10 PROCEDURE — 1111F DSCHRG MED/CURRENT MED MERGE: CPT | Performed by: INTERNAL MEDICINE

## 2024-04-10 PROCEDURE — 1126F AMNT PAIN NOTED NONE PRSNT: CPT | Performed by: INTERNAL MEDICINE

## 2024-04-10 PROCEDURE — 85730 THROMBOPLASTIN TIME PARTIAL: CPT | Performed by: INTERNAL MEDICINE

## 2024-04-10 PROCEDURE — 85610 PROTHROMBIN TIME: CPT | Performed by: INTERNAL MEDICINE

## 2024-04-10 PROCEDURE — 84155 ASSAY OF PROTEIN SERUM: CPT | Performed by: INTERNAL MEDICINE

## 2024-04-10 PROCEDURE — 85025 COMPLETE CBC W/AUTO DIFF WBC: CPT

## 2024-04-10 PROCEDURE — 85240 CLOT FACTOR VIII AHG 1 STAGE: CPT | Performed by: INTERNAL MEDICINE

## 2024-04-10 PROCEDURE — 84165 PROTEIN E-PHORESIS SERUM: CPT | Performed by: INTERNAL MEDICINE

## 2024-04-10 PROCEDURE — 99215 OFFICE O/P EST HI 40 MIN: CPT | Performed by: INTERNAL MEDICINE

## 2024-04-10 PROCEDURE — 83550 IRON BINDING TEST: CPT | Mod: 91 | Performed by: INTERNAL MEDICINE

## 2024-04-10 PROCEDURE — 80053 COMPREHEN METABOLIC PANEL: CPT

## 2024-04-10 PROCEDURE — 1036F TOBACCO NON-USER: CPT | Performed by: INTERNAL MEDICINE

## 2024-04-10 PROCEDURE — 99215 OFFICE O/P EST HI 40 MIN: CPT | Mod: 25 | Performed by: INTERNAL MEDICINE

## 2024-04-10 ASSESSMENT — ENCOUNTER SYMPTOMS
EYES NEGATIVE: 1
PSYCHIATRIC NEGATIVE: 1
HEMATOLOGIC/LYMPHATIC NEGATIVE: 1
NEUROLOGICAL NEGATIVE: 1
ENDOCRINE NEGATIVE: 1
GASTROINTESTINAL NEGATIVE: 1
ARTHRALGIAS: 1
RESPIRATORY NEGATIVE: 1
CONSTITUTIONAL NEGATIVE: 1
CARDIOVASCULAR NEGATIVE: 1

## 2024-04-10 ASSESSMENT — PAIN SCALES - GENERAL: PAINLEVEL: 0-NO PAIN

## 2024-04-10 NOTE — PROGRESS NOTES
Patient ID: Ramón Flores is a 89 y.o. male.  Referring Physician: Terese Llamas, APRN-CNP  [address] [place] [address]  Primary Care Provider: No primary care provider on file.  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  He had minimal bleeding during life due concurrent protein C deficiency.  However, he bleeds on interventions.  His major problem is recovery from several syncopal episodes that led to a prolonged rehabilitation in [place].  There he spent most of his time in bed or in a chair and became weak,  In that period of time, his osteoarthritis stiffened and presently, he is unable to fully extend his knees to  an upright position.  He lives at home with 3 wheelchairs on each of 3 levels in his home.  He can walk with a walker with assistance. He had good upper body support.     PMHx: In January 2022, he went to see his opthalmologist for his macular degeneration.  The opthalmologist  decided that he needed another eye injection of ? (VEGF?).  One had been given in the office without incident 6 weeks prior - the hemophilia service was not  informed.  The injection was given without incidence.     However, overnight, the patient had a bleeding eye.  He had blood on his pillow.  He says his vision is OK.  We saw him and had opthalmology see the patient and gave him factor for 4 days.  It got better.     In 10/2022, he called me to complain of a painful left ankle.  Being out-of-town, I asked the patient to go to the ER.  He got factor for a few days and it appeared to get better.  But presently, it never really went away. The patient does not have  pain at rest, but one can see that each step is painful.      It was for these reasons that although he had never been on FVIII prophylaxis, we felt that it was time to institute prophylaxis for bleeding with FVIII infusions.  At that time, we put him on Adynovate - a rFVIII  that has a slightly longer half-life.  He gets prophy  twice weekly, Monday and Thursday.  His bleeding episodes appear to have decreased.  The patient is pleased as well.   Last year, when available, we started him on Altuviiio. This agent is once weekly and his bleeding issues have not been a problem.       Meds: no changes           Review of Systems:   · Constitutional NEGATIVE: Fever, Chills, Anorexia, Weight Loss, Malaise      · Eyes NEGATIVE: Blurry Vision, Drainage, Diploplia, Redness, Vision Loss/ Change      · ENMT NEGATIVE: Nasal Discharge, Nasal Congestion, Ear Pain, Mouth Pain, Throat Pain      · Respiratory NEGATIVE: Dry Cough, Productive Cough, Hemoptysis, Wheezing, Shortness of Breath      · Cardiology NEGATIVE: Chest Pain, Dyspnea on Exertion, Orthopnea, Palpitations, Syncope      · Gastrointestinal NEGATIVE: Abdominal Pain, Constipation, Diarrhea, Nausea, Vomiting      · Genitourinary NEGATIVE: Discharge, Dysuria, Flank Pain, Frequency, Hematuria      ·  Musculoskeletal POSITIVE: Pain     NEGATIVE: Decreased ROM, Swelling, Stiffness, Weakness      · Neurological NEGATIVE: Dizziness, Confusion, Headache, Seizures, Syncope      · Psychiatric NEGATIVE: Mood Changes, Anxiety, Hallucinations, Sleep Changes, Suicidal Ideas      · Skin NEGATIVE: Mass, Pain, Pruritus, Rash, Ulcer      · Endocrine NEGATIVE: Heat Intolerance, Cold Intolerance, Sweat, Polyuria, Thirst      ·  Hematologic/Lymph POSITIVE: Bruising, Easy Bleeding     NEGATIVE: Petechiae            Allergies and Intolerances:       Allergies:         penicillin: Drug, Hives/Urticaria, Itching, Active     Review of Systems   Constitutional: Negative.    HENT:  Negative.     Eyes: Negative.    Respiratory: Negative.     Cardiovascular: Negative.    Gastrointestinal: Negative.    Endocrine: Negative.    Genitourinary: Negative.     Musculoskeletal:  Positive for arthralgias.        Major issue is an inability to filly extend his knees to walk and  stand. This fact keeps him a chair rest.  His ambulation is severely limited.     Skin: Negative.    Neurological: Negative.    Hematological: Negative.    Psychiatric/Behavioral: Negative.        Objective   BSA: There is no height or weight on file to calculate BSA.  /63   Pulse 69   Temp 36.3 °C (97.3 °F) (Core)   Resp 20   SpO2 100%      has a past medical history of Acute deep vein thrombosis (DVT) of left femoral vein (CMS/[facility]) (09/10/2023), Acute diastolic heart failure (CMS/[facility]) (04/16/2023), Benign prostatic hyperplasia without lower urinary tract symptoms (01/07/2016), Bleeding hemorrhoids (03/01/2023), CAP (community acquired pneumonia) (01/18/2024), Closed nondisplaced fracture of head of left radius (09/05/2023), COVID-19 (05/21/2023), Enlarged prostate with lower urinary tract symptoms (LUTS) (03/01/2023), Fracture of bone of hip (CMS/[facility]) (09/05/2023), Fracture of femoral neck, right (CMS/[facility]) (03/01/2023), Gastrointestinal hemorrhage (09/05/2023), Gingiva hemorrhage (09/05/2023), Gross hematuria (03/01/2023), Hemarthrosis of ankle joint (04/18/2019), Hemarthrosis of knee (05/09/2022), Hematochezia (09/05/2023), Hematoma of lower extremity (09/05/2023), Hematoma of right thigh (03/01/2023), History of recent fall (05/31/2023), Knee effusion (09/05/2023), Knee effusion, left (03/01/2023), Lumbar pain (03/01/2023), Obstructive uropathy (04/28/2023), Orthostatic syncope (09/05/2023), Personal history of other endocrine, nutritional and metabolic disease, Pneumonia of right lung due to infectious organism (03/01/2023), 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   has a past surgical history that includes Coronary artery bypass graft.  Family History   Problem Relation Name Age of Onset    Hemophilia Mother       Oncology  History    No history exists.       Ramón Flores  reports that he has never smoked. He has never used smokeless tobacco.  He Alcohol use questions deferred to the physician.  He  reports no history of drug use.    Physical Exam  Constitutional:       Appearance: Normal appearance. He is normal weight.   HENT:      Head: Normocephalic and atraumatic.      Nose: Nose normal.      Mouth/Throat:      Mouth: Mucous membranes are dry.   Eyes:      Extraocular Movements: Extraocular movements intact.      Conjunctiva/sclera: Conjunctivae normal.      Pupils: Pupils are equal, round, and reactive to light.   Cardiovascular:      Rate and Rhythm: Normal rate and regular rhythm.      Pulses: Normal pulses.      Heart sounds: Normal heart sounds.   Pulmonary:      Effort: Pulmonary effort is normal.      Breath sounds: Normal breath sounds.   Abdominal:      Palpations: Abdomen is soft.   Musculoskeletal:      Cervical back: Normal range of motion and neck supple.      Comments: Knees are flexed at 15-30 degree angles.  Unable to fully extend legs at the level of the knee.     Skin:     General: Skin is warm and dry.   Neurological:      General: No focal deficit present.      Mental Status: He is alert and oriented to person, place, and time.   Psychiatric:         Mood and Affect: Mood normal.         Behavior: Behavior normal.         Thought Content: Thought content normal.         Judgment: Judgment normal.     WBC   Date/Time Value Ref Range Status   04/10/2024 10:11 AM 4.1 (L) 4.4 - 11.3 x10*3/uL Final   03/24/2024 08:25 AM 4.6 4.4 - 11.3 x10*3/uL Final   03/23/2024 07:36 AM 3.9 (L) 4.4 - 11.3 x10*3/uL Final     nRBC   Date Value Ref Range Status   04/10/2024 0.0 0.0 - 0.0 /100 WBCs Final   03/24/2024 0.0 0.0 - 0.0 /100 WBCs Final   03/23/2024 0.0 0.0 - 0.0 /100 WBCs Final     RBC   Date Value Ref Range Status   04/10/2024 3.70 (L) 4.50 - 5.90 x10*6/uL Final   03/24/2024 4.08 (L) 4.50 - 5.90 x10*6/uL Final  "  03/23/2024 3.61 (L) 4.50 - 5.90 x10*6/uL Final     Hemoglobin   Date Value Ref Range Status   04/10/2024 12.6 (L) 13.5 - 17.5 g/dL Final   03/24/2024 13.6 13.5 - 17.5 g/dL Final   03/23/2024 12.4 (L) 13.5 - 17.5 g/dL Final     Hematocrit   Date Value Ref Range Status   04/10/2024 37.2 (L) 41.0 - 52.0 % Final   03/24/2024 40.8 (L) 41.0 - 52.0 % Final   03/23/2024 35.7 (L) 41.0 - 52.0 % Final     MCV   Date/Time Value Ref Range Status   04/10/2024 10:11  (H) 80 - 100 fL Final   03/24/2024 08:25  80 - 100 fL Final   03/23/2024 07:36 AM 99 80 - 100 fL Final     MCH   Date/Time Value Ref Range Status   04/10/2024 10:11 AM 34.1 (H) 26.0 - 34.0 pg Final   03/24/2024 08:25 AM 33.3 26.0 - 34.0 pg Final   03/23/2024 07:36 AM 34.3 (H) 26.0 - 34.0 pg Final     MCHC   Date/Time Value Ref Range Status   04/10/2024 10:11 AM 33.9 32.0 - 36.0 g/dL Final   03/24/2024 08:25 AM 33.3 32.0 - 36.0 g/dL Final   03/23/2024 07:36 AM 34.7 32.0 - 36.0 g/dL Final     RDW   Date/Time Value Ref Range Status   04/10/2024 10:11 AM 13.7 11.5 - 14.5 % Final   03/24/2024 08:25 AM 14.2 11.5 - 14.5 % Final   03/23/2024 07:36 AM 14.1 11.5 - 14.5 % Final     Platelets   Date/Time Value Ref Range Status   04/10/2024 10:11  (L) 150 - 450 x10*3/uL Final   03/24/2024 08:25  (L) 150 - 450 x10*3/uL Final   03/23/2024 07:36  (L) 150 - 450 x10*3/uL Final     No results found for: \"MPV\"  Neutrophils %   Date/Time Value Ref Range Status   04/10/2024 10:11 AM 57.3 40.0 - 80.0 % Final   03/24/2024 08:25 AM 39.2 40.0 - 80.0 % Final   03/23/2024 07:36 AM 43.1 40.0 - 80.0 % Final     Immature Granulocytes %, Automated   Date/Time Value Ref Range Status   04/10/2024 10:11 AM 0.2 0.0 - 0.9 % Final     Comment:     Immature Granulocyte Count (IG) includes promyelocytes, myelocytes and metamyelocytes but does not include bands. Percent differential counts (%) should be interpreted in the context of the absolute cell counts (cells/UL). "   03/24/2024 08:25 AM 0.2 0.0 - 0.9 % Final     Comment:     Immature Granulocyte Count (IG) includes promyelocytes, myelocytes and metamyelocytes but does not include bands. Percent differential counts (%) should be interpreted in the context of the absolute cell counts (cells/UL).   03/23/2024 07:36 AM 0.5 0.0 - 0.9 % Final     Comment:     Immature Granulocyte Count (IG) includes promyelocytes, myelocytes and metamyelocytes but does not include bands. Percent differential counts (%) should be interpreted in the context of the absolute cell counts (cells/UL).     Lymphocytes %   Date/Time Value Ref Range Status   04/10/2024 10:11 AM 32.8 13.0 - 44.0 % Final   03/24/2024 08:25 AM 45.5 13.0 - 44.0 % Final   03/23/2024 07:36 AM 42.6 13.0 - 44.0 % Final     Monocytes %   Date/Time Value Ref Range Status   04/10/2024 10:11 AM 8.3 2.0 - 10.0 % Final   03/24/2024 08:25 AM 10.8 2.0 - 10.0 % Final   03/23/2024 07:36 AM 8.4 2.0 - 10.0 % Final     Eosinophils %   Date/Time Value Ref Range Status   04/10/2024 10:11 AM 1.2 0.0 - 6.0 % Final   03/24/2024 08:25 AM 3.9 0.0 - 6.0 % Final   03/23/2024 07:36 AM 4.6 0.0 - 6.0 % Final     Basophils %   Date/Time Value Ref Range Status   04/10/2024 10:11 AM 0.2 0.0 - 2.0 % Final   03/24/2024 08:25 AM 0.4 0.0 - 2.0 % Final   03/23/2024 07:36 AM 0.8 0.0 - 2.0 % Final     Neutrophils Absolute   Date/Time Value Ref Range Status   04/10/2024 10:11 AM 2.36 1.60 - 5.50 x10*3/uL Final     Comment:     Percent differential counts (%) should be interpreted in the context of the absolute cell counts (cells/uL).   03/24/2024 08:25 AM 1.81 1.60 - 5.50 x10*3/uL Final     Comment:     Percent differential counts (%) should be interpreted in the context of the absolute cell counts (cells/uL).   03/23/2024 07:36 AM 1.70 1.60 - 5.50 x10*3/uL Final     Comment:     Percent differential counts (%) should be interpreted in the context of the absolute cell counts (cells/uL).     Immature Granulocytes  "Absolute, Automated   Date/Time Value Ref Range Status   04/10/2024 10:11 AM 0.01 0.00 - 0.50 x10*3/uL Final   03/24/2024 08:25 AM 0.01 0.00 - 0.50 x10*3/uL Final   03/23/2024 07:36 AM 0.02 0.00 - 0.50 x10*3/uL Final     Lymphocytes Absolute   Date/Time Value Ref Range Status   04/10/2024 10:11 AM 1.35 0.80 - 3.00 x10*3/uL Final   03/24/2024 08:25 AM 2.10 0.80 - 3.00 x10*3/uL Final   03/23/2024 07:36 AM 1.68 0.80 - 3.00 x10*3/uL Final     Monocytes Absolute   Date/Time Value Ref Range Status   04/10/2024 10:11 AM 0.34 0.05 - 0.80 x10*3/uL Final   03/24/2024 08:25 AM 0.50 0.05 - 0.80 x10*3/uL Final   03/23/2024 07:36 AM 0.33 0.05 - 0.80 x10*3/uL Final     Eosinophils Absolute   Date/Time Value Ref Range Status   04/10/2024 10:11 AM 0.05 0.00 - 0.40 x10*3/uL Final   03/24/2024 08:25 AM 0.18 0.00 - 0.40 x10*3/uL Final   03/23/2024 07:36 AM 0.18 0.00 - 0.40 x10*3/uL Final     Basophils Absolute   Date/Time Value Ref Range Status   04/10/2024 10:11 AM 0.01 0.00 - 0.10 x10*3/uL Final   03/24/2024 08:25 AM 0.02 0.00 - 0.10 x10*3/uL Final   03/23/2024 07:36 AM 0.03 0.00 - 0.10 x10*3/uL Final       No components found for: \"PT\"  aPTT   Date/Time Value Ref Range Status   04/10/2024 10:11 AM 39 (H) 27 - 38 seconds Final   01/20/2024 03:59 PM 49 (H) 27 - 38 seconds Final   04/30/2023 05:33 AM 35 26 - 39 sec Final     Comment:       THE APTT IS NO LONGER USED FOR MONITORING     UNFRACTIONATED HEPARIN THERAPY.    FOR MONITORING HEPARIN THERAPY,     USE THE HEPARIN ASSAY.     04/29/2023 01:57 PM 38 26 - 39 sec Final     Comment:       THE APTT IS NO LONGER USED FOR MONITORING     UNFRACTIONATED HEPARIN THERAPY.    FOR MONITORING HEPARIN THERAPY,     USE THE HEPARIN ASSAY.     04/28/2023 10:00 PM 35 26 - 39 sec Final     Comment:       THE APTT IS NO LONGER USED FOR MONITORING     UNFRACTIONATED HEPARIN THERAPY.    FOR MONITORING HEPARIN THERAPY,     USE THE HEPARIN ASSAY.       Labs:  PT=13.6, aPTT=39, FVIII = 51% 24 h after " receiving his home visit nurse who infused Altuviiio.  Hgb=12.3, platelets=103.  Peripheral smear shows some schistocytes and puzzlecytes.  No abnormal WBC or nRBCs.  XNMe=4114 with 57% PMNs, 38% Lymphs.  YLP=746.  Creatinine=131, GFR=50.      Assessment/Plan    The patient is clinically stable;   We have provided Altuviiio and he has 51% FVIII activity 24 h after dosing.  Our plan is that he receive lifetime prophylaxis one weekly at 50 international units /kg. The patient is pleased with the plan.     Also, he has persistent thrombocytopenia.  I am evaluating his immunoglobulins looking for a clonal process.      His orthopedic issues is his major disability.  We will seek orthopedic care to determine if he will benefit from chrondroitin sulfate injections with mild physical therapy.      RTC in 6 months.        Diagnoses and all orders for this visit:  Hemophilia A (CMS/[facility])  -     antihemophilic RF VIII (Altuviiio) 3,000 (+/-) unit recon soln; Infuse 3,175 Units into a venous catheter every 7 days. 3175 units +/-10% dose every 7 days.  Additionally,  prn when directed.  -     aPTT; Future  -     Protime-INR; Future  -     Factor 8 Activity; Future  -     Comprehensive Metabolic Panel; Standing  -     CBC and Auto Differential; Future  -     Bilirubin, Direct; Future  -     Direct Antiglobulin Test; Future  -     Ferritin; Future  -     Iron and TIBC; Future  -     Reticulocytes; Future  -     Slide Request; Future  -     Sedimentation Rate; Future  -     Serum Protein Electrophoresis; Future  -     Platelet Antibody, Screen; Future  -     Immunoglobulins (IgG, IgA, IgM); Future  -     [place]/Lambda Free Light Chain, Serum; Future  -     Clinic Appointment Request Follow Up; LISBETH CHILDERS; Future  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 3175 units +/-10% dose every 7 days.  Additionally,  prn  when directed.  -     Referral to Hematology and Oncology  -     aPTT; Future  -     Protime-INR; Future  -     Factor 8 Activity; Future  -     Comprehensive Metabolic Panel; Standing  -     CBC and Auto Differential; Future  -     Bilirubin, Direct; Future  -     Direct Antiglobulin Test; Future  -     Ferritin; Future  -     Iron and TIBC; Future  -     Reticulocytes; Future  -     Slide Request; Future  -     Sedimentation Rate; Future  -     Serum Protein Electrophoresis; Future  -     Platelet Antibody, Screen; Future  -     Immunoglobulins (IgG, IgA, IgM); Future  -     [place]/Lambda Free Light Chain, Serum; Future  -     Clinic Appointment Request Follow Up; LISBETH DILLON; Future  Thrombocytopenia (CMS/HCC)  -     antihemophilic RF VIII (Altuviiio) 3,000 (+/-) unit recon soln; Infuse 3,175 Units into a venous catheter every 7 days. 3175 units +/-10% dose every 7 days.  Additionally,  prn when directed.  -     aPTT; Future  -     Protime-INR; Future  -     Factor 8 Activity; Future  -     Comprehensive Metabolic Panel; Standing  -     CBC and Auto Differential; Future  -     Bilirubin, Direct; Future  -     Direct Antiglobulin Test; Future  -     Ferritin; Future  -     Iron and TIBC; Future  -     Reticulocytes; Future  -     Slide Request; Future  -     Sedimentation Rate; Future  -     Serum Protein Electrophoresis; Future  -     Platelet Antibody, Screen; Future  -     Immunoglobulins (IgG, IgA, IgM); Future  -     [place]/Lambda Free Light Chain, Serum; Future  -     Clinic Appointment Request Follow Up; LISBETH DILLON; Future         Lisbeth Dillon MD

## 2024-04-10 NOTE — PROGRESS NOTES
HT[facility] Nurse Note: Hemophilia visit.  Ramón here with niece and nephew (Javier and Jovita Wallace).  He is in wheelchair but is able to stand with assistance. Decreased ROM in knees causing difficulty.  Upper body strength allows him to move self in chair on when on exam table. Sits self back up.  He says he uses walker and 3 wheelchairs at home (a wheelchair on each floor of his home).  States he has help in home.  Home Nurse for altuviiio (Factor eight) infusions once a week,  Dosed yesterday.  Nurse from [facility] to come in and fill pill box weekly.  Says P.T. helps him get up and ambulate.  He says he feels comfortable getting around the house and to the bathroom on his own.  He likes having time at home alone.  He is wearing his Life Alert necklace. Says he feels pretty good but would like to be able to walk better.  Emergency wallet card reviewed and given to Ramón and duplicate given to Javier.  Plan:  we will contact ortho to ask about an appropriate referral for him. Does not want surgery, but would like to increase mobility, especially ROM to knees.

## 2024-04-11 LAB
ALBUMIN: 4 G/DL (ref 3.4–5)
ALPHA 1 GLOBULIN: 0.3 G/DL (ref 0.2–0.6)
ALPHA 2 GLOBULIN: 0.7 G/DL (ref 0.4–1.1)
BETA GLOBULIN: 0.9 G/DL (ref 0.5–1.2)
GAMMA GLOBULIN: 0.7 G/DL (ref 0.5–1.4)
KAPPA LC SERPL-MCNC: 2.72 MG/DL (ref 0.33–1.94)
KAPPA LC/LAMBDA SER: 1.62 {RATIO} (ref 0.26–1.65)
LAMBDA LC SERPL-MCNC: 1.68 MG/DL (ref 0.57–2.63)
M-PROTEIN 1: 0.2 G/DL
PATH REVIEW-SERUM PROTEIN ELECTROPHORESIS: ABNORMAL
PROTEIN ELECTROPHORESIS COMMENT: ABNORMAL

## 2024-04-13 LAB
PLATELET ANTIBODY TARGET 1 IGG RESULT: NEGATIVE
PLATELET ANTIBODY TARGET 1 IGM RESULT: NEGATIVE
PLATELET ANTIBODY TARGET 2 IGG RESULT: NEGATIVE
PLATELET ANTIBODY TARGET 2 IGM RESULT: NEGATIVE
SCAN RESULT: NORMAL

## 2024-04-17 PROBLEM — J18.9 PNEUMONIA OF LEFT LOWER LOBE DUE TO INFECTIOUS ORGANISM: Status: RESOLVED | Noted: 2024-03-19 | Resolved: 2024-04-17

## 2024-04-17 ASSESSMENT — ENCOUNTER SYMPTOMS
HEMATOLOGIC/LYMPHATIC NEGATIVE: 1
PSYCHIATRIC NEGATIVE: 1
GASTROINTESTINAL NEGATIVE: 1
EYES NEGATIVE: 1
CONSTITUTIONAL NEGATIVE: 1
ENDOCRINE NEGATIVE: 1
RESPIRATORY NEGATIVE: 1
ALLERGIC/IMMUNOLOGIC NEGATIVE: 1
NEUROLOGICAL NEGATIVE: 1
ARTHRALGIAS: 1

## 2024-04-18 NOTE — ASSESSMENT & PLAN NOTE
Continue with metoprolol succinate 50 mg once daily, spironolactone 25 mg once daily, hydrochlorothiazide 12.5 mg once daily, bumetanide 1 mg once daily as needed for edema  Followed by cardiology

## 2024-04-18 NOTE — ASSESSMENT & PLAN NOTE
Bilateral pitting edema, mix dependent & HF   Continue with hydrochlorothiazide 12.5 mg once daily, spironolactone 25 mg once daily, bumetanide 1 mg once daily as needed  Wear compression socks daily

## 2024-04-18 NOTE — ASSESSMENT & PLAN NOTE
Continue with antihemophilic RF VIII (Altuviiio) 3,175 units every 7 days and PRN when directed.  Followed by hematology

## 2024-05-01 ENCOUNTER — OFFICE VISIT (OUTPATIENT)
Dept: OPHTHALMOLOGY | Facility: CLINIC | Age: 89
End: 2024-05-01
Payer: MEDICARE

## 2024-05-01 DIAGNOSIS — D31.32 CHOROIDAL NEVUS, LEFT EYE: ICD-10-CM

## 2024-05-01 DIAGNOSIS — H35.3211 EXUDATIVE AGE-RELATED MACULAR DEGENERATION, RIGHT EYE, WITH ACTIVE CHOROIDAL NEOVASCULARIZATION (MULTI): Primary | ICD-10-CM

## 2024-05-01 DIAGNOSIS — H35.3122 INTERMEDIATE STAGE NONEXUDATIVE AGE-RELATED MACULAR DEGENERATION OF LEFT EYE: ICD-10-CM

## 2024-05-01 PROCEDURE — 99214 OFFICE O/P EST MOD 30 MIN: CPT | Performed by: OPHTHALMOLOGY

## 2024-05-01 ASSESSMENT — VISUAL ACUITY
OS_CC: 20/60
CORRECTION_TYPE: GLASSES
OS_CC+: -2
OD_CC: 20/30
METHOD: SNELLEN - LINEAR
OD_CC+: +1

## 2024-05-01 ASSESSMENT — CONF VISUAL FIELD
OD_INFERIOR_NASAL_RESTRICTION: 0
OS_SUPERIOR_TEMPORAL_RESTRICTION: 0
OS_INFERIOR_NASAL_RESTRICTION: 0
OS_INFERIOR_TEMPORAL_RESTRICTION: 0
OD_NORMAL: 1
OS_NORMAL: 1
OS_SUPERIOR_NASAL_RESTRICTION: 0
OD_INFERIOR_TEMPORAL_RESTRICTION: 0
OD_SUPERIOR_NASAL_RESTRICTION: 0
OD_SUPERIOR_TEMPORAL_RESTRICTION: 0

## 2024-05-01 ASSESSMENT — ENCOUNTER SYMPTOMS
RESPIRATORY NEGATIVE: 0
MUSCULOSKELETAL NEGATIVE: 0
ALLERGIC/IMMUNOLOGIC NEGATIVE: 0
ENDOCRINE NEGATIVE: 0
EYES NEGATIVE: 0
PSYCHIATRIC NEGATIVE: 0
GASTROINTESTINAL NEGATIVE: 0
CONSTITUTIONAL NEGATIVE: 0
CARDIOVASCULAR NEGATIVE: 0
NEUROLOGICAL NEGATIVE: 0
HEMATOLOGIC/LYMPHATIC NEGATIVE: 0

## 2024-05-01 ASSESSMENT — TONOMETRY
OS_IOP_MMHG: 15
OD_IOP_MMHG: 15
IOP_METHOD: GOLDMANN APPLANATION

## 2024-05-01 ASSESSMENT — SLIT LAMP EXAM - LIDS
COMMENTS: DERMATOCHALASIS UL
COMMENTS: DERMATOCHALASIS UL

## 2024-05-01 ASSESSMENT — EXTERNAL EXAM - RIGHT EYE: OD_EXAM: NORMAL

## 2024-05-01 ASSESSMENT — CUP TO DISC RATIO
OS_RATIO: .30
OD_RATIO: .30

## 2024-05-01 ASSESSMENT — EXTERNAL EXAM - LEFT EYE: OS_EXAM: NORMAL

## 2024-05-11 ENCOUNTER — APPOINTMENT (OUTPATIENT)
Dept: RADIOLOGY | Facility: HOSPITAL | Age: 89
End: 2024-05-11
Payer: MEDICARE

## 2024-05-11 ENCOUNTER — HOSPITAL ENCOUNTER (EMERGENCY)
Facility: HOSPITAL | Age: 89
Discharge: HOME | End: 2024-05-11
Attending: STUDENT IN AN ORGANIZED HEALTH CARE EDUCATION/TRAINING PROGRAM
Payer: MEDICARE

## 2024-05-11 VITALS
DIASTOLIC BLOOD PRESSURE: 92 MMHG | HEIGHT: 66 IN | OXYGEN SATURATION: 95 % | SYSTOLIC BLOOD PRESSURE: 150 MMHG | TEMPERATURE: 97.5 F | RESPIRATION RATE: 16 BRPM | WEIGHT: 132 LBS | HEART RATE: 80 BPM | BODY MASS INDEX: 21.21 KG/M2

## 2024-05-11 DIAGNOSIS — J18.9 PNEUMONIA OF LEFT LOWER LOBE DUE TO INFECTIOUS ORGANISM: Primary | ICD-10-CM

## 2024-05-11 DIAGNOSIS — R05.1 ACUTE COUGH: ICD-10-CM

## 2024-05-11 PROCEDURE — 99284 EMERGENCY DEPT VISIT MOD MDM: CPT

## 2024-05-11 PROCEDURE — 71046 X-RAY EXAM CHEST 2 VIEWS: CPT | Performed by: INTERNAL MEDICINE

## 2024-05-11 PROCEDURE — 71046 X-RAY EXAM CHEST 2 VIEWS: CPT

## 2024-05-11 PROCEDURE — 99283 EMERGENCY DEPT VISIT LOW MDM: CPT | Mod: 25

## 2024-05-11 RX ORDER — AZITHROMYCIN 250 MG/1
250 TABLET, FILM COATED ORAL DAILY
Qty: 6 TABLET | Refills: 0 | Status: SHIPPED | OUTPATIENT
Start: 2024-05-11 | End: 2024-05-16

## 2024-05-11 RX ORDER — GUAIFENESIN 600 MG/1
1200 TABLET, EXTENDED RELEASE ORAL 2 TIMES DAILY
Qty: 120 TABLET | Refills: 0 | Status: SHIPPED | OUTPATIENT
Start: 2024-05-11 | End: 2025-05-11

## 2024-05-11 RX ORDER — CEFUROXIME AXETIL 500 MG/1
500 TABLET ORAL 2 TIMES DAILY
Qty: 20 TABLET | Refills: 0 | Status: SHIPPED | OUTPATIENT
Start: 2024-05-11 | End: 2024-05-21

## 2024-05-11 ASSESSMENT — PAIN SCALES - GENERAL: PAINLEVEL_OUTOF10: 0 - NO PAIN

## 2024-05-11 ASSESSMENT — LIFESTYLE VARIABLES
HAVE YOU EVER FELT YOU SHOULD CUT DOWN ON YOUR DRINKING: NO
EVER FELT BAD OR GUILTY ABOUT YOUR DRINKING: NO
TOTAL SCORE: 0
EVER HAD A DRINK FIRST THING IN THE MORNING TO STEADY YOUR NERVES TO GET RID OF A HANGOVER: NO
HAVE PEOPLE ANNOYED YOU BY CRITICIZING YOUR DRINKING: NO

## 2024-05-11 ASSESSMENT — PAIN - FUNCTIONAL ASSESSMENT: PAIN_FUNCTIONAL_ASSESSMENT: 0-10

## 2024-05-11 NOTE — ED PROVIDER NOTES
HPI   Chief Complaint   Patient presents with    Cough       89-year-old male with history of hemophilia A, protein C deficiency, HFpEF, CAD s/p CABG 2002, A-fib, HTN, HLD, CKD 3 presents for chief complaint of cough and congestion.  Endorses nonproductive cough and intermittent wheezing.  States that he has excess phlegm but has difficulty bringing it up.  Also endorses some nasal congestion.  Denies any known sick contacts.  Denies any bleeding/hemoptysis.  Denies any pain of any sort, including chest pain.  No dyspnea either.  Denies abdominal pain, nausea, vomiting.  Denies changes in urination or bowel movement.                          Hurley Coma Scale Score: 15                     Patient History   Past Medical History:   Diagnosis Date    Acute deep vein thrombosis (DVT) of left femoral vein (Multi) 09/10/2023    ACUTE DEEP VEIN THROMBOSIS, L FEMORAL, HEMOPHILIA    Acute diastolic heart failure (Multi) 04/16/2023    Benign prostatic hyperplasia without lower urinary tract symptoms 01/07/2016    Enlarged prostate without lower urinary tract symptoms (luts)    Bleeding hemorrhoids 03/01/2023    CAP (community acquired pneumonia) 01/18/2024    Closed nondisplaced fracture of head of left radius 09/05/2023    COVID-19 05/21/2023    Enlarged prostate with lower urinary tract symptoms (LUTS) 03/01/2023    Fracture of bone of hip (Multi) 09/05/2023    Fracture of femoral neck, right (Multi) 03/01/2023    Gastrointestinal hemorrhage 09/05/2023    LOWER GI BLEED    Gingiva hemorrhage 09/05/2023    Gross hematuria 03/01/2023    Hemarthrosis of ankle joint 04/18/2019    Hemarthrosis of knee 05/09/2022    Hematochezia 09/05/2023    Hematoma of lower extremity 09/05/2023    Hematoma of right thigh 03/01/2023    History of recent fall 05/31/2023    Knee effusion 09/05/2023    Knee effusion, left 03/01/2023    Lumbar pain 03/01/2023    Obstructive uropathy 04/28/2023    Orthostatic syncope 09/05/2023    Personal history  of other endocrine, nutritional and metabolic disease     History of hyperlipidemia    Pneumonia of left lower lobe due to infectious organism 03/19/2024    Pneumonia of right lung due to infectious organism 03/01/2023    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    Subdural hematoma (Multi) 04/09/2023    Syncope 05/12/2023    Urinary retention 03/01/2023    UTI (urinary tract infection) 03/01/2023    Viral gastroenteritis 03/01/2023     Past Surgical History:   Procedure Laterality Date    CORONARY ARTERY BYPASS GRAFT       Family History   Problem Relation Name Age of Onset    Hemophilia Mother       Social History     Tobacco Use    Smoking status: Never    Smokeless tobacco: Never   Substance Use Topics    Alcohol use: Defer    Drug use: Never       Physical Exam   ED Triage Vitals [05/11/24 1310]   Temperature Heart Rate Respirations BP   36.4 °C (97.5 °F) 80 16 (!) 150/92      Pulse Ox Temp Source Heart Rate Source Patient Position   95 % Oral -- --      BP Location FiO2 (%)     -- 21 %       Physical Exam  Constitutional:       Appearance: Normal appearance.   HENT:      Head: Normocephalic and atraumatic.      Mouth/Throat:      Mouth: Mucous membranes are moist.      Pharynx: Oropharynx is clear.   Eyes:      Extraocular Movements: Extraocular movements intact.      Conjunctiva/sclera: Conjunctivae normal.      Pupils: Pupils are equal, round, and reactive to light.   Cardiovascular:      Rate and Rhythm: Normal rate and regular rhythm.      Pulses: Normal pulses.      Heart sounds: Normal heart sounds.   Pulmonary:      Effort: Pulmonary effort is normal.      Breath sounds: Normal breath sounds.   Abdominal:      General: Abdomen is flat.      Palpations: Abdomen is soft.   Musculoskeletal:         General: Normal range of motion.      Cervical back: Normal range of motion and neck supple.   Skin:     General: Skin is warm and dry.      Capillary Refill: Capillary refill takes less than 2 seconds.   Neurological:       General: No focal deficit present.      Mental Status: He is alert and oriented to person, place, and time.   Psychiatric:         Mood and Affect: Mood normal.         Behavior: Behavior normal.         Thought Content: Thought content normal.         Judgment: Judgment normal.         ED Course & MDM   Diagnoses as of 05/11/24 1657   Pneumonia of left lower lobe due to infectious organism   Acute cough       Medical Decision Making  89-year-old male with history of hemophilia A, protein C deficiency, HFpEF, CAD s/p CABG 2002, A-fib, HTN, HLD, CKD 3 presents for chief complaint of cough and congestion.  Vital signs reviewed, remarkable for mild hypertension at 150/92.  Otherwise unremarkable.  Patient is well-appearing overall appears in no distress.  Speaks full sentences without difficulty.  Diagnostic testing performed.  X-ray ordered to help to rule out pneumonia, for which the patient was admitted back in March.  He declines COVID testing.  No known sick contacts and denies any fever/chills.  Low suspicion for COVID or flu.  Is any chest pain or dyspnea.  I do not suspect ACS at this time.  Likely a viral respiratory infection. X-ray of the chest showed cardiomegaly with findings of interstitial edema.  Small left-sided pleural effusion with adjacent patchy opacities which could represent atelectasis or early infiltrate.  Patient was notified of these findings.  Labs and CT of the chest ordered for patient declined.  Stated that he would rather leave and follow-up with his primary care doctor for this.  Patient is well connected with the healthcare system and has good insight.  Shared decision-making was utilized and patient was discharged home.  He was recommended to return with any new or worsening symptoms and to follow-up soon as possible with primary care.  Patient in agreement with this plan.  Discharged in stable condition           Procedure  Procedures     Henry Reese, SUSI-CNP  05/11/24  [id number]

## 2024-05-11 NOTE — DISCHARGE INSTRUCTIONS
Call Annmarie to schedule a Cat Scan of the chest both to make sure the pneumonia is getting better and to rule out a different reason as to why you keep getting pneumonia in your left lower chest.     If any worsening come back.

## 2024-05-17 DIAGNOSIS — R06.02 SHORTNESS OF BREATH: Primary | ICD-10-CM

## 2024-05-17 DIAGNOSIS — R06.2 WHEEZING: ICD-10-CM

## 2024-05-17 RX ORDER — IPRATROPIUM BROMIDE AND ALBUTEROL SULFATE 2.5; .5 MG/3ML; MG/3ML
3 SOLUTION RESPIRATORY (INHALATION) 4 TIMES DAILY PRN
Qty: 360 ML | Refills: 11 | Status: SHIPPED | OUTPATIENT
Start: 2024-05-17 | End: 2024-06-11 | Stop reason: SDUPTHER

## 2024-05-17 NOTE — PROGRESS NOTES
Order for neb tx, nebulizer, neb kit supplies. Received call from Vanna from [facility]. Pt home from hospital, dx pneumonia.

## 2024-05-20 ENCOUNTER — TELEPHONE (OUTPATIENT)
Dept: GERIATRIC MEDICINE | Facility: CLINIC | Age: 89
End: 2024-05-20
Payer: MEDICARE

## 2024-05-20 DIAGNOSIS — J18.9 PNEUMONIA DUE TO INFECTIOUS ORGANISM, UNSPECIFIED LATERALITY, UNSPECIFIED PART OF LUNG: Primary | ICD-10-CM

## 2024-05-23 ENCOUNTER — HOSPITAL ENCOUNTER (OUTPATIENT)
Dept: RADIOLOGY | Facility: HOSPITAL | Age: 89
Discharge: HOME | End: 2024-05-23
Payer: MEDICARE

## 2024-05-23 DIAGNOSIS — J18.9 PNEUMONIA DUE TO INFECTIOUS ORGANISM, UNSPECIFIED LATERALITY, UNSPECIFIED PART OF LUNG: ICD-10-CM

## 2024-05-23 PROCEDURE — 71250 CT THORAX DX C-: CPT

## 2024-05-23 PROCEDURE — 71250 CT THORAX DX C-: CPT | Performed by: RADIOLOGY

## 2024-05-25 ENCOUNTER — DOCUMENTATION (OUTPATIENT)
Dept: GERIATRIC MEDICINE | Facility: CLINIC | Age: 89
End: 2024-05-25
Payer: MEDICARE

## 2024-05-25 NOTE — PROGRESS NOTES
"Spoke with patient to follow up after recent pneumonia. Patient had chest CT on 5/20 which showed some mild pulmonary edema.     Patient states he is feeling \"much better.\" He notes occasional dry cough, no SOB/wheeze. He denies chest pain/pressure. No peripheral edema.     Patient instructed to call if he experiences increased cough, or feels SOB or has edema.       "

## 2024-05-31 ENCOUNTER — APPOINTMENT (OUTPATIENT)
Dept: RADIOLOGY | Facility: HOSPITAL | Age: 89
DRG: 291 | End: 2024-05-31
Payer: MEDICARE

## 2024-05-31 ENCOUNTER — APPOINTMENT (OUTPATIENT)
Dept: UROLOGY | Facility: CLINIC | Age: 89
End: 2024-05-31
Payer: MEDICARE

## 2024-05-31 ENCOUNTER — APPOINTMENT (OUTPATIENT)
Dept: CARDIOLOGY | Facility: HOSPITAL | Age: 89
DRG: 291 | End: 2024-05-31
Payer: MEDICARE

## 2024-05-31 ENCOUNTER — APPOINTMENT (OUTPATIENT)
Dept: VASCULAR MEDICINE | Facility: HOSPITAL | Age: 89
DRG: 291 | End: 2024-05-31
Payer: MEDICARE

## 2024-05-31 ENCOUNTER — HOSPITAL ENCOUNTER (INPATIENT)
Facility: HOSPITAL | Age: 89
LOS: 2 days | Discharge: HOME HEALTH | DRG: 291 | End: 2024-06-03
Attending: EMERGENCY MEDICINE | Admitting: INTERNAL MEDICINE
Payer: MEDICARE

## 2024-05-31 DIAGNOSIS — R60.0 EDEMA, LOWER EXTREMITY: ICD-10-CM

## 2024-05-31 DIAGNOSIS — I50.33 ACUTE ON CHRONIC DIASTOLIC HEART FAILURE (MULTI): ICD-10-CM

## 2024-05-31 DIAGNOSIS — I50.20 UNSPECIFIED SYSTOLIC (CONGESTIVE) HEART FAILURE (MULTI): ICD-10-CM

## 2024-05-31 DIAGNOSIS — M79.605 PAIN IN LEFT LEG: ICD-10-CM

## 2024-05-31 DIAGNOSIS — J06.9 UPPER RESPIRATORY TRACT INFECTION, UNSPECIFIED TYPE: ICD-10-CM

## 2024-05-31 DIAGNOSIS — J18.9 PNEUMONIA DUE TO INFECTIOUS ORGANISM, UNSPECIFIED LATERALITY, UNSPECIFIED PART OF LUNG: ICD-10-CM

## 2024-05-31 DIAGNOSIS — E83.42 HYPOMAGNESEMIA: ICD-10-CM

## 2024-05-31 DIAGNOSIS — I50.30 UNSPECIFIED DIASTOLIC (CONGESTIVE) HEART FAILURE (MULTI): ICD-10-CM

## 2024-05-31 DIAGNOSIS — I50.9 CONGESTIVE HEART FAILURE, UNSPECIFIED HF CHRONICITY, UNSPECIFIED HEART FAILURE TYPE (MULTI): Primary | ICD-10-CM

## 2024-05-31 DIAGNOSIS — J84.10 PULMONARY FIBROSIS (MULTI): ICD-10-CM

## 2024-05-31 PROBLEM — M25.561 ACUTE PAIN OF RIGHT KNEE: Status: RESOLVED | Noted: 2023-05-12 | Resolved: 2024-05-31

## 2024-05-31 PROBLEM — M17.9 OSTEOARTHRITIS OF KNEE: Status: RESOLVED | Noted: 2023-05-12 | Resolved: 2024-05-31

## 2024-05-31 PROBLEM — D31.32 CHOROIDAL NEVUS, LEFT EYE: Status: RESOLVED | Noted: 2024-05-01 | Resolved: 2024-05-31

## 2024-05-31 PROBLEM — I87.2 PERIPHERAL VENOUS INSUFFICIENCY: Status: ACTIVE | Noted: 2024-05-31

## 2024-05-31 PROBLEM — N47.2 PARAPHIMOSIS: Status: RESOLVED | Noted: 2023-09-05 | Resolved: 2024-05-31

## 2024-05-31 PROBLEM — H35.3120 NONEXUDATIVE AGE-RELATED MACULAR DEGENERATION OF LEFT EYE: Status: RESOLVED | Noted: 2023-03-01 | Resolved: 2024-05-31

## 2024-05-31 PROBLEM — Z87.442 PERSONAL HISTORY OF URINARY CALCULI: Status: RESOLVED | Noted: 2022-11-28 | Resolved: 2024-05-31

## 2024-05-31 PROBLEM — R35.0 URINARY FREQUENCY: Status: RESOLVED | Noted: 2023-09-05 | Resolved: 2024-05-31

## 2024-05-31 PROBLEM — K83.8 HEMOBILIA: Status: RESOLVED | Noted: 2023-09-05 | Resolved: 2024-05-31

## 2024-05-31 PROBLEM — N40.0: Status: RESOLVED | Noted: 2022-05-09 | Resolved: 2024-05-31

## 2024-05-31 PROBLEM — R41.89 IMPAIRED COGNITION: Status: ACTIVE | Noted: 2024-05-31

## 2024-05-31 PROBLEM — D68.59 PROTEIN C DEFICIENCY (MULTI): Status: RESOLVED | Noted: 2023-09-05 | Resolved: 2024-05-31

## 2024-05-31 PROBLEM — H59.319: Status: RESOLVED | Noted: 2023-09-05 | Resolved: 2024-05-31

## 2024-05-31 PROBLEM — E83.51 HYPOCALCEMIA: Status: RESOLVED | Noted: 2023-09-05 | Resolved: 2024-05-31

## 2024-05-31 PROBLEM — H35.3211 EXUDATIVE AGE-RELATED MACULAR DEGENERATION, RIGHT EYE, WITH ACTIVE CHOROIDAL NEOVASCULARIZATION (MULTI): Status: RESOLVED | Noted: 2023-03-01 | Resolved: 2024-05-31

## 2024-05-31 LAB
ALBUMIN SERPL BCP-MCNC: 3.9 G/DL (ref 3.4–5)
ALP SERPL-CCNC: 78 U/L (ref 33–136)
ALT SERPL W P-5'-P-CCNC: 25 U/L (ref 10–52)
ANION GAP SERPL CALC-SCNC: 13 MMOL/L (ref 10–20)
ANION GAP SERPL CALC-SCNC: 15 MMOL/L (ref 10–20)
APTT PPP: 44 SECONDS (ref 27–38)
AST SERPL W P-5'-P-CCNC: 28 U/L (ref 9–39)
BASOPHILS # BLD AUTO: 0.02 X10*3/UL (ref 0–0.1)
BASOPHILS NFR BLD AUTO: 0.4 %
BASOPHILS NFR FLD MANUAL: 0 %
BILIRUB SERPL-MCNC: 2.2 MG/DL (ref 0–1.2)
BLASTS NFR FLD MANUAL: 0 %
BNP SERPL-MCNC: 1024 PG/ML (ref 0–99)
BUN SERPL-MCNC: 24 MG/DL (ref 6–23)
BUN SERPL-MCNC: 24 MG/DL (ref 6–23)
CALCIUM SERPL-MCNC: 7.6 MG/DL (ref 8.6–10.3)
CALCIUM SERPL-MCNC: 7.6 MG/DL (ref 8.6–10.3)
CARDIAC TROPONIN I PNL SERPL HS: 102 NG/L (ref 0–20)
CARDIAC TROPONIN I PNL SERPL HS: 104 NG/L (ref 0–20)
CARDIAC TROPONIN I PNL SERPL HS: 99 NG/L (ref 0–20)
CHLORIDE SERPL-SCNC: 110 MMOL/L (ref 98–107)
CHLORIDE SERPL-SCNC: 110 MMOL/L (ref 98–107)
CLARITY FLD: ABNORMAL
CO2 SERPL-SCNC: 20 MMOL/L (ref 21–32)
CO2 SERPL-SCNC: 21 MMOL/L (ref 21–32)
COLOR FLD: YELLOW
CREAT SERPL-MCNC: 1.11 MG/DL (ref 0.5–1.3)
CREAT SERPL-MCNC: 1.15 MG/DL (ref 0.5–1.3)
EGFRCR SERPLBLD CKD-EPI 2021: 59 ML/MIN/1.73M*2
EGFRCR SERPLBLD CKD-EPI 2021: 61 ML/MIN/1.73M*2
EOSINOPHIL # BLD AUTO: 0.09 X10*3/UL (ref 0–0.4)
EOSINOPHIL NFR BLD AUTO: 1.9 %
EOSINOPHIL NFR FLD MANUAL: 0 %
ERYTHROCYTE [DISTWIDTH] IN BLOOD BY AUTOMATED COUNT: 16.5 % (ref 11.5–14.5)
ERYTHROCYTE [DISTWIDTH] IN BLOOD BY AUTOMATED COUNT: 16.8 % (ref 11.5–14.5)
GLUCOSE SERPL-MCNC: 109 MG/DL (ref 74–99)
GLUCOSE SERPL-MCNC: 137 MG/DL (ref 74–99)
HCT VFR BLD AUTO: 34.7 % (ref 41–52)
HCT VFR BLD AUTO: 35.5 % (ref 41–52)
HGB BLD-MCNC: 11.7 G/DL (ref 13.5–17.5)
HGB BLD-MCNC: 12 G/DL (ref 13.5–17.5)
IMM GRANULOCYTES # BLD AUTO: 0.02 X10*3/UL (ref 0–0.5)
IMM GRANULOCYTES NFR BLD AUTO: 0.4 % (ref 0–0.9)
IMMATURE GRANULOCYTES IN FLUID: 0 %
INR PPP: 1.5 (ref 0.9–1.1)
LDH SERPL L TO P-CCNC: 208 U/L (ref 84–246)
LYMPHOCYTES # BLD AUTO: 1.84 X10*3/UL (ref 0.8–3)
LYMPHOCYTES NFR BLD AUTO: 38.4 %
LYMPHOCYTES NFR FLD MANUAL: 24 %
MAGNESIUM SERPL-MCNC: 0.7 MG/DL (ref 1.6–2.4)
MAGNESIUM SERPL-MCNC: 0.7 MG/DL (ref 1.6–2.4)
MAGNESIUM SERPL-MCNC: 1.1 MG/DL (ref 1.6–2.4)
MAGNESIUM SERPL-MCNC: 2.6 MG/DL (ref 1.6–2.4)
MCH RBC QN AUTO: 34.6 PG (ref 26–34)
MCH RBC QN AUTO: 35.1 PG (ref 26–34)
MCHC RBC AUTO-ENTMCNC: 33.7 G/DL (ref 32–36)
MCHC RBC AUTO-ENTMCNC: 33.8 G/DL (ref 32–36)
MCV RBC AUTO: 102 FL (ref 80–100)
MCV RBC AUTO: 104 FL (ref 80–100)
MONOCYTES # BLD AUTO: 0.39 X10*3/UL (ref 0.05–0.8)
MONOCYTES NFR BLD AUTO: 8.1 %
MONOS+MACROS NFR FLD MANUAL: 76 %
NEUTROPHILS # BLD AUTO: 2.43 X10*3/UL (ref 1.6–5.5)
NEUTROPHILS NFR BLD AUTO: 50.8 %
NEUTROPHILS NFR FLD MANUAL: 0 %
NRBC BLD-RTO: 0 /100 WBCS (ref 0–0)
NRBC BLD-RTO: 0 /100 WBCS (ref 0–0)
OTHER CELLS NFR FLD MANUAL: 0 %
PLASMA CELLS NFR FLD MANUAL: 0 %
PLATELET # BLD AUTO: 89 X10*3/UL (ref 150–450)
PLATELET # BLD AUTO: 93 X10*3/UL (ref 150–450)
POTASSIUM SERPL-SCNC: 3.1 MMOL/L (ref 3.5–5.3)
POTASSIUM SERPL-SCNC: 3.5 MMOL/L (ref 3.5–5.3)
PROCALCITONIN SERPL-MCNC: 0.03 NG/ML
PROT SERPL-MCNC: 6.1 G/DL (ref 6.4–8.2)
PROT SERPL-MCNC: 6.2 G/DL (ref 6.4–8.2)
PROTHROMBIN TIME: 16.9 SECONDS (ref 9.8–12.8)
Q ONSET: 210 MS
QRS COUNT: 11 BEATS
QRS DURATION: 96 MS
QT INTERVAL: 452 MS
QTC CALCULATION(BAZETT): 466 MS
QTC FREDERICIA: 461 MS
R AXIS: 263 DEGREES
RBC # BLD AUTO: 3.33 X10*6/UL (ref 4.5–5.9)
RBC # BLD AUTO: 3.47 X10*6/UL (ref 4.5–5.9)
RBC # FLD AUTO: 1000 /UL
SARS-COV-2 RNA RESP QL NAA+PROBE: NOT DETECTED
SODIUM SERPL-SCNC: 141 MMOL/L (ref 136–145)
SODIUM SERPL-SCNC: 141 MMOL/L (ref 136–145)
T AXIS: 52 DEGREES
T OFFSET: 436 MS
TOTAL CELLS COUNTED FLD: 100
VENTRICULAR RATE: 64 BPM
WBC # BLD AUTO: 4.4 X10*3/UL (ref 4.4–11.3)
WBC # BLD AUTO: 4.8 X10*3/UL (ref 4.4–11.3)
WBC # FLD AUTO: 617 /UL

## 2024-05-31 PROCEDURE — 93005 ELECTROCARDIOGRAM TRACING: CPT

## 2024-05-31 PROCEDURE — G0378 HOSPITAL OBSERVATION PER HR: HCPCS

## 2024-05-31 PROCEDURE — 96366 THER/PROPH/DIAG IV INF ADDON: CPT

## 2024-05-31 PROCEDURE — 76705 ECHO EXAM OF ABDOMEN: CPT

## 2024-05-31 PROCEDURE — 2500000002 HC RX 250 W HCPCS SELF ADMINISTERED DRUGS (ALT 637 FOR MEDICARE OP, ALT 636 FOR OP/ED)

## 2024-05-31 PROCEDURE — 99223 1ST HOSP IP/OBS HIGH 75: CPT | Performed by: INTERNAL MEDICINE

## 2024-05-31 PROCEDURE — 80053 COMPREHEN METABOLIC PANEL: CPT | Performed by: EMERGENCY MEDICINE

## 2024-05-31 PROCEDURE — 2500000001 HC RX 250 WO HCPCS SELF ADMINISTERED DRUGS (ALT 637 FOR MEDICARE OP): Performed by: NURSE PRACTITIONER

## 2024-05-31 PROCEDURE — 71045 X-RAY EXAM CHEST 1 VIEW: CPT

## 2024-05-31 PROCEDURE — 87070 CULTURE OTHR SPECIMN AEROBIC: CPT | Mod: AHULAB

## 2024-05-31 PROCEDURE — 2500000004 HC RX 250 GENERAL PHARMACY W/ HCPCS (ALT 636 FOR OP/ED): Performed by: NURSE PRACTITIONER

## 2024-05-31 PROCEDURE — 2500000005 HC RX 250 GENERAL PHARMACY W/O HCPCS: Performed by: NURSE PRACTITIONER

## 2024-05-31 PROCEDURE — 84145 PROCALCITONIN (PCT): CPT | Mod: AHULAB | Performed by: NURSE PRACTITIONER

## 2024-05-31 PROCEDURE — 82945 GLUCOSE OTHER FLUID: CPT | Mod: AHULAB

## 2024-05-31 PROCEDURE — 80048 BASIC METABOLIC PNL TOTAL CA: CPT | Mod: CCI | Performed by: NURSE PRACTITIONER

## 2024-05-31 PROCEDURE — 96367 TX/PROPH/DG ADDL SEQ IV INF: CPT

## 2024-05-31 PROCEDURE — 76705 ECHO EXAM OF ABDOMEN: CPT | Performed by: RADIOLOGY

## 2024-05-31 PROCEDURE — 71045 X-RAY EXAM CHEST 1 VIEW: CPT | Performed by: RADIOLOGY

## 2024-05-31 PROCEDURE — 83615 LACTATE (LD) (LDH) ENZYME: CPT

## 2024-05-31 PROCEDURE — 93971 EXTREMITY STUDY: CPT | Performed by: INTERNAL MEDICINE

## 2024-05-31 PROCEDURE — 32555 ASPIRATE PLEURA W/ IMAGING: CPT

## 2024-05-31 PROCEDURE — 88112 CYTOPATH CELL ENHANCE TECH: CPT | Mod: TC

## 2024-05-31 PROCEDURE — 89051 BODY FLUID CELL COUNT: CPT

## 2024-05-31 PROCEDURE — 99285 EMERGENCY DEPT VISIT HI MDM: CPT | Mod: 25

## 2024-05-31 PROCEDURE — 93971 EXTREMITY STUDY: CPT

## 2024-05-31 PROCEDURE — 85025 COMPLETE CBC W/AUTO DIFF WBC: CPT | Performed by: EMERGENCY MEDICINE

## 2024-05-31 PROCEDURE — 94640 AIRWAY INHALATION TREATMENT: CPT

## 2024-05-31 PROCEDURE — 83735 ASSAY OF MAGNESIUM: CPT | Mod: 91 | Performed by: NURSE PRACTITIONER

## 2024-05-31 PROCEDURE — 74176 CT ABD & PELVIS W/O CONTRAST: CPT

## 2024-05-31 PROCEDURE — 2500000004 HC RX 250 GENERAL PHARMACY W/ HCPCS (ALT 636 FOR OP/ED): Performed by: INTERNAL MEDICINE

## 2024-05-31 PROCEDURE — C1729 CATH, DRAINAGE: HCPCS

## 2024-05-31 PROCEDURE — 85730 THROMBOPLASTIN TIME PARTIAL: CPT | Performed by: EMERGENCY MEDICINE

## 2024-05-31 PROCEDURE — 84484 ASSAY OF TROPONIN QUANT: CPT | Mod: 91 | Performed by: EMERGENCY MEDICINE

## 2024-05-31 PROCEDURE — 83735 ASSAY OF MAGNESIUM: CPT | Mod: 91 | Performed by: INTERNAL MEDICINE

## 2024-05-31 PROCEDURE — 2720000007 HC OR 272 NO HCPCS

## 2024-05-31 PROCEDURE — 71250 CT THORAX DX C-: CPT

## 2024-05-31 PROCEDURE — 83735 ASSAY OF MAGNESIUM: CPT | Performed by: EMERGENCY MEDICINE

## 2024-05-31 PROCEDURE — 85610 PROTHROMBIN TIME: CPT | Performed by: EMERGENCY MEDICINE

## 2024-05-31 PROCEDURE — 96372 THER/PROPH/DIAG INJ SC/IM: CPT | Performed by: NURSE PRACTITIONER

## 2024-05-31 PROCEDURE — 84484 ASSAY OF TROPONIN QUANT: CPT | Performed by: EMERGENCY MEDICINE

## 2024-05-31 PROCEDURE — 2500000002 HC RX 250 W HCPCS SELF ADMINISTERED DRUGS (ALT 637 FOR MEDICARE OP, ALT 636 FOR OP/ED): Mod: MUE | Performed by: NURSE PRACTITIONER

## 2024-05-31 PROCEDURE — 84484 ASSAY OF TROPONIN QUANT: CPT | Mod: 91

## 2024-05-31 PROCEDURE — 2500000002 HC RX 250 W HCPCS SELF ADMINISTERED DRUGS (ALT 637 FOR MEDICARE OP, ALT 636 FOR OP/ED): Performed by: NURSE PRACTITIONER

## 2024-05-31 PROCEDURE — 96365 THER/PROPH/DIAG IV INF INIT: CPT | Mod: 59

## 2024-05-31 PROCEDURE — 0W993ZZ DRAINAGE OF RIGHT PLEURAL CAVITY, PERCUTANEOUS APPROACH: ICD-10-PCS

## 2024-05-31 PROCEDURE — 84155 ASSAY OF PROTEIN SERUM: CPT

## 2024-05-31 PROCEDURE — 2500000005 HC RX 250 GENERAL PHARMACY W/O HCPCS

## 2024-05-31 PROCEDURE — 83615 LACTATE (LD) (LDH) ENZYME: CPT | Mod: AHULAB

## 2024-05-31 PROCEDURE — 96375 TX/PRO/DX INJ NEW DRUG ADDON: CPT

## 2024-05-31 PROCEDURE — 2500000004 HC RX 250 GENERAL PHARMACY W/ HCPCS (ALT 636 FOR OP/ED): Performed by: EMERGENCY MEDICINE

## 2024-05-31 PROCEDURE — 94760 N-INVAS EAR/PLS OXIMETRY 1: CPT

## 2024-05-31 PROCEDURE — 36415 COLL VENOUS BLD VENIPUNCTURE: CPT | Performed by: EMERGENCY MEDICINE

## 2024-05-31 PROCEDURE — 85027 COMPLETE CBC AUTOMATED: CPT | Performed by: NURSE PRACTITIONER

## 2024-05-31 PROCEDURE — 83880 ASSAY OF NATRIURETIC PEPTIDE: CPT | Performed by: EMERGENCY MEDICINE

## 2024-05-31 PROCEDURE — 84157 ASSAY OF PROTEIN OTHER: CPT | Mod: AHULAB

## 2024-05-31 PROCEDURE — 96368 THER/DIAG CONCURRENT INF: CPT

## 2024-05-31 PROCEDURE — 87635 SARS-COV-2 COVID-19 AMP PRB: CPT | Performed by: EMERGENCY MEDICINE

## 2024-05-31 PROCEDURE — 83986 ASSAY PH BODY FLUID NOS: CPT | Mod: AHULAB

## 2024-05-31 RX ORDER — IPRATROPIUM BROMIDE AND ALBUTEROL SULFATE 2.5; .5 MG/3ML; MG/3ML
3 SOLUTION RESPIRATORY (INHALATION)
Status: DISCONTINUED | OUTPATIENT
Start: 2024-05-31 | End: 2024-06-03

## 2024-05-31 RX ORDER — MULTIVIT-MIN/IRON FUM/FOLIC AC 7.5 MG-4
1 TABLET ORAL DAILY
Status: DISCONTINUED | OUTPATIENT
Start: 2024-05-31 | End: 2024-06-03 | Stop reason: HOSPADM

## 2024-05-31 RX ORDER — IPRATROPIUM BROMIDE AND ALBUTEROL SULFATE 2.5; .5 MG/3ML; MG/3ML
3 SOLUTION RESPIRATORY (INHALATION) 4 TIMES DAILY PRN
Status: DISCONTINUED | OUTPATIENT
Start: 2024-05-31 | End: 2024-05-31

## 2024-05-31 RX ORDER — FUROSEMIDE 10 MG/ML
40 INJECTION INTRAMUSCULAR; INTRAVENOUS
Status: DISCONTINUED | OUTPATIENT
Start: 2024-05-31 | End: 2024-06-03

## 2024-05-31 RX ORDER — BENZONATATE 100 MG/1
100 CAPSULE ORAL 3 TIMES DAILY PRN
Status: DISCONTINUED | OUTPATIENT
Start: 2024-05-31 | End: 2024-06-03 | Stop reason: HOSPADM

## 2024-05-31 RX ORDER — ENOXAPARIN SODIUM 100 MG/ML
40 INJECTION SUBCUTANEOUS EVERY 24 HOURS
Status: DISCONTINUED | OUTPATIENT
Start: 2024-05-31 | End: 2024-06-03 | Stop reason: HOSPADM

## 2024-05-31 RX ORDER — MAGNESIUM SULFATE HEPTAHYDRATE 40 MG/ML
2 INJECTION, SOLUTION INTRAVENOUS ONCE
Status: COMPLETED | OUTPATIENT
Start: 2024-05-31 | End: 2024-05-31

## 2024-05-31 RX ORDER — ONDANSETRON HYDROCHLORIDE 2 MG/ML
4 INJECTION, SOLUTION INTRAVENOUS EVERY 8 HOURS PRN
Status: DISCONTINUED | OUTPATIENT
Start: 2024-05-31 | End: 2024-06-03 | Stop reason: HOSPADM

## 2024-05-31 RX ORDER — FOLIC ACID 1 MG/1
1 TABLET ORAL DAILY
Status: DISCONTINUED | OUTPATIENT
Start: 2024-05-31 | End: 2024-06-03 | Stop reason: HOSPADM

## 2024-05-31 RX ORDER — LIDOCAINE HYDROCHLORIDE 10 MG/ML
INJECTION, SOLUTION EPIDURAL; INFILTRATION; INTRACAUDAL; PERINEURAL
Status: COMPLETED | OUTPATIENT
Start: 2024-05-31 | End: 2024-05-31

## 2024-05-31 RX ORDER — FERROUS SULFATE 325(65) MG
65 TABLET ORAL DAILY
Status: DISCONTINUED | OUTPATIENT
Start: 2024-05-31 | End: 2024-06-03 | Stop reason: HOSPADM

## 2024-05-31 RX ORDER — ACETAMINOPHEN 325 MG/1
1000 TABLET ORAL EVERY MORNING
Status: DISCONTINUED | OUTPATIENT
Start: 2024-05-31 | End: 2024-06-03 | Stop reason: HOSPADM

## 2024-05-31 RX ORDER — CALCIUM CARBONATE 200(500)MG
1500 TABLET,CHEWABLE ORAL
Status: DISCONTINUED | OUTPATIENT
Start: 2024-05-31 | End: 2024-06-03 | Stop reason: HOSPADM

## 2024-05-31 RX ORDER — IPRATROPIUM BROMIDE AND ALBUTEROL SULFATE 2.5; .5 MG/3ML; MG/3ML
3 SOLUTION RESPIRATORY (INHALATION) EVERY 2 HOUR PRN
Status: DISCONTINUED | OUTPATIENT
Start: 2024-05-31 | End: 2024-06-03 | Stop reason: HOSPADM

## 2024-05-31 RX ORDER — PANTOPRAZOLE SODIUM 40 MG/1
40 TABLET, DELAYED RELEASE ORAL
Status: DISCONTINUED | OUTPATIENT
Start: 2024-05-31 | End: 2024-06-03 | Stop reason: HOSPADM

## 2024-05-31 RX ORDER — TALC
3 POWDER (GRAM) TOPICAL NIGHTLY PRN
Status: DISCONTINUED | OUTPATIENT
Start: 2024-05-31 | End: 2024-06-03 | Stop reason: HOSPADM

## 2024-05-31 RX ORDER — POLYETHYLENE GLYCOL 3350 17 G/17G
17 POWDER, FOR SOLUTION ORAL DAILY PRN
Status: DISCONTINUED | OUTPATIENT
Start: 2024-05-31 | End: 2024-06-03 | Stop reason: HOSPADM

## 2024-05-31 RX ORDER — LANOLIN ALCOHOL/MO/W.PET/CERES
400 CREAM (GRAM) TOPICAL DAILY
Status: DISCONTINUED | OUTPATIENT
Start: 2024-05-31 | End: 2024-06-03 | Stop reason: HOSPADM

## 2024-05-31 RX ORDER — HYDROCHLOROTHIAZIDE 12.5 MG/1
12.5 TABLET ORAL DAILY
Status: DISCONTINUED | OUTPATIENT
Start: 2024-05-31 | End: 2024-06-03

## 2024-05-31 RX ORDER — ACETAMINOPHEN 325 MG/1
650 TABLET ORAL EVERY 6 HOURS PRN
Status: DISCONTINUED | OUTPATIENT
Start: 2024-05-31 | End: 2024-06-03 | Stop reason: HOSPADM

## 2024-05-31 RX ORDER — ATORVASTATIN CALCIUM 40 MG/1
40 TABLET, FILM COATED ORAL DAILY
Status: DISCONTINUED | OUTPATIENT
Start: 2024-05-31 | End: 2024-06-03 | Stop reason: HOSPADM

## 2024-05-31 RX ORDER — TAMSULOSIN HYDROCHLORIDE 0.4 MG/1
0.4 CAPSULE ORAL 2 TIMES DAILY
Status: DISCONTINUED | OUTPATIENT
Start: 2024-05-31 | End: 2024-06-03 | Stop reason: HOSPADM

## 2024-05-31 RX ORDER — CEFTRIAXONE 1 G/50ML
1 INJECTION, SOLUTION INTRAVENOUS ONCE
Status: COMPLETED | OUTPATIENT
Start: 2024-05-31 | End: 2024-05-31

## 2024-05-31 RX ORDER — FUROSEMIDE 10 MG/ML
40 INJECTION INTRAMUSCULAR; INTRAVENOUS ONCE
Status: COMPLETED | OUTPATIENT
Start: 2024-05-31 | End: 2024-05-31

## 2024-05-31 RX ORDER — SPIRONOLACTONE 25 MG/1
25 TABLET ORAL DAILY
Status: DISCONTINUED | OUTPATIENT
Start: 2024-05-31 | End: 2024-06-03 | Stop reason: HOSPADM

## 2024-05-31 RX ORDER — GUAIFENESIN 100 MG/5ML
200 SOLUTION ORAL EVERY 4 HOURS PRN
Status: DISCONTINUED | OUTPATIENT
Start: 2024-05-31 | End: 2024-05-31

## 2024-05-31 RX ORDER — METOPROLOL SUCCINATE 50 MG/1
50 TABLET, EXTENDED RELEASE ORAL DAILY
Status: DISCONTINUED | OUTPATIENT
Start: 2024-05-31 | End: 2024-06-03 | Stop reason: HOSPADM

## 2024-05-31 RX ORDER — FINASTERIDE 5 MG/1
5 TABLET, FILM COATED ORAL DAILY
Status: DISCONTINUED | OUTPATIENT
Start: 2024-05-31 | End: 2024-06-03 | Stop reason: HOSPADM

## 2024-05-31 RX ORDER — POTASSIUM CHLORIDE 20 MEQ/1
40 TABLET, EXTENDED RELEASE ORAL ONCE
Status: COMPLETED | OUTPATIENT
Start: 2024-05-31 | End: 2024-05-31

## 2024-05-31 RX ORDER — GUAIFENESIN 600 MG/1
1200 TABLET, EXTENDED RELEASE ORAL 2 TIMES DAILY
Status: DISCONTINUED | OUTPATIENT
Start: 2024-05-31 | End: 2024-06-03 | Stop reason: HOSPADM

## 2024-05-31 RX ADMIN — GUAIFENESIN 1200 MG: 600 TABLET ORAL at 09:27

## 2024-05-31 RX ADMIN — CALCIUM CARBONATE (ANTACID) CHEW TAB 500 MG 1500 MG: 500 CHEW TAB at 09:26

## 2024-05-31 RX ADMIN — FINASTERIDE 5 MG: 5 TABLET, FILM COATED ORAL at 09:41

## 2024-05-31 RX ADMIN — ACETAMINOPHEN 975 MG: 325 TABLET ORAL at 09:27

## 2024-05-31 RX ADMIN — ENOXAPARIN SODIUM 40 MG: 40 INJECTION SUBCUTANEOUS at 09:30

## 2024-05-31 RX ADMIN — TAMSULOSIN HYDROCHLORIDE 0.4 MG: 0.4 CAPSULE ORAL at 20:10

## 2024-05-31 RX ADMIN — CEFTRIAXONE SODIUM 1 G: 1 INJECTION, SOLUTION INTRAVENOUS at 03:47

## 2024-05-31 RX ADMIN — ATORVASTATIN CALCIUM 40 MG: 40 TABLET, FILM COATED ORAL at 09:26

## 2024-05-31 RX ADMIN — MAGNESIUM SULFATE HEPTAHYDRATE 2 G: 40 INJECTION, SOLUTION INTRAVENOUS at 12:36

## 2024-05-31 RX ADMIN — TAMSULOSIN HYDROCHLORIDE 0.4 MG: 0.4 CAPSULE ORAL at 09:27

## 2024-05-31 RX ADMIN — METOPROLOL SUCCINATE 50 MG: 50 TABLET, EXTENDED RELEASE ORAL at 09:26

## 2024-05-31 RX ADMIN — GUAIFENESIN 1200 MG: 600 TABLET ORAL at 20:10

## 2024-05-31 RX ADMIN — MULTIPLE VITAMINS W/ MINERALS TAB 1 TABLET: TAB at 09:27

## 2024-05-31 RX ADMIN — FUROSEMIDE 40 MG: 10 INJECTION, SOLUTION INTRAMUSCULAR; INTRAVENOUS at 03:50

## 2024-05-31 RX ADMIN — LIDOCAINE HYDROCHLORIDE 10 ML: 10 INJECTION, SOLUTION EPIDURAL; INFILTRATION; INTRACAUDAL; PERINEURAL at 16:20

## 2024-05-31 RX ADMIN — CALCIUM CARBONATE (ANTACID) CHEW TAB 500 MG 1500 MG: 500 CHEW TAB at 17:53

## 2024-05-31 RX ADMIN — IPRATROPIUM BROMIDE AND ALBUTEROL SULFATE 3 ML: 2.5; .5 SOLUTION RESPIRATORY (INHALATION) at 19:18

## 2024-05-31 RX ADMIN — FOLIC ACID 1 MG: 1 TABLET ORAL at 09:41

## 2024-05-31 RX ADMIN — IPRATROPIUM BROMIDE AND ALBUTEROL SULFATE 3 ML: 2.5; .5 SOLUTION RESPIRATORY (INHALATION) at 07:17

## 2024-05-31 RX ADMIN — SPIRONOLACTONE 25 MG: 25 TABLET ORAL at 09:26

## 2024-05-31 RX ADMIN — MAGNESIUM SULFATE HEPTAHYDRATE 2 G: 40 INJECTION, SOLUTION INTRAVENOUS at 15:00

## 2024-05-31 RX ADMIN — PANTOPRAZOLE SODIUM 40 MG: 40 TABLET, DELAYED RELEASE ORAL at 09:27

## 2024-05-31 RX ADMIN — FUROSEMIDE 40 MG: 10 INJECTION, SOLUTION INTRAMUSCULAR; INTRAVENOUS at 17:53

## 2024-05-31 RX ADMIN — Medication 400 MG: at 09:27

## 2024-05-31 RX ADMIN — FERROUS SULFATE TAB 325 MG (65 MG ELEMENTAL FE) 1 TABLET: 325 (65 FE) TAB at 09:27

## 2024-05-31 RX ADMIN — MAGNESIUM SULFATE IN WATER 2 G: 2 INJECTION, SOLUTION INTRAVENOUS at 02:20

## 2024-05-31 RX ADMIN — FUROSEMIDE 40 MG: 10 INJECTION, SOLUTION INTRAMUSCULAR; INTRAVENOUS at 09:41

## 2024-05-31 RX ADMIN — AZITHROMYCIN MONOHYDRATE 500 MG: 500 INJECTION, POWDER, LYOPHILIZED, FOR SOLUTION INTRAVENOUS at 04:38

## 2024-05-31 RX ADMIN — POTASSIUM CHLORIDE 40 MEQ: 1500 TABLET, EXTENDED RELEASE ORAL at 09:27

## 2024-05-31 SDOH — SOCIAL STABILITY: SOCIAL INSECURITY: HAVE YOU HAD THOUGHTS OF HARMING ANYONE ELSE?: NO

## 2024-05-31 SDOH — SOCIAL STABILITY: SOCIAL INSECURITY: WERE YOU ABLE TO COMPLETE ALL THE BEHAVIORAL HEALTH SCREENINGS?: YES

## 2024-05-31 SDOH — SOCIAL STABILITY: SOCIAL INSECURITY: DO YOU FEEL ANYONE HAS EXPLOITED OR TAKEN ADVANTAGE OF YOU FINANCIALLY OR OF YOUR PERSONAL PROPERTY?: NO

## 2024-05-31 SDOH — SOCIAL STABILITY: SOCIAL INSECURITY: HAS ANYONE EVER THREATENED TO HURT YOUR FAMILY OR YOUR PETS?: NO

## 2024-05-31 SDOH — SOCIAL STABILITY: SOCIAL INSECURITY: ARE THERE ANY APPARENT SIGNS OF INJURIES/BEHAVIORS THAT COULD BE RELATED TO ABUSE/NEGLECT?: NO

## 2024-05-31 SDOH — SOCIAL STABILITY: SOCIAL INSECURITY: ARE YOU OR HAVE YOU BEEN THREATENED OR ABUSED PHYSICALLY, EMOTIONALLY, OR SEXUALLY BY ANYONE?: NO

## 2024-05-31 SDOH — SOCIAL STABILITY: SOCIAL INSECURITY: DOES ANYONE TRY TO KEEP YOU FROM HAVING/CONTACTING OTHER FRIENDS OR DOING THINGS OUTSIDE YOUR HOME?: NO

## 2024-05-31 SDOH — SOCIAL STABILITY: SOCIAL INSECURITY: ABUSE: ADULT

## 2024-05-31 SDOH — SOCIAL STABILITY: SOCIAL INSECURITY: HAVE YOU HAD ANY THOUGHTS OF HARMING ANYONE ELSE?: NO

## 2024-05-31 SDOH — SOCIAL STABILITY: SOCIAL INSECURITY: DO YOU FEEL UNSAFE GOING BACK TO THE PLACE WHERE YOU ARE LIVING?: NO

## 2024-05-31 ASSESSMENT — COGNITIVE AND FUNCTIONAL STATUS - GENERAL
TURNING FROM BACK TO SIDE WHILE IN FLAT BAD: A LITTLE
MOBILITY SCORE: 15
HELP NEEDED FOR BATHING: A LITTLE
WALKING IN HOSPITAL ROOM: A LOT
PATIENT BASELINE BEDBOUND: NO
STANDING UP FROM CHAIR USING ARMS: A LOT
CLIMB 3 TO 5 STEPS WITH RAILING: A LOT
TURNING FROM BACK TO SIDE WHILE IN FLAT BAD: A LITTLE
TOILETING: A LITTLE
DAILY ACTIVITIY SCORE: 19
DRESSING REGULAR UPPER BODY CLOTHING: A LITTLE
MOVING FROM LYING ON BACK TO SITTING ON SIDE OF FLAT BED WITH BEDRAILS: A LITTLE
TOILETING: A LITTLE
CLIMB 3 TO 5 STEPS WITH RAILING: A LOT
HELP NEEDED FOR BATHING: A LITTLE
WALKING IN HOSPITAL ROOM: A LOT
EATING MEALS: A LITTLE
DAILY ACTIVITIY SCORE: 20
MOVING FROM LYING ON BACK TO SITTING ON SIDE OF FLAT BED WITH BEDRAILS: A LITTLE
MOBILITY SCORE: 15
STANDING UP FROM CHAIR USING ARMS: A LOT
DRESSING REGULAR UPPER BODY CLOTHING: A LITTLE
DRESSING REGULAR LOWER BODY CLOTHING: A LITTLE
MOVING TO AND FROM BED TO CHAIR: A LITTLE
MOVING TO AND FROM BED TO CHAIR: A LITTLE
DRESSING REGULAR LOWER BODY CLOTHING: A LITTLE

## 2024-05-31 ASSESSMENT — ACTIVITIES OF DAILY LIVING (ADL)
HEARING - RIGHT EAR: FUNCTIONAL
BATHING: NEEDS ASSISTANCE
ASSISTIVE_DEVICE: WALKER
GROOMING: NEEDS ASSISTANCE
DRESSING YOURSELF: NEEDS ASSISTANCE
JUDGMENT_ADEQUATE_SAFELY_COMPLETE_DAILY_ACTIVITIES: YES
FEEDING YOURSELF: INDEPENDENT
LACK_OF_TRANSPORTATION: NO
PATIENT'S MEMORY ADEQUATE TO SAFELY COMPLETE DAILY ACTIVITIES?: YES
HEARING - LEFT EAR: FUNCTIONAL
TOILETING: NEEDS ASSISTANCE
ADEQUATE_TO_COMPLETE_ADL: YES
WALKS IN HOME: NEEDS ASSISTANCE

## 2024-05-31 ASSESSMENT — LIFESTYLE VARIABLES
HOW OFTEN DO YOU HAVE 6 OR MORE DRINKS ON ONE OCCASION: NEVER
HOW OFTEN DO YOU HAVE A DRINK CONTAINING ALCOHOL: NEVER
SKIP TO QUESTIONS 9-10: 1
HOW MANY STANDARD DRINKS CONTAINING ALCOHOL DO YOU HAVE ON A TYPICAL DAY: PATIENT DOES NOT DRINK
PRESCIPTION_ABUSE_PAST_12_MONTHS: NO
AUDIT-C TOTAL SCORE: 0
AUDIT-C TOTAL SCORE: 0
SUBSTANCE_ABUSE_PAST_12_MONTHS: NO

## 2024-05-31 ASSESSMENT — PAIN - FUNCTIONAL ASSESSMENT
PAIN_FUNCTIONAL_ASSESSMENT: 0-10

## 2024-05-31 ASSESSMENT — PAIN SCALES - GENERAL
PAINLEVEL_OUTOF10: 0 - NO PAIN

## 2024-05-31 NOTE — PROGRESS NOTES
Transitional Care Coordination Progress Note:  Plan per Medical/Surgical team: treatment of CHF & PN with IV ATB, IV lasix, duoneb, oxygen @ 2 liters NC  Status: Observation  Payor source: medicare A/B, [facility]  Discharge disposition: Home alone with [facility]  Holocaust survivor  Potential Barriers: Mag 0.70, BNP 1,024, trop 102, 99  ADOD: 6/1/2024   LEXI Aguilar RN, BSN Transitional Care Coordinator ED# [phone]      05/31/24 0638   Discharge Planning   Living Arrangements Alone   Support Systems Family members   Assistance Needed cardio work up, ?oxygen   Type of Residence Private residence   Number of Stairs to Enter Residence 2   Number of Stairs Within Residence 0   Do you have animals or pets at home? No   Home or Post Acute Services In home services   Type of Home Care Services Home nursing visits;Home health aide;Home PT   Patient expects to be discharged to: Home alone with [facility]   Does the patient need discharge transport arranged? Yes   RoundTrip coordination needed? Yes   Has discharge transport been arranged? No   Financial Resource Strain   How hard is it for you to pay for the very basics like food, housing, medical care, and heating? Not hard   Housing Stability   In the last 12 months, was there a time when you were not able to pay the mortgage or rent on time? N   In the last 12 months, how many places have you lived? 1   In the last 12 months, was there a time when you did not have a steady place to sleep or slept in a shelter (including now)? N   Transportation Needs   In the past 12 months, has lack of transportation kept you from medical appointments or from getting medications? no   In the past 12 months, has lack of transportation kept you from meetings, work, or from getting things needed for daily living? No

## 2024-05-31 NOTE — CARE PLAN
"Pt provided  with  a Bikur Cholim  \"Shabbat in   a box\" to support  Mosque needs. Pt made  aware  of  sundown  time.     Petra Shah, MSW, LSW    "

## 2024-05-31 NOTE — PROGRESS NOTES
05/31/24 0638   [facility] Disability Status   Are you deaf or do you have serious difficulty hearing? N   Are you blind or do you have serious difficulty seeing, even when wearing glasses? N   Because of a physical, mental, or emotional condition, do you have serious difficulty concentrating, remembering, or making decisions? (5 years old or older) N   Do you have serious difficulty walking or climbing stairs? Y  (chair lift, walker, cane)   Do you have serious difficulty dressing or bathing? N   Because of a physical, mental, or emotional condition, do you have serious difficulty doing errands alone such as visiting the doctor? Y

## 2024-05-31 NOTE — H&P
[facility] OBSERVATION H&P    Admitting Physician: Regi Martines MD   Admitting Dx: Acute on Chronic Diastolic HF    HPI: Ramón Flores is a 89 y.o. 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 Patient was recently seen in [facility] ED on 5/11 for cough and congestion, and chose to follow up with PCP vs having furhter work up in ED, patient was seen by geriatrics NP on 5/25 and was instructed to call if he experiences cough, edema, or SOB. Today (5/31) patient states that his cough had been getting worse, and feels as though mucus is stuck sitting in his throat. Cough is non-productive no hemoptosis. He denies any HA, dizziness, SOB, CP, palpitations, abdominal pian, changes in BM, urinary complaints such as burning or irritation. Patient does have swelling in his LLE. Of note patient states he has injured it in the past.       In the ED, VSS. EKG NSR wo ischemic changes. Labs notable for hypomagnesemia Mg 0.7, Troponin 102--99, BNP 1024, INR 1.5, chronic thrombocytopenia and anemia. CXR showed streaky bibasilar airspace opacities appears similar to the prior exam that may reflect atelectasis or consolidation/pneumonia. Recent chest CT on 5/24 showed interlobular septal thickening and bilateral moderate pleural effusions, consistent with pulmonary edema and no evidence of pneumonia, with chronic interstitial fibrotic changes at both lung bases similar to the previous examination and mild ascites and body wall edema suggesting right-sided heart failure. Patient was given IV Lasix, magnesium, and IV Rocephin/Azithromycin and admitted for further management of heart failure and possible PNA.    ROS: Per HPI    Subjective   Past Medical History:   Diagnosis Date    Acute deep  vein thrombosis (DVT) of left femoral vein (Multi) 09/10/2023    ACUTE DEEP VEIN THROMBOSIS, L FEMORAL, HEMOPHILIA    Acute diastolic heart failure (Multi) 04/16/2023    Benign prostatic hyperplasia without lower urinary tract symptoms 01/07/2016    Enlarged prostate without lower urinary tract symptoms (luts)    Bleeding hemorrhoids 03/01/2023    CAP (community acquired pneumonia) 01/18/2024    Closed nondisplaced fracture of head of left radius 09/05/2023    COVID-19 05/21/2023    Enlarged prostate with lower urinary tract symptoms (LUTS) 03/01/2023    Fracture of bone of hip (Multi) 09/05/2023    Fracture of femoral neck, right (Multi) 03/01/2023    Gastrointestinal hemorrhage 09/05/2023    LOWER GI BLEED    Gingiva hemorrhage 09/05/2023    Gross hematuria 03/01/2023    Hemarthrosis of ankle joint 04/18/2019    Hemarthrosis of knee 05/09/2022    Hematochezia 09/05/2023    Hematoma of lower extremity 09/05/2023    Hematoma of right thigh 03/01/2023    History of recent fall 05/31/2023    Knee effusion 09/05/2023    Knee effusion, left 03/01/2023    Lumbar pain 03/01/2023    Obstructive uropathy 04/28/2023    Orthostatic syncope 09/05/2023    Personal history of other endocrine, nutritional and metabolic disease     History of hyperlipidemia    Pneumonia of left lower lobe due to infectious organism 03/19/2024    Pneumonia of right lung due to infectious organism 03/01/2023    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    Subdural hematoma (Multi) 04/09/2023    Syncope 05/12/2023    Urinary retention 03/01/2023    UTI (urinary tract infection) 03/01/2023    Viral gastroenteritis 03/01/2023     Past Surgical History:   Procedure Laterality Date    CORONARY ARTERY BYPASS GRAFT       Social History     Tobacco Use    Smoking status: Never    Smokeless tobacco: Never   Substance Use Topics    Alcohol use: Defer    Drug use: Never     Family History   Problem Relation Name Age of Onset    Hemophilia Mother         Allergies    Allergen Reactions    Aspirin Bleeding     hemophiliac    Penicillins Rash       Prior to Admission medications    Medication Sig Start Date End Date Taking? Authorizing Provider   acetaminophen (Tylenol Extra Strength) 500 mg tablet Take 2 tablets (1,000 mg) by mouth once daily in the morning. 4/8/24 4/8/25  SHERRY Camara   antihemophilic RF VIII (Altuviiio) 3,000 (+/-) unit recon soln Infuse 3,175 Units into a venous catheter every 7 days. 3175 units +/-10% dose every 7 days.  Additionally,  prn when directed. 4/10/24 4/10/25  Rufino Dillon MD   antihemophilic RF VIII (Altuviiio) 3,000 (+/-) unit recon soln Infuse 3,175 Units into a venous catheter every 7 days. 3175 units +/-10% dose every 7 days.  Additionally,  prn when directed. 3/26/24 3/26/25  Rufino Dillon MD   atorvastatin (Lipitor) 40 mg tablet Take 1 tablet (40 mg) by mouth once daily. 1/25/24   SHERRY Cervantes   bumetanide (Bumex) 1 mg tablet Take 1 tablet (1 mg) by mouth once daily as needed (edema). 3/24/24   SHERRY Mccarty   calcium carbonate 600 mg calcium (1,500 mg) tablet Take 1 tablet (1,500 mg) by mouth 2 times a day with meals. 5/18/23   Historical Provider, MD   ferrous sulfate 325 (65 Fe) MG tablet Take 1 tablet by mouth once daily. 4/12/23   Historical Provider, MD   finasteride (Proscar) 5 mg tablet Take 1 tablet (5 mg) by mouth once daily. 1/25/24   RACHEL CervantesCNP   folic acid (Folvite) 1 mg tablet Take 1 tablet (1 mg) by mouth once daily. 4/12/23   Historical Provider, MD   guaiFENesin (Mucinex) 600 mg 12 hr tablet Take 2 tablets (1,200 mg) by mouth 2 times a day. Do not crush, chew, or split. 5/11/24 5/11/25  Denisse Kumar MD   hydroCHLOROthiazide (Microzide) 12.5 mg tablet Take 1 tablet (12.5 mg) by mouth once daily.    Historical Provider, MD   ipratropium-albuteroL (Duo-Neb) 0.5-2.5 mg/3 mL nebulizer solution Take 3 mL by nebulization 4 times a day as needed for wheezing or  "shortness of breath. 5/17/24 5/17/25  SHERRY Camara   Lactobacillus acidoph-L.estrellitagar 1 million cell tablet tablet Take 1 tablet by mouth once daily. 4/21/23   Historical Provider, MD   metoprolol succinate XL (Toprol-XL) 50 mg 24 hr tablet TAKE 1 TABLET BY MOUTH DAILY 3/20/24   Fabio Bustillos MD   multivitamin with minerals (multivitamin-iron-folic acid) tablet Take 1 tablet by mouth once daily. 2/28/23   Historical Provider, MD   nebulizer accessories kit 1 kit 1 time if needed (wheezing, shortness of breath) for up to 1 dose. 5/17/24   SHERRY Camara   nystatin (Mycostatin) cream Apply 1 Application topically 2 times a day. 4/9/24 4/9/25  SHERRY Camara   pantoprazole (ProtoNix) 40 mg EC tablet Take 1 tablet (40 mg) by mouth once daily in the morning. Take before meals. Do not crush, chew, or split. 1/25/24   SHERRY Cervantes   spironolactone (Aldactone) 25 mg tablet Take 1 tablet (25 mg) by mouth once daily. 1/25/24   SHERRY Cervantes   tamsulosin (Flomax) 0.4 mg 24 hr capsule Take 1 capsule (0.4 mg) by mouth 2 times a day. 1/25/24   SHERRY Cervantes   vit A/vit C/vit E/zinc/copper (PRESERVISION AREDS ORAL) Take 1 capsule by mouth once daily. 4/21/17   Historical Provider, MD   acetaminophen (Tylenol) 500 mg tablet Take 1 tablet (500 mg) by mouth every 6 hours if needed (pain).  5/31/24  Historical Provider, MD     Review of Systems       Objective   Heart Rate:  [59-72]   Temperature:  [36.5 °C (97.7 °F)]   Respirations:  [16-20]   BP: (108-143)/(66-93)   Height:  [167.6 cm (5' 6\")]   Weight:  [63 kg (139 lb)]   Pulse Ox:  [93 %-98 %]      Pain Score: 0 - No pain   Vitals:    05/31/24 0116   Weight: 63 kg (139 lb)      No intake or output data in the 24 hours ending 05/31/24 0510    Physical Exam  Constitutional:       Appearance: Normal appearance.   HENT:      Head: Normocephalic and atraumatic.      Nose: Nose normal.   Cardiovascular:      Rate and " Rhythm: Normal rate and regular rhythm.   Pulmonary:      Effort: Pulmonary effort is normal. No respiratory distress.      Breath sounds: No wheezing.      Comments: Coarse breath sounds  Abdominal:      General: Bowel sounds are normal. There is no distension.      Palpations: Abdomen is soft.      Tenderness: There is no abdominal tenderness. There is no guarding.   Musculoskeletal:         General: Normal range of motion.      Cervical back: Normal range of motion.      Right lower leg: No edema.      Left lower leg: Edema present.   Skin:     General: Skin is warm and dry.   Neurological:      General: No focal deficit present.      Mental Status: He is alert.   Psychiatric:         Mood and Affect: Mood normal.         Behavior: Behavior normal.           Medications  acetaminophen, 975 mg, oral, q AM  atorvastatin, 40 mg, oral, Daily  azithromycin, 500 mg, intravenous, Once  calcium carbonate, 1,500 mg, oral, BID  enoxaparin, 40 mg, subcutaneous, q24h  ferrous sulfate (325 mg ferrous sulfate), 65 mg of iron, oral, Daily  finasteride, 5 mg, oral, Daily  folic acid, 1 mg, oral, Daily  guaiFENesin, 1,200 mg, oral, BID  hydroCHLOROthiazide, 12.5 mg, oral, Daily  magnesium oxide, 400 mg, oral, Daily  metoprolol succinate XL, 50 mg, oral, Daily  multivitamin with minerals, 1 tablet, oral, Daily  pantoprazole, 40 mg, oral, Daily before breakfast  spironolactone, 25 mg, oral, Daily  tamsulosin, 0.4 mg, oral, BID     PRN medications: acetaminophen, benzocaine-menthol, ipratropium-albuteroL, melatonin, ondansetron, oxygen, polyethylene glycol    Labs  Results for orders placed or performed during the hospital encounter of 05/31/24 (from the past 24 hour(s))   CBC and Auto Differential   Result Value Ref Range    WBC 4.8 4.4 - 11.3 x10*3/uL    nRBC 0.0 0.0 - 0.0 /100 WBCs    RBC 3.47 (L) 4.50 - 5.90 x10*6/uL    Hemoglobin 12.0 (L) 13.5 - 17.5 g/dL    Hematocrit 35.5 (L) 41.0 - 52.0 %     (H) 80 - 100 fL    MCH  34.6 (H) 26.0 - 34.0 pg    MCHC 33.8 32.0 - 36.0 g/dL    RDW 16.8 (H) 11.5 - 14.5 %    Platelets 93 (L) 150 - 450 x10*3/uL    Neutrophils % 50.8 40.0 - 80.0 %    Immature Granulocytes %, Automated 0.4 0.0 - 0.9 %    Lymphocytes % 38.4 13.0 - 44.0 %    Monocytes % 8.1 2.0 - 10.0 %    Eosinophils % 1.9 0.0 - 6.0 %    Basophils % 0.4 0.0 - 2.0 %    Neutrophils Absolute 2.43 1.60 - 5.50 x10*3/uL    Immature Granulocytes Absolute, Automated 0.02 0.00 - 0.50 x10*3/uL    Lymphocytes Absolute 1.84 0.80 - 3.00 x10*3/uL    Monocytes Absolute 0.39 0.05 - 0.80 x10*3/uL    Eosinophils Absolute 0.09 0.00 - 0.40 x10*3/uL    Basophils Absolute 0.02 0.00 - 0.10 x10*3/uL   Comprehensive Metabolic Panel   Result Value Ref Range    Glucose 109 (H) 74 - 99 mg/dL    Sodium 141 136 - 145 mmol/L    Potassium 3.5 3.5 - 5.3 mmol/L    Chloride 110 (H) 98 - 107 mmol/L    Bicarbonate 20 (L) 21 - 32 mmol/L    Anion Gap 15 10 - 20 mmol/L    Urea Nitrogen 24 (H) 6 - 23 mg/dL    Creatinine 1.11 0.50 - 1.30 mg/dL    eGFR 61 >60 mL/min/1.73m*2    Calcium 7.6 (L) 8.6 - 10.3 mg/dL    Albumin 3.9 3.4 - 5.0 g/dL    Alkaline Phosphatase 78 33 - 136 U/L    Total Protein 6.2 (L) 6.4 - 8.2 g/dL    AST 28 9 - 39 U/L    Bilirubin, Total 2.2 (H) 0.0 - 1.2 mg/dL    ALT 25 10 - 52 U/L   Magnesium   Result Value Ref Range    Magnesium 0.70 (L) 1.60 - 2.40 mg/dL   aPTT   Result Value Ref Range    aPTT 44 (H) 27 - 38 seconds   Protime-INR   Result Value Ref Range    Protime 16.9 (H) 9.8 - 12.8 seconds    INR 1.5 (H) 0.9 - 1.1   B-Type Natriuretic Peptide   Result Value Ref Range    BNP 1,024 (H) 0 - 99 pg/mL   Troponin I, High Sensitivity, Initial   Result Value Ref Range    Troponin I, High Sensitivity 102 (HH) 0 - 20 ng/L   Sars-CoV-2 PCR   Result Value Ref Range    Coronavirus 2019, PCR Not Detected Not Detected   Troponin, High Sensitivity, 1 Hour   Result Value Ref Range    Troponin I, High Sensitivity 99 (HH) 0 - 20 ng/L   Magnesium   Result Value Ref Range     Magnesium 0.70 (L) 1.60 - 2.40 mg/dL     Imaging  XR chest 1 view 05/31/2024  1. Streaky bibasilar airspace opacities appears similar to the prior exam and may reflect atelectasis or consolidation/pneumonia.  2. Stable cardiomegaly.      Assessment/Plan   Mr.George Flores is a 89 y.o. male who p/w cough/SOB and is admitted for CHF and possible PNA. 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    Acute on Chronic Diastolic Heart Failure Exacerbation  HFmrEF  CAD s/p CABG  HTN, HLD, Chronic AF  Hypomagnesemia  -Last TTE 3/2024: EF 50%, global hypokinesis of LV, mildly reduced RV fxn, mild-mod TR, LA mod dilated  -updated echo ordered  -BNP 1024  -Troponin 102--99--104, chronic elevation at baseline  -CXR 5/20: Streaky bibasilar airspace opacities, atelectasis vs PNA  -CT Chest 5/24: interlobular septal thickening and bilateral moderate pleural effusions, consistent with pulmonary edema, and no evidence of pneumonia, Mild ascites and body wall edema suggesting right-sided heart failure  -updated CT ordered  -s/p IV Lasix 60mg in the ED, assess response  -40mg IV lasix BID today pending AM labs  -Optimize BP control, continue home medications  -Resume Bumex at discharge  -c/w statin, no ASA d/t allergy  -Tele, monitor lytes, daily wts, I&Os  -plan to wean supplemental O2 as further diuresed   -Cards consulted for further recommendations     Cough  APARICIO  Possible PNA per CXR  -Procal in process  -May be r/t resolving PNA or HF  -Recently completed PO Azithro and Cefuroxime  -See CXR vs chest CT results as above  -Antitussives ordered PRN  -Check procal before continuing abx  -Duonebs PRN  -afebrile no leukocytosis    Hx of Hemophilia A  ZEYNEP  -Weekly infusions of RF VIII on Tuesdays  -Monitor for active bleeding  -c/w PO Iron and folic acid    Other comorbidities as  above  -Continue meds as ordered and adjust based on clinical course       GI/VTE PPX: PPI, held Lovenox due to thrombocytopenia, SCDs   Code Status: DNR and No Intubation    Chart, medical history, and labs/testing reviewed in detail.   Case and plan of care with attending provider.    Disposition: Discharge home once medically cleared and stable, pending diuresis, CT, and procal to determine if abx warrented  -Appears patient was active with [facility], may need PT/OT  -SW consult placed    Mckenzie Beck PA-C  Observation/Internal Med [facility]  [facility]  05/31/24  5:27 AM

## 2024-05-31 NOTE — PROGRESS NOTES
05/31/24 0856   Current Planned Discharge Disposition   Current Planned Discharge Disposition Home Health     Patient is active with [facility]--they are able to resume HHC when patient is discharged.  No new HHC orders unless patient is upgraded to inpatient status.  Cardio consulted-patient received IV lasix in ED.  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  Will continue to follow for discharge planning needs.    1115 Spoke with patient at length at bedside.  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  Patient is Mandaen and I explained the Shabbot in a Box in case patient stays overnight.  Asked Petra ODOM to bring the box to him so he has all of the supplies he needs for Shabbot at sundown.

## 2024-05-31 NOTE — PROGRESS NOTES
Scribed for Dr. Raisa Sierra by Bandar Alexander.  I, Dr. Raisa Sierra, have personally reviewed and agreed with the information entered by the Virtual Scribe. 05/31/24    History of Present Illness (HPI):  TODAY: (05/31/24)  Ramón Flores is a 89 y.o. male with history of a groin rash.   Last seen in Dec 2023.     Presents today for a follow up visit.   Reports ***    TO REVIEW: Last visit (12/13/23)  here because he developed a rash on the skin folds of his groin.   Tried to use nystatin powder but he did not like it because of the mess it made.  Opted to restart Nystatin cream, and was referred to dermatology.     Past Medical History:   Diagnosis Date    Acute deep vein thrombosis (DVT) of left femoral vein (Multi) 09/10/2023    ACUTE DEEP VEIN THROMBOSIS, L FEMORAL, HEMOPHILIA    Acute diastolic heart failure (Multi) 04/16/2023    Benign prostatic hyperplasia without lower urinary tract symptoms 01/07/2016    Enlarged prostate without lower urinary tract symptoms (luts)    Bleeding hemorrhoids 03/01/2023    CAP (community acquired pneumonia) 01/18/2024    Closed nondisplaced fracture of head of left radius 09/05/2023    COVID-19 05/21/2023    Enlarged prostate with lower urinary tract symptoms (LUTS) 03/01/2023    Fracture of bone of hip (Multi) 09/05/2023    Fracture of femoral neck, right (Multi) 03/01/2023    Gastrointestinal hemorrhage 09/05/2023    LOWER GI BLEED    Gingiva hemorrhage 09/05/2023    Gross hematuria 03/01/2023    Hemarthrosis of ankle joint 04/18/2019    Hemarthrosis of knee 05/09/2022    Hematochezia 09/05/2023    Hematoma of lower extremity 09/05/2023    Hematoma of right thigh 03/01/2023    History of recent fall 05/31/2023    Knee effusion 09/05/2023    Knee effusion, left 03/01/2023    Lumbar pain 03/01/2023    Obstructive uropathy 04/28/2023    Orthostatic syncope 09/05/2023    Personal history of other endocrine, nutritional and metabolic disease     History of hyperlipidemia     Pneumonia of left lower lobe due to infectious organism 03/19/2024    Pneumonia of right lung due to infectious organism 03/01/2023    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     No current outpatient medications on file.     Facility-Administered Medications Ordered in Other Visits   Medication Dose Route Frequency Provider Last Rate Last Admin    acetaminophen (Tylenol) tablet 650 mg  650 mg oral q6h PRN Merna Hatch, APRN-VALENTINO        acetaminophen (Tylenol) tablet 975 mg  975 mg oral q AM Merna Hatch, APRN-VALENTINO        atorvastatin (Lipitor) tablet 40 mg  40 mg oral Daily Merna Hatch, APRN-VALENTINO        benzocaine-menthol (Cepastat Sore Throat) lozenge 1 lozenge  1 lozenge Mouth/Throat q2h PRN Merna Hatch, APRN-CNP        calcium carbonate (Tums) chewable tablet 1,500 mg  1,500 mg oral BID Merna Hatch, APRN-CNP        enoxaparin (Lovenox) syringe 40 mg  40 mg subcutaneous q24h Merna Hatch, APRN-VALENTINO        ferrous sulfate (325 mg ferrous sulfate) tablet 1 tablet  65 mg of iron oral Daily Merna Hatch, APRN-VALENTINO        finasteride (Proscar) tablet 5 mg  5 mg oral Daily Merna Hatch, APRN-CNP        folic acid (Folvite) tablet 1 mg  1 mg oral Daily Merna Hatch, APRN-VALENTINO        guaiFENesin (Mucinex) 12 hr tablet 1,200 mg  1,200 mg oral BID SUSI Rahman-CNP        [Held by provider] hydroCHLOROthiazide (Microzide) tablet 12.5 mg  12.5 mg oral Daily Merna Hatch,  APRN-CNP        ipratropium-albuteroL (Duo-Neb) 0.5-2.5 mg/3 mL nebulizer solution 3 mL  3 mL nebulization q2h PRN Merna Hatch, APRN-VALENTINO        ipratropium-albuteroL (Duo-Neb) 0.5-2.5 mg/3 mL nebulizer solution 3 mL  3 mL nebulization TID Merna Gonzales, APRN-CNP   3 mL at 05/31/24 0717    magnesium oxide (Mag-Ox) tablet 400 mg  400 mg oral Daily [facility], APRN-CNP        melatonin tablet 3 mg  3 mg oral Nightly PRN [facility], APRN-CNP        metoprolol succinate XL (Toprol-XL) 24 hr tablet 50 mg  50 mg oral Daily [facility], SUSI-VALENTINO        multivitamin with minerals 1 tablet  1 tablet oral Daily [facility], SUSI-VALENTINO        ondansetron (Zofran) injection 4 mg  4 mg intravenous q8h PRN [facility], APRN-CNP        oxygen (O2) therapy   inhalation Continuous PRN - O2/gases [facility]   1 L/min at 05/31/24 0717    pantoprazole (ProtoNix) EC tablet 40 mg  40 mg oral Daily before breakfast Merna Gonzales, SUSI-VALENTINO        polyethylene glycol (Glycolax, Miralax) packet 17 g  17 g oral Daily PRN [facility], APRN-VALENTINO        spironolactone (Aldactone) tablet 25 mg  25 mg oral Daily [facility], APRN-CNP        tamsulosin (Flomax) 24 hr capsule 0.4 mg  0.4 mg oral BID [facility], SUSI-VALENTINO         Allergies   Allergen Reactions    Aspirin Bleeding     hemophiliac    Penicillins Rash     Past medical, surgical, family and social history in the chart was reviewed and is accurate including any additions to what is in this HPI.    Review of systems (ROS):   Pertinent information as listed in the HPI.        Objective   There were no vitals taken for this visit.  Physical Exam:  Constitutional: NAD  HEENT: AT/NC  Resp: Non labored respirations.  Skin: No jaundice or visible skin lesions.  Neuro: No focal deficits.  Psych: Appropriate mood and affect.    Lab Review:  Lab Results   Component Value Date    WBC 4.4 05/31/2024     RBC 3.33 (L) 05/31/2024    HGB 11.7 (L) 05/31/2024    HCT 34.7 (L) 05/31/2024    PLT 89 (L) 05/31/2024      Lab Results   Component Value Date    BUN 24 (H) 05/31/2024    CREATININE 1.15 05/31/2024      Lab Results   Component Value Date    PSA 6.86 (H) 07/12/2021    HGBA1C 5.2 08/10/2022     Lab Results   Component Value Date    CHOL 84 08/10/2022    TRIG 140 08/10/2022    HDL 35.7 (A) 08/10/2022    ALT 25 05/31/2024    AST 28 05/31/2024     05/31/2024    K 3.1 (L) 05/31/2024     (H) 05/31/2024    CO2 21 05/31/2024    TSH 6.80 (H) 07/24/2023    INR 1.5 (H) 05/31/2024        ASSESSMENT:  Problem List Items Addressed This Visit    None     PLAN:  ***    All questions were answered to the patient’s satisfaction.  Patient agrees with the plan and wishes to proceed.  Continue follow-up for ongoing care of *** chronic medical conditions.    Scribed for Dr. Raisa Sierra by Bandar Alexander.  I, Dr. Raisa Sierra, have personally reviewed and agreed with the information entered by the Virtual Scribe. 05/31/24

## 2024-05-31 NOTE — ED PROVIDER NOTES
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 Patient has no wheezing, rales or distress.  He is satting anywhere from 91 to 94% on room air.  He states he has had 1.5 weeks of cough.  He was in the hospital 20 days ago for pneumonia.  The patient is a holocaust survivor and has a speaking engagement tomorrow morning.                            Evan Coma Scale Score: 15                     Patient History   Past Medical History:   Diagnosis Date    Acute deep vein thrombosis (DVT) of left femoral vein (Multi) 09/10/2023    ACUTE DEEP VEIN THROMBOSIS, L FEMORAL, HEMOPHILIA    Acute diastolic heart failure (Multi) 04/16/2023    Benign prostatic hyperplasia without lower urinary tract symptoms 01/07/2016    Enlarged prostate without lower urinary tract symptoms (luts)    Bleeding hemorrhoids 03/01/2023    CAP (community acquired pneumonia) 01/18/2024    Closed nondisplaced fracture of head of left radius 09/05/2023    COVID-19 05/21/2023    Enlarged prostate with lower urinary tract symptoms (LUTS) 03/01/2023    Fracture of bone of hip (Multi) 09/05/2023    Fracture of femoral neck, right (Multi) 03/01/2023    Gastrointestinal hemorrhage 09/05/2023    LOWER GI BLEED    Gingiva hemorrhage 09/05/2023    Gross hematuria 03/01/2023    Hemarthrosis of ankle joint 04/18/2019    Hemarthrosis of knee 05/09/2022    Hematochezia 09/05/2023    Hematoma of lower extremity 09/05/2023    Hematoma of right thigh 03/01/2023    History of recent fall 05/31/2023    Knee effusion 09/05/2023    Knee effusion, left 03/01/2023    Lumbar pain 03/01/2023    Obstructive uropathy 04/28/2023    Orthostatic syncope 09/05/2023    Personal history of other endocrine, nutritional and metabolic disease     History of hyperlipidemia    Pneumonia of left lower lobe due to  infectious organism 03/19/2024    Pneumonia of right lung due to infectious organism 03/01/2023    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    Subdural hematoma (Multi) 04/09/2023    Syncope 05/12/2023    Urinary retention 03/01/2023    UTI (urinary tract infection) 03/01/2023    Viral gastroenteritis 03/01/2023     Past Surgical History:   Procedure Laterality Date    CORONARY ARTERY BYPASS GRAFT       Family History   Problem Relation Name Age of Onset    Hemophilia Mother       Social History     Tobacco Use    Smoking status: Never    Smokeless tobacco: Never   Substance Use Topics    Alcohol use: Defer    Drug use: Never       Physical Exam   ED Triage Vitals   Temp Pulse Resp BP   -- -- -- --      SpO2 Temp src Heart Rate Source Patient Position   -- -- -- --      BP Location FiO2 (%)     -- --       Physical Exam  Vitals and nursing note reviewed.   Constitutional:       General: He is not in acute distress.     Appearance: He is well-developed.   HENT:      Head: Normocephalic and atraumatic.   Eyes:      Conjunctiva/sclera: Conjunctivae normal.   Cardiovascular:      Rate and Rhythm: Normal rate and regular rhythm.      Heart sounds: No murmur heard.  Pulmonary:      Effort: Pulmonary effort is normal. No respiratory distress.      Breath sounds: Normal breath sounds.   Abdominal:      Palpations: Abdomen is soft.      Tenderness: There is no abdominal tenderness.   Musculoskeletal:         General: No swelling.      Cervical back: Neck supple.   Skin:     General: Skin is warm and dry.      Capillary Refill: Capillary refill takes less than 2 seconds.   Neurological:      Mental Status: He is alert.   Psychiatric:         Mood and Affect: Mood normal.         ED Course & MDM   Diagnoses as of 05/31/24 2132   Congestive heart failure, unspecified HF chronicity, unspecified heart failure type (Multi)   Upper respiratory tract infection, unspecified type   Pneumonia due to infectious organism, unspecified laterality,  unspecified part of lung   Hypomagnesemia   Pulmonary fibrosis (Multi)   Edema, lower extremity       Medical Decision Making  Consider pneumonia response.  Patient patient's last CT scan he does have some signs of chronic bronchitis or interstitial fibrosis.  He denies a personal history COPD or asthma.  A chronic troponin leak.  His BNP is doubled.  His x-ray is showing possible pneumonia versus atelectasis versus infiltrate.  He was placed on small oxygen for comfort.  Will admit the patient.    EKG interpreted by myself.  Normal sinus rhythm at a rate of 64 bpm.  Normal intervals.  Normal axis.  No signs of acute ischemia.  The patient was found to have hypomagnesemia.  Patient initial elevated troponin but it is not increasing.  This is similar to the past.  Has had  Procedure  Procedures     Ezio Woodward MD  05/31/24 2138

## 2024-05-31 NOTE — CONSULTS
Inpatient consult to Cardiology  Consult performed by: SUSI Huerta-CNP  Consult ordered by: SUSI Story-CNP  Reason for consult: chf exacerbation        History Of Present Illness:    Ramón Flores is a 89 y.o. 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  Cardiology consulted for CHF exacerbation.     States he has been dealing with coughing for the past two months.  He has been following closely with PCP.  States he is coughing up phlegm, but is not really sure of the color.  He has noticed increased shortness of breath the past week.  He has increased swelling in both his legs (L>R), and increased abdominal weight.  He sleeps on two pillows, and this has not recently changes.  He has chronic trouble and pain and both his knees.  His weight is usually 138lbs.  He was coughing so much, that he did vomit one time.  Denies any fever or chills.      He lives at home alone, but does have people who come to help.     Home cardiac medications:  Toprol 50mg daily, spironolactone 25mg daily, hydrochlorothiazide 12.5mg daily, and Bumex 1mg PRN    Previously followed with Dr. Dugan, but last visit in 2023 was to follow up PRN    Past Cardiology Tests (Last 3 Years):  EKG:  Results for orders placed during the hospital encounter of 05/31/24    Electrocardiogram, 12-lead PRN ACS symptoms    Narrative  Atrial fibrillation with premature ventricular or aberrantly conducted complexes  Right superior axis deviation  Incomplete right bundle branch block  Nonspecific T wave abnormality  Prolonged QT  Abnormal ECG  When compared with ECG of 19-MAR-2024 10:02,  Sinus rhythm is no longer with ventricular escape  complexes  Left anterior fascicular block is no longer Present  See ED provider note for full interpretation and clinical correlation  Confirmed by Juliann Nicholas ([id number]) on 5/31/2024 10:44:02 AM      Results for orders placed during the hospital encounter of 03/19/24    ECG 12 lead    Narrative  Atrial fibrillation with premature ventricular or aberrantly conducted complexes and with ventricular escape complexes  Left anterior fascicular block  Possible Anterior infarct , age undetermined  Abnormal ECG  When compared with ECG of 20-JAN-2024 15:42,  Sinus rhythm is now with ventricular escape complexes    See ED provider note for full interpretation and clinical correlation  Confirmed by Latanya Thomas ([id number]) on 3/19/2024 11:03:20 PM      Results for orders placed during the hospital encounter of 01/20/24    ECG 12 Lead    Narrative  Atrial fibrillation with slow ventricular response  Nonspecific ST abnormality  Abnormal ECG  When compared with ECG of 18-JAN-2024 13:33, (unconfirmed)  No significant change was found  Confirmed by Ramón Maradiaga ([id number]) on 1/25/2024 1:09:09 PM    Echo:    Echo: 3/19/2024  CONCLUSIONS:   1. Left ventricular systolic function is mildly decreased with a 50% estimated ejection fraction.   2. There is global hypokinesis of the left ventricle with minor regional variations.   3. There is mildly reduced right ventricular systolic function.   4. Mild to moderate tricuspid regurgitation visualized.   5. Mildly elevated RVSP.   6. The left atrium is moderately dilated.   7. Compared with study from 2/24/2023, the LVEF appears reduced (50%) when compared with the prior assessment (60%). Also, TR appears mild to moderate on today's study compared to trace.    Echo : 2/24/23  CONCLUSIONS:  1. Left ventricular systolic function is normal with a 60-65% estimated ejection fraction.  2. Spectral Doppler shows a restrictive pattern of left ventricular diastolic filling.  3. There is an elevated mean left  atrial pressure.  4. There is mildly reduced right ventricular systolic function.  5. Aortic valve stenosis is not presen    Ejection Fractions:  LV EF   Date/Time Value Ref Range Status   03/20/2024 03:17 PM 51 %      Cath:  CABG ( 2002 with LIMA --> LAD , SVG --> OM1)    Stress Test:  No results found for this or any previous visit.    Cardiac Imaging:  No results found for this or any previous visit.      Past Medical History:  He has a past medical history of Acute deep vein thrombosis (DVT) of left femoral vein (Multi) (09/10/2023), Acute diastolic heart failure (Multi) (04/16/2023), Benign prostatic hyperplasia without lower urinary tract symptoms (01/07/2016), Bleeding hemorrhoids (03/01/2023), CAP (community acquired pneumonia) (01/18/2024), Closed nondisplaced fracture of head of left radius (09/05/2023), COVID-19 (05/21/2023), Enlarged prostate with lower urinary tract symptoms (LUTS) (03/01/2023), Fracture of bone of hip (Multi) (09/05/2023), Fracture of femoral neck, right (Multi) (03/01/2023), Gastrointestinal hemorrhage (09/05/2023), Gingiva hemorrhage (09/05/2023), Gross hematuria (03/01/2023), Hemarthrosis of ankle joint (04/18/2019), Hemarthrosis of knee (05/09/2022), Hematochezia (09/05/2023), Hematoma of lower extremity (09/05/2023), Hematoma of right thigh (03/01/2023), History of recent fall (05/31/2023), Knee effusion (09/05/2023), Knee effusion, left (03/01/2023), Lumbar pain (03/01/2023), Obstructive uropathy (04/28/2023), Orthostatic syncope (09/05/2023), Personal history of other endocrine, nutritional and metabolic disease, Pneumonia of left lower lobe due to infectious organism (03/19/2024), Pneumonia of right lung due to infectious organism (03/01/2023), 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    Past Surgical  "History:  He has a past surgical history that includes Coronary artery bypass graft.      Social History:  He reports that he has never smoked. He has never used smokeless tobacco. Alcohol use questions deferred to the physician. He reports that he does not use drugs.    Family History:  Family History   Problem Relation Name Age of Onset    Hemophilia Mother          Allergies:  Aspirin and Penicillins    ROS:  10 point review of systems including (Constitutional, Eyes, ENMT, Respiratory, Cardiac, Gastrointestinal, Neurological, Psychiatric, and Hematologic) was performed and is otherwise negative.    Objective Data:  Last Recorded Vitals:  Vitals:    24 0530 24 0545 24 0717 24 0758   BP: 111/75   119/68   BP Location:    Right arm   Patient Position:    Lying   Pulse: 60 58  55   Resp: 18   16   Temp:    36.2 °C (97.2 °F)   TempSrc:    Temporal   SpO2: 97% 96% 97% 96%   Weight:       Height:         Medical Gas Therapy: Supplemental oxygen  O2 Delivery Method: Nasal cannula  Weight  Av kg (139 lb)  Min: 63 kg (139 lb)  Max: 63 kg (139 lb)    LABS:  CMP:  Results from last 7 days   Lab Units 24  0619 24  0228 24  0121   SODIUM mmol/L 141  --  141   POTASSIUM mmol/L 3.1*  --  3.5   CHLORIDE mmol/L 110*  --  110*   CO2 mmol/L 21  --  20*   ANION GAP mmol/L 13  --  15   BUN mg/dL 24*  --  24*   CREATININE mg/dL 1.15  --  1.11   EGFR mL/min/1.73m*2 59*  --  61   MAGNESIUM mg/dL 1.10* 0.70* 0.70*   ALBUMIN g/dL  --   --  3.9   ALT U/L  --   --  25   AST U/L  --   --  28   BILIRUBIN TOTAL mg/dL  --   --  2.2*     CBC:  Results from last 7 days   Lab Units 24  0619 24  0121   WBC AUTO x10*3/uL 4.4 4.8   HEMOGLOBIN g/dL 11.7* 12.0*   HEMATOCRIT % 34.7* 35.5*   PLATELETS AUTO x10*3/uL 89* 93*   MCV fL 104* 102*     COAG:   Results from last 7 days   Lab Units 24  0121   INR  1.5*     ABO: No results found for: \"ABO\"  HEME/ENDO:     CARDIAC:   Results from last " 7 days   Lab Units 05/31/24  0619 05/31/24  0228 05/31/24  0121   TROPHS ng/L 104* 99* 102*   BNP pg/mL  --   --  1,024*             Last I/O:  No intake or output data in the 24 hours ending 05/31/24 1204  Net IO Since Admission: No IO data has been entered for this period [05/31/24 1204]      Imaging Results:  Electrocardiogram, 12-lead PRN ACS symptoms    Result Date: 5/31/2024  Atrial fibrillation with premature ventricular or aberrantly conducted complexes Right superior axis deviation Incomplete right bundle branch block Nonspecific T wave abnormality Prolonged QT Abnormal ECG When compared with ECG of 19-MAR-2024 10:02, Sinus rhythm is no longer with ventricular escape complexes Left anterior fascicular block is no longer Present See ED provider note for full interpretation and clinical correlation Confirmed by Juliann Nicholas ([id number]) on 5/31/2024 10:44:02 AM    XR chest 1 view    Result Date: 5/31/2024  Interpreted By:  Julian Soto, STUDY: XR CHEST 1 VIEW;  5/31/2024 2:06 am   INDICATION: Signs/Symptoms:Chest Pain.   COMPARISON: 05/11/2024   ACCESSION NUMBER(S): [id number]   ORDERING CLINICIAN: KATHRIN THOMPSON   FINDINGS: Median sternotomy wires and mediastinal surgical clips. Stable moderate to severe cardiomegaly. Streaky bibasilar airspace opacities appears similar to the prior exam and may reflect atelectasis or consolidation. Pneumothorax. No large pleural effusion. Upper abdomen is unremarkable. No acute osseous abnormality.       1. Streaky bibasilar airspace opacities appears similar to the prior exam and may reflect atelectasis or consolidation/pneumonia. 2. Stable cardiomegaly.   Signed by: Julian Soto 5/31/2024 2:24 AM Dictation workstation:   [id number]      Inpatient Medications:  Scheduled medications   Medication Dose Route Frequency    acetaminophen  975 mg oral q AM    atorvastatin  40 mg oral Daily    calcium carbonate  1,500 mg oral BID    enoxaparin  40 mg subcutaneous  q24h    ferrous sulfate (325 mg ferrous sulfate)  65 mg of iron oral Daily    finasteride  5 mg oral Daily    folic acid  1 mg oral Daily    furosemide  40 mg intravenous BID    guaiFENesin  1,200 mg oral BID    [Held by provider] hydroCHLOROthiazide  12.5 mg oral Daily    ipratropium-albuteroL  3 mL nebulization TID    magnesium oxide  400 mg oral Daily    magnesium sulfate  2 g intravenous Once    metoprolol succinate XL  50 mg oral Daily    multivitamin with minerals  1 tablet oral Daily    pantoprazole  40 mg oral Daily before breakfast    perflutren lipid microspheres  0.5-10 mL of dilution intravenous Once in imaging    spironolactone  25 mg oral Daily    tamsulosin  0.4 mg oral BID     PRN medications   Medication    acetaminophen    benzocaine-menthol    ipratropium-albuteroL    melatonin    ondansetron    oxygen    polyethylene glycol     Continuous Medications   Medication Dose Last Rate       Outpatient Medications:  Current Outpatient Medications   Medication Instructions    acetaminophen (TYLENOL EXTRA STRENGTH) 1,000 mg, oral, Every morning    antihemophilic RF VIII (Altuviiio) 3,000 (+/-) unit recon soln 50 Units/kg, intravenous, Every 7 days, 3175 units +/-10% dose every 7 days.  Additionally,  prn when directed.    antihemophilic RF VIII (Altuviiio) 3,000 (+/-) unit recon soln 50 Units/kg, intravenous, Every 7 days, 3175 units +/-10% dose every 7 days.  Additionally,  prn when directed.    atorvastatin (LIPITOR) 40 mg, oral, Daily    bumetanide (BUMEX) 1 mg, oral, Daily PRN    calcium carbonate 600 mg calcium (1,500 mg) tablet 1 tablet, oral, 2 times daily (morning and late afternoon)    ferrous sulfate 325 (65 Fe) MG tablet 65 mg of iron, oral, Daily    finasteride (PROSCAR) 5 mg, oral, Daily    folic acid (Folvite) 1 mg tablet 1 tablet, oral, Daily    guaiFENesin (MUCINEX) 1,200 mg, oral, 2 times daily, Do not crush, chew, or split.    hydroCHLOROthiazide (MICROZIDE) 12.5 mg, oral, Daily     ipratropium-albuteroL (Duo-Neb) 0.5-2.5 mg/3 mL nebulizer solution 3 mL, nebulization, 4 times daily PRN    Lactobacillus acidoph-L.bulgar 1 million cell tablet tablet 1 Million Cells, oral, Daily    metoprolol succinate XL (TOPROL-XL) 50 mg, oral, Daily    multivitamin with minerals (multivitamin-iron-folic acid) tablet 1 tablet, oral, Daily    nebulizer accessories kit 1 kit, miscellaneous, Once as needed    nystatin (Mycostatin) cream 1 Application, Topical, 2 times daily    pantoprazole (PROTONIX) 40 mg, oral, Daily before breakfast, Do not crush, chew, or split.    spironolactone (ALDACTONE) 25 mg, oral, Daily    tamsulosin (FLOMAX) 0.4 mg, oral, 2 times daily    vit A/vit C/vit E/zinc/copper (PRESERVISION AREDS ORAL) 1 capsule, oral, Daily       Physical Exam:    General:  Patient is awake, alert, and oriented.  Patient is in no acute distress.  HEENT:  Normocephalic.  Moist mucosa.    Neck:  ++ JVD  Cardiovascular:  Iregular rate and rhythm. Varible S1 and S2, no murmurs, rubs or gallops  Pulmonary:  Crackles in bases.  Abdomen:  Soft. Non-tender.   Non-distended.  Positive bowel sounds.  Lower Extremities:  2+ pedal pulses. Bilateral LE edema, 2-3+,  L>R  Neurologic:  Alert and oriented x3. No focal deficit.   Skin: Skin warm and dry, normal skin turgor.   Psychiatric: Normal affect.     Assessment/Plan     Ramón Flores is a 89 y.o. 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  Cardiology consulted for CHF exacerbation.     Assessment:  # Acute on chronic HFpEF    - Last echo 3/19/2024 with LVEF down to 50% and moderate TR    - he was not on a loop diuretic at home after Bumex was stopped 5/2023  # CAD s/p CABG  #  Chronic atrial fibrillation, not able to anticoagulate with hemophilia    - rate controlled   # Moderate pleural effusions with no evidence of pneumonia on CT 5/24     - repeat CT 5/31  # Hemophilia A    - weekly infusions  # Hypomagnesemia    - admit mag level 0.4  # Hypokalemia     - admit K level 3.1    5/31 > hypomagnesemia Mg 0.7, Troponin 102--99, BNP 1024, INR 1.5, chronic thrombocytopenia and anemia. CXR showed streaky bibasilar airspace opacities appears similar to the prior exam that may reflect atelectasis or consolidation/pneumonia. Recent chest CT on 5/24 showed interlobular septal thickening and bilateral moderate pleural effusions, consistent with pulmonary edema and no evidence of pneumonia, with chronic interstitial fibrotic changes at both lung bases similar to the previous examination and mild ascites and body wall edema suggesting right-sided heart failure.  Volume up, diuresing well,  oxygen at 2L    Plan:  - We will obtain a transthoracic echocardiogram for structural evaluation including ejection fraction, assessment of regional wall motion abnormalities or valvular disease, and further evaluation of hemodynamics.  - Continue to diurese.  Continue with furosemide 40mg IV BID.  Strict I/O  - Continue Toprol 25mg daily, spironolactone 25mg daily  -  Monitor electrolytes closely.  Goals K > 4, Mag >2.  - US LE to rule out DVT > pending    He can follow up with Dr. Dugan in 2-3 weeks after hospital discharge.     Code Status:  DNR and No Intubation      Nena Betts, APRN-CNP

## 2024-05-31 NOTE — H&P (VIEW-ONLY)
Inpatient consult to Cardiology  Consult performed by: SUSI Huerta-CNP  Consult ordered by: SUSI Story-CNP  Reason for consult: chf exacerbation        History Of Present Illness:    Ramón Flores is a 89 y.o. 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  Cardiology consulted for CHF exacerbation.     States he has been dealing with coughing for the past two months.  He has been following closely with PCP.  States he is coughing up phlegm, but is not really sure of the color.  He has noticed increased shortness of breath the past week.  He has increased swelling in both his legs (L>R), and increased abdominal weight.  He sleeps on two pillows, and this has not recently changes.  He has chronic trouble and pain and both his knees.  His weight is usually 138lbs.  He was coughing so much, that he did vomit one time.  Denies any fever or chills.      He lives at home alone, but does have people who come to help.     Home cardiac medications:  Toprol 50mg daily, spironolactone 25mg daily, hydrochlorothiazide 12.5mg daily, and Bumex 1mg PRN    Previously followed with Dr. Dugan, but last visit in 2023 was to follow up PRN    Past Cardiology Tests (Last 3 Years):  EKG:  Results for orders placed during the hospital encounter of 05/31/24    Electrocardiogram, 12-lead PRN ACS symptoms    Narrative  Atrial fibrillation with premature ventricular or aberrantly conducted complexes  Right superior axis deviation  Incomplete right bundle branch block  Nonspecific T wave abnormality  Prolonged QT  Abnormal ECG  When compared with ECG of 19-MAR-2024 10:02,  Sinus rhythm is no longer with ventricular escape  complexes  Left anterior fascicular block is no longer Present  See ED provider note for full interpretation and clinical correlation  Confirmed by Juliann Nicholas ([id number]) on 5/31/2024 10:44:02 AM      Results for orders placed during the hospital encounter of 03/19/24    ECG 12 lead    Narrative  Atrial fibrillation with premature ventricular or aberrantly conducted complexes and with ventricular escape complexes  Left anterior fascicular block  Possible Anterior infarct , age undetermined  Abnormal ECG  When compared with ECG of 20-JAN-2024 15:42,  Sinus rhythm is now with ventricular escape complexes    See ED provider note for full interpretation and clinical correlation  Confirmed by Latanya Thomas ([id number]) on 3/19/2024 11:03:20 PM      Results for orders placed during the hospital encounter of 01/20/24    ECG 12 Lead    Narrative  Atrial fibrillation with slow ventricular response  Nonspecific ST abnormality  Abnormal ECG  When compared with ECG of 18-JAN-2024 13:33, (unconfirmed)  No significant change was found  Confirmed by Ramón Maradiaga ([id number]) on 1/25/2024 1:09:09 PM    Echo:    Echo: 3/19/2024  CONCLUSIONS:   1. Left ventricular systolic function is mildly decreased with a 50% estimated ejection fraction.   2. There is global hypokinesis of the left ventricle with minor regional variations.   3. There is mildly reduced right ventricular systolic function.   4. Mild to moderate tricuspid regurgitation visualized.   5. Mildly elevated RVSP.   6. The left atrium is moderately dilated.   7. Compared with study from 2/24/2023, the LVEF appears reduced (50%) when compared with the prior assessment (60%). Also, TR appears mild to moderate on today's study compared to trace.    Echo : 2/24/23  CONCLUSIONS:  1. Left ventricular systolic function is normal with a 60-65% estimated ejection fraction.  2. Spectral Doppler shows a restrictive pattern of left ventricular diastolic filling.  3. There is an elevated mean left  atrial pressure.  4. There is mildly reduced right ventricular systolic function.  5. Aortic valve stenosis is not presen    Ejection Fractions:  LV EF   Date/Time Value Ref Range Status   03/20/2024 03:17 PM 51 %      Cath:  CABG ( 2002 with LIMA --> LAD , SVG --> OM1)    Stress Test:  No results found for this or any previous visit.    Cardiac Imaging:  No results found for this or any previous visit.      Past Medical History:  He has a past medical history of Acute deep vein thrombosis (DVT) of left femoral vein (Multi) (09/10/2023), Acute diastolic heart failure (Multi) (04/16/2023), Benign prostatic hyperplasia without lower urinary tract symptoms (01/07/2016), Bleeding hemorrhoids (03/01/2023), CAP (community acquired pneumonia) (01/18/2024), Closed nondisplaced fracture of head of left radius (09/05/2023), COVID-19 (05/21/2023), Enlarged prostate with lower urinary tract symptoms (LUTS) (03/01/2023), Fracture of bone of hip (Multi) (09/05/2023), Fracture of femoral neck, right (Multi) (03/01/2023), Gastrointestinal hemorrhage (09/05/2023), Gingiva hemorrhage (09/05/2023), Gross hematuria (03/01/2023), Hemarthrosis of ankle joint (04/18/2019), Hemarthrosis of knee (05/09/2022), Hematochezia (09/05/2023), Hematoma of lower extremity (09/05/2023), Hematoma of right thigh (03/01/2023), History of recent fall (05/31/2023), Knee effusion (09/05/2023), Knee effusion, left (03/01/2023), Lumbar pain (03/01/2023), Obstructive uropathy (04/28/2023), Orthostatic syncope (09/05/2023), Personal history of other endocrine, nutritional and metabolic disease, Pneumonia of left lower lobe due to infectious organism (03/19/2024), Pneumonia of right lung due to infectious organism (03/01/2023), 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    Past Surgical  "History:  He has a past surgical history that includes Coronary artery bypass graft.      Social History:  He reports that he has never smoked. He has never used smokeless tobacco. Alcohol use questions deferred to the physician. He reports that he does not use drugs.    Family History:  Family History   Problem Relation Name Age of Onset    Hemophilia Mother          Allergies:  Aspirin and Penicillins    ROS:  10 point review of systems including (Constitutional, Eyes, ENMT, Respiratory, Cardiac, Gastrointestinal, Neurological, Psychiatric, and Hematologic) was performed and is otherwise negative.    Objective Data:  Last Recorded Vitals:  Vitals:    24 0530 24 0545 24 0717 24 0758   BP: 111/75   119/68   BP Location:    Right arm   Patient Position:    Lying   Pulse: 60 58  55   Resp: 18   16   Temp:    36.2 °C (97.2 °F)   TempSrc:    Temporal   SpO2: 97% 96% 97% 96%   Weight:       Height:         Medical Gas Therapy: Supplemental oxygen  O2 Delivery Method: Nasal cannula  Weight  Av kg (139 lb)  Min: 63 kg (139 lb)  Max: 63 kg (139 lb)    LABS:  CMP:  Results from last 7 days   Lab Units 24  0619 24  0228 24  0121   SODIUM mmol/L 141  --  141   POTASSIUM mmol/L 3.1*  --  3.5   CHLORIDE mmol/L 110*  --  110*   CO2 mmol/L 21  --  20*   ANION GAP mmol/L 13  --  15   BUN mg/dL 24*  --  24*   CREATININE mg/dL 1.15  --  1.11   EGFR mL/min/1.73m*2 59*  --  61   MAGNESIUM mg/dL 1.10* 0.70* 0.70*   ALBUMIN g/dL  --   --  3.9   ALT U/L  --   --  25   AST U/L  --   --  28   BILIRUBIN TOTAL mg/dL  --   --  2.2*     CBC:  Results from last 7 days   Lab Units 24  0619 24  0121   WBC AUTO x10*3/uL 4.4 4.8   HEMOGLOBIN g/dL 11.7* 12.0*   HEMATOCRIT % 34.7* 35.5*   PLATELETS AUTO x10*3/uL 89* 93*   MCV fL 104* 102*     COAG:   Results from last 7 days   Lab Units 24  0121   INR  1.5*     ABO: No results found for: \"ABO\"  HEME/ENDO:     CARDIAC:   Results from last " 7 days   Lab Units 05/31/24  0619 05/31/24  0228 05/31/24  0121   TROPHS ng/L 104* 99* 102*   BNP pg/mL  --   --  1,024*             Last I/O:  No intake or output data in the 24 hours ending 05/31/24 1204  Net IO Since Admission: No IO data has been entered for this period [05/31/24 1204]      Imaging Results:  Electrocardiogram, 12-lead PRN ACS symptoms    Result Date: 5/31/2024  Atrial fibrillation with premature ventricular or aberrantly conducted complexes Right superior axis deviation Incomplete right bundle branch block Nonspecific T wave abnormality Prolonged QT Abnormal ECG When compared with ECG of 19-MAR-2024 10:02, Sinus rhythm is no longer with ventricular escape complexes Left anterior fascicular block is no longer Present See ED provider note for full interpretation and clinical correlation Confirmed by Juliann Nicholas ([id number]) on 5/31/2024 10:44:02 AM    XR chest 1 view    Result Date: 5/31/2024  Interpreted By:  Julian Soto, STUDY: XR CHEST 1 VIEW;  5/31/2024 2:06 am   INDICATION: Signs/Symptoms:Chest Pain.   COMPARISON: 05/11/2024   ACCESSION NUMBER(S): [id number]   ORDERING CLINICIAN: KATHRIN THOMPSON   FINDINGS: Median sternotomy wires and mediastinal surgical clips. Stable moderate to severe cardiomegaly. Streaky bibasilar airspace opacities appears similar to the prior exam and may reflect atelectasis or consolidation. Pneumothorax. No large pleural effusion. Upper abdomen is unremarkable. No acute osseous abnormality.       1. Streaky bibasilar airspace opacities appears similar to the prior exam and may reflect atelectasis or consolidation/pneumonia. 2. Stable cardiomegaly.   Signed by: Julian Soto 5/31/2024 2:24 AM Dictation workstation:   [id number]      Inpatient Medications:  Scheduled medications   Medication Dose Route Frequency    acetaminophen  975 mg oral q AM    atorvastatin  40 mg oral Daily    calcium carbonate  1,500 mg oral BID    enoxaparin  40 mg subcutaneous  q24h    ferrous sulfate (325 mg ferrous sulfate)  65 mg of iron oral Daily    finasteride  5 mg oral Daily    folic acid  1 mg oral Daily    furosemide  40 mg intravenous BID    guaiFENesin  1,200 mg oral BID    [Held by provider] hydroCHLOROthiazide  12.5 mg oral Daily    ipratropium-albuteroL  3 mL nebulization TID    magnesium oxide  400 mg oral Daily    magnesium sulfate  2 g intravenous Once    metoprolol succinate XL  50 mg oral Daily    multivitamin with minerals  1 tablet oral Daily    pantoprazole  40 mg oral Daily before breakfast    perflutren lipid microspheres  0.5-10 mL of dilution intravenous Once in imaging    spironolactone  25 mg oral Daily    tamsulosin  0.4 mg oral BID     PRN medications   Medication    acetaminophen    benzocaine-menthol    ipratropium-albuteroL    melatonin    ondansetron    oxygen    polyethylene glycol     Continuous Medications   Medication Dose Last Rate       Outpatient Medications:  Current Outpatient Medications   Medication Instructions    acetaminophen (TYLENOL EXTRA STRENGTH) 1,000 mg, oral, Every morning    antihemophilic RF VIII (Altuviiio) 3,000 (+/-) unit recon soln 50 Units/kg, intravenous, Every 7 days, 3175 units +/-10% dose every 7 days.  Additionally,  prn when directed.    antihemophilic RF VIII (Altuviiio) 3,000 (+/-) unit recon soln 50 Units/kg, intravenous, Every 7 days, 3175 units +/-10% dose every 7 days.  Additionally,  prn when directed.    atorvastatin (LIPITOR) 40 mg, oral, Daily    bumetanide (BUMEX) 1 mg, oral, Daily PRN    calcium carbonate 600 mg calcium (1,500 mg) tablet 1 tablet, oral, 2 times daily (morning and late afternoon)    ferrous sulfate 325 (65 Fe) MG tablet 65 mg of iron, oral, Daily    finasteride (PROSCAR) 5 mg, oral, Daily    folic acid (Folvite) 1 mg tablet 1 tablet, oral, Daily    guaiFENesin (MUCINEX) 1,200 mg, oral, 2 times daily, Do not crush, chew, or split.    hydroCHLOROthiazide (MICROZIDE) 12.5 mg, oral, Daily     ipratropium-albuteroL (Duo-Neb) 0.5-2.5 mg/3 mL nebulizer solution 3 mL, nebulization, 4 times daily PRN    Lactobacillus acidoph-L.bulgar 1 million cell tablet tablet 1 Million Cells, oral, Daily    metoprolol succinate XL (TOPROL-XL) 50 mg, oral, Daily    multivitamin with minerals (multivitamin-iron-folic acid) tablet 1 tablet, oral, Daily    nebulizer accessories kit 1 kit, miscellaneous, Once as needed    nystatin (Mycostatin) cream 1 Application, Topical, 2 times daily    pantoprazole (PROTONIX) 40 mg, oral, Daily before breakfast, Do not crush, chew, or split.    spironolactone (ALDACTONE) 25 mg, oral, Daily    tamsulosin (FLOMAX) 0.4 mg, oral, 2 times daily    vit A/vit C/vit E/zinc/copper (PRESERVISION AREDS ORAL) 1 capsule, oral, Daily       Physical Exam:    General:  Patient is awake, alert, and oriented.  Patient is in no acute distress.  HEENT:  Normocephalic.  Moist mucosa.    Neck:  ++ JVD  Cardiovascular:  Iregular rate and rhythm. Varible S1 and S2, no murmurs, rubs or gallops  Pulmonary:  Crackles in bases.  Abdomen:  Soft. Non-tender.   Non-distended.  Positive bowel sounds.  Lower Extremities:  2+ pedal pulses. Bilateral LE edema, 2-3+,  L>R  Neurologic:  Alert and oriented x3. No focal deficit.   Skin: Skin warm and dry, normal skin turgor.   Psychiatric: Normal affect.     Assessment/Plan     Ramón Flores is a 89 y.o. 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  Cardiology consulted for CHF exacerbation.     Assessment:  # Acute on chronic HFpEF    - Last echo 3/19/2024 with LVEF down to 50% and moderate TR    - he was not on a loop diuretic at home after Bumex was stopped 5/2023  # CAD s/p CABG  #  Chronic atrial fibrillation, not able to anticoagulate with hemophilia    - rate controlled   # Moderate pleural effusions with no evidence of pneumonia on CT 5/24     - repeat CT 5/31  # Hemophilia A    - weekly infusions  # Hypomagnesemia    - admit mag level 0.4  # Hypokalemia     - admit K level 3.1    5/31 > hypomagnesemia Mg 0.7, Troponin 102--99, BNP 1024, INR 1.5, chronic thrombocytopenia and anemia. CXR showed streaky bibasilar airspace opacities appears similar to the prior exam that may reflect atelectasis or consolidation/pneumonia. Recent chest CT on 5/24 showed interlobular septal thickening and bilateral moderate pleural effusions, consistent with pulmonary edema and no evidence of pneumonia, with chronic interstitial fibrotic changes at both lung bases similar to the previous examination and mild ascites and body wall edema suggesting right-sided heart failure.  Volume up, diuresing well,  oxygen at 2L    Plan:  - We will obtain a transthoracic echocardiogram for structural evaluation including ejection fraction, assessment of regional wall motion abnormalities or valvular disease, and further evaluation of hemodynamics.  - Continue to diurese.  Continue with furosemide 40mg IV BID.  Strict I/O  - Continue Toprol 25mg daily, spironolactone 25mg daily  -  Monitor electrolytes closely.  Goals K > 4, Mag >2.  - US LE to rule out DVT > pending    He can follow up with Dr. Dugan in 2-3 weeks after hospital discharge.     Code Status:  DNR and No Intubation      Nena Betts, APRN-CNP

## 2024-05-31 NOTE — PROGRESS NOTES
Home alone with [facility]     05/31/24 0637   Current Planned Discharge Disposition   Current Planned Discharge Disposition Home Health

## 2024-05-31 NOTE — HOSPITAL COURSE
BNP 1024    Hypokalemia  -replacement ordered    Hypomagnesia  -replacement ordered    Pro eliseo no    HF  -BNP 1024  -CXR:Streaky bibasilar airspace opacities appears similar to the prior exam and may reflect atelectasis or consolidation/pneumonia. Stable cardiomegaly.  -CRP negative  -CT ordered  -last echo ***  -EKG ***  -diuresis with lasix 40mg BID  -telemetry ***  -strict Is/Os  -daily weights  -cardio diet   -consult cardio

## 2024-05-31 NOTE — POST-PROCEDURE NOTE
Interventional Radiology Brief Postprocedure Note    Attending: Modesto Yen CNP    Assistant: none    Diagnosis: Right sided pleural effusion    Description of procedure: Ultrasound guided thoracentesis was preformed on the right.  450 mL of straw-yellow fluid was drained.        Anesthesia:  Local    Complications: None    Estimated Blood Loss: none    Medications  As of 05/31/24 1625      magnesium oxide (Mag-Ox) tablet 400 mg (mg) Total dose:  400 mg Dosing weight:  63.1      Date/Time Rate/Dose/Volume Action       05/31/24  0927 400 mg Given               magnesium sulfate IV 2 g (mL/hr) Total volume:  Not documented* Dosing weight:  63.1   *Total volume has not been documented. View each administration to see the amount administered.     Date/Time Rate/Dose/Volume Action       05/31/24  0220 2 g - 25 mL/hr (over 120 min) New Bag      0420  (over 120 min) Stopped      1236 2 g - 25 mL/hr (over 120 min) New Bag      1436  (over 120 min) Stopped      1500 2 g - 25 mL/hr (over 120 min) New Bag               furosemide (Lasix) injection 40 mg (mg) Total dose:  80 mg Dosing weight:  63.1      Date/Time Rate/Dose/Volume Action       05/31/24  0350 40 mg Given      0941 40 mg Given               cefTRIAXone (Rocephin) IVPB 1 g (mL/hr) Total volume:  Not documented* Dosing weight:  63.1   *Total volume has not been documented. View each administration to see the amount administered.     Date/Time Rate/Dose/Volume Action       05/31/24  0347 1 g - 100 mL/hr (over 30 min) New Bag      0417  (over 30 min) Stopped               azithromycin (Zithromax) in dextrose 5 % in water (D5W) 250 mL  mg (mL/hr) Total volume:  Not documented* Dosing weight:  63.1   *Total volume has not been documented. View each administration to see the amount administered.     Date/Time Rate/Dose/Volume Action       05/31/24  0438 500 mg - 250 mL/hr (over 60 min) New Bag      0538  (over 60 min) Stopped               acetaminophen (Tylenol)  tablet 975 mg (mg) Total dose:  975 mg Dosing weight:  63.1      Date/Time Rate/Dose/Volume Action       05/31/24 0927 975 mg Given               atorvastatin (Lipitor) tablet 40 mg (mg) Total dose:  40 mg      Date/Time Rate/Dose/Volume Action       05/31/24 0926 40 mg Given               ferrous sulfate (325 mg ferrous sulfate) tablet 1 tablet (tablet) Total dose:  65 mg of iron Dosing weight:  63.1      Date/Time Rate/Dose/Volume Action       05/31/24 0927 1 tablet Given               finasteride (Proscar) tablet 5 mg (mg) Total dose:  5 mg      Date/Time Rate/Dose/Volume Action       05/31/24 0941 5 mg Given               calcium carbonate (Tums) chewable tablet 1,500 mg (mg) Total dose:  1,500 mg      Date/Time Rate/Dose/Volume Action       05/31/24 0926 1,500 mg Given               folic acid (Folvite) tablet 1 mg (mg) Total dose:  1 mg Dosing weight:  63.1      Date/Time Rate/Dose/Volume Action       05/31/24 0941 1 mg Given               guaiFENesin (Mucinex) 12 hr tablet 1,200 mg (mg) Total dose:  1,200 mg Dosing weight:  63.1      Date/Time Rate/Dose/Volume Action       05/31/24 0927 1,200 mg Given               hydroCHLOROthiazide (Microzide) tablet 12.5 mg (mg) Total dose:  Cannot be calculated* Dosing weight:  63.1   *Administration dose not documented     Date/Time Rate/Dose/Volume Action       05/31/24 0524 *Not included in total Held by provider      0900 *Not included in total Automatically Held               metoprolol succinate XL (Toprol-XL) 24 hr tablet 50 mg (mg) Total dose:  50 mg Dosing weight:  63.1      Date/Time Rate/Dose/Volume Action       05/31/24 0926 50 mg Given               multivitamin with minerals 1 tablet (tablet) Total dose:  1 tablet Dosing weight:  63.1      Date/Time Rate/Dose/Volume Action       05/31/24 0927 1 tablet Given               pantoprazole (ProtoNix) EC tablet 40 mg (mg) Total dose:  40 mg Dosing weight:  63.1      Date/Time Rate/Dose/Volume Action        05/31/24  0927 40 mg Given               spironolactone (Aldactone) tablet 25 mg (mg) Total dose:  25 mg      Date/Time Rate/Dose/Volume Action       05/31/24  0926 25 mg Given               tamsulosin (Flomax) 24 hr capsule 0.4 mg (mg) Total dose:  0.4 mg      Date/Time Rate/Dose/Volume Action       05/31/24 0927 0.4 mg Given               oxygen (O2) therapy (L/min) Total volume:  Not documented* Dosing weight:  63.1   *Total volume has not been documented. View each administration to see the amount administered.     Date/Time Rate/Dose/Volume Action       05/31/24  0717 1 L/min Rate Change Medical Gas               enoxaparin (Lovenox) syringe 40 mg (mg) Total dose:  40 mg Dosing weight:  63.1      Date/Time Rate/Dose/Volume Action       05/31/24  0930 40 mg Given      1314 *Not included in total Held by provider               ipratropium-albuteroL (Duo-Neb) 0.5-2.5 mg/3 mL nebulizer solution 3 mL (mL) Total volume:  3 mL* Dosing weight:  63.1   *Administration not included in total     Date/Time Rate/Dose/Volume Action       05/31/24 0717 3 mL Given      1300 *3 mL Missed               potassium chloride CR (Klor-Con M20) ER tablet 40 mEq (mEq) Total dose:  40 mEq Dosing weight:  63.1      Date/Time Rate/Dose/Volume Action       05/31/24 0927 40 mEq Given               lidocaine PF (Xylocaine) 10 mg/mL (1 %) injection (mL) Total volume:  10 mL      Date/Time Rate/Dose/Volume Action       05/31/24  1620 10 mL Given                   Specimens collected      See detailed result report with images in PACS.    The patient tolerated the procedure well without incident or complication and is in stable condition.

## 2024-06-01 ENCOUNTER — APPOINTMENT (OUTPATIENT)
Dept: CARDIOLOGY | Facility: HOSPITAL | Age: 89
DRG: 291 | End: 2024-06-01
Payer: MEDICARE

## 2024-06-01 LAB
ALBUMIN SERPL BCP-MCNC: 3.6 G/DL (ref 3.4–5)
ALP SERPL-CCNC: 71 U/L (ref 33–136)
ALT SERPL W P-5'-P-CCNC: 20 U/L (ref 10–52)
ANION GAP SERPL CALC-SCNC: 14 MMOL/L (ref 10–20)
AST SERPL W P-5'-P-CCNC: 20 U/L (ref 9–39)
BILIRUB DIRECT SERPL-MCNC: 0.2 MG/DL (ref 0–0.3)
BILIRUB SERPL-MCNC: 1.8 MG/DL (ref 0–1.2)
BUN SERPL-MCNC: 21 MG/DL (ref 6–23)
CALCIUM SERPL-MCNC: 7.7 MG/DL (ref 8.6–10.3)
CHLORIDE SERPL-SCNC: 109 MMOL/L (ref 98–107)
CO2 SERPL-SCNC: 24 MMOL/L (ref 21–32)
CREAT SERPL-MCNC: 1.14 MG/DL (ref 0.5–1.3)
EGFRCR SERPLBLD CKD-EPI 2021: 59 ML/MIN/1.73M*2
EJECTION FRACTION APICAL 4 CHAMBER: 32.6
ERYTHROCYTE [DISTWIDTH] IN BLOOD BY AUTOMATED COUNT: 16.5 % (ref 11.5–14.5)
GLUCOSE FLD-MCNC: 103 MG/DL
GLUCOSE SERPL-MCNC: 83 MG/DL (ref 74–99)
HCT VFR BLD AUTO: 34.7 % (ref 41–52)
HGB BLD-MCNC: 11.5 G/DL (ref 13.5–17.5)
LDH FLD L TO P-CCNC: 103 U/L
LEFT VENTRICLE INTERNAL DIMENSION DIASTOLE: 3.6 CM (ref 3.5–6)
LV EJECTION FRACTION BIPLANE: 41 %
MAGNESIUM SERPL-MCNC: 2.1 MG/DL (ref 1.6–2.4)
MCH RBC QN AUTO: 34.6 PG (ref 26–34)
MCHC RBC AUTO-ENTMCNC: 33.1 G/DL (ref 32–36)
MCV RBC AUTO: 105 FL (ref 80–100)
NRBC BLD-RTO: 0 /100 WBCS (ref 0–0)
PH FLD: 6.78 [PH]
PLATELET # BLD AUTO: 82 X10*3/UL (ref 150–450)
POTASSIUM SERPL-SCNC: 3.5 MMOL/L (ref 3.5–5.3)
PROT FLD-MCNC: 2 G/DL
PROT SERPL-MCNC: 5.7 G/DL (ref 6.4–8.2)
RBC # BLD AUTO: 3.32 X10*6/UL (ref 4.5–5.9)
RIGHT VENTRICLE PEAK SYSTOLIC PRESSURE: 53 MMHG
SODIUM SERPL-SCNC: 143 MMOL/L (ref 136–145)
WBC # BLD AUTO: 3.9 X10*3/UL (ref 4.4–11.3)

## 2024-06-01 PROCEDURE — 94640 AIRWAY INHALATION TREATMENT: CPT

## 2024-06-01 PROCEDURE — 2500000002 HC RX 250 W HCPCS SELF ADMINISTERED DRUGS (ALT 637 FOR MEDICARE OP, ALT 636 FOR OP/ED): Performed by: NURSE PRACTITIONER

## 2024-06-01 PROCEDURE — 93308 TTE F-UP OR LMTD: CPT | Performed by: INTERNAL MEDICINE

## 2024-06-01 PROCEDURE — 2500000002 HC RX 250 W HCPCS SELF ADMINISTERED DRUGS (ALT 637 FOR MEDICARE OP, ALT 636 FOR OP/ED): Performed by: INTERNAL MEDICINE

## 2024-06-01 PROCEDURE — 80053 COMPREHEN METABOLIC PANEL: CPT

## 2024-06-01 PROCEDURE — 2500000001 HC RX 250 WO HCPCS SELF ADMINISTERED DRUGS (ALT 637 FOR MEDICARE OP): Performed by: NURSE PRACTITIONER

## 2024-06-01 PROCEDURE — 2500000004 HC RX 250 GENERAL PHARMACY W/ HCPCS (ALT 636 FOR OP/ED): Performed by: NURSE PRACTITIONER

## 2024-06-01 PROCEDURE — 93321 DOPPLER ECHO F-UP/LMTD STD: CPT

## 2024-06-01 PROCEDURE — 1200000002 HC GENERAL ROOM WITH TELEMETRY DAILY

## 2024-06-01 PROCEDURE — 99232 SBSQ HOSP IP/OBS MODERATE 35: CPT | Performed by: INTERNAL MEDICINE

## 2024-06-01 PROCEDURE — 93321 DOPPLER ECHO F-UP/LMTD STD: CPT | Performed by: INTERNAL MEDICINE

## 2024-06-01 PROCEDURE — 2500000002 HC RX 250 W HCPCS SELF ADMINISTERED DRUGS (ALT 637 FOR MEDICARE OP, ALT 636 FOR OP/ED): Mod: MUE | Performed by: NURSE PRACTITIONER

## 2024-06-01 PROCEDURE — 83735 ASSAY OF MAGNESIUM: CPT | Performed by: NURSE PRACTITIONER

## 2024-06-01 PROCEDURE — 84075 ASSAY ALKALINE PHOSPHATASE: CPT | Performed by: NURSE PRACTITIONER

## 2024-06-01 PROCEDURE — 85027 COMPLETE CBC AUTOMATED: CPT

## 2024-06-01 PROCEDURE — 93325 DOPPLER ECHO COLOR FLOW MAPG: CPT | Performed by: INTERNAL MEDICINE

## 2024-06-01 PROCEDURE — 36415 COLL VENOUS BLD VENIPUNCTURE: CPT

## 2024-06-01 RX ORDER — POTASSIUM CHLORIDE 20 MEQ/1
20 TABLET, EXTENDED RELEASE ORAL ONCE
Status: COMPLETED | OUTPATIENT
Start: 2024-06-01 | End: 2024-06-01

## 2024-06-01 RX ADMIN — IPRATROPIUM BROMIDE AND ALBUTEROL SULFATE 3 ML: 2.5; .5 SOLUTION RESPIRATORY (INHALATION) at 07:16

## 2024-06-01 RX ADMIN — POTASSIUM CHLORIDE 20 MEQ: 1500 TABLET, EXTENDED RELEASE ORAL at 08:50

## 2024-06-01 RX ADMIN — PANTOPRAZOLE SODIUM 40 MG: 40 TABLET, DELAYED RELEASE ORAL at 08:00

## 2024-06-01 RX ADMIN — FOLIC ACID 1 MG: 1 TABLET ORAL at 08:50

## 2024-06-01 RX ADMIN — FERROUS SULFATE TAB 325 MG (65 MG ELEMENTAL FE) 1 TABLET: 325 (65 FE) TAB at 08:50

## 2024-06-01 RX ADMIN — IPRATROPIUM BROMIDE AND ALBUTEROL SULFATE 3 ML: 2.5; .5 SOLUTION RESPIRATORY (INHALATION) at 13:05

## 2024-06-01 RX ADMIN — FINASTERIDE 5 MG: 5 TABLET, FILM COATED ORAL at 08:49

## 2024-06-01 RX ADMIN — GUAIFENESIN 1200 MG: 600 TABLET ORAL at 08:50

## 2024-06-01 RX ADMIN — IPRATROPIUM BROMIDE AND ALBUTEROL SULFATE 3 ML: 2.5; .5 SOLUTION RESPIRATORY (INHALATION) at 19:12

## 2024-06-01 RX ADMIN — CALCIUM CARBONATE (ANTACID) CHEW TAB 500 MG 1500 MG: 500 CHEW TAB at 08:49

## 2024-06-01 RX ADMIN — ATORVASTATIN CALCIUM 40 MG: 40 TABLET, FILM COATED ORAL at 08:50

## 2024-06-01 RX ADMIN — Medication 400 MG: at 08:50

## 2024-06-01 RX ADMIN — METOPROLOL SUCCINATE 50 MG: 50 TABLET, EXTENDED RELEASE ORAL at 08:49

## 2024-06-01 RX ADMIN — TAMSULOSIN HYDROCHLORIDE 0.4 MG: 0.4 CAPSULE ORAL at 08:49

## 2024-06-01 RX ADMIN — TAMSULOSIN HYDROCHLORIDE 0.4 MG: 0.4 CAPSULE ORAL at 20:40

## 2024-06-01 RX ADMIN — FUROSEMIDE 40 MG: 10 INJECTION, SOLUTION INTRAMUSCULAR; INTRAVENOUS at 08:49

## 2024-06-01 RX ADMIN — FUROSEMIDE 40 MG: 10 INJECTION, SOLUTION INTRAMUSCULAR; INTRAVENOUS at 17:48

## 2024-06-01 RX ADMIN — SPIRONOLACTONE 25 MG: 25 TABLET ORAL at 08:50

## 2024-06-01 RX ADMIN — ACETAMINOPHEN 975 MG: 325 TABLET ORAL at 08:50

## 2024-06-01 RX ADMIN — GUAIFENESIN 1200 MG: 600 TABLET ORAL at 20:40

## 2024-06-01 RX ADMIN — CALCIUM CARBONATE (ANTACID) CHEW TAB 500 MG 1500 MG: 500 CHEW TAB at 17:48

## 2024-06-01 RX ADMIN — MULTIPLE VITAMINS W/ MINERALS TAB 1 TABLET: TAB at 08:49

## 2024-06-01 ASSESSMENT — COGNITIVE AND FUNCTIONAL STATUS - GENERAL
MOBILITY SCORE: 15
WALKING IN HOSPITAL ROOM: A LOT
DAILY ACTIVITIY SCORE: 19
DRESSING REGULAR LOWER BODY CLOTHING: A LITTLE
EATING MEALS: A LITTLE
MOVING FROM LYING ON BACK TO SITTING ON SIDE OF FLAT BED WITH BEDRAILS: A LITTLE
STANDING UP FROM CHAIR USING ARMS: A LOT
HELP NEEDED FOR BATHING: A LITTLE
CLIMB 3 TO 5 STEPS WITH RAILING: A LOT
TOILETING: A LITTLE
TURNING FROM BACK TO SIDE WHILE IN FLAT BAD: A LITTLE
EATING MEALS: A LITTLE
WALKING IN HOSPITAL ROOM: A LOT
MOVING TO AND FROM BED TO CHAIR: A LITTLE
TOILETING: A LITTLE
DRESSING REGULAR UPPER BODY CLOTHING: A LITTLE
DAILY ACTIVITIY SCORE: 19
HELP NEEDED FOR BATHING: A LITTLE
DRESSING REGULAR UPPER BODY CLOTHING: A LITTLE
MOVING TO AND FROM BED TO CHAIR: A LITTLE
CLIMB 3 TO 5 STEPS WITH RAILING: A LOT
DRESSING REGULAR LOWER BODY CLOTHING: A LITTLE
TURNING FROM BACK TO SIDE WHILE IN FLAT BAD: A LITTLE
STANDING UP FROM CHAIR USING ARMS: A LOT
MOVING FROM LYING ON BACK TO SITTING ON SIDE OF FLAT BED WITH BEDRAILS: A LITTLE
MOBILITY SCORE: 15

## 2024-06-01 ASSESSMENT — PAIN - FUNCTIONAL ASSESSMENT: PAIN_FUNCTIONAL_ASSESSMENT: 0-10

## 2024-06-01 ASSESSMENT — PAIN SCALES - GENERAL
PAINLEVEL_OUTOF10: 0 - NO PAIN
PAINLEVEL_OUTOF10: 0 - NO PAIN

## 2024-06-01 NOTE — PROGRESS NOTES
"Ramón Flores is a 89 y.o. male on day 0 of admission presenting with Congestive heart failure, unspecified HF chronicity, unspecified heart failure type (Multi).    Subjective   Awake in bed. Stable on RA. Denies any nausea or abdominal pain        Objective     Physical Exam  Constitutional:       Appearance: Normal appearance.   Cardiovascular:      Rate and Rhythm: Normal rate. Rhythm irregular.      Pulses: Normal pulses.      Heart sounds: Normal heart sounds. No murmur heard.     No gallop.   Pulmonary:      Effort: Pulmonary effort is normal. No respiratory distress.      Breath sounds: Examination of the left-lower field reveals rales. Rhonchi and rales present. No wheezing.      Comments: Diminished bilaterally  Abdominal:      General: Abdomen is flat. Bowel sounds are normal. There is no distension.      Palpations: Abdomen is soft.      Tenderness: There is no abdominal tenderness. There is no guarding.   Musculoskeletal:         General: Normal range of motion.      Right lower le+ Edema present.      Left lower le+ Edema present.   Skin:     General: Skin is warm.      Capillary Refill: Capillary refill takes less than 2 seconds.   Neurological:      Mental Status: He is alert and oriented to person, place, and time.   Psychiatric:         Mood and Affect: Mood normal.         Thought Content: Thought content normal.         Judgment: Judgment normal.         Last Recorded Vitals  Blood pressure 100/56, pulse 55, temperature 36.2 °C (97.2 °F), resp. rate 15, height 1.676 m (5' 6\"), weight 63 kg (139 lb), SpO2 95%.  Intake/Output last 3 Shifts:  I/O last 3 completed shifts:  In: - (0 mL/kg)   Out: 1700 (27 mL/kg) [Urine:1700 (0.7 mL/kg/hr)]  Weight: 63 kg     Relevant Results  Scheduled medications  acetaminophen, 975 mg, oral, q AM  atorvastatin, 40 mg, oral, Daily  calcium carbonate, 1,500 mg, oral, BID  [Held by provider] enoxaparin, 40 mg, subcutaneous, q24h  ferrous sulfate (325 mg " ferrous sulfate), 65 mg of iron, oral, Daily  finasteride, 5 mg, oral, Daily  folic acid, 1 mg, oral, Daily  furosemide, 40 mg, intravenous, BID  guaiFENesin, 1,200 mg, oral, BID  [Held by provider] hydroCHLOROthiazide, 12.5 mg, oral, Daily  ipratropium-albuteroL, 3 mL, nebulization, TID  magnesium oxide, 400 mg, oral, Daily  metoprolol succinate XL, 50 mg, oral, Daily  multivitamin with minerals, 1 tablet, oral, Daily  pantoprazole, 40 mg, oral, Daily before breakfast  perflutren lipid microspheres, 0.5-10 mL of dilution, intravenous, Once in imaging  spironolactone, 25 mg, oral, Daily  tamsulosin, 0.4 mg, oral, BID      Continuous medications     PRN medications  PRN medications: acetaminophen, benzocaine-menthol, benzonatate, ipratropium-albuteroL, melatonin, ondansetron, oxygen, polyethylene glycol    Results for orders placed or performed during the hospital encounter of 05/31/24 (from the past 24 hour(s))   Vascular US lower extremity venous duplex left   Result Value Ref Range    BSA 1.71 m2   pH, Body Fluid   Result Value Ref Range    pH, Fluid 6.78 See Below   Lactate Dehydrogenase, Fluid   Result Value Ref Range    LD, Fluid 103 Not established. U/L   Glucose, Fluid   Result Value Ref Range    Glucose, Fluid 103 Not established mg/dL   Protein, Total Fluid   Result Value Ref Range    Protein, Total Fluid 2.0 Not established g/dL   Body Fluid Cell Count   Result Value Ref Range    Color, Fluid Yellow Colorless, Straw, Yellow    Clarity, Fluid Hazy (A) Clear    WBC, Fluid 617 See Comment /uL    RBC, Fluid 1,000 0  /uL /uL   Body Fluid Differential   Result Value Ref Range    Neutrophils %, Manual, Fluid 0 <25 % %    Lymphocytes %, Manual, Fluid 24 <75 % %    Mono/Macrophages %, Manual, Fluid 76 <70 % %    Eosinophils %, Manual, Fluid 0 0 % %    Basophils %, Manual, Fluid 0 0 % %    Immature Granulocytes %, Manual, Fluid 0 0 % %    Blasts %, Manual, Fluid 0 0 % %    Unclassified Cells %, Manual, Fluid 0 0 % %     Plasma Cells %, Manual, Fluid 0 0 % %    Total Cells Counted, Fluid 100    Magnesium   Result Value Ref Range    Magnesium 2.60 (H) 1.60 - 2.40 mg/dL   Lactate Dehydrogenase   Result Value Ref Range     84 - 246 U/L   Protein, Total   Result Value Ref Range    Total Protein 6.1 (L) 6.4 - 8.2 g/dL   Magnesium   Result Value Ref Range    Magnesium 2.10 1.60 - 2.40 mg/dL   CBC   Result Value Ref Range    WBC 3.9 (L) 4.4 - 11.3 x10*3/uL    nRBC 0.0 0.0 - 0.0 /100 WBCs    RBC 3.32 (L) 4.50 - 5.90 x10*6/uL    Hemoglobin 11.5 (L) 13.5 - 17.5 g/dL    Hematocrit 34.7 (L) 41.0 - 52.0 %     (H) 80 - 100 fL    MCH 34.6 (H) 26.0 - 34.0 pg    MCHC 33.1 32.0 - 36.0 g/dL    RDW 16.5 (H) 11.5 - 14.5 %    Platelets 82 (L) 150 - 450 x10*3/uL   Basic Metabolic Panel   Result Value Ref Range    Glucose 83 74 - 99 mg/dL    Sodium 143 136 - 145 mmol/L    Potassium 3.5 3.5 - 5.3 mmol/L    Chloride 109 (H) 98 - 107 mmol/L    Bicarbonate 24 21 - 32 mmol/L    Anion Gap 14 10 - 20 mmol/L    Urea Nitrogen 21 6 - 23 mg/dL    Creatinine 1.14 0.50 - 1.30 mg/dL    eGFR 59 (L) >60 mL/min/1.73m*2    Calcium 7.7 (L) 8.6 - 10.3 mg/dL   Hepatic function panel   Result Value Ref Range    Albumin 3.6 3.4 - 5.0 g/dL    Bilirubin, Total 1.8 (H) 0.0 - 1.2 mg/dL    Bilirubin, Direct 0.2 0.0 - 0.3 mg/dL    Alkaline Phosphatase 71 33 - 136 U/L    ALT 20 10 - 52 U/L    AST 20 9 - 39 U/L    Total Protein 5.7 (L) 6.4 - 8.2 g/dL     US right upper quadrant    Result Date: 5/31/2024  Interpreted By:  Negrito Vora, STUDY: US RIGHT UPPER QUADRANT;  5/31/2024 5:08 pm   INDICATION: Signs/Symptoms: Suspicious for cholecystits.   COMPARISON: CT scan of the chest, abdomen and pelvis 05/31/2024.   ACCESSION NUMBER(S): [id number]   ORDERING CLINICIAN: MENDY LLOYD   TECHNIQUE: Grayscale and color Doppler sonographic imaging of the right upper quadrant.   FINDINGS: LIVER:  Normal echogenicity, and echotexture. No focal abnormalities.   BILE DUCTS: No  intrahepatic or extrahepatic bile duct dilatation. Extrahepatic bile duct =   3 mm.   GALLBLADDER: There is a 2 mm nonshadowing echogenic focus adherent to the gallbladder wall suggestive of small polyp. Note is made of gallbladder wall thickening with pericholecystic fluid. No definite calculi. The sonographic Simon sign is reported negative by the ultrasound technician.   PANCREAS: Normal head and body. Limited evaluation of the pancreatic tail due to bowel gas.   RIGHT KIDNEY: Measures 9.7 cm in length, no hydronephrosis. There is a 1.1 x 1.1 x 1.0 cm anechoic focus in the upper pole of the right kidney with posterior acoustic enhancement and no central flow suggestive of a small cyst.   PERITONEUM: Trace ascites.   OTHER: Small right pleural effusion noted.       No sonographic evidence for cholelithiasis. Gallbladder wall thickening and pericholecystic fluid is nonspecific given ascites. Sonographic Simon sign is reported negative by the ultrasound technician. If there is persistent high clinical concern further evaluation with HIDA can be considered.   Trace ascites.   Small right pleural effusion.     MACRO: None   Signed by: Negrito Vora 5/31/2024 6:08 PM Dictation workstation:   [id number]    XR chest 1 view    Result Date: 5/31/2024  Interpreted By:  Nicholas Disla, STUDY: XR CHEST 1 VIEW;  5/31/2024 4:36 pm   INDICATION: Signs/Symptoms:s/p right thora.   COMPARISON: 05/31/2024   ACCESSION NUMBER(S): [id number]   ORDERING CLINICIAN: VEE CASTELLANO   FINDINGS: AP portable view of the chest is obtained.  Limited exam due to portable nature. Magnified cardiac silhouette. Sternal wires. No definite focal infiltrates. Question minimal atelectasis and/or scarring in the right lung base. No pneumothorax.       Cardiomegaly with likely atelectasis and/or scarring in the right lung base.   MACRO: None   Signed by: Nicholas Disla 5/31/2024 4:46 PM Dictation workstation:   [id number]    US thoracentesis    Result  Date: 5/31/2024  Interpreted By:  Modesto Yen, STUDY: US THORACENTESIS5/31/20244:40 pm   INDICATION: Signs/Symptoms:worsening pleural effusion   COMPARISON: CT C/A/P 5/31/2024   ACCESSION NUMBER(S): [id number]   ORDERING CLINICIAN: MENDY LLOYD   TECHNIQUE: INTERVENTIONALIST(S): Modesto Yen   CONSENT: The patient/patient's POA/next of kin was informed of the nature of the proposed procedure. The purposes, alternatives, risks, and benefits were explained and discussed. All questions were answered and consent was obtained.   SEDATION: None   MEDICATION/CONTRAST: 1% lidocaine was used to anesthetize subcutaneously.   TIME OUT: A time out was performed immediately prior to procedure start with the interventional team, correctly identifying the patient name, date of birth, MRN, procedure, anatomy (including marking of site and side), patient position, procedure consent form, relevant laboratory and imaging test results, antibiotic administration, safety precautions, and procedure-specific equipment needs.   FINDINGS: The patient was placed in the sitting position.   The pleural space was examined with grey scale ultrasound, and the most accessible fluid identified and marked for thoracentesis.   The skin was prepped and draped in usual manner. Local anesthesia with lidocaine was administered and a right-sided thoracentesis was performed.  A 5 Serbian One-Step Valved thoracentesis needle/catheter was then placed where marked. Approximately 450 mL of yellowish colored fluid was removed.  The needle/catheter was then withdrawn.   The patient tolerated the procedure well and there were no immediate complications. Specimen(s) sent to the laboratory for further evaluation, per the requesting team.       Uneventful thoracentesis, as detailed above. Right pleural space, 450 mL   I personally performed and/or directly supervised this study and was present for the entire procedure.   Performed and dictated at [facility].   Signed by: Modesto Yen 5/31/2024 4:41 PM Dictation workstation:   [id number]    CT chest abdomen pelvis wo IV contrast    Result Date: 5/31/2024  Interpreted By:  Radha Schumacher, STUDY: CT CHEST ABDOMEN PELVIS WO CONTRAST;  5/31/2024 1:52 pm   INDICATION: Signs/Symptoms:further evaluation of pleural effusions and ascites.   COMPARISON: CT chest 05/23/2024 and CT abdomen pelvis 01/16/2023   ACCESSION NUMBER(S): [id number]   ORDERING CLINICIAN: ADRIANNA CUMMINS   TECHNIQUE: CT of the chest, abdomen and pelvis was performed. Contiguous axial images were obtained at 3 mm slice thickness through the chest, abdomen and pelvis. Coronal and sagittal reconstructions at 3 mm slice thickness were performed.  No intravenous or oral contrast was administered.   FINDINGS: Please note that the study is limited without intravenous contrast.   CHEST:     LUNG/PLEURA/LARGE AIRWAYS: Trachea and mainstem bronchi are patent. Trachea has contour consistent with imaging during expiration. Small to moderate pleural effusion on the right and small pleural effusion on the left at both increased compared to the prior chest CT. There is mild dependent atelectasis bilaterally. Heterogeneous density of the lung parenchyma is consistent with the expiratory phase imaging.   VESSELS: Aorta contains patchy atherosclerosis. There is no aneurysmal dilatation. Main pulmonary artery is dilated at 3.5 cm. Superior vena cava appears distended.   HEART: Heart is enlarged with multi chamber dilatation and unchanged in size compared to the prior study. No pericardial effusion is noted. Patient has had coronary bypass surgery.   MEDIASTINUM AND DANIEL: No pathologically enlarged mediastinal or hilar lymph nodes are noted. Esophagus is predominantly decompressed. There is probably a small hiatal hernia.   CHEST WALL AND LOWER NECK: Subcutaneous fat appears mildly edematous. Thyroid gland is nonenlarged.   ABDOMEN:    LIVER: Liver is nonenlarged and there is no focal mass identified.   BILE DUCTS: No biliary ductal dilatation is noted.   GALLBLADDER: Gallbladder is surrounded by fluid in the gallbladder wall is probably thickened. No calcified stones are identified. The gallbladder wall and pericholecystic findings have changed since the prior chest CT.   PANCREAS: No gross mass or inflammation is noted involving the pancreas.   SPLEEN: Spleen is normal in size with no gross mass.   ADRENAL GLANDS: Adrenal glands appear normal.   KIDNEYS AND URETERS: No stones or hydronephrosis are present. Renal cysts measure up to 2.3 cm.   PELVIS:   BLADDER: Bladder base is obscured by artifact from right hip endoprosthesis. Visualized portion of the bladder shows evidence of mild bladder wall hypertrophy. There also diverticula noted towards the dome of the bladder.   REPRODUCTIVE ORGANS: Prostate gland is largely obscured by artifact from right hip endoprosthesis, but appears enlarged with an AP dimension of 5.7 cm.   BOWEL: Stomach contains air and a small amount of hyperdense content. Small bowel loops are nondilated. Prominent amount of fecal material is noted in the proximal colon. Colon is otherwise nondilated. Appendix is not identified.   VESSELS: Patchy atherosclerosis is present in the aorta and iliac arteries. There is no aneurysmal dilatation. Vena cava appears mildly distended.   PERITONEUM/RETROPERITONEUM/LYMPH NODES: Fluid is predominantly noted around the gallbladder. There is minimal perihepatic fluid. No free fluid is noted in the pelvis. There is no pneumoperitoneum. No pathologically enlarged retroperitoneal lymph nodes are detected.   ABDOMINAL WALL: Subcutaneous fat appears mildly edematous diffusely. No abdominal wall hernia is detected.   BONES: Multilevel severe degenerative disc changes are present in the spine. No suspicious osseous lesions are noted. Endoprosthesis is present in the right hip.       Chest 1.   Bilateral pleural effusions, right larger than left and both larger than on the prior study 2. Cardiomegaly with probably multi chamber dilatation. Vena cava also appears distended which is indicative of passive congestion from right heart failure.   Abdomen-Pelvis 1.  Gallbladder wall appears thickened and there is pericholecystic fluid noted without generalized ascites to explain the pericholecystic fluid. Gallbladder findings represent interval change from the chest CT of 05/23/2024 and are concerning for cholecystitis. Radha Schumacher discussed the significance and urgency of this critical finding by epic chat with  ADRIANNA CUMMINS on 5/31/2024 at 3:11 pm.  (**-RCF-**) Findings:  See findings.         MACRO: None   Signed by: Radha Schumacher 5/31/2024 3:13 PM Dictation workstation:   [id number]    Vascular US lower extremity venous duplex left    Result Date: 5/31/2024  Preliminary Cardiology Report            [place] [id number]   Tel [phone] and Fax [phone]       Preliminary Vascular Lab Report  VASC US LOWER EXTREMITY VENOUS DUPLEX LEFT  Patient Name:      [facility] Physician:  [id number] Singh Douglas MD, RPVI Study Date:        5/31/2024         Ordering Physician: [id number] MENDY LLOYD MRN/PID:           [id number]          Technologist:       Dimitri Dobson RVT Accession#:        [id number]      Technologist 2: Date of Birth/Age: 7/24/1928         Encounter#:         [id number] Gender:            M Admission Status:  Inpatient         Location Performed: [facility]  Diagnosis/ICD: Localized (leg) edema-R60.0; Pain in left leg-M79.605 Procedure/CPT: 24188 Peripheral venous duplex scan for DVT Limited  PRELIMINARY CONCLUSIONS: Right Lower Venous: Right CFV doppler was not obtained due to machine technical error. Left  Lower Venous: No evidence of acute deep vein thrombus visualized in the left lower extremity. Left calf veins visualized in segments due to swelling/edema. Technically difficult study due to limited positioning and swelling/edema.  Imaging & Doppler Findings:  Left                  Compress Thrombus        Flow Distal External Iliac   Yes      None CFV                     Yes      None   Spontaneous/Phasic PFV                     Yes      None FV Proximal             Yes      None   Spontaneous/Phasic FV Mid                  Yes      None FV Distal               Yes      None Popliteal               Yes      None   Spontaneous/Phasic Peroneal                Yes      None PTV                     Yes      None VASCULAR PRELIMINARY REPORT completed by Dimitri Dobson RVT on 5/31/2024 at 12:15:51 PM  ** Final **     Electrocardiogram, 12-lead PRN ACS symptoms    Result Date: 5/31/2024  Atrial fibrillation with premature ventricular or aberrantly conducted complexes Right superior axis deviation Incomplete right bundle branch block Nonspecific T wave abnormality Prolonged QT Abnormal ECG When compared with ECG of 19-MAR-2024 10:02, Sinus rhythm is no longer with ventricular escape complexes Left anterior fascicular block is no longer Present See ED provider note for full interpretation and clinical correlation Confirmed by Juliann Nicholas ([id number]) on 5/31/2024 10:44:02 AM    XR chest 1 view    Result Date: 5/31/2024  Interpreted By:  Julian Soto, STUDY: XR CHEST 1 VIEW;  5/31/2024 2:06 am   INDICATION: Signs/Symptoms:Chest Pain.   COMPARISON: 05/11/2024   ACCESSION NUMBER(S): [id number]   ORDERING CLINICIAN: KATHRIN THOMPSON   FINDINGS: Median sternotomy wires and mediastinal surgical clips. Stable moderate to severe cardiomegaly. Streaky bibasilar airspace opacities appears similar to the prior exam and may reflect atelectasis or consolidation. Pneumothorax. No large pleural effusion. Upper abdomen is unremarkable.  No acute osseous abnormality.       1. Streaky bibasilar airspace opacities appears similar to the prior exam and may reflect atelectasis or consolidation/pneumonia. 2. Stable cardiomegaly.   Signed by: Julian Soto 5/31/2024 2:24 AM Dictation workstation:   [id number]    CT chest wo IV contrast    Result Date: 5/24/2024  Interpreted By:  Navdeep Robledo, STUDY: CT CHEST WO IV CONTRAST;  5/23/2024 9:03 am   INDICATION: Signs/Symptoms:suspect pneumonia.   COMPARISON: 01/18/2024   ACCESSION NUMBER(S): [id number]   ORDERING CLINICIAN: GENARO DE LEON   TECHNIQUE: Helical data acquisition of the chest was obtained  without IV contrast material.  Images were reformatted in axial, coronal, and sagittal planes.   FINDINGS: History of hemophilia A, protein C deficiency, hypertension, atrial fibrillation, heart failure with preserved ejection fraction, CAD.   LUNGS AND AIRWAYS: The trachea and central airways are patent. No endobronchial lesion.   Mild interlobular septal edema is present at the lung bases bilaterally as seen at image 224/329. Decrease in the interlobular septal thickening is present compared to 01/18/2024. Moderate bilateral pleural effusions are present. Interstitial fibrotic changes are present, at both lung bases with some ground-glass density. The upper and mid zones are bilaterally clear.   MEDIASTINUM AND DANIEL, LOWER NECK AND AXILLA: The visualized thyroid gland is within normal limits.   No evidence of thoracic lymphadenopathy by CT criteria.   Esophagus appears within normal limits as seen.   HEART AND VESSELS: Moderately severe to severe cardiomegaly with multi chamber enlargement. Both atria appear enlarged. Calcification is seen in the coronary arteries. No pericardial effusion. The thoracic aorta is normal in caliber. The main pulmonary artery is dilated measuring 3.5 cm. Status post CABG.   UPPER ABDOMEN: A small amount of ascites is present adjacent to the liver. The spleen and liver are  normal in size. No change from 01/18/2024.   CHEST WALL AND OSSEOUS STRUCTURES: There are no suspicious osseous lesions. Severe degenerative changes are seen throughout the thoracic and upper lumbar spine. Median sternotomy sutures are present. Mild edema of the body wall is present       1. Cardiomegaly, interlobular septal thickening,  and bilateral moderate pleural effusions, consistent with pulmonary edema. No evidence of pneumonia. 2. Chronic interstitial fibrotic changes at both lung bases similar to the previous examination. 3. Mild ascites and body wall edema suggesting right-sided heart failure 4. Status post CABG. 5. Dilated main pulmonary artery similar to 01/18/2024.   MACRO: None   Signed by: Navdeep Robledo 5/24/2024 11:38 AM Dictation workstation:   [id number]    XR chest 2 views    Result Date: 5/11/2024  Interpreted By:  Andrew Barksdale, STUDY: XR CHEST 2 VIEWS;  5/11/2024 1:36 pm   INDICATION: Signs/Symptoms:cough, congestion.   COMPARISON: Chest x-ray 03/19/2024. CT chest 01/18/2024.   ACCESSION NUMBER(S): [id number]   ORDERING CLINICIAN: RACHEL CHACKO   FINDINGS: PA and lateral radiographs of the chest   CARDIOMEDIASTINAL SILHOUETTE: Cardiomediastinal silhouette is stable in size and configuration. Persistently enlarged with postop changes of median sternotomy.   LUNGS: There is mild prominence interstitial markings. There is a patchy opacity of the left lung base with slight blunting of the costophrenic angle.   ABDOMEN: No remarkable upper abdominal findings.   BONES: No acute osseous changes.       1.  Cardiomegaly with findings of interstitial edema. 2. Small left-sided pleural effusion with adjacent patchy opacities which can represent atelectasis or early infiltrate. Correlation with clinical concern of infection may be of value.   Signed by: Andrew Barksdale 5/11/2024 1:40 PM Dictation workstation:   [id number]              Assessment/Plan   Principal Problem:    Congestive heart  failure, unspecified HF chronicity, unspecified heart failure type (Multi)  Active Problems:    Acute on chronic diastolic heart failure (Multi)    HPI: Ramón Flores is a 89 y.o. 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 [place] [facility] [place] for cough and congestion, and chose to follow up with PCP vs having furhter work up in ED, patient was seen by geriatrics NP on 5/25 and was instructed to call if he experiences cough, edema, or SOB. Today (5/31) patient states that his cough had been getting worse, and feels as though mucus is stuck sitting in his throat. Cough is non-productive no hemoptosis. He denies any HA, dizziness, SOB, CP, palpitations, abdominal pian, changes in BM, urinary complaints such as burning or irritation. Patient does have swelling in his LLE. Of note patient states he has injured it in the past.         In the ED, VSS. EKG NSR wo ischemic changes. Labs notable for hypomagnesemia Mg 0.7, Troponin 102--99, BNP 1024, INR 1.5, chronic thrombocytopenia and anemia. CXR showed streaky bibasilar airspace opacities appears similar to the prior exam that may reflect atelectasis or consolidation/pneumonia. Recent chest CT on 5/24 showed interlobular septal thickening and bilateral moderate pleural effusions, consistent with pulmonary edema and no evidence of pneumonia, with chronic interstitial fibrotic changes at both lung bases similar to the previous examination and mild ascites and body wall edema suggesting right-sided heart failure. Patient was given IV Lasix, magnesium, and IV Rocephin/Azithromycin and admitted for further management of heart failure and possible PNA.     ROS: Per HPI    Acute on Chronic Diastolic Heart  Failure Exacerbation  HFmrEF  CAD s/p CABG  HTN, HLD, Chronic AF  Hypomagnesemia  -Last TTE 3/2024: EF 50%, global hypokinesis of LV, mildly reduced RV fxn, mild-mod TR, LA mod dilated  -updated echo ordered  -BNP 1024  -Troponin 102--99--104, chronic elevation at baseline  -CXR 5/20: Streaky bibasilar airspace opacities, atelectasis vs PNA  -CT Chest 5/24: interlobular septal thickening and bilateral moderate pleural effusions, consistent with pulmonary edema, and no evidence of pneumonia, Mild ascites and body wall edema suggesting right-sided heart failure  -Repeat CT 5/31/24: R>L pleural effusion, increased from previous study. Also questions gall bladder thickening  -40mg IV lasix BID   -Optimize BP control, continue home medications  -Resume Bumex at discharge  -c/w statin, no ASA d/t allergy  -Tele, monitor lytes, daily wts, I&Os  -plan to wean supplemental O2 as further diuresed   -strict I&O's  -daily weights  -Cards consulted for further recommendations   -CXR initially concerning for  pna, however procal negative and CT more consistent with CHF. No leukocytosis. Pna ruled out   -s/p R thora 5/31/24 with 450 outpt      Gall bladder thickening  -noted on CT: Gallbladder wall appears thickened and there is pericholecystic  fluid noted without generalized ascites to explain the  pericholecystic fluid. Gallbladder findings represent interval change from the chest CT of 05/23/2024 and are concerning for cholecystitis  -US RUQ: neg for cholelithiasis. Thickening and pericholecystic fluid non specific given ascites. Fifty Six sign neg  -LFTS WNL   -consider HIDA  -no abdominal pain on exam, even with palpation    Hx of Hemophilia A  ZEYNEP  -Weekly infusions of RF VIII on Tuesdays  -Monitor for active bleeding  -c/w PO Iron and folic acid     Other comorbidities as above  -Continue meds as ordered and adjust based on clinical course         GI/VTE PPX: PPI, held Lovenox due to thrombocytopenia, SCDs   Code Status: DNR  and No Intubation     Labs/Testing reviewed    Interdisciplinary team rounding completed with hospitalist, nurse, TCC    NP discussed plan and lab/testing results with Dr. Dougherty     Upgraded from obs to inpatient, Dr. Lee to assume care, notified via epic secure chat        Disposition: Discharge home once medically cleared and stable,   -Appears patient was active with [facility], may need PT/OT-> monitor for needs  -SW consult placed    I spent 50 minutes in the professional and overall care of this patient.  Vianca uAstin, APRN-CNP

## 2024-06-01 NOTE — PROGRESS NOTES
06/01/24 0952   Discharge Planning   Patient expects to be discharged to: Home with [facility]        Patient still has cough. He is diuresing with Lasix IV 40 mg BID. 02 2L NC. Patient had right thoracentesis done yesterday with 450 ml's of fluid removed. Plan is for patient to go home with [facility]. No new order needed if patient continues to be obs status.

## 2024-06-01 NOTE — PROGRESS NOTES
"Subjective Data:  No new complaints. Reports breathing as improved. Off O2. S/p thoracentesis yesterday.        Objective Data:  Last Recorded Vitals:  Vitals:    24 1957 245 24 0240 24 0716   BP: 105/59 109/70 100/56 124/69   BP Location:       Patient Position:       Pulse: 58 61 55 65   Resp: 16 16 15 16   Temp: 36.7 °C (98.1 °F) 36.9 °C (98.5 °F)  36.2 °C (97.1 °F)   TempSrc:    Temporal   SpO2: 97% 95% 95% 98%   Weight:       Height:         Medical Gas Therapy: Supplemental oxygen (Pt. placed on RA at this time as SpO2=98% on 1LNC, QMM2=335-50% on RA, no distress noted)  O2 Delivery Method: Nasal cannula  Weight  Av kg (139 lb)  Min: 63 kg (139 lb)  Max: 63 kg (139 lb)    LABS:  CMP:  Results from last 7 days   Lab Units 24  1844 24  0228 24  0121   SODIUM mmol/L 143  --  141  --  141   POTASSIUM mmol/L 3.5  --  3.1*  --  3.5   CHLORIDE mmol/L 109*  --  110*  --  110*   CO2 mmol/L 24  --  21  --  20*   ANION GAP mmol/L 14  --  13  --  15   BUN mg/dL 21  --  24*  --  24*   CREATININE mg/dL 1.14  --  1.15  --  1.11   EGFR mL/min/1.73m*2 59*  --  59*  --  61   MAGNESIUM mg/dL 2.10 2.60* 1.10* 0.70* 0.70*   ALBUMIN g/dL 3.6  --   --   --  3.9   ALT U/L 20  --   --   --  25   AST U/L 20  --   --   --  28   BILIRUBIN TOTAL mg/dL 1.8*  --   --   --  2.2*     CBC:  Results from last 7 days   Lab Units 24  04524  0121   WBC AUTO x10*3/uL 3.9* 4.4 4.8   HEMOGLOBIN g/dL 11.5* 11.7* 12.0*   HEMATOCRIT % 34.7* 34.7* 35.5*   PLATELETS AUTO x10*3/uL 82* 89* 93*   MCV fL 105* 104* 102*     COAG:   Results from last 7 days   Lab Units 24  0121   INR  1.5*     ABO: No results found for: \"ABO\"  HEME/ENDO:     CARDIAC:   Results from last 7 days   Lab Units 24  1844 24  0619 24  0228 24  0121   LD U/L 208  --   --   --    TROPHS ng/L  --  104* 99* 102*   BNP pg/mL  --   --   --  1,024*        "      Last I/O:    Intake/Output Summary (Last 24 hours) at 6/1/2024 1107  Last data filed at 6/1/2024 1051  Gross per 24 hour   Intake 150 ml   Output 2400 ml   Net -2250 ml     Net IO Since Admission: -2,950 mL [06/01/24 1107]            Inpatient Medications:  Scheduled medications   Medication Dose Route Frequency    acetaminophen  975 mg oral q AM    atorvastatin  40 mg oral Daily    calcium carbonate  1,500 mg oral BID    [Held by provider] enoxaparin  40 mg subcutaneous q24h    ferrous sulfate (325 mg ferrous sulfate)  65 mg of iron oral Daily    finasteride  5 mg oral Daily    folic acid  1 mg oral Daily    furosemide  40 mg intravenous BID    guaiFENesin  1,200 mg oral BID    [Held by provider] hydroCHLOROthiazide  12.5 mg oral Daily    ipratropium-albuteroL  3 mL nebulization TID    magnesium oxide  400 mg oral Daily    metoprolol succinate XL  50 mg oral Daily    multivitamin with minerals  1 tablet oral Daily    pantoprazole  40 mg oral Daily before breakfast    perflutren lipid microspheres  0.5-10 mL of dilution intravenous Once in imaging    spironolactone  25 mg oral Daily    tamsulosin  0.4 mg oral BID     PRN medications   Medication    acetaminophen    benzocaine-menthol    benzonatate    ipratropium-albuteroL    melatonin    ondansetron    oxygen    polyethylene glycol     Continuous Medications   Medication Dose Last Rate           Physical Exam:  Gen Well appearing elderly male sitting up in NAD. Body mass index is 22.44 kg/m².   CV irregularly irregular. No m/r/g appreciated. +JVD. 2+ bilateral leg edema.    Pulm Reduced at the bases. Lungs otherwise clear with normal respiratory effort.   Neuro Alert and conversant. Grossly nonfocal.         Assessment:  Acute on chronic diastolic HF.   Denies excess sodium intake. TTE stable. Diuresing well. Volume status improving though still fairly volume+  2. CAD s/p CABG  Stable by available metrics. Not on aspirin per prior discussions. Maintains on  statin.  3. Atrial fibrillation  Permanent (?). CVR. Not on anticoagulation per prior discussions.    4. Hypomagnesemia   Resolved s/p supplementation.     Recommendations:   Con't IV diuretics. Strict I/O's. Daily weights. Monitor Mag/K and supplement to >2/4. Close monitoring of renal function. Probable added SGLT2-I on discharge.           Davis Cabello MD

## 2024-06-01 NOTE — CARE PLAN
The patient's goals for the shift include      The clinical goals for the shift include pt will maintain respiratory status overnight; no complaints of SOB or increased O2 requirements      Problem: Pain - Adult  Goal: Verbalizes/displays adequate comfort level or baseline comfort level  Outcome: Progressing     Problem: Safety - Adult  Goal: Free from fall injury  Outcome: Progressing     Problem: Discharge Planning  Goal: Discharge to home or other facility with appropriate resources  Outcome: Progressing     Problem: Chronic Conditions and Co-morbidities  Goal: Patient's chronic conditions and co-morbidity symptoms are monitored and maintained or improved  Outcome: Progressing     Problem: Heart Failure  Goal: Improved gas exchange this shift  Outcome: Progressing  Goal: Improved urinary output this shift  Outcome: Progressing  Goal: Report improvement of dyspnea/breathlessness this shift  Outcome: Progressing

## 2024-06-02 LAB
ALBUMIN SERPL BCP-MCNC: 3.8 G/DL (ref 3.4–5)
ALP SERPL-CCNC: 73 U/L (ref 33–136)
ALT SERPL W P-5'-P-CCNC: 19 U/L (ref 10–52)
ANION GAP SERPL CALC-SCNC: 15 MMOL/L (ref 10–20)
AST SERPL W P-5'-P-CCNC: 21 U/L (ref 9–39)
BILIRUB DIRECT SERPL-MCNC: 0.2 MG/DL (ref 0–0.3)
BILIRUB SERPL-MCNC: 1.8 MG/DL (ref 0–1.2)
BUN SERPL-MCNC: 21 MG/DL (ref 6–23)
CALCIUM SERPL-MCNC: 8.1 MG/DL (ref 8.6–10.3)
CHLORIDE SERPL-SCNC: 107 MMOL/L (ref 98–107)
CO2 SERPL-SCNC: 25 MMOL/L (ref 21–32)
CREAT SERPL-MCNC: 1.27 MG/DL (ref 0.5–1.3)
EGFRCR SERPLBLD CKD-EPI 2021: 52 ML/MIN/1.73M*2
ERYTHROCYTE [DISTWIDTH] IN BLOOD BY AUTOMATED COUNT: 16.4 % (ref 11.5–14.5)
GLUCOSE SERPL-MCNC: 91 MG/DL (ref 74–99)
HCT VFR BLD AUTO: 34 % (ref 41–52)
HGB BLD-MCNC: 11.4 G/DL (ref 13.5–17.5)
MAGNESIUM SERPL-MCNC: 2 MG/DL (ref 1.6–2.4)
MCH RBC QN AUTO: 35 PG (ref 26–34)
MCHC RBC AUTO-ENTMCNC: 33.5 G/DL (ref 32–36)
MCV RBC AUTO: 104 FL (ref 80–100)
NRBC BLD-RTO: 0 /100 WBCS (ref 0–0)
PLATELET # BLD AUTO: 87 X10*3/UL (ref 150–450)
POTASSIUM SERPL-SCNC: 4 MMOL/L (ref 3.5–5.3)
PROT SERPL-MCNC: 5.7 G/DL (ref 6.4–8.2)
RBC # BLD AUTO: 3.26 X10*6/UL (ref 4.5–5.9)
SODIUM SERPL-SCNC: 143 MMOL/L (ref 136–145)
T4 FREE SERPL-MCNC: 1.16 NG/DL (ref 0.61–1.12)
TSH SERPL-ACNC: 5.19 MIU/L (ref 0.44–3.98)
WBC # BLD AUTO: 3.9 X10*3/UL (ref 4.4–11.3)

## 2024-06-02 PROCEDURE — 2500000004 HC RX 250 GENERAL PHARMACY W/ HCPCS (ALT 636 FOR OP/ED): Performed by: NURSE PRACTITIONER

## 2024-06-02 PROCEDURE — 83735 ASSAY OF MAGNESIUM: CPT | Performed by: NURSE PRACTITIONER

## 2024-06-02 PROCEDURE — 84443 ASSAY THYROID STIM HORMONE: CPT | Performed by: INTERNAL MEDICINE

## 2024-06-02 PROCEDURE — 80048 BASIC METABOLIC PNL TOTAL CA: CPT | Performed by: NURSE PRACTITIONER

## 2024-06-02 PROCEDURE — 2500000002 HC RX 250 W HCPCS SELF ADMINISTERED DRUGS (ALT 637 FOR MEDICARE OP, ALT 636 FOR OP/ED): Performed by: NURSE PRACTITIONER

## 2024-06-02 PROCEDURE — 99232 SBSQ HOSP IP/OBS MODERATE 35: CPT | Performed by: INTERNAL MEDICINE

## 2024-06-02 PROCEDURE — 36415 COLL VENOUS BLD VENIPUNCTURE: CPT | Performed by: NURSE PRACTITIONER

## 2024-06-02 PROCEDURE — 94640 AIRWAY INHALATION TREATMENT: CPT

## 2024-06-02 PROCEDURE — 2500000001 HC RX 250 WO HCPCS SELF ADMINISTERED DRUGS (ALT 637 FOR MEDICARE OP): Performed by: NURSE PRACTITIONER

## 2024-06-02 PROCEDURE — 1200000002 HC GENERAL ROOM WITH TELEMETRY DAILY

## 2024-06-02 PROCEDURE — 84439 ASSAY OF FREE THYROXINE: CPT | Performed by: INTERNAL MEDICINE

## 2024-06-02 PROCEDURE — 85027 COMPLETE CBC AUTOMATED: CPT | Performed by: NURSE PRACTITIONER

## 2024-06-02 PROCEDURE — 94664 DEMO&/EVAL PT USE INHALER: CPT

## 2024-06-02 PROCEDURE — 82248 BILIRUBIN DIRECT: CPT | Performed by: NURSE PRACTITIONER

## 2024-06-02 RX ADMIN — PANTOPRAZOLE SODIUM 40 MG: 40 TABLET, DELAYED RELEASE ORAL at 08:57

## 2024-06-02 RX ADMIN — GUAIFENESIN 1200 MG: 600 TABLET ORAL at 20:36

## 2024-06-02 RX ADMIN — GUAIFENESIN 1200 MG: 600 TABLET ORAL at 08:51

## 2024-06-02 RX ADMIN — CALCIUM CARBONATE (ANTACID) CHEW TAB 500 MG 1500 MG: 500 CHEW TAB at 17:06

## 2024-06-02 RX ADMIN — MULTIPLE VITAMINS W/ MINERALS TAB 1 TABLET: TAB at 08:51

## 2024-06-02 RX ADMIN — Medication 400 MG: at 08:51

## 2024-06-02 RX ADMIN — FUROSEMIDE 40 MG: 10 INJECTION, SOLUTION INTRAMUSCULAR; INTRAVENOUS at 17:06

## 2024-06-02 RX ADMIN — FINASTERIDE 5 MG: 5 TABLET, FILM COATED ORAL at 08:51

## 2024-06-02 RX ADMIN — FOLIC ACID 1 MG: 1 TABLET ORAL at 08:51

## 2024-06-02 RX ADMIN — METOPROLOL SUCCINATE 50 MG: 50 TABLET, EXTENDED RELEASE ORAL at 08:51

## 2024-06-02 RX ADMIN — TAMSULOSIN HYDROCHLORIDE 0.4 MG: 0.4 CAPSULE ORAL at 20:36

## 2024-06-02 RX ADMIN — ACETAMINOPHEN 975 MG: 325 TABLET ORAL at 08:51

## 2024-06-02 RX ADMIN — IPRATROPIUM BROMIDE AND ALBUTEROL SULFATE 3 ML: 2.5; .5 SOLUTION RESPIRATORY (INHALATION) at 14:09

## 2024-06-02 RX ADMIN — SPIRONOLACTONE 25 MG: 25 TABLET ORAL at 08:51

## 2024-06-02 RX ADMIN — IPRATROPIUM BROMIDE AND ALBUTEROL SULFATE 3 ML: 2.5; .5 SOLUTION RESPIRATORY (INHALATION) at 07:21

## 2024-06-02 RX ADMIN — TAMSULOSIN HYDROCHLORIDE 0.4 MG: 0.4 CAPSULE ORAL at 08:51

## 2024-06-02 RX ADMIN — IPRATROPIUM BROMIDE AND ALBUTEROL SULFATE 3 ML: 2.5; .5 SOLUTION RESPIRATORY (INHALATION) at 19:06

## 2024-06-02 RX ADMIN — FERROUS SULFATE TAB 325 MG (65 MG ELEMENTAL FE) 1 TABLET: 325 (65 FE) TAB at 08:51

## 2024-06-02 RX ADMIN — CALCIUM CARBONATE (ANTACID) CHEW TAB 500 MG 1500 MG: 500 CHEW TAB at 08:50

## 2024-06-02 RX ADMIN — FUROSEMIDE 40 MG: 10 INJECTION, SOLUTION INTRAMUSCULAR; INTRAVENOUS at 08:51

## 2024-06-02 RX ADMIN — ATORVASTATIN CALCIUM 40 MG: 40 TABLET, FILM COATED ORAL at 08:51

## 2024-06-02 ASSESSMENT — COGNITIVE AND FUNCTIONAL STATUS - GENERAL
TURNING FROM BACK TO SIDE WHILE IN FLAT BAD: A LITTLE
MOVING TO AND FROM BED TO CHAIR: A LITTLE
MOVING TO AND FROM BED TO CHAIR: A LOT
STANDING UP FROM CHAIR USING ARMS: A LOT
STANDING UP FROM CHAIR USING ARMS: A LOT
PERSONAL GROOMING: A LITTLE
DRESSING REGULAR UPPER BODY CLOTHING: A LITTLE
MOBILITY SCORE: 15
CLIMB 3 TO 5 STEPS WITH RAILING: A LOT
TOILETING: A LITTLE
WALKING IN HOSPITAL ROOM: A LOT
MOBILITY SCORE: 14
HELP NEEDED FOR BATHING: A LITTLE
DAILY ACTIVITIY SCORE: 18
MOVING FROM LYING ON BACK TO SITTING ON SIDE OF FLAT BED WITH BEDRAILS: A LITTLE
WALKING IN HOSPITAL ROOM: TOTAL
CLIMB 3 TO 5 STEPS WITH RAILING: TOTAL
DRESSING REGULAR LOWER BODY CLOTHING: A LOT

## 2024-06-02 ASSESSMENT — PAIN SCALES - GENERAL
PAINLEVEL_OUTOF10: 0 - NO PAIN
PAINLEVEL_OUTOF10: 0 - NO PAIN

## 2024-06-02 ASSESSMENT — PAIN - FUNCTIONAL ASSESSMENT
PAIN_FUNCTIONAL_ASSESSMENT: 0-10
PAIN_FUNCTIONAL_ASSESSMENT: 0-10

## 2024-06-02 NOTE — CARE PLAN
The patient's goals for the shift include sleep    The clinical goals for the shift include comfort and rest      Problem: Pain - Adult  Goal: Verbalizes/displays adequate comfort level or baseline comfort level  Outcome: Progressing     Problem: Safety - Adult  Goal: Free from fall injury  Outcome: Progressing     Problem: Discharge Planning  Goal: Discharge to home or other facility with appropriate resources  Outcome: Progressing     Problem: Chronic Conditions and Co-morbidities  Goal: Patient's chronic conditions and co-morbidity symptoms are monitored and maintained or improved  Outcome: Progressing     Problem: Heart Failure  Goal: Improved gas exchange this shift  Outcome: Progressing  Goal: Improved urinary output this shift  Outcome: Progressing  Goal: Reduction in peripheral edema within 24 hours  Outcome: Progressing  Goal: Report improvement of dyspnea/breathlessness this shift  Outcome: Progressing  Goal: Weight from fluid excess reduced over 2-3 days, then stabilize  Outcome: Progressing  Goal: Increase self care and/or family involvement in 24 hours  Outcome: Progressing

## 2024-06-02 NOTE — CARE PLAN
The patient's goals for the shift include sleep    The clinical goals for the shift include comfort and rest    Over the shift, the patient did not make progress toward the following goals. Barriers to progression include chronic illness. Recommendations to address these barriers include provide a quiet environment.     pts father called office again asking if pt can be seen today because shes in a lot of pain; I offered apt for 2/14 at 10:45 as we do not have a provider until Friday; father declined and said he will try to get her into her pcp

## 2024-06-02 NOTE — PROGRESS NOTES
"Ramón Flores is a 89 y.o. male on day 1 of admission presenting with Congestive heart failure, unspecified HF chronicity, unspecified heart failure type (Multi).    Subjective   Awake in bed. Stable on RA. Denies any nausea or abdominal pain      Does have cough and congestion   No fever  No chest pain   No nausea vomiting   Objective     Physical Exam  Constitutional:       Appearance: Normal appearance.   Cardiovascular:      Rate and Rhythm: Normal rate. Rhythm irregular.      Pulses: Normal pulses.      Heart sounds: Normal heart sounds. No murmur heard.     No gallop.   Pulmonary:      Effort: Pulmonary effort is normal. No respiratory distress.      Breath sounds: Examination of the left-lower field reveals rales. Rhonchi and rales present. No wheezing.      Comments: Diminished bilaterally  Abdominal:      General: Abdomen is flat. Bowel sounds are normal. There is no distension.      Palpations: Abdomen is soft.      Tenderness: There is no abdominal tenderness. There is no guarding.   Musculoskeletal:         General: Normal range of motion.      Right lower le+ Edema present.      Left lower le+ Edema present.   Skin:     General: Skin is warm.      Capillary Refill: Capillary refill takes less than 2 seconds.   Neurological:      Mental Status: He is alert and oriented to person, place, and time.   Psychiatric:         Mood and Affect: Mood normal.         Thought Content: Thought content normal.         Judgment: Judgment normal.         Last Recorded Vitals  Blood pressure 122/61, pulse 57, temperature 36.9 °C (98.5 °F), temperature source Temporal, resp. rate 18, height 1.676 m (5' 6\"), weight 63 kg (139 lb), SpO2 93%.  Intake/Output last 3 Shifts:  I/O last 3 completed shifts:  In: 150 (2.4 mL/kg) [I.V.:150 (2.4 mL/kg)]  Out: 2550 (40.4 mL/kg) [Urine:2550 (1.1 mL/kg/hr)]  Weight: 63 kg     Relevant Results  Scheduled medications  acetaminophen, 975 mg, oral, q AM  atorvastatin, 40 mg, " oral, Daily  calcium carbonate, 1,500 mg, oral, BID  [Held by provider] enoxaparin, 40 mg, subcutaneous, q24h  ferrous sulfate (325 mg ferrous sulfate), 65 mg of iron, oral, Daily  finasteride, 5 mg, oral, Daily  folic acid, 1 mg, oral, Daily  furosemide, 40 mg, intravenous, BID  guaiFENesin, 1,200 mg, oral, BID  [Held by provider] hydroCHLOROthiazide, 12.5 mg, oral, Daily  ipratropium-albuteroL, 3 mL, nebulization, TID  magnesium oxide, 400 mg, oral, Daily  metoprolol succinate XL, 50 mg, oral, Daily  multivitamin with minerals, 1 tablet, oral, Daily  pantoprazole, 40 mg, oral, Daily before breakfast  perflutren lipid microspheres, 0.5-10 mL of dilution, intravenous, Once in imaging  spironolactone, 25 mg, oral, Daily  tamsulosin, 0.4 mg, oral, BID      Continuous medications     PRN medications  PRN medications: acetaminophen, benzocaine-menthol, benzonatate, ipratropium-albuteroL, melatonin, ondansetron, oxygen, polyethylene glycol    Results for orders placed or performed during the hospital encounter of 05/31/24 (from the past 24 hour(s))   Magnesium   Result Value Ref Range    Magnesium 2.00 1.60 - 2.40 mg/dL   CBC   Result Value Ref Range    WBC 3.9 (L) 4.4 - 11.3 x10*3/uL    nRBC 0.0 0.0 - 0.0 /100 WBCs    RBC 3.26 (L) 4.50 - 5.90 x10*6/uL    Hemoglobin 11.4 (L) 13.5 - 17.5 g/dL    Hematocrit 34.0 (L) 41.0 - 52.0 %     (H) 80 - 100 fL    MCH 35.0 (H) 26.0 - 34.0 pg    MCHC 33.5 32.0 - 36.0 g/dL    RDW 16.4 (H) 11.5 - 14.5 %    Platelets 87 (L) 150 - 450 x10*3/uL   Basic Metabolic Panel   Result Value Ref Range    Glucose 91 74 - 99 mg/dL    Sodium 143 136 - 145 mmol/L    Potassium 4.0 3.5 - 5.3 mmol/L    Chloride 107 98 - 107 mmol/L    Bicarbonate 25 21 - 32 mmol/L    Anion Gap 15 10 - 20 mmol/L    Urea Nitrogen 21 6 - 23 mg/dL    Creatinine 1.27 0.50 - 1.30 mg/dL    eGFR 52 (L) >60 mL/min/1.73m*2    Calcium 8.1 (L) 8.6 - 10.3 mg/dL   Hepatic function panel   Result Value Ref Range    Albumin 3.8 3.4  - 5.0 g/dL    Bilirubin, Total 1.8 (H) 0.0 - 1.2 mg/dL    Bilirubin, Direct 0.2 0.0 - 0.3 mg/dL    Alkaline Phosphatase 73 33 - 136 U/L    ALT 19 10 - 52 U/L    AST 21 9 - 39 U/L    Total Protein 5.7 (L) 6.4 - 8.2 g/dL     US right upper quadrant    Result Date: 5/31/2024  Interpreted By:  Negrito Vora, STUDY: US RIGHT UPPER QUADRANT;  5/31/2024 5:08 pm   INDICATION: Signs/Symptoms: Suspicious for cholecystits.   COMPARISON: CT scan of the chest, abdomen and pelvis 05/31/2024.   ACCESSION NUMBER(S): [id number]   ORDERING CLINICIAN: MENDY LLOYD   TECHNIQUE: Grayscale and color Doppler sonographic imaging of the right upper quadrant.   FINDINGS: LIVER:  Normal echogenicity, and echotexture. No focal abnormalities.   BILE DUCTS: No intrahepatic or extrahepatic bile duct dilatation. Extrahepatic bile duct =   3 mm.   GALLBLADDER: There is a 2 mm nonshadowing echogenic focus adherent to the gallbladder wall suggestive of small polyp. Note is made of gallbladder wall thickening with pericholecystic fluid. No definite calculi. The sonographic Simon sign is reported negative by the ultrasound technician.   PANCREAS: Normal head and body. Limited evaluation of the pancreatic tail due to bowel gas.   RIGHT KIDNEY: Measures 9.7 cm in length, no hydronephrosis. There is a 1.1 x 1.1 x 1.0 cm anechoic focus in the upper pole of the right kidney with posterior acoustic enhancement and no central flow suggestive of a small cyst.   PERITONEUM: Trace ascites.   OTHER: Small right pleural effusion noted.       No sonographic evidence for cholelithiasis. Gallbladder wall thickening and pericholecystic fluid is nonspecific given ascites. Sonographic Simon sign is reported negative by the ultrasound technician. If there is persistent high clinical concern further evaluation with HIDA can be considered.   Trace ascites.   Small right pleural effusion.     MACRO: None   Signed by: Negrito Vora 5/31/2024 6:08 PM Dictation  workstation:   [id number]    XR chest 1 view    Result Date: 5/31/2024  Interpreted By:  Nicholas Disla, STUDY: XR CHEST 1 VIEW;  5/31/2024 4:36 pm   INDICATION: Signs/Symptoms:s/p right thora.   COMPARISON: 05/31/2024   ACCESSION NUMBER(S): [id number]   ORDERING CLINICIAN: VEE YEN   FINDINGS: AP portable view of the chest is obtained.  Limited exam due to portable nature. Magnified cardiac silhouette. Sternal wires. No definite focal infiltrates. Question minimal atelectasis and/or scarring in the right lung base. No pneumothorax.       Cardiomegaly with likely atelectasis and/or scarring in the right lung base.   MACRO: None   Signed by: Nicholas Disla 5/31/2024 4:46 PM Dictation workstation:   [id number]    US thoracentesis    Result Date: 5/31/2024  Interpreted By:  Vee Yen, STUDY: US THORACENTESIS5/31/20244:40 pm   INDICATION: Signs/Symptoms:worsening pleural effusion   COMPARISON: CT C/A/P 5/31/2024   ACCESSION NUMBER(S): [id number]   ORDERING CLINICIAN: MENDY LLOYD   TECHNIQUE: INTERVENTIONALIST(S): Vee Yen   CONSENT: The patient/patient's POA/next of kin was informed of the nature of the proposed procedure. The purposes, alternatives, risks, and benefits were explained and discussed. All questions were answered and consent was obtained.   SEDATION: None   MEDICATION/CONTRAST: 1% lidocaine was used to anesthetize subcutaneously.   TIME OUT: A time out was performed immediately prior to procedure start with the interventional team, correctly identifying the patient name, date of birth, MRN, procedure, anatomy (including marking of site and side), patient position, procedure consent form, relevant laboratory and imaging test results, antibiotic administration, safety precautions, and procedure-specific equipment needs.   FINDINGS: The patient was placed in the sitting position.   The pleural space was examined with grey scale ultrasound, and the most accessible fluid identified and  marked for thoracentesis.   The skin was prepped and draped in usual manner. Local anesthesia with lidocaine was administered and a right-sided thoracentesis was performed.  A 5 Vietnamese One-Step Valved thoracentesis needle/catheter was then placed where marked. Approximately 450 mL of yellowish colored fluid was removed.  The needle/catheter was then withdrawn.   The patient tolerated the procedure well and there were no immediate complications. Specimen(s) sent to the laboratory for further evaluation, per the requesting team.       Uneventful thoracentesis, as detailed above. Right pleural space, 450 mL   I personally performed and/or directly supervised this study and was present for the entire procedure.   Performed and dictated at [facility].   Signed by: [place] Yen 5/31/2024 4:41 PM Dictation workstation:   [id number]    CT chest abdomen pelvis wo IV contrast    Result Date: 5/31/2024  Interpreted By:  Radha Schumacher, STUDY: CT CHEST ABDOMEN PELVIS WO CONTRAST;  5/31/2024 1:52 pm   INDICATION: Signs/Symptoms:further evaluation of pleural effusions and ascites.   COMPARISON: CT chest 05/23/2024 and CT abdomen pelvis 01/16/2023   ACCESSION NUMBER(S): [id number]   ORDERING CLINICIAN: ADRIANNA CUMMINS   TECHNIQUE: CT of the chest, abdomen and pelvis was performed. Contiguous axial images were obtained at 3 mm slice thickness through the chest, abdomen and pelvis. Coronal and sagittal reconstructions at 3 mm slice thickness were performed.  No intravenous or oral contrast was administered.   FINDINGS: Please note that the study is limited without intravenous contrast.   CHEST:     LUNG/PLEURA/LARGE AIRWAYS: Trachea and mainstem bronchi are patent. Trachea has contour consistent with imaging during expiration. Small to moderate pleural effusion on the right and small pleural effusion on the left at both increased compared to the prior chest CT. There is mild dependent  atelectasis bilaterally. Heterogeneous density of the lung parenchyma is consistent with the expiratory phase imaging.   VESSELS: Aorta contains patchy atherosclerosis. There is no aneurysmal dilatation. Main pulmonary artery is dilated at 3.5 cm. Superior vena cava appears distended.   HEART: Heart is enlarged with multi chamber dilatation and unchanged in size compared to the prior study. No pericardial effusion is noted. Patient has had coronary bypass surgery.   MEDIASTINUM AND DANIEL: No pathologically enlarged mediastinal or hilar lymph nodes are noted. Esophagus is predominantly decompressed. There is probably a small hiatal hernia.   CHEST WALL AND LOWER NECK: Subcutaneous fat appears mildly edematous. Thyroid gland is nonenlarged.   ABDOMEN:   LIVER: Liver is nonenlarged and there is no focal mass identified.   BILE DUCTS: No biliary ductal dilatation is noted.   GALLBLADDER: Gallbladder is surrounded by fluid in the gallbladder wall is probably thickened. No calcified stones are identified. The gallbladder wall and pericholecystic findings have changed since the prior chest CT.   PANCREAS: No gross mass or inflammation is noted involving the pancreas.   SPLEEN: Spleen is normal in size with no gross mass.   ADRENAL GLANDS: Adrenal glands appear normal.   KIDNEYS AND URETERS: No stones or hydronephrosis are present. Renal cysts measure up to 2.3 cm.   PELVIS:   BLADDER: Bladder base is obscured by artifact from right hip endoprosthesis. Visualized portion of the bladder shows evidence of mild bladder wall hypertrophy. There also diverticula noted towards the dome of the bladder.   REPRODUCTIVE ORGANS: Prostate gland is largely obscured by artifact from right hip endoprosthesis, but appears enlarged with an AP dimension of 5.7 cm.   BOWEL: Stomach contains air and a small amount of hyperdense content. Small bowel loops are nondilated. Prominent amount of fecal material is noted in the proximal colon. Colon is  otherwise nondilated. Appendix is not identified.   VESSELS: Patchy atherosclerosis is present in the aorta and iliac arteries. There is no aneurysmal dilatation. Vena cava appears mildly distended.   PERITONEUM/RETROPERITONEUM/LYMPH NODES: Fluid is predominantly noted around the gallbladder. There is minimal perihepatic fluid. No free fluid is noted in the pelvis. There is no pneumoperitoneum. No pathologically enlarged retroperitoneal lymph nodes are detected.   ABDOMINAL WALL: Subcutaneous fat appears mildly edematous diffusely. No abdominal wall hernia is detected.   BONES: Multilevel severe degenerative disc changes are present in the spine. No suspicious osseous lesions are noted. Endoprosthesis is present in the right hip.       Chest 1.  Bilateral pleural effusions, right larger than left and both larger than on the prior study 2. Cardiomegaly with probably multi chamber dilatation. Vena cava also appears distended which is indicative of passive congestion from right heart failure.   Abdomen-Pelvis 1.  Gallbladder wall appears thickened and there is pericholecystic fluid noted without generalized ascites to explain the pericholecystic fluid. Gallbladder findings represent interval change from the chest CT of 05/23/2024 and are concerning for cholecystitis. Radha Schumacher discussed the significance and urgency of this critical finding by epic chat with  ADRIANNA CUMMINS on 5/31/2024 at 3:11 pm.  (**-RCF-**) Findings:  See findings.         MACRO: None   Signed by: Radha Schumacher 5/31/2024 3:13 PM Dictation workstation:   [id number]    Vascular US lower extremity venous duplex left    Result Date: 5/31/2024  Preliminary Cardiology Report            [place] [id number]   Tel [phone] and Fax [phone]       Preliminary Vascular Lab Report  VASC US LOWER EXTREMITY VENOUS DUPLEX LEFT  Patient Name:      KRISTIN DAY Reading Physician:  [id number] Douglas                                                           MD, ISABELLE Study Date:        5/31/2024         Ordering Physician: [id number] MENDY LLOYD MRN/PID:           [id number]          Technologist:       Dimitri Dobson RVT Accession#:        [id number]      Technologist 2: Date of Birth/Age: 7/24/1928         Encounter#:         [id number] Gender:            M Admission Status:  Inpatient         Location Performed: [facility]  Diagnosis/ICD: Localized (leg) edema-R60.0; Pain in left leg-M79.605 Procedure/CPT: 70839 Peripheral venous duplex scan for DVT Limited  PRELIMINARY CONCLUSIONS: Right Lower Venous: Right CFV doppler was not obtained due to machine technical error. Left Lower Venous: No evidence of acute deep vein thrombus visualized in the left lower extremity. Left calf veins visualized in segments due to swelling/edema. Technically difficult study due to limited positioning and swelling/edema.  Imaging & Doppler Findings:  Left                  Compress Thrombus        Flow Distal External Iliac   Yes      None CFV                     Yes      None   Spontaneous/Phasic PFV                     Yes      None FV Proximal             Yes      None   Spontaneous/Phasic FV Mid                  Yes      None FV Distal               Yes      None Popliteal               Yes      None   Spontaneous/Phasic Peroneal                Yes      None PTV                     Yes      None VASCULAR PRELIMINARY REPORT completed by Dimitri Dobson RVT on 5/31/2024 at 12:15:51 PM  ** Final **     Electrocardiogram, 12-lead PRN ACS symptoms    Result Date: 5/31/2024  Atrial fibrillation with premature ventricular or aberrantly conducted complexes Right superior axis deviation Incomplete right bundle branch block Nonspecific T wave abnormality Prolonged QT Abnormal ECG When compared with ECG of 19-MAR-2024 10:02, Sinus rhythm is no longer with ventricular escape  complexes Left anterior fascicular block is no longer Present See ED provider note for full interpretation and clinical correlation Confirmed by Juliann Nicholas ([id number]) on 5/31/2024 10:44:02 AM    XR chest 1 view    Result Date: 5/31/2024  Interpreted By:  Julian Soto, STUDY: XR CHEST 1 VIEW;  5/31/2024 2:06 am   INDICATION: Signs/Symptoms:Chest Pain.   COMPARISON: 05/11/2024   ACCESSION NUMBER(S): [id number]   ORDERING CLINICIAN: KATHRIN THOMPSON   FINDINGS: Median sternotomy wires and mediastinal surgical clips. Stable moderate to severe cardiomegaly. Streaky bibasilar airspace opacities appears similar to the prior exam and may reflect atelectasis or consolidation. Pneumothorax. No large pleural effusion. Upper abdomen is unremarkable. No acute osseous abnormality.       1. Streaky bibasilar airspace opacities appears similar to the prior exam and may reflect atelectasis or consolidation/pneumonia. 2. Stable cardiomegaly.   Signed by: Julian Soto 5/31/2024 2:24 AM Dictation workstation:   [id number]    CT chest wo IV contrast    Result Date: 5/24/2024  Interpreted By:  Navdeep Robledo, STUDY: CT CHEST WO IV CONTRAST;  5/23/2024 9:03 am   INDICATION: Signs/Symptoms:suspect pneumonia.   COMPARISON: 01/18/2024   ACCESSION NUMBER(S): [id number]   ORDERING CLINICIAN: GENARO DE LEON   TECHNIQUE: Helical data acquisition of the chest was obtained  without IV contrast material.  Images were reformatted in axial, coronal, and sagittal planes.   FINDINGS: History of hemophilia A, protein C deficiency, hypertension, atrial fibrillation, heart failure with preserved ejection fraction, CAD.   LUNGS AND AIRWAYS: The trachea and central airways are patent. No endobronchial lesion.   Mild interlobular septal edema is present at the lung bases bilaterally as seen at image 224/329. Decrease in the interlobular septal thickening is present compared to 01/18/2024. Moderate bilateral pleural effusions are present.  Interstitial fibrotic changes are present, at both lung bases with some ground-glass density. The upper and mid zones are bilaterally clear.   MEDIASTINUM AND DANIEL, LOWER NECK AND AXILLA: The visualized thyroid gland is within normal limits.   No evidence of thoracic lymphadenopathy by CT criteria.   Esophagus appears within normal limits as seen.   HEART AND VESSELS: Moderately severe to severe cardiomegaly with multi chamber enlargement. Both atria appear enlarged. Calcification is seen in the coronary arteries. No pericardial effusion. The thoracic aorta is normal in caliber. The main pulmonary artery is dilated measuring 3.5 cm. Status post CABG.   UPPER ABDOMEN: A small amount of ascites is present adjacent to the liver. The spleen and liver are normal in size. No change from 01/18/2024.   CHEST WALL AND OSSEOUS STRUCTURES: There are no suspicious osseous lesions. Severe degenerative changes are seen throughout the thoracic and upper lumbar spine. Median sternotomy sutures are present. Mild edema of the body wall is present       1. Cardiomegaly, interlobular septal thickening,  and bilateral moderate pleural effusions, consistent with pulmonary edema. No evidence of pneumonia. 2. Chronic interstitial fibrotic changes at both lung bases similar to the previous examination. 3. Mild ascites and body wall edema suggesting right-sided heart failure 4. Status post CABG. 5. Dilated main pulmonary artery similar to 01/18/2024.   MACRO: None   Signed by: Navdeep Robledo 5/24/2024 11:38 AM Dictation workstation:   [id number]    XR chest 2 views    Result Date: 5/11/2024  Interpreted By:  Andrew Barksdale, STUDY: XR CHEST 2 VIEWS;  5/11/2024 1:36 pm   INDICATION: Signs/Symptoms:cough, congestion.   COMPARISON: Chest x-ray 03/19/2024. CT chest 01/18/2024.   ACCESSION NUMBER(S): [id number]   ORDERING CLINICIAN: RACHEL CHACKO   FINDINGS: PA and lateral radiographs of the chest   CARDIOMEDIASTINAL SILHOUETTE:  Cardiomediastinal silhouette is stable in size and configuration. Persistently enlarged with postop changes of median sternotomy.   LUNGS: There is mild prominence interstitial markings. There is a patchy opacity of the left lung base with slight blunting of the costophrenic angle.   ABDOMEN: No remarkable upper abdominal findings.   BONES: No acute osseous changes.       1.  Cardiomegaly with findings of interstitial edema. 2. Small left-sided pleural effusion with adjacent patchy opacities which can represent atelectasis or early infiltrate. Correlation with clinical concern of infection may be of value.   Signed by: Andrew Barksdale 5/11/2024 1:40 PM Dictation workstation:   [id number]              Assessment/Plan   Principal Problem:    Congestive heart failure, unspecified HF chronicity, unspecified heart failure type (Multi)  Active Problems:    Acute on chronic diastolic heart failure (Multi)    HPI: Ramón lFores is a 89 y.o. 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 Patient was recently seen in [place] ED on 5/11 for cough and congestion, and chose to follow up with PCP vs having furhter work up in ED, patient was seen by geriatrics NP on 5/25 and was instructed to call if he experiences cough, edema, or SOB. Today (5/31) patient states that his cough had been getting worse, and feels as though mucus is stuck sitting in his throat. Cough is non-productive no hemoptosis. He denies any HA, dizziness, SOB, CP, palpitations, abdominal pian, changes in BM, urinary complaints such as burning or irritation. Patient does have swelling in his LLE. Of note patient states he has injured it in the past.         In the ED, VSS. EKG NSR wo ischemic changes. Labs notable  for hypomagnesemia Mg 0.7, Troponin 102--99, BNP 1024, INR 1.5, chronic thrombocytopenia and anemia. CXR showed streaky bibasilar airspace opacities appears similar to the prior exam that may reflect atelectasis or consolidation/pneumonia. Recent chest CT on 5/24 showed interlobular septal thickening and bilateral moderate pleural effusions, consistent with pulmonary edema and no evidence of pneumonia, with chronic interstitial fibrotic changes at both lung bases similar to the previous examination and mild ascites and body wall edema suggesting right-sided heart failure. Patient was given IV Lasix, magnesium, and IV Rocephin/Azithromycin and admitted for further management of heart failure and possible PNA.     ROS: Per HPI    Acute on Chronic Diastolic Heart Failure Exacerbation  HFmrEF  CAD s/p CABG  HTN, HLD, Chronic AF  Hypomagnesemia  -Last TTE 3/2024: EF 50%, global hypokinesis of LV, mildly reduced RV fxn, mild-mod TR, LA mod dilated  -updated echo ordered  -BNP 1024  -Troponin 102--99--104, chronic elevation at baseline  -CXR 5/20: Streaky bibasilar airspace opacities, atelectasis vs PNA  -CT Chest 5/24: interlobular septal thickening and bilateral moderate pleural effusions, consistent with pulmonary edema, and no evidence of pneumonia, Mild ascites and body wall edema suggesting right-sided heart failure  -Repeat CT 5/31/24: R>L pleural effusion, increased from previous study. Also questions gall bladder thickening  -40mg IV lasix BID   -Optimize BP control, continue home medications  -Resume Bumex at discharge  -c/w statin, no ASA d/t allergy  -Tele, monitor lytes, daily wts, I&Os  -plan to wean supplemental O2 as further diuresed   -strict I&O's  -daily weights  -Cards consulted for further recommendations   -CXR initially concerning for  pna, however procal negative and CT more consistent with CHF. No leukocytosis. Pna ruled out   -s/p R thora 5/31/24 with 450 outpt      Gall bladder thickening  -noted  on CT: Gallbladder wall appears thickened and there is pericholecystic  fluid noted without generalized ascites to explain the  pericholecystic fluid. Gallbladder findings represent interval change from the chest CT of 05/23/2024 and are concerning for cholecystitis  -US RUQ: neg for cholelithiasis. Thickening and pericholecystic fluid non specific given ascites. [place] sign neg  -LFTS WNL   -consider HIDA  -no abdominal pain on exam, even with palpation    Hx of Hemophilia A  ZEYNEP  -Weekly infusions of RF VIII on Tuesdays  -Monitor for active bleeding  -c/w PO Iron and folic acid     Other comorbidities as above  -Continue meds as ordered and adjust based on clinical course         GI/VTE PPX: PPI, held Lovenox due to thrombocytopenia, SCDs   Code Status: DNR and No Intubation     Labs/Testing reviewed   Noted input from cardiolgy   Will cotn to diurese  Dc plan will be to home upon dc      I spent 50 minutes in the professional and overall care of this patient.  Enrique Lee MD

## 2024-06-03 ENCOUNTER — PHARMACY VISIT (OUTPATIENT)
Dept: PHARMACY | Facility: CLINIC | Age: 89
End: 2024-06-03
Payer: COMMERCIAL

## 2024-06-03 VITALS
WEIGHT: 139 LBS | SYSTOLIC BLOOD PRESSURE: 107 MMHG | OXYGEN SATURATION: 97 % | HEIGHT: 66 IN | BODY MASS INDEX: 22.34 KG/M2 | DIASTOLIC BLOOD PRESSURE: 92 MMHG | HEART RATE: 68 BPM | RESPIRATION RATE: 18 BRPM | TEMPERATURE: 97.9 F

## 2024-06-03 LAB
ALBUMIN SERPL BCP-MCNC: 3.7 G/DL (ref 3.4–5)
ALP SERPL-CCNC: 74 U/L (ref 33–136)
ALT SERPL W P-5'-P-CCNC: 18 U/L (ref 10–52)
ANION GAP SERPL CALC-SCNC: 13 MMOL/L (ref 10–20)
AST SERPL W P-5'-P-CCNC: 23 U/L (ref 9–39)
BACTERIA FLD CULT: NORMAL
BILIRUB DIRECT SERPL-MCNC: 0.2 MG/DL (ref 0–0.3)
BILIRUB SERPL-MCNC: 1.6 MG/DL (ref 0–1.2)
BUN SERPL-MCNC: 22 MG/DL (ref 6–23)
CALCIUM SERPL-MCNC: 8 MG/DL (ref 8.6–10.3)
CHLORIDE SERPL-SCNC: 106 MMOL/L (ref 98–107)
CO2 SERPL-SCNC: 26 MMOL/L (ref 21–32)
CREAT SERPL-MCNC: 1.26 MG/DL (ref 0.5–1.3)
EGFRCR SERPLBLD CKD-EPI 2021: 53 ML/MIN/1.73M*2
ERYTHROCYTE [DISTWIDTH] IN BLOOD BY AUTOMATED COUNT: 16.2 % (ref 11.5–14.5)
GLUCOSE SERPL-MCNC: 90 MG/DL (ref 74–99)
GRAM STN SPEC: NORMAL
GRAM STN SPEC: NORMAL
HCT VFR BLD AUTO: 34.5 % (ref 41–52)
HGB BLD-MCNC: 11.6 G/DL (ref 13.5–17.5)
HOLD SPECIMEN: NORMAL
MAGNESIUM SERPL-MCNC: 1.9 MG/DL (ref 1.6–2.4)
MCH RBC QN AUTO: 34.9 PG (ref 26–34)
MCHC RBC AUTO-ENTMCNC: 33.6 G/DL (ref 32–36)
MCV RBC AUTO: 104 FL (ref 80–100)
NRBC BLD-RTO: 0 /100 WBCS (ref 0–0)
PLATELET # BLD AUTO: 83 X10*3/UL (ref 150–450)
POTASSIUM SERPL-SCNC: 3.9 MMOL/L (ref 3.5–5.3)
PROT SERPL-MCNC: 6 G/DL (ref 6.4–8.2)
RBC # BLD AUTO: 3.32 X10*6/UL (ref 4.5–5.9)
SODIUM SERPL-SCNC: 141 MMOL/L (ref 136–145)
WBC # BLD AUTO: 4.1 X10*3/UL (ref 4.4–11.3)

## 2024-06-03 PROCEDURE — 2500000004 HC RX 250 GENERAL PHARMACY W/ HCPCS (ALT 636 FOR OP/ED): Performed by: NURSE PRACTITIONER

## 2024-06-03 PROCEDURE — 94640 AIRWAY INHALATION TREATMENT: CPT

## 2024-06-03 PROCEDURE — 99232 SBSQ HOSP IP/OBS MODERATE 35: CPT | Performed by: INTERNAL MEDICINE

## 2024-06-03 PROCEDURE — 82248 BILIRUBIN DIRECT: CPT | Performed by: NURSE PRACTITIONER

## 2024-06-03 PROCEDURE — 2500000001 HC RX 250 WO HCPCS SELF ADMINISTERED DRUGS (ALT 637 FOR MEDICARE OP): Performed by: NURSE PRACTITIONER

## 2024-06-03 PROCEDURE — 2500000002 HC RX 250 W HCPCS SELF ADMINISTERED DRUGS (ALT 637 FOR MEDICARE OP, ALT 636 FOR OP/ED): Performed by: NURSE PRACTITIONER

## 2024-06-03 PROCEDURE — 2500000002 HC RX 250 W HCPCS SELF ADMINISTERED DRUGS (ALT 637 FOR MEDICARE OP, ALT 636 FOR OP/ED): Mod: MUE | Performed by: NURSE PRACTITIONER

## 2024-06-03 PROCEDURE — 36415 COLL VENOUS BLD VENIPUNCTURE: CPT | Performed by: NURSE PRACTITIONER

## 2024-06-03 PROCEDURE — 2500000001 HC RX 250 WO HCPCS SELF ADMINISTERED DRUGS (ALT 637 FOR MEDICARE OP): Performed by: INTERNAL MEDICINE

## 2024-06-03 PROCEDURE — RXMED WILLOW AMBULATORY MEDICATION CHARGE

## 2024-06-03 PROCEDURE — 80048 BASIC METABOLIC PNL TOTAL CA: CPT | Performed by: NURSE PRACTITIONER

## 2024-06-03 PROCEDURE — 83735 ASSAY OF MAGNESIUM: CPT | Performed by: NURSE PRACTITIONER

## 2024-06-03 PROCEDURE — 85027 COMPLETE CBC AUTOMATED: CPT | Performed by: NURSE PRACTITIONER

## 2024-06-03 RX ORDER — BUMETANIDE 1 MG/1
1 TABLET ORAL DAILY
Qty: 30 TABLET | Refills: 0 | Status: SHIPPED | OUTPATIENT
Start: 2024-06-04 | End: 2024-07-04

## 2024-06-03 RX ORDER — BUMETANIDE 1 MG/1
1 TABLET ORAL DAILY
Status: DISCONTINUED | OUTPATIENT
Start: 2024-06-04 | End: 2024-06-03 | Stop reason: HOSPADM

## 2024-06-03 RX ADMIN — Medication 400 MG: at 08:48

## 2024-06-03 RX ADMIN — MULTIPLE VITAMINS W/ MINERALS TAB 1 TABLET: TAB at 08:48

## 2024-06-03 RX ADMIN — PANTOPRAZOLE SODIUM 40 MG: 40 TABLET, DELAYED RELEASE ORAL at 06:00

## 2024-06-03 RX ADMIN — ATORVASTATIN CALCIUM 40 MG: 40 TABLET, FILM COATED ORAL at 08:48

## 2024-06-03 RX ADMIN — SPIRONOLACTONE 25 MG: 25 TABLET ORAL at 08:48

## 2024-06-03 RX ADMIN — ACETAMINOPHEN 975 MG: 325 TABLET ORAL at 08:47

## 2024-06-03 RX ADMIN — FUROSEMIDE 40 MG: 10 INJECTION, SOLUTION INTRAMUSCULAR; INTRAVENOUS at 08:47

## 2024-06-03 RX ADMIN — FINASTERIDE 5 MG: 5 TABLET, FILM COATED ORAL at 08:48

## 2024-06-03 RX ADMIN — EMPAGLIFLOZIN 10 MG: 10 TABLET, FILM COATED ORAL at 08:54

## 2024-06-03 RX ADMIN — FOLIC ACID 1 MG: 1 TABLET ORAL at 08:48

## 2024-06-03 RX ADMIN — FERROUS SULFATE TAB 325 MG (65 MG ELEMENTAL FE) 1 TABLET: 325 (65 FE) TAB at 08:47

## 2024-06-03 RX ADMIN — METOPROLOL SUCCINATE 50 MG: 50 TABLET, EXTENDED RELEASE ORAL at 08:47

## 2024-06-03 RX ADMIN — IPRATROPIUM BROMIDE AND ALBUTEROL SULFATE 3 ML: 2.5; .5 SOLUTION RESPIRATORY (INHALATION) at 06:57

## 2024-06-03 RX ADMIN — CALCIUM CARBONATE (ANTACID) CHEW TAB 500 MG 1500 MG: 500 CHEW TAB at 08:47

## 2024-06-03 RX ADMIN — GUAIFENESIN 1200 MG: 600 TABLET ORAL at 08:47

## 2024-06-03 RX ADMIN — TAMSULOSIN HYDROCHLORIDE 0.4 MG: 0.4 CAPSULE ORAL at 08:47

## 2024-06-03 ASSESSMENT — COGNITIVE AND FUNCTIONAL STATUS - GENERAL
TURNING FROM BACK TO SIDE WHILE IN FLAT BAD: A LOT
MOVING FROM LYING ON BACK TO SITTING ON SIDE OF FLAT BED WITH BEDRAILS: A LOT
MOBILITY SCORE: 12
DRESSING REGULAR LOWER BODY CLOTHING: A LOT
WALKING IN HOSPITAL ROOM: A LOT
DRESSING REGULAR UPPER BODY CLOTHING: A LOT
EATING MEALS: A LITTLE
DAILY ACTIVITIY SCORE: 14
HELP NEEDED FOR BATHING: A LOT
PERSONAL GROOMING: A LITTLE
MOVING TO AND FROM BED TO CHAIR: A LOT
TOILETING: A LOT
STANDING UP FROM CHAIR USING ARMS: A LOT
CLIMB 3 TO 5 STEPS WITH RAILING: A LOT

## 2024-06-03 ASSESSMENT — PAIN SCALES - GENERAL: PAINLEVEL_OUTOF10: 0 - NO PAIN

## 2024-06-03 ASSESSMENT — PAIN - FUNCTIONAL ASSESSMENT: PAIN_FUNCTIONAL_ASSESSMENT: 0-10

## 2024-06-03 NOTE — CARE PLAN
The patient's goals for the shift include sleep    The clinical goals for the shift include comfort and rest    \  Problem: Pain - Adult  Goal: Verbalizes/displays adequate comfort level or baseline comfort level  Outcome: Progressing     Problem: Safety - Adult  Goal: Free from fall injury  Outcome: Progressing     Problem: Discharge Planning  Goal: Discharge to home or other facility with appropriate resources  Outcome: Progressing     Problem: Chronic Conditions and Co-morbidities  Goal: Patient's chronic conditions and co-morbidity symptoms are monitored and maintained or improved  Outcome: Progressing     Problem: Heart Failure  Goal: Improved gas exchange this shift  Outcome: Progressing  Goal: Improved urinary output this shift  Outcome: Progressing  Goal: Reduction in peripheral edema within 24 hours  Outcome: Progressing  Goal: Report improvement of dyspnea/breathlessness this shift  Outcome: Progressing  Goal: Weight from fluid excess reduced over 2-3 days, then stabilize  Outcome: Progressing  Goal: Increase self care and/or family involvement in 24 hours  Outcome: Progressing     Problem: Skin  Goal: Decreased wound size/increased tissue granulation at next dressing change  Outcome: Progressing  Goal: Participates in plan/prevention/treatment measures  Outcome: Progressing  Goal: Prevent/manage excess moisture  Outcome: Progressing  Goal: Prevent/minimize sheer/friction injuries  Outcome: Progressing  Goal: Promote/optimize nutrition  Outcome: Progressing  Goal: Promote skin healing  Outcome: Progressing

## 2024-06-03 NOTE — DISCHARGE SUMMARY
Inpatient Discharge Summary    BRIEF OVERVIEW  Admitting Provider: Regi Martines MD  Discharge Provider: Enrique Lee MD  Primary Care Physician at Discharge: No primary care provider on file. None     Treatment Team:   Attending Provider: Enrique Lee MD  Utilization Reviewer: Shruti Campos RN  Transitional Care Coordinator: Ayde Bales RN  Registered Nurse: Deb Guevara RN      Admission Date: 5/31/2024     Discharge Date: 6/3/2024    Primary Discharge Diagnosis  Principal Problem:    Congestive heart failure, unspecified HF chronicity, unspecified heart failure type (Multi)  Active Problems:    Acute on chronic diastolic heart failure (Multi)        Discharge Disposition  Home  Code Status at Discharge: DNR no intubation     Active Issues Requiring Follow-up  Issue: hospital follow up, general health maintenance and medication refills.    Responsible Individual: PCP   What is Needed: follow up and on going care   Follow-up Appointments Arranged: No     Issue: CHF / CAD / AFib  Responsible Individual: CHF clinic and cardiologist   What is Needed: follow p and on going care   Follow-up Appointments Arranged: No     Outpatient Follow-Up  Future Appointments   Date Time Provider Department [place]   6/12/2024 11:00 AM [facility] [id number] ROOM [id number] [facility]   7/18/2024  1:40 PM Lashonda Dugan MD MPH [id number] [facility]   9/4/2024  2:15 PM Lito Ackerman MD [id number] [facility]   10/9/2024  9:00 AM Rufino Dillon MD [id number] [facility]   11/26/2024  2:30 PM Raisa Sierra MD [id number] [facility]       [unfilled]    Test Results Pending at Discharge  Pending Labs       Order Current Status    Cytology (Non-Gynecologic) In process    Sterile Fluid Culture/Smear Preliminary result                  DETAILS OF HOSPITAL STAY    Presenting Problem/History of Present Illness  Acute on chronic diastolic heart failure (Multi) [I50.33]  Pulmonary fibrosis (Multi) [J84.10]  Hypomagnesemia [E83.42]  Upper respiratory  tract infection, unspecified type [J06.9]  Pneumonia due to infectious organism, unspecified laterality, unspecified part of lung [J18.9]  Congestive heart failure, unspecified HF chronicity, unspecified heart failure type (Multi) [I50.9]    HPI: Ramón Flores is a 89 y.o. 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 Patient was recently seen in [facility] ED on 5/11 for cough and congestion, and chose to follow up with PCP vs having furhter work up in ED, patient was seen by geriatrics NP on 5/25 and was instructed to call if he experiences cough, edema, or SOB. Today (5/31) patient states that his cough had been getting worse, and feels as though mucus is stuck sitting in his throat. Cough is non-productive no hemoptosis. He denies any HA, dizziness, SOB, CP, palpitations, abdominal pian, changes in BM, urinary complaints such as burning or irritation. Patient does have swelling in his LLE. Of note patient states he has injured it in the past.         In the ED, VSS. EKG NSR wo ischemic changes. Labs notable for hypomagnesemia Mg 0.7, Troponin 102--99, BNP 1024, INR 1.5, chronic thrombocytopenia and anemia. CXR showed streaky bibasilar airspace opacities appears similar to the prior exam that may reflect atelectasis or consolidation/pneumonia. Recent chest CT on 5/24 showed interlobular septal thickening and bilateral moderate pleural effusions, consistent with pulmonary edema and no evidence of pneumonia, with chronic interstitial fibrotic changes at both lung bases similar to the previous examination and mild ascites and body wall edema suggesting right-sided heart failure. Patient was given IV Lasix, magnesium, and IV Rocephin/Azithromycin and admitted for further  management of heart failure and possible PNA.    Hospital Course         Assessment/Plan   Principal Problem:    Congestive heart failure, unspecified HF chronicity, unspecified heart failure type (Multi)  Active Problems:    Acute on chronic diastolic heart failure (Multi)     HPI: Ramón Flores is a 89 y.o. 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 [place] [facility] [place] for cough and congestion, and chose to follow up with PCP vs having furhter work up in ED, patient was seen by geriatrics NP on 5/25 and was instructed to call if he experiences cough, edema, or SOB. Today (5/31) patient states that his cough had been getting worse, and feels as though mucus is stuck sitting in his throat. Cough is non-productive no hemoptosis. He denies any HA, dizziness, SOB, CP, palpitations, abdominal pian, changes in BM, urinary complaints such as burning or irritation. Patient does have swelling in his LLE. Of note patient states he has injured it in the past.         In the ED, VSS. EKG NSR wo ischemic changes. Labs notable for hypomagnesemia Mg 0.7, Troponin 102--99, BNP 1024, INR 1.5, chronic thrombocytopenia and anemia. CXR showed streaky bibasilar airspace opacities appears similar to the prior exam that may reflect atelectasis or consolidation/pneumonia. Recent chest CT on 5/24 showed interlobular septal thickening and bilateral moderate pleural effusions, consistent with pulmonary edema and no evidence of pneumonia, with chronic interstitial fibrotic changes at both lung bases similar to the previous examination and mild ascites and body wall edema suggesting right-sided heart failure. Patient was given IV Lasix, magnesium, and IV  Rocephin/Azithromycin and admitted for further management of heart failure and possible PNA.     ROS: Per HPI     Acute on Chronic Diastolic Heart Failure Exacerbation >> improving   HFmrEF  CAD s/p CABG  HTN, HLD, Chronic AF  Hypomagnesemia  -Last TTE 3/2024: EF 50%, global hypokinesis of LV, mildly reduced RV fxn, mild-mod TR, LA mod dilated  -updated echo ordered  -BNP 1024  -Troponin 102--99--104, chronic elevation at baseline  -CXR 5/20: Streaky bibasilar airspace opacities, atelectasis vs PNA  -CT Chest 5/24: interlobular septal thickening and bilateral moderate pleural effusions, consistent with pulmonary edema, and no evidence of pneumonia, Mild ascites and body wall edema suggesting right-sided heart failure  -Repeat CT 5/31/24: R>L pleural effusion, increased from previous study.    -c/w statin, no ASA d/t allergy  -Tele, monitor lytes, daily wts, I&Os  -plan to wean supplemental O2 as further diuresed   -strict I&O's  -daily weights  -Cards consulted for further recommendations , input noted. Cleared for discharge        Gall bladder thickening  -noted on CT: Gallbladder wall appears thickened and there is pericholecystic  fluid noted without generalized ascites to explain the  pericholecystic fluid. Gallbladder findings represent interval change from the chest CT of 05/23/2024 and are concerning for cholecystitis  -US RUQ: neg for cholelithiasis. Thickening and pericholecystic fluid non specific given ascites. [place] sign neg  -LFTS WNL   Tolerating PO  Can follow up as out patient   -no abdominal pain on exam, even with palpation     Hx of Hemophilia A  ZEYNEP  -Weekly infusions of RF VIII on Tuesdays  -Monitor for active bleeding  -c/w PO Iron and folic acid     Other comorbidities as above  -Continue meds as ordered and adjust based on clinical course         GI/VTE PPX: PPI, held Lovenox due to thrombocytopenia, SCDs   Code Status: DNR and No Intubation     Labs/Testing reviewed   Noted input from  cardiolgy   Pt stable for discharge     Follow up as out patient          Your medication list        START taking these medications        Instructions Last Dose Given Next Dose Due   empagliflozin 10 mg  Commonly known as: Jardiance  Start taking on: June 4, 2024      Take 1 tablet (10 mg) by mouth once daily.              CHANGE how you take these medications        Instructions Last Dose Given Next Dose Due   bumetanide 1 mg tablet  Commonly known as: Bumex  Start taking on: June 4, 2024  What changed:   when to take this  reasons to take this      Take 1 tablet (1 mg) by mouth once daily. Do not fill before June 4, 2024.              CONTINUE taking these medications        Instructions Last Dose Given Next Dose Due   acetaminophen 500 mg tablet  Commonly known as: Tylenol Extra Strength      Take 2 tablets (1,000 mg) by mouth once daily in the morning.       antihemophilic RF VIII 3,000 (+/-) unit recon soln  Commonly known as: Altuviiio      Infuse 3,175 Units into a venous catheter every 7 days. 3175 units +/-10% dose every 7 days.  Additionally,  prn when directed.       antihemophilic RF VIII 3,000 (+/-) unit recon soln  Commonly known as: Altuviiio      Infuse 3,175 Units into a venous catheter every 7 days. 3175 units +/-10% dose every 7 days.  Additionally,  prn when directed.       atorvastatin 40 mg tablet  Commonly known as: Lipitor      Take 1 tablet (40 mg) by mouth once daily.       calcium carbonate 600 mg calcium (1,500 mg) tablet           ferrous sulfate (325 mg ferrous sulfate) tablet           finasteride 5 mg tablet  Commonly known as: Proscar      Take 1 tablet (5 mg) by mouth once daily.       folic acid 1 mg tablet  Commonly known as: Folvite           guaiFENesin 600 mg 12 hr tablet  Commonly known as: Mucinex      Take 2 tablets (1,200 mg) by mouth 2 times a day. Do not crush, chew, or split.       ipratropium-albuteroL 0.5-2.5 mg/3 mL nebulizer solution  Commonly known as: Duo-Neb       Take 3 mL by nebulization 4 times a day as needed for wheezing or shortness of breath.       lactobacillus acidophilus tablet tablet           metoprolol succinate XL 50 mg 24 hr tablet  Commonly known as: Toprol-XL      TAKE 1 TABLET BY MOUTH DAILY       multivitamin with minerals tablet           nebulizer accessories kit      1 kit 1 time if needed (wheezing, shortness of breath) for up to 1 dose.       nystatin cream  Commonly known as: Mycostatin      Apply 1 Application topically 2 times a day.       pantoprazole 40 mg EC tablet  Commonly known as: ProtoNix      Take 1 tablet (40 mg) by mouth once daily in the morning. Take before meals. Do not crush, chew, or split.       PRESERVISION AREDS ORAL           spironolactone 25 mg tablet  Commonly known as: Aldactone      Take 1 tablet (25 mg) by mouth once daily.       tamsulosin 0.4 mg 24 hr capsule  Commonly known as: Flomax      Take 1 capsule (0.4 mg) by mouth 2 times a day.              STOP taking these medications      hydroCHLOROthiazide 12.5 mg tablet  Commonly known as: Microzide                  Where to Get Your Medications        These medications were sent to [facility]  [address] [address] [address]      Hours: 8 AM to 6 PM Mon-Fri, 9 AM to 1 PM Saturday Phone: [phone]   bumetanide 1 mg tablet  empagliflozin 10 mg          Physical Exam at Discharge  Discharge Condition: good  Heart Rate: 68  Resp: 18  BP: (!) 107/92  Temp: 36.6 °C (97.9 °F)  Weight: 63 kg (139 lb)    [facility] discharge summary , reviewd and dw NP  Enrique Lee MD

## 2024-06-03 NOTE — PROGRESS NOTES
"      Ramón Flores is a 89 y.o. male on day 2 of admission presenting with Congestive heart failure, unspecified HF chronicity, unspecified heart failure type (Multi).    Subjective     Awake in bed. Stable on RA. Denies any nausea or abdominal pain      Less coughing    Dressed and anxious to go home  Feels well      Objective     Physical Exam  Constitutional:       Appearance: Normal appearance.   Cardiovascular:      Rate and Rhythm: Normal rate. Rhythm irregular.      Pulses: Normal pulses.      Heart sounds: Normal heart sounds. No murmur heard.     No gallop.   Pulmonary:      Effort: Pulmonary effort is normal. No respiratory distress.      Breath sounds: Examination of the left-lower field reveals rales. Rhonchi and rales present. No wheezing.      Comments: Diminished bilaterally  Abdominal:      General: Abdomen is flat. Bowel sounds are normal. There is no distension.      Palpations: Abdomen is soft.      Tenderness: There is no abdominal tenderness. There is no guarding.   Musculoskeletal:         General: Normal range of motion.      Right lower le+ Edema present.      Left lower le+ Edema present.   Skin:     General: Skin is warm.      Capillary Refill: Capillary refill takes less than 2 seconds.   Neurological:      Mental Status: He is alert and oriented to person, place, and time.   Psychiatric:         Mood and Affect: Mood normal.         Thought Content: Thought content normal.         Judgment: Judgment normal.         Last Recorded Vitals  Blood pressure (!) 107/92, pulse 68, temperature 36.6 °C (97.9 °F), temperature source Temporal, resp. rate 18, height 1.676 m (5' 6\"), weight 63 kg (139 lb), SpO2 97%.  Intake/Output last 3 Shifts:  I/O last 3 completed shifts:  In: 480 (7.6 mL/kg) [P.O.:480]  Out: 4125 (65.4 mL/kg) [Urine:4125 (1.8 mL/kg/hr)]  Weight: 63 kg     Relevant Results  Scheduled medications  acetaminophen, 975 mg, oral, q AM  atorvastatin, 40 mg, oral, " Daily  [START ON 6/4/2024] bumetanide, 1 mg, oral, Daily  calcium carbonate, 1,500 mg, oral, BID  empagliflozin, 10 mg, oral, Daily  [Held by provider] enoxaparin, 40 mg, subcutaneous, q24h  ferrous sulfate (325 mg ferrous sulfate), 65 mg of iron, oral, Daily  finasteride, 5 mg, oral, Daily  folic acid, 1 mg, oral, Daily  guaiFENesin, 1,200 mg, oral, BID  magnesium oxide, 400 mg, oral, Daily  metoprolol succinate XL, 50 mg, oral, Daily  multivitamin with minerals, 1 tablet, oral, Daily  pantoprazole, 40 mg, oral, Daily before breakfast  perflutren lipid microspheres, 0.5-10 mL of dilution, intravenous, Once in imaging  spironolactone, 25 mg, oral, Daily  tamsulosin, 0.4 mg, oral, BID      Continuous medications     PRN medications  PRN medications: acetaminophen, benzocaine-menthol, benzonatate, ipratropium-albuteroL, melatonin, ondansetron, oxygen, polyethylene glycol    Results for orders placed or performed during the hospital encounter of 05/31/24 (from the past 24 hour(s))   Magnesium   Result Value Ref Range    Magnesium 1.90 1.60 - 2.40 mg/dL   CBC   Result Value Ref Range    WBC 4.1 (L) 4.4 - 11.3 x10*3/uL    nRBC 0.0 0.0 - 0.0 /100 WBCs    RBC 3.32 (L) 4.50 - 5.90 x10*6/uL    Hemoglobin 11.6 (L) 13.5 - 17.5 g/dL    Hematocrit 34.5 (L) 41.0 - 52.0 %     (H) 80 - 100 fL    MCH 34.9 (H) 26.0 - 34.0 pg    MCHC 33.6 32.0 - 36.0 g/dL    RDW 16.2 (H) 11.5 - 14.5 %    Platelets 83 (L) 150 - 450 x10*3/uL   Basic Metabolic Panel   Result Value Ref Range    Glucose 90 74 - 99 mg/dL    Sodium 141 136 - 145 mmol/L    Potassium 3.9 3.5 - 5.3 mmol/L    Chloride 106 98 - 107 mmol/L    Bicarbonate 26 21 - 32 mmol/L    Anion Gap 13 10 - 20 mmol/L    Urea Nitrogen 22 6 - 23 mg/dL    Creatinine 1.26 0.50 - 1.30 mg/dL    eGFR 53 (L) >60 mL/min/1.73m*2    Calcium 8.0 (L) 8.6 - 10.3 mg/dL   Hepatic function panel   Result Value Ref Range    Albumin 3.7 3.4 - 5.0 g/dL    Bilirubin, Total 1.6 (H) 0.0 - 1.2 mg/dL     Bilirubin, Direct 0.2 0.0 - 0.3 mg/dL    Alkaline Phosphatase 74 33 - 136 U/L    ALT 18 10 - 52 U/L    AST 23 9 - 39 U/L    Total Protein 6.0 (L) 6.4 - 8.2 g/dL   SST TOP   Result Value Ref Range    Extra Tube Hold for add-ons.      US right upper quadrant    Result Date: 5/31/2024  Interpreted By:  Negrito Vora, STUDY: US RIGHT UPPER QUADRANT;  5/31/2024 5:08 pm   INDICATION: Signs/Symptoms: Suspicious for cholecystits.   COMPARISON: CT scan of the chest, abdomen and pelvis 05/31/2024.   ACCESSION NUMBER(S): [id number]   ORDERING CLINICIAN: MENDY LLOYD   TECHNIQUE: Grayscale and color Doppler sonographic imaging of the right upper quadrant.   FINDINGS: LIVER:  Normal echogenicity, and echotexture. No focal abnormalities.   BILE DUCTS: No intrahepatic or extrahepatic bile duct dilatation. Extrahepatic bile duct =   3 mm.   GALLBLADDER: There is a 2 mm nonshadowing echogenic focus adherent to the gallbladder wall suggestive of small polyp. Note is made of gallbladder wall thickening with pericholecystic fluid. No definite calculi. The sonographic Simon sign is reported negative by the ultrasound technician.   PANCREAS: Normal head and body. Limited evaluation of the pancreatic tail due to bowel gas.   RIGHT KIDNEY: Measures 9.7 cm in length, no hydronephrosis. There is a 1.1 x 1.1 x 1.0 cm anechoic focus in the upper pole of the right kidney with posterior acoustic enhancement and no central flow suggestive of a small cyst.   PERITONEUM: Trace ascites.   OTHER: Small right pleural effusion noted.       No sonographic evidence for cholelithiasis. Gallbladder wall thickening and pericholecystic fluid is nonspecific given ascites. Sonographic Simon sign is reported negative by the ultrasound technician. If there is persistent high clinical concern further evaluation with HIDA can be considered.   Trace ascites.   Small right pleural effusion.     MACRO: None   Signed by: Negrito Vora 5/31/2024 6:08 PM  Dictation workstation:   [id number]    XR chest 1 view    Result Date: 5/31/2024  Interpreted By:  Nicholas Disla, STUDY: XR CHEST 1 VIEW;  5/31/2024 4:36 pm   INDICATION: Signs/Symptoms:s/p right thora.   COMPARISON: 05/31/2024   ACCESSION NUMBER(S): [id number]   ORDERING CLINICIAN: VEE YEN   FINDINGS: AP portable view of the chest is obtained.  Limited exam due to portable nature. Magnified cardiac silhouette. Sternal wires. No definite focal infiltrates. Question minimal atelectasis and/or scarring in the right lung base. No pneumothorax.       Cardiomegaly with likely atelectasis and/or scarring in the right lung base.   MACRO: None   Signed by: Nicholas Disla 5/31/2024 4:46 PM Dictation workstation:   [id number]    US thoracentesis    Result Date: 5/31/2024  Interpreted By:  Vee Yen, STUDY: US THORACENTESIS5/31/20244:40 pm   INDICATION: Signs/Symptoms:worsening pleural effusion   COMPARISON: CT C/A/P 5/31/2024   ACCESSION NUMBER(S): [id number]   ORDERING CLINICIAN: MENDY LLOYD   TECHNIQUE: INTERVENTIONALIST(S): Vee Yen   CONSENT: The patient/patient's POA/next of kin was informed of the nature of the proposed procedure. The purposes, alternatives, risks, and benefits were explained and discussed. All questions were answered and consent was obtained.   SEDATION: None   MEDICATION/CONTRAST: 1% lidocaine was used to anesthetize subcutaneously.   TIME OUT: A time out was performed immediately prior to procedure start with the interventional team, correctly identifying the patient name, date of birth, MRN, procedure, anatomy (including marking of site and side), patient position, procedure consent form, relevant laboratory and imaging test results, antibiotic administration, safety precautions, and procedure-specific equipment needs.   FINDINGS: The patient was placed in the sitting position.   The pleural space was examined with grey scale ultrasound, and the most accessible fluid  identified and marked for thoracentesis.   The skin was prepped and draped in usual manner. Local anesthesia with lidocaine was administered and a right-sided thoracentesis was performed.  A 5 Afghan One-Step Valved thoracentesis needle/catheter was then placed where marked. Approximately 450 mL of yellowish colored fluid was removed.  The needle/catheter was then withdrawn.   The patient tolerated the procedure well and there were no immediate complications. Specimen(s) sent to the laboratory for further evaluation, per the requesting team.       Uneventful thoracentesis, as detailed above. Right pleural space, 450 mL   I personally performed and/or directly supervised this study and was present for the entire procedure.   Performed and dictated at [facility].   Signed by: Modesto Yen 5/31/2024 4:41 PM Dictation workstation:   [id number]    CT chest abdomen pelvis wo IV contrast    Result Date: 5/31/2024  Interpreted By:  Radha Schumacher, STUDY: CT CHEST ABDOMEN PELVIS WO CONTRAST;  5/31/2024 1:52 pm   INDICATION: Signs/Symptoms:further evaluation of pleural effusions and ascites.   COMPARISON: CT chest 05/23/2024 and CT abdomen pelvis 01/16/2023   ACCESSION NUMBER(S): [id number]   ORDERING CLINICIAN: ADRIANNA CUMMINS   TECHNIQUE: CT of the chest, abdomen and pelvis was performed. Contiguous axial images were obtained at 3 mm slice thickness through the chest, abdomen and pelvis. Coronal and sagittal reconstructions at 3 mm slice thickness were performed.  No intravenous or oral contrast was administered.   FINDINGS: Please note that the study is limited without intravenous contrast.   CHEST:     LUNG/PLEURA/LARGE AIRWAYS: Trachea and mainstem bronchi are patent. Trachea has contour consistent with imaging during expiration. Small to moderate pleural effusion on the right and small pleural effusion on the left at both increased compared to the prior chest CT. There is mild  dependent atelectasis bilaterally. Heterogeneous density of the lung parenchyma is consistent with the expiratory phase imaging.   VESSELS: Aorta contains patchy atherosclerosis. There is no aneurysmal dilatation. Main pulmonary artery is dilated at 3.5 cm. Superior vena cava appears distended.   HEART: Heart is enlarged with multi chamber dilatation and unchanged in size compared to the prior study. No pericardial effusion is noted. Patient has had coronary bypass surgery.   MEDIASTINUM AND DANIEL: No pathologically enlarged mediastinal or hilar lymph nodes are noted. Esophagus is predominantly decompressed. There is probably a small hiatal hernia.   CHEST WALL AND LOWER NECK: Subcutaneous fat appears mildly edematous. Thyroid gland is nonenlarged.   ABDOMEN:   LIVER: Liver is nonenlarged and there is no focal mass identified.   BILE DUCTS: No biliary ductal dilatation is noted.   GALLBLADDER: Gallbladder is surrounded by fluid in the gallbladder wall is probably thickened. No calcified stones are identified. The gallbladder wall and pericholecystic findings have changed since the prior chest CT.   PANCREAS: No gross mass or inflammation is noted involving the pancreas.   SPLEEN: Spleen is normal in size with no gross mass.   ADRENAL GLANDS: Adrenal glands appear normal.   KIDNEYS AND URETERS: No stones or hydronephrosis are present. Renal cysts measure up to 2.3 cm.   PELVIS:   BLADDER: Bladder base is obscured by artifact from right hip endoprosthesis. Visualized portion of the bladder shows evidence of mild bladder wall hypertrophy. There also diverticula noted towards the dome of the bladder.   REPRODUCTIVE ORGANS: Prostate gland is largely obscured by artifact from right hip endoprosthesis, but appears enlarged with an AP dimension of 5.7 cm.   BOWEL: Stomach contains air and a small amount of hyperdense content. Small bowel loops are nondilated. Prominent amount of fecal material is noted in the proximal colon.  Colon is otherwise nondilated. Appendix is not identified.   VESSELS: Patchy atherosclerosis is present in the aorta and iliac arteries. There is no aneurysmal dilatation. Vena cava appears mildly distended.   PERITONEUM/RETROPERITONEUM/LYMPH NODES: Fluid is predominantly noted around the gallbladder. There is minimal perihepatic fluid. No free fluid is noted in the pelvis. There is no pneumoperitoneum. No pathologically enlarged retroperitoneal lymph nodes are detected.   ABDOMINAL WALL: Subcutaneous fat appears mildly edematous diffusely. No abdominal wall hernia is detected.   BONES: Multilevel severe degenerative disc changes are present in the spine. No suspicious osseous lesions are noted. Endoprosthesis is present in the right hip.       Chest 1.  Bilateral pleural effusions, right larger than left and both larger than on the prior study 2. Cardiomegaly with probably multi chamber dilatation. Vena cava also appears distended which is indicative of passive congestion from right heart failure.   Abdomen-Pelvis 1.  Gallbladder wall appears thickened and there is pericholecystic fluid noted without generalized ascites to explain the pericholecystic fluid. Gallbladder findings represent interval change from the chest CT of 05/23/2024 and are concerning for cholecystitis. Radha Schumacher discussed the significance and urgency of this critical finding by epic chat with  ARDIANNA CUMMINS on 5/31/2024 at 3:11 pm.  (**-RCF-**) Findings:  See findings.         MACRO: None   Signed by: Radha Schumacher 5/31/2024 3:13 PM Dictation workstation:   [id number]    Vascular US lower extremity venous duplex left    Result Date: 5/31/2024  Preliminary Cardiology Report            [place] [id number]   Tel [phone] and Fax [phone]       Preliminary Vascular Lab Report  VASC US LOWER EXTREMITY VENOUS DUPLEX LEFT  Patient Name:      KRISTIN DAY Reading Physician:  [id number]  Singh Douglas MD, RPVI Study Date:        5/31/2024         Ordering Physician: [id number] MENDY LLOYD MRN/PID:           [id number]          Technologist:       Dimitri Dobson RVT Accession#:        [id number]      Technologist 2: Date of Birth/Age: 7/24/1928         Encounter#:         [id number] Gender:            M Admission Status:  Inpatient         Location Performed: [facility]  Diagnosis/ICD: Localized (leg) edema-R60.0; Pain in left leg-M79.605 Procedure/CPT: 94187 Peripheral venous duplex scan for DVT Limited  PRELIMINARY CONCLUSIONS: Right Lower Venous: Right CFV doppler was not obtained due to machine technical error. Left Lower Venous: No evidence of acute deep vein thrombus visualized in the left lower extremity. Left calf veins visualized in segments due to swelling/edema. Technically difficult study due to limited positioning and swelling/edema.  Imaging & Doppler Findings:  Left                  Compress Thrombus        Flow Distal External Iliac   Yes      None CFV                     Yes      None   Spontaneous/Phasic PFV                     Yes      None FV Proximal             Yes      None   Spontaneous/Phasic FV Mid                  Yes      None FV Distal               Yes      None Popliteal               Yes      None   Spontaneous/Phasic Peroneal                Yes      None PTV                     Yes      None VASCULAR PRELIMINARY REPORT completed by Dimitri Dobson RVT on 5/31/2024 at 12:15:51 PM  ** Final **     Electrocardiogram, 12-lead PRN ACS symptoms    Result Date: 5/31/2024  Atrial fibrillation with premature ventricular or aberrantly conducted complexes Right superior axis deviation Incomplete right bundle branch block Nonspecific T wave abnormality Prolonged QT Abnormal ECG When compared with ECG of 19-MAR-2024 10:02, Sinus rhythm is no longer with  ventricular escape complexes Left anterior fascicular block is no longer Present See ED provider note for full interpretation and clinical correlation Confirmed by Juliann Nicholas ([id number]) on 5/31/2024 10:44:02 AM    XR chest 1 view    Result Date: 5/31/2024  Interpreted By:  Julian Soto, STUDY: XR CHEST 1 VIEW;  5/31/2024 2:06 am   INDICATION: Signs/Symptoms:Chest Pain.   COMPARISON: 05/11/2024   ACCESSION NUMBER(S): [id number]   ORDERING CLINICIAN: KATHRIN THOMPSON   FINDINGS: Median sternotomy wires and mediastinal surgical clips. Stable moderate to severe cardiomegaly. Streaky bibasilar airspace opacities appears similar to the prior exam and may reflect atelectasis or consolidation. Pneumothorax. No large pleural effusion. Upper abdomen is unremarkable. No acute osseous abnormality.       1. Streaky bibasilar airspace opacities appears similar to the prior exam and may reflect atelectasis or consolidation/pneumonia. 2. Stable cardiomegaly.   Signed by: Julian Soto 5/31/2024 2:24 AM Dictation workstation:   [id number]    CT chest wo IV contrast    Result Date: 5/24/2024  Interpreted By:  Navdeep Robledo, STUDY: CT CHEST WO IV CONTRAST;  5/23/2024 9:03 am   INDICATION: Signs/Symptoms:suspect pneumonia.   COMPARISON: 01/18/2024   ACCESSION NUMBER(S): [id number]   ORDERING CLINICIAN: GENARO DE LEON   TECHNIQUE: Helical data acquisition of the chest was obtained  without IV contrast material.  Images were reformatted in axial, coronal, and sagittal planes.   FINDINGS: History of hemophilia A, protein C deficiency, hypertension, atrial fibrillation, heart failure with preserved ejection fraction, CAD.   LUNGS AND AIRWAYS: The trachea and central airways are patent. No endobronchial lesion.   Mild interlobular septal edema is present at the lung bases bilaterally as seen at image 224/329. Decrease in the interlobular septal thickening is present compared to 01/18/2024. Moderate bilateral pleural  effusions are present. Interstitial fibrotic changes are present, at both lung bases with some ground-glass density. The upper and mid zones are bilaterally clear.   MEDIASTINUM AND DANIEL, LOWER NECK AND AXILLA: The visualized thyroid gland is within normal limits.   No evidence of thoracic lymphadenopathy by CT criteria.   Esophagus appears within normal limits as seen.   HEART AND VESSELS: Moderately severe to severe cardiomegaly with multi chamber enlargement. Both atria appear enlarged. Calcification is seen in the coronary arteries. No pericardial effusion. The thoracic aorta is normal in caliber. The main pulmonary artery is dilated measuring 3.5 cm. Status post CABG.   UPPER ABDOMEN: A small amount of ascites is present adjacent to the liver. The spleen and liver are normal in size. No change from 01/18/2024.   CHEST WALL AND OSSEOUS STRUCTURES: There are no suspicious osseous lesions. Severe degenerative changes are seen throughout the thoracic and upper lumbar spine. Median sternotomy sutures are present. Mild edema of the body wall is present       1. Cardiomegaly, interlobular septal thickening,  and bilateral moderate pleural effusions, consistent with pulmonary edema. No evidence of pneumonia. 2. Chronic interstitial fibrotic changes at both lung bases similar to the previous examination. 3. Mild ascites and body wall edema suggesting right-sided heart failure 4. Status post CABG. 5. Dilated main pulmonary artery similar to 01/18/2024.   MACRO: None   Signed by: Navdeep Robledo 5/24/2024 11:38 AM Dictation workstation:   [id number]    XR chest 2 views    Result Date: 5/11/2024  Interpreted By:  Andrew Barksdale, STUDY: XR CHEST 2 VIEWS;  5/11/2024 1:36 pm   INDICATION: Signs/Symptoms:cough, congestion.   COMPARISON: Chest x-ray 03/19/2024. CT chest 01/18/2024.   ACCESSION NUMBER(S): [id number]   ORDERING CLINICIAN: RACHEL CHACKO   FINDINGS: PA and lateral radiographs of the chest   CARDIOMEDIASTINAL  SILHOUETTE: Cardiomediastinal silhouette is stable in size and configuration. Persistently enlarged with postop changes of median sternotomy.   LUNGS: There is mild prominence interstitial markings. There is a patchy opacity of the left lung base with slight blunting of the costophrenic angle.   ABDOMEN: No remarkable upper abdominal findings.   BONES: No acute osseous changes.       1.  Cardiomegaly with findings of interstitial edema. 2. Small left-sided pleural effusion with adjacent patchy opacities which can represent atelectasis or early infiltrate. Correlation with clinical concern of infection may be of value.   Signed by: Andrew Barksdale 5/11/2024 1:40 PM Dictation workstation:   [id number]              Assessment/Plan   Principal Problem:    Congestive heart failure, unspecified HF chronicity, unspecified heart failure type (Multi)  Active Problems:    Acute on chronic diastolic heart failure (Multi)    HPI: Ramón Flores is a 89 y.o. 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 Patient was recently seen in [facility] ED on 5/11 for cough and congestion, and chose to follow up with PCP vs having furhter work up in ED, patient was seen by geriatrics NP on 5/25 and was instructed to call if he experiences cough, edema, or SOB. Today (5/31) patient states that his cough had been getting worse, and feels as though mucus is stuck sitting in his throat. Cough is non-productive no hemoptosis. He denies any HA, dizziness, SOB, CP, palpitations, abdominal pian, changes in BM, urinary complaints such as burning or irritation. Patient does have swelling in his LLE. Of note patient states he has injured it in the past.         In the ED, VSS. EKG NSR wo ischemic changes.  Labs notable for hypomagnesemia Mg 0.7, Troponin 102--99, BNP 1024, INR 1.5, chronic thrombocytopenia and anemia. CXR showed streaky bibasilar airspace opacities appears similar to the prior exam that may reflect atelectasis or consolidation/pneumonia. Recent chest CT on 5/24 showed interlobular septal thickening and bilateral moderate pleural effusions, consistent with pulmonary edema and no evidence of pneumonia, with chronic interstitial fibrotic changes at both lung bases similar to the previous examination and mild ascites and body wall edema suggesting right-sided heart failure. Patient was given IV Lasix, magnesium, and IV Rocephin/Azithromycin and admitted for further management of heart failure and possible PNA.     ROS: Per HPI    Acute on Chronic Diastolic Heart Failure Exacerbation >> improving   HFmrEF  CAD s/p CABG  HTN, HLD, Chronic AF  Hypomagnesemia  -Last TTE 3/2024: EF 50%, global hypokinesis of LV, mildly reduced RV fxn, mild-mod TR, LA mod dilated  -updated echo ordered  -BNP 1024  -Troponin 102--99--104, chronic elevation at baseline  -CXR 5/20: Streaky bibasilar airspace opacities, atelectasis vs PNA  -CT Chest 5/24: interlobular septal thickening and bilateral moderate pleural effusions, consistent with pulmonary edema, and no evidence of pneumonia, Mild ascites and body wall edema suggesting right-sided heart failure  -Repeat CT 5/31/24: R>L pleural effusion, increased from previous study.    -c/w statin, no ASA d/t allergy  -Tele, monitor lytes, daily wts, I&Os  -plan to wean supplemental O2 as further diuresed   -strict I&O's  -daily weights  -Cards consulted for further recommendations , input noted. Cleared for discharge       Gall bladder thickening  -noted on CT: Gallbladder wall appears thickened and there is pericholecystic  fluid noted without generalized ascites to explain the  pericholecystic fluid. Gallbladder findings represent interval change from the chest CT of 05/23/2024 and  are concerning for cholecystitis  -US RUQ: neg for cholelithiasis. Thickening and pericholecystic fluid non specific given ascites. [place] sign neg  -LFTS WNL   Tolerating PO  Can follow up as out patient   -no abdominal pain on exam, even with palpation    Hx of Hemophilia A  ZEYNEP  -Weekly infusions of RF VIII on Tuesdays  -Monitor for active bleeding  -c/w PO Iron and folic acid     Other comorbidities as above  -Continue meds as ordered and adjust based on clinical course         GI/VTE PPX: PPI, held Lovenox due to thrombocytopenia, SCDs   Code Status: DNR and No Intubation     Labs/Testing reviewed   Noted input from cardiolgy   Pt stable for discharge    Follow up as out patient     Patient case and plan of care discussed with Dr. MAYKEL Lee.    SUSI Murdock - CNP  -In collaboration with Dr. MAYKEL Lee    [facility]  Office: [phone]  Fax: [phone]  I have reviewed the above note obtained and documented by the NP/PA and I personally participated in the key components. I have discussed the case and management of the patient's care. Changes made to the note, and all key components of history and physical/progress note done by me.   nursing  Enrique Lee MD

## 2024-06-03 NOTE — PROGRESS NOTES
06/03/24 1016   Discharge Planning   Patient expects to be discharged to: Home with [facility]     Met with patient at bedside  Explained role of TCC  Patient admitted for cough/SOB/pna    Patient states he is active with [facility], (they will need new orders since patient was flipped to inpatient status), he states his nephew will provide his transport home at [place].    ADOD today/tomorrow  BARRIERS cards clearance, IV lasix  DISPO [facility]- need FHCO    AVS and FHCO sent to [facility]

## 2024-06-03 NOTE — PROGRESS NOTES
Subjective Data:  No complaints. Leg swelling resolved and breathing normalized. States he is able to bend his knees for the first time in years and extra arm flab has disappeared.    Objective Data:  Last Recorded Vitals:  Vitals:    24 2329 24 0234 24 0728   BP: 100/60 100/55 108/66 122/63   BP Location: Left arm Left arm Left arm Left arm   Patient Position: Lying Lying Lying Lying   Pulse: 60 58 68    Resp: 18 18 18 18   Temp: 36.4 °C (97.6 °F) 36.6 °C (97.8 °F) 36.8 °C (98.2 °F) 37.1 °C (98.8 °F)   TempSrc: Oral Temporal Oral Temporal   SpO2: 95% 95% 97% 96%   Weight:       Height:         Medical Gas Therapy: None (Room air)  O2 Delivery Method: Nasal cannula  Weight  Av kg (139 lb)  Min: 63 kg (139 lb)  Max: 63 kg (139 lb)    LABS:  CMP:  Results from last 7 days   Lab Units 24  0604 24  0546 24  0459 24  1844 24  0619 24  0228 24  0121   SODIUM mmol/L 141 143 143  --  141  --  141   POTASSIUM mmol/L 3.9 4.0 3.5  --  3.1*  --  3.5   CHLORIDE mmol/L 106 107 109*  --  110*  --  110*   CO2 mmol/L 26 25 24  --  21  --  20*   ANION GAP mmol/L 13 15 14  --  13  --  15   BUN mg/dL 22 21 21  --  24*  --  24*   CREATININE mg/dL 1.26 1.27 1.14  --  1.15  --  1.11   EGFR mL/min/1.73m*2 53* 52* 59*  --  59*  --  61   MAGNESIUM mg/dL 1.90 2.00 2.10 2.60* 1.10* 0.70* 0.70*   ALBUMIN g/dL 3.7 3.8 3.6  --   --   --  3.9   ALT U/L 18 19 20  --   --   --  25   AST U/L 23 21 20  --   --   --  28   BILIRUBIN TOTAL mg/dL 1.6* 1.8* 1.8*  --   --   --  2.2*     CBC:  Results from last 7 days   Lab Units 24  0604 24  0546 24  0459 24  0619 24  0121   WBC AUTO x10*3/uL 4.1* 3.9* 3.9* 4.4 4.8   HEMOGLOBIN g/dL 11.6* 11.4* 11.5* 11.7* 12.0*   HEMATOCRIT % 34.5* 34.0* 34.7* 34.7* 35.5*   PLATELETS AUTO x10*3/uL 83* 87* 82* 89* 93*   MCV fL 104* 104* 105* 104* 102*     COAG:   Results from last 7 days   Lab Units 24  0121  "  INR  1.5*     ABO: No results found for: \"ABO\"  HEME/ENDO:  Results from last 7 days   Lab Units 06/02/24  0546   TSH mIU/L 5.19*      CARDIAC:   Results from last 7 days   Lab Units 05/31/24  1844 05/31/24  0619 05/31/24  0228 05/31/24  0121   LD U/L 208  --   --   --    TROPHS ng/L  --  104* 99* 102*   BNP pg/mL  --   --   --  1,024*             Last I/O:    Intake/Output Summary (Last 24 hours) at 6/3/2024 1050  Last data filed at 6/3/2024 0728  Gross per 24 hour   Intake 240 ml   Output 3175 ml   Net -2935 ml     Net IO Since Admission: -6,995 mL [06/03/24 1050]            Inpatient Medications:  Scheduled medications   Medication Dose Route Frequency    acetaminophen  975 mg oral q AM    atorvastatin  40 mg oral Daily    [START ON 6/4/2024] bumetanide  1 mg oral Daily    calcium carbonate  1,500 mg oral BID    empagliflozin  10 mg oral Daily    [Held by provider] enoxaparin  40 mg subcutaneous q24h    ferrous sulfate (325 mg ferrous sulfate)  65 mg of iron oral Daily    finasteride  5 mg oral Daily    folic acid  1 mg oral Daily    guaiFENesin  1,200 mg oral BID    magnesium oxide  400 mg oral Daily    metoprolol succinate XL  50 mg oral Daily    multivitamin with minerals  1 tablet oral Daily    pantoprazole  40 mg oral Daily before breakfast    perflutren lipid microspheres  0.5-10 mL of dilution intravenous Once in imaging    spironolactone  25 mg oral Daily    tamsulosin  0.4 mg oral BID     PRN medications   Medication    acetaminophen    benzocaine-menthol    benzonatate    ipratropium-albuteroL    melatonin    ondansetron    oxygen    polyethylene glycol     Continuous Medications   Medication Dose Last Rate           Physical Exam:  Gen Well appearing elderly male sitting up in NAD. Body mass index is 22.44 kg/m².   CV irregularly irregular. No m/r/g appreciated. No JVD. No leg edema.    Pulm Lungs clear with normal respiratory effort.   Neuro Alert and conversant. Grossly nonfocal.    I reviewed " Telemetry - AF with CVR       Assessment:  Acute on chronic diastolic HF.   Denies excess sodium intake. TTE stable. Diuresed well now euvolemic.  2. CAD s/p CABG  Stable by available metrics. Not on aspirin per prior discussions. Maintains on statin.  3. Atrial fibrillation  Permanent (?). CVR. Not on anticoagulation per prior discussions.    4. Hypomagnesemia   Resolved s/p supplementation.     Recommendations:   Diuretics to oral. Hydrochlorothiazide to off. Added Bumex and Jardiance. No cardiac barriers to discharge. Follow up with HF clinic in 1-2 weeks and with his outpatient cardiologist thereafter.    Thank you for the consult. Will sign off, but we are available for any ?'s            Davis Cabello MD

## 2024-06-03 NOTE — PROGRESS NOTES
LATE ENTRY. Patient see     Subjective Data:  No new complaints. Leg swelling improved. Denies breathing at present.        Objective Data:  Last Recorded Vitals:  Vitals:    24 2010 24 2329 24 0234 24 0728   BP: 100/60 100/55 108/66 122/63   BP Location: Left arm Left arm Left arm Left arm   Patient Position: Lying Lying Lying Lying   Pulse: 60 58 68    Resp: 18 18 18 18   Temp: 36.4 °C (97.6 °F) 36.6 °C (97.8 °F) 36.8 °C (98.2 °F) 37.1 °C (98.8 °F)   TempSrc: Oral Temporal Oral Temporal   SpO2: 95% 95% 97% 96%   Weight:       Height:         Medical Gas Therapy: None (Room air)  O2 Delivery Method: Nasal cannula  Weight  Av kg (139 lb)  Min: 63 kg (139 lb)  Max: 63 kg (139 lb)    LABS:  CMP:  Results from last 7 days   Lab Units 24  0604 24  0546 24  0459 24  1844 24  0619 24  0228 24  0121   SODIUM mmol/L 141 143 143  --  141  --  141   POTASSIUM mmol/L 3.9 4.0 3.5  --  3.1*  --  3.5   CHLORIDE mmol/L 106 107 109*  --  110*  --  110*   CO2 mmol/L 26 25 24  --  21  --  20*   ANION GAP mmol/L 13 15 14  --  13  --  15   BUN mg/dL 22 21 21  --  24*  --  24*   CREATININE mg/dL 1.26 1.27 1.14  --  1.15  --  1.11   EGFR mL/min/1.73m*2 53* 52* 59*  --  59*  --  61   MAGNESIUM mg/dL 1.90 2.00 2.10 2.60* 1.10* 0.70* 0.70*   ALBUMIN g/dL 3.7 3.8 3.6  --   --   --  3.9   ALT U/L 18 19 20  --   --   --  25   AST U/L 23 21 20  --   --   --  28   BILIRUBIN TOTAL mg/dL 1.6* 1.8* 1.8*  --   --   --  2.2*     CBC:  Results from last 7 days   Lab Units 24  0604 24  0546 24  0459 24  0619 24  0121   WBC AUTO x10*3/uL 4.1* 3.9* 3.9* 4.4 4.8   HEMOGLOBIN g/dL 11.6* 11.4* 11.5* 11.7* 12.0*   HEMATOCRIT % 34.5* 34.0* 34.7* 34.7* 35.5*   PLATELETS AUTO x10*3/uL 83* 87* 82* 89* 93*   MCV fL 104* 104* 105* 104* 102*     COAG:   Results from last 7 days   Lab Units 24  0121   INR  1.5*     ABO: No results found for:  "\"ABO\"  HEME/ENDO:  Results from last 7 days   Lab Units 06/02/24  0546   TSH mIU/L 5.19*      CARDIAC:   Results from last 7 days   Lab Units 05/31/24  1844 05/31/24  0619 05/31/24  0228 05/31/24  0121   LD U/L 208  --   --   --    TROPHS ng/L  --  104* 99* 102*   BNP pg/mL  --   --   --  1,024*             Last I/O:    Intake/Output Summary (Last 24 hours) at 6/3/2024 0838  Last data filed at 6/3/2024 0728  Gross per 24 hour   Intake 480 ml   Output 3375 ml   Net -2895 ml     Net IO Since Admission: -6,995 mL [06/03/24 0838]            Inpatient Medications:  Scheduled medications   Medication Dose Route Frequency    acetaminophen  975 mg oral q AM    atorvastatin  40 mg oral Daily    calcium carbonate  1,500 mg oral BID    [Held by provider] enoxaparin  40 mg subcutaneous q24h    ferrous sulfate (325 mg ferrous sulfate)  65 mg of iron oral Daily    finasteride  5 mg oral Daily    folic acid  1 mg oral Daily    furosemide  40 mg intravenous BID    guaiFENesin  1,200 mg oral BID    [Held by provider] hydroCHLOROthiazide  12.5 mg oral Daily    ipratropium-albuteroL  3 mL nebulization TID    magnesium oxide  400 mg oral Daily    metoprolol succinate XL  50 mg oral Daily    multivitamin with minerals  1 tablet oral Daily    pantoprazole  40 mg oral Daily before breakfast    perflutren lipid microspheres  0.5-10 mL of dilution intravenous Once in imaging    spironolactone  25 mg oral Daily    tamsulosin  0.4 mg oral BID     PRN medications   Medication    acetaminophen    benzocaine-menthol    benzonatate    ipratropium-albuteroL    melatonin    ondansetron    oxygen    polyethylene glycol     Continuous Medications   Medication Dose Last Rate           Physical Exam:  Gen Well appearing elderly male sitting up in NAD. Body mass index is 22.44 kg/m².   CV irregularly irregular. No m/r/g appreciated. +JVD. 1+ bilateral leg edema.    Pulm Reduced at the bases. Lungs otherwise clear with normal respiratory effort.   Neuro " Alert and conversant. Grossly nonfocal.         Assessment:  Acute on chronic diastolic HF.   Denies excess sodium intake. TTE stable. Diuresing well. Volume status improving--nearly euvolemia  2. CAD s/p CABG  Stable by available metrics. Not on aspirin per prior discussions. Maintains on statin.  3. Atrial fibrillation  Permanent (?). CVR. Not on anticoagulation per prior discussions.    4. Hypomagnesemia   Resolved s/p supplementation.     Recommendations:   Con't IV diuretics. Strict I/O's. Daily weights. Monitor Mag/K and supplement to >2/4. Close monitoring of renal function. Probable added SGLT2-I on discharge.           Davis Cabello MD

## 2024-06-03 NOTE — NURSING NOTE

## 2024-06-06 LAB
LABORATORY COMMENT REPORT: NORMAL
LABORATORY COMMENT REPORT: NORMAL
PATH REPORT.FINAL DX SPEC: NORMAL
PATH REPORT.GROSS SPEC: NORMAL
PATH REPORT.RELEVANT HX SPEC: NORMAL
PATH REPORT.TOTAL CANCER: NORMAL

## 2024-06-11 ENCOUNTER — TELEPHONE (OUTPATIENT)
Dept: GERIATRIC MEDICINE | Facility: CLINIC | Age: 89
End: 2024-06-11
Payer: MEDICARE

## 2024-06-11 DIAGNOSIS — R06.02 SHORTNESS OF BREATH: ICD-10-CM

## 2024-06-11 DIAGNOSIS — R06.2 WHEEZING: ICD-10-CM

## 2024-06-11 DIAGNOSIS — I50.43 ACUTE ON CHRONIC COMBINED SYSTOLIC AND DIASTOLIC HEART FAILURE (MULTI): ICD-10-CM

## 2024-06-11 DIAGNOSIS — B37.2 YEAST INFECTION OF THE SKIN: ICD-10-CM

## 2024-06-11 DIAGNOSIS — I50.9 CONGESTIVE HEART FAILURE, UNSPECIFIED HF CHRONICITY, UNSPECIFIED HEART FAILURE TYPE (MULTI): ICD-10-CM

## 2024-06-11 RX ORDER — NYSTATIN 100000 U/G
1 CREAM TOPICAL 2 TIMES DAILY
Qty: 18 G | Refills: 3 | Status: SHIPPED | OUTPATIENT
Start: 2024-06-11 | End: 2025-06-11

## 2024-06-11 RX ORDER — IPRATROPIUM BROMIDE AND ALBUTEROL SULFATE 2.5; .5 MG/3ML; MG/3ML
3 SOLUTION RESPIRATORY (INHALATION) 4 TIMES DAILY PRN
Qty: 360 ML | Refills: 11 | Status: SHIPPED | OUTPATIENT
Start: 2024-06-11 | End: 2025-06-11

## 2024-06-11 RX ORDER — BUMETANIDE 1 MG/1
1 TABLET ORAL DAILY
Qty: 90 TABLET | Refills: 3 | OUTPATIENT
Start: 2024-06-11 | End: 2025-06-11

## 2024-06-12 ENCOUNTER — OFFICE VISIT (OUTPATIENT)
Dept: CARDIOLOGY | Facility: CLINIC | Age: 89
End: 2024-06-12
Payer: MEDICARE

## 2024-06-12 VITALS
SYSTOLIC BLOOD PRESSURE: 100 MMHG | RESPIRATION RATE: 16 BRPM | HEART RATE: 57 BPM | OXYGEN SATURATION: 91 % | DIASTOLIC BLOOD PRESSURE: 61 MMHG | TEMPERATURE: 97 F

## 2024-06-12 DIAGNOSIS — I50.32 CHRONIC HEART FAILURE WITH PRESERVED EJECTION FRACTION (MULTI): Primary | ICD-10-CM

## 2024-06-12 PROCEDURE — 99214 OFFICE O/P EST MOD 30 MIN: CPT | Performed by: NURSE PRACTITIONER

## 2024-06-12 PROCEDURE — 3078F DIAST BP <80 MM HG: CPT | Performed by: NURSE PRACTITIONER

## 2024-06-12 PROCEDURE — 1157F ADVNC CARE PLAN IN RCRD: CPT | Performed by: NURSE PRACTITIONER

## 2024-06-12 PROCEDURE — 1111F DSCHRG MED/CURRENT MED MERGE: CPT | Performed by: NURSE PRACTITIONER

## 2024-06-12 PROCEDURE — 3074F SYST BP LT 130 MM HG: CPT | Performed by: NURSE PRACTITIONER

## 2024-06-12 PROCEDURE — 1159F MED LIST DOCD IN RCRD: CPT | Performed by: NURSE PRACTITIONER

## 2024-06-12 PROCEDURE — 1036F TOBACCO NON-USER: CPT | Performed by: NURSE PRACTITIONER

## 2024-06-12 NOTE — PATIENT INSTRUCTIONS
Our pharmacy will mail you 30 days of Farxiga.    Do not not take Jardiance and Farxiga at the same time  Pharmacy is working on coverage for Jardiance of Farxiga    Continue all other medications as scheduled.     Follow up with Dr. Dugan

## 2024-06-12 NOTE — PROGRESS NOTES
Primary Care Physician: No primary care provider on file.  Date of Visit: 06/12/2024 11:00 AM EDT  Location of visit: [facility]     Chief Complaint:   No chief complaint on file.       HPI / Summary:   Ramón Flores is a 89 y.o. 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    He was admitted to [facility] from 5/31/2024 - 6/3/2024 for acute on chronic diastolic heart failure.  He was not on a loop diuretic prior to admit.  He required IV diuresis and discharged home on Bumex 1mg daily.  BNP was 1024    He is here today with his nephew.     States he could hardly button his pants when he was admitted, he was so bloated.  Now, he has no shortness of breath.  Always uses two pillows to sleep. No further swelling in abdomen or legs. He never has chest pain.   He is still wearing clothes from 30 years ago, clothes still fit.  Mobility is limited, with fear of falling, but his aides come to his home and he has been doing laps around his home.  He has aides coming to his home to help a few times a week.  He feels that his overall health status is improving since discharge to hospital.  No recent fever  or chills.  Taking all medications as prescribed.     SBP at home running 120's.  No home weights, but weight steady at 135lbs.       Last Cardiology Tests:  ECG:      Echo:  TTE 5/31/2024  CONCLUSIONS:   1. Left ventricular systolic function is low normal with a 50% estimated ejection fraction.   2. There is moderate to severe concentric left ventricular hypertrophy.   3. There is reduced right ventricular systolic function.   4. Absent A-wave on MV spectral Doppler tracing, consistent with atrial fibrillation.   5. Moderately  elevated right ventricular systolic pressure.    TTE 3/19/2024  CONCLUSIONS:   1. Left ventricular systolic function is mildly decreased with a 50% estimated ejection fraction.   2. There is global hypokinesis of the left ventricle with minor regional variations.   3. There is mildly reduced right ventricular systolic function.   4. Mild to moderate tricuspid regurgitation visualized.   5. Mildly elevated RVSP.   6. The left atrium is moderately dilated.   7. Compared with study from 2/24/2023, the LVEF appears reduced (50%) when compared with the prior assessment (60%). Also, TR appears mild to moderate on today's study compared to trace.      Cath:  CABG ( 2002 with LIMA --> LAD , SVG --> OM1)     Stress Test:      Cardiac Imaging:    Past Medical History:  Past Medical History:   Diagnosis Date    Acute deep vein thrombosis (DVT) of left femoral vein (Multi) 09/10/2023    ACUTE DEEP VEIN THROMBOSIS, L FEMORAL, HEMOPHILIA    Acute diastolic heart failure (Multi) 04/16/2023    Benign prostatic hyperplasia without lower urinary tract symptoms 01/07/2016    Enlarged prostate without lower urinary tract symptoms (luts)    Bleeding hemorrhoids 03/01/2023    CAP (community acquired pneumonia) 01/18/2024    Closed nondisplaced fracture of head of left radius 09/05/2023    COVID-19 05/21/2023    Enlarged prostate with lower urinary tract symptoms (LUTS) 03/01/2023    Fracture of bone of hip (Multi) 09/05/2023    Fracture of femoral neck, right (Multi) 03/01/2023    Gastrointestinal hemorrhage 09/05/2023    LOWER GI BLEED    Gingiva hemorrhage 09/05/2023    Gross hematuria 03/01/2023    Hemarthrosis of ankle joint 04/18/2019    Hemarthrosis of knee 05/09/2022    Hematochezia 09/05/2023    Hematoma of lower extremity 09/05/2023    Hematoma of right thigh 03/01/2023    History of recent fall 05/31/2023    Knee effusion 09/05/2023    Knee effusion, left 03/01/2023    Lumbar pain 03/01/2023    Obstructive uropathy 04/28/2023     Orthostatic syncope 09/05/2023    Personal history of other endocrine, nutritional and metabolic disease     History of hyperlipidemia    Pneumonia of left lower lobe due to infectious organism 03/19/2024    Pneumonia of right lung due to infectious organism 03/01/2023    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 He has never used smokeless tobacco. Alcohol use questions deferred to the physician. He reports that he does not use drugs.    Family History:  family history includes Hemophilia in his mother.      Allergies:  Allergies   Allergen Reactions    Aspirin Bleeding     hemophiliac    Penicillins Rash       Outpatient Medications:  Current Outpatient Medications   Medication Instructions    acetaminophen (TYLENOL EXTRA STRENGTH) 1,000 mg, oral, Every morning    antihemophilic RF VIII (Altuviiio) 3,000 (+/-) unit recon soln 50 Units/kg, intravenous, Every 7 days, 3175 units +/-10% dose every 7 days.  Additionally,  prn when directed.    antihemophilic RF VIII (Altuviiio) 3,000 (+/-) unit recon soln 50 Units/kg, intravenous, Every 7 days, 3175 units +/-10% dose every 7 days.  Additionally,  prn when directed.    atorvastatin (LIPITOR) 40 mg, oral, Daily    bumetanide (Bumex) 1 mg tablet Take 1 tablet (1 mg) by mouth once daily. Do not start before June 4, 2024.    calcium carbonate 600 mg calcium (1,500 mg) tablet 1 tablet, oral, 2 times daily (morning and late afternoon)    ferrous sulfate 325 (65 Fe) MG tablet 65 mg of iron, oral, Daily    finasteride (PROSCAR) 5 mg, oral, Daily    folic acid (Folvite) 1 mg tablet 1 tablet, oral, Daily    guaiFENesin (MUCINEX) 1,200 mg,  oral, 2 times daily, Do not crush, chew, or split.    ipratropium-albuteroL (Duo-Neb) 0.5-2.5 mg/3 mL nebulizer solution 3 mL, nebulization, 4 times daily PRN    Jardiance 10 mg, oral, Daily    Lactobacillus acidoph-L.bulgar 1 million cell tablet tablet 1 Million Cells, oral, Daily    metoprolol succinate XL (TOPROL-XL) 50 mg, oral, Daily    multivitamin with minerals (multivitamin-iron-folic acid) tablet 1 tablet, oral, Daily    nebulizer accessories kit 1 kit, miscellaneous, Every 6 hours PRN    nystatin (Mycostatin) cream 1 Application, Topical, 2 times daily    pantoprazole (PROTONIX) 40 mg, oral, Daily before breakfast, Do not crush, chew, or split.    spironolactone (ALDACTONE) 25 mg, oral, Daily    tamsulosin (FLOMAX) 0.4 mg, oral, 2 times daily    vit A/vit C/vit E/zinc/copper (PRESERVISION AREDS ORAL) 1 capsule, oral, Daily       Physical Exam:    General:  Patient is awake, alert, and oriented.  Patient is in no acute distress.  HEENT:  Normocephalic.  Moist mucosa.    Neck:  Normal Jugular Venous Pressure.  Cardiovascular:  Irregular rate and rhythm.  Variable S1 and S2, no murmurs, rubs or gallops  Pulmonary:  Clear to auscultation bilaterally.  Abdomen:  Soft. Non-tender.   Non-distended.  Positive bowel sounds.  Lower Extremities:  2+ pedal pulses. No LE edema.  Neurologic:  Alert and oriented x3. No focal deficit.   Skin: Skin warm and dry, normal skin turgor.   Psychiatric: Normal affect.      Vitals:    06/12/24 1115   BP: 100/61   Pulse: 57   Resp: 16   Temp: 36.1 °C (97 °F)   SpO2: 91%     Wt Readings from Last 5 Encounters:   05/31/24 63 kg (139 lb)   05/11/24 59.9 kg (132 lb)   04/08/24 60.2 kg (132 lb 12.8 oz)   03/19/24 63.5 kg (140 lb)   01/20/24 69.5 kg (153 lb 3.5 oz)     There is no height or weight on file to calculate BMI.        Last Labs:  CMP:  Recent Labs     06/03/24  0604 06/02/24  0546 06/01/24  0459    143 143   K 3.9 4.0 3.5    107 109*   CO2 26 25 24   ANIONGAP 13  15 14   BUN 22 21 21   CREATININE 1.26 1.27 1.14   EGFR 53* 52* 59*   GLUCOSE 90 91 83     Recent Labs     06/03/24  0604 06/02/24  0546 06/01/24  0459 08/10/22  1037 05/28/22  2048 05/06/19  0535 05/05/19  1531   ALBUMIN 3.7 3.8 3.6   < > 3.9   < > 4.1   ALKPHOS 74 73 71   < >  --    < > 66   ALT 18 19 20   < >  --    < > 18   AST 23 21 20   < >  --    < > 18   BILITOT 1.6* 1.8* 1.8*   < >  --    < > 2.1*   LIPASE  --   --   --   --  30  --  26    < > = values in this interval not displayed.     CBC:  Recent Labs     06/03/24  0604 06/02/24  0546 06/01/24  0459   WBC 4.1* 3.9* 3.9*   HGB 11.6* 11.4* 11.5*   HCT 34.5* 34.0* 34.7*   PLT 83* 87* 82*   * 104* 105*     COAG:   Recent Labs     05/31/24  0121 04/10/24  1011 01/20/24  1559   INR 1.5* 1.2* 1.3*     ENDO:  Recent Labs     06/02/24  0546 07/24/23  1435 10/12/22  1412 08/10/22  1037 04/13/22  1345 07/12/21  0950 12/14/17  0836   TSH 5.19* 6.80* 4.88* 6.68*   < > 4.39* 5.28*   HGBA1C  --   --   --  5.2  --  5.5 5.4    < > = values in this interval not displayed.      CARDIAC:   Recent Labs     05/31/24  1844 05/31/24  0619 05/31/24  0228 05/31/24  0121 03/19/24  1134 03/19/24  0857 01/18/24  1527 01/18/24  1402 05/28/22  1625 04/13/22  1345 12/28/19  0613 01/09/19  1256     --   --   --   --   --   --   --   --  197  --  169   TROPHS  --  104* 99* 102*   < > 85*   < > 93*   < >  --   --   --    BNP  --   --   --  1,024*  --  589*  --  613*   < >  --    < >  --     < > = values in this interval not displayed.     Recent Labs     08/10/22  1037 05/29/22  0802 07/12/21  0950   CHOL 84 76 90   LDLF 20 26 27   HDL 35.7* 34.2* 35.0*   TRIG 140 77 138     No data recorded      Assessment/Plan   Ramón Flores is a 89 y.o. male with past medical history of  atherosclerotic heart disease status post multivessel CABG ( 2002 with LIMA --> LAD , SVG --> OM1), HFpEF, dyslipidemia, hypertension, chronic atrial fibrillation (no ac with hemophilia) ASA allergy  contraindication due to known hemophilia A,  who presents to heart failure clinic for hospital follow up.    He was admitted to [facility] from 5/31/2024 - 6/3/2024 for acute on chronic diastolic heart failure.  He was not on a loop diuretic prior to admit.  He required IV diuresis and discharged home on Bumex 1mg daily.     He is doing very well since discharge.  HR and BP well controlled.  Weight stable. Appears euvolemic.    Plan:  - Continue with Bumex 1 mg daily  - He is almost done with 14 day trial of Jardiance, and [facility] will mail him 30 day Farxiga.  He was instructed NOT to take these medicines together.  Pharmacy will work with financial assistance to determine which should be continued.   - Continue Toprol 50mg daily   - Continue statin ( not on ASA with hx of hemophilia).    He is scheduled to follow up with Dr. Dugan on 7/18/2024.  Please keep this appointment.     Followup Appts:  Future Appointments   Date Time Provider Department Center   7/18/2024  1:40 PM Lashonda Dugan MD MPH [id number] [facility]   9/4/2024  2:15 PM Lito Ackerman MD [id number] [facility]   10/9/2024  9:00 AM Rufino Dillon MD [id number] [facility]   11/26/2024  2:30 PM Raisa Sierra MD [id number] [facility]     ____________________________________________________________  Nena Betts CNP  [facility]  [facility] - [facility]

## 2024-06-18 ENCOUNTER — TELEPHONE (OUTPATIENT)
Dept: GERIATRIC MEDICINE | Facility: CLINIC | Age: 89
End: 2024-06-18
Payer: MEDICARE

## 2024-06-19 ENCOUNTER — PHARMACY VISIT (OUTPATIENT)
Dept: PHARMACY | Facility: CLINIC | Age: 89
End: 2024-06-19
Payer: COMMERCIAL

## 2024-06-19 DIAGNOSIS — I50.33 ACUTE ON CHRONIC DIASTOLIC HEART FAILURE (MULTI): Primary | ICD-10-CM

## 2024-06-19 PROCEDURE — RXMED WILLOW AMBULATORY MEDICATION CHARGE

## 2024-06-19 RX ORDER — DAPAGLIFLOZIN 10 MG/1
10 TABLET, FILM COATED ORAL DAILY
Qty: 30 TABLET | Refills: 6 | Status: SHIPPED | OUTPATIENT
Start: 2024-06-19 | End: 2025-06-19

## 2024-07-03 ENCOUNTER — TELEPHONE (OUTPATIENT)
Dept: ADMISSION | Facility: HOSPITAL | Age: 89
End: 2024-07-03
Payer: MEDICARE

## 2024-07-03 NOTE — TELEPHONE ENCOUNTER
[facility] requesting refill   Heparin flush syringes   Last FUV 4/10

## 2024-07-05 ENCOUNTER — TELEPHONE (OUTPATIENT)
Dept: HEMATOLOGY/ONCOLOGY | Facility: HOSPITAL | Age: 89
End: 2024-07-05
Payer: MEDICARE

## 2024-07-05 NOTE — TELEPHONE ENCOUNTER
Please call them and tell them that we are CANCELLING the heparin flushes.  The order was sent by mistake.  They are NOT needed.  Thank you.

## 2024-07-05 NOTE — TELEPHONE ENCOUNTER
Spoke with pharmacist at [facility]. Heparin flush script has been canceled.

## 2024-07-09 ENCOUNTER — TELEPHONE (OUTPATIENT)
Dept: HEMATOLOGY/ONCOLOGY | Facility: HOSPITAL | Age: 89
End: 2024-07-09

## 2024-07-12 DIAGNOSIS — I50.33 ACUTE ON CHRONIC DIASTOLIC HEART FAILURE (MULTI): Primary | ICD-10-CM

## 2024-07-15 ENCOUNTER — APPOINTMENT (OUTPATIENT)
Dept: PHARMACY | Facility: HOSPITAL | Age: 89
End: 2024-07-15
Payer: MEDICARE

## 2024-07-15 DIAGNOSIS — I50.33 ACUTE ON CHRONIC DIASTOLIC HEART FAILURE (MULTI): ICD-10-CM

## 2024-07-15 DIAGNOSIS — I50.32 CHRONIC HEART FAILURE WITH PRESERVED EJECTION FRACTION (MULTI): ICD-10-CM

## 2024-07-15 DIAGNOSIS — I50.9 CONGESTIVE HEART FAILURE, UNSPECIFIED HF CHRONICITY, UNSPECIFIED HEART FAILURE TYPE (MULTI): ICD-10-CM

## 2024-07-15 PROCEDURE — RXMED WILLOW AMBULATORY MEDICATION CHARGE

## 2024-07-15 NOTE — PROGRESS NOTES
"Pharmacist Clinic: Heart Failure/Cardiomyopathy Management  Ramón Flores was referred to the Clinical Pharmacy Team for his heart failure/cardiomyopathy management.    Referring Provider:  SUSI Huerta-VALENTINO and Lashonda Dugan MD MPH    PHARMACY ASSESSMENT    Allergies Reviewed? Yes  Home Pharmacy Reviewed? Yes, [facility]    Affordability/Accessibility: SGLT2 inhibitor cost  Adherence/Organization: No issues, organized by aide or family  Adverse Effects: None    RELEVANT LAB RESULTS  Lab Results   Component Value Date    BILITOT 1.6 (H) 06/03/2024    CALCIUM 8.0 (L) 06/03/2024    CO2 26 06/03/2024     06/03/2024    CREATININE 1.26 06/03/2024    GLUCOSE 90 06/03/2024    ALKPHOS 74 06/03/2024    K 3.9 06/03/2024    PROT 6.0 (L) 06/03/2024     06/03/2024    AST 23 06/03/2024    ALT 18 06/03/2024    BUN 22 06/03/2024    ANIONGAP 13 06/03/2024    MG 1.90 06/03/2024    PHOS 3.4 01/20/2024     05/31/2024    ALBUMIN 3.7 06/03/2024    LIPASE 30 05/28/2022    GFRF CANCELED 04/14/2023    GFRMALE 62 08/28/2023     Lab Results   Component Value Date    TRIG 140 08/10/2022    CHOL 84 08/10/2022    HDL 35.7 (A) 08/10/2022     No results found for: \"BMCBC\", \"CBCDIF\"       DRUG INTERACTIONS  - No    CHF ASSESSMENT     Symptom/Staging:  -Most recent ejection fraction: 50%  -NYHA Stage: II      Guideline-Directed Medical Therapy:  -ARNI: No   -If no, then ACEi/ARB?: No  -Beta Blocker: Yes, describe: metoprolol succinate 50 mg daily  -MRA: Yes, describe: spironolactone 25 mg daily  -SGLT2i: Yes, describe: Farxiga 10 mg daily    Secondary Prevention:  -The ASCVD Risk score (Octavio ROBERTSON, et al., 2019) failed to calculate for the following reasons:    The 2019 ASCVD risk score is only valid for ages 40 to 79    The patient has a prior MI or stroke diagnosis   -Aspirin 81mg? no  -Statin?: Yes, describe: atorvastatin 40 mg daily  -HTN?: No    PATIENT EDUCATION/GOALS  - Counseled patient on MOA, expectations, " duration of therapy, contraindications, administration, and monitoring parameters  - Counseled patient on lifestyle modifications that can decrease your risk of having complications (smoking cessation, losing weight, daily weights, vaccines)  - Counseled patient on fluid intake and weight management. Recommended to not consume more than 2 liters of fliuids per day. If they have gained more than 2-3 pounds within a 24 hours period (or 5 pounds in a week), contact their cardiologist  - Answered all patient questions and concerns    RECOMMENDATIONS/PLAN  1. Restart Jardiance 10 mg daily under [facility] PAP.  2. Monitor for fluid retention.    Next Cardiology Appointment: 7/18/24  Clinical Pharmacist follow up: 1 year or sooner if needed. Jeanine has my contact information.  Type of Encounter: Wesley FloresD    Verbal consent to manage patient's drug therapy was obtained from an individual authorized to act on behalf of a patient. They were informed they may decline to participate or withdraw from participation in pharmacy services at any time.    Continue all meds under the continuation of care with the referring provider and clinical pharmacy team.

## 2024-07-16 ENCOUNTER — TELEPHONE (OUTPATIENT)
Dept: GERIATRIC MEDICINE | Facility: CLINIC | Age: 89
End: 2024-07-16
Payer: MEDICARE

## 2024-07-17 ENCOUNTER — PHARMACY VISIT (OUTPATIENT)
Dept: PHARMACY | Facility: CLINIC | Age: 89
End: 2024-07-17
Payer: COMMERCIAL

## 2024-07-18 ENCOUNTER — APPOINTMENT (OUTPATIENT)
Dept: CARDIOLOGY | Facility: CLINIC | Age: 89
End: 2024-07-18
Payer: MEDICARE

## 2024-07-18 VITALS
TEMPERATURE: 98 F | OXYGEN SATURATION: 97 % | WEIGHT: 131 LBS | SYSTOLIC BLOOD PRESSURE: 130 MMHG | BODY MASS INDEX: 21.14 KG/M2 | HEART RATE: 73 BPM | DIASTOLIC BLOOD PRESSURE: 67 MMHG

## 2024-07-18 DIAGNOSIS — I48.19 PERSISTENT ATRIAL FIBRILLATION (MULTI): ICD-10-CM

## 2024-07-18 DIAGNOSIS — I50.32 CHRONIC HEART FAILURE WITH PRESERVED EJECTION FRACTION (MULTI): Primary | ICD-10-CM

## 2024-07-18 PROCEDURE — 3078F DIAST BP <80 MM HG: CPT | Performed by: INTERNAL MEDICINE

## 2024-07-18 PROCEDURE — G2211 COMPLEX E/M VISIT ADD ON: HCPCS | Performed by: INTERNAL MEDICINE

## 2024-07-18 PROCEDURE — 99215 OFFICE O/P EST HI 40 MIN: CPT | Performed by: INTERNAL MEDICINE

## 2024-07-18 PROCEDURE — 1036F TOBACCO NON-USER: CPT | Performed by: INTERNAL MEDICINE

## 2024-07-18 PROCEDURE — 1159F MED LIST DOCD IN RCRD: CPT | Performed by: INTERNAL MEDICINE

## 2024-07-18 PROCEDURE — 1157F ADVNC CARE PLAN IN RCRD: CPT | Performed by: INTERNAL MEDICINE

## 2024-07-18 PROCEDURE — 3075F SYST BP GE 130 - 139MM HG: CPT | Performed by: INTERNAL MEDICINE

## 2024-07-18 RX ORDER — AMLODIPINE BESYLATE 5 MG/1
5 TABLET ORAL DAILY
COMMUNITY

## 2024-07-18 NOTE — Clinical Note
Dr. Dillon, Your patient Ramón Flores speaks very highly of you. Please see my note. Specifically -- what is the risk that he may develop a blood clot that could result in a stroke? (He has chronic AF). Should we consider him for YAMILE closure with a Watchman device? Best regards, Lashonda Dugan

## 2024-07-18 NOTE — PATIENT INSTRUCTIONS
To reach Dr. Dugan's office please call [phone] ([facility]). Fax [phone]. Call [phone] to schedule an appointment. You may also contact the HF RNs at [email or web address]    Thank you for coming to your appointment today. If you have any questions or need cardiac medication refills, please call the Heart Failure Office at [phone] option [phone].     Follow up with Dr. Dugan as needed

## 2024-07-18 NOTE — PROGRESS NOTES
Heart Failure Cardiology    Ramón Flores is a 89 y.o. 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 Currently lives in his own home on [address] In [place], and has nursing aides.    He was hospitalized in 6/2024 with cough and dyspnea, and in this setting had a high BNP. He was diagnosed with acutely decompensated heart failure and treated with IV furosemide. He was also treated with antibiotics due to possible pneumonia.     He was previously on metoprolol, but this was stopped in setting of relative hypotension.    Exam: /67 (BP Location: Right arm, Patient Position: Sitting, BP Cuff Size: Adult)   Pulse 73   Temp 36.7 °C (98 °F) (Temporal)   Wt 59.4 kg (131 lb)   SpO2 97%   BMI 21.14 kg/m²   Alert and oriented  Conversant  No JVD  CTA  Irregular rhythm  No LE edema    Current Outpatient Medications   Medication Instructions    acetaminophen (TYLENOL EXTRA STRENGTH) 1,000 mg, oral, Every morning    amLODIPine (NORVASC) 5 mg, oral, Daily    antihemophilic RF VIII (Altuviiio) 3,000 (+/-) unit recon soln 50 Units/kg, intravenous, Every 7 days, 3175 units +/-10% dose every 7 days.  Additionally,  prn when directed.    antihemophilic RF VIII (Altuviiio) 3,000 (+/-) unit recon soln 50 Units/kg, intravenous, Every 7 days, 3175 units +/-10% dose every 7 days.  Additionally,  prn when directed.    atorvastatin (LIPITOR) 40 mg, oral, Daily    bumetanide (Bumex) 1 mg tablet Take 1 tablet (1 mg) by mouth once daily. Do not start before June 4, 2024.    calcium carbonate 600 mg calcium (1,500 mg) tablet 1 tablet, oral, 2 times daily (morning and late afternoon)    ferrous sulfate 325 (65 Fe) MG tablet 65 mg of iron, oral, Daily    finasteride (PROSCAR) 5 mg, oral, Daily    folic acid (Folvite) 1 mg tablet 1 tablet, oral, Daily    ipratropium-albuteroL (Duo-Neb) 0.5-2.5 mg/3 mL nebulizer solution 3 mL, nebulization, 4 times daily PRN    Jardiance 10 mg, oral, Daily     Lactobacillus acidoph-L.bulgar 1 million cell tablet tablet 1 Million Cells, oral, Daily    multivitamin with minerals (multivitamin-iron-folic acid) tablet 1 tablet, oral, Daily    nebulizer accessories kit 1 kit, miscellaneous, Every 6 hours PRN    nystatin (Mycostatin) cream 1 Application, Topical, 2 times daily    pantoprazole (PROTONIX) 40 mg, oral, Daily before breakfast, Do not crush, chew, or split.    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 WM with history of HFpEF, with recent episode of acutely decompensated heart failure. He is now stable on combination of MRA and SGLT2i. He has ongoing irregular pulse consistent with known persistent AF.    Plan:  -- Persistent AF: not able to be on blood thinner due to hemophilia. His risk of clot formation and thromboembolism is unclear to me. I will send a message to Dr. Rufino Dillon to inquire, and further inquire if YAMILE closure (Watchman device) may play a role in reducing stroke risk.  -- HFpEF: already prescribed SGLT2i (Jardiance) and MRA (spironolactone)    Lashonda Dugan MD, MPH  [facility] ([facility])  [facility]  [place], [place]

## 2024-07-21 ENCOUNTER — HOSPITAL ENCOUNTER (EMERGENCY)
Facility: HOSPITAL | Age: 89
Discharge: OTHER INSTITUTION | End: 2024-07-22
Attending: STUDENT IN AN ORGANIZED HEALTH CARE EDUCATION/TRAINING PROGRAM
Payer: MEDICARE

## 2024-07-21 ENCOUNTER — APPOINTMENT (OUTPATIENT)
Dept: RADIOLOGY | Facility: HOSPITAL | Age: 89
End: 2024-07-21
Payer: MEDICARE
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
## 2024-07-21 DIAGNOSIS — N17.9 AKI (ACUTE KIDNEY INJURY) (CMS-HCC): ICD-10-CM

## 2024-07-21 DIAGNOSIS — Z86.2: ICD-10-CM

## 2024-07-21 DIAGNOSIS — S09.90XA CLOSED HEAD INJURY, INITIAL ENCOUNTER: Primary | ICD-10-CM

## 2024-07-21 DIAGNOSIS — S01.81XA LACERATION OF FOREHEAD, INITIAL ENCOUNTER: ICD-10-CM

## 2024-07-21 DIAGNOSIS — S50.312A ABRASION OF LEFT ELBOW, INITIAL ENCOUNTER: ICD-10-CM

## 2024-07-21 DIAGNOSIS — S00.83XA TRAUMATIC HEMATOMA OF FOREHEAD, INITIAL ENCOUNTER: ICD-10-CM

## 2024-07-21 PROCEDURE — 70450 CT HEAD/BRAIN W/O DYE: CPT

## 2024-07-21 PROCEDURE — 99285 EMERGENCY DEPT VISIT HI MDM: CPT

## 2024-07-21 PROCEDURE — 12001 RPR S/N/AX/GEN/TRNK 2.5CM/<: CPT | Performed by: STUDENT IN AN ORGANIZED HEALTH CARE EDUCATION/TRAINING PROGRAM

## 2024-07-21 PROCEDURE — 70450 CT HEAD/BRAIN W/O DYE: CPT | Performed by: RADIOLOGY

## 2024-07-21 PROCEDURE — 72125 CT NECK SPINE W/O DYE: CPT

## 2024-07-21 PROCEDURE — 72125 CT NECK SPINE W/O DYE: CPT | Performed by: RADIOLOGY

## 2024-07-22 ENCOUNTER — APPOINTMENT (OUTPATIENT)
Dept: RADIOLOGY | Facility: HOSPITAL | Age: 89
End: 2024-07-22
Payer: MEDICARE

## 2024-07-22 ENCOUNTER — HOSPITAL ENCOUNTER (EMERGENCY)
Facility: HOSPITAL | Age: 89
Discharge: HOME | End: 2024-07-22
Attending: EMERGENCY MEDICINE
Payer: MEDICARE

## 2024-07-22 ENCOUNTER — OFFICE VISIT (OUTPATIENT)
Dept: GERIATRIC MEDICINE | Facility: CLINIC | Age: 89
End: 2024-07-22
Payer: MEDICARE

## 2024-07-22 VITALS
HEART RATE: 70 BPM | TEMPERATURE: 97.9 F | OXYGEN SATURATION: 95 % | SYSTOLIC BLOOD PRESSURE: 111 MMHG | RESPIRATION RATE: 18 BRPM | DIASTOLIC BLOOD PRESSURE: 68 MMHG

## 2024-07-22 VITALS
RESPIRATION RATE: 12 BRPM | WEIGHT: 131 LBS | SYSTOLIC BLOOD PRESSURE: 120 MMHG | OXYGEN SATURATION: 98 % | TEMPERATURE: 97.3 F | DIASTOLIC BLOOD PRESSURE: 78 MMHG | HEIGHT: 66 IN | HEART RATE: 73 BPM | BODY MASS INDEX: 21.05 KG/M2

## 2024-07-22 VITALS
WEIGHT: 131 LBS | BODY MASS INDEX: 21.14 KG/M2 | HEART RATE: 68 BPM | DIASTOLIC BLOOD PRESSURE: 58 MMHG | TEMPERATURE: 97.8 F | SYSTOLIC BLOOD PRESSURE: 150 MMHG | RESPIRATION RATE: 16 BRPM

## 2024-07-22 DIAGNOSIS — R53.1 WEAKNESS GENERALIZED: Primary | ICD-10-CM
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
## 2024-07-22 LAB
ABO GROUP (TYPE) IN BLOOD: NORMAL
ALBUMIN SERPL BCP-MCNC: 4.3 G/DL (ref 3.4–5)
ALP SERPL-CCNC: 72 U/L (ref 33–136)
ALT SERPL W P-5'-P-CCNC: 19 U/L (ref 10–52)
ANION GAP SERPL CALC-SCNC: 16 MMOL/L (ref 10–20)
ANTIBODY SCREEN: NORMAL
APTT PPP: 48 SECONDS (ref 27–38)
AST SERPL W P-5'-P-CCNC: 24 U/L (ref 9–39)
BASOPHILS # BLD AUTO: 0.02 X10*3/UL (ref 0–0.1)
BASOPHILS NFR BLD AUTO: 0.4 %
BILIRUB SERPL-MCNC: 1.6 MG/DL (ref 0–1.2)
BUN SERPL-MCNC: 34 MG/DL (ref 6–23)
CALCIUM SERPL-MCNC: 9 MG/DL (ref 8.6–10.3)
CHLORIDE SERPL-SCNC: 98 MMOL/L (ref 98–107)
CO2 SERPL-SCNC: 26 MMOL/L (ref 21–32)
CREAT SERPL-MCNC: 1.66 MG/DL (ref 0.5–1.3)
EGFRCR SERPLBLD CKD-EPI 2021: 38 ML/MIN/1.73M*2
EOSINOPHIL # BLD AUTO: 0.04 X10*3/UL (ref 0–0.4)
EOSINOPHIL NFR BLD AUTO: 0.7 %
ERYTHROCYTE [DISTWIDTH] IN BLOOD BY AUTOMATED COUNT: 13.8 % (ref 11.5–14.5)
ETHANOL SERPL-MCNC: <10 MG/DL
FACT VIII ACT/NOR PPP: 11 % (ref 55–180)
FACT VIII ACT/NOR PPP: 88 % (ref 55–180)
GLUCOSE SERPL-MCNC: 139 MG/DL (ref 74–99)
HCT VFR BLD AUTO: 38.5 % (ref 41–52)
HGB BLD-MCNC: 13.1 G/DL (ref 13.5–17.5)
IMM GRANULOCYTES # BLD AUTO: 0.07 X10*3/UL (ref 0–0.5)
IMM GRANULOCYTES NFR BLD AUTO: 1.3 % (ref 0–0.9)
INR PPP: 1.1 (ref 0.9–1.1)
INR PPP: 1.2 (ref 0.9–1.1)
LACTATE SERPL-SCNC: 1.6 MMOL/L (ref 0.4–2)
LYMPHOCYTES # BLD AUTO: 1.84 X10*3/UL (ref 0.8–3)
LYMPHOCYTES NFR BLD AUTO: 33.6 %
MCH RBC QN AUTO: 34.5 PG (ref 26–34)
MCHC RBC AUTO-ENTMCNC: 34 G/DL (ref 32–36)
MCV RBC AUTO: 101 FL (ref 80–100)
MONOCYTES # BLD AUTO: 0.54 X10*3/UL (ref 0.05–0.8)
MONOCYTES NFR BLD AUTO: 9.9 %
NEUTROPHILS # BLD AUTO: 2.96 X10*3/UL (ref 1.6–5.5)
NEUTROPHILS NFR BLD AUTO: 54.1 %
NRBC BLD-RTO: 0 /100 WBCS (ref 0–0)
PLATELET # BLD AUTO: 129 X10*3/UL (ref 150–450)
POTASSIUM SERPL-SCNC: 4.1 MMOL/L (ref 3.5–5.3)
PROT SERPL-MCNC: 7.3 G/DL (ref 6.4–8.2)
PROTHROMBIN TIME: 12.9 SECONDS (ref 9.8–12.8)
PROTHROMBIN TIME: 13.9 SECONDS (ref 9.8–12.8)
RBC # BLD AUTO: 3.8 X10*6/UL (ref 4.5–5.9)
RH FACTOR (ANTIGEN D): NORMAL
SODIUM SERPL-SCNC: 136 MMOL/L (ref 136–145)
WBC # BLD AUTO: 5.5 X10*3/UL (ref 4.4–11.3)

## 2024-07-22 PROCEDURE — 1126F AMNT PAIN NOTED NONE PRSNT: CPT | Performed by: CLINICAL NURSE SPECIALIST

## 2024-07-22 PROCEDURE — 1036F TOBACCO NON-USER: CPT | Performed by: CLINICAL NURSE SPECIALIST

## 2024-07-22 PROCEDURE — 72128 CT CHEST SPINE W/O DYE: CPT | Mod: FOREIGN READ | Performed by: RADIOLOGY

## 2024-07-22 PROCEDURE — 96374 THER/PROPH/DIAG INJ IV PUSH: CPT

## 2024-07-22 PROCEDURE — 72131 CT LUMBAR SPINE W/O DYE: CPT

## 2024-07-22 PROCEDURE — 3077F SYST BP >= 140 MM HG: CPT | Performed by: CLINICAL NURSE SPECIALIST

## 2024-07-22 PROCEDURE — 85610 PROTHROMBIN TIME: CPT

## 2024-07-22 PROCEDURE — 85025 COMPLETE CBC W/AUTO DIFF WBC: CPT | Performed by: STUDENT IN AN ORGANIZED HEALTH CARE EDUCATION/TRAINING PROGRAM

## 2024-07-22 PROCEDURE — 85240 CLOT FACTOR VIII AHG 1 STAGE: CPT

## 2024-07-22 PROCEDURE — 6360000002 HC RX 636 FACTOR: Mod: JZ,JG

## 2024-07-22 PROCEDURE — 99284 EMERGENCY DEPT VISIT MOD MDM: CPT | Performed by: EMERGENCY MEDICINE

## 2024-07-22 PROCEDURE — 82077 ASSAY SPEC XCP UR&BREATH IA: CPT | Performed by: STUDENT IN AN ORGANIZED HEALTH CARE EDUCATION/TRAINING PROGRAM

## 2024-07-22 PROCEDURE — 85610 PROTHROMBIN TIME: CPT | Performed by: STUDENT IN AN ORGANIZED HEALTH CARE EDUCATION/TRAINING PROGRAM

## 2024-07-22 PROCEDURE — 80053 COMPREHEN METABOLIC PANEL: CPT | Performed by: STUDENT IN AN ORGANIZED HEALTH CARE EDUCATION/TRAINING PROGRAM

## 2024-07-22 PROCEDURE — 3078F DIAST BP <80 MM HG: CPT | Performed by: CLINICAL NURSE SPECIALIST

## 2024-07-22 PROCEDURE — 74176 CT ABD & PELVIS W/O CONTRAST: CPT | Mod: FOREIGN READ | Performed by: RADIOLOGY

## 2024-07-22 PROCEDURE — 72131 CT LUMBAR SPINE W/O DYE: CPT | Mod: FOREIGN READ | Performed by: RADIOLOGY

## 2024-07-22 PROCEDURE — 36415 COLL VENOUS BLD VENIPUNCTURE: CPT

## 2024-07-22 PROCEDURE — 71250 CT THORAX DX C-: CPT | Mod: FOREIGN READ | Performed by: RADIOLOGY

## 2024-07-22 PROCEDURE — 1157F ADVNC CARE PLAN IN RCRD: CPT | Performed by: CLINICAL NURSE SPECIALIST

## 2024-07-22 PROCEDURE — 83605 ASSAY OF LACTIC ACID: CPT | Performed by: STUDENT IN AN ORGANIZED HEALTH CARE EDUCATION/TRAINING PROGRAM

## 2024-07-22 PROCEDURE — 1159F MED LIST DOCD IN RCRD: CPT | Performed by: CLINICAL NURSE SPECIALIST

## 2024-07-22 PROCEDURE — 99284 EMERGENCY DEPT VISIT MOD MDM: CPT | Mod: 25

## 2024-07-22 PROCEDURE — 86901 BLOOD TYPING SEROLOGIC RH(D): CPT | Performed by: STUDENT IN AN ORGANIZED HEALTH CARE EDUCATION/TRAINING PROGRAM

## 2024-07-22 PROCEDURE — 99349 HOME/RES VST EST MOD MDM 40: CPT | Performed by: CLINICAL NURSE SPECIALIST

## 2024-07-22 PROCEDURE — 36415 COLL VENOUS BLD VENIPUNCTURE: CPT | Performed by: STUDENT IN AN ORGANIZED HEALTH CARE EDUCATION/TRAINING PROGRAM

## 2024-07-22 PROCEDURE — 74176 CT ABD & PELVIS W/O CONTRAST: CPT

## 2024-07-22 PROCEDURE — 72128 CT CHEST SPINE W/O DYE: CPT

## 2024-07-22 RX ORDER — LIDOCAINE HYDROCHLORIDE AND EPINEPHRINE 10; 10 MG/ML; UG/ML
10 INJECTION, SOLUTION INFILTRATION; PERINEURAL ONCE
Status: DISCONTINUED | OUTPATIENT
Start: 2024-07-22 | End: 2024-07-22 | Stop reason: HOSPADM

## 2024-07-22 RX ORDER — BUMETANIDE 1 MG/1
1 TABLET ORAL AS NEEDED
COMMUNITY

## 2024-07-22 ASSESSMENT — COLUMBIA-SUICIDE SEVERITY RATING SCALE - C-SSRS
1. IN THE PAST MONTH, HAVE YOU WISHED YOU WERE DEAD OR WISHED YOU COULD GO TO SLEEP AND NOT WAKE UP?: NO
2. HAVE YOU ACTUALLY HAD ANY THOUGHTS OF KILLING YOURSELF?: NO
2. HAVE YOU ACTUALLY HAD ANY THOUGHTS OF KILLING YOURSELF?: NO
6. HAVE YOU EVER DONE ANYTHING, STARTED TO DO ANYTHING, OR PREPARED TO DO ANYTHING TO END YOUR LIFE?: NO
1. IN THE PAST MONTH, HAVE YOU WISHED YOU WERE DEAD OR WISHED YOU COULD GO TO SLEEP AND NOT WAKE UP?: NO
6. HAVE YOU EVER DONE ANYTHING, STARTED TO DO ANYTHING, OR PREPARED TO DO ANYTHING TO END YOUR LIFE?: NO
6. HAVE YOU EVER DONE ANYTHING, STARTED TO DO ANYTHING, OR PREPARED TO DO ANYTHING TO END YOUR LIFE?: NO
2. HAVE YOU ACTUALLY HAD ANY THOUGHTS OF KILLING YOURSELF?: NO
1. IN THE PAST MONTH, HAVE YOU WISHED YOU WERE DEAD OR WISHED YOU COULD GO TO SLEEP AND NOT WAKE UP?: NO

## 2024-07-22 ASSESSMENT — PAIN SCALES - GENERAL
PAINLEVEL_OUTOF10: 0 - NO PAIN
PAINLEVEL_OUTOF10: 0 - NO PAIN
PAINLEVEL: 0-NO PAIN

## 2024-07-22 ASSESSMENT — LIFESTYLE VARIABLES
HAVE PEOPLE ANNOYED YOU BY CRITICIZING YOUR DRINKING: NO
TOTAL SCORE: 0
HAVE YOU EVER FELT YOU SHOULD CUT DOWN ON YOUR DRINKING: NO
EVER FELT BAD OR GUILTY ABOUT YOUR DRINKING: NO
EVER HAD A DRINK FIRST THING IN THE MORNING TO STEADY YOUR NERVES TO GET RID OF A HANGOVER: NO

## 2024-07-22 ASSESSMENT — PAIN DESCRIPTION - PROGRESSION: CLINICAL_PROGRESSION: NOT CHANGED

## 2024-07-22 ASSESSMENT — PAIN - FUNCTIONAL ASSESSMENT
PAIN_FUNCTIONAL_ASSESSMENT: 0-10
PAIN_FUNCTIONAL_ASSESSMENT: 0-10

## 2024-07-22 NOTE — ED PROVIDER NOTES
Emergency Department Provider Note        History of Present Illness     History provided by: Patient  Limitations to History:  None  External Chart Review: See ED Course    HPI:  Ramón Flores is a 89 y.o. male with history of hemophilia A presenting to the ED after fall with head strike.  Patient reports that just prior to arrival he was attempting to transfer from his couch to his wheelchair.  Reports that the wheels of the wheelchair were not locked and his wheelchair started to slide away causing him to fall.  Reports that he struck his head on the wheelchair during the fall.  He did not have LOC.  Sustained a laceration to his forehead.  He reports that his Tdap is up-to-date.  Currently denies headache, visual changes, nausea, numbness, weakness, neck pain.  Also sustained skin avulsion to his left elbow but denies left elbow pain.  He reports that he receives weekly Factor VIII infusions at home.    Physical Exam   Arrival Vitals:  T 36.6 °C (97.9 °F)  HR 70  /84  RR 18  O2 97 %      PRIMARY SURVEY:  AIRWAY: intact  BREATHING: bilateral breath sounds, trachea midline  CIRCULATION: 2+ radial and DP pulses  DEFICIT: motor and sensation grossly intact in BUE and BLE  GCS: 15              -- EYES: 4              -- VERBAL: 5              -- MOTOR: 6     SECONDARY SURVEY:  HEAD: Forehead cephalohematoma and 2 cm laceration present on forehead, no Battles signs or racoon eyes  FACE: midface stable  EYES: PERRL, no conjunctival hemorrhage or chemosis, EOMI  EARS: no hemotympanum or otorrhea  NOSE: no nasal septal hematoma  MOUTH: no lip lacerations, no loose or fractured teeth, no intraoral lesions, no dental malocclusion, no mandibular tenderness     C-SPINE: no midline tenderness or bony step-offs or deformities  CHEST: no clavicular or chest wall tenderness, no crepitus, no flail chest  ABD: no abdominal tenderness or ecchymosis  PELVIS: pelvis stable  EXT: no obvious deformities or tenderness to  BUE and BLE, skin avulsion to lateral left elbow, intact range of motion  SKIN: Wounds as above  BACK: no midline tenderness or bony step-offs or deformities in TLS spine    Medical Decision Making & ED Course   Medical Decision Making:  Ramón Flores is a 89 y.o. male presenting to the ED after mechanical fall.  On arrival to the ED, the patient was activated as a trauma alert given his fall with head strike and history of hemophilia A. Patient was afebrile, hemodynamically stable, and satting on room air on arrival. Exam as noted above. Imaging as noted below in ED course.  Given his history of hemophilia A, initially attempted to contact on-call hematology provider at [facility], however there are no general hematologist listed on-call currently.  Therefore case was discussed with trauma surgery at [facility], trauma advises there is no protocol in place for hemophilia patients with blunt head trauma and recommended ongoing discussion with hematology.  Transfer center was able to locate a hematologist on-call at [facility].  While awaiting callback from [facility] hematology, patient's home factor VIII was ordered, however on discussion with pharmacy, the patient's normal factor VIII is not carried at [facility].  Around this time, [facility] hematology was reached and on discussion with them, it was recommended that patient be transferred to [facility] for evaluation by hematology.  They advised that it was not necessary to give an alternative form of Factor VIII replacement and that it was appropriate to wait until he was assessed by hematology team at [facility].  Discussed with [facility] ED attending who accepted patient as a transfer and requested completion of pan scan for trauma workup.  Patient's labs resulted below in ED course, notable for mild RONNIE.  A bolus of IV fluid was ordered.  Completion of CT imaging therefore obtained noncontrast.  X-rays of the patient's left elbow and forearm were ordered,  but patient declined these imaging studies.  His forehead laceration was repaired with absorbable suture.  His left elbow skin avulsion appears to be unlikely to benefit from suture repair and is the overlying skin flap is thin and likely not salvageable and I do not feel that suture would hold given this.  The site was dressed with Mepilex.  Patient signed out to Dr. Richardson pending completion of imaging and transfer to [facility].    ------------------------------------------------  Independent Test Interpretation: See ED Course  Discussion with Other Providers: See ED Course    ED Course:  ED Course as of 07/22/24 0333   Sun Jul 21, 2024   2334 External chart review:  Home health visit 7/16/24 for infusion of factor VIII     [SS]   2340 CT head W O contrast trauma protocol  I have personally reviewed and interpreted this CT head, which shows no large ICH. Final decision making pending radiology read.   [SS]   2348 Personally discussed currently available facts and concerns about the case with Dr Chew with trauma, who advises no trauma protocol in place for hemophilia A, rec discuss with hematology   [SS]   2349 External chart review:  Heme onc office visit 4/10/24  Plan for weekly Altuviio infusion at 50 units/kg     [SS]   Mon Jul 22, 2024   0005 Personally discussed currently available facts and concerns about the case with pharmacy, who advises we do not stock Altuivo at [facility], only have Humate-P and orders are restricted to division of hematology   [SS]   0010 Personally discussed currently available facts and concerns about the case with Dr Villa with hematology, who advises recommend transfer to [facility] ED for evaluation and to give pt his regular factor replacement. They do not recommend giving any doses of factor replacement here at [facility] even with possible delay for transport   [SS]   0011 Personally discussed currently available facts and concerns about the case with Dr Ordonez with [facility] ED, who advises  accepts pt as transfer request finish pan scan before transport   [SS]   0039 Patient refused XR imaging of LUE [SS]   0103 CBC and Auto Differential(!)  Anemia and thrombocytopenia improved when compared to prior, no leukocytosis [SS]   0103 Comprehensive Metabolic Panel(!)  RONNIE, no clinically significant electrolyte abnormalities, no signs of acute liver injury [SS]   0103 Alcohol: <10  Negative [SS]   0103 Lactate: 1.6  Normal doubt endorgan dysfunction [SS]      ED Course User Index  [SS] Steffen Simerlink, MD         Diagnoses as of 07/22/24 0333   Fall, initial encounter   Closed head injury, initial encounter   Traumatic hematoma of forehead, initial encounter   Laceration of forehead, initial encounter   Abrasion of left elbow, initial encounter   History of hemophilia A   RONNIE (acute kidney injury) (CMS-HCC)       Disposition   As a result of their workup, the patient will require transfer to another facility.  The patient and/or his guardian/representative is agreeable to transfer at this time.   We will continue to monitor and manage the patient in the Emergency Department until transport for transfer can be arranged.    Procedures   Laceration Repair    Performed by: Steffen Simerlink, MD  Authorized by: Steffen Simerlink, MD    Consent:     Consent obtained:  Verbal    Consent given by:  Patient    Risks, benefits, and alternatives were discussed: yes      Risks discussed:  Pain, poor cosmetic result, poor wound healing and need for additional repair  Universal protocol:     Procedure explained and questions answered to patient or proxy's satisfaction: yes      Immediately prior to procedure, a time out was called: yes      Patient identity confirmed:  Verbally with patient  Anesthesia:     Anesthesia method:  Local infiltration    Local anesthetic:  Lidocaine 1% WITH epi  Laceration details:     Location:  Scalp    Scalp location:  Frontal    Length (cm):  2  Pre-procedure details:     Preparation:   Patient was prepped and draped in usual sterile fashion  Exploration:     Hemostasis achieved with:  Direct pressure    Contaminated: no    Treatment:     Area cleansed with:  Saline    Amount of cleaning:  Standard    Irrigation solution:  Sterile saline  Skin repair:     Repair method:  Sutures    Suture size:  5-0    Suture material:  Fast-absorbing gut    Suture technique:  Simple interrupted    Number of sutures:  4  Approximation:     Approximation:  Close  Repair type:     Repair type:  Simple  Post-procedure details:     Dressing:  Open (no dressing)    Procedure completion:  Tolerated      Steffen Simerlink, MD Steffen Simerlink, MD  07/22/24 0962

## 2024-07-22 NOTE — PATIENT INSTRUCTIONS
Continue current medications, no changes  Follow up in 3 months  Nephew will call hematology office to make sure he is to get the factor VIII tomorrow

## 2024-07-22 NOTE — PROGRESS NOTES
"Reason for visit: follow up home visit    Reason for homebound status: BLE weakness and instability of knees and inability to do steps makes leaving his home quite taxing on Ramón and his caregivers    HPI: Ramón was seen today after he spent a good part of the night in the ED. Last night he fell asleep in his chair and when he was transferring to the wheelchair he misjudged and fell to the floor, hitting his head on the wheelchair. He said there was quite a lot of blood. He was able to call for 911 with his life alert. In the ED labs were drawn and his factor VIII was low so he received an antihemophilic factor injection and had his head sutured. CT head was negative and there were no other injuries other than a tiny abrasion left elbow. He was able to return home.  Otherwise he has been stable over the past several months and had recently seen his cardiologist who offered a Watchman device but Ramón tells me he does not want that.     Chief Complaint: \"I am really feeling well\"    Review of Systems   General: feels fine today, no fever or chills, sleeping good at night; feels rested during day; sedentary generally but knows he needs to walk more; appetite is good; feels his health is good other than his legs being weak; aware of few pound wt loss lately so starting to drink boost  Skin: no lesions, few tiny bruises on forearms, forehead laceration, left elbow abrasion, no pruritus  Eyes: glasses, adequate near and distant vision with no recent vision changes; no eye pain  Ears: no pain, drainage or hearing loss  Nose and sinuses: no drainage or congestion  Mouth and throat: no oral pain or sore throat; no lesions or sores; no loose teeth, feels they are in good condition; no trouble chewing or swallowing  Respiratory: no cough, congestion or shortness of breath  Cardiac: no chest pain or palpitations; not aware of HR ever going fast  Gastrointestinal: no abdominal pain, no nausea, vomiting, heartburn, diarrhea " or constipation; BM every day without straining; no rectal pain or bleeding; no hemorrhoids  Urinary: continent; no leakage; no discomfort with urination; no frequency, has mild urgency; gets up to void at night 1-2 times; normal color of urine  Musculoskeletal: mild bilateral knee pain but tylenol is enough; knees are weak but can walk with supervision and his walker; can stand by himself and transfer independently; arms are strong; no swelling  Neurologic: long and short term memory intact; no headaches, dizziness or lightheadedness; no tremors or involuntary movements; no altered sensation; no unilateral weakness  Psychiatric: no depression or anxiety; mood is stable; planning to write another book    Physical Examination   Vitals: Blood pressure 150/58, pulse 68, temperature 36.6 °C (97.8 °F), temperature source Tympanic, resp. rate 16, weight 59.4 kg (131 lb).    General: sitting in wheelchair in his kitchen, nephew present; pleasant, good historian and conversationalist; well nourished, appears comfortable  Neurologic: alert, oriented x4 with what appears to be good executive function; cranials nerves 2-12 grossly intact; adequate and equal peripheral strength, tone and sensation  Skin: no abnormalities of hair or nails; right lower cheek with large mole, discolorations, or excessive dryness other than few tiny !1cm bruises on forearms, left elbow abrasion, slightly moist with blood and covered with alevyn, and forehead laceration, about 3cm and sutured  Eyes: no ptosis, conjunctival inflammation or scleral icterus; no corneal opacities or abrasions; PERRLA; EOM intact, no exophthalmos; gross visual acuity and visual fields intact  Ears: normal auricles, auditory canals, tympanic membranes; gross hearing intact bilaterally  Nose: no deformities;  no obstruction, mucous membrane pink w/o bleeding or discharge  Mouth: Lips pink, no lesions or abnormal pigmentations; teeth in good overall condition; no  inflammation or exudate on gums; mucosa pink and moist w/o coating or lesions, no malodor of breath  Respiratory: unlabored; chest symmetrical w/ good rise and fall; regular rhythm and depth, breath sounds equal and clear throughout  Heart: regular rhythm, rate controlled; normal heart sounds w/ no murmurs, rubs and gallops  Abdomen: nondistended, nontenderness, bowel sounds active x4, no masses  Extremities: no deformities, tenderness, swelling; palpable peripheral pulses, no cyanosis, clubbing; normal temperature, no varicose veins  Musculoskeletal: no limited joint mobility with good ROM of upper extremities but about 50% limited ROM of legs., knees stiff, not able to stand upright completly, no tenderness, effusion, erythema, minimal kyphosis and no scoliosis  Psych: pleasant, good attitude, good conversationalist; calm    Review of ED labs:   Component  Ref Range & Units 00:12  7/22/24 1 mo ago  6/3/24 1 mo ago  6/2/24   WBC  4.4 - 11.3 x10*3/uL 5.5 4.1 3.9   nRBC  0.0 - 0.0 /100 WBCs 0.0 0.0 0.0   RBC  4.50 - 5.90 x10*6/uL 3.80 3.32 3.26   Hemoglobin  13.5 - 17.5 g/dL 13.1 11.6 11.4   Hematocrit  41.0 - 52.0 % 38.5 34.5 34.0   MCV  80 - 100 fL 101 104 104   MCH  26.0 - 34.0 pg 34.5 34.9 35.0   MCHC  32.0 - 36.0 g/dL 34.0 33.6 33.5   RDW  11.5 - 14.5 % 13.8 16.2 16.4   Platelets  150 - 450 x10*3/uL 129 83 87CM     Component  Ref Range & Units 00:12  (7/22/24) 1 mo ago  (6/3/24) 1 mo ago  (6/3/24) 1 mo ago  (6/2/24) 1 mo ago  (6/2/24) 1 mo ago  (6/1/24) 1 mo ago  (6/1/24)   Glucose  74 - 99 mg/dL 139 High   90  91  83   Sodium  136 - 145 mmol/L 136  141  143  143   Potassium  3.5 - 5.3 mmol/L 4.1  3.9  4.0  3.5   Chloride  98 - 107 mmol/L 98  106  107  109 High    Bicarbonate  21 - 32 mmol/L 26  26  25  24   Anion Gap  10 - 20 mmol/L 16  13  15  14   Urea Nitrogen  6 - 23 mg/dL 34 High   22  21  21   Creatinine  0.50 - 1.30 mg/dL 1.66 High   1.26  1.27  1.14   eGFR  >60 mL/min/1.73m*2 38 Low   53 Low  CM  52 Low   CM  59 Low  CM   Comment: Calculations of estimated GFR are performed using the 2021 CKD-EPI Study Refit equation without the race variable for the IDMS-Traceable creatinine methods.  https://jasn.asnjournals.org/content/early/2021/09/22/ASN.5149686949   Calcium  8.6 - 10.3 mg/dL 9.0  8.0 Low   8.1 Low   7.7 Low    Albumin  3.4 - 5.0 g/dL 4.3 3.7  3.8  3.6    Alkaline Phosphatase  33 - 136 U/L 72 74  73  71    Total Protein  6.4 - 8.2 g/dL 7.3 6.0 Low   5.7 Low   5.7 Low     AST  9 - 39 U/L 24 23  21  20    Bilirubin, Total  0.0 - 1.2 mg/dL 1.6 High  1.6 High   1.8 High   1.8 High     ALT  10 - 52 U/L 19 18 CM  19 CM        Component  Ref Range & Units 04:36  (7/22/24) 3 mo ago  (4/10/24) 4 mo ago  (3/24/24) 4 mo ago  (3/23/24) 4 mo ago  (3/22/24) 4 mo ago  (3/21/24) 4 mo ago  (3/20/24)   Factor VIII Activity  55 - 180 % 11 Low  51 Low  17 Low  25 Low  37 Low  53 Low  74     Component  Ref Range & Units 1 mo ago  (6/3/24) 1 mo ago  (6/2/24) 1 mo ago  (6/1/24) 1 mo ago  (5/31/24) 1 mo ago  (5/31/24) 3 mo ago  (4/10/24) 3 mo ago  (4/10/24) 3 mo ago  (4/10/24)   Albumin  3.4 - 5.0 g/dL 3.7 3.8 3.6  3.9   4.3   Bilirubin, Total  0.0 - 1.2 mg/dL 1.6 High  1.8 High  1.8 High   2.2 High    1.9 High    Bilirubin, Direct  0.0 - 0.3 mg/dL 0.2 0.2 0.2    0.3    Alkaline Phosphatase  33 - 136 U/L 74 73 71  78   62   ALT  10 - 52 U/L 18 19 CM 20 CM  25 CM   29 CM   Comment: Patients treated with Sulfasalazine may generate falsely decreased results for ALT.   AST  9 - 39 U/L 23 21 20  28   26   Total Protein  6.4 - 8.2 g/dL 6.0 Low  5.7 Low  5.7 Low  6.1 Low  6.2 Low         Diagnoses and Plan:  HFpEF - no longer on metoprolol; has spironolactone daily, no longer on hydrochlorothiazide and only takes bumetanide prn for edema. Has not been having edema.   Chronic atrial fibrillation - no longer on metoprolol; no blood thinners due to hemophilia; does not want to proceed with Watchman device suggested by cardiology  Primary OA  knees - takes tylenol routinely which is effective  BPH w/ lower urinary symptoms - controlled with flomax once a day  Hemophilia - had factor VIII injection in ED but also due to injection tomorrow; I would think he would still get tomorrows dose but suggested calling hematologist today; fact VIII low in ED but coags otherwise ok in ED  GERD w/o esophagitis - stable on pantoprazole  CKD 3b - drop in gfr since last labs; no need for adjustment in any meds; continue adequate fluid intake - discussed how to get enough fluid; avoid nephrotoxic drugs

## 2024-07-22 NOTE — SIGNIFICANT EVENT
Called about Mr. 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 Imaging at [facility] did not disclose any intracranial or spinal hemorrhage, though pt was noted to have a superficial scalp laceration. Pt is currently hemodynamically stable, but was transferred to [facility] for further care as [facility] did not stock his FVIII medication. Patient takes 50U/kg of long-acting FVIII (Altuviiio), last dose last Wednesday, 5 days prior.     Recommendations:  -please obtain coagulation panel and FVIII activity level now  -will order 50U/kg Advate IV to be given once  -please obtain peak FVIII level 1 hour after drug is given  -please obtain third FVIII level 8 hours after drug is given  -will assess patient during the day and decide on further treatment plans  -in case of any further bleeding please page hematology    Ezio Scott MD, PhD  Heme/onc fellow  [id number]

## 2024-07-22 NOTE — DISCHARGE INSTRUCTIONS
He was seen in the emergency room for transfusion after a fall.  Please return to emergency room if you have any new or worsening symptoms.

## 2024-07-22 NOTE — ED TRIAGE NOTES
Pt transferred from [facility] for chief complaint of a fall at home. Pt is a hemophiliac and was transferred because he needs vector 8. Pt states he went to move from a chair to his wheelchair and he fell and hit his head on the wheelchair. Denies loc and thinners. Pt has a lac on forehead that was stitched at [facility] and an abrasion on the L elbow that was bandaged at [facility]. Per pt, he lives at home by himself and has 24/hr aids. Pt denies any concerns/complaints at this time.

## 2024-07-22 NOTE — PROGRESS NOTES
Emergency Department Transition of Care Note       Signout   I received Ramón Flores in signout from Dr. Clements .  Please see the ED Provider Note for all HPI, PE and MDM up to the time of signout at  7 am.  This is in addition to the primary record.    Ramón Flores is a 89 y.o. 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     At the time of signout we were awaiting:  Hematology to see    ED Course & Medical Decision Making   Medical Decision Making:  Under my care, pt's family was concerned about the home health appointments they had made for this morning. Pt is adamant about heading home. Pt confirmed that they will follow up outpatient with Hematology. Pt vitally stable prior leaving the emergency room.     ED Course:  Diagnoses as of 07/22/24 0748   Fall, initial encounter       Disposition   As a result of the work-up, the patient was discharged home.  he was informed of his diagnosis and instructed to come back with any concerns or worsening of condition.  he and was agreeable to the plan as discussed above.  he was given the opportunity to ask questions.  All of the patient's questions were answered.    Procedures   Procedures    Patient seen and discussed with ED attending physician.    Lori Loaiza MD  Emergency Medicine   Goal Outcome Evaluation: Patient restless in bed throughout shift. Patient had a fall earlier this shift. Vs stable.

## 2024-07-22 NOTE — ED PROVIDER NOTES
Emergency Department Provider Note        History of Present Illness     History provided by: Patient  Limitations to History: None  External Records Reviewed with Brief Summary:  ED note from [facility] on 7/21/2024, noting negative pan scan after mechanical fall    HPI:  Ramón Flores is a 89 y.o. 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  Per patient, he went to get out of bed, and was closer to the edge of the bed than he realized, so he fell out of bed hitting his head on the wheelchair he keeps nearby.  He reports no loss of consciousness.  He had a forehead laceration that was repaired at [facility], but CT head showed no ICH, and his trauma imaging showed no other injuries.  Given his history of hemophilia, he came to the hospital for factor infusion, however the factor VIII that he needs was not available at [facility] so he was transferred to [facility].    On arrival, he reports no headache, no lightheadedness, no vision changes, no chest pain, no shortness of breath, no abdominal pain.    Physical Exam   Triage vitals:  T 36.3 °C (97.3 °F)  HR 75  /70  RR 17  O2 97 % None (Room air)    Physical exam:   Triage vitals reviewed.  Constitutional: Well developed adult in no acute distress, non toxic in appearance  Head: Normocephalic.  There is a 2.5 cm forehead laceration, s/p suture repair  Skin: Intact, dry. No rashes or lesions.  Eyes: Pupils are equal. No conjunctival injection.  Neck: Supple. Trachea is midline.  Pulmonary: Normal work of breathing with no accessory muscle use noted.  Clear to auscultation bilaterally.   Cardiovascular: Normal rate, regular rhythm. No murmurs/gallops/rubs appreciated. 2+ radial pulses bilaterally.   Abdomen: Soft, nondistended. Nontender to palpation.  Extremities: No gross deformities.  Moving all extremities spontaneously without difficulty.  Neuro:  Awake and alert, oriented x 4. Face is symmetric. Speech is clear.  Strength 5/5 in bilateral upper and lower extremities.  Sensation intact to light touch throughout.  Psych: Appropriate mood and affect.      Medical Decision Making & ED Course   Medical Decision Makin y.o. male with PMHx hemophilia A who presents for Factor VIII infusion.  On arrival, patient was afebrile and hemodynamically stable, AO x 4.  No neurologic deficits on exam.  Per patient, his last factor administration was .  Hematology was consulted, and 50u/kg of acute factor VIII was ordered.  Per hematology recs, Factor VIII activity labs were drawn immediately prior to factor infusion as well as 1 hour after factor infusion.  Hematology recommended that the patient stay and be evaluated by hematology prior to discharge.    At time of sign out, patient was pending evaluation by hematology. Family did have concerns about getting home in time to attend a follow up appointment with home health at 9 AM today; these concerns were shared with hematology. Patient remained hemodynamically stable throughout my shift, without neurologic changes.    ----      Differential diagnoses considered include but are not limited to: Trauma, scalp hematoma, ICH     Social Determinants of Health which Significantly Impact Care: None identified    EKG Independent Interpretation: EKG not obtained    Independent Result Review and Interpretation: Relevant laboratory and radiographic results were reviewed and independently interpreted by myself.  As necessary, they are commented on in the ED Course.    Chronic conditions affecting the patient's care: As documented above in [facility]    The patient was discussed with the following consultants/services:  Hematology consultant    Care Considerations: As documented above in [facility]    ED Course:  Diagnoses as of 24 0734   , initial encounter     Disposition   Patient was signed out to Dr. Loaiza at 0700 pending  evaluation by hematology. I anticipate that he will be safe for discharge following this evaluation.      Patient seen and discussed with ED attending physician.    Isi Clements MD  Emergency Medicine PGY2  [facility]      Isi Clements MD  Resident  07/22/24 8309

## 2024-07-31 ENCOUNTER — APPOINTMENT (OUTPATIENT)
Dept: HEMATOLOGY/ONCOLOGY | Facility: HOSPITAL | Age: 89
End: 2024-07-31
Payer: MEDICARE

## 2024-08-01 ENCOUNTER — TELEPHONE (OUTPATIENT)
Dept: GERIATRIC MEDICINE | Facility: CLINIC | Age: 89
End: 2024-08-01

## 2024-08-06 ENCOUNTER — APPOINTMENT (OUTPATIENT)
Dept: PHARMACY | Facility: HOSPITAL | Age: 89
End: 2024-08-06
Payer: MEDICARE

## 2024-08-13 DIAGNOSIS — I50.9 CONGESTIVE HEART FAILURE, UNSPECIFIED HF CHRONICITY, UNSPECIFIED HEART FAILURE TYPE (MULTI): Primary | ICD-10-CM

## 2024-08-13 RX ORDER — BUMETANIDE 1 MG/1
1 TABLET ORAL DAILY
Qty: 90 TABLET | Refills: 3 | Status: SHIPPED | OUTPATIENT
Start: 2024-08-13 | End: 2025-08-13

## 2024-08-19 ENCOUNTER — TELEPHONE (OUTPATIENT)
Dept: GERIATRIC MEDICINE | Facility: CLINIC | Age: 89
End: 2024-08-19
Payer: MEDICARE

## 2024-08-29 ENCOUNTER — OFFICE VISIT (OUTPATIENT)
Dept: GERIATRIC MEDICINE | Facility: CLINIC | Age: 89
End: 2024-08-29
Payer: MEDICARE

## 2024-08-29 VITALS
TEMPERATURE: 97.8 F | SYSTOLIC BLOOD PRESSURE: 110 MMHG | RESPIRATION RATE: 16 BRPM | HEART RATE: 52 BPM | DIASTOLIC BLOOD PRESSURE: 54 MMHG

## 2024-08-29 DIAGNOSIS — M19.90 ARTHRITIS: ICD-10-CM

## 2024-08-29 PROCEDURE — 3074F SYST BP LT 130 MM HG: CPT | Performed by: CLINICAL NURSE SPECIALIST

## 2024-08-29 PROCEDURE — 99349 HOME/RES VST EST MOD MDM 40: CPT | Performed by: CLINICAL NURSE SPECIALIST

## 2024-08-29 PROCEDURE — 1036F TOBACCO NON-USER: CPT | Performed by: CLINICAL NURSE SPECIALIST

## 2024-08-29 PROCEDURE — 3078F DIAST BP <80 MM HG: CPT | Performed by: CLINICAL NURSE SPECIALIST

## 2024-08-29 PROCEDURE — 1157F ADVNC CARE PLAN IN RCRD: CPT | Performed by: CLINICAL NURSE SPECIALIST

## 2024-08-29 PROCEDURE — 1159F MED LIST DOCD IN RCRD: CPT | Performed by: CLINICAL NURSE SPECIALIST

## 2024-08-29 RX ORDER — ACETAMINOPHEN 500 MG
1000 TABLET ORAL EVERY 8 HOURS PRN
Qty: 60 TABLET | Refills: 11 | Status: SHIPPED | OUTPATIENT
Start: 2024-08-29 | End: 2025-08-29
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
## 2024-08-30 NOTE — PATIENT INSTRUCTIONS
No medication changes  Keep hair in ears trimmed with electric britni and clean ears with warm tap water daily and dry  Follow up visit early December

## 2024-09-04 ENCOUNTER — APPOINTMENT (OUTPATIENT)
Dept: OPHTHALMOLOGY | Facility: CLINIC | Age: 89
End: 2024-09-04
Payer: MEDICARE

## 2024-09-04 DIAGNOSIS — H35.3122 INTERMEDIATE STAGE NONEXUDATIVE AGE-RELATED MACULAR DEGENERATION OF LEFT EYE: Primary | ICD-10-CM

## 2024-09-04 PROCEDURE — 99213 OFFICE O/P EST LOW 20 MIN: CPT | Performed by: OPHTHALMOLOGY

## 2024-09-04 PROCEDURE — 92134 CPTRZ OPH DX IMG PST SGM RTA: CPT | Performed by: OPHTHALMOLOGY

## 2024-09-04 ASSESSMENT — EXTERNAL EXAM - RIGHT EYE: OD_EXAM: NORMAL

## 2024-09-04 ASSESSMENT — EXTERNAL EXAM - LEFT EYE: OS_EXAM: NORMAL

## 2024-09-04 ASSESSMENT — VISUAL ACUITY
OS_CC: 20/50
OD_CC: 20/30
CORRECTION_TYPE: GLASSES
METHOD: SNELLEN - LINEAR
OD_CC+: -2
OS_CC+: +2

## 2024-09-04 ASSESSMENT — CONF VISUAL FIELD
OD_NORMAL: 1
OD_INFERIOR_NASAL_RESTRICTION: 0
OS_NORMAL: 1
OS_INFERIOR_NASAL_RESTRICTION: 0
OS_INFERIOR_TEMPORAL_RESTRICTION: 0
OD_INFERIOR_TEMPORAL_RESTRICTION: 0
OS_SUPERIOR_TEMPORAL_RESTRICTION: 0
OS_SUPERIOR_NASAL_RESTRICTION: 0
OD_SUPERIOR_NASAL_RESTRICTION: 0
OD_SUPERIOR_TEMPORAL_RESTRICTION: 0

## 2024-09-04 ASSESSMENT — ENCOUNTER SYMPTOMS
PSYCHIATRIC NEGATIVE: 0
EYES NEGATIVE: 1
RESPIRATORY NEGATIVE: 0
GASTROINTESTINAL NEGATIVE: 0
CONSTITUTIONAL NEGATIVE: 0
MUSCULOSKELETAL NEGATIVE: 0
HEMATOLOGIC/LYMPHATIC NEGATIVE: 0
NEUROLOGICAL NEGATIVE: 0
CARDIOVASCULAR NEGATIVE: 0
ALLERGIC/IMMUNOLOGIC NEGATIVE: 0
ENDOCRINE NEGATIVE: 0

## 2024-09-04 ASSESSMENT — CUP TO DISC RATIO
OD_RATIO: .30
OS_RATIO: .30

## 2024-09-04 ASSESSMENT — SLIT LAMP EXAM - LIDS
COMMENTS: DERMATOCHALASIS UL
COMMENTS: DERMATOCHALASIS UL

## 2024-09-04 NOTE — PROGRESS NOTES
Exudative age-related macular degeneration, right eye, with active choroidal neovascularization (CMS/HCC)  Intermediate stage nonexudative age-related macular degeneration of left eye  Choroidal nevus, left eye  Exudative age-related macular degeneration, right eye, with active choroidal [id number]  Nonexudative age-related macular degeneration of left [id number]    OCT : 09/04/24     OD: preserved foveal contour, central areas of hypertransmission, no SRF/IRF, +drusen  OS: no SRF/IRF, +drusen    - exam stable both eyes today  - RTC 4 months for DFE/OCT. If an injection is needed, will need to schedule a separate visit to coordinate with Factor VIII infusion.    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 Santoyo at [facility] after a second Eylea injection for wet AMD OD led to excessive subconj hemorrhage  - Have been coordinating with patient`s hematologist Dr. Dillon to have Factor VIII infusion in preparation for injections. Factor VIII infusions need to occur within 12 hours of injections  - Patient reports he has been receiving twice weekly infusions recently through home care  - No need for RISA injections today, no significant edema and good visual acuity (VA)      Choroidal nevus left eye  -Stable, lesions is about 1.5DD and flat, (-) orange pigment/heme/subretinal fluid (SRF)  - continue regular exams

## 2024-10-08 PROCEDURE — RXMED WILLOW AMBULATORY MEDICATION CHARGE

## 2024-10-09 ENCOUNTER — LAB (OUTPATIENT)
Dept: LAB | Facility: HOSPITAL | Age: 89
End: 2024-10-09
Payer: MEDICARE

## 2024-10-09 ENCOUNTER — DOCUMENTATION (OUTPATIENT)
Dept: PEDIATRIC HEMATOLOGY/ONCOLOGY | Facility: HOSPITAL | Age: 89
End: 2024-10-09

## 2024-10-09 ENCOUNTER — DOCUMENTATION (OUTPATIENT)
Dept: HEMATOLOGY/ONCOLOGY | Facility: HOSPITAL | Age: 89
End: 2024-10-09

## 2024-10-09 ENCOUNTER — OFFICE VISIT (OUTPATIENT)
Dept: HEMATOLOGY/ONCOLOGY | Facility: HOSPITAL | Age: 89
End: 2024-10-09
Payer: MEDICARE

## 2024-10-09 ENCOUNTER — DOCUMENTATION (OUTPATIENT)
Dept: HEMATOLOGY/ONCOLOGY | Facility: HOSPITAL | Age: 89
End: 2024-10-09
Payer: MEDICARE

## 2024-10-09 VITALS
SYSTOLIC BLOOD PRESSURE: 104 MMHG | TEMPERATURE: 96.4 F | HEART RATE: 61 BPM | WEIGHT: 133.6 LBS | OXYGEN SATURATION: 99 % | RESPIRATION RATE: 20 BRPM | BODY MASS INDEX: 21.56 KG/M2 | DIASTOLIC BLOOD PRESSURE: 58 MMHG

## 2024-10-09 DIAGNOSIS — D50.0 IRON DEFICIENCY ANEMIA DUE TO CHRONIC BLOOD LOSS: Primary | ICD-10-CM

## 2024-10-09 DIAGNOSIS — D66 FACTOR VIII DEFICIENCY (MULTI): ICD-10-CM

## 2024-10-09 DIAGNOSIS — D50.0 IRON DEFICIENCY ANEMIA DUE TO CHRONIC BLOOD LOSS: ICD-10-CM

## 2024-10-09 DIAGNOSIS — D66 HEMOPHILIA A (MULTI): ICD-10-CM

## 2024-10-09 DIAGNOSIS — D69.6 THROMBOCYTOPENIA (CMS-HCC): ICD-10-CM

## 2024-10-09 DIAGNOSIS — I50.9 HEART FAILURE, UNSPECIFIED HF CHRONICITY, UNSPECIFIED HEART FAILURE TYPE: ICD-10-CM

## 2024-10-09 LAB
ALBUMIN SERPL BCP-MCNC: 4.4 G/DL (ref 3.4–5)
ALP SERPL-CCNC: 79 U/L (ref 33–136)
ALT SERPL W P-5'-P-CCNC: 20 U/L (ref 10–52)
ANION GAP SERPL CALC-SCNC: 14 MMOL/L (ref 10–20)
APTT PPP: 38 SECONDS (ref 27–38)
AST SERPL W P-5'-P-CCNC: 20 U/L (ref 9–39)
BASOPHILS # BLD MANUAL: 0 X10*3/UL (ref 0–0.1)
BASOPHILS NFR BLD MANUAL: 0 %
BILIRUB SERPL-MCNC: 1.6 MG/DL (ref 0–1.2)
BNP SERPL-MCNC: 317 PG/ML (ref 0–99)
BUN SERPL-MCNC: 35 MG/DL (ref 6–23)
BURR CELLS BLD QL SMEAR: NORMAL
CALCIUM SERPL-MCNC: 9.6 MG/DL (ref 8.6–10.3)
CHLORIDE SERPL-SCNC: 98 MMOL/L (ref 98–107)
CO2 SERPL-SCNC: 30 MMOL/L (ref 21–32)
CREAT SERPL-MCNC: 1.3 MG/DL (ref 0.5–1.3)
EGFRCR SERPLBLD CKD-EPI 2021: 50 ML/MIN/1.73M*2
EOSINOPHIL # BLD MANUAL: 0.1 X10*3/UL (ref 0–0.4)
EOSINOPHIL NFR BLD MANUAL: 2 %
ERYTHROCYTE [DISTWIDTH] IN BLOOD BY AUTOMATED COUNT: 13.6 % (ref 11.5–14.5)
FACT VIII ACT/NOR PPP: 58 % (ref 55–180)
FERRITIN SERPL-MCNC: 202 NG/ML (ref 20–300)
FIBRINOGEN PPP-MCNC: 351 MG/DL (ref 200–400)
GLUCOSE SERPL-MCNC: 90 MG/DL (ref 74–99)
HCT VFR BLD AUTO: 40.5 % (ref 41–52)
HGB BLD-MCNC: 13.8 G/DL (ref 13.5–17.5)
HGB RETIC QN: 38 PG (ref 28–38)
IMM GRANULOCYTES # BLD AUTO: 0.34 X10*3/UL (ref 0–0.5)
IMM GRANULOCYTES NFR BLD AUTO: 6.7 % (ref 0–0.9)
IMMATURE RETIC FRACTION: 13.2 %
INR PPP: 1.2 (ref 0.9–1.1)
IRON SATN MFR SERPL: 36 % (ref 25–45)
IRON SERPL-MCNC: 113 UG/DL (ref 35–150)
LYMPHOCYTES # BLD MANUAL: 1.68 X10*3/UL (ref 0.8–3)
LYMPHOCYTES NFR BLD MANUAL: 33 %
MCH RBC QN AUTO: 35 PG (ref 26–34)
MCHC RBC AUTO-ENTMCNC: 34.1 G/DL (ref 32–36)
MCV RBC AUTO: 103 FL (ref 80–100)
MONOCYTES # BLD MANUAL: 0.61 X10*3/UL (ref 0.05–0.8)
MONOCYTES NFR BLD MANUAL: 12 %
NEUTS SEG # BLD MANUAL: 2.65 X10*3/UL (ref 1.6–5)
NEUTS SEG NFR BLD MANUAL: 52 %
NRBC BLD-RTO: 0 /100 WBCS (ref 0–0)
OVALOCYTES BLD QL SMEAR: NORMAL
PLATELET # BLD AUTO: 111 X10*3/UL (ref 150–450)
POTASSIUM SERPL-SCNC: 4.2 MMOL/L (ref 3.5–5.3)
PROT SERPL-MCNC: 7.2 G/DL (ref 6.4–8.2)
PROT SERPL-MCNC: 7.4 G/DL (ref 6.4–8.2)
PROTHROMBIN TIME: 13.4 SECONDS (ref 9.8–12.8)
RBC # BLD AUTO: 3.94 X10*6/UL (ref 4.5–5.9)
RBC MORPH BLD: NORMAL
RETICS #: 0.06 X10*6/UL (ref 0.02–0.11)
RETICS/RBC NFR AUTO: 1.5 % (ref 0.5–2)
SCHISTOCYTES BLD QL SMEAR: NORMAL
SODIUM SERPL-SCNC: 138 MMOL/L (ref 136–145)
TIBC SERPL-MCNC: 310 UG/DL (ref 240–445)
TOTAL CELLS COUNTED BLD: 100
UIBC SERPL-MCNC: 197 UG/DL (ref 110–370)
VARIANT LYMPHS # BLD MANUAL: 0.05 X10*3/UL (ref 0–0.3)
VARIANT LYMPHS NFR BLD: 1 %
WBC # BLD AUTO: 5.1 X10*3/UL (ref 4.4–11.3)

## 2024-10-09 PROCEDURE — 84165 PROTEIN E-PHORESIS SERUM: CPT

## 2024-10-09 PROCEDURE — 1036F TOBACCO NON-USER: CPT | Performed by: INTERNAL MEDICINE

## 2024-10-09 PROCEDURE — 1126F AMNT PAIN NOTED NONE PRSNT: CPT | Performed by: INTERNAL MEDICINE

## 2024-10-09 PROCEDURE — 99215 OFFICE O/P EST HI 40 MIN: CPT | Performed by: INTERNAL MEDICINE

## 2024-10-09 PROCEDURE — 80053 COMPREHEN METABOLIC PANEL: CPT

## 2024-10-09 PROCEDURE — 36415 COLL VENOUS BLD VENIPUNCTURE: CPT

## 2024-10-09 PROCEDURE — 85045 AUTOMATED RETICULOCYTE COUNT: CPT

## 2024-10-09 PROCEDURE — 85240 CLOT FACTOR VIII AHG 1 STAGE: CPT

## 2024-10-09 PROCEDURE — 3074F SYST BP LT 130 MM HG: CPT | Performed by: INTERNAL MEDICINE

## 2024-10-09 PROCEDURE — 85384 FIBRINOGEN ACTIVITY: CPT

## 2024-10-09 PROCEDURE — 1157F ADVNC CARE PLAN IN RCRD: CPT | Performed by: INTERNAL MEDICINE

## 2024-10-09 PROCEDURE — 84155 ASSAY OF PROTEIN SERUM: CPT | Mod: 59

## 2024-10-09 PROCEDURE — 85730 THROMBOPLASTIN TIME PARTIAL: CPT

## 2024-10-09 PROCEDURE — 85027 COMPLETE CBC AUTOMATED: CPT

## 2024-10-09 PROCEDURE — 85007 BL SMEAR W/DIFF WBC COUNT: CPT

## 2024-10-09 PROCEDURE — 3078F DIAST BP <80 MM HG: CPT | Performed by: INTERNAL MEDICINE

## 2024-10-09 PROCEDURE — 82728 ASSAY OF FERRITIN: CPT

## 2024-10-09 PROCEDURE — 83880 ASSAY OF NATRIURETIC PEPTIDE: CPT

## 2024-10-09 PROCEDURE — 85610 PROTHROMBIN TIME: CPT

## 2024-10-09 PROCEDURE — 83550 IRON BINDING TEST: CPT

## 2024-10-09 PROCEDURE — 1159F MED LIST DOCD IN RCRD: CPT | Performed by: INTERNAL MEDICINE

## 2024-10-09 ASSESSMENT — ENCOUNTER SYMPTOMS
GASTROINTESTINAL NEGATIVE: 1
PSYCHIATRIC NEGATIVE: 1
RESPIRATORY NEGATIVE: 1
NEUROLOGICAL NEGATIVE: 1

## 2024-10-09 ASSESSMENT — PAIN SCALES - GENERAL: PAINLEVEL: 0-NO PAIN

## 2024-10-09 NOTE — PROGRESS NOTES
Patient ID: Ramón Flores is a 89 y.o. male.  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  He had minimal bleeding during life due concurrent protein C deficiency.  However, he bleeds on interventions.  His major problem is recovery from several syncopal episodes that led to a prolonged rehabilitation in [place].  There he spent most of his time in bed or in a chair and became weak,  In that period of time, his osteoarthritis stiffened and presently, he is unable to fully extend his knees to  an upright position.  This problems exists to today.  He lives at home with 3 wheelchairs on each of 3 levels in his home.  He can walk with a walker with assistance. He had good upper body support.     PMHx: In January 2022, he went to see his opthalmologist for his macular degeneration.  The opthalmologist  decided that he needed another eye injection of ? (VEGF?).  One had been given in the office without incident 6 weeks prior - the hemophilia service was not  informed.  The injection was given without incidence.     However, overnight, the patient had a bleeding eye.  He had blood on his pillow.  He says his vision is OK.  We saw him and had opthalmology see the patient and gave him factor for 4 days.  It got better.     In 10/2022, he called me to complain of a painful left ankle.  Being out-of-town, I asked the patient to go to the ER.  He got factor for a few days and it appeared to get better.  But presently, it never really went away. The patient does not have  pain at rest, but one can see that each step is painful.      It was for these reasons that although he had never been on FVIII prophylaxis, we felt that it was time to institute prophylaxis for bleeding with FVIII infusions.  At that time, we put him on twice  weekly Adynovate - a rFVIII that has a slightly longer half-life. His bleeding episodes appear to have decreased.  The patient is pleased as well.   Last year, when available, we started him on Altuviiio. This agent is given once weekly and his bleeding issues have not been a problem.   He gets prophy weekly on Tuesdays.     Meds: no changes       Review of Systems   Constitutional:         Overall feeling well.  Ambulation is his biggest problem, at present.   HENT:  Negative.     Eyes:         Getting Rx'd for age-related macular degeneration.   Respiratory: Negative.     Cardiovascular:         Recent short hospitalization for heart failure.  He was in atrial fibrillation.  Remains such.  No anticoagulation.  Need to consider Watchman procedure.     Gastrointestinal: Negative.    Genitourinary: Negative.     Musculoskeletal:         Knees are major arthritic problem; right knee worse than left knee.   Skin: Negative.    Neurological: Negative.    Hematological:         Hemostasis: under control for now.   Psychiatric/Behavioral: Negative.          Objective   BSA: 1.68 meters squared  /58 (BP Location: Left arm, Patient Position: Sitting, BP Cuff Size: Adult)   Pulse 61   Temp 35.8 °C (96.4 °F) (Temporal)   Resp 20   Wt 60.6 kg (133 lb 9.6 oz)   SpO2 99%   BMI 21.56 kg/m²      has a past medical history of Acute deep vein thrombosis (DVT) of left femoral vein (Multi) (09/10/2023), Acute diastolic heart failure (04/16/2023), Benign prostatic hyperplasia without lower urinary tract symptoms (01/07/2016), Bleeding hemorrhoids (03/01/2023), CAP (community acquired pneumonia) (01/18/2024), Closed nondisplaced fracture of head of left radius (09/05/2023), COVID-19 (05/21/2023), Enlarged prostate with lower urinary tract symptoms (LUTS) (03/01/2023), Fracture of bone of hip (Multi) (09/05/2023), Fracture of femoral neck, right (03/01/2023), Gastrointestinal hemorrhage (09/05/2023), Gingiva hemorrhage  (09/05/2023), Gross hematuria (03/01/2023), Hemarthrosis of ankle joint (04/18/2019), Hemarthrosis of knee (05/09/2022), Hematochezia (09/05/2023), Hematoma of lower extremity (09/05/2023), Hematoma of right thigh (03/01/2023), History of recent fall (05/31/2023), Knee effusion (09/05/2023), Knee effusion, left (03/01/2023), Lumbar pain (03/01/2023), Obstructive uropathy (04/28/2023), Orthostatic syncope (09/05/2023), Personal history of other endocrine, nutritional and metabolic disease, Pneumonia of left lower lobe due to infectious organism (03/19/2024), Pneumonia of right lung due to infectious organism (03/01/2023), 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   has a past surgical history that includes Coronary artery bypass graft.  Family History   Problem Relation Name Age of Onset    Hemophilia Mother       Oncology History    No history exists.       Ramón Flores  reports that he has never smoked. He has never used smokeless tobacco.  He Alcohol use questions deferred to the physician.  He  reports no history of drug use.    Physical Exam  Vitals reviewed.   Constitutional:       Appearance: Normal appearance. He is normal weight.   HENT:      Head: Normocephalic and atraumatic.      Nose: Nose normal.   Eyes:      Extraocular Movements: Extraocular movements intact.      Conjunctiva/sclera: Conjunctivae normal.      Pupils: Pupils are equal, round, and reactive to light.   Cardiovascular:      Rate and Rhythm: Bradycardia present. Rhythm irregular.      Pulses: Normal pulses.      Heart sounds: Normal heart sounds.      Comments: Irregularly, irregularly rhythm at a slow rate.  Pulmonary:      Effort: Pulmonary effort is normal.      Breath sounds: Normal breath sounds.   Abdominal:      Palpations: Abdomen is soft.   Musculoskeletal:          General: Deformity present.      Cervical back: Normal range of motion and neck supple.      Right lower leg: No edema.      Left lower leg: No edema.   Skin:     General: Skin is warm and dry.   Neurological:      General: No focal deficit present.      Mental Status: He is alert and oriented to person, place, and time. Mental status is at baseline.   Psychiatric:         Mood and Affect: Mood normal.         Behavior: Behavior normal.         Thought Content: Thought content normal.         Judgment: Judgment normal.         WBC   Date/Time Value Ref Range Status   10/09/2024 10:31 AM 5.1 4.4 - 11.3 x10*3/uL Final   07/22/2024 12:12 AM 5.5 4.4 - 11.3 x10*3/uL Final   06/03/2024 06:04 AM 4.1 (L) 4.4 - 11.3 x10*3/uL Final     nRBC   Date Value Ref Range Status   10/09/2024 0.0 0.0 - 0.0 /100 WBCs Final   07/22/2024 0.0 0.0 - 0.0 /100 WBCs Final   06/03/2024 0.0 0.0 - 0.0 /100 WBCs Final     RBC   Date Value Ref Range Status   10/09/2024 3.94 (L) 4.50 - 5.90 x10*6/uL Final   07/22/2024 3.80 (L) 4.50 - 5.90 x10*6/uL Final   06/03/2024 3.32 (L) 4.50 - 5.90 x10*6/uL Final     Hemoglobin   Date Value Ref Range Status   10/09/2024 13.8 13.5 - 17.5 g/dL Final   07/22/2024 13.1 (L) 13.5 - 17.5 g/dL Final   06/03/2024 11.6 (L) 13.5 - 17.5 g/dL Final     Hematocrit   Date Value Ref Range Status   10/09/2024 40.5 (L) 41.0 - 52.0 % Final   07/22/2024 38.5 (L) 41.0 - 52.0 % Final   06/03/2024 34.5 (L) 41.0 - 52.0 % Final     MCV   Date/Time Value Ref Range Status   10/09/2024 10:31  (H) 80 - 100 fL Final   07/22/2024 12:12  (H) 80 - 100 fL Final   06/03/2024 06:04  (H) 80 - 100 fL Final     MCH   Date/Time Value Ref Range Status   10/09/2024 10:31 AM 35.0 (H) 26.0 - 34.0 pg Final   07/22/2024 12:12 AM 34.5 (H) 26.0 - 34.0 pg Final   06/03/2024 06:04 AM 34.9 (H) 26.0 - 34.0 pg Final     MCHC   Date/Time Value Ref Range Status   10/09/2024 10:31 AM 34.1 32.0 - 36.0 g/dL Final   07/22/2024 12:12 AM 34.0 32.0  "- 36.0 g/dL Final   06/03/2024 06:04 AM 33.6 32.0 - 36.0 g/dL Final     RDW   Date/Time Value Ref Range Status   10/09/2024 10:31 AM 13.6 11.5 - 14.5 % Final   07/22/2024 12:12 AM 13.8 11.5 - 14.5 % Final   06/03/2024 06:04 AM 16.2 (H) 11.5 - 14.5 % Final     Platelets   Date/Time Value Ref Range Status   10/09/2024 10:31  (L) 150 - 450 x10*3/uL Final   07/22/2024 12:12  (L) 150 - 450 x10*3/uL Final   06/03/2024 06:04 AM 83 (L) 150 - 450 x10*3/uL Final     No results found for: \"MPV\"  Neutrophils %   Date/Time Value Ref Range Status   07/22/2024 12:12 AM 54.1 40.0 - 80.0 % Final   05/31/2024 01:21 AM 50.8 40.0 - 80.0 % Final   04/10/2024 10:11 AM 57.3 40.0 - 80.0 % Final     Immature Granulocytes %, Automated   Date/Time Value Ref Range Status   10/09/2024 10:31 AM 6.7 (H) 0.0 - 0.9 % Final     Comment:     Immature Granulocyte Count (IG) includes promyelocytes, myelocytes and metamyelocytes but does not include bands. Percent differential counts (%) should be interpreted in the context of the absolute cell counts (cells/UL).   07/22/2024 12:12 AM 1.3 (H) 0.0 - 0.9 % Final     Comment:     Immature Granulocyte Count (IG) includes promyelocytes, myelocytes and metamyelocytes but does not include bands. Percent differential counts (%) should be interpreted in the context of the absolute cell counts (cells/UL).   05/31/2024 01:21 AM 0.4 0.0 - 0.9 % Final     Comment:     Immature Granulocyte Count (IG) includes promyelocytes, myelocytes and metamyelocytes but does not include bands. Percent differential counts (%) should be interpreted in the context of the absolute cell counts (cells/UL).     Lymphocytes %, Manual   Date/Time Value Ref Range Status   10/09/2024 10:31 AM 33.0 13.0 - 44.0 % Final     Lymphocytes %   Date/Time Value Ref Range Status   07/22/2024 12:12 AM 33.6 13.0 - 44.0 % Final   05/31/2024 01:21 AM 38.4 13.0 - 44.0 % Final   04/10/2024 10:11 AM 32.8 13.0 - 44.0 % Final     Monocytes %, " Manual   Date/Time Value Ref Range Status   10/09/2024 10:31 AM 12.0 2.0 - 10.0 % Final     Monocytes %   Date/Time Value Ref Range Status   07/22/2024 12:12 AM 9.9 2.0 - 10.0 % Final   05/31/2024 01:21 AM 8.1 2.0 - 10.0 % Final   04/10/2024 10:11 AM 8.3 2.0 - 10.0 % Final     Eosinophils %, Manual   Date/Time Value Ref Range Status   10/09/2024 10:31 AM 2.0 0.0 - 6.0 % Final     Eosinophils %   Date/Time Value Ref Range Status   07/22/2024 12:12 AM 0.7 0.0 - 6.0 % Final   05/31/2024 01:21 AM 1.9 0.0 - 6.0 % Final   04/10/2024 10:11 AM 1.2 0.0 - 6.0 % Final     Basophils %, Manual   Date/Time Value Ref Range Status   10/09/2024 10:31 AM 0.0 0.0 - 2.0 % Final     Basophils %   Date/Time Value Ref Range Status   07/22/2024 12:12 AM 0.4 0.0 - 2.0 % Final   05/31/2024 01:21 AM 0.4 0.0 - 2.0 % Final   04/10/2024 10:11 AM 0.2 0.0 - 2.0 % Final     Neutrophils Absolute   Date/Time Value Ref Range Status   07/22/2024 12:12 AM 2.96 1.60 - 5.50 x10*3/uL Final     Comment:     Percent differential counts (%) should be interpreted in the context of the absolute cell counts (cells/uL).   05/31/2024 01:21 AM 2.43 1.60 - 5.50 x10*3/uL Final     Comment:     Percent differential counts (%) should be interpreted in the context of the absolute cell counts (cells/uL).   04/10/2024 10:11 AM 2.36 1.60 - 5.50 x10*3/uL Final     Comment:     Percent differential counts (%) should be interpreted in the context of the absolute cell counts (cells/uL).     Immature Granulocytes Absolute, Automated   Date/Time Value Ref Range Status   10/09/2024 10:31 AM 0.34 0.00 - 0.50 x10*3/uL Final   07/22/2024 12:12 AM 0.07 0.00 - 0.50 x10*3/uL Final   05/31/2024 01:21 AM 0.02 0.00 - 0.50 x10*3/uL Final     Lymphocytes Absolute   Date/Time Value Ref Range Status   07/22/2024 12:12 AM 1.84 0.80 - 3.00 x10*3/uL Final   05/31/2024 01:21 AM 1.84 0.80 - 3.00 x10*3/uL Final   04/10/2024 10:11 AM 1.35 0.80 - 3.00 x10*3/uL Final     Monocytes Absolute   Date/Time  "Value Ref Range Status   07/22/2024 12:12 AM 0.54 0.05 - 0.80 x10*3/uL Final   05/31/2024 01:21 AM 0.39 0.05 - 0.80 x10*3/uL Final   04/10/2024 10:11 AM 0.34 0.05 - 0.80 x10*3/uL Final     Eosinophils Absolute   Date/Time Value Ref Range Status   07/22/2024 12:12 AM 0.04 0.00 - 0.40 x10*3/uL Final   05/31/2024 01:21 AM 0.09 0.00 - 0.40 x10*3/uL Final   04/10/2024 10:11 AM 0.05 0.00 - 0.40 x10*3/uL Final     Eosinophils Absolute, Manual   Date/Time Value Ref Range Status   10/09/2024 10:31 AM 0.10 0.00 - 0.40 x10*3/uL Final     Basophils Absolute   Date/Time Value Ref Range Status   07/22/2024 12:12 AM 0.02 0.00 - 0.10 x10*3/uL Final   05/31/2024 01:21 AM 0.02 0.00 - 0.10 x10*3/uL Final   04/10/2024 10:11 AM 0.01 0.00 - 0.10 x10*3/uL Final     Basophils Absolute, Manual   Date/Time Value Ref Range Status   10/09/2024 10:31 AM 0.00 0.00 - 0.10 x10*3/uL Final       No components found for: \"PT\"  aPTT   Date/Time Value Ref Range Status   10/09/2024 10:31 AM 38 27 - 38 seconds Final   07/22/2024 04:36 AM 48 (H) 27 - 38 seconds Final   05/31/2024 01:21 AM 44 (H) 27 - 38 seconds Final     Labs: BUN=36; creatinine=1.30; GFR=50; total bilirubin=1.6; irknsssf=907; AFN=634; INR=1.2; PT=13.4, aPTT=38 sec; FVIII=58%;  WBC 5100 with 52% PMNs and 33% lymphs;     Assessment/Plan      Clinically stable, but mild heart failure 2nd to AF (XMT=797).  I intend to contact Dr. Lashonda Dugan to see if Ramón a candidate for a Watchman procedure. At this time in life, would be very difficult to anticoagulate.      Important to agree on this management.   RTC in 6 months.       Diagnoses and all orders for this visit:  Iron deficiency anemia due to chronic blood loss  -     aPTT; Future  -     Protime-INR; Future  -     Fibrinogen; Future  -     Factor 8 Activity; Future  -     CBC and Auto Differential; Future  -     Comprehensive Metabolic Panel; Future  -     Ferritin; Future  -     Iron and TIBC; Future  -     Slide Request; Future  -  "    Reticulocytes; Future  -     Serum Protein Electrophoresis; Future  -     B-Type Natriuretic Peptide; Future  -     Clinic Appointment Request Follow Up; LISBETH DILLON; Future  Hemophilia A (Multi)  -     Clinic Appointment Request Follow Up; LISBETH DILLON  -     aPTT; Future  -     Protime-INR; Future  -     Fibrinogen; Future  -     Factor 8 Activity; Future  -     CBC and Auto Differential; Future  -     Comprehensive Metabolic Panel; Future  -     Ferritin; Future  -     Iron and TIBC; Future  -     Slide Request; Future  -     Reticulocytes; Future  -     Serum Protein Electrophoresis; Future  -     B-Type Natriuretic Peptide; Future  -     Clinic Appointment Request Follow Up; LISBETH DILLON; Future  Factor VIII deficiency (Multi)  -     Clinic Appointment Request Follow Up; LISBETH DILLON  -     aPTT; Future  -     Protime-INR; Future  -     Fibrinogen; Future  -     Factor 8 Activity; Future  -     CBC and Auto Differential; Future  -     Comprehensive Metabolic Panel; Future  -     Ferritin; Future  -     Iron and TIBC; Future  -     Slide Request; Future  -     Reticulocytes; Future  -     Serum Protein Electrophoresis; Future  -     B-Type Natriuretic Peptide; Future  -     Clinic Appointment Request Follow Up; LISBETH DILLON; Future  Thrombocytopenia (CMS-HCC)  -     Clinic Appointment Request Follow Up; LISBETH DILLON  Heart failure, unspecified HF chronicity, unspecified heart failure type  -     aPTT; Future  -     Protime-INR; Future  -     Fibrinogen; Future  -     Factor 8 Activity; Future  -     CBC and Auto Differential; Future  -     Comprehensive Metabolic Panel; Future  -     Ferritin; Future  -     Iron and TIBC; Future  -     Slide Request; Future  -     Reticulocytes; Future  -     Serum Protein Electrophoresis; Future  -     B-Type Natriuretic Peptide; Future         Lisbeth Dillon MD

## 2024-10-09 NOTE — PROGRESS NOTES
Patient in for his visit with Dr. Dillon and the [facility] team. Patient has insurance and PCP. Patient also has specialist and up to date with preventative and specialty care, hemophilia managed well, see medical notes. Patient reports mental & emotional health are good, no depressive episodes no attempts or discussion of suicide, no attempts at self harm or attempting to hurt others, no manic or depressive mood swings, no rage or uncontrolled anger or outbursts, doesn't isolate, engages well with family, home care providers/aides and medical providers. Patient does not drink, smoke and no OTC meds or products.     Patient receives care & support from nephew who attends appointments and assists in monitoring medical care. Patient managing usual and customary stressors appropriately, readily voices concerns if care and/or supports are not up to expectations. Patient reports eating well, preparing his own meals at times, being able to operate microwave, toaster oven and able to maneuver wheel chair in the process, receives meals on wheels 2x per week with plenty of leftovers. Patient reports sleeping ok, able to do most ADL's with the exception of walking long distance in his home and not doing stairs in the home. Patient discussed the desire to try the 5 stairs to the lower lever but was discouraged to do so by nephew in the room as well this SW.   Aides provide lite housekeeping, laundry, makes bed but not very well according to patient. Meeting has been scheduled with provider of in-home aides to discuss the services that haven't been done very well.     Patient reports doing well for someone who is 89 and desires to be as independent as possible. Patient reports being very comfortable in his home, no threats or hazards, feels safe, basic needs are met, no need for support service referrals at this time. SW to follow up as needed.     Cyndi Marshall [facility]  [facility]  Adult and Pediatric Social  Worker

## 2024-10-10 ENCOUNTER — PHARMACY VISIT (OUTPATIENT)
Dept: PHARMACY | Facility: CLINIC | Age: 89
End: 2024-10-10
Payer: COMMERCIAL

## 2024-10-14 LAB
ALBUMIN: 4.3 G/DL (ref 3.4–5)
ALPHA 1 GLOBULIN: 0.3 G/DL (ref 0.2–0.6)
ALPHA 2 GLOBULIN: 0.8 G/DL (ref 0.4–1.1)
BETA GLOBULIN: 1 G/DL (ref 0.5–1.2)
GAMMA GLOBULIN: 0.8 G/DL (ref 0.5–1.4)
M-PROTEIN 1: 0.3 G/DL
PATH REVIEW-SERUM PROTEIN ELECTROPHORESIS: ABNORMAL
PROTEIN ELECTROPHORESIS COMMENT: ABNORMAL

## 2024-10-15 DIAGNOSIS — M17.12 PRIMARY OSTEOARTHRITIS OF LEFT KNEE: Primary | ICD-10-CM

## 2024-10-16 NOTE — PROGRESS NOTES
[facility] PT Consult    Patient: Ramón Flores  MRN: [id number]  10/16/24    Assessment   Ramón is a 89 y.o. with PMHx Hemophilia A who was seen by Physical Therapy in clinic on 10/9/2024 accompanied by his nephew, Javier. Pt is overall doing well. He lives at home and has caregivers come to the home. He is on ALTUVIIIO 1x/week (typically gets dose on Tuesdays). States he has a life alert.     Pt states that he has wheelchairs at home to get around, and can also ambulate a little bit with a walker. He is able to complete transfers in <> out of wheelchair independently. States he has a chair lift at home as well. He states he gets a little bit of therapy at home, but states it is not enough. He is very interested in doing aquatic therapy.    Upon assessment, pt demonstrates independence with transferring from wheelchair <> exam table. Also demonstrating severely limited ROM cali knees.       Plan/Recommendations:  Pt is clear for aquatic therapy from cardiology perspective (cardiologist Dr. Lashonda Dugan recommending to wear a life jacket with therapy)  Order for therapy from PCP is in the chart  Called Ramón to let him know he is set to schedule aquatic therapy. Left VM for nephew Javier on 10/16/2024 with phone number to [facility] to schedule Ramón. Let him know to please call with any questions or barriers to care.     Subjective   Pt reports that he lives at home and has caregivers come to the home. Pt states that he has wheelchairs at home to get around, and can also ambulate a little bit with walker. Says he is able to complete transfers in <> out of wheelchair independently. States he has a chair lift at home as well. He says he gets a little bit of therapy at home, but states it is not enough. He is very interested in doing aquatic therapy. States he has a life alert.     Past Medical History:   Past Medical History:   Diagnosis Date    Acute deep vein thrombosis (DVT) of left femoral vein (Multi) 09/10/2023    ACUTE  DEEP VEIN THROMBOSIS, L FEMORAL, HEMOPHILIA    Acute diastolic heart failure 04/16/2023    Benign prostatic hyperplasia without lower urinary tract symptoms 01/07/2016    Enlarged prostate without lower urinary tract symptoms (luts)    Bleeding hemorrhoids 03/01/2023    CAP (community acquired pneumonia) 01/18/2024    Closed nondisplaced fracture of head of left radius 09/05/2023    COVID-19 05/21/2023    Enlarged prostate with lower urinary tract symptoms (LUTS) 03/01/2023    Fracture of bone of hip (Multi) 09/05/2023    Fracture of femoral neck, right 03/01/2023    Gastrointestinal hemorrhage 09/05/2023    LOWER GI BLEED    Gingiva hemorrhage 09/05/2023    Gross hematuria 03/01/2023    Hemarthrosis of ankle joint 04/18/2019    Hemarthrosis of knee 05/09/2022    Hematochezia 09/05/2023    Hematoma of lower extremity 09/05/2023    Hematoma of right thigh 03/01/2023    History of recent fall 05/31/2023    Knee effusion 09/05/2023    Knee effusion, left 03/01/2023    Lumbar pain 03/01/2023    Obstructive uropathy 04/28/2023    Orthostatic syncope 09/05/2023    Personal history of other endocrine, nutritional and metabolic disease     History of hyperlipidemia    Pneumonia of left lower lobe due to infectious organism 03/19/2024    Pneumonia of right lung due to infectious organism 03/01/2023    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    Subdural hematoma (Multi) 04/09/2023    Syncope 05/12/2023    Urinary retention 03/01/2023    UTI (urinary tract infection) 03/01/2023    Viral gastroenteritis 03/01/2023       Past Surgical History:   Past Surgical History:   Procedure Laterality Date    CORONARY ARTERY BYPASS GRAFT         Prophylaxis: Yes  ALTUVIIIO 1x/week (typically on Tuesdays)    Additional Review  Home environment/accessability: lives at home, caregivers come to the home   Equipment used at this time: wheelchairs, walkers, stair lift    Objective   Range of Motion:   Knee Flexion (0-135): Left limited and Right  limited  Knee Extension (0): Left limited and Right limited    Transfers:  Wheelchair to exam table: independent  Exam table to wheelchair: independent    Patient/Family Education Provided: Yes. Education on aquatic therapy.     Kathie Kruger, PT

## 2024-10-20 ENCOUNTER — APPOINTMENT (OUTPATIENT)
Dept: RADIOLOGY | Facility: HOSPITAL | Age: 89
End: 2024-10-20
Payer: MEDICARE

## 2024-10-20 ENCOUNTER — HOSPITAL ENCOUNTER (OUTPATIENT)
Facility: HOSPITAL | Age: 89
Setting detail: OBSERVATION
Discharge: ANOTHER HOSPITAL | End: 2024-10-20
Attending: EMERGENCY MEDICINE | Admitting: INTERNAL MEDICINE
Payer: MEDICARE

## 2024-10-20 ENCOUNTER — HOSPITAL ENCOUNTER (INPATIENT)
Facility: HOSPITAL | Age: 89
LOS: 3 days | Discharge: HOME | End: 2024-10-23
Attending: INTERNAL MEDICINE | Admitting: INTERNAL MEDICINE
Payer: MEDICARE

## 2024-10-20 VITALS
SYSTOLIC BLOOD PRESSURE: 110 MMHG | DIASTOLIC BLOOD PRESSURE: 73 MMHG | TEMPERATURE: 97.3 F | RESPIRATION RATE: 18 BRPM | HEART RATE: 58 BPM | OXYGEN SATURATION: 97 %

## 2024-10-20 DIAGNOSIS — D66 FACTOR VIII DEFICIENCY (MULTI): ICD-10-CM

## 2024-10-20 DIAGNOSIS — N40.0 BENIGN PROSTATIC HYPERPLASIA WITHOUT LOWER URINARY TRACT SYMPTOMS: ICD-10-CM

## 2024-10-20 DIAGNOSIS — I50.30 (HFPEF) HEART FAILURE WITH PRESERVED EJECTION FRACTION: ICD-10-CM

## 2024-10-20 DIAGNOSIS — K92.2 GASTROINTESTINAL HEMORRHAGE, UNSPECIFIED GASTROINTESTINAL HEMORRHAGE TYPE: ICD-10-CM

## 2024-10-20 DIAGNOSIS — Z95.1 HISTORY OF CORONARY ARTERY BYPASS GRAFT: ICD-10-CM

## 2024-10-20 DIAGNOSIS — I50.33 ACUTE ON CHRONIC DIASTOLIC HEART FAILURE: ICD-10-CM

## 2024-10-20 DIAGNOSIS — E46 PROTEIN-CALORIE MALNUTRITION, UNSPECIFIED SEVERITY (MULTI): ICD-10-CM

## 2024-10-20 DIAGNOSIS — I50.9 CONGESTIVE HEART FAILURE, UNSPECIFIED HF CHRONICITY, UNSPECIFIED HEART FAILURE TYPE: ICD-10-CM

## 2024-10-20 DIAGNOSIS — R41.89 IMPAIRED COGNITION: ICD-10-CM

## 2024-10-20 DIAGNOSIS — K92.2 GI BLEED: Primary | ICD-10-CM

## 2024-10-20 DIAGNOSIS — D66 HEMOPHILIA A (MULTI): ICD-10-CM

## 2024-10-20 DIAGNOSIS — K92.2 GASTROINTESTINAL HEMORRHAGE, UNSPECIFIED GASTROINTESTINAL HEMORRHAGE TYPE: Primary | ICD-10-CM

## 2024-10-20 LAB
ABO GROUP (TYPE) IN BLOOD: NORMAL
ACANTHOCYTES BLD QL SMEAR: ABNORMAL
ALBUMIN SERPL BCP-MCNC: 4.2 G/DL (ref 3.4–5)
ALBUMIN SERPL BCP-MCNC: 4.2 G/DL (ref 3.4–5)
ALP SERPL-CCNC: 79 U/L (ref 33–136)
ALP SERPL-CCNC: 81 U/L (ref 33–136)
ALT SERPL W P-5'-P-CCNC: 12 U/L (ref 10–52)
ALT SERPL W P-5'-P-CCNC: 16 U/L (ref 10–52)
ANION GAP SERPL CALC-SCNC: 15 MMOL/L (ref 10–20)
ANION GAP SERPL CALC-SCNC: 15 MMOL/L (ref 10–20)
ANTIBODY SCREEN: NORMAL
APTT PPP: 57 SECONDS (ref 27–38)
AST SERPL W P-5'-P-CCNC: 19 U/L (ref 9–39)
AST SERPL W P-5'-P-CCNC: 21 U/L (ref 9–39)
BASOPHILS # BLD AUTO: 0.02 X10*3/UL (ref 0–0.1)
BASOPHILS # BLD MANUAL: 0 X10*3/UL (ref 0–0.1)
BASOPHILS NFR BLD AUTO: 0.4 %
BASOPHILS NFR BLD MANUAL: 0 %
BILIRUB SERPL-MCNC: 1.5 MG/DL (ref 0–1.2)
BILIRUB SERPL-MCNC: 1.8 MG/DL (ref 0–1.2)
BITE CELLS BLD QL SMEAR: PRESENT
BUN SERPL-MCNC: 35 MG/DL (ref 6–23)
BUN SERPL-MCNC: 41 MG/DL (ref 6–23)
BURR CELLS BLD QL SMEAR: ABNORMAL
CALCIUM SERPL-MCNC: 9.1 MG/DL (ref 8.6–10.3)
CALCIUM SERPL-MCNC: 9.2 MG/DL (ref 8.6–10.6)
CHLORIDE SERPL-SCNC: 102 MMOL/L (ref 98–107)
CHLORIDE SERPL-SCNC: 97 MMOL/L (ref 98–107)
CO2 SERPL-SCNC: 24 MMOL/L (ref 21–32)
CO2 SERPL-SCNC: 27 MMOL/L (ref 21–32)
CREAT SERPL-MCNC: 1.45 MG/DL (ref 0.5–1.3)
CREAT SERPL-MCNC: 1.48 MG/DL (ref 0.5–1.3)
DACRYOCYTES BLD QL SMEAR: ABNORMAL
EGFRCR SERPLBLD CKD-EPI 2021: 43 ML/MIN/1.73M*2
EGFRCR SERPLBLD CKD-EPI 2021: 44 ML/MIN/1.73M*2
EOSINOPHIL # BLD AUTO: 0.08 X10*3/UL (ref 0–0.4)
EOSINOPHIL # BLD MANUAL: 0 X10*3/UL (ref 0–0.4)
EOSINOPHIL NFR BLD AUTO: 1.6 %
EOSINOPHIL NFR BLD MANUAL: 0 %
ERYTHROCYTE [DISTWIDTH] IN BLOOD BY AUTOMATED COUNT: 13.5 % (ref 11.5–14.5)
ERYTHROCYTE [DISTWIDTH] IN BLOOD BY AUTOMATED COUNT: 13.6 % (ref 11.5–14.5)
GLUCOSE BLD MANUAL STRIP-MCNC: 134 MG/DL (ref 74–99)
GLUCOSE SERPL-MCNC: 110 MG/DL (ref 74–99)
GLUCOSE SERPL-MCNC: 84 MG/DL (ref 74–99)
HCT VFR BLD AUTO: 37.1 % (ref 41–52)
HCT VFR BLD AUTO: 38.5 % (ref 41–52)
HEMOCCULT SP1 STL QL: POSITIVE
HGB BLD-MCNC: 12.9 G/DL (ref 13.5–17.5)
HGB BLD-MCNC: 13.3 G/DL (ref 13.5–17.5)
IMM GRANULOCYTES # BLD AUTO: 0.09 X10*3/UL (ref 0–0.5)
IMM GRANULOCYTES # BLD AUTO: 0.37 X10*3/UL (ref 0–0.5)
IMM GRANULOCYTES NFR BLD AUTO: 1.8 % (ref 0–0.9)
IMM GRANULOCYTES NFR BLD AUTO: 6.5 % (ref 0–0.9)
INR PPP: 1.1 (ref 0.9–1.1)
LYMPHOCYTES # BLD AUTO: 1.6 X10*3/UL (ref 0.8–3)
LYMPHOCYTES # BLD MANUAL: 1.03 X10*3/UL (ref 0.8–3)
LYMPHOCYTES NFR BLD AUTO: 32.4 %
LYMPHOCYTES NFR BLD MANUAL: 18 %
MAGNESIUM SERPL-MCNC: 1.7 MG/DL (ref 1.6–2.4)
MCH RBC QN AUTO: 34.7 PG (ref 26–34)
MCH RBC QN AUTO: 35.3 PG (ref 26–34)
MCHC RBC AUTO-ENTMCNC: 34.5 G/DL (ref 32–36)
MCHC RBC AUTO-ENTMCNC: 34.8 G/DL (ref 32–36)
MCV RBC AUTO: 101 FL (ref 80–100)
MCV RBC AUTO: 102 FL (ref 80–100)
METAMYELOCYTES # BLD MANUAL: 0.17 X10*3/UL
METAMYELOCYTES NFR BLD MANUAL: 3 %
MONOCYTES # BLD AUTO: 0.55 X10*3/UL (ref 0.05–0.8)
MONOCYTES # BLD MANUAL: 0 X10*3/UL (ref 0.05–0.8)
MONOCYTES NFR BLD AUTO: 11.1 %
MONOCYTES NFR BLD MANUAL: 0 %
NEUTROPHILS # BLD AUTO: 2.6 X10*3/UL (ref 1.6–5.5)
NEUTROPHILS NFR BLD AUTO: 52.7 %
NEUTS SEG # BLD MANUAL: 3.14 X10*3/UL (ref 1.6–5)
NEUTS SEG NFR BLD MANUAL: 55 %
NRBC BLD-RTO: 0 /100 WBCS (ref 0–0)
NRBC BLD-RTO: 0 /100 WBCS (ref 0–0)
OVALOCYTES BLD QL SMEAR: ABNORMAL
PLATELET # BLD AUTO: 110 X10*3/UL (ref 150–450)
PLATELET # BLD AUTO: 112 X10*3/UL (ref 150–450)
POTASSIUM SERPL-SCNC: 4.1 MMOL/L (ref 3.5–5.3)
POTASSIUM SERPL-SCNC: 4.2 MMOL/L (ref 3.5–5.3)
PROT SERPL-MCNC: 6.9 G/DL (ref 6.4–8.2)
PROT SERPL-MCNC: 7 G/DL (ref 6.4–8.2)
PROTHROMBIN TIME: 12.6 SECONDS (ref 9.8–12.8)
RBC # BLD AUTO: 3.65 X10*6/UL (ref 4.5–5.9)
RBC # BLD AUTO: 3.83 X10*6/UL (ref 4.5–5.9)
RBC MORPH BLD: ABNORMAL
RH FACTOR (ANTIGEN D): NORMAL
SODIUM SERPL-SCNC: 135 MMOL/L (ref 136–145)
SODIUM SERPL-SCNC: 137 MMOL/L (ref 136–145)
TOTAL CELLS COUNTED BLD: 100
VARIANT LYMPHS # BLD MANUAL: 1.37 X10*3/UL (ref 0–0.3)
VARIANT LYMPHS NFR BLD: 24 %
WBC # BLD AUTO: 4.9 X10*3/UL (ref 4.4–11.3)
WBC # BLD AUTO: 5.7 X10*3/UL (ref 4.4–11.3)

## 2024-10-20 PROCEDURE — 2500000004 HC RX 250 GENERAL PHARMACY W/ HCPCS (ALT 636 FOR OP/ED): Performed by: EMERGENCY MEDICINE

## 2024-10-20 PROCEDURE — 85007 BL SMEAR W/DIFF WBC COUNT: CPT | Performed by: EMERGENCY MEDICINE

## 2024-10-20 PROCEDURE — 2500000004 HC RX 250 GENERAL PHARMACY W/ HCPCS (ALT 636 FOR OP/ED)

## 2024-10-20 PROCEDURE — 85240 CLOT FACTOR VIII AHG 1 STAGE: CPT

## 2024-10-20 PROCEDURE — 85025 COMPLETE CBC W/AUTO DIFF WBC: CPT

## 2024-10-20 PROCEDURE — 80053 COMPREHEN METABOLIC PANEL: CPT

## 2024-10-20 PROCEDURE — 2500000002 HC RX 250 W HCPCS SELF ADMINISTERED DRUGS (ALT 637 FOR MEDICARE OP, ALT 636 FOR OP/ED): Performed by: NURSE PRACTITIONER

## 2024-10-20 PROCEDURE — 99223 1ST HOSP IP/OBS HIGH 75: CPT

## 2024-10-20 PROCEDURE — 82947 ASSAY GLUCOSE BLOOD QUANT: CPT

## 2024-10-20 PROCEDURE — 96361 HYDRATE IV INFUSION ADD-ON: CPT

## 2024-10-20 PROCEDURE — 80053 COMPREHEN METABOLIC PANEL: CPT | Performed by: EMERGENCY MEDICINE

## 2024-10-20 PROCEDURE — 2550000001 HC RX 255 CONTRASTS: Performed by: EMERGENCY MEDICINE

## 2024-10-20 PROCEDURE — 82270 OCCULT BLOOD FECES: CPT | Performed by: EMERGENCY MEDICINE

## 2024-10-20 PROCEDURE — 36415 COLL VENOUS BLD VENIPUNCTURE: CPT

## 2024-10-20 PROCEDURE — 74174 CTA ABD&PLVS W/CONTRAST: CPT | Performed by: RADIOLOGY

## 2024-10-20 PROCEDURE — G0378 HOSPITAL OBSERVATION PER HR: HCPCS

## 2024-10-20 PROCEDURE — 6360000002 HC RX 636 FACTOR: Mod: JZ | Performed by: STUDENT IN AN ORGANIZED HEALTH CARE EDUCATION/TRAINING PROGRAM

## 2024-10-20 PROCEDURE — 83735 ASSAY OF MAGNESIUM: CPT

## 2024-10-20 PROCEDURE — 74174 CTA ABD&PLVS W/CONTRAST: CPT

## 2024-10-20 PROCEDURE — 99285 EMERGENCY DEPT VISIT HI MDM: CPT | Mod: 25

## 2024-10-20 PROCEDURE — 85610 PROTHROMBIN TIME: CPT

## 2024-10-20 PROCEDURE — 36415 COLL VENOUS BLD VENIPUNCTURE: CPT | Performed by: EMERGENCY MEDICINE

## 2024-10-20 PROCEDURE — 99238 HOSP IP/OBS DSCHRG MGMT 30/<: CPT | Performed by: NURSE PRACTITIONER

## 2024-10-20 PROCEDURE — 96360 HYDRATION IV INFUSION INIT: CPT

## 2024-10-20 PROCEDURE — 85027 COMPLETE CBC AUTOMATED: CPT | Performed by: EMERGENCY MEDICINE

## 2024-10-20 PROCEDURE — 1100000001 HC PRIVATE ROOM DAILY

## 2024-10-20 PROCEDURE — 86900 BLOOD TYPING SEROLOGIC ABO: CPT | Performed by: EMERGENCY MEDICINE

## 2024-10-20 RX ORDER — SPIRONOLACTONE 25 MG/1
25 TABLET ORAL DAILY
Status: DISCONTINUED | OUTPATIENT
Start: 2024-10-21 | End: 2024-10-23 | Stop reason: HOSPADM

## 2024-10-20 RX ORDER — FOLIC ACID 1 MG/1
1 TABLET ORAL DAILY
Status: DISCONTINUED | OUTPATIENT
Start: 2024-10-20 | End: 2024-10-20

## 2024-10-20 RX ORDER — PANTOPRAZOLE SODIUM 40 MG/1
40 TABLET, DELAYED RELEASE ORAL
Status: CANCELLED | OUTPATIENT
Start: 2024-10-21

## 2024-10-20 RX ORDER — PANTOPRAZOLE SODIUM 40 MG/1
40 TABLET, DELAYED RELEASE ORAL
Status: DISCONTINUED | OUTPATIENT
Start: 2024-10-21 | End: 2024-10-20 | Stop reason: HOSPADM

## 2024-10-20 RX ORDER — TAMSULOSIN HYDROCHLORIDE 0.4 MG/1
0.4 CAPSULE ORAL 2 TIMES DAILY
Status: DISCONTINUED | OUTPATIENT
Start: 2024-10-20 | End: 2024-10-23 | Stop reason: HOSPADM

## 2024-10-20 RX ORDER — METOPROLOL SUCCINATE 25 MG/1
25 TABLET, EXTENDED RELEASE ORAL DAILY
COMMUNITY
End: 2024-10-23 | Stop reason: HOSPADM

## 2024-10-20 RX ORDER — DEXTROSE 50 % IN WATER (D50W) INTRAVENOUS SYRINGE
12.5
Status: DISCONTINUED | OUTPATIENT
Start: 2024-10-20 | End: 2024-10-23 | Stop reason: HOSPADM

## 2024-10-20 RX ORDER — FINASTERIDE 5 MG/1
5 TABLET, FILM COATED ORAL DAILY
Status: CANCELLED | OUTPATIENT
Start: 2024-10-21

## 2024-10-20 RX ORDER — ATORVASTATIN CALCIUM 40 MG/1
40 TABLET, FILM COATED ORAL DAILY
Status: DISCONTINUED | OUTPATIENT
Start: 2024-10-21 | End: 2024-10-23 | Stop reason: HOSPADM

## 2024-10-20 RX ORDER — CALCIUM CARBONATE 200(500)MG
1500 TABLET,CHEWABLE ORAL
Status: CANCELLED | OUTPATIENT
Start: 2024-10-20

## 2024-10-20 RX ORDER — METOPROLOL SUCCINATE 25 MG/1
25 TABLET, EXTENDED RELEASE ORAL DAILY
Status: DISCONTINUED | OUTPATIENT
Start: 2024-10-20 | End: 2024-10-20

## 2024-10-20 RX ORDER — INSULIN LISPRO 100 [IU]/ML
0-5 INJECTION, SOLUTION INTRAVENOUS; SUBCUTANEOUS
Status: DISCONTINUED | OUTPATIENT
Start: 2024-10-20 | End: 2024-10-23 | Stop reason: HOSPADM

## 2024-10-20 RX ORDER — PSYLLIUM HUSK 0.4 G
1500 CAPSULE ORAL
Status: DISCONTINUED | OUTPATIENT
Start: 2024-10-20 | End: 2024-10-20

## 2024-10-20 RX ORDER — FINASTERIDE 5 MG/1
5 TABLET, FILM COATED ORAL DAILY
Status: DISCONTINUED | OUTPATIENT
Start: 2024-10-20 | End: 2024-10-20 | Stop reason: HOSPADM

## 2024-10-20 RX ORDER — ACETAMINOPHEN 325 MG/1
1000 TABLET ORAL EVERY 8 HOURS PRN
Status: DISCONTINUED | OUTPATIENT
Start: 2024-10-20 | End: 2024-10-20

## 2024-10-20 RX ORDER — DEXTROSE 50 % IN WATER (D50W) INTRAVENOUS SYRINGE
25
Status: DISCONTINUED | OUTPATIENT
Start: 2024-10-20 | End: 2024-10-23 | Stop reason: HOSPADM

## 2024-10-20 RX ORDER — SPIRONOLACTONE 25 MG/1
25 TABLET ORAL DAILY
Status: DISCONTINUED | OUTPATIENT
Start: 2024-10-20 | End: 2024-10-20 | Stop reason: HOSPADM

## 2024-10-20 RX ORDER — FOLIC ACID 1 MG/1
1 TABLET ORAL DAILY
Status: CANCELLED | OUTPATIENT
Start: 2024-10-21

## 2024-10-20 RX ORDER — FINASTERIDE 5 MG/1
5 TABLET, FILM COATED ORAL DAILY
Status: DISCONTINUED | OUTPATIENT
Start: 2024-10-20 | End: 2024-10-20

## 2024-10-20 RX ORDER — TAMSULOSIN HYDROCHLORIDE 0.4 MG/1
0.4 CAPSULE ORAL 2 TIMES DAILY
Status: CANCELLED | OUTPATIENT
Start: 2024-10-20

## 2024-10-20 RX ORDER — METOPROLOL SUCCINATE 25 MG/1
25 TABLET, EXTENDED RELEASE ORAL DAILY
Status: DISCONTINUED | OUTPATIENT
Start: 2024-10-20 | End: 2024-10-20 | Stop reason: HOSPADM

## 2024-10-20 RX ORDER — METOPROLOL SUCCINATE 25 MG/1
25 TABLET, EXTENDED RELEASE ORAL DAILY
Status: CANCELLED | OUTPATIENT
Start: 2024-10-21

## 2024-10-20 RX ORDER — PANTOPRAZOLE SODIUM 40 MG/10ML
40 INJECTION, POWDER, LYOPHILIZED, FOR SOLUTION INTRAVENOUS 2 TIMES DAILY
Status: DISCONTINUED | OUTPATIENT
Start: 2024-10-20 | End: 2024-10-23 | Stop reason: HOSPADM

## 2024-10-20 RX ORDER — TAMSULOSIN HYDROCHLORIDE 0.4 MG/1
0.4 CAPSULE ORAL 2 TIMES DAILY
Status: DISCONTINUED | OUTPATIENT
Start: 2024-10-20 | End: 2024-10-20 | Stop reason: HOSPADM

## 2024-10-20 RX ORDER — FINASTERIDE 5 MG/1
5 TABLET, FILM COATED ORAL DAILY
Status: DISCONTINUED | OUTPATIENT
Start: 2024-10-21 | End: 2024-10-23 | Stop reason: HOSPADM

## 2024-10-20 RX ORDER — FOLIC ACID 1 MG/1
1 TABLET ORAL DAILY
Status: DISCONTINUED | OUTPATIENT
Start: 2024-10-21 | End: 2024-10-23 | Stop reason: HOSPADM

## 2024-10-20 RX ORDER — FOLIC ACID 1 MG/1
1 TABLET ORAL DAILY
Status: DISCONTINUED | OUTPATIENT
Start: 2024-10-20 | End: 2024-10-20 | Stop reason: HOSPADM

## 2024-10-20 RX ORDER — TAMSULOSIN HYDROCHLORIDE 0.4 MG/1
0.4 CAPSULE ORAL 2 TIMES DAILY
Status: DISCONTINUED | OUTPATIENT
Start: 2024-10-20 | End: 2024-10-20

## 2024-10-20 RX ORDER — ACETAMINOPHEN 325 MG/1
1000 TABLET ORAL EVERY 8 HOURS PRN
Status: DISCONTINUED | OUTPATIENT
Start: 2024-10-20 | End: 2024-10-20 | Stop reason: HOSPADM

## 2024-10-20 RX ORDER — SPIRONOLACTONE 25 MG/1
25 TABLET ORAL DAILY
Status: CANCELLED | OUTPATIENT
Start: 2024-10-21

## 2024-10-20 RX ORDER — ATORVASTATIN CALCIUM 40 MG/1
40 TABLET, FILM COATED ORAL DAILY
Status: DISCONTINUED | OUTPATIENT
Start: 2024-10-20 | End: 2024-10-20 | Stop reason: HOSPADM

## 2024-10-20 RX ORDER — ATORVASTATIN CALCIUM 40 MG/1
40 TABLET, FILM COATED ORAL DAILY
Status: DISCONTINUED | OUTPATIENT
Start: 2024-10-20 | End: 2024-10-20

## 2024-10-20 RX ORDER — METOPROLOL SUCCINATE 25 MG/1
25 TABLET, EXTENDED RELEASE ORAL DAILY
Status: DISCONTINUED | OUTPATIENT
Start: 2024-10-21 | End: 2024-10-23 | Stop reason: HOSPADM

## 2024-10-20 RX ORDER — ATORVASTATIN CALCIUM 40 MG/1
40 TABLET, FILM COATED ORAL DAILY
Status: CANCELLED | OUTPATIENT
Start: 2024-10-21

## 2024-10-20 RX ORDER — CALCIUM CARBONATE 200(500)MG
1500 TABLET,CHEWABLE ORAL
Status: DISCONTINUED | OUTPATIENT
Start: 2024-10-20 | End: 2024-10-20 | Stop reason: HOSPADM

## 2024-10-20 RX ORDER — ACETAMINOPHEN 325 MG/1
1000 TABLET ORAL EVERY 8 HOURS PRN
Status: DISCONTINUED | OUTPATIENT
Start: 2024-10-20 | End: 2024-10-23 | Stop reason: HOSPADM

## 2024-10-20 RX ORDER — CALCIUM CARBONATE 200(500)MG
1500 TABLET,CHEWABLE ORAL
Status: DISCONTINUED | OUTPATIENT
Start: 2024-10-20 | End: 2024-10-23 | Stop reason: HOSPADM

## 2024-10-20 RX ORDER — ACETAMINOPHEN 325 MG/1
1000 TABLET ORAL EVERY 8 HOURS PRN
Status: CANCELLED | OUTPATIENT
Start: 2024-10-20

## 2024-10-20 RX ADMIN — SODIUM CHLORIDE 500 ML: 9 INJECTION, SOLUTION INTRAVENOUS at 05:47

## 2024-10-20 RX ADMIN — IOHEXOL 90 ML: 350 INJECTION, SOLUTION INTRAVENOUS at 05:44

## 2024-10-20 SDOH — SOCIAL STABILITY: SOCIAL INSECURITY: HAVE YOU HAD ANY THOUGHTS OF HARMING ANYONE ELSE?: NO

## 2024-10-20 SDOH — SOCIAL STABILITY: SOCIAL INSECURITY: ARE THERE ANY APPARENT SIGNS OF INJURIES/BEHAVIORS THAT COULD BE RELATED TO ABUSE/NEGLECT?: NO

## 2024-10-20 SDOH — SOCIAL STABILITY: SOCIAL INSECURITY: ARE YOU OR HAVE YOU BEEN THREATENED OR ABUSED PHYSICALLY, EMOTIONALLY, OR SEXUALLY BY ANYONE?: NO

## 2024-10-20 SDOH — SOCIAL STABILITY: SOCIAL INSECURITY: WERE YOU ABLE TO COMPLETE ALL THE BEHAVIORAL HEALTH SCREENINGS?: YES

## 2024-10-20 SDOH — SOCIAL STABILITY: SOCIAL INSECURITY: DO YOU FEEL ANYONE HAS EXPLOITED OR TAKEN ADVANTAGE OF YOU FINANCIALLY OR OF YOUR PERSONAL PROPERTY?: NO

## 2024-10-20 SDOH — SOCIAL STABILITY: SOCIAL INSECURITY: DO YOU FEEL UNSAFE GOING BACK TO THE PLACE WHERE YOU ARE LIVING?: NO

## 2024-10-20 SDOH — SOCIAL STABILITY: SOCIAL INSECURITY: HAVE YOU HAD THOUGHTS OF HARMING ANYONE ELSE?: NO

## 2024-10-20 SDOH — SOCIAL STABILITY: SOCIAL INSECURITY: ABUSE: ADULT

## 2024-10-20 SDOH — HEALTH STABILITY: MENTAL HEALTH: EXPERIENCED ANY OF THE FOLLOWING LIFE EVENTS: CAUSE OF INJURY OR DEATH TO SOMEONE ELSE

## 2024-10-20 SDOH — SOCIAL STABILITY: SOCIAL INSECURITY: DOES ANYONE TRY TO KEEP YOU FROM HAVING/CONTACTING OTHER FRIENDS OR DOING THINGS OUTSIDE YOUR HOME?: NO

## 2024-10-20 SDOH — SOCIAL STABILITY: SOCIAL INSECURITY: HAS ANYONE EVER THREATENED TO HURT YOUR FAMILY OR YOUR PETS?: NO

## 2024-10-20 ASSESSMENT — ACTIVITIES OF DAILY LIVING (ADL)
HEARING - RIGHT EAR: FUNCTIONAL
ASSISTIVE_DEVICE: WALKER;WHEELCHAIR
FEEDING YOURSELF: INDEPENDENT
HEARING - LEFT EAR: FUNCTIONAL
JUDGMENT_ADEQUATE_SAFELY_COMPLETE_DAILY_ACTIVITIES: YES
ADEQUATE_TO_COMPLETE_ADL: YES
BATHING: DEPENDENT
TOILETING: NEEDS ASSISTANCE
DRESSING YOURSELF: NEEDS ASSISTANCE
WALKS IN HOME: NEEDS ASSISTANCE
GROOMING: NEEDS ASSISTANCE
PATIENT'S MEMORY ADEQUATE TO SAFELY COMPLETE DAILY ACTIVITIES?: YES

## 2024-10-20 ASSESSMENT — LIFESTYLE VARIABLES
AUDIT-C TOTAL SCORE: 0
HOW OFTEN DO YOU HAVE 6 OR MORE DRINKS ON ONE OCCASION: NEVER
HOW OFTEN DO YOU HAVE A DRINK CONTAINING ALCOHOL: NEVER
PRESCIPTION_ABUSE_PAST_12_MONTHS: NO
AUDIT-C TOTAL SCORE: 0
SUBSTANCE_ABUSE_PAST_12_MONTHS: NO
SKIP TO QUESTIONS 9-10: 1
HOW MANY STANDARD DRINKS CONTAINING ALCOHOL DO YOU HAVE ON A TYPICAL DAY: PATIENT DOES NOT DRINK

## 2024-10-20 ASSESSMENT — ENCOUNTER SYMPTOMS
DIARRHEA: 0
CHILLS: 0
BRUISES/BLEEDS EASILY: 1
CONSTIPATION: 1
CONSTITUTIONAL NEGATIVE: 1
PSYCHIATRIC NEGATIVE: 1
DYSURIA: 0
SHORTNESS OF BREATH: 0
CONSTIPATION: 1
FATIGUE: 0
WHEEZING: 0
RECTAL PAIN: 0
BLOOD IN STOOL: 1
BACK PAIN: 0
ENDOCRINE NEGATIVE: 1
EYES NEGATIVE: 1
SORE THROAT: 0
ARTHRALGIAS: 0
RESPIRATORY NEGATIVE: 1
DIAPHORESIS: 0
PALPITATIONS: 0
ALLERGIC/IMMUNOLOGIC NEGATIVE: 1
CARDIOVASCULAR NEGATIVE: 1
ARTHRALGIAS: 1
BLOOD IN STOOL: 1
MYALGIAS: 0
WOUND: 0
HEMATURIA: 0
COUGH: 0
BRUISES/BLEEDS EASILY: 1

## 2024-10-20 ASSESSMENT — PAIN - FUNCTIONAL ASSESSMENT
PAIN_FUNCTIONAL_ASSESSMENT: 0-10

## 2024-10-20 ASSESSMENT — COLUMBIA-SUICIDE SEVERITY RATING SCALE - C-SSRS
6. HAVE YOU EVER DONE ANYTHING, STARTED TO DO ANYTHING, OR PREPARED TO DO ANYTHING TO END YOUR LIFE?: NO
1. IN THE PAST MONTH, HAVE YOU WISHED YOU WERE DEAD OR WISHED YOU COULD GO TO SLEEP AND NOT WAKE UP?: NO
2. HAVE YOU ACTUALLY HAD ANY THOUGHTS OF KILLING YOURSELF?: NO

## 2024-10-20 ASSESSMENT — COGNITIVE AND FUNCTIONAL STATUS - GENERAL
CLIMB 3 TO 5 STEPS WITH RAILING: A LOT
HELP NEEDED FOR BATHING: A LOT
DRESSING REGULAR LOWER BODY CLOTHING: A LITTLE
TURNING FROM BACK TO SIDE WHILE IN FLAT BAD: A LITTLE
TURNING FROM BACK TO SIDE WHILE IN FLAT BAD: A LITTLE
DRESSING REGULAR UPPER BODY CLOTHING: A LOT
PERSONAL GROOMING: A LOT
DAILY ACTIVITIY SCORE: 15
STANDING UP FROM CHAIR USING ARMS: A LITTLE
MOVING TO AND FROM BED TO CHAIR: A LOT
MOVING FROM LYING ON BACK TO SITTING ON SIDE OF FLAT BED WITH BEDRAILS: A LITTLE
DAILY ACTIVITIY SCORE: 14
MOVING FROM LYING ON BACK TO SITTING ON SIDE OF FLAT BED WITH BEDRAILS: A LITTLE
DRESSING REGULAR UPPER BODY CLOTHING: A LOT
HELP NEEDED FOR BATHING: A LOT
TOILETING: A LOT
WALKING IN HOSPITAL ROOM: A LOT
CLIMB 3 TO 5 STEPS WITH RAILING: A LOT
DRESSING REGULAR LOWER BODY CLOTHING: A LOT
PATIENT BASELINE BEDBOUND: NO
TOILETING: A LOT
MOBILITY SCORE: 15
MOBILITY SCORE: 15
STANDING UP FROM CHAIR USING ARMS: A LITTLE
MOVING TO AND FROM BED TO CHAIR: A LOT
PERSONAL GROOMING: A LOT
WALKING IN HOSPITAL ROOM: A LOT

## 2024-10-20 ASSESSMENT — PAIN SCALES - GENERAL
PAINLEVEL_OUTOF10: 0 - NO PAIN

## 2024-10-20 ASSESSMENT — PATIENT HEALTH QUESTIONNAIRE - PHQ9
SUM OF ALL RESPONSES TO PHQ9 QUESTIONS 1 & 2: 0
1. LITTLE INTEREST OR PLEASURE IN DOING THINGS: NOT AT ALL
2. FEELING DOWN, DEPRESSED OR HOPELESS: NOT AT ALL

## 2024-10-20 NOTE — ASSESSMENT & PLAN NOTE
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 Mcneil he will need to be transferred to [facility] for factor 8 needs     Elevated creatine   ED requires IV contrast to better evaluate for potential source of active bleeding.   Patient will be hydrated accordingly   Trend  Monitor meds will hold home diuretics     Anemia  On folic acide and iron     HFpEF  Follows with Dr Dugan  Home jardiance, spironlactone, metoprolol XL and bumex for diruetic     BPH  On tamsulosin     Disposition   Transfer to [facility] to medicine service tele with needs for GI and hemeatology consults     DVT prophalysis non with bleeding and known hemophilla

## 2024-10-20 NOTE — PROGRESS NOTES
Pharmacy Medication History Review   Spoke to the patient, only 1 change [facility] was D/C'd and put on Metoprolol a few months ago    Ramón Flores is a 89 y.o. male admitted for No Principal Problem: There is no principal problem currently on the Problem List. Please update the Problem List and refresh.. Pharmacy reviewed the patient's [id number] medications and allergies for accuracy.    The list below reflectives the updated PTA list. Please review each medication in order reconciliation for additional clarification and justification.   The following updates were made to the Prior to Admission medication list:     Source of Information:     Medications ADDED:   Metoprolol  Medications CHANGED:    Medications REMOVED:   Norvasc  Medications NOT TAKING:       Allergy reviewed : Yes    Comments:     Prior to Admission Medications   Prescriptions Last Dose Informant   Lactobacillus acidoph-L.bulgar 1 million cell tablet tablet 10/19/2024 Morning Self, Family Member   Sig: Take 1 tablet by mouth once daily.   acetaminophen (Tylenol Extra Strength) 500 mg tablet     Sig: Take 2 tablets (1,000 mg) by mouth every 8 hours if needed for mild pain (1 - 3).   antihemophilic RF VIII (Altuviiio) 3,000 (+/-) unit recon soln     Sig: Infuse 3,175 Units into a venous catheter every 7 days. 3175 units +/-10% dose every 7 days.  Additionally,  prn when directed.   antihemophilic RF VIII (Altuviiio) 3,000 (+/-) unit recon soln     Sig: Infuse 3,175 Units into a venous catheter every 7 days. 3175 units +/-10% dose every 7 days.  Additionally,  prn when directed.   atorvastatin (Lipitor) 40 mg tablet 10/19/2024 Morning Self, Family Member   Sig: Take 1 tablet (40 mg) by mouth once daily.   bumetanide (Bumex) 1 mg tablet 10/19/2024 Morning    Sig: Take 1 tablet (1 mg) by mouth once daily.   calcium carbonate 600 mg calcium (1,500 mg) tablet 10/19/2024 Morning Self, Family Member   Sig: Take 1 tablet (1,500 mg) by mouth 2  times daily (morning and late afternoon).   empagliflozin (Jardiance) 10 mg 10/19/2024 Morning    Sig: Take 1 tablet (10 mg) by mouth once daily.   ferrous sulfate 325 (65 Fe) MG tablet 10/19/2024 Morning Self, Family Member   Sig: Take 1 tablet (325 mg) by mouth once daily.   finasteride (Proscar) 5 mg tablet 10/19/2024 Morning Self, Family Member   Sig: Take 1 tablet (5 mg) by mouth once daily.   folic acid (Folvite) 1 mg tablet 10/19/2024 Morning Self, Family Member   Sig: Take 1 tablet (1 mg) by mouth once daily.   metoprolol succinate XL (Toprol-XL) 25 mg 24 hr tablet 10/19/2024 Morning    Sig: Take 1 tablet (25 mg) by mouth once daily. Do not crush or chew. (1/2 tab 50 mg)   multivitamin with minerals (multivitamin-iron-folic acid) tablet  Self, Family Member   Sig: Take 1 tablet by mouth once daily.   nystatin (Mycostatin) cream     Sig: Apply 1 Application topically 2 times a day.   pantoprazole (ProtoNix) 40 mg EC tablet  Self, Family Member   Sig: Take 1 tablet (40 mg) by mouth once daily in the morning. Take before meals. Do not crush, chew, or split.   spironolactone (Aldactone) 25 mg tablet 10/19/2024 Morning Self, Family Member   Sig: Take 1 tablet (25 mg) by mouth once daily.   tamsulosin (Flomax) 0.4 mg 24 hr capsule 10/19/2024 Evening Self, Family Member   Sig: Take 1 capsule (0.4 mg) by mouth 2 times a day.   vit A/vit C/vit E/zinc/copper (PRESERVISION AREDS ORAL) 10/19/2024 Morning Self, Family Member   Sig: Take 1 capsule by mouth once daily.      Facility-Administered Medications: None       The list below reflectives the updated allergy list. Please review each documented allergy for additional clarification and justification.  Allergies  Reviewed by Whit Short on 10/20/2024        Severity Reactions Comments    Aspirin High Bleeding hemophiliac    Penicillins Low Rash             Below are additional concerns with the patient's PTA list.      Whit Short

## 2024-10-20 NOTE — CARE PLAN
Mr. 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     Patient is HDS without severe anemia. No updated coags available. CTA A/P without active extravasation.     Plan:  - PTT and factor VIII level now  - working with lab to see if factor VIII level can be run tonight vs tomorrow morning  - once baseline levels drawn, will start advate 70u/kg q12 to start  - please obtain trough factor VIII level before 2nd advate dose due 10/21 AM    Full consult note to follow in AM. Please page with any questions. Patient discussed with Dr. Berger and Dr. Dillon.     Deepak Sparrow MD  Hematology-Oncology Fellow, PGY5  Hematology Consult Pager: [phone]  Oncology Consult Pager: [phone]

## 2024-10-20 NOTE — ED PROVIDER NOTES
HPI   Chief Complaint   Patient presents with    Rectal Bleeding    Constipation       HPI  Patient is a 89-year-old male with a past medical history significant for hemophilia a, atrial fibrillation, protein C deficiency, hypertension, nephrolithiasis, BPH, CAD status post CABG 20 years ago who is not currently on blood thinners due to his history of hemophilia who presents emergency room with a chief complaint of GI bleed.  He had noted that he was a little constipated but without any pain.  For the past 3 to 4 days he has noted some blood every time he wipes.  He also noted a little bit of blood in his underwear and some on the sheets when he slept.  He denies any abdominal pain, dizziness, lightheadedness, nausea, vomiting, syncope or any other symptoms.  He presented tonight because he was worried about the going blood in the setting of his history of hemophilia.    PMHx: As above  PSHx: As above  FamilyHx: Denies pertinent  SocialHx: Denies  Allergies: Aspirin and penicillin  Medications: See Medication Reconciliation     ROS  As above otherwise denies      Physical Exam    GENERAL: Awake and Alert, No Acute Distress  HEENT: AT/NC, PERRL, EOMI, Normal Oropharynx, No Signs of Dehydration  NECK: Normal Inspection, No JVD  CARDIOVASCULAR: Irregularly irregular, No M/R/G  RESPIRATORY: CTA Bilaterally, No Wheezes, Rales or Rhonchi, Chest Wall Non-tender  ABDOMEN: Soft, non-tender abdomen, Normal Bowel Sounds, No Distention  BACK: No CVA Tenderness  SKIN: Normal Color, Warm, Dry, No Rashes   EXTREMITIES: Non-Tender, Full ROM, No Pedal Edema  NEURO: A&O x 3, Normal Motor and Sensation, Normal Mood and Affect    Nursing Assessment and Vitals Reviewed    EKG showed atrial fibrillation at 72 bpm with a left anterior fascicular block.  No ischemic ST changes.  No T wave inversions or axis deviation    Medical Decision  Patient is a 89-year-old male with a past medical history significant for hemophilia a, atrial  fibrillation, protein C deficiency, hypertension, nephrolithiasis, BPH, CAD status post CABG 20 years ago who is not currently on blood thinners due to his history of hemophilia who presents emergency room with a chief complaint of GI bleed.  He had noted that he was a little constipated but without any pain.  For the past 3 to 4 days he has noted some blood every time he wipes.  He also noted a little bit of blood in his underwear and some on the sheets when he slept.  He denies any abdominal pain, dizziness, lightheadedness, nausea, vomiting, syncope or any other symptoms.  He presented tonight because he was worried about the going blood in the setting of his history of hemophilia.    On evaluation patient is very well-appearing and in no acute distress.  Lungs are clear and heart is irregularly irregular.  Rectal exam performed with RN chaperone, acute history, showed 2 external hemorrhoids but appeared hemostatic without signs of thrombosis.  Rectal exam, however, did show bright red blood in the rectal vault.  Given this and his history of hemophilia CT imaging is ordered to evaluate for active GI bleed.      Workup for patient thus far revealed an elevated BUN and creatinine slightly higher than previous.  Bilirubin is elevated but at baseline.  He has no leukocytosis and his hemoglobin is 13.3.  Patient is signed out to the oncoming physician pending CT imaging and final disposition.              Patient History   Past Medical History:   Diagnosis Date    Acute deep vein thrombosis (DVT) of left femoral vein (Multi) 09/10/2023    ACUTE DEEP VEIN THROMBOSIS, L FEMORAL, HEMOPHILIA    Acute diastolic heart failure 04/16/2023    Benign prostatic hyperplasia without lower urinary tract symptoms 01/07/2016    Enlarged prostate without lower urinary tract symptoms (luts)    Bleeding hemorrhoids 03/01/2023    CAP (community acquired pneumonia) 01/18/2024    Closed nondisplaced fracture of head of left radius  09/05/2023    COVID-19 05/21/2023    Enlarged prostate with lower urinary tract symptoms (LUTS) 03/01/2023    Fracture of bone of hip (Multi) 09/05/2023    Fracture of femoral neck, right 03/01/2023    Gastrointestinal hemorrhage 09/05/2023    LOWER GI BLEED    Gingiva hemorrhage 09/05/2023    Gross hematuria 03/01/2023    Hemarthrosis of ankle joint 04/18/2019    Hemarthrosis of knee 05/09/2022    Hematochezia 09/05/2023    Hematoma of lower extremity 09/05/2023    Hematoma of right thigh 03/01/2023    History of recent fall 05/31/2023    Knee effusion 09/05/2023    Knee effusion, left 03/01/2023    Lumbar pain 03/01/2023    Obstructive uropathy 04/28/2023    Orthostatic syncope 09/05/2023    Personal history of other endocrine, nutritional and metabolic disease     History of hyperlipidemia    Pneumonia of left lower lobe due to infectious organism 03/19/2024    Pneumonia of right lung due to infectious organism 03/01/2023    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    Subdural hematoma (Multi) 04/09/2023    Syncope 05/12/2023    Urinary retention 03/01/2023    UTI (urinary tract infection) 03/01/2023    Viral gastroenteritis 03/01/2023     Past Surgical History:   Procedure Laterality Date    CORONARY ARTERY BYPASS GRAFT       Family History   Problem Relation Name Age of Onset    Hemophilia Mother       Social History     Tobacco Use    Smoking status: Never    Smokeless tobacco: Never   Substance Use Topics    Alcohol use: Defer    Drug use: Never       Physical Exam   ED Triage Vitals   Temperature Heart Rate Respirations BP   10/20/24 0224 10/20/24 0224 10/20/24 0224 10/20/24 0226   36.7 °C (98.1 °F) 75 15 112/59      Pulse Ox Temp Source Heart Rate Source Patient Position   10/20/24 0224 10/20/24 0224 10/20/24 0224 --   96 % Oral Monitor       BP Location FiO2 (%)     -- --             Physical Exam      ED Course & MDM   Diagnoses as of 10/20/24 0648   Gastrointestinal hemorrhage, unspecified gastrointestinal  hemorrhage type                 No data recorded     Evan Coma Scale Score: 15 (10/20/24 0227 : Vinod Grijalva RN)                           Medical Decision Making      Procedure  Procedures     Rona Gaviria MD  10/20/24 0663

## 2024-10-20 NOTE — PROGRESS NOTES
Emergency Medicine Transition of Care Note.    I received Ramón Flores in signout from Dr. Lorenzo.  Please see the previous ED provider note for all HPI, PE and MDM up to the time of signout at 6 AM. This is in addition to the primary record.    In brief Ramón Flores is an 89 y.o. male presenting for   Chief Complaint   Patient presents with    Rectal Bleeding    Constipation     At the time of signout we were awaiting: CT angio results    Diagnoses as of 10/20/24 0917   Gastrointestinal hemorrhage, unspecified gastrointestinal hemorrhage type       Medical Decision Making  CT angio of the abdomen pelvis shows no evidence of active GI bleed, patient has incidental findings be referred to the radiology report patient's hemoglobin is stable, as per recommendation of Nel patient will be hospitalized for serial hemoglobins and further gastroenterology evaluation.    Final diagnoses:   [K92.2] Gastrointestinal hemorrhage, unspecified gastrointestinal hemorrhage type           Procedure  Procedures    Charan Lesile MD

## 2024-10-20 NOTE — SIGNIFICANT EVENT
SENIOR STAFFING NOTE    89-year-old male with past medical history of hemophilia A on weekly infusions of factor VIII (last infusion was 10/15), A-fib not on anticoagulation, protein C deficiency, CAD status post CABG and hypertension presenting with bright red blood per rectum.  Patient endorses constipation for the past 4 days and has noted bright red blood when he wipes after using the restroom.  Patient denying abdominal pain, dizziness, lightheadedness, nausea, vomiting, syncope, anal pain.  Colonoscopy in June 2018 showed nonthrombosed large external hemorrhoids, moderate nonbleeding internal hemorrhoids.    In the emergency department [facility] patient was afebrile, hemodynamic stable.  On physical, external hemorrhoids notes, dried blood around the anus present, no active bleeding. CMP showing mildly elevated BUN, RONNIE, chronically elevated bilirubin.  CBC showing stable hemoglobin, chronic thrombocytopenia.  CT angio abdomen pelvis showing diverticulosis otherwise negative for active bleed.  Patient given 500 cc of normal saline.    Assessment and Plan  89-year-old male with past medical history of hemophilia A and protein C deficiency presenting with bright red blood per rectum.  Patient has external hemorrhoids on physical exam, blood in rectal vault.  CBC is stable.  With history of constipation, bleeding likely in the setting of external hemorrhoids however consideration can be given to diverticular bleeding as diverticulosis was seen on CTAP.  Patient is currently hemodynamically stable and does not require any transfusions. RONNIE likely in setting of dehydration, unlikely related to blood loss  #Lower GI bleed  #Hemorrhoids  #Hemophilia  #Protein C deficiency  #RONNIE on CKD  - GI to evaluate in the AM, no need for urgent consultation at this time (HDS, CBC stable)  - Factor 8 infusions per hematology, ensure factor 8 activity levels ordered BID  - Keep active type and screen  - Consented for  blood products  - Follow up coagulation panel   - IV PPI BID  - Will give IVF overnight iso RONNIE  - Will hold metoprolol, bumex, and spironolactone iso GI bleed and RONNIE  - Hold jardiance  - Mild corrective sliding scale    F: IVF overnight  E: k> 4, mg >2  N: npo at midnight  A: piv    DVT PPX: SCDs, holding chemoppx iso gi bleed  GIPPX: PPI BID  Bowel regimen: miralax    Code status (discussed on admission): DNR/DNI

## 2024-10-20 NOTE — PROGRESS NOTES
Patient presents with active GI bleed in the setting of history of hemophilia.  He requires IV contrast to better evaluate for potential source of active bleeding.  Patient will be hydrated accordingly.    Rona Gaviria MD

## 2024-10-20 NOTE — H&P
History Of Present Illness  Ramón Flores is a 89 y.o. male presenting with rectal bleeding.    ED HPI  Patient called EMS at 2 am for rectal bleeding in setting of known hemophilla and protein C deficiency who gets weekly infusions of factor 8.   Rectal Bleeding    Constipation         HPI  Patient is a 89-year-old male with a past medical history significant for hemophilia a, atrial fibrillation, protein C deficiency, hypertension, nephrolithiasis, BPH, CAD status post CABG 20 years ago who is not currently on blood thinners due to his history of hemophilia who presents emergency room with a chief complaint of GI bleed.  He had noted that he was a little constipated but without any pain.  For the past 3 to 4 days he has noted some blood every time he wipes.  He also noted a little bit of blood in his underwear and some on the sheets when he slept.  He denies any abdominal pain, dizziness, lightheadedness, nausea, vomiting, syncope or any other symptoms.  He presented tonight because he was worried about the going blood in the setting of his history of hemophilia.    Interviewed patient and currently denies dizziness, shortness of breath, chest pain, weakness or other complaints. Complains of bleeding from rectum. Bleeding noted anal area. Discussed with hematologist and he will need transfer to [facility] for heme and GI consults.  Discussed with transfer center and after discussions with hemetology services will be admitted to medicine.  Appreciate their help.  Patient and family aware of plan.        Past Medical History  Past Medical History:   Diagnosis Date    Acute deep vein thrombosis (DVT) of left femoral vein (Multi) 09/10/2023    ACUTE DEEP VEIN THROMBOSIS, L FEMORAL, HEMOPHILIA    Acute diastolic heart failure 04/16/2023    Benign prostatic hyperplasia without lower urinary tract symptoms 01/07/2016    Enlarged prostate without lower urinary tract symptoms (luts)    Bleeding hemorrhoids 03/01/2023    CAP  (community acquired pneumonia) 01/18/2024    Closed nondisplaced fracture of head of left radius 09/05/2023    COVID-19 05/21/2023    Enlarged prostate with lower urinary tract symptoms (LUTS) 03/01/2023    Fracture of bone of hip (Multi) 09/05/2023    Fracture of femoral neck, right 03/01/2023    Gastrointestinal hemorrhage 09/05/2023    LOWER GI BLEED    Gingiva hemorrhage 09/05/2023    Gross hematuria 03/01/2023    Hemarthrosis of ankle joint 04/18/2019    Hemarthrosis of knee 05/09/2022    Hematochezia 09/05/2023    Hematoma of lower extremity 09/05/2023    Hematoma of right thigh 03/01/2023    History of recent fall 05/31/2023    Knee effusion 09/05/2023    Knee effusion, left 03/01/2023    Lumbar pain 03/01/2023    Obstructive uropathy 04/28/2023    Orthostatic syncope 09/05/2023    Personal history of other endocrine, nutritional and metabolic disease     History of hyperlipidemia    Pneumonia of left lower lobe due to infectious organism 03/19/2024    Pneumonia of right lung due to infectious organism 03/01/2023    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 He has never used smokeless tobacco. Alcohol use questions deferred to the physician. He reports that he does not use drugs.  Retired after 50 years for [employer]     Family History  Family History   Problem Relation Name Age of Onset    Hemophilia Mother          Allergies  Aspirin and Penicillins    Review of Systems   Constitutional: Negative.    HENT: Negative.     Eyes: Negative.    Respiratory: Negative.     Cardiovascular: Negative.    Gastrointestinal:  Positive for blood in stool and constipation.   Endocrine:  Negative.    Genitourinary: Negative.    Musculoskeletal:  Positive for arthralgias and gait problem.   Skin: Negative.    Allergic/Immunologic: Negative.    Hematological:  Bruises/bleeds easily.        Hemophiliac    Psychiatric/Behavioral: Negative.          Physical Exam  Constitutional:       Appearance: Normal appearance.   HENT:      Mouth/Throat:      Mouth: Mucous membranes are moist.   Cardiovascular:      Rate and Rhythm: Regular rhythm.   Pulmonary:      Effort: Pulmonary effort is normal.   Abdominal:      Palpations: Abdomen is soft.      Comments: Rectal area inspected blood staining obvious to anal area    Musculoskeletal:         General: Normal range of motion.   Skin:     General: Skin is warm and dry.   Neurological:      General: No focal deficit present.      Mental Status: He is alert and oriented to person, place, and time.          Last Recorded Vitals  Blood pressure 138/86, pulse 62, temperature 36.4 °C (97.5 °F), resp. rate 19, SpO2 98%.    Relevant Results  Scheduled medications    Continuous medications    PRN medications     Results for orders placed or performed during the hospital encounter of 10/20/24 (from the past 24 hours)   CBC and Auto Differential   Result Value Ref Range    WBC 5.7 4.4 - 11.3 x10*3/uL    nRBC 0.0 0.0 - 0.0 /100 WBCs    RBC 3.83 (L) 4.50 - 5.90 x10*6/uL    Hemoglobin 13.3 (L) 13.5 - 17.5 g/dL    Hematocrit 38.5 (L) 41.0 - 52.0 %     (H) 80 - 100 fL    MCH 34.7 (H) 26.0 - 34.0 pg    MCHC 34.5 32.0 - 36.0 g/dL    RDW 13.5 11.5 - 14.5 %    Platelets 110 (L) 150 - 450 x10*3/uL    Immature Granulocytes %, Automated 6.5 (H) 0.0 - 0.9 %    Immature Granulocytes Absolute, Automated 0.37 0.00 - 0.50 x10*3/uL   Comprehensive metabolic panel   Result Value Ref Range    Glucose 110 (H) 74 - 99 mg/dL    Sodium 135 (L) 136 - 145 mmol/L    Potassium 4.2 3.5 - 5.3 mmol/L    Chloride 97 (L) 98 - 107 mmol/L    Bicarbonate 27 21 - 32 mmol/L    Anion Gap 15 10 - 20 mmol/L     Urea Nitrogen 41 (H) 6 - 23 mg/dL    Creatinine 1.48 (H) 0.50 - 1.30 mg/dL    eGFR 43 (L) >60 mL/min/1.73m*2    Calcium 9.1 8.6 - 10.3 mg/dL    Albumin 4.2 3.4 - 5.0 g/dL    Alkaline Phosphatase 81 33 - 136 U/L    Total Protein 7.0 6.4 - 8.2 g/dL    AST 21 9 - 39 U/L    Bilirubin, Total 1.5 (H) 0.0 - 1.2 mg/dL    ALT 16 10 - 52 U/L   Type and Screen   Result Value Ref Range    ABO TYPE O     Rh TYPE POS     ANTIBODY SCREEN NEG    Manual Differential   Result Value Ref Range    Neutrophils %, Manual 55.0 40.0 - 80.0 %    Lymphocytes %, Manual 18.0 13.0 - 44.0 %    Monocytes %, Manual 0.0 2.0 - 10.0 %    Eosinophils %, Manual 0.0 0.0 - 6.0 %    Basophils %, Manual 0.0 0.0 - 2.0 %    Atypical Lymphocytes %, Manual 24.0 0.0 - 2.0 %    Metamyelocytes %, Manual 3.0 0.0 - 0.0 %    Seg Neutrophils Absolute, Manual 3.14 1.60 - 5.00 x10*3/uL    Lymphocytes Absolute, Manual 1.03 0.80 - 3.00 x10*3/uL    Monocytes Absolute, Manual 0.00 (L) 0.05 - 0.80 x10*3/uL    Eosinophils Absolute, Manual 0.00 0.00 - 0.40 x10*3/uL    Basophils Absolute, Manual 0.00 0.00 - 0.10 x10*3/uL    Atypical Lymphs Absolute, Manual 1.37 (H) 0.00 - 0.30 x10*3/uL    Metamyelocytes Absolute, Manual 0.17 0.00 - 0.00 x10*3/uL    Total Cells Counted 100     RBC Morphology See Below     Ovalocytes Few     Teardrop Cells Few     Evans Cells Few     Acanthocytes Few     Bite Cells Present    Occult Blood, Stool    Specimen: Stool   Result Value Ref Range    Occult Blood, Stool X1 Positive (A) Negative     *Note: Due to a large number of results and/or encounters for the requested time period, some results have not been displayed. A complete set of results can be found in Results Review.      CT angio abdomen pelvis w and or wo IV IV contrast   Final Result   1.  Cardiomegaly and small hiatal hernia   2. 2 cm right renal cortical cyst   3. Uncomplicated colonic diverticulosis.   4. Urinary bladder wall thickening with enlarged prostate causing a   large  impression on the floor of the urinary bladder             MACRO:   None        Signed by: Roseline Luther 10/20/2024 8:05 AM   Dictation workstation:   [id number]                Assessment/Plan   Assessment & Plan  Gastrointestinal hemorrhage, unspecified gastrointestinal hemorrhage type  #Acute GIB  - +FOB  CT abd and pelvis no acute GI bleed   - H&H stable  -Trend H&H  -GI consult at main [facility]  - Known severe hemophilia A due to concurrent protein C deficiency  - check coags   On home PPI       Hemophilla A due to concurrent protein C deficiency  Now with bleeding rectal episode  Known weekly factor 8 infusions at home   Discussed with is known hematology team attending Dr. Mcneil he will need to be transferred to [facility] for factor 8 needs     Elevated creatine   ED requires IV contrast to better evaluate for potential source of active bleeding.   Patient will be hydrated accordingly   Trend  Monitor meds will hold home diuretics     Anemia  On folic acide and iron     HFpEF  Follows with Dr Dugan  Home jardiance, spironlactone, metoprolol XL and bumex for diruetic     BPH  On tamsulosin     Disposition   Transfer to [facility] to medicine service tele with needs for GI and hemeatology consults     DVT prophalysis non with bleeding and known hemophilla       Becca Newman, APRN-CNP

## 2024-10-20 NOTE — H&P
History Of Present Illness  HPI: Mr. Flores is a 89 year old male with history of hemophilia A, atrial fibrillation, protein C deficiency, hypertension, nephrolithiasis, BPH, CAD status post CABG 20 years ago who is not currently on blood thinners due to his history of hemophilia who presented as a transfer from [facility] due to concern for rectal bleeding in the setting of his known Hemophilia A and protein C deficiency. Patient reports he has otherwise been in his normal state of health, but has had 2-3 days of constipation and strained to have a bowel movement yesterday after which he noticed red streaks while wiping. Denies anal pain with this. He states that he has continued to have small amounts of blood in the stool and additionally EMS noted he had blood streaks in his diaper on arrival to . Colonoscopy last done in 2018 showing non-thrombosed external hemorrhoids, diminutive polyp in the sigmoid colon. He is on weekly factor VIII replacement. His hemoglobin at this point is above baseline (currently 13.3, over last 6 months has been around 11-12).     Significant ED data: HB 13.3, WBC 5.7, Plt 110, Cr 1.48, BUN 41, Na 135; ED READ: EKG showed atrial fibrillation at 72 bpm with a left anterior fascicular block. No ischemic ST changes. No T wave inversions or axis deviation;      Past Medical History  He has a past medical history of Acute deep vein thrombosis (DVT) of left femoral vein (Multi) (09/10/2023), Acute diastolic heart failure (04/16/2023), Benign prostatic hyperplasia without lower urinary tract symptoms (01/07/2016), Bleeding hemorrhoids (03/01/2023), CAP (community acquired pneumonia) (01/18/2024), Closed nondisplaced fracture of head of left radius (09/05/2023), COVID-19 (05/21/2023), Enlarged prostate with lower urinary tract symptoms (LUTS) (03/01/2023), Fracture of bone of hip (Multi) (09/05/2023), Fracture of femoral neck, right (03/01/2023), Gastrointestinal hemorrhage (09/05/2023),  Gingiva hemorrhage (09/05/2023), Gross hematuria (03/01/2023), Hemarthrosis of ankle joint (04/18/2019), Hemarthrosis of knee (05/09/2022), Hematochezia (09/05/2023), Hematoma of lower extremity (09/05/2023), Hematoma of right thigh (03/01/2023), History of recent fall (05/31/2023), Knee effusion (09/05/2023), Knee effusion, left (03/01/2023), Lumbar pain (03/01/2023), Obstructive uropathy (04/28/2023), Orthostatic syncope (09/05/2023), Personal history of other endocrine, nutritional and metabolic disease, Pneumonia of left lower lobe due to infectious organism (03/19/2024), Pneumonia of right lung due to infectious organism (03/01/2023), 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    Surgical History  He has a past surgical history that includes Coronary artery bypass graft.     Social History  He reports that he has never smoked. He has never used smokeless tobacco. Alcohol use questions deferred to the physician. He reports that he does not use drugs.    Family History  Family History   Problem Relation Name Age of Onset    Hemophilia Mother          Allergies  Aspirin and Penicillins    Review of Systems   Constitutional:  Negative for chills, diaphoresis and fatigue.   HENT:  Negative for congestion and sore throat.    Respiratory:  Negative for cough, shortness of breath and wheezing.    Cardiovascular:  Negative for chest pain, palpitations and leg swelling.   Gastrointestinal:  Positive for blood in stool and constipation. Negative for diarrhea and rectal pain.   Genitourinary:  Negative for dysuria, hematuria and urgency.   Musculoskeletal:  Negative for arthralgias, back pain and myalgias.   Skin:  Negative for pallor, rash and wound.   Hematological:  Bruises/bleeds easily.     Physical exam:   Constitutional: Awake, alert, NAD;  thin  Respiratory: LCTAB, normal respiratory effort  Cardiac: RRR, normal S1/S2, no murmur appreciated  Abdominal: soft, nontender, nondistended, + bowel sounds  Extremities: no lower extremity edema  Skin: no rash; warm, dry  Psych: appropriate mood and affect  Neuro: no focal deficits; moves all extremities spontaneously   GI: Superficial perirectal exam performed; external hemorrhoids noted with dried blood present on the rectal surface; no active bleeding     Assessment/Plan:   Mr. Flores is a 89 year old male with history of hemophilia a, atrial fibrillation, protein C deficiency, hypertension, nephrolithiasis, BPH, CAD status post CABG 20 years ago who is not currently on blood thinners due to his history of hemophilia who presented as a transfer from [facility] due to concern for rectal bleeding in the setting of his known Hemophilia A and protein C deficiency. He is overall feeling in his normal state of health and denies anal pain. He has been hemodynamically stable since admission. Will plan for routine GI and hematology consultations in the morning to evaluate need for possible scope/Factor VIII replenishment.     #Concern for GI bleed  #Hemophilia A  #Protein C deficiency  : Curently receiving weekly Altuviiio q7d  : Admission hemoglobin of 13 above 6 month baseline   - Hematology and GI consults in AM  - Hold home iron supplementation iso potential GI procedure  - Blood transfusion consent done with written form, photo in chart  - Will give 1 L fluid bolus overnight  - Contacted by heme fellow 10/20 PM - will order factor VIII activity; fellow planning to give q12h factor VIII, will plan for lab draws prior to administration     #RONNIE on CKD   : Baseline creatinine 1.1  : Potential etiology includes volume depletion iso GI blood loss    - Will give 1 L fluids overnight   - Monitor daily RFP     #HFpEF  : Last EF 50% on5/31/2024   : Home regimen: Jardiance + Metoprolol + Bumetanide  - Hold home meds given  concern for volume depletion     #Atrial fibrillation  : On home metoprolol   - Hold metoprolol given concern for blood loss, want to avoid masking of reactive tachycardia    #Thrombocytopenia  : Appears chronic, at this time above baseline   - Continue to monitor    #Hyperlipidemia   - Continue home Lipitor 40mg     #BPH  - Continue home finasteride 5mg daily + Flomax 0.4mg BID     F: PRN  E: Mg >2, K >4  N: Kosher    A: PIV    Code status: DNR/DNI  NOK: David Wallace (relative) [phone]     Last Recorded Vitals  /57 (BP Location: Left arm, Patient Position: Sitting)   Pulse 59   Temp 36.4 °C (97.5 °F) (Temporal)   Resp 18   Wt 60.6 kg (133 lb 9.6 oz)   SpO2 97%            Modesto Harper MD

## 2024-10-20 NOTE — DISCHARGE SUMMARY
Discharge Diagnosis  Gastrointestinal hemorrhage, unspecified gastrointestinal hemorrhage type    Issues Requiring Follow-Up  [place] [facility] [place] in setting of hemophiliac and weekly infusions of factor 8  Hospital Course   Ramón Flores is a 89 y.o. male presenting with rectal bleeding.     ED HPI  Patient called EMS at 2 am for rectal bleeding in setting of known hemophilla and protein C deficiency who gets weekly infusions of factor 8.   Rectal Bleeding    Constipation         HPI  Patient is a 89-year-old male with a past medical history significant for hemophilia a, atrial fibrillation, protein C deficiency, hypertension, nephrolithiasis, BPH, CAD status post CABG 20 years ago who is not currently on blood thinners due to his history of hemophilia who presents emergency room with a chief complaint of GI bleed.  He had noted that he was a little constipated but without any pain.  For the past 3 to 4 days he has noted some blood every time he wipes.  He also noted a little bit of blood in his underwear and some on the sheets when he slept.  He denies any abdominal pain, dizziness, lightheadedness, nausea, vomiting, syncope or any other symptoms.  He presented tonight because he was worried about the going blood in the setting of his history of hemophilia.     Interviewed patient and currently denies dizziness, shortness of breath, chest pain, weakness or other complaints. Complains of bleeding from rectum. Bleeding noted anal area. Discussed with hematologist and he will need transfer to [facility] [place] heme and GI consults.  Discussed with transfer center and after discussions with hemetology services will be admitted to medicine.  Appreciate their help.  Patient and family aware of plan.     Pertinent Physical Exam At Time of Discharge  Physical Exam  Constitutional:       Appearance: Normal appearance.   HENT:      Mouth/Throat:      Mouth: Mucous membranes are moist.   Cardiovascular:      Rate and Rhythm:  Regular rhythm.   Abdominal:      Palpations: Abdomen is soft.      Comments: Rectal bleeding stain area to anal area noted    Musculoskeletal:         General: Normal range of motion.   Skin:     General: Skin is warm and dry.   Neurological:      General: No focal deficit present.      Mental Status: He is alert and oriented to person, place, and time.         Home Medications     Medication List      ASK your doctor about these medications     acetaminophen 500 mg tablet; Commonly known as: Tylenol Extra Strength;   Take 2 tablets (1,000 mg) by mouth every 8 hours if needed for mild pain   (1 - 3).   * antihemophilic RF VIII 3,000 (+/-) unit recon soln; Commonly known as:   Altuviiio; Infuse 3,175 Units into a venous catheter every 7 days. 3175   units +/-10% dose every 7 days.  Additionally,  prn when directed.   * antihemophilic RF VIII 3,000 (+/-) unit recon soln; Commonly known as:   Altuviiio; Infuse 3,175 Units into a venous catheter every 7 days. 3175   units +/-10% dose every 7 days.  Additionally,  prn when directed.   atorvastatin 40 mg tablet; Commonly known as: Lipitor; Take 1 tablet (40   mg) by mouth once daily.   bumetanide 1 mg tablet; Commonly known as: Bumex; Take 1 tablet (1 mg)   by mouth once daily.   calcium carbonate 600 mg calcium (1,500 mg) tablet   ferrous sulfate (325 mg ferrous sulfate) tablet   finasteride 5 mg tablet; Commonly known as: Proscar; Take 1 tablet (5   mg) by mouth once daily.   folic acid 1 mg tablet; Commonly known as: Folvite   Jardiance 10 mg; Generic drug: empagliflozin; Take 1 tablet (10 mg) by   mouth once daily.   lactobacillus acidophilus tablet tablet   metoprolol succinate XL 25 mg 24 hr tablet; Commonly known as: Toprol-XL   multivitamin with minerals tablet   nystatin cream; Commonly known as: Mycostatin; Apply 1 Application   topically 2 times a day.   pantoprazole 40 mg EC tablet; Commonly known as: ProtoNix; Take 1 tablet   (40 mg) by mouth once daily in  the morning. Take before meals. Do not   crush, chew, or split.   PRESERVISION AREDS ORAL   spironolactone 25 mg tablet; Commonly known as: Aldactone; Take 1 tablet   (25 mg) by mouth once daily.   tamsulosin 0.4 mg 24 hr capsule; Commonly known as: Flomax; Take 1   capsule (0.4 mg) by mouth 2 times a day.  * This list has 2 medication(s) that are the same as other medications   prescribed for you. Read the directions carefully, and ask your doctor or   other care provider to review them with you.       Outpatient Follow-Up  Future Appointments   Date Time Provider Department Center   11/26/2024  2:30 PM Raisa Sierra MD [id number] [facility]   1/8/2025  2:15 PM Lito Ackerman MD [id number] [facility]   4/9/2025 11:20 AM Rufino Dillon MD [id number] Academic       Becca Newman, APRN-CNP  Discussed with Dr. Dillon hemetologist/medicine team at [facility] and observation team

## 2024-10-20 NOTE — ED TRIAGE NOTES
Patient states he has been battling constipation and has been noticing blood when wiping in small amounts and small amounts on underwear. Patient denies and SOB, CP, or pain.

## 2024-10-21 LAB
ALBUMIN SERPL BCP-MCNC: 3.8 G/DL (ref 3.4–5)
ALP SERPL-CCNC: 70 U/L (ref 33–136)
ALT SERPL W P-5'-P-CCNC: 12 U/L (ref 10–52)
ANION GAP SERPL CALC-SCNC: 14 MMOL/L (ref 10–20)
AST SERPL W P-5'-P-CCNC: 18 U/L (ref 9–39)
BILIRUB SERPL-MCNC: 1.2 MG/DL (ref 0–1.2)
BUN SERPL-MCNC: 37 MG/DL (ref 6–23)
CALCIUM SERPL-MCNC: 9.2 MG/DL (ref 8.6–10.6)
CHLORIDE SERPL-SCNC: 104 MMOL/L (ref 98–107)
CO2 SERPL-SCNC: 25 MMOL/L (ref 21–32)
CREAT SERPL-MCNC: 1.53 MG/DL (ref 0.5–1.3)
EGFRCR SERPLBLD CKD-EPI 2021: 41 ML/MIN/1.73M*2
ERYTHROCYTE [DISTWIDTH] IN BLOOD BY AUTOMATED COUNT: 13.5 % (ref 11.5–14.5)
FACT VIII ACT/NOR PPP: 4 % (ref 55–180)
FACT VIII ACT/NOR PPP: 55 % (ref 55–180)
GLUCOSE BLD MANUAL STRIP-MCNC: 103 MG/DL (ref 74–99)
GLUCOSE BLD MANUAL STRIP-MCNC: 82 MG/DL (ref 74–99)
GLUCOSE BLD MANUAL STRIP-MCNC: 88 MG/DL (ref 74–99)
GLUCOSE SERPL-MCNC: 95 MG/DL (ref 74–99)
HCT VFR BLD AUTO: 36.8 % (ref 41–52)
HGB BLD-MCNC: 12.8 G/DL (ref 13.5–17.5)
MCH RBC QN AUTO: 35.3 PG (ref 26–34)
MCHC RBC AUTO-ENTMCNC: 34.8 G/DL (ref 32–36)
MCV RBC AUTO: 101 FL (ref 80–100)
NRBC BLD-RTO: 0 /100 WBCS (ref 0–0)
PATH REVIEW-CBC DIFFERENTIAL: NORMAL
PLATELET # BLD AUTO: 110 X10*3/UL (ref 150–450)
POTASSIUM SERPL-SCNC: 4.5 MMOL/L (ref 3.5–5.3)
PROT SERPL-MCNC: 6.5 G/DL (ref 6.4–8.2)
RBC # BLD AUTO: 3.63 X10*6/UL (ref 4.5–5.9)
SODIUM SERPL-SCNC: 138 MMOL/L (ref 136–145)
WBC # BLD AUTO: 5.3 X10*3/UL (ref 4.4–11.3)

## 2024-10-21 PROCEDURE — 2500000004 HC RX 250 GENERAL PHARMACY W/ HCPCS (ALT 636 FOR OP/ED)

## 2024-10-21 PROCEDURE — 97110 THERAPEUTIC EXERCISES: CPT | Mod: GP

## 2024-10-21 PROCEDURE — 99223 1ST HOSP IP/OBS HIGH 75: CPT | Performed by: STUDENT IN AN ORGANIZED HEALTH CARE EDUCATION/TRAINING PROGRAM

## 2024-10-21 PROCEDURE — 85240 CLOT FACTOR VIII AHG 1 STAGE: CPT

## 2024-10-21 PROCEDURE — 99232 SBSQ HOSP IP/OBS MODERATE 35: CPT

## 2024-10-21 PROCEDURE — 2500000001 HC RX 250 WO HCPCS SELF ADMINISTERED DRUGS (ALT 637 FOR MEDICARE OP)

## 2024-10-21 PROCEDURE — 80053 COMPREHEN METABOLIC PANEL: CPT

## 2024-10-21 PROCEDURE — 97161 PT EVAL LOW COMPLEX 20 MIN: CPT | Mod: GP

## 2024-10-21 PROCEDURE — 1100000001 HC PRIVATE ROOM DAILY

## 2024-10-21 PROCEDURE — 6360000002 HC RX 636 FACTOR: Mod: JZ | Performed by: STUDENT IN AN ORGANIZED HEALTH CARE EDUCATION/TRAINING PROGRAM

## 2024-10-21 PROCEDURE — 36415 COLL VENOUS BLD VENIPUNCTURE: CPT

## 2024-10-21 PROCEDURE — 2500000002 HC RX 250 W HCPCS SELF ADMINISTERED DRUGS (ALT 637 FOR MEDICARE OP, ALT 636 FOR OP/ED): Performed by: NURSE PRACTITIONER

## 2024-10-21 PROCEDURE — 82947 ASSAY GLUCOSE BLOOD QUANT: CPT

## 2024-10-21 PROCEDURE — 85027 COMPLETE CBC AUTOMATED: CPT

## 2024-10-21 PROCEDURE — 2500000001 HC RX 250 WO HCPCS SELF ADMINISTERED DRUGS (ALT 637 FOR MEDICARE OP): Performed by: NURSE PRACTITIONER

## 2024-10-21 RX ORDER — POLYETHYLENE GLYCOL 3350, SODIUM CHLORIDE, SODIUM BICARBONATE, POTASSIUM CHLORIDE 420; 11.2; 5.72; 1.48 G/4L; G/4L; G/4L; G/4L
2000 POWDER, FOR SOLUTION ORAL ONCE AS NEEDED
Status: DISCONTINUED | OUTPATIENT
Start: 2024-10-22 | End: 2024-10-23 | Stop reason: HOSPADM

## 2024-10-21 RX ORDER — POLYETHYLENE GLYCOL 3350 17 G/17G
17 POWDER, FOR SOLUTION ORAL DAILY
Status: DISCONTINUED | OUTPATIENT
Start: 2024-10-21 | End: 2024-10-23 | Stop reason: HOSPADM

## 2024-10-21 RX ORDER — POLYETHYLENE GLYCOL 3350 17 G/17G
238 POWDER, FOR SOLUTION ORAL ONCE AS NEEDED
Status: CANCELLED | OUTPATIENT
Start: 2024-10-21

## 2024-10-21 RX ORDER — AMOXICILLIN 250 MG
2 CAPSULE ORAL 2 TIMES DAILY
Status: DISCONTINUED | OUTPATIENT
Start: 2024-10-21 | End: 2024-10-23 | Stop reason: HOSPADM

## 2024-10-21 RX ORDER — BISACODYL 10 MG/1
10 SUPPOSITORY RECTAL DAILY PRN
Status: DISCONTINUED | OUTPATIENT
Start: 2024-10-21 | End: 2024-10-23 | Stop reason: HOSPADM

## 2024-10-21 RX ORDER — ONDANSETRON HYDROCHLORIDE 2 MG/ML
4 INJECTION, SOLUTION INTRAVENOUS EVERY 4 HOURS PRN
Status: DISCONTINUED | OUTPATIENT
Start: 2024-10-21 | End: 2024-10-23 | Stop reason: HOSPADM

## 2024-10-21 RX ORDER — POLYETHYLENE GLYCOL 3350, SODIUM CHLORIDE, SODIUM BICARBONATE, POTASSIUM CHLORIDE 420; 11.2; 5.72; 1.48 G/4L; G/4L; G/4L; G/4L
4000 POWDER, FOR SOLUTION ORAL ONCE
Status: COMPLETED | OUTPATIENT
Start: 2024-10-21 | End: 2024-10-21

## 2024-10-21 ASSESSMENT — COGNITIVE AND FUNCTIONAL STATUS - GENERAL
DAILY ACTIVITIY SCORE: 16
TURNING FROM BACK TO SIDE WHILE IN FLAT BAD: A LITTLE
EATING MEALS: A LITTLE
HELP NEEDED FOR BATHING: A LITTLE
DRESSING REGULAR UPPER BODY CLOTHING: A LITTLE
STANDING UP FROM CHAIR USING ARMS: A LITTLE
TURNING FROM BACK TO SIDE WHILE IN FLAT BAD: A LITTLE
MOVING TO AND FROM BED TO CHAIR: A LITTLE
WALKING IN HOSPITAL ROOM: A LOT
DRESSING REGULAR LOWER BODY CLOTHING: A LOT
STANDING UP FROM CHAIR USING ARMS: A LOT
CLIMB 3 TO 5 STEPS WITH RAILING: A LOT
MOVING FROM LYING ON BACK TO SITTING ON SIDE OF FLAT BED WITH BEDRAILS: A LITTLE
TOILETING: A LOT
MOVING TO AND FROM BED TO CHAIR: A LOT
MOBILITY SCORE: 14
PERSONAL GROOMING: A LITTLE
CLIMB 3 TO 5 STEPS WITH RAILING: A LOT
WALKING IN HOSPITAL ROOM: A LITTLE
MOBILITY SCORE: 17
MOVING FROM LYING ON BACK TO SITTING ON SIDE OF FLAT BED WITH BEDRAILS: A LITTLE

## 2024-10-21 ASSESSMENT — PAIN SCALES - GENERAL
PAINLEVEL_OUTOF10: 0 - NO PAIN

## 2024-10-21 ASSESSMENT — PAIN - FUNCTIONAL ASSESSMENT
PAIN_FUNCTIONAL_ASSESSMENT: 0-10

## 2024-10-21 ASSESSMENT — ACTIVITIES OF DAILY LIVING (ADL): LACK_OF_TRANSPORTATION: NO

## 2024-10-21 NOTE — RESEARCH NOTES
Artificial Intelligence Monitoring in Nursing (AIMS Nursing) Study    Principle Investigator - Dr. Nahum Sofia  Research Coordinator - Isi Fenton     Patient Name - Ramón Flores  Date - 10/21/2024 10:54 AM  Location - [place] [address]    Ramón Flores was approached by Isi Fenton to talk about participating in the AIMS Nursing Study. The patient was not able to be approached, a research coordinator will come back at a later time. Study protocol was followed and patient was given study contact information.     Isi Fenton

## 2024-10-21 NOTE — PROGRESS NOTES
Physical Therapy    Physical Therapy Evaluation & Treatment    Patient Name: Ramón Flores  MRN: [id number]  Department: [place] [address]  Room: [address] [place] [address]  Today's Date: 10/21/2024   Time Calculation  Start Time: 1610  Stop Time: 1641  Time Calculation (min): 31 min    Assessment/Plan   PT Assessment  PT Assessment Results: Decreased strength, Decreased range of motion, Decreased mobility  Rehab Prognosis: Good  Barriers to Discharge: lives alone, medical comorbidities  End of Session Communication: PCT/NA/CTA  Assessment Comment: Pt mobilizing slowly with CGA, will benefit from continued work with skilled PT to maximize safe mobility while inpatient.  End of Session Patient Position: Up in chair, Alarm off, not on at start of session   IP OR SWING BED PT PLAN  Inpatient or Swing Bed: Inpatient  PT Plan  Treatment/Interventions: Bed mobility, Transfer training, Gait training, Balance training, Strengthening, Range of motion, Therapeutic exercise, Therapeutic activity  PT Plan: Ongoing PT  PT Frequency: 3 times per week  PT Discharge Recommendations:  (pt reports has had 24/7(A) at home after hospitalizations in the past, may benefit from 24/7assist initially along with low intensity therapy to maximize safety with home going.  Will update POC if mobility safety does not improve during hospitalization)  PT Recommended Transfer Status: Contact guard  PT - OK to Discharge: Yes (POC/goals/discharge rec intensity created)      Subjective     General Visit Information:  General  Reason for Referral: transfer from OSH for concern for rectal bleeding  Past Medical History Relevant to Rehab: HTN, Afib, hemophilia, HF, CAD, CABG, (R) femoral neck fx s/p THR 2023  General Comment: Pt up in bedside chair, agreeable to participate, shared that he heard some bad news recently today about his twin sister passing away.  Pt motivated to mobilize though aware of need for safety precautions.  Will continue to follow.  Home Living:  Home  "Living  Type of Home: House  Lives With: Alone  Home Adaptive Equipment: Walker rolling or standard, Cane, Wheelchair-manual  Home Layout:  (stair glide to bedroom and bathroom level)  Prior Level of Function:  Prior Function Per Pt/Caregiver Report  ADL Assistance:  (reports mostly (I), has home health aide for laundry and heavy cleaning)  Ambulatory Assistance:  (reports amb short distances with whw, otherwise self propels wheelchair, mostly household mobility.  Reports transfers (I).)  Prior Function Comments: was to start \"pool\" therapy at [facility] soon  Precautions:  Precautions  Hearing/Visual Limitations: wears glasses,  Medical Precautions: Fall precautions    Objective   Pain:  Pain Assessment  Pain Assessment: 0-10  0-10 (Numeric) Pain Score: 0 - No pain  Cognition:  Cognition  Orientation Level: Oriented X4    General Assessments:     Activity Tolerance  Endurance: Tolerates 10 - 20 min exercise with multiple rests           Perception  Inattention/Neglect: Appears intact  Initiation: Appears intact  Motor Planning: Appears intact  Perseveration: Not present           Static Standing Balance  Static Standing-Balance Support: Bilateral upper extremity supported  Static Standing-Level of Assistance: Contact guard  Static Standing-Comment/Number of Minutes: with whw  Dynamic Standing Balance  Dynamic Standing-Balance Support: Bilateral upper extremity supported  Dynamic Standing-Level of Assistance: Contact guard  Dynamic Standing-Comments: with whw  Functional Assessments:  Bed Mobility  Bed Mobility: No (up in chair upon PT entry and exit with needs in reach)    Transfers  Transfer: Yes  Transfer 1  Transfer From 1: Chair with arms to  Transfer to 1: Stand  Transfer Device 1: Walker  Transfer Level of Assistance 1: Contact guard, Minimal verbal cues  Trials/Comments 1: increased effort to complete, complete slowly with increased time to transfer each hand from chair arm to walker , does push with legs " against chair.  Transfers 2  Transfer From 2: Stand to  Transfer to 2: Chair with arms  Transfer Level of Assistance 2: Contact guard, Minimal verbal cues    Ambulation/Gait Training  Ambulation/Gait Training Performed: Yes  Ambulation/Gait Training 1  Surface 1: Level tile  Device 1: Rolling walker  Assistance 1: Contact guard, Minimal verbal cues  Quality of Gait 1: Decreased step length (knees maintain flexion (lacks full extension at baseline))  Comments/Distance (ft) 1: 15 ft  Extremity/Trunk Assessments:  RLE   RLE : Exceptions to WFL  AROM RLE (degrees)  RLE AROM Comment: lacks full extension at knee, grossly ~20  Strength RLE  RLE Overall Strength: Greater than or equal to 3/5 as evidenced by functional mobility  LLE   LLE : Exceptions to WFL  AROM LLE (degrees)  LLE AROM Comment: lacks full extension at knee, grossly ~20  Strength LLE  LLE Overall Strength: Greater than or equal to 3/5 as evidenced by functional mobility  Treatments:  Therapeutic Exercise  Therapeutic Exercise Performed: Yes  Therapeutic Exercise Activity 1: (B) LE AP, LAQ seated.  Standing at walker pt also demos attempts at increasing extension at knees with quad set with foot flat on ground.     Outcome Measures:  [facility] Basic Mobility  Turning from your back to your side while in a flat bed without using bedrails: A little  Moving from lying on your back to sitting on the side of a flat bed without using bedrails: A little  Moving to and from bed to chair (including a wheelchair): A little  Standing up from a chair using your arms (e.g. wheelchair or bedside chair): A little  To walk in hospital room: A little  Climbing 3-5 steps with railing: A lot  Basic Mobility - Total Score: 17    Encounter Problems       Encounter Problems (Active)       Balance       Patient will maintain balance to allow for safe mobility with (S), LRAD.        Start:  10/21/24    Expected End:  11/04/24               Mobility       STG - Patient will ambulate  with (S), LRAD, 40 ft x 4.        Start:  10/21/24    Expected End:  11/05/24               PT Transfers       STG - Patient will perform bed mobility (S).        Start:  10/21/24    Expected End:  11/04/24            STG - Patient will transfer sit to and from stand (S).        Start:  10/21/24    Expected End:  11/04/24               Strength        Will complete (B) LE therex to improve strength and ROM.        Start:  10/21/24    Expected End:  11/04/24                   Education Documentation  Mobility Training, taught by Ashlyn Sheehan PT at 10/21/2024  5:21 PM.  Learner: Patient  Readiness: Acceptance  Method: Explanation  Response: Verbalizes Understanding  Comment: PT POC, safety with mobility    10/21/24 at 5:22 PM - Ashlyn Sheehan, PT

## 2024-10-21 NOTE — PROGRESS NOTES
Ramón Flores is a 89 y.o. male on day 1 of admission presenting with GI bleed.      Subjective   Patient is feeling well today. States that he did not notice more rectal bleeding today, but is not fully sure since could not see all the stool. Denies hematemesis.        Objective     Last Recorded Vitals  /62   Pulse 60   Temp 36.5 °C (97.7 °F)   Resp 17   Wt 60.6 kg (133 lb 9.6 oz)   SpO2 94%   Intake/Output last 3 Shifts:    Intake/Output Summary (Last 24 hours) at 10/21/2024 1200  Last data filed at 10/21/2024 0210  Gross per 24 hour   Intake 1154.16 ml   Output 150 ml   Net 1004.16 ml       Admission Weight  Weight: 60.6 kg (133 lb 9.6 oz) (10/20/24 1819)    Daily Weight  10/20/24 : 60.6 kg (133 lb 9.6 oz)    Image Results  CT angio abdomen pelvis w and or wo IV IV contrast  Narrative: Interpreted By:  Roseline Luther,   STUDY:  CT ANGIO ABDOMEN PELVIS W AND/OR WO IV IV CONTRAST;  10/20/2024 5:52  am      INDICATION:  Signs/Symptoms:GI BLEED PROTOCOL. Constipation and rectal bleeding.          COMPARISON:  None.      ACCESSION NUMBER(S):  [id number]      ORDERING CLINICIAN:  CAMILA HCAVES      TECHNIQUE:  CT of the abdomen and pelvis was performed. Initial noncontrast  images were performed followed by contrast-enhanced images. Standard  contiguous axial images were obtained at 3 mm slice thickness through  the abdomen and pelvis. Coronal and sagittal reconstructions at 3 mm  slice thickness were performed.  90 ML of Omnipaque 350 was  administered intravenously without immediate complication.      3D reconstructions were performed. MIP images performed      FINDINGS:  Lung bases: There is cardiomegaly. There is a small hiatal hernia.      Abdomen: There is reflux of contrast into hepatic veins. The liver,  spleen, pancreas, and adrenal glands appear normal. Kidneys show no  hydronephrosis. There are symmetric nephrograms. Arising in the  anterior right renal cortex there is a  low-density 2 cm exophytic  cortical lesion likely a cyst.      No aortic aneurysm or aortic dissection. Mild atherosclerotic  calcifications are seen in the aorta and its branches. No significant  stenosis identified.      No bowel obstruction, free fluid or free air. Prominent formed stool  in the right colon can indicate constipation. Appendix appears normal.      Pelvis: Scattered sigmoid diverticula are noted. No CT signs of  diverticulitis. There is wall thickening of the urinary bladder with  a large prostatic impression on the urinary bladder. The prostate is  abnormally enlarged. The bladder is partially obscured by beam  hardening artifact from a right hip joint replacement. No active  bleed is seen at the time of this exam.      Degenerative changes are seen throughout the spine. Surgical clips  noted in the right groin      Impression: 1.  Cardiomegaly and small hiatal hernia  2. 2 cm right renal cortical cyst  3. Uncomplicated colonic diverticulosis.  4. Urinary bladder wall thickening with enlarged prostate causing a  large impression on the floor of the urinary bladder          MACRO:  None      Signed by: Roseline Luther 10/20/2024 8:05 AM  Dictation workstation:   [id number]    Results for orders placed or performed during the hospital encounter of 10/20/24 (from the past 24 hours)   CBC and Auto Differential   Result Value Ref Range    WBC 4.9 4.4 - 11.3 x10*3/uL    nRBC 0.0 0.0 - 0.0 /100 WBCs    RBC 3.65 (L) 4.50 - 5.90 x10*6/uL    Hemoglobin 12.9 (L) 13.5 - 17.5 g/dL    Hematocrit 37.1 (L) 41.0 - 52.0 %     (H) 80 - 100 fL    MCH 35.3 (H) 26.0 - 34.0 pg    MCHC 34.8 32.0 - 36.0 g/dL    RDW 13.6 11.5 - 14.5 %    Platelets 112 (L) 150 - 450 x10*3/uL    Neutrophils % 52.7 40.0 - 80.0 %    Immature Granulocytes %, Automated 1.8 (H) 0.0 - 0.9 %    Lymphocytes % 32.4 13.0 - 44.0 %    Monocytes % 11.1 2.0 - 10.0 %    Eosinophils % 1.6 0.0 - 6.0 %    Basophils % 0.4 0.0 - 2.0 %    Neutrophils  Absolute 2.60 1.60 - 5.50 x10*3/uL    Immature Granulocytes Absolute, Automated 0.09 0.00 - 0.50 x10*3/uL    Lymphocytes Absolute 1.60 0.80 - 3.00 x10*3/uL    Monocytes Absolute 0.55 0.05 - 0.80 x10*3/uL    Eosinophils Absolute 0.08 0.00 - 0.40 x10*3/uL    Basophils Absolute 0.02 0.00 - 0.10 x10*3/uL   Comprehensive Metabolic Panel   Result Value Ref Range    Glucose 84 74 - 99 mg/dL    Sodium 137 136 - 145 mmol/L    Potassium 4.1 3.5 - 5.3 mmol/L    Chloride 102 98 - 107 mmol/L    Bicarbonate 24 21 - 32 mmol/L    Anion Gap 15 10 - 20 mmol/L    Urea Nitrogen 35 (H) 6 - 23 mg/dL    Creatinine 1.45 (H) 0.50 - 1.30 mg/dL    eGFR 44 (L) >60 mL/min/1.73m*2    Calcium 9.2 8.6 - 10.6 mg/dL    Albumin 4.2 3.4 - 5.0 g/dL    Alkaline Phosphatase 79 33 - 136 U/L    Total Protein 6.9 6.4 - 8.2 g/dL    AST 19 9 - 39 U/L    Bilirubin, Total 1.8 (H) 0.0 - 1.2 mg/dL    ALT 12 10 - 52 U/L   Coagulation Screen   Result Value Ref Range    Protime 12.6 9.8 - 12.8 seconds    INR 1.1 0.9 - 1.1    aPTT 57 (H) 27 - 38 seconds   Factor 8 Activity   Result Value Ref Range    Factor VIII Activity 4 (L) 55 - 180 %   POCT GLUCOSE   Result Value Ref Range    POCT Glucose 134 (H) 74 - 99 mg/dL   CBC   Result Value Ref Range    WBC 5.3 4.4 - 11.3 x10*3/uL    nRBC 0.0 0.0 - 0.0 /100 WBCs    RBC 3.63 (L) 4.50 - 5.90 x10*6/uL    Hemoglobin 12.8 (L) 13.5 - 17.5 g/dL    Hematocrit 36.8 (L) 41.0 - 52.0 %     (H) 80 - 100 fL    MCH 35.3 (H) 26.0 - 34.0 pg    MCHC 34.8 32.0 - 36.0 g/dL    RDW 13.5 11.5 - 14.5 %    Platelets 110 (L) 150 - 450 x10*3/uL   Comprehensive metabolic panel   Result Value Ref Range    Glucose 95 74 - 99 mg/dL    Sodium 138 136 - 145 mmol/L    Potassium 4.5 3.5 - 5.3 mmol/L    Chloride 104 98 - 107 mmol/L    Bicarbonate 25 21 - 32 mmol/L    Anion Gap 14 10 - 20 mmol/L    Urea Nitrogen 37 (H) 6 - 23 mg/dL    Creatinine 1.53 (H) 0.50 - 1.30 mg/dL    eGFR 41 (L) >60 mL/min/1.73m*2    Calcium 9.2 8.6 - 10.6 mg/dL    Albumin 3.8  3.4 - 5.0 g/dL    Alkaline Phosphatase 70 33 - 136 U/L    Total Protein 6.5 6.4 - 8.2 g/dL    AST 18 9 - 39 U/L    Bilirubin, Total 1.2 0.0 - 1.2 mg/dL    ALT 12 10 - 52 U/L   Factor 8 Activity   Result Value Ref Range    Factor VIII Activity 55 55 - 180 %   POCT GLUCOSE   Result Value Ref Range    POCT Glucose 88 74 - 99 mg/dL   POCT GLUCOSE   Result Value Ref Range    POCT Glucose 82 74 - 99 mg/dL     *Note: Due to a large number of results and/or encounters for the requested time period, some results have not been displayed. A complete set of results can be found in Results Review.       Physical Exam  Constitutional:       Appearance: Normal appearance.   HENT:      Head: Normocephalic.      Mouth/Throat:      Mouth: Mucous membranes are moist.   Eyes:      Pupils: Pupils are equal, round, and reactive to light.   Cardiovascular:      Rate and Rhythm: Normal rate.   Pulmonary:      Effort: Pulmonary effort is normal.      Breath sounds: Normal breath sounds.   Abdominal:      General: Abdomen is flat.      Tenderness: There is no abdominal tenderness.   Musculoskeletal:         General: Normal range of motion.      Cervical back: Normal range of motion.   Skin:     General: Skin is warm.      Capillary Refill: Capillary refill takes less than 2 seconds.   Neurological:      General: No focal deficit present.      Mental Status: He is alert.   Psychiatric:         Mood and Affect: Mood normal.         Relevant Results               Assessment/Plan      Mr. Flores is a 89 year old male with history of hemophilia a, atrial fibrillation, protein C deficiency, hypertension, nephrolithiasis, BPH, CAD status post CABG 20 years ago who is not currently on blood thinners due to his history of hemophilia who presented as a transfer from [facility] due to concern for rectal bleeding in the setting of his known Hemophilia A and protein C deficiency. He is overall feeling in his normal state of health and denies anal pain. He  has been hemodynamically stable since admission. Will plan for routine GI and hematology consultations in the morning to evaluate need for possible scope/Factor VIII replenishment.     Patient with rectal bright bleeding in context of hemophylia. Receiving factor VIII per hematology. Patient with no additional bleeding since stay, and hemodynamically stable. He will go for colonoscopy tomorrow morning.      #Concern for GI bleed  #Hemophilia A  #Protein C deficiency  : Curently receiving weekly Altuviiio q7d  : Admission hemoglobin of 13 above 6 month baseline   : s/p 1L of fluids LR   : factor VIII activity drawn; q12h factor VIII, factor VIII obtained today prior administration  - Hold home iron supplementation iso potential GI procedure  - Blood transfusion consent done with written form, photo in chart  - Colonoscopy tomorrow 10/22/2024 -- Bowel prep ordered and NPO midnight.     #RONNIE on CKD   : Baseline creatinine 1.1  : Potential etiology includes volume depletion iso GI blood loss   : No hypotension reported during admission   - Creatinine increased today 1.45->1.53, no uremic symptoms. We will monitor daily RFP.      #HFpEF  : Last EF 50% on5/31/2024   : Home regimen: Jardiance + Metoprolol + Bumetanide  - Hold home meds given concern for volume depletion      #Atrial fibrillation  : On home metoprolol   - Hold metoprolol given concern for blood loss     #Thrombocytopenia  : Appears chronic, at this time above baseline   - Continue to monitor     #Dyslipidemia   - Continue home Lipitor 40mg      #BPH  - Continue home finasteride 5mg daily + Flomax 0.4mg BID      F: PRN  E: Mg >2, K >4  N: Kosher    A: PIV     Code status: DNR/DNI  NOK: David Wallace (relative) [phone]    Case discussed and seen with Dr. Rader.     Sergei Richardson MD  PGY1 Internal Medicine

## 2024-10-21 NOTE — CONSULTS
Name: Ramón Folres  MRN: [id number]  Encounter Date: 10/21/2024  PCP: SUSI Mckeon-CNS  Heme-Onc: Dr. Dillon    Reason for consult: hemophilia A & protein C def with mild GIB  Attending Provider: Jose Rafael Rader MD    Hematology Consult Note      History of Present Illness   Ramón Flores is a 89 y.o. male with PMH Afib, CAD (s/p CABG 20 yrs ago), HTN, severe hemophilia A and concurrent protein C deficiency (minimal bleeding outside of interventions). In 2022 he was started on prophylaxis with twice weekly Adynovate (rFVIII) then transitioned to weekly Atuviiio.     He is admitted due to rectal bleeding following 2-3 days of constipation and straining. Reporting streaks of blood noted when wiping with small amounts of blood in the stool. Otherwise feeling well.     On admission he is HDS at baseline Hgb level (11-13). aPTT 57 (baseline 30-50) and PT 12.6 (baseline ~13). GI to consider colonoscopy. Factor VIII activity is 4 on 10/20. Advate 70 U/kg Q12hr started. First dose given on 10/20 at 2250.       Heme/Onc History    Severe hemophilia A.  He had minimal bleeding during life due concurrent protein C deficiency.  However, he bleeds on interventions.  His major problem is recovery from several syncopal episodes that led to a prolonged rehabilitation in [place].  There he spent most of his time in bed or in a chair and became weak,  In that period of time, his osteoarthritis stiffened and presently, he is unable to fully extend his knees to  an upright position.  This problems exists to today.  He lives at home with 3 wheelchairs on each of 3 levels in his home.  He can walk with a walker with assistance. He had good upper body support.     PMHx: In January 2022, he went to see his opthalmologist for his macular degeneration.  The opthalmologist  decided that he needed another eye injection of ? (VEGF?).  One had been given in the office without incident 6 weeks prior - the hemophilia  Telephone Encounter by Melida Dickson RN at 10/31/2019  3:11 PM     Author: Melida Dickson RN Service: -- Author Type: Registered Nurse    Filed: 10/31/2019  3:14 PM Encounter Date: 10/31/2019 Status: Signed    : Melida Dickson RN (Registered Nurse)       Last visit date: 9/10 Provider: BE  Next visit date: 11/05  Provider: BE  Expected follow up: 8 weeks  MTM visit ([facility]) date: no  UDS,CSA 10/2018   snipped in:    Pertinent between visit information about requested medication (telephone, mychart, prior authorization, concerns, comments):   Script being sent to provider by nurse- dates and quantity:   Requested Prescriptions     Pending Prescriptions Disp Refills   ? HYDROmorphone (DILAUDID) 4 MG tablet 84 tablet 0     Sig: Take 1 tablet (4 mg total) by mouth every 4 (four) hours as needed for pain.     Pharmacy cued: [facility]  Standing orders for withdrawal protocol implemented: DELFINA          service was not  informed.  The injection was given without incidence.     However, overnight, the patient had a bleeding eye.  He had blood on his pillow.  He says his vision is OK.  We saw him and had opthalmology see the patient and gave him factor for 4 days.  It got better.     In 10/2022, he called me to complain of a painful left ankle.  Being out-of-town, I asked the patient to go to the ER.  He got factor for a few days and it appeared to get better.  But presently, it never really went away. The patient does not have  pain at rest, but one can see that each step is painful.      It was for these reasons that although he had never been on FVIII prophylaxis, we felt that it was time to institute prophylaxis for bleeding with FVIII infusions.  At that time, we put him on twice weekly Adynovate - a rFVIII that has a slightly longer half-life. His bleeding episodes appear to have decreased.  The patient is pleased as well.   Last year, when available, we started him on Altuviiio. This agent is given once weekly and his bleeding issues have not been a problem.   He gets prophy weekly on Tuesdays.    Past Medical history     Past Medical History:   Diagnosis Date    Acute deep vein thrombosis (DVT) of left femoral vein (Multi) 09/10/2023    ACUTE DEEP VEIN THROMBOSIS, L FEMORAL, HEMOPHILIA    Acute diastolic heart failure 04/16/2023    Benign prostatic hyperplasia without lower urinary tract symptoms 01/07/2016    Enlarged prostate without lower urinary tract symptoms (luts)    Bleeding hemorrhoids 03/01/2023    CAP (community acquired pneumonia) 01/18/2024    Closed nondisplaced fracture of head of left radius 09/05/2023    COVID-19 05/21/2023    Enlarged prostate with lower urinary tract symptoms (LUTS) 03/01/2023    Fracture of bone of hip (Multi) 09/05/2023    Fracture of femoral neck, right 03/01/2023    Gastrointestinal hemorrhage 09/05/2023    LOWER GI BLEED    Gingiva hemorrhage 09/05/2023    Gross  hematuria 03/01/2023    Hemarthrosis of ankle joint 04/18/2019    Hemarthrosis of knee 05/09/2022    Hematochezia 09/05/2023    Hematoma of lower extremity 09/05/2023    Hematoma of right thigh 03/01/2023    History of recent fall 05/31/2023    Knee effusion 09/05/2023    Knee effusion, left 03/01/2023    Lumbar pain 03/01/2023    Obstructive uropathy 04/28/2023    Orthostatic syncope 09/05/2023    Personal history of other endocrine, nutritional and metabolic disease     History of hyperlipidemia    Pneumonia of left lower lobe due to infectious organism 03/19/2024    Pneumonia of right lung due to infectious organism 03/01/2023    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    Subdural hematoma (Multi) 04/09/2023    Syncope 05/12/2023    Urinary retention 03/01/2023    UTI (urinary tract infection) 03/01/2023    Viral gastroenteritis 03/01/2023         Past Surgical History     Past Surgical History:   Procedure Laterality Date    CORONARY ARTERY BYPASS GRAFT           Family History      Family History   Problem Relation Name Age of Onset    Hemophilia Mother           Social History     Social History     Socioeconomic History    Marital status:    Tobacco Use    Smoking status: Never    Smokeless tobacco: Never   Substance and Sexual Activity    Alcohol use: Defer    Drug use: Never     Social Drivers of Health     Financial Resource Strain: Low Risk  (5/31/2024)    Overall Financial Resource Strain (CARDIA)     Difficulty of Paying Living Expenses: Not hard at all   Transportation Needs: No Transportation Needs (5/31/2024)    PRAPARE - Transportation     Lack of Transportation (Medical): No     Lack of Transportation (Non-Medical): No   Housing Stability: Low Risk  (5/31/2024)    Housing Stability Vital Sign     Unable to Pay for Housing in the Last Year: No     Number of Places Lived in the Last Year: 1     Unstable Housing in the Last Year: No         Allergies     Allergies   Allergen Reactions    Aspirin  "Bleeding     hemophiliac    Penicillins Rash       Medications   antihemophilic factor rAHF-PFM, 4,389 Units, q12h  atorvastatin, 40 mg, Daily  calcium carbonate, 1,500 mg, BID  finasteride, 5 mg, Daily  folic acid, 1 mg, Daily  insulin lispro, 0-5 Units, TID  [Held by provider] metoprolol succinate XL, 25 mg, Daily  pantoprazole, 40 mg, BID  polyethylene glycol, 17 g, Daily  sennosides-docusate sodium, 2 tablet, BID  [Held by provider] spironolactone, 25 mg, Daily  tamsulosin, 0.4 mg, BID         acetaminophen, 975 mg, q8h PRN  bisacodyl, 10 mg, Daily PRN  dextrose, 12.5 g, q15 min PRN  dextrose, 25 g, q15 min PRN  glucagon, 1 mg, q15 min PRN  glucagon, 1 mg, q15 min PRN        Review of Systems   Review of Systems   10 pt ROS reviewed and negative aside from above    Physical Exam   Blood pressure 107/63, pulse 79, temperature 36.1 °C (97 °F), temperature source Temporal, resp. rate 16, height 1.667 m (5' 5.63\"), weight 60.6 kg (133 lb 9.6 oz), SpO2 96%.      Gen: awake, alert, in no acute distress  HEENT: AT/NC  CV: RRR  Pulm: CTAB  Abd: soft, NT/ND  Ext: no LE edema  Skin: warm and dry  Neuro: A&Ox4, moves all 4 extremities spontaneously     Labs     Lab Results   Component Value Date    GLUCOSE 84 10/20/2024    CALCIUM 9.2 10/20/2024     10/20/2024    K 4.1 10/20/2024    CO2 24 10/20/2024     10/20/2024    BUN 35 (H) 10/20/2024    CREATININE 1.45 (H) 10/20/2024       Lab Results   Component Value Date    WBC 4.9 10/20/2024    HGB 12.9 (L) 10/20/2024    HCT 37.1 (L) 10/20/2024     (H) 10/20/2024     (L) 10/20/2024       Lab Results   Component Value Date    ALT 12 10/20/2024    AST 19 10/20/2024    ALKPHOS 79 10/20/2024    BILITOT 1.8 (H) 10/20/2024       Imaging   CT ANGIO ABDOMEN PELVIS W AND/OR WO IV IV CONTRAST;  10/20/2024 5:52am  IMPRESSION:  1.  Cardiomegaly and small hiatal hernia  2. 2 cm right renal cortical cyst  3. Uncomplicated colonic diverticulosis.  4. Urinary bladder " wall thickening with enlarged prostate causing a  large impression on the floor of the urinary bladder    Assessment/Plan     Ramón Flores is a 89 y.o. male w/ a past medical history of severe hemophilia A on weekly prophylaxis with Atuviiio and concurrent protein C deficiency who presents with mild rectal bleeding following 2-3 days of constipation. Planning for colonoscopy tomorrow. Factor VIII level 4 prior to Advate initiation and is 55 at trough following first dose.     Recommendations  -will decrease Advate to 50 U/kg for this evening (2330) dose  -had planned to give Altuviio prior to colonoscopy however this is not in stock per pharmacy so patient will receive Advate 50 U/kg at 1000 on 10/22-- this is to be given immediately PRIOR to colonoscopy   -please order factor VIII activity with tomorrow morning's labs   -continue to follow daily PTT levels        Thank you for this consult, we will continue to follow. Patient seen and discussed with attending physician, Dr. Kee, who agrees with the above.     Megan Schmid MD  Hematology-Oncology Fellow, PGY5  Hematology Consult Pager: [phone]  Oncology Consult Pager: [phone]

## 2024-10-21 NOTE — CARE PLAN
Problem: Skin  Goal: Prevent/minimize sheer/friction injuries  Flowsheets (Taken 10/20/2024 2322)  Prevent/minimize sheer/friction injuries: Increase activity/out of bed for meals  Goal: Promote/optimize nutrition  Flowsheets (Taken 10/20/2024 2322)  Promote/optimize nutrition:   Consume > 50% meals/supplements   Offer water/supplements/favorite foods

## 2024-10-21 NOTE — CONSULTS
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     Patient endorses constipation for the past 4 days and has noted bright red blood when he wipes after using the restroom.  Initially admitted to [facility] with lab work up notable for stable H/H and chronic thrombocytopenia. CTA negative for active bleeding. Patient transferred to [facility] given he needs Factor VIII replacement infusion. Colonoscopy in June 2018 showed nonthrombosed large external hemorrhoids, moderate nonbleeding internal hemorrhoids.    At bedside, patient denies any prior history of lower GI bleeding. Reports he has known hemorrhoids. Denies any recent weight loss, melena. Patient lives by himself at home. He has strong social support per him.      ROS: Complete review of systems obtained, negative unless otherwise indicated above.     Allergies   Allergen Reactions    Aspirin Bleeding     hemophiliac    Penicillins Rash     Past Medical History:   Diagnosis Date    Acute deep vein thrombosis (DVT) of left femoral vein (Multi) 09/10/2023    ACUTE DEEP VEIN THROMBOSIS, L FEMORAL, HEMOPHILIA    Acute diastolic heart failure 04/16/2023    Benign prostatic hyperplasia without lower urinary tract symptoms 01/07/2016    Enlarged prostate without lower urinary tract symptoms (luts)    Bleeding hemorrhoids 03/01/2023    CAP (community acquired pneumonia) 01/18/2024    Closed nondisplaced fracture of head of left radius 09/05/2023    COVID-19  05/21/2023    Enlarged prostate with lower urinary tract symptoms (LUTS) 03/01/2023    Fracture of bone of hip (Multi) 09/05/2023    Fracture of femoral neck, right 03/01/2023    Gastrointestinal hemorrhage 09/05/2023    LOWER GI BLEED    Gingiva hemorrhage 09/05/2023    Gross hematuria 03/01/2023    Hemarthrosis of ankle joint 04/18/2019    Hemarthrosis of knee 05/09/2022    Hematochezia 09/05/2023    Hematoma of lower extremity 09/05/2023    Hematoma of right thigh 03/01/2023    History of recent fall 05/31/2023    Knee effusion 09/05/2023    Knee effusion, left 03/01/2023    Lumbar pain 03/01/2023    Obstructive uropathy 04/28/2023    Orthostatic syncope 09/05/2023    Personal history of other endocrine, nutritional and metabolic disease     History of hyperlipidemia    Pneumonia of left lower lobe due to infectious organism 03/19/2024    Pneumonia of right lung due to infectious organism 03/01/2023    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    Subdural hematoma (Multi) 04/09/2023    Syncope 05/12/2023    Urinary retention 03/01/2023    UTI (urinary tract infection) 03/01/2023    Viral gastroenteritis 03/01/2023     Past Surgical History:   Procedure Laterality Date    CORONARY ARTERY BYPASS GRAFT       Family History   Problem Relation Name Age of Onset    Hemophilia Mother       Social History     Social History Narrative    Not on file     [facility]    Medications:  Scheduled medications  antihemophilic factor rAHF-PFM, 4,389 Units, intravenous, q12h  atorvastatin, 40 mg, oral, Daily  calcium carbonate, 1,500 mg, oral, BID  finasteride, 5 mg, oral, Daily  folic acid, 1 mg, oral, Daily  insulin lispro, 0-5 Units, subcutaneous, TID  [Held by provider] metoprolol succinate XL, 25 mg, oral, Daily  pantoprazole, 40 mg, intravenous, BID  polyethylene glycol, 17 g, oral, Daily  polyethylene glycol-electrolytes, 4,000 mL, oral, Once  sennosides-docusate sodium, 2 tablet, oral, BID  [Held by provider] spironolactone, 25 mg,  oral, Daily  tamsulosin, 0.4 mg, oral, BID      Continuous medications     PRN medications  PRN medications: acetaminophen, bisacodyl, dextrose, dextrose, glucagon, glucagon, ondansetron, [START ON 10/22/2024] polyethylene glycol-electrolytes       EXAM     Vital signs:  [unfilled]    Physical Exam  Alert and oriented x 3  CTAB  RRR  Abd soft NT ND  Skin warm and dry  Perianal exam notable for skin tags and external hemorrhoids.       DATA                                                                            Labs     Lab Results   Component Value Date    WBC 5.3 10/21/2024    WBC 4.9 10/20/2024    WBC 5.7 10/20/2024    HGB 12.8 (L) 10/21/2024    HGB 12.9 (L) 10/20/2024    HGB 13.3 (L) 10/20/2024     (H) 10/21/2024     (H) 10/20/2024     (H) 10/20/2024     (L) 10/21/2024     (L) 10/20/2024     (L) 10/20/2024       Lab Results   Component Value Date    GLUCOSE 95 10/21/2024    CALCIUM 9.2 10/21/2024     10/21/2024    K 4.5 10/21/2024    CO2 25 10/21/2024     10/21/2024    BUN 37 (H) 10/21/2024    CREATININE 1.53 (H) 10/21/2024       Lab Results   Component Value Date    ALT 12 10/21/2024    ALT 12 10/20/2024    ALT 16 10/20/2024    AST 18 10/21/2024    AST 19 10/20/2024    AST 21 10/20/2024    ALKPHOS 70 10/21/2024    ALKPHOS 79 10/20/2024    ALKPHOS 81 10/20/2024    BILITOT 1.2 10/21/2024    BILITOT 1.8 (H) 10/20/2024    BILITOT 1.5 (H) 10/20/2024                                                                                  Imaging           === 05/31/24 ===    US THORACENTESIS    - Impression -  Uneventful thoracentesis, as detailed above. Right pleural space, 450  mL    I personally performed and/or directly supervised this study and was  present for the entire procedure.    Performed and dictated at [facility].    Signed by: Modesto Yen 5/31/2024 4:41 PM  Dictation workstation:   [id number]  === 10/20/24 ===    CT  ABDOMEN PELVIS ANGIOGRAM W AND/OR WO IV CONTRAST    - Impression -  1.  Cardiomegaly and small hiatal hernia  2. 2 cm right renal cortical cyst  3. Uncomplicated colonic diverticulosis.  4. 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 Lab work up notable for stable H/H and chronic thrombocytopenia. CTA negative for active bleeding. Patient transferred to [facility] given he needs Factor VIII replacement infusion. GI consulted for evaluation of BRBPR.     #BRBPR  :: Discussed need for colonoscopy given history, age and ongoing bleeding. We agreed that given that is relatively healthy and independent, it maybe best to ensure this can all be just explained by what looks like hemorrhoidal bleeding. Plan for colonoscopy on 10/22/24.  - Per hematology, his recent Factor VIII infusion should have him covered for his colonoscopy tomorrow.     Recommendations  - Clear liquid diet on the day prior to colonoscopy  - NPO after midnight on the day of colonoscopy (other than bowel prep, which can be given until 5AM)  - Golytely split prep (total 4L)               - 2L at 6pm; additional 2L at 10pm for a total of 4L  - Please ensure prep is completed by 5AM  - Please ask night RN to document stool color and consistency in a care note in Epic by 6AM on the day of colonoscopy    Staffed with Dr Kulwant BEDOLLA per primary team.    ------------------------------------------------------------------------  Rodolfo Stone MD  Gastroenterology Fellow    After 5PM and on Weekends, please page on-call fellow.    Final recommendations pending attending attestation.

## 2024-10-22 ENCOUNTER — APPOINTMENT (OUTPATIENT)
Dept: GASTROENTEROLOGY | Facility: HOSPITAL | Age: 89
End: 2024-10-22
Payer: MEDICARE

## 2024-10-22 LAB
ALBUMIN SERPL BCP-MCNC: 3.9 G/DL (ref 3.4–5)
ANION GAP SERPL CALC-SCNC: 12 MMOL/L (ref 10–20)
APTT PPP: 32 SECONDS (ref 27–38)
BUN SERPL-MCNC: 25 MG/DL (ref 6–23)
CALCIUM SERPL-MCNC: 9.2 MG/DL (ref 8.6–10.6)
CHLORIDE SERPL-SCNC: 104 MMOL/L (ref 98–107)
CO2 SERPL-SCNC: 29 MMOL/L (ref 21–32)
CREAT SERPL-MCNC: 1.16 MG/DL (ref 0.5–1.3)
EGFRCR SERPLBLD CKD-EPI 2021: 58 ML/MIN/1.73M*2
ERYTHROCYTE [DISTWIDTH] IN BLOOD BY AUTOMATED COUNT: 13.7 % (ref 11.5–14.5)
FACT VIII ACT/NOR PPP: 105 % (ref 55–180)
GLUCOSE BLD MANUAL STRIP-MCNC: 89 MG/DL (ref 74–99)
GLUCOSE SERPL-MCNC: 92 MG/DL (ref 74–99)
HCT VFR BLD AUTO: 37.3 % (ref 41–52)
HGB BLD-MCNC: 12.6 G/DL (ref 13.5–17.5)
INR PPP: 1.2 (ref 0.9–1.1)
MAGNESIUM SERPL-MCNC: 1.7 MG/DL (ref 1.6–2.4)
MCH RBC QN AUTO: 34.9 PG (ref 26–34)
MCHC RBC AUTO-ENTMCNC: 33.8 G/DL (ref 32–36)
MCV RBC AUTO: 103 FL (ref 80–100)
NRBC BLD-RTO: 0 /100 WBCS (ref 0–0)
PHOSPHATE SERPL-MCNC: 2.4 MG/DL (ref 2.5–4.9)
PLATELET # BLD AUTO: 106 X10*3/UL (ref 150–450)
POTASSIUM SERPL-SCNC: 4.4 MMOL/L (ref 3.5–5.3)
PROTHROMBIN TIME: 13.2 SECONDS (ref 9.8–12.8)
RBC # BLD AUTO: 3.61 X10*6/UL (ref 4.5–5.9)
SODIUM SERPL-SCNC: 141 MMOL/L (ref 136–145)
WBC # BLD AUTO: 4.8 X10*3/UL (ref 4.4–11.3)

## 2024-10-22 PROCEDURE — 2500000004 HC RX 250 GENERAL PHARMACY W/ HCPCS (ALT 636 FOR OP/ED): Performed by: INTERNAL MEDICINE

## 2024-10-22 PROCEDURE — 45380 COLONOSCOPY AND BIOPSY: CPT | Performed by: INTERNAL MEDICINE

## 2024-10-22 PROCEDURE — 85027 COMPLETE CBC AUTOMATED: CPT

## 2024-10-22 PROCEDURE — 7100000010 HC PHASE TWO TIME - EACH INCREMENTAL 1 MINUTE

## 2024-10-22 PROCEDURE — 2500000004 HC RX 250 GENERAL PHARMACY W/ HCPCS (ALT 636 FOR OP/ED)

## 2024-10-22 PROCEDURE — 1100000001 HC PRIVATE ROOM DAILY

## 2024-10-22 PROCEDURE — 6360000002 HC RX 636 FACTOR: Mod: JZ | Performed by: STUDENT IN AN ORGANIZED HEALTH CARE EDUCATION/TRAINING PROGRAM

## 2024-10-22 PROCEDURE — 82947 ASSAY GLUCOSE BLOOD QUANT: CPT

## 2024-10-22 PROCEDURE — 99231 SBSQ HOSP IP/OBS SF/LOW 25: CPT

## 2024-10-22 PROCEDURE — 85610 PROTHROMBIN TIME: CPT

## 2024-10-22 PROCEDURE — 85240 CLOT FACTOR VIII AHG 1 STAGE: CPT

## 2024-10-22 PROCEDURE — 99233 SBSQ HOSP IP/OBS HIGH 50: CPT | Performed by: STUDENT IN AN ORGANIZED HEALTH CARE EDUCATION/TRAINING PROGRAM

## 2024-10-22 PROCEDURE — 36415 COLL VENOUS BLD VENIPUNCTURE: CPT

## 2024-10-22 PROCEDURE — 6360000002 HC RX 636 FACTOR: Mod: JZ | Performed by: INTERNAL MEDICINE

## 2024-10-22 PROCEDURE — 2500000001 HC RX 250 WO HCPCS SELF ADMINISTERED DRUGS (ALT 637 FOR MEDICARE OP)

## 2024-10-22 PROCEDURE — 0DBQ8ZX EXCISION OF ANUS, VIA NATURAL OR ARTIFICIAL OPENING ENDOSCOPIC, DIAGNOSTIC: ICD-10-PCS | Performed by: STUDENT IN AN ORGANIZED HEALTH CARE EDUCATION/TRAINING PROGRAM

## 2024-10-22 PROCEDURE — 2500000002 HC RX 250 W HCPCS SELF ADMINISTERED DRUGS (ALT 637 FOR MEDICARE OP, ALT 636 FOR OP/ED): Performed by: NURSE PRACTITIONER

## 2024-10-22 PROCEDURE — 2500000001 HC RX 250 WO HCPCS SELF ADMINISTERED DRUGS (ALT 637 FOR MEDICARE OP): Performed by: NURSE PRACTITIONER

## 2024-10-22 PROCEDURE — 7100000009 HC PHASE TWO TIME - INITIAL BASE CHARGE

## 2024-10-22 PROCEDURE — G0500 MOD SEDAT ENDO SERVICE >5YRS: HCPCS | Performed by: INTERNAL MEDICINE

## 2024-10-22 PROCEDURE — 3700000013 HC SEDATION LEVEL 5+ TIME - EACH ADDITIONAL 15 MINUTES

## 2024-10-22 PROCEDURE — 83735 ASSAY OF MAGNESIUM: CPT

## 2024-10-22 PROCEDURE — 3700000012 HC SEDATION LEVEL 5+ TIME - INITIAL 15 MINUTES 5/> YEARS

## 2024-10-22 PROCEDURE — 80069 RENAL FUNCTION PANEL: CPT

## 2024-10-22 RX ORDER — MIDAZOLAM HYDROCHLORIDE 1 MG/ML
INJECTION, SOLUTION INTRAMUSCULAR; INTRAVENOUS AS NEEDED
Status: COMPLETED | OUTPATIENT
Start: 2024-10-22 | End: 2024-10-22

## 2024-10-22 RX ORDER — FENTANYL CITRATE 50 UG/ML
INJECTION, SOLUTION INTRAMUSCULAR; INTRAVENOUS AS NEEDED
Status: COMPLETED | OUTPATIENT
Start: 2024-10-22 | End: 2024-10-22

## 2024-10-22 ASSESSMENT — PAIN - FUNCTIONAL ASSESSMENT

## 2024-10-22 ASSESSMENT — PAIN SCALES - GENERAL

## 2024-10-22 NOTE — CARE PLAN
The patient's goals for the shift include      The clinical goals for the shift include Pt will get colonoscopy during shift

## 2024-10-22 NOTE — PROGRESS NOTES
10/21/24 1200   Discharge Planning   Living Arrangements Alone   Support Systems Home care staff;Family members   Assistance Needed house keeping and meals   Type of Residence Private residence   Number of Stairs Within Residence   (4 level split)   Do you have animals or pets at home? No   Who is requesting discharge planning? Provider   Home or Post Acute Services In home services   Type of Home Care Services Home health aide;Home nursing visits;Home OT;Home PT  (nurse comes once weekly to administer Factor VIII injections)   Expected Discharge Disposition Home Health  ([facility])   Does the patient need discharge transport arranged? No  (nephew to provide)   Financial Resource Strain   How hard is it for you to pay for the very basics like food, housing, medical care, and heating? Not hard   Housing Stability   In the last 12 months, was there a time when you were not able to pay the mortgage or rent on time? N   In the past 12 months, how many times have you moved where you were living? 0   At any time in the past 12 months, were you homeless or living in a shelter (including now)? N   Transportation Needs   In the past 12 months, has lack of transportation kept you from medical appointments or from getting medications? no   In the past 12 months, has lack of transportation kept you from meetings, work, or from getting things needed for daily living? No   Patient Choice   Provider Choice list and CMS website (https://medicare.gov/care-compare#search) for post-acute Quality and Resource Measure Data were provided and reviewed with: Patient   Patient / Family choosing to utilize agency / facility established prior to hospitalization Yes     Met with pt and introduced myself as care coordinator and member of the Care Transitions team for discharge planning. Pt stated he lives at home alone, he is a Holocaust survivor and receives daily [facility]HA [facility] [facility]. Pt stated HHA assists him with house keeping and meals, he is having a tough time walking since a year ago and uses a FWW for assistance with ambulation. Pt denies any falls. Pt stated he feels safe at home. Pt's address, phone number, and contact information was verified. Pt denies any social or financial concerns at this time.    Home care: Yes  Agency: [facility]. Pt stated he was supposed to start outpatient therapy at [facility] ([facility]) and water exercises but that hasn't happened yet.  Disciplines: HHA comes daily from 9am to 5pm. Nurse comes once weekly to administer Hemophilia injections.  DME: Lift chair, 3 wheelchairs, 2 walkers.  : none.  PCP: Roseline Mccurdy NP (pt stated he has a new PCP but does not recall provider's name).  Last appt: Pt stated he's only seen new PCP a couple of times. Pt stated he sees Hematologist every 2-3 weeks.  Transport to appts: Nephew provides.  Pharm: [facility] (denies issues affording/obtaining medications).    Discharge Planning: Pt admitted with c/o bright red blood per rectum, per medical team he is planned for colonoscopy tomorrow, PT/OT consulted. Pt stated he would like to resume home care services with [facility] post discharge. Referral sent to [facility] via [facility] and they confirmed pt is active with their agency for skilled nursing/HHA services, they are able to accept him back for [facility]IOMA at time of discharge. Pt voiced no additional questions/concerns related to discharge planning. Care coordinator will continue to follow for discharge planning needs.    Lloyd Hickey RN  Transitional Care Coordinator (TCC)  [phone] or [phone]

## 2024-10-22 NOTE — PROGRESS NOTES
Name: Ramón Flores  MRN: [id number]  Encounter Date: 10/22/2024  PCP: SUSI Mckeon-CNS  Heme-Onc: Dr. Dillon     Reason for consult: hemophilia A & protein C def with mild GIB  Attending Provider: Jose Rafael Rader MD    Hematology Consult Daily Progress Note    Interval Events/Subjective   PM Advate dose given 4hrs late. Received another dose today at noon. Saw patient in room following colonoscopy. He is feeling well. Has not noticed any bleeding.    Oncologic History   Severe hemophilia A.  He had minimal bleeding during life due concurrent protein C deficiency.  However, he bleeds on interventions.  His major problem is recovery from several syncopal episodes that led to a prolonged rehabilitation in [place].  There he spent most of his time in bed or in a chair and became weak,  In that period of time, his osteoarthritis stiffened and presently, he is unable to fully extend his knees to  an upright position.  This problems exists to today.  He lives at home with 3 wheelchairs on each of 3 levels in his home.  He can walk with a walker with assistance. He had good upper body support.     PMHx: In January 2022, he went to see his opthalmologist for his macular degeneration.  The opthalmologist  decided that he needed another eye injection of ? (VEGF?).  One had been given in the office without incident 6 weeks prior - the hemophilia service was not  informed.  The injection was given without incidence.     However, overnight, the patient had a bleeding eye.  He had blood on his pillow.  He says his vision is OK.  We saw him and had opthalmology see the patient and gave him factor for 4 days.  It got better.     In 10/2022, he called me to complain of a painful left ankle.  Being out-of-town, I asked the patient to go to the ER.  He got factor for a few days and it appeared to get better.  But presently, it never really went away. The patient does not have  pain at rest, but one can see that  "each step is painful.      It was for these reasons that although he had never been on FVIII prophylaxis, we felt that it was time to institute prophylaxis for bleeding with FVIII infusions.  At that time, we put him on twice weekly Adynovate - a rFVIII that has a slightly longer half-life. His bleeding episodes appear to have decreased.  The patient is pleased as well.   Last year, when available, we started him on Altuviiio. This agent is given once weekly and his bleeding issues have not been a problem.   He gets prophy weekly on Tuesdays    Review of Systems   12 Point ROS negative except HPI     Allergies     Allergies   Allergen Reactions    Aspirin Bleeding     hemophiliac    Penicillins Rash       Medications     [START ON 10/23/2024] antihemophilic RF VIII, 3,263 Units, Once  atorvastatin, 40 mg, Daily  calcium carbonate, 1,500 mg, BID  finasteride, 5 mg, Daily  folic acid, 1 mg, Daily  insulin lispro, 0-5 Units, TID  [Held by provider] metoprolol succinate XL, 25 mg, Daily  pantoprazole, 40 mg, BID  polyethylene glycol, 17 g, Daily  sennosides-docusate sodium, 2 tablet, BID  [Held by provider] spironolactone, 25 mg, Daily  tamsulosin, 0.4 mg, BID         acetaminophen, 975 mg, q8h PRN  bisacodyl, 10 mg, Daily PRN  dextrose, 12.5 g, q15 min PRN  dextrose, 25 g, q15 min PRN  glucagon, 1 mg, q15 min PRN  glucagon, 1 mg, q15 min PRN  ondansetron, 4 mg, q4h PRN  polyethylene glycol-electrolytes, 2,000 mL, Once PRN        Physical Exam   Blood pressure 117/62, pulse 69, temperature 36.5 °C (97.7 °F), resp. rate 16, height 1.667 m (5' 5.63\"), weight 60.6 kg (133 lb 9.6 oz), SpO2 99%.    Gen: awake, alert, in no acute distress  HEENT: AT/NC  CV: RRR  Pulm: CTAB  Abd: soft, NT/ND  Ext: no LE edema  Skin: warm and dry  Neuro: A&Ox4, moves all 4 extremities spontaneously     Labs     Lab Results   Component Value Date    GLUCOSE 92 10/22/2024    CALCIUM 9.2 10/22/2024     10/22/2024    K 4.4 10/22/2024    CO2 " 29 10/22/2024     10/22/2024    BUN 25 (H) 10/22/2024    CREATININE 1.16 10/22/2024       Lab Results   Component Value Date    WBC 4.8 10/22/2024    HGB 12.6 (L) 10/22/2024    HCT 37.3 (L) 10/22/2024     (H) 10/22/2024     (L) 10/22/2024       Lab Results   Component Value Date    ALT 12 10/21/2024    AST 18 10/21/2024    ALKPHOS 70 10/21/2024    BILITOT 1.2 10/21/2024         Imaging   CT ANGIO ABDOMEN PELVIS W AND/OR WO IV IV CONTRAST;  10/20/2024 5:52am  IMPRESSION:  1.  Cardiomegaly and small hiatal hernia  2. 2 cm right renal cortical cyst  3. Uncomplicated colonic diverticulosis.  4. Urinary bladder wall thickening with enlarged prostate causing a large impression on the floor of the urinary bladder    Assessment/Plan     Ramón Flores is a 89 y.o. male w/ a past medical history of severe hemophilia A on weekly prophylaxis with Altuviiio and concurrent protein C deficiency who presents with mild rectal bleeding following 2-3 days of constipation. Has been managed with Advate during admission. Initial Factor VIII level was 4 and most recently up to 105. S/p colonoscopy on 10/22.      Recommendations  -Altuviiio (long acting replacement) now available per pharmacy; we will give 50 U/kg on 10/23 at 0000  -please order factor VIII activity trough this evening prior to Altuviiio dose  -continue to follow daily PTT levels  -will coordinate with outpatient team to set up next Altuviiio treatment         Patient seen and discussed with attending physician, Dr. Kee, who agrees with the above.       Megan Schmid MD  Hematology-Oncology Fellow, PGY5  Hematology Consult Pager: [phone]

## 2024-10-22 NOTE — CONSULTS
[facility]  ACUTE CARE SURGERY - HISTORY AND PHYSICAL / CONSULT    Patient Name: Ramón Flores  MRN: [id number]  Admit Date: 1020  : 1928  AGE: 89 y.o.   GENDER: male  ==============================================================================  TODAY'S ASSESSMENT AND PLAN OF CARE:  Mr. Flores is a 89 yo M with history of hemophilia A, atrial fibrillation, protein C deficiency, hypertension, nephrolithiasis, BPH, CAD s/p CABG (20 y ago) admitted for rectal bleeding in setting of hemophilia A and protein C deficiency. He underwent colonoscopy on 10/22 and was noted to have a rectal mass which was biopsied. We are consulted for evaluation for surgical management of rectal mass. Stable hemoglobin at 12.8. Vitally stable. Patient denies abdominal pain, nausea, vomiting. Given that patient has no ongoing active bleeding nor symptoms of obstruction there are no acute indications for surgical intervention.     No indication for acute surgical intervention  Recommend that patient follow up outpatient with GI and colorectal surgery service for work up of rectal mass  Please reach out with any questions and concerns    Discussed with Dr. Karol Sparrow MD  PGY-1 General Surgery  Acute Care Surgery [id number]     ==============================================================================  CHIEF COMPLAINT/REASON FOR CONSULT:  Mr. Flores is a 89 yo M with history of hemophilia A, atrial fibrillation, protein C deficiency, hypertension, nephrolithiasis, BPH, CAD s/p CABG (20 y ago) admitted for rectal bleeding in setting of hemophilia A and protein C deficiency. He underwent colonoscopy on 10/22 and was noted to have a rectal mass which was biopsied. We are consulted for evaluation for surgical management of rectal mass.    Patient noticed dark blood from rectum for a few days when he was toileting and wiping. He is being managed with factor VIII per hematology.  He  went to bed and noticed drops of blood in bed as well. Denies abdominal pain, nausea, vomiting.     PAST MEDICAL HISTORY:   PMH:   Past Medical History:   Diagnosis Date    Acute deep vein thrombosis (DVT) of left femoral vein (Multi) 09/10/2023    ACUTE DEEP VEIN THROMBOSIS, L FEMORAL, HEMOPHILIA    Acute diastolic heart failure 04/16/2023    Benign prostatic hyperplasia without lower urinary tract symptoms 01/07/2016    Enlarged prostate without lower urinary tract symptoms (luts)    Bleeding hemorrhoids 03/01/2023    CAP (community acquired pneumonia) 01/18/2024    Closed nondisplaced fracture of head of left radius 09/05/2023    COVID-19 05/21/2023    Enlarged prostate with lower urinary tract symptoms (LUTS) 03/01/2023    Fracture of bone of hip (Multi) 09/05/2023    Fracture of femoral neck, right 03/01/2023    Gastrointestinal hemorrhage 09/05/2023    LOWER GI BLEED    Gingiva hemorrhage 09/05/2023    Gross hematuria 03/01/2023    Hemarthrosis of ankle joint 04/18/2019    Hemarthrosis of knee 05/09/2022    Hematochezia 09/05/2023    Hematoma of lower extremity 09/05/2023    Hematoma of right thigh 03/01/2023    History of recent fall 05/31/2023    Knee effusion 09/05/2023    Knee effusion, left 03/01/2023    Lumbar pain 03/01/2023    Obstructive uropathy 04/28/2023    Orthostatic syncope 09/05/2023    Personal history of other endocrine, nutritional and metabolic disease     History of hyperlipidemia    Pneumonia of left lower lobe due to infectious organism 03/19/2024    Pneumonia of right lung due to infectious organism 03/01/2023    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    Subdural hematoma (Multi) 04/09/2023    Syncope 05/12/2023    Urinary retention 03/01/2023    UTI (urinary tract infection) 03/01/2023    Viral gastroenteritis 03/01/2023       PSH:   Past Surgical History:   Procedure Laterality Date    CORONARY ARTERY BYPASS GRAFT       FH:   Family History   Problem Relation Name Age of Onset    Hemophilia  Mother       SOCIAL HISTORY:    Smoking:    Social History     Tobacco Use   Smoking Status Never   Smokeless Tobacco Never       Alcohol:    Social History     Substance and Sexual Activity   Alcohol Use Defer       MEDICATIONS:   Prior to Admission medications    Medication Sig Start Date End Date Taking? Authorizing Provider   acetaminophen (Tylenol Extra Strength) 500 mg tablet Take 2 tablets (1,000 mg) by mouth every 8 hours if needed for mild pain (1 - 3). 8/29/24 8/29/25  SUSI Mckeon-CNS   antihemophilic RF VIII (Altuviiio) 3,000 (+/-) unit recon soln Infuse 3,175 Units into a venous catheter every 7 days. 3175 units +/-10% dose every 7 days.  Additionally,  prn when directed. 4/10/24 4/10/25  Rufino Dillon MD   antihemophilic RF VIII (Altuviiio) 3,000 (+/-) unit recon soln Infuse 3,175 Units into a venous catheter every 7 days. 3175 units +/-10% dose every 7 days.  Additionally,  prn when directed. 3/26/24 3/26/25  Rufino Dillon MD   atorvastatin (Lipitor) 40 mg tablet Take 1 tablet (40 mg) by mouth once daily. 1/25/24   SHERRY Cervantes   bumetanide (Bumex) 1 mg tablet Take 1 tablet (1 mg) by mouth once daily. 8/13/24 8/13/25  SUSI Mckeon-CNS   calcium carbonate 600 mg calcium (1,500 mg) tablet Take 1 tablet (1,500 mg) by mouth 2 times daily (morning and late afternoon). 5/18/23   Historical Provider, MD   empagliflozin (Jardiance) 10 mg Take 1 tablet (10 mg) by mouth once daily. 7/15/24 7/10/25  Lashonda Dugan MD MPH   ferrous sulfate 325 (65 Fe) MG tablet Take 1 tablet (325 mg) by mouth once daily. 4/12/23   Historical Provider, MD   finasteride (Proscar) 5 mg tablet Take 1 tablet (5 mg) by mouth once daily. 1/25/24   SHERRY Cervantes   folic acid (Folvite) 1 mg tablet Take 1 tablet (1 mg) by mouth once daily. 4/12/23   Historical Provider, MD   Lactobacillus acidoph-L.bulgar 1 million cell tablet tablet Take 1 tablet by mouth once daily. 4/21/23   Historical  Provider, MD   metoprolol succinate XL (Toprol-XL) 25 mg 24 hr tablet Take 1 tablet (25 mg) by mouth once daily. Do not crush or chew. (1/2 tab 50 mg)    Historical Provider, MD   multivitamin with minerals (multivitamin-iron-folic acid) tablet Take 1 tablet by mouth once daily. 2/28/23   Historical Provider, MD   nystatin (Mycostatin) cream Apply 1 Application topically 2 times a day. 6/11/24 6/11/25  SHERRY Camara   pantoprazole (ProtoNix) 40 mg EC tablet Take 1 tablet (40 mg) by mouth once daily in the morning. Take before meals. Do not crush, chew, or split. 1/25/24   SHERRY Cervantes   spironolactone (Aldactone) 25 mg tablet Take 1 tablet (25 mg) by mouth once daily. 1/25/24   SHERRY Cervantes   tamsulosin (Flomax) 0.4 mg 24 hr capsule Take 1 capsule (0.4 mg) by mouth 2 times a day. 1/25/24   SHERRY Cervantes   vit A/vit C/vit E/zinc/copper (PRESERVISION AREDS ORAL) Take 1 capsule by mouth once daily. 4/21/17   Historical Provider, MD   amLODIPine (Norvasc) 5 mg tablet Take 1 tablet (5 mg) by mouth once daily.  10/20/24  Historical Provider, MD     ALLERGIES:   Allergies   Allergen Reactions    Aspirin Bleeding     hemophiliac    Penicillins Rash     Vitals:    10/22/24 1623   BP: 117/62   Pulse: 69   Resp: 16   Temp: 36.5 °C (97.7 °F)   SpO2: 99%     REVIEW OF SYSTEMS:  See HPI    PHYSICAL EXAM:  Physical Exam  HENT:      Head: Normocephalic.      Nose: Nose normal.   Cardiovascular:      Rate and Rhythm: Normal rate.   Pulmonary:      Effort: Pulmonary effort is normal.   Abdominal:      General: Abdomen is flat. There is no distension.      Palpations: Abdomen is soft.      Tenderness: There is no abdominal tenderness. There is no guarding or rebound.   Neurological:      Mental Status: He is alert.       IMAGING SUMMARY:      CT Chest, Abdomen, and Pelvis without IV Contrast, CT Thoracic Spine  and Lumbar Spine without IV Contrast; 7/22/2024 at 1:40 AM  INDICATION:  Status post trauma.  History of hemophilia.  IMPRESSION:  1.  No definite acute thoracic, abdominal, or pelvic trauma  identified.  2.  Minimal subpleural interstitial fibrotic changes in the lung  bases.  3.  Enlarged prostate.  4.  No acute thoracic or lumbar spine fracture identified.  Multilevel  age-related osteoarthritic changes as described.  5.  Distal colonic diverticulosis.  6.  Additional chronic changes as described.  Signed by Santos Fitzgerald      CT ANGIO ABDOMEN PELVIS W AND/OR WO IV IV CONTRAST;  10/20/2024 5:52   am   INDICATION: GI BLEED PROTOCOL. Constipation and rectal bleeding.   IMPRESSION:   1.  Cardiomegaly and small hiatal hernia  2. 2 cm right renal cortical cyst  3. Uncomplicated colonic diverticulosis.  4. Urinary bladder wall thickening with enlarged prostate causing a  large impression on the floor of the urinary bladder     COLONOSCOPY (DIAGNOSTIC)  10/22/24  Findings  Single malignant-appearing and friable mass (traversable) measuring 4 cm in the anal region; there was stigmata of recent hemorrhage; performed cold forceps biopsy  One sessile polyp measuring smaller than 5 mm in the ascending colon, descending colon and sigmoid colon  Few medium, scattered diverticula of moderate severity in the sigmoid colon; no bleeding was observed  Exam otherwise normal including retroflexion    LABS:  Results from last 7 days   Lab Units 10/22/24  0719 10/21/24  0538 10/20/24  1900 10/20/24  0243   WBC AUTO x10*3/uL 4.8 5.3 4.9 5.7   HEMOGLOBIN g/dL 12.6* 12.8* 12.9* 13.3*   HEMATOCRIT % 37.3* 36.8* 37.1* 38.5*   PLATELETS AUTO x10*3/uL 106* 110* 112* 110*   NEUTROS PCT AUTO %  --   --  52.7  --    LYMPHO PCT MAN %  --   --   --  18.0   LYMPHS PCT AUTO %  --   --  32.4  --    MONO PCT MAN %  --   --   --  0.0   MONOS PCT AUTO %  --   --  11.1  --    EOSINO PCT MAN %  --   --   --  0.0   EOS PCT AUTO %  --   --  1.6  --      Results from last 7 days   Lab Units 10/22/24  0719 10/20/24  1900   APTT  seconds 32 57*   INR  1.2* 1.1     Results from last 7 days   Lab Units 10/22/24  0719 10/21/24  0538 10/20/24  1900 10/20/24  0243   SODIUM mmol/L 141 138 137 135*   POTASSIUM mmol/L 4.4 4.5 4.1 4.2   CHLORIDE mmol/L 104 104 102 97*   CO2 mmol/L 29 25 24 27   BUN mg/dL 25* 37* 35* 41*   CREATININE mg/dL 1.16 1.53* 1.45* 1.48*   CALCIUM mg/dL 9.2 9.2 9.2 9.1   PROTEIN TOTAL g/dL  --  6.5 6.9 7.0   BILIRUBIN TOTAL mg/dL  --  1.2 1.8* 1.5*   ALK PHOS U/L  --  70 79 81   ALT U/L  --  12 12 16   AST U/L  --  18 19 21   GLUCOSE mg/dL 92 95 84 110*     Results from last 7 days   Lab Units 10/21/24  0538 10/20/24  1900 10/20/24  0243   BILIRUBIN TOTAL mg/dL 1.2 1.8* 1.5*       I have reviewed all laboratory and imaging results ordered/pertinent for this encounter.

## 2024-10-22 NOTE — RESEARCH NOTES
Artificial Intelligence Monitoring in Nursing (AIMS Nursing) Study    Principle Investigator - Dr. Nahum Sofia  Research Coordinator - Kathy Ortiz RN     Patient Name - Ramón Flores  Date - 10/22/2024 2:58 PM  Location - [place] [address]    Ramón Flores was approached by Kathy Ortiz RN to talk about participating in the AIMS Nursing Study. The patient was not able to be approached, a research coordinator will come back at a later time. Study protocol was followed and patient was given study contact information.     Kathy Ortiz RN

## 2024-10-22 NOTE — PROGRESS NOTES
Ramón Flores is a 89 y.o. male on day 2 of admission presenting with GI bleed.      Subjective   Patient seen in the morning, around 7:20 am. Patient states feeling well today before colonoscopy, no additional episodes of rectal bleeding.        Objective     Last Recorded Vitals  /64   Pulse 68   Temp 36 °C (96.8 °F) (Temporal)   Resp 16   Wt 60.6 kg (133 lb 9.6 oz)   SpO2 98%   Intake/Output last 3 Shifts:  No intake or output data in the 24 hours ending 10/22/24 1545    Admission Weight  Weight: 60.6 kg (133 lb 9.6 oz) (10/20/24 1819)    Daily Weight  10/20/24 : 60.6 kg (133 lb 9.6 oz)    Image Results  Colonoscopy Diagnostic  Table formatting from the original result was not included.  Impression  Single malignant-appearing and friable mass in anal canal. Biopsied  Subcentimeter polyp in the ascending colon, descending colon and sigmoid   colon. Not resected given age and comorbidities  Sigmoid diverticulosis  Exam otherwise normal including retroflexion    Findings  Single malignant-appearing and friable mass (traversable) measuring 4 cm   in the anal region; there was stigmata of recent hemorrhage; performed   cold forceps biopsy  One sessile polyp measuring smaller than 5 mm in the ascending colon,   descending colon and sigmoid colon  Few medium, scattered diverticula of moderate severity in the sigmoid   colon; no bleeding was observed  Exam otherwise normal including retroflexion    Recommendation  Await pathology results   Follow up with primary gastroenterologist   Follow up with PCP   Follow up with GI inpatient [facility] service with Dr Stone and Dr Blum       Indication  GI bleed    Staff  Staff Role   Rodolfo Stone MD Fellow   Nika Pinedo MD Proceduralist     Medications  insulin lispro (HumaLOG) injection 0-5 Units Cannot be calculated*    *Administration dose not documented   midazolam (Versed) injection 2 mg   fentaNYL PF (Sublimaze) injection 50 mcg   (Totals for  administrations occurring from 1254 to 1434 on 10/22/24)     Preprocedure  A history and physical has been performed, and patient medication   allergies have been reviewed. The patient's tolerance of previous   anesthesia has been reviewed. The risks and benefits of the procedure and   the sedation options and risks were discussed with the patient. All   questions were answered and informed consent obtained.    Details of the Procedure  The patient underwent moderate sedation, which was administered by the   procedural nurse. The patient's blood pressure, ECG, ETCO2, heart rate,   level of consciousness, oxygen and respirations were monitored throughout   the procedure. A digital rectal exam was performed. The scope was   introduced through the anus and advanced to the cecum. Retroflexion was   performed in the rectum. The quality of bowel preparation was evaluated   using the Briggs Bowel Preparation Scale with scores of: right colon = 2,   transverse colon = 3, left colon = 3. The total BBPS score was 8. Bowel   prep was adequate. The patient's estimated blood loss was minimal (<5 mL).   The procedure was not difficult. The patient tolerated the procedure well.   There were no apparent adverse events.     Events  Procedure Events   Event Event Time   ENDO SCOPE IN TIME 10/22/2024  1:26 PM   ENDO CECUM REACHED 10/22/2024  1:56 PM   ENDO SCOPE OUT TIME 10/22/2024  2:29 PM     Specimens  ID Type Source Tests Collected by Time   1 : anal mass Tissue ANAL CANAL SURGICAL PATHOLOGY EXAM Rodolfo Stone MD   10/22/2024 1420     Procedure Location  [place] [address]  [address] [facility] [id number] [phone]    Referring Provider  Rodolfo Stone MD    Procedure Provider  Rodolfo Stone MD    Results for orders placed or performed during the hospital encounter of 10/20/24 (from the past 24 hours)   POCT GLUCOSE   Result Value Ref Range    POCT Glucose 103 (H) 74 - 99  mg/dL   CBC   Result Value Ref Range    WBC 4.8 4.4 - 11.3 x10*3/uL    nRBC 0.0 0.0 - 0.0 /100 WBCs    RBC 3.61 (L) 4.50 - 5.90 x10*6/uL    Hemoglobin 12.6 (L) 13.5 - 17.5 g/dL    Hematocrit 37.3 (L) 41.0 - 52.0 %     (H) 80 - 100 fL    MCH 34.9 (H) 26.0 - 34.0 pg    MCHC 33.8 32.0 - 36.0 g/dL    RDW 13.7 11.5 - 14.5 %    Platelets 106 (L) 150 - 450 x10*3/uL   Renal function panel   Result Value Ref Range    Glucose 92 74 - 99 mg/dL    Sodium 141 136 - 145 mmol/L    Potassium 4.4 3.5 - 5.3 mmol/L    Chloride 104 98 - 107 mmol/L    Bicarbonate 29 21 - 32 mmol/L    Anion Gap 12 10 - 20 mmol/L    Urea Nitrogen 25 (H) 6 - 23 mg/dL    Creatinine 1.16 0.50 - 1.30 mg/dL    eGFR 58 (L) >60 mL/min/1.73m*2    Calcium 9.2 8.6 - 10.6 mg/dL    Phosphorus 2.4 (L) 2.5 - 4.9 mg/dL    Albumin 3.9 3.4 - 5.0 g/dL   Magnesium   Result Value Ref Range    Magnesium 1.70 1.60 - 2.40 mg/dL   Coagulation Screen   Result Value Ref Range    Protime 13.2 (H) 9.8 - 12.8 seconds    INR 1.2 (H) 0.9 - 1.1    aPTT 32 27 - 38 seconds   Factor 8 Activity   Result Value Ref Range    Factor VIII Activity 105 55 - 180 %     *Note: Due to a large number of results and/or encounters for the requested time period, some results have not been displayed. A complete set of results can be found in Results Review.           Physical Exam  Constitutional:       Appearance: Normal appearance.   HENT:      Head: Normocephalic.      Mouth/Throat:      Mouth: Mucous membranes are moist.   Eyes:      Pupils: Pupils are equal, round, and reactive to light.   Cardiovascular:      Rate and Rhythm: Normal rate.   Pulmonary:      Effort: Pulmonary effort is normal.   Abdominal:      General: Abdomen is flat.      Palpations: Abdomen is soft.   Musculoskeletal:         General: Normal range of motion.      Cervical back: Normal range of motion.   Skin:     General: Skin is warm.      Capillary Refill: Capillary refill takes less than 2 seconds.   Neurological:       General: No focal deficit present.      Mental Status: He is alert.   Psychiatric:         Mood and Affect: Mood normal.         Relevant Results               Results for orders placed or performed during the hospital encounter of 10/20/24 (from the past 24 hours)   POCT GLUCOSE   Result Value Ref Range    POCT Glucose 103 (H) 74 - 99 mg/dL   CBC   Result Value Ref Range    WBC 4.8 4.4 - 11.3 x10*3/uL    nRBC 0.0 0.0 - 0.0 /100 WBCs    RBC 3.61 (L) 4.50 - 5.90 x10*6/uL    Hemoglobin 12.6 (L) 13.5 - 17.5 g/dL    Hematocrit 37.3 (L) 41.0 - 52.0 %     (H) 80 - 100 fL    MCH 34.9 (H) 26.0 - 34.0 pg    MCHC 33.8 32.0 - 36.0 g/dL    RDW 13.7 11.5 - 14.5 %    Platelets 106 (L) 150 - 450 x10*3/uL   Renal function panel   Result Value Ref Range    Glucose 92 74 - 99 mg/dL    Sodium 141 136 - 145 mmol/L    Potassium 4.4 3.5 - 5.3 mmol/L    Chloride 104 98 - 107 mmol/L    Bicarbonate 29 21 - 32 mmol/L    Anion Gap 12 10 - 20 mmol/L    Urea Nitrogen 25 (H) 6 - 23 mg/dL    Creatinine 1.16 0.50 - 1.30 mg/dL    eGFR 58 (L) >60 mL/min/1.73m*2    Calcium 9.2 8.6 - 10.6 mg/dL    Phosphorus 2.4 (L) 2.5 - 4.9 mg/dL    Albumin 3.9 3.4 - 5.0 g/dL   Magnesium   Result Value Ref Range    Magnesium 1.70 1.60 - 2.40 mg/dL   Coagulation Screen   Result Value Ref Range    Protime 13.2 (H) 9.8 - 12.8 seconds    INR 1.2 (H) 0.9 - 1.1    aPTT 32 27 - 38 seconds   Factor 8 Activity   Result Value Ref Range    Factor VIII Activity 105 55 - 180 %         Assessment/Plan      Mr. Flores is a 89 year old male with history of hemophilia a, atrial fibrillation, protein C deficiency, hypertension, nephrolithiasis, BPH, CAD status post CABG 20 years ago who is not currently on blood thinners due to his history of hemophilia who presented as a transfer from [facility] due to concern for rectal bleeding in the setting of his known Hemophilia A and protein C deficiency. He is overall feeling in his normal state of health and denies anal pain. He has  been hemodynamically stable since admission. Will plan for routine GI and hematology consultations in the morning to evaluate need for possible scope/Factor VIII replenishment.      Patient with rectal bright bleeding in context of hemophylia. Receiving factor VIII per hematology. Patient had colonoscopy today afternoon, which revealed rectal mass concerning for malignancy. Colorectal surgery will be consulted. Patient will be monitor after colonoscopy and resume diet.      #GI bleed (BRRB)  #Hemophilia A  #Protein C deficiency  #Malignant appearing mass in anal region  : Curently receiving weekly Altuviiio q7d  : Admission hemoglobin of 13 above 6 month baseline   : s/p 1L of fluids LR   : factor VIII activity drawn; q12h factor VIII, factor VIII obtained today prior administration  - Hold home iron supplementation iso potential GI procedure  - Blood transfusion consent done with written form, photo in chart  - Consult colorectal surgery/ACS covering today      #RONNIE on CKD   : Baseline creatinine 1.1  : Potential etiology includes volume depletion iso GI blood loss   : No hypotension reported during admission   - Creatinine increased today 1.45->1.53, no uremic symptoms. We will monitor daily RFP     #HFpEF  : Last EF 50% on5/31/2024   : Home regimen: Jardiance + Metoprolol + Bumetanide  - Hold home meds given concern for volume depletion      #Atrial fibrillation  : On home metoprolol   - Hold metoprolol given concern for blood loss     #Thrombocytopenia  : Appears chronic, at this time above baseline   - Continue to monitor     #Dyslipidemia   - Continue home Lipitor 40mg      #BPH  - Continue home finasteride 5mg daily + Flomax 0.4mg BID      F: PRN  E: Mg >2, K >4  N: Kosher    A: PIV     Code status: DNR/DNI  NOK: David Wallace (relative) [phone]     Case discussed and seen with Dr. Rader.      Sergei Richardson MD  PGY1 Internal Medicine

## 2024-10-22 NOTE — H&P
History Of Present Illness  Ramón Flores is a 89 y.o. male presenting with hematochezia after constipation and straining.  Noticed blood on wiping and some on his clothes.  Patient has h/o hemophilia.  Previous colonoscopy 2018 showed hemorrhoids and a small polyp that was not removed.  Patient received Factor VIII just before coming to GI lab.     Past Medical History  Past Medical History:   Diagnosis Date    Acute deep vein thrombosis (DVT) of left femoral vein (Multi) 09/10/2023    ACUTE DEEP VEIN THROMBOSIS, L FEMORAL, HEMOPHILIA    Acute diastolic heart failure 04/16/2023    Benign prostatic hyperplasia without lower urinary tract symptoms 01/07/2016    Enlarged prostate without lower urinary tract symptoms (luts)    Bleeding hemorrhoids 03/01/2023    CAP (community acquired pneumonia) 01/18/2024    Closed nondisplaced fracture of head of left radius 09/05/2023    COVID-19 05/21/2023    Enlarged prostate with lower urinary tract symptoms (LUTS) 03/01/2023    Fracture of bone of hip (Multi) 09/05/2023    Fracture of femoral neck, right 03/01/2023    Gastrointestinal hemorrhage 09/05/2023    LOWER GI BLEED    Gingiva hemorrhage 09/05/2023    Gross hematuria 03/01/2023    Hemarthrosis of ankle joint 04/18/2019    Hemarthrosis of knee 05/09/2022    Hematochezia 09/05/2023    Hematoma of lower extremity 09/05/2023    Hematoma of right thigh 03/01/2023    History of recent fall 05/31/2023    Knee effusion 09/05/2023    Knee effusion, left 03/01/2023    Lumbar pain 03/01/2023    Obstructive uropathy 04/28/2023    Orthostatic syncope 09/05/2023    Personal history of other endocrine, nutritional and metabolic disease     History of hyperlipidemia    Pneumonia of left lower lobe due to infectious organism 03/19/2024    Pneumonia of right lung due to infectious organism 03/01/2023    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 He has never used smokeless tobacco. Alcohol use questions deferred to the physician. He reports that he does not use drugs.    Family History  Family History   Problem Relation Name Age of Onset    Hemophilia Mother          Allergies  Allergies   Allergen Reactions    Aspirin Bleeding     hemophiliac    Penicillins Rash     Review of Systems  Pre-sedation Evaluation:  ASA Classification - ASA 3 - Patient with moderate systemic disease with functional limitations  Mallampati Score - II (hard and soft palate, upper portion of tonsils and uvula visible)    Physical Exam  Cardiovascular:      Rate and Rhythm: Normal rate and regular rhythm.   Pulmonary:      Breath sounds: Normal breath sounds. No wheezing.   Abdominal:      Palpations: Abdomen is soft.      Tenderness: There is no abdominal tenderness.          Last Recorded Vitals  Blood pressure 130/78, pulse 62, temperature 36 °C (96.8 °F), temperature source Temporal, resp. rate 16, height 1.667 m (5' 5.63\"), weight 60.6 kg (133 lb 9.6 oz), SpO2 97%.     Assessment/Plan   Colonoscopy with moderate sedation.  Discussed that we would need to weigh possibility of high risk maneuvers with potential risk of bleeding due to patient's hemophilia and thrombocytopenia and that some interventions (e.g. hemorrhoidal banding) would not be done during this procedure.     PTA/Current Medications:  Medications Prior to Admission   Medication Sig Dispense Refill Last Dose/Taking    acetaminophen (Tylenol Extra Strength) 500 mg tablet Take 2 tablets (1,000 mg) by mouth every 8 hours if needed for mild pain (1 - 3). 60 tablet 11     antihemophilic RF VIII (Altuviiio) 3,000 (+/-) unit recon soln Infuse 3,175 Units into a venous catheter every 7 " days. 3175 units +/-10% dose every 7 days.  Additionally,  prn when directed. 5 each 12     antihemophilic RF VIII (Altuviiio) 3,000 (+/-) unit recon soln Infuse 3,175 Units into a venous catheter every 7 days. 3175 units +/-10% dose every 7 days.  Additionally,  prn when directed. 5 each 12     atorvastatin (Lipitor) 40 mg tablet Take 1 tablet (40 mg) by mouth once daily. 90 tablet 1     bumetanide (Bumex) 1 mg tablet Take 1 tablet (1 mg) by mouth once daily. 90 tablet 3     calcium carbonate 600 mg calcium (1,500 mg) tablet Take 1 tablet (1,500 mg) by mouth 2 times daily (morning and late afternoon).       empagliflozin (Jardiance) 10 mg Take 1 tablet (10 mg) by mouth once daily. 90 tablet 3     ferrous sulfate 325 (65 Fe) MG tablet Take 1 tablet (325 mg) by mouth once daily.       finasteride (Proscar) 5 mg tablet Take 1 tablet (5 mg) by mouth once daily. 90 tablet 1     folic acid (Folvite) 1 mg tablet Take 1 tablet (1 mg) by mouth once daily.       Lactobacillus acidoph-L.bulgar 1 million cell tablet tablet Take 1 tablet by mouth once daily.       metoprolol succinate XL (Toprol-XL) 25 mg 24 hr tablet Take 1 tablet (25 mg) by mouth once daily. Do not crush or chew. (1/2 tab 50 mg)       multivitamin with minerals (multivitamin-iron-folic acid) tablet Take 1 tablet by mouth once daily.       nystatin (Mycostatin) cream Apply 1 Application topically 2 times a day. 18 g 3     pantoprazole (ProtoNix) 40 mg EC tablet Take 1 tablet (40 mg) by mouth once daily in the morning. Take before meals. Do not crush, chew, or split. 90 tablet 1     spironolactone (Aldactone) 25 mg tablet Take 1 tablet (25 mg) by mouth once daily. 90 tablet 1     tamsulosin (Flomax) 0.4 mg 24 hr capsule Take 1 capsule (0.4 mg) by mouth 2 times a day. 180 capsule 1     vit A/vit C/vit E/zinc/copper (PRESERVISION AREDS ORAL) Take 1 capsule by mouth once daily.        Current Facility-Administered Medications   Medication Dose Route Frequency  Provider Last Rate Last Admin    acetaminophen (Tylenol) tablet 975 mg  975 mg oral q8h PRN Modesto Harper MD        antihemophilic factor rAHF-PFM (Advate) injection 2,926 Units  2,926 Units intravenous q12h Megan Schmid MD   2,926 Units at 10/22/24 1203    atorvastatin (Lipitor) tablet 40 mg  40 mg oral Daily RACHEL MurciaCNP   40 mg at 10/22/24 0926    bisacodyl (Dulcolax) suppository 10 mg  10 mg rectal Daily PRN Carla Wright MD        calcium carbonate (Tums) chewable tablet 1,500 mg  1,500 mg oral BID SHERRY Murcia   1,500 mg at 10/21/24 1823    dextrose 50 % injection 12.5 g  12.5 g intravenous q15 min PRN Shruti Yeboah MD        dextrose 50 % injection 25 g  25 g intravenous q15 min PRN Shruti Yeboah MD        finasteride (Proscar) tablet 5 mg  5 mg oral Daily SHERRY Murcia   5 mg at 10/22/24 0927    folic acid (Folvite) tablet 1 mg  1 mg oral Daily RACHEL MurciaCNP   1 mg at 10/22/24 0944    glucagon (Glucagen) injection 1 mg  1 mg intramuscular q15 min PRN Shruti Yeboah MD        glucagon (Glucagen) injection 1 mg  1 mg intramuscular q15 min PRN Shruti Yeboah MD        insulin lispro (HumaLOG) injection 0-5 Units  0-5 Units subcutaneous TID Shruti Yeboah MD        [Held by provider] metoprolol succinate XL (Toprol-XL) 24 hr tablet 25 mg  25 mg oral Daily SUSI Murcia-VALENTINO        ondansetron (Zofran) injection 4 mg  4 mg intravenous q4h PRN Sheila Caldwell MD        pantoprazole (ProtoNix) injection 40 mg  40 mg intravenous BID Shruti Yeboah MD   40 mg at 10/22/24 0927    polyethylene glycol (Glycolax, Miralax) packet 17 g  17 g oral Daily Carla Wright MD   17 g at 10/21/24 1042    polyethylene glycol-electrolytes (Nulytely) solution 2,000 mL  2,000 mL oral Once PRN Sheila Caldwell MD        sennosides-docusate sodium (Kinsey-Colace) 8.6-50 mg per tablet 2 tablet  2 tablet oral BID Carla Wright MD   2 tablet at 10/22/24 9059     [Held by provider] spironolactone (Aldactone) tablet 25 mg  25 mg oral Daily SUSI Murcia-CNP        tamsulosin (Flomax) 24 hr capsule 0.4 mg  0.4 mg oral BID SHERRY Murcia   0.4 mg at 10/22/24 0908     Nika Pinedo MD

## 2024-10-23 ENCOUNTER — HOME HEALTH ADMISSION (OUTPATIENT)
Dept: HOME HEALTH SERVICES | Facility: HOME HEALTH | Age: 89
End: 2024-10-23
Payer: MEDICARE

## 2024-10-23 ENCOUNTER — DOCUMENTATION (OUTPATIENT)
Dept: HOME HEALTH SERVICES | Facility: HOME HEALTH | Age: 89
End: 2024-10-23
Payer: MEDICARE

## 2024-10-23 VITALS
OXYGEN SATURATION: 97 % | RESPIRATION RATE: 16 BRPM | TEMPERATURE: 97.7 F | BODY MASS INDEX: 21.47 KG/M2 | WEIGHT: 133.6 LBS | HEART RATE: 58 BPM | HEIGHT: 66 IN | SYSTOLIC BLOOD PRESSURE: 119 MMHG | DIASTOLIC BLOOD PRESSURE: 70 MMHG

## 2024-10-23 LAB
ALBUMIN SERPL BCP-MCNC: 3.7 G/DL (ref 3.4–5)
ANION GAP SERPL CALC-SCNC: 13 MMOL/L (ref 10–20)
APTT PPP: 34 SECONDS (ref 27–38)
BUN SERPL-MCNC: 23 MG/DL (ref 6–23)
CALCIUM SERPL-MCNC: 9 MG/DL (ref 8.6–10.6)
CHLORIDE SERPL-SCNC: 107 MMOL/L (ref 98–107)
CO2 SERPL-SCNC: 24 MMOL/L (ref 21–32)
CREAT SERPL-MCNC: 1.19 MG/DL (ref 0.5–1.3)
EGFRCR SERPLBLD CKD-EPI 2021: 56 ML/MIN/1.73M*2
ERYTHROCYTE [DISTWIDTH] IN BLOOD BY AUTOMATED COUNT: 13.8 % (ref 11.5–14.5)
FACT VIII ACT/NOR PPP: 71 % (ref 55–180)
GLUCOSE BLD MANUAL STRIP-MCNC: 131 MG/DL (ref 74–99)
GLUCOSE BLD MANUAL STRIP-MCNC: 71 MG/DL (ref 74–99)
GLUCOSE SERPL-MCNC: 88 MG/DL (ref 74–99)
HCT VFR BLD AUTO: 35.7 % (ref 41–52)
HGB BLD-MCNC: 12.2 G/DL (ref 13.5–17.5)
INR PPP: 1.2 (ref 0.9–1.1)
MAGNESIUM SERPL-MCNC: 1.59 MG/DL (ref 1.6–2.4)
MCH RBC QN AUTO: 34.8 PG (ref 26–34)
MCHC RBC AUTO-ENTMCNC: 34.2 G/DL (ref 32–36)
MCV RBC AUTO: 102 FL (ref 80–100)
NRBC BLD-RTO: 0 /100 WBCS (ref 0–0)
PHOSPHATE SERPL-MCNC: 2.7 MG/DL (ref 2.5–4.9)
PLATELET # BLD AUTO: 104 X10*3/UL (ref 150–450)
POTASSIUM SERPL-SCNC: 4.1 MMOL/L (ref 3.5–5.3)
PROTHROMBIN TIME: 13.6 SECONDS (ref 9.8–12.8)
RBC # BLD AUTO: 3.51 X10*6/UL (ref 4.5–5.9)
SODIUM SERPL-SCNC: 140 MMOL/L (ref 136–145)
WBC # BLD AUTO: 4.9 X10*3/UL (ref 4.4–11.3)

## 2024-10-23 PROCEDURE — 97116 GAIT TRAINING THERAPY: CPT | Mod: GP

## 2024-10-23 PROCEDURE — 85027 COMPLETE CBC AUTOMATED: CPT

## 2024-10-23 PROCEDURE — 85610 PROTHROMBIN TIME: CPT

## 2024-10-23 PROCEDURE — 36415 COLL VENOUS BLD VENIPUNCTURE: CPT

## 2024-10-23 PROCEDURE — 2500000002 HC RX 250 W HCPCS SELF ADMINISTERED DRUGS (ALT 637 FOR MEDICARE OP, ALT 636 FOR OP/ED): Performed by: NURSE PRACTITIONER

## 2024-10-23 PROCEDURE — 2500000004 HC RX 250 GENERAL PHARMACY W/ HCPCS (ALT 636 FOR OP/ED)

## 2024-10-23 PROCEDURE — 82947 ASSAY GLUCOSE BLOOD QUANT: CPT

## 2024-10-23 PROCEDURE — 83735 ASSAY OF MAGNESIUM: CPT

## 2024-10-23 PROCEDURE — 97165 OT EVAL LOW COMPLEX 30 MIN: CPT | Mod: GO

## 2024-10-23 PROCEDURE — 2500000001 HC RX 250 WO HCPCS SELF ADMINISTERED DRUGS (ALT 637 FOR MEDICARE OP): Performed by: NURSE PRACTITIONER

## 2024-10-23 PROCEDURE — 2500000001 HC RX 250 WO HCPCS SELF ADMINISTERED DRUGS (ALT 637 FOR MEDICARE OP)

## 2024-10-23 PROCEDURE — 99238 HOSP IP/OBS DSCHRG MGMT 30/<: CPT

## 2024-10-23 PROCEDURE — 2500000004 HC RX 250 GENERAL PHARMACY W/ HCPCS (ALT 636 FOR OP/ED): Mod: JZ | Performed by: STUDENT IN AN ORGANIZED HEALTH CARE EDUCATION/TRAINING PROGRAM

## 2024-10-23 PROCEDURE — 80069 RENAL FUNCTION PANEL: CPT

## 2024-10-23 RX ORDER — MAGNESIUM SULFATE HEPTAHYDRATE 40 MG/ML
2 INJECTION, SOLUTION INTRAVENOUS ONCE
Status: COMPLETED | OUTPATIENT
Start: 2024-10-23 | End: 2024-10-23

## 2024-10-23 ASSESSMENT — COGNITIVE AND FUNCTIONAL STATUS - GENERAL
TURNING FROM BACK TO SIDE WHILE IN FLAT BAD: A LITTLE
WALKING IN HOSPITAL ROOM: A LOT
DRESSING REGULAR UPPER BODY CLOTHING: A LITTLE
PERSONAL GROOMING: A LITTLE
STANDING UP FROM CHAIR USING ARMS: A LOT
CLIMB 3 TO 5 STEPS WITH RAILING: A LOT
DRESSING REGULAR UPPER BODY CLOTHING: A LITTLE
TOILETING: A LITTLE
DRESSING REGULAR LOWER BODY CLOTHING: A LITTLE
STANDING UP FROM CHAIR USING ARMS: A LITTLE
DAILY ACTIVITIY SCORE: 18
DAILY ACTIVITIY SCORE: 16
MOVING FROM LYING ON BACK TO SITTING ON SIDE OF FLAT BED WITH BEDRAILS: A LITTLE
PERSONAL GROOMING: A LITTLE
EATING MEALS: A LITTLE
TURNING FROM BACK TO SIDE WHILE IN FLAT BAD: A LITTLE
HELP NEEDED FOR BATHING: A LITTLE
MOVING TO AND FROM BED TO CHAIR: A LITTLE
HELP NEEDED FOR BATHING: A LITTLE
MOVING TO AND FROM BED TO CHAIR: A LOT
MOVING FROM LYING ON BACK TO SITTING ON SIDE OF FLAT BED WITH BEDRAILS: A LITTLE
EATING MEALS: A LITTLE
MOBILITY SCORE: 14
CLIMB 3 TO 5 STEPS WITH RAILING: A LOT
DRESSING REGULAR LOWER BODY CLOTHING: A LOT
MOBILITY SCORE: 17
WALKING IN HOSPITAL ROOM: A LITTLE
TOILETING: A LOT

## 2024-10-23 ASSESSMENT — PAIN - FUNCTIONAL ASSESSMENT
PAIN_FUNCTIONAL_ASSESSMENT: 0-10

## 2024-10-23 ASSESSMENT — PAIN SCALES - GENERAL
PAINLEVEL_OUTOF10: 0 - NO PAIN

## 2024-10-23 ASSESSMENT — ACTIVITIES OF DAILY LIVING (ADL): ADL_ASSISTANCE: NEEDS ASSISTANCE

## 2024-10-23 NOTE — DISCHARGE INSTRUCTIONS
Dear Mr. Flores,    You were admitted on 10/20/2024 due to blood in your stools. You were stable during the hospital stay and fortunately your hemoglobin did not drop and you did not have further bleeding since your admission. You had a colonoscopy on 10/22/2024 which found a mass in the anal area concerning for malignancy. Biopsies were taken. We suggest to follow up with colorectal surgery with the results of the biopsy for further management. You are tolerated diet after the colonoscopy and did not have more blood in your stools, and we considered you were ready to be discharged from the hospital.     We also recommend keep your follow up with the Hematology service for the Hemophilia treatment.     FOLLOW UP APPOINTMENTS:  - Please request appointment with Colorectal surgery, we have placed a referral order for you   - Continue follow up with Hematology     Future Appointments   Date Time Provider Department Center   11/26/2024  2:30 PM Raisa Sierra MD [id number] [facility]   1/8/2025  2:15 PM Lito Ackerman MD [id number] [facility]   4/9/2025 11:20 AM Rufino Dillon MD [id number] [facility]

## 2024-10-23 NOTE — DISCHARGE SUMMARY
Discharge Diagnosis  GI bleed    Issues Requiring Follow-Up  - Patient presented with rectal bleeding, a mass concerning for malignancy was found in the anal area in colonoscopy done on 10/22/2024  - Colorectal surgery follow up   - Hematology follow up   - Pending biopsy results     Discharge Meds     Medication List      CONTINUE taking these medications     acetaminophen 500 mg tablet; Commonly known as: Tylenol Extra Strength;   Take 2 tablets (1,000 mg) by mouth every 8 hours if needed for mild pain   (1 - 3).   * antihemophilic RF VIII 3,000 (+/-) unit recon soln; Commonly known as:   Altuviiio; Infuse 3,175 Units into a venous catheter every 7 days. 3175   units +/-10% dose every 7 days.  Additionally,  prn when directed.   * antihemophilic RF VIII 3,000 (+/-) unit recon soln; Commonly known as:   Altuviiio; Infuse 3,175 Units into a venous catheter every 7 days. 3175   units +/-10% dose every 7 days.  Additionally,  prn when directed.   atorvastatin 40 mg tablet; Commonly known as: Lipitor; Take 1 tablet (40   mg) by mouth once daily.   calcium carbonate 600 mg calcium (1,500 mg) tablet   finasteride 5 mg tablet; Commonly known as: Proscar; Take 1 tablet (5   mg) by mouth once daily.   folic acid 1 mg tablet; Commonly known as: Folvite   Jardiance 10 mg; Generic drug: empagliflozin; Take 1 tablet (10 mg) by   mouth once daily.   lactobacillus acidophilus tablet tablet   nystatin cream; Commonly known as: Mycostatin; Apply 1 Application   topically 2 times a day.   pantoprazole 40 mg EC tablet; Commonly known as: ProtoNix; Take 1 tablet   (40 mg) by mouth once daily in the morning. Take before meals. Do not   crush, chew, or split.   PRESERVISION AREDS ORAL   tamsulosin 0.4 mg 24 hr capsule; Commonly known as: Flomax; Take 1   capsule (0.4 mg) by mouth 2 times a day.  * This list has 2 medication(s) that are the same as other medications   prescribed for you. Read the directions carefully, and ask your doctor  or   other care provider to review them with you.     STOP taking these medications     bumetanide 1 mg tablet; Commonly known as: Bumex   ferrous sulfate (325 mg ferrous sulfate) tablet   metoprolol succinate XL 25 mg 24 hr tablet; Commonly known as: Toprol-XL   multivitamin with minerals tablet   spironolactone 25 mg tablet; Commonly known as: Aldactone       Test Results Pending At Discharge  Pending Labs       Order Current Status    Surgical Pathology Exam In process            Hospital Course  Mr. Flores is a 89 year old male with history of hemophilia a, atrial fibrillation, protein C deficiency, hypertension, nephrolithiasis, BPH, CAD status post CABG 20 years ago who is not currently on blood thinners due to his history of hemophilia who presented as a transfer from [facility] due to concern for rectal bleeding in the setting of his known Hemophilia A and protein C deficiency. He is overall feeling in his normal state of health and denies anal pain. He has been hemodynamically stable since admission. Patient had colonoscopy 10/22/2024, which revealed mass in anal area concerning for malignancy. Surgical service was consulted and stated no indication of acute surgical intervention at this time.    Hematology service was consulted  to manage Factor VIII replenishment. Patient did not have any critical or minor bleeding during hospital stay.      Patient was considered ready for discharge after tolerating diet and bowel movements with no bleed were reported. Patient understood diagnosis and is willing to follow up with colorectal surgery outpatient and oncology outpatient once results of the biopsy comes back.        Pertinent Physical Exam At Time of Discharge  Physical Exam  Constitutional:       Appearance: Normal appearance.   HENT:      Head: Normocephalic.      Mouth/Throat:      Mouth: Mucous membranes are moist.   Eyes:      Pupils: Pupils are equal, round, and reactive to light.   Cardiovascular:       Rate and Rhythm: Normal rate.   Pulmonary:      Effort: Pulmonary effort is normal.      Breath sounds: Normal breath sounds.   Abdominal:      General: Abdomen is flat.      Palpations: Abdomen is soft.   Musculoskeletal:         General: Normal range of motion.      Cervical back: Normal range of motion.   Skin:     Capillary Refill: Capillary refill takes less than 2 seconds.   Neurological:      General: No focal deficit present.      Mental Status: He is alert.         Outpatient Follow-Up  Future Appointments   Date Time Provider Department Center   10/28/2024 To Be Determined Catherine Cruz RN [facility]   10/29/2024  9:00 AM [facility]cc[facility]   10/30/2024 To Be Determined Burton Cast, PT [facility]   10/31/2024 To Be Determined Vilma Grewal OT [facility]   11/26/2024  2:30 PM Raisa Sierra MD [id number] [facility]   1/8/2025  2:15 PM Lito Ackerman MD [id number] [facility]   4/9/2025 11:20 AM Rufino Dillon MD [id number] [facility]     Patient discussed and seen with attending physician, Dr. Blum.     Sergei Richardson MD  PGY1 Internal Medicine

## 2024-10-23 NOTE — CARE PLAN
The patient's goals for the shift include      The clinical goals for the shift include Pt will remain HDS during shift

## 2024-10-23 NOTE — NURSING NOTE
DC paperwork reviewed with patient by [place] nurseElvi prior to dc. This RN gave patient 2 copies of dc paperwork and removed pt's peripheral IV - intact upon removal. Pt's niece and nephew came to bedside to take patient home and  transport took patient downstairs for dc in wheelchair at 1724 along with family. Pt left with his belongings. -NILESH Smith RN

## 2024-10-23 NOTE — PROGRESS NOTES
Transitional Care Coordination Progress Note:  Patient discussed during interdisciplinary rounds.  Team members present: GONZALO POND  Plan per Medical/Surgical team: Plan for discharge home today with outpatient follow up appts with [facility] Surgery team and Oncologist for follow up of anal mass c/f malignancy. PT recommended low intensity therapy with 24/7 assistance at time of dsicharge.  Payer: Medicare A/B, [employer]  Status: Inpatient  Discharge disposition: [facility] for RN/LPN (Factor VIII injections), PT/OT and HHA services.  Potential Barriers: none  ADOD: 10/23  Final HCO for above services and AVS sent to [facility] for [facility]. [facility] confirmed receipt of home care orders and they will let their staff know about pt's discharge home today. Care coordinator will continue to follow for discharge planning needs.     Lloyd Hickey RN  Transitional Care Coordinator (TCC)  [phone] or [phone]

## 2024-10-23 NOTE — PROGRESS NOTES
Occupational Therapy    Evaluation    Patient Name: Ramón Flores  MRN: [id number]  Today's Date: 10/23/2024  Room: [address] [place], [place] [address]  Time Calculation  Start Time: 1106  Stop Time: 1124  Time Calculation (min): 18 min    Assessment  IP OT Assessment  End of Session Communication: Bedside nurse  End of Session Patient Position: Up in chair (Commode, call bell and needs in reach)  Plan:  Inpatient Plan  OT Frequency: 2 times per week  OT Discharge Recommendations: Low intensity level of continued care  Equipment Recommended upon Discharge:  (Tub bench, raised toilet seat with grab bar, grab bars in shower/tub area to maximize safety with ADL's if pt doesn't already own equipment.)  OT - OK to Discharge:  (OT Eval completed.)  OT Assessment  OT Assessment Results: Decreased ADL status, Decreased endurance, Decreased trunk control for functional activities  Strengths: Attitude of self    Subjective   Current Problem:  1. GI bleed  Colonoscopy Diagnostic    Colonoscopy Diagnostic    Surgical Pathology Exam    Surgical Pathology Exam    Referral to Colorectal Surgery      2. Gastrointestinal hemorrhage, unspecified gastrointestinal hemorrhage type  calcium carbonate (Tums) chewable tablet 1,500 mg    Surgical Pathology Exam    Surgical Pathology Exam    DISCONTINUED: acetaminophen (Tylenol) tablet 975 mg      3. Factor VIII deficiency (Multi)  folic acid (Folvite) tablet 1 mg    Surgical Pathology Exam    Surgical Pathology Exam    DISCONTINUED: acetaminophen (Tylenol) tablet 975 mg      4. (HFpEF) heart failure with preserved ejection fraction  atorvastatin (Lipitor) tablet 40 mg    Surgical Pathology Exam    Surgical Pathology Exam      5. History of coronary artery bypass graft  atorvastatin (Lipitor) tablet 40 mg    Surgical Pathology Exam    Surgical Pathology Exam      6. Acute on chronic diastolic heart failure  metoprolol succinate XL (Toprol-XL) 24 hr tablet 25 mg    spironolactone (Aldactone) tablet 25 mg     Surgical Pathology Exam    Surgical Pathology Exam    DISCONTINUED: empagliflozin (Jardiance) tablet 10 mg      7. Benign prostatic hyperplasia without lower urinary tract symptoms  finasteride (Proscar) tablet 5 mg    tamsulosin (Flomax) 24 hr capsule 0.4 mg    Surgical Pathology Exam    Surgical Pathology Exam      8. Protein-calorie malnutrition, unspecified severity (Multi)  Referral to Home Care      9. Congestive heart failure, unspecified HF chronicity, unspecified heart failure type  Referral to Home Care      10. 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 Pleasant and cooperative.   Precautions:  Hearing/Visual Limitations: wears glasses,  Medical Precautions: Fall precautions    Pain:  Pain Assessment  Pain Assessment: 0-10  0-10 (Numeric) Pain Score: 0 - No pain         Objective   Cognition:  Orientation Level: Oriented X4  Following Commands: Follows one step commands without difficulty           Home Living:  Type of Home: House  Lives With: Alone  Home Adaptive Equipment: Walker rolling or standard, Cane, Wheelchair-manual  Home Layout: Multi-level (Stair glide to bedroom/bathroom level)  Home Living Comments: Pt reports he has a home health aide from 9-5 daily   Prior Function:  Level of [place]: Independent with ADLs and functional transfers  Receives Help From:  (Home health aide)  ADL Assistance: Needs assistance (Aide assists with bathing, otherwise pt  "reports he is independent in ADL's)  Homemaking Assistance: Needs assistance (Home health aid assists)    ADL:  UE Dressing Assistance:  (Anticpate SBA based on pt's functional performance during other tasks observed in session.)  LE Dressing Assistance:  (SBA to don/doff socks in sitting using \"figure 4\" technique, close SBA/CGA for clothing management while in standing.)  Activity Tolerance:  Endurance: Tolerates 10 - 20 min exercise with multiple rests  Balance:  Dynamic Sitting Balance  Dynamic Sitting-Level of Assistance: Close supervision  Static Sitting Balance  Static Sitting-Level of Assistance: Distant supervision  Static Standing Balance  Static Standing-Level of Assistance: Close supervision (with support of RW once pt transitioned to standing.)  Bed Mobility/Transfers: Bed Mobility  Bed Mobility: Yes  Bed Mobility 1  Bed Mobility 1: Supine to sitting  Level of Assistance 1: Close supervision  Bed Mobility Comments 1: HOB elevated  Functional Mobility  Functional Mobility Performed: Yes  Functional Mobility 1  Surface 1: Level tile  Device 1: Rolling walker  Assistance 1: Contact guard  Quality of Functional Mobility 1:  (Pt noted to have limited B/L knee extenstion and dorsiflexion B/L ankles; pt  \"toe walks\". Pt reports this is his baseline and has been for some time. Refer to PT notes re: LE/gait status.)   and Transfers  Transfer: Yes  Transfer 1  Transfer From 1: Bed to  Transfer to 1: Stand  Technique 1: Sit to stand  Transfer Device 1: Walker  Transfer Level of Assistance 1: Minimum assistance, Minimal verbal cues  Trials/Comments 1: Increased time to complete, slightly unsteady in task.  Transfers 2  Transfer From 2: Stand to  Transfer to 2: Commode-standard  Technique 2: Stand to sit  Transfer Device 2: Walker  Transfer Level of Assistance 2: Contact guard, Minimal verbal cues       Vision: Vision - Basic Assessment  Current Vision: Wears glasses all the time   and    Sensation:  Light Touch: No " apparent deficits (UE's)  Strength:  Strength Comments: Grossly WFL B/L UE's throughout    Coordination:  Movements are Fluid and Coordinated: Yes   Hand Function:  Hand Function  Gross Grasp: Functional  Extremities: RUE   RUE : Within Functional Limits, LUE   LUE: Within Functional Limits,  , and      Outcome Measures: [facility] Daily Activity  Putting on and taking off regular lower body clothing: A little  Bathing (including washing, rinsing, drying): A little  Putting on and taking off regular upper body clothing: A little  Toileting, which includes using toilet, bedpan or urinal: A little  Taking care of personal grooming such as brushing teeth: A little  Eating Meals: A little  Daily Activity - Total Score: 18         ,     OT Adult Other Outcome Measures  4AT: 0    Education Documentation  Body Mechanics, taught by Twila Lopez OT at 10/23/2024  2:32 PM.  Learner: Patient  Readiness: Acceptance  Method: Explanation  Response: Needs Reinforcement    Precautions, taught by Twila Lopez OT at 10/23/2024  2:32 PM.  Learner: Patient  Readiness: Acceptance  Method: Explanation  Response: Needs Reinforcement    ADL Training, taught by Twila Lopez OT at 10/23/2024  2:32 PM.  Learner: Patient  Readiness: Acceptance  Method: Explanation  Response: Needs Reinforcement    Education Comments  No comments found.        Goals:     Encounter Problems       Encounter Problems (Active)       ADLs       Patient will perform UB and LB bathing  with set-up and supervision level of assistance and extended tub bench and long-handled sponge. (Progressing)       Start:  10/23/24    Expected End:  11/06/24            Patient with complete upper body dressing with independent level of assistance donning and doffing all UE clothes with PRN adaptive equipment while supported sitting and edge of bed  (Progressing)       Start:  10/23/24    Expected End:  11/06/24            Patient with complete lower body dressing with modified  independent level of assistance donning and doffing all LE clothes  with PRN adaptive equipment while supported sitting and edge of bed  (Progressing)       Start:  10/23/24    Expected End:  11/06/24            Patient will complete daily grooming tasks brushing teeth and washing face/hair with modified independent level of assistance and PRN adaptive equipment while edge of bed  and standing. (Progressing)       Start:  10/23/24    Expected End:  11/06/24            Patient will complete toileting including hygiene clothing management/hygiene with modified independent level of assistance and raised toilet seat. (Progressing)       Start:  10/23/24    Expected End:  11/06/24               BALANCE       Pt will maintain dynamic standing balance during ADL task with modified independent level of assistance in order to demonstrate decreased risk of falling and improved postural control. (Progressing)       Start:  10/23/24    Expected End:  11/06/24               MOBILITY       Patient will perform Functional mobility  Household distances/Community Distances with modified independent level of assistance and least restrictive device in order to improve safety and functional mobility. (Progressing)       Start:  10/23/24    Expected End:  11/06/24               TRANSFERS       Patient will perform bed mobility independent level of assistance and using safe technique in order to improve safety and independence with mobility (Progressing)       Start:  10/23/24    Expected End:  11/06/24            Patient will complete functional transfer to all surfaces with least restrictive device with modified independent level of assistance. (Progressing)       Start:  10/23/24    Expected End:  11/06/24                   10/23/24 at 2:34 PM   Twila Lopez OT   Rehab Office: [phone]

## 2024-10-23 NOTE — RESEARCH NOTES
Artificial Intelligence Monitoring in Nursing (AIMS Nursing) Study    Principle Investigator - Dr. Nahum Sofia  Research Coordinator - Isi Fenton     Patient Name - Ramón Flores  Date - 10/23/2024 1:13 PM  Location - [place] [address]    Ramón Flores was approached by Isi Fenton to talk about participating in the AIMS Nursing Study. The patient was not able to be approached, a research coordinator will come back at a later time. Study protocol was followed and patient was given study contact information.     Isi Fenton

## 2024-10-23 NOTE — HH CARE COORDINATION
Home Care received a Referral for Nursing, Physical Therapy, Occupational Therapy, and Home Health Aide. We have processed the referral for a Start of Care on 24-48 HOURS .      If you have any questions or concerns, please feel free to contact us at [phone]. Follow the prompts, enter your five digit zip code, and you will be directed to your care team on [facility] 1.

## 2024-10-23 NOTE — PROGRESS NOTES
Physical Therapy    Physical Therapy Treatment    Patient Name: Ramón Flores  MRN: [id number]  Department: [place] [address]  Room: [address] [place] [address]  Today's Date: 10/23/2024  Time Calculation  Start Time: 1106  Stop Time: 1124  Time Calculation (min): 18 min         Assessment/Plan   PT Assessment  End of Session Communication: Bedside nurse  Assessment Comment: Pt able to ambulate increased distance this session. Pt remains limited by range of motion, decreased endurance, and weakness. Pt to benefit from Low Intensity Rehab after DC to facilitate return to PLOF.  End of Session Patient Position: Up in chair (Commode)     PT Plan  Treatment/Interventions: Bed mobility, Transfer training, Gait training, Balance training, Strengthening, Range of motion, Therapeutic exercise, Therapeutic activity  PT Plan: Ongoing PT  PT Frequency: 3 times per week  PT Discharge Recommendations:  (pt reports has had 24/7(A) at home after hospitalizations in the past, may benefit from 24/7assist initially along with low intensity therapy to maximize safety with home going.  Will update POC if mobility safety does not improve during hospitalization)  PT Recommended Transfer Status: Contact guard  PT - OK to Discharge: Yes (POC/goals/discharge rec intensity created)      General Visit Information:   PT  Visit  PT Received On: 10/23/24  Response to Previous Treatment: Patient with no complaints from previous session.  General  Family/Caregiver Present: No  Co-Treatment: OT  Co-Treatment Reason: To maximize pt function and safety  Prior to Session Communication: Bedside nurse  Patient Position Received: Bed, 3 rail up, Alarm off, not on at start of session  Preferred Learning Style: auditory, verbal, visual  General Comment: Pt pleasant and agreeable to PT session    Subjective   Precautions:  Precautions  Hearing/Visual Limitations: wears glasses,  Medical Precautions: Fall precautions      Objective   Pain:  Pain Assessment  Pain Assessment:  0-10  0-10 (Numeric) Pain Score: 0 - No pain  Cognition:  Cognition  Orientation Level: Oriented X4  Coordination:  Movements are Fluid and Coordinated: Yes  Postural Control:  Postural Control  Postural Control: Impaired (Kyphotic)  Static Sitting Balance  Static Sitting-Balance Support: Feet supported, No upper extremity supported  Static Sitting-Level of Assistance: Close supervision  Static Standing Balance  Static Standing-Balance Support: Bilateral upper extremity supported  Static Standing-Level of Assistance: Contact guard  Static Standing-Comment/Number of Minutes: At FWW  Extremity/Trunk Assessments:     AROM RLE (degrees)  RLE AROM Comment: Lacks full extension at knee, grossly ~20. Lacks ankle DF -20 from neutral  Strength RLE  RLE Overall Strength: Greater than or equal to 3/5 as evidenced by functional mobility  AROM LLE (degrees)  LLE AROM Comment: Lacks full extension at knee, grossly ~20. Lacks ankle DF -20 from neutral  Strength LLE  LLE Overall Strength: Greater than or equal to 3/5 as evidenced by functional mobility  Activity Tolerance:  Activity Tolerance  Endurance: Tolerates 10 - 20 min exercise with multiple rests  Early Mobility/Exercise Safety Screen: Proceed with mobilization - No exclusion criteria met  Treatments:    Bed Mobility  Bed Mobility: Yes  Bed Mobility 1  Bed Mobility 1: Supine to sitting  Level of Assistance 1: Close supervision  Bed Mobility Comments 1: HOB elevated    Ambulation/Gait Training  Ambulation/Gait Training Performed: Yes  Ambulation/Gait Training 1  Surface 1: Level tile  Device 1: Rolling walker  Assistance 1: Contact guard, Minimal verbal cues  Quality of Gait 1: Decreased step length (Pt with both ankles plantarflexed (walking on toes). Pt reporting this is normal for him)  Comments/Distance (ft) 1: 50ft  Transfers  Transfer: Yes  Transfer 1  Transfer From 1: Bed to  Transfer to 1: Stand  Technique 1: Sit to stand  Transfer Device 1: Walker  Transfer Level of  Assistance 1: Minimum assistance, Minimal verbal cues  Trials/Comments 1: Increased time to complete  Transfers 2  Transfer From 2: Stand to  Transfer to 2: Commode-standard  Technique 2: Stand to sit  Transfer Device 2: Walker  Transfer Level of Assistance 2: Contact guard, Minimal verbal cues    Stairs  Stairs: No    Outcome Measures:  [facility] Basic Mobility  Turning from your back to your side while in a flat bed without using bedrails: A little  Moving from lying on your back to sitting on the side of a flat bed without using bedrails: A little  Moving to and from bed to chair (including a wheelchair): A little  Standing up from a chair using your arms (e.g. wheelchair or bedside chair): A little  To walk in hospital room: A little  Climbing 3-5 steps with railing: A lot  Basic Mobility - Total Score: 17    Education Documentation  Mobility Training, taught by Diane Keller PT at 10/23/2024 12:21 PM.  Learner: Patient  Readiness: Eager  Method: Explanation  Response: Verbalizes Understanding, Demonstrated Understanding    Education Comments  No comments found.      Encounter Problems       Encounter Problems (Active)       Balance       Patient will maintain balance to allow for safe mobility with (S), LRAD.  (Progressing)       Start:  10/21/24    Expected End:  11/04/24               Mobility       STG - Patient will ambulate with (S), LRAD, 40 ft x 4.  (Progressing)       Start:  10/21/24    Expected End:  11/05/24               PT Transfers       STG - Patient will perform bed mobility (S).  (Progressing)       Start:  10/21/24    Expected End:  11/04/24            STG - Patient will transfer sit to and from stand (S).  (Progressing)       Start:  10/21/24    Expected End:  11/04/24               Pain - Adult          Strength        Will complete (B) LE therex to improve strength and ROM.  (Progressing)       Start:  10/21/24    Expected End:  11/04/24

## 2024-10-24 NOTE — PROGRESS NOTES
10/24/24 8:59am  Pt is a readmission and has a LACE score of 85 which is high for readmission.  Met with pt yesterday to talk to him about follow up appointments.  Pt stated that Roseline Mccurdy CNP comes to his house.  Pt was Ok with me making an appointment for him.  Called [facility] yesterday and could not get anyone.  Called this morning was able to set up a follow up appointment with Roseline Mccurdy CNP for October 29, 24 at 9:00 am.  Called pt and inform him of the appointment time.  Bell Ca MSW, [facility]

## 2024-10-24 NOTE — HOSPITAL COURSE
Mr. Flores is a 89 year old male with history of hemophilia a, atrial fibrillation, protein C deficiency, hypertension, nephrolithiasis, BPH, CAD status post CABG 20 years ago who is not currently on blood thinners due to his history of hemophilia who presented as a transfer from [facility] due to concern for rectal bleeding in the setting of his known Hemophilia A and protein C deficiency. He is overall feeling in his normal state of health and denies anal pain. He has been hemodynamically stable since admission. Patient had colonoscopy 10/22/2024, which revealed mass in anal area concerning for malignancy. Surgical service was consulted and stated no indication of acute surgical intervention at this time.    Hematology service was consulted  to manage Factor VIII replenishment. Patient did not have any critical or minor bleeding during hospital stay.      Patient was considered ready for discharge after tolerating diet and bowel movements with no bleed were reported. Patient understood diagnosis and is willing to follow up with colorectal surgery outpatient and oncology outpatient once results of the biopsy comes back.

## 2024-10-25 DIAGNOSIS — D66 FACTOR VIII DEFICIENCY (MULTI): ICD-10-CM

## 2024-10-25 DIAGNOSIS — D69.6 THROMBOCYTOPENIA (CMS-HCC): ICD-10-CM

## 2024-10-25 DIAGNOSIS — D66 HEMOPHILIA A (MULTI): ICD-10-CM

## 2024-10-28 ENCOUNTER — PATIENT OUTREACH (OUTPATIENT)
Dept: HEMATOLOGY/ONCOLOGY | Facility: CLINIC | Age: 89
End: 2024-10-28
Payer: MEDICARE

## 2024-10-28 ENCOUNTER — TELEPHONE (OUTPATIENT)
Dept: HEMATOLOGY/ONCOLOGY | Facility: CLINIC | Age: 89
End: 2024-10-28
Payer: MEDICARE

## 2024-10-28 DIAGNOSIS — K62.89 MASS OF ANUS: ICD-10-CM

## 2024-10-28 SDOH — ECONOMIC STABILITY: GENERAL
WHICH OF THE FOLLOWING WOULD YOU LIKE TO GET CONNECTED TO IN ORDER TO RECEIVE A DISCOUNT OR FOR FREE? (CHOOSE ALL THAT APPLY): NONE OF THESE

## 2024-10-28 SDOH — ECONOMIC STABILITY: GENERAL
WHICH OF THE FOLLOWING DO YOU KNOW HOW TO USE AND HAVE ACCESS TO EVERY DAY? (CHOOSE ALL THAT APPLY): DESKTOP COMPUTER, LAPTOP COMPUTER, OR TABLET WITH BROADBAND INTERNET CONNECTION

## 2024-10-29 ENCOUNTER — TELEPHONE (OUTPATIENT)
Dept: HEMATOLOGY/ONCOLOGY | Facility: HOSPITAL | Age: 89
End: 2024-10-29

## 2024-10-29 ENCOUNTER — OFFICE VISIT (OUTPATIENT)
Dept: GERIATRIC MEDICINE | Facility: CLINIC | Age: 89
End: 2024-10-29
Payer: MEDICARE

## 2024-10-29 VITALS
DIASTOLIC BLOOD PRESSURE: 60 MMHG | SYSTOLIC BLOOD PRESSURE: 140 MMHG | HEART RATE: 80 BPM | TEMPERATURE: 97.7 F | RESPIRATION RATE: 16 BRPM

## 2024-10-29 DIAGNOSIS — M17.11 PRIMARY OSTEOARTHRITIS OF RIGHT KNEE: ICD-10-CM

## 2024-10-29 DIAGNOSIS — M17.12 PRIMARY OSTEOARTHRITIS OF LEFT KNEE: ICD-10-CM

## 2024-10-29 DIAGNOSIS — M62.81 GENERALIZED MUSCLE WEAKNESS: ICD-10-CM

## 2024-10-29 DIAGNOSIS — I25.10 ASHD (ARTERIOSCLEROTIC HEART DISEASE): Primary | ICD-10-CM

## 2024-10-29 DIAGNOSIS — N18.31 STAGE 3A CHRONIC KIDNEY DISEASE (MULTI): ICD-10-CM

## 2024-10-29 DIAGNOSIS — D66 HEMOPHILIA A (MULTI): ICD-10-CM

## 2024-10-29 DIAGNOSIS — N40.0 BENIGN PROSTATIC HYPERPLASIA WITHOUT LOWER URINARY TRACT SYMPTOMS: ICD-10-CM

## 2024-10-29 DIAGNOSIS — I48.20 ATRIAL FIBRILLATION, CHRONIC (MULTI): ICD-10-CM

## 2024-10-29 DIAGNOSIS — I50.30 HEART FAILURE WITH PRESERVED EJECTION FRACTION, UNSPECIFIED HF CHRONICITY: ICD-10-CM

## 2024-10-29 PROBLEM — I50.33 ACUTE ON CHRONIC DIASTOLIC HEART FAILURE: Status: RESOLVED | Noted: 2024-05-31 | Resolved: 2024-10-29

## 2024-10-29 PROBLEM — R60.0 BILATERAL LEG EDEMA: Status: RESOLVED | Noted: 2023-11-17 | Resolved: 2024-10-29

## 2024-10-29 PROBLEM — K92.2 GI BLEED: Status: RESOLVED | Noted: 2024-10-20 | Resolved: 2024-10-29

## 2024-10-29 LAB
LAB AP ASR DISCLAIMER: NORMAL
LABORATORY COMMENT REPORT: NORMAL
PATH REPORT.FINAL DX SPEC: NORMAL
PATH REPORT.GROSS SPEC: NORMAL
PATH REPORT.RELEVANT HX SPEC: NORMAL
PATH REPORT.TOTAL CANCER: NORMAL

## 2024-10-29 PROCEDURE — 99349 HOME/RES VST EST MOD MDM 40: CPT | Performed by: CLINICAL NURSE SPECIALIST

## 2024-10-29 PROCEDURE — 3078F DIAST BP <80 MM HG: CPT | Performed by: CLINICAL NURSE SPECIALIST

## 2024-10-29 PROCEDURE — 1159F MED LIST DOCD IN RCRD: CPT | Performed by: CLINICAL NURSE SPECIALIST

## 2024-10-29 PROCEDURE — 1157F ADVNC CARE PLAN IN RCRD: CPT | Performed by: CLINICAL NURSE SPECIALIST

## 2024-10-29 PROCEDURE — 1036F TOBACCO NON-USER: CPT | Performed by: CLINICAL NURSE SPECIALIST

## 2024-10-29 PROCEDURE — 3077F SYST BP >= 140 MM HG: CPT | Performed by: CLINICAL NURSE SPECIALIST

## 2024-10-29 PROCEDURE — 1111F DSCHRG MED/CURRENT MED MERGE: CPT | Performed by: CLINICAL NURSE SPECIALIST

## 2024-10-30 ENCOUNTER — TELEPHONE (OUTPATIENT)
Dept: HEMATOLOGY/ONCOLOGY | Facility: HOSPITAL | Age: 89
End: 2024-10-30
Payer: MEDICARE

## 2024-11-01 DIAGNOSIS — C21.0 ANAL CANCER (MULTI): Primary | ICD-10-CM

## 2024-11-04 ENCOUNTER — OFFICE VISIT (OUTPATIENT)
Dept: HEMATOLOGY/ONCOLOGY | Facility: CLINIC | Age: 89
End: 2024-11-04
Payer: MEDICARE

## 2024-11-04 ENCOUNTER — HOSPITAL ENCOUNTER (OUTPATIENT)
Dept: RADIATION ONCOLOGY | Facility: HOSPITAL | Age: 89
Setting detail: RADIATION/ONCOLOGY SERIES
Discharge: HOME | End: 2024-11-04
Payer: MEDICARE

## 2024-11-04 ENCOUNTER — PATIENT OUTREACH (OUTPATIENT)
Dept: HEMATOLOGY/ONCOLOGY | Facility: CLINIC | Age: 89
End: 2024-11-04
Payer: MEDICARE

## 2024-11-04 VITALS
WEIGHT: 139.33 LBS | SYSTOLIC BLOOD PRESSURE: 147 MMHG | TEMPERATURE: 97.2 F | OXYGEN SATURATION: 96 % | RESPIRATION RATE: 18 BRPM | DIASTOLIC BLOOD PRESSURE: 82 MMHG | BODY MASS INDEX: 22.74 KG/M2 | HEART RATE: 72 BPM

## 2024-11-04 DIAGNOSIS — K62.89 MASS OF ANUS: ICD-10-CM

## 2024-11-04 DIAGNOSIS — C21.0 ANAL CANCER (MULTI): Primary | ICD-10-CM

## 2024-11-04 LAB
LAB AP ASR DISCLAIMER: NORMAL
LABORATORY COMMENT REPORT: NORMAL
PATH REPORT.ADDENDUM SPEC: NORMAL
PATH REPORT.FINAL DX SPEC: NORMAL
PATH REPORT.GROSS SPEC: NORMAL
PATH REPORT.RELEVANT HX SPEC: NORMAL
PATH REPORT.TOTAL CANCER: NORMAL

## 2024-11-04 PROCEDURE — 99215 OFFICE O/P EST HI 40 MIN: CPT | Performed by: INTERNAL MEDICINE

## 2024-11-04 PROCEDURE — 1036F TOBACCO NON-USER: CPT | Performed by: INTERNAL MEDICINE

## 2024-11-04 PROCEDURE — 1159F MED LIST DOCD IN RCRD: CPT | Performed by: INTERNAL MEDICINE

## 2024-11-04 PROCEDURE — 1126F AMNT PAIN NOTED NONE PRSNT: CPT | Performed by: INTERNAL MEDICINE

## 2024-11-04 PROCEDURE — 3077F SYST BP >= 140 MM HG: CPT | Performed by: INTERNAL MEDICINE

## 2024-11-04 PROCEDURE — G2211 COMPLEX E/M VISIT ADD ON: HCPCS | Performed by: RADIOLOGY

## 2024-11-04 PROCEDURE — 99215 OFFICE O/P EST HI 40 MIN: CPT | Mod: 95 | Performed by: RADIOLOGY

## 2024-11-04 PROCEDURE — 3079F DIAST BP 80-89 MM HG: CPT | Performed by: INTERNAL MEDICINE

## 2024-11-04 PROCEDURE — 1157F ADVNC CARE PLAN IN RCRD: CPT | Performed by: INTERNAL MEDICINE

## 2024-11-04 PROCEDURE — 99205 OFFICE O/P NEW HI 60 MIN: CPT | Performed by: RADIOLOGY

## 2024-11-04 PROCEDURE — 1111F DSCHRG MED/CURRENT MED MERGE: CPT | Performed by: INTERNAL MEDICINE

## 2024-11-04 ASSESSMENT — NCCN CANCER DISTRESS MANAGEMENT
NCCN EMOTIONAL CONCERNS: 1
NCCN EMOTIONAL CONCERNS: 4
NCCN EMOTIONAL CONCERNS: 6
NCCN PHYSICAL CONCERNS: 9

## 2024-11-04 ASSESSMENT — ENCOUNTER SYMPTOMS
APPETITE CHANGE: 0
SLEEP DISTURBANCE: 0
DIARRHEA: 0
BLOOD IN STOOL: 0
GASTROINTESTINAL NEGATIVE: 1
RESPIRATORY NEGATIVE: 1
EYE PROBLEMS: 1
CARDIOVASCULAR NEGATIVE: 1
BRUISES/BLEEDS EASILY: 1
NERVOUS/ANXIOUS: 0
ABDOMINAL DISTENTION: 0
CHILLS: 0
CONSTIPATION: 0
DEPRESSION: 0
DIAPHORESIS: 0
VOMITING: 0
MUSCULOSKELETAL NEGATIVE: 1
RECTAL PAIN: 0
NEUROLOGICAL NEGATIVE: 1
UNEXPECTED WEIGHT CHANGE: 0
FEVER: 0
CONFUSION: 0
NAUSEA: 0
ABDOMINAL PAIN: 0
DECREASED CONCENTRATION: 0
SCLERAL ICTERUS: 0
FATIGUE: 0

## 2024-11-04 ASSESSMENT — PAIN SCALES - GENERAL: PAINLEVEL_OUTOF10: 0-NO PAIN

## 2024-11-04 NOTE — PROGRESS NOTES
New patient here with family. Patient denies any pain states that he feels good. Stated he has hemophilia and is treated and seen at [facility]. Patient states that he eats well and drinks well. He has a nurse who comes and helps him at home. Med rec done. Pt distress entered. MD mervat Aguirre RN

## 2024-11-04 NOTE — PROGRESS NOTES
MEDICAL ONCOLOGY INITIAL OUTPATIENT CONSULTATION NOTE  [facility] [place], Gastrointestinal Oncology    Patient Name:  Ramón Flores  MRN:  [id number]  :  1928    Referring Provider: Eleazar Caraballo*  Care Team: Patient Care Team:  SUSI Mckeon-CNS as PCP - General (Gerontology)  Gallito Layne MD as Consulting Physician (Hospitalist)  Rufino Dillon MD as Consulting Physician (Hematology and Oncology)  Ayde Locke MD as Consulting Physician (Hematology and Oncology)  Deidra Muro MD as Radiation Oncologist (Radiation Oncology)  Date of Service: 2024     IDENTIFYING DATA:  Ramón Flores is a 89 y.o.-year-old with anal cancer    HISTORY OF PRESENT ILLNESS:  Ramón Flores is a 89 y.o. 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     Th patient reports that he noted a few episodes of rectal bleeding in the setting of constipation over several consecutive days and then called EMS. He was brought to [facility] but then transferred to [facility] on 10/20/24 given no factor VIII at [facility]. Colonoscopy performed on 10/22/24 showed single malignant-appearing and friable ulcerated mass in the anal canal. 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    At the time of initial consultation on 24, patient repots he is overall feeling well. He is accompanied by his nephew Javier and his niece Corinne. 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 He has no complaints other than the above symptoms.    PAST MEDICAL HISTORY:  Past Medical History:   Diagnosis Date    Acute deep vein thrombosis (DVT) of left femoral vein (Multi) 09/10/2023    ACUTE DEEP VEIN THROMBOSIS, L FEMORAL, HEMOPHILIA    Acute diastolic heart failure 2023    Benign  prostatic hyperplasia without lower urinary tract symptoms 01/07/2016    Enlarged prostate without lower urinary tract symptoms (luts)    Bleeding hemorrhoids 03/01/2023    CAP (community acquired pneumonia) 01/18/2024    Closed nondisplaced fracture of head of left radius 09/05/2023    COVID-19 05/21/2023    Enlarged prostate with lower urinary tract symptoms (LUTS) 03/01/2023    Fracture of bone of hip (Multi) 09/05/2023    Fracture of femoral neck, right 03/01/2023    Gastrointestinal hemorrhage 09/05/2023    LOWER GI BLEED    Gingiva hemorrhage 09/05/2023    Gross hematuria 03/01/2023    Hemarthrosis of ankle joint 04/18/2019    Hemarthrosis of knee 05/09/2022    Hematochezia 09/05/2023    Hematoma of lower extremity 09/05/2023    Hematoma of right thigh 03/01/2023    History of recent fall 05/31/2023    Knee effusion 09/05/2023    Knee effusion, left 03/01/2023    Lumbar pain 03/01/2023    Obstructive uropathy 04/28/2023    Orthostatic syncope 09/05/2023    Personal history of other endocrine, nutritional and metabolic disease     History of hyperlipidemia    Pneumonia of left lower lobe due to infectious organism 03/19/2024    Pneumonia of right lung due to infectious organism 03/01/2023    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    Subdural hematoma (Multi) 04/09/2023    Syncope 05/12/2023    Urinary retention 03/01/2023    UTI (urinary tract infection) 03/01/2023    Viral gastroenteritis 03/01/2023     PAST SURGICAL HISTORY:  Past Surgical History:   Procedure Laterality Date    CORONARY ARTERY BYPASS GRAFT         ALLERGIES:  Allergies   Allergen Reactions    Aspirin Bleeding     hemophiliac    Penicillins Rash       MEDICATIONS:  Current Outpatient Medications   Medication Instructions    acetaminophen (TYLENOL EXTRA STRENGTH) 1,000 mg, oral, Every 8 hours PRN    antihemophilic RF VIII (Altuviiio) 3,000 (+/-) unit recon soln 50 Units/kg, intravenous, Every 7 days, 3030 units +/-10% dose every 7 days.  Additionally,   prn when directed.    atorvastatin (LIPITOR) 40 mg, oral, Daily    calcium carbonate 600 mg calcium (1,500 mg) tablet 1 tablet, 2 times daily (morning and late afternoon)    finasteride (PROSCAR) 5 mg, oral, Daily    folic acid (Folvite) 1 mg tablet 1 tablet, Daily    Jardiance 10 mg, oral, Daily    Lactobacillus acidoph-L.bulgar 1 million cell tablet tablet 1 Million Cells, Daily    pantoprazole (PROTONIX) 40 mg, oral, Daily before breakfast, Do not crush, chew, or split.    tamsulosin (FLOMAX) 0.4 mg, oral, 2 times daily    vit A/vit C/vit E/zinc/copper (PRESERVISION AREDS ORAL) 1 capsule, Daily       SOCIAL HISTORY:  Social History     Tobacco Use    Smoking status: Never    Smokeless tobacco: Never   Substance Use Topics    Alcohol use: Defer     Social History     Social History Narrative    Patient lives alone with support for bathing and purchasing/preparing food. He broke his hip 2 years ago and recovered. He had a twin sister in [place] who is recently  from dementia. Wife  14 years ago. Adopted daughter is alive. 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        FAMILY HISTORY:  Family History   Problem Relation Name Age of Onset    Hemophilia Mother          REVIEW OF SYSTEMS:  10-pt ROS reviewed and negative except as mentioned above.    PERFORMANCE STATUS:  Karnofsky Performance Score/ECO, Normal activity with effort; some signs or symptoms of disease (ECOG equivalent 1)    PHYSICAL EXAMINATION:  There were no vitals taken for this visit.   Wt Readings from Last 5 Encounters:   10/20/24 60.6 kg (133 lb 9.6 oz)   10/09/24 60.6 kg (133 lb 9.6 oz)   24 59.4 kg (131 lb)   24 59.4 kg (131 lb)   24 59.4 kg (131 lb)     GEN: Well-appearing, no acute distress, breathing comfortably on room air.  HEENT: Moist mucous membranes, EOMI intact.   LYMPH: No  cervical or supraclavicular adenopathy  LUNGS: Clear to auscultation bilaterally, no wheezing.  CV: Regular rate and rhythm, normal S1, S2, no murmurs.  ABD: Soft, non-tender, non-distended.    EXT: Warm and well-perfused, no lower extremity edema.  NEURO: Grossly intact. No focal deficits.  SKIN: No rashes  PSYCH: Appropriate mood and affect    PATHOLOGY:    Surgical pathology 10/22/24:  FINAL DIAGNOSIS   A. Anus, mass, biopsy:  -- Invasive moderately differentiated squamous cell carcinoma (see note)     Note: The biopsy tissue demonstrates squamous cell carcinoma in situ (high-grade squamous intraepithelial lesion, HSIL) with focal invasion. An immunohistochemical stain for p16 has been ordered and the results will be reported as an addendum. Findings were communicated to Dr. Nika Pinedo via secure chat messaging on 10/29/2024 at 4:28 pm.     : Dr. Yahaira Abdul   Electronically signed by Nalini Brito MD on 10/29/2024 at 1629        By the signature on this report, the individual or group listed as making the Final Interpretation/Diagnosis certifies that they have reviewed this case.    Addendum   An immunohistochemical stain for p16 is diffusely positive (strong, box-like) in the tumor.        NGS: N/A    IMAGING DATA:   CT angio abdomen/pelvis 10/20/24:  Impression   1.  Cardiomegaly and small hiatal hernia  2. 2 cm right renal cortical cyst  3. Uncomplicated colonic diverticulosis.  4. Urinary bladder wall thickening with enlarged prostate causing a  large impression on the floor of the urinary bladder       LABORATORY DATA:    Last CBC w. Diff.:    Lab Results   Component Value Date/Time    WBC 4.9 10/23/2024 0822    NRBC 0.0 10/23/2024 0822    RBC 3.51 (L) 10/23/2024 0822    HGB 12.2 (L) 10/23/2024 0822    HCT 35.7 (L) 10/23/2024 0822     (H) 10/23/2024 0822    MCH 34.8 (H) 10/23/2024 0822    MCHC 34.2 10/23/2024 0822    RDW 13.8 10/23/2024 0822     (L) 10/23/2024 0822     NEUTOPHILPCT 52.7 10/20/2024 1900    IGPCT 1.8 (H) 10/20/2024 1900    LYMPHOPCT 32.4 10/20/2024 1900    LYMPHOPCT 18.0 10/20/2024 0243    MONOPCT 11.1 10/20/2024 1900    MONOPCT 0.0 10/20/2024 0243    EOSPCT 1.6 10/20/2024 1900    EOSPCT 0.0 10/20/2024 0243    BASOPCT 0.4 10/20/2024 1900    BASOPCT 0.0 10/20/2024 0243    NEUTROABS 2.60 10/20/2024 1900    IGABSOL 0.09 10/20/2024 1900    LYMPHSABS 1.60 10/20/2024 1900    MONOSABS 0.55 10/20/2024 1900    EOSABS 0.08 10/20/2024 1900    EOSABS 0.00 10/20/2024 0243    BASOSABS 0.02 10/20/2024 1900    BASOSABS 0.00 10/20/2024 0243     Last CMP:     Lab Results   Component Value Date/Time    GLUCOSE 88 10/23/2024 0822     10/23/2024 0822    K 4.1 10/23/2024 0822     10/23/2024 0822    CO2 24 10/23/2024 0822    ANIONGAP 13 10/23/2024 0822    BUN 23 10/23/2024 0822    CREATININE 1.19 10/23/2024 0822    EGFR 56 (L) 10/23/2024 0822    CALCIUM 9.0 10/23/2024 0822    ALBUMIN 3.7 10/23/2024 0822    ALKPHOS 70 10/21/2024 0538    PROT 6.5 10/21/2024 0538    AST 18 10/21/2024 0538    BILITOT 1.2 10/21/2024 0538    ALT 12 10/21/2024 0538       All pertinent pathology, imaging, and labs were personally reviewed and interpreted in clinic. Findings as per HPI and EMR.    ASSESSMENT:  Ramón Flores is a 89 y.o. 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     Reviewed the above history and diagnosis of anal squamous cell carcinoma with the patient and his family. Explained that anal carcinoma is associated with HPV infection and this may have been the  of his disease. Would also recommend HIV testing, given history of transfusion and given HIV infection can increase risk of anal cancer, though low suspicion based on clinical presentation. He is scheduled for PET/CT 11/7/24.    Although prior treatment of invasive anal  carcinoma included APR, this strategy was associated with high local recurrence rates and 5-year survival 40-70% with significant morbidity.  Preoperative chemoradiation with 5-FU-based concurrent chemotherapy and radiation including mitomycin or porfiromycin has been associated with complete tumor regression. The current treatment paradigm is based on a phase III study from [employer] which compared chemoRT (5-FU/mitomycin) to RT alone and showed patients who received chemoRT had 18% higher rate of locoregional control at 5 years and 32% longer colostomy-fee interval.  The [facility] randomized ACT I trial confirmed 5-FU/mitomycin chemoRT was more effective than RT alone (RR 0.54), with overall survival benefit later demonstrated (median survival 7.6 vs. 5.4 yrs).    The optimal regimen for older patients who may be unlikely to tolerate mitomycin is uknown. I explained that the standard of care would be concurrent chemoradiation with mitomycin and capecitabine, though given the patient's age and comorbidities, I would recommend either concurrent chemoradiation with capecitabine alone or radiation alone. Reviewed side effects of capecitabine in detail with the patient, including but not limited to cytopenias, hand-foot syndrome, diarrhea, nausea, vomiting, coronary vasospasm. Explained that definitive RT alone is also an option, which he can discuss at his radiation oncology visit this afternoon. After considering the risks/benefits of chemotherapy, the patient is leaning toward RT alone at the conclusion of our visit.      PLAN:  #Anal cancer:  - CBC, CMP,  - Follow up PET/CT 11/7/24  - Follow up radiation oncology recommendations - case discussed with Dr. Muro, who will see the patient this afternoon  - If no evidence of metastatic disease, recommend concurrent chemoradiation with capecitabine alone versus definitive RT alone  - Return to clinic as needed depending on chemotherapy decision    #Hemophilia A:  - Management  per Dr. Dillon    #History of CABG:  - Management per Dr. Misti Locke MD  Gastrointestinal Medical Oncologist   11/4/2024

## 2024-11-04 NOTE — PROGRESS NOTES
Radiation Oncology Nursing Note    Prior Radiotherapy:  No  No radiation treatments to show. (Treatments may have been administered in another system.)     Current Systemic Treatment:  No     Presence of Pacemaker or ICD:  No    History of Autoimmune or Connective Tissue Disorders:  No    Pain: The patient's current pain level was assessed.  They report currently having a pain of 0 out of 10.  They feel their pain is under control without the use of pain medications.    Review of Systems:  Review of Systems   Constitutional:  Negative for appetite change, chills, diaphoresis, fatigue, fever and unexpected weight change.   HENT:  Negative.     Eyes:  Positive for eye problems. Negative for icterus.        Wears glasses  Macular degeneration - pill    Respiratory: Negative.     Cardiovascular: Negative.    Gastrointestinal: Negative.  Negative for abdominal distention, abdominal pain, blood in stool, constipation, diarrhea, nausea, rectal pain and vomiting.        No blood on tissue last 2 weeks.    Genitourinary: Negative.     Musculoskeletal: Negative.    Skin: Negative.    Neurological: Negative.    Hematological:  Bruises/bleeds easily.   Psychiatric/Behavioral:  Negative for confusion, decreased concentration, depression, sleep disturbance and suicidal ideas. The patient is not nervous/anxious.         Holocaust survivor

## 2024-11-04 NOTE — PROGRESS NOTES
Staff Physician: Deidra Muro MD  Referring Physician: Carla Tracey MD  Date of Service: 11/4/2024    RADIATION ONCOLOGY CONSULT NOTE    IDENTIFYING DATA:   Cancer Staging   Anal cancer (Multi)  Staging form: Anus, AJCC V9  - Clinical: cT2, cNX - Unsigned    Problem List Items Addressed This Visit       Anal cancer (Multi) - Primary     Mr. Ramón Flores is a 89-year-old man with cT2-3NxM0 anal SCC by CT, with PET pending. He presents to consider radiation options.    HISTORY OF PRESENT ILLNESS:  Mr. Ramón Flores states that he has a history of hemophilia. In this context, he had onset of rectal bleeding prompting evaluation and was seen in the [facility] ED by 911. He denies any anal pain or changes in bowel function. Colonoscopy 10/22/24 showed an anal mass, ~4cm in extent. He notes that he hasn't had bleeding since then, with biopsy showing squamous cell carcinoma, Gr2, p16 diffusely and strongly positive. CT-AP 10/20/24 showed anal thickening, a possible mesorectal node, and no other findings of concern. PET is ordered and pending. Presently, he feels well without complaints. He met with [facility] today and they feel he may be a limited candidate for chemotherapy, possible capecitabine alone but potentially RT alone. He is seen today on virtual visit to discuss radiation options.    PAST MEDICAL HISTORY:  Past Medical History:   Diagnosis Date    Acute deep vein thrombosis (DVT) of left femoral vein (Multi) 09/10/2023    ACUTE DEEP VEIN THROMBOSIS, L FEMORAL, HEMOPHILIA    Acute diastolic heart failure 04/16/2023    Benign prostatic hyperplasia without lower urinary tract symptoms 01/07/2016    Enlarged prostate without lower urinary tract symptoms (luts)    Bleeding hemorrhoids 03/01/2023    CAP (community acquired pneumonia) 01/18/2024    Closed nondisplaced fracture of head of left radius 09/05/2023    COVID-19 05/21/2023    Enlarged prostate with lower urinary tract symptoms (LUTS) 03/01/2023     Fracture of bone of hip (Multi) 09/05/2023    Fracture of femoral neck, right 03/01/2023    Gastrointestinal hemorrhage 09/05/2023    LOWER GI BLEED    Gingiva hemorrhage 09/05/2023    Gross hematuria 03/01/2023    Hemarthrosis of ankle joint 04/18/2019    Hemarthrosis of knee 05/09/2022    Hematochezia 09/05/2023    Hematoma of lower extremity 09/05/2023    Hematoma of right thigh 03/01/2023    History of recent fall 05/31/2023    Knee effusion 09/05/2023    Knee effusion, left 03/01/2023    Lumbar pain 03/01/2023    Obstructive uropathy 04/28/2023    Orthostatic syncope 09/05/2023    Personal history of other endocrine, nutritional and metabolic disease     History of hyperlipidemia    Pneumonia of left lower lobe due to infectious organism 03/19/2024    Pneumonia of right lung due to infectious organism 03/01/2023    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    Subdural hematoma (Multi) 04/09/2023    Syncope 05/12/2023    Urinary retention 03/01/2023    UTI (urinary tract infection) 03/01/2023    Viral gastroenteritis 03/01/2023     PAST SURGICAL HISTORY:  Past Surgical History:   Procedure Laterality Date    CORONARY ARTERY BYPASS GRAFT       ALLERGIES:  Allergies   Allergen Reactions    Aspirin Bleeding     hemophiliac    Penicillins Rash     MEDICATIONS:    Current Outpatient Medications:     acetaminophen (Tylenol Extra Strength) 500 mg tablet, Take 2 tablets (1,000 mg) by mouth every 8 hours if needed for mild pain (1 - 3)., Disp: 60 tablet, Rfl: 11    antihemophilic RF VIII (Altuviiio) 3,000 (+/-) unit recon soln, Infuse 3,030 Units into a venous catheter every 7 days. 3030 units +/-10% dose every 7 days.  Additionally,  prn when directed., Disp: 5 each, Rfl: 12    atorvastatin (Lipitor) 40 mg tablet, Take 1 tablet (40 mg) by mouth once daily., Disp: 90 tablet, Rfl: 1    calcium carbonate 600 mg calcium (1,500 mg) tablet, Take 1 tablet (1,500 mg) by mouth 2 times daily (morning and late afternoon)., Disp: , Rfl:      empagliflozin (Jardiance) 10 mg, Take 1 tablet (10 mg) by mouth once daily., Disp: 90 tablet, Rfl: 3    finasteride (Proscar) 5 mg tablet, Take 1 tablet (5 mg) by mouth once daily. (Patient not taking: Reported on 2024), Disp: 90 tablet, Rfl: 1    folic acid (Folvite) 1 mg tablet, Take 1 tablet (1 mg) by mouth once daily., Disp: , Rfl:     Lactobacillus acidoph-L.bulgar 1 million cell tablet tablet, Take 1 tablet by mouth once daily., Disp: , Rfl:     pantoprazole (ProtoNix) 40 mg EC tablet, Take 1 tablet (40 mg) by mouth once daily in the morning. Take before meals. Do not crush, chew, or split., Disp: 90 tablet, Rfl: 1    tamsulosin (Flomax) 0.4 mg 24 hr capsule, Take 1 capsule (0.4 mg) by mouth 2 times a day., Disp: 180 capsule, Rfl: 1    vit A/vit C/vit E/zinc/copper (PRESERVISION AREDS ORAL), Take 1 capsule by mouth once daily., Disp: , Rfl:    SOCIAL HISTORY:  Social History     Tobacco Use    Smoking status: Never    Smokeless tobacco: Never   Substance Use Topics    Alcohol use: Defer     FAMILY HISTORY:  Family History   Problem Relation Name Age of Onset    Hemophilia Mother         REVIEW OF SYSTEMS:  Except for the symptoms described above, the review of systems is negative. Specifically, except as noted in the HPI, when asked the patient expressed no complaints relative to constitutional (fever, weight loss), eyes, ears, nose, mouth, throat, neurologic, cardiovascular, pulmonary, breast, GI, , skin, musculoskeletal, endocrine, hematologic/lymphatic, or immunologic systems.    RADIATION SCREENING QUESTIONS:  Prior radiation therapy: No  Pacemaker: No  Other implantable devices: No  Connective tissue disease: No    PERFORMANCE STATUS:  Karnofsky Performance Score/ECO, Able to carry on normal activity; minor signs or symptoms of disease (ECOG equivalent 0)    PHYSICAL EXAMINATION:  There were no vitals taken for this visit.  Pain score: 0/10  NA telemedicine    LABORATORY AND IMAGING  DATA:  Imaging: All imaging was personally reviewed and interpreted in clinic. Findings as per HPI and EMR.    Laboratory/Pathology:  All pertinent labs and pathology were personally reviewed and interpreted in clinic. Findings as per HPI and EMR.    IMPRESSION:  Mr. Flores has cT2-3NxMx anal SCC, pending PET, who is a good candidate for definitive chemoradiation.    PLAN:  For Mr. Ramón Flores we are recommending a course of curative intent radiation therapy. Specifically, we would recommend a course of chemoradiation therapy using IMRT to treat the primary tumor and elective and involved oksana regions. He  understands this is pending PET but on basis of CT, is likely to be the final plan. We reviewed again today the goal of therapy, which is curative in intent. He may be a limited chemo candidate due to age, but we reviewed that radiation alone can also be curative and is recommended for local control and avoidance of the severe morbidity of uncontrolled anal cancer. We also reviewed potential short and long term side effects. Acute radiation side effects could include but are not limited to: dermatitis, desquamation of skin, skin ulceration, fatigue, nausea, weight loss, diarrhea, dysuria, and pain. We also discussed potential late reactions including hyperpigmentation of the skin as well as scar tissue formation, anal stenosis, permanent changes in urinary and bowel habits, small increase in risk of fracture of normal surrounding bones, and less likely but possible adverse effects like fistula formation. He accepts these risks, signed consent, and will proceed today with radiation simulation, with treatment planning and treatment to commence thereafter. The patient knows to call us anytime with any questions or concerns in the interim.    Thank you for the opportunity to participate in the care of this pleasant man.      PAIN PLAN: The patient reports their pain is well-controlled on their current  regimen.  Their pain regimen is currently managed by Medical Oncology.  No uncontrolled pain.    Virtual Visit Statement: An interactive audio and video telecommunications system which permits real-time communications between the patient at the originating site and provider at the distant site was utilized to provide this telehealth service.    Verbal consent for encounter: Verbal consent was requested and obtained from the patient on this date for a telehealth visit.      Deidra Muro MD  11/4/2024  , Radiation Oncology

## 2024-11-05 NOTE — DOCUMENTATION CLARIFICATION NOTE
"    PATIENT:               KRISTIN DAY  ACCT #:                  [id number]  MRN:                       [id number]  :                       1928  ADMIT DATE:       10/20/2024 5:20 PM  DISCH DATE:        10/23/2024 5:24 PM  RESPONDING PROVIDER #:        [id number]          PROVIDER RESPONSE TEXT:    Pathology findings the patient has anal cancer on pathology    CDI QUERY TEXT:    Clarification    Instruction:    Based on your assessment of the patient and the clinical information, please provide the requested documentation by clicking on the appropriate radio button and enter any additional information if prompted.    Question: Based on your assessment of the patient and the pathology findings, can the pathology findings be endorsed by providing the requested documentation to include if specimen is benign or malignant, primary or secondary and any metastatic sites.  Please include diagnosis of disease    When answering this query, please exercise your independent professional judgment. The fact that a question is being asked, does not imply that any particular answer is desired or expected.    The patient's clinical indicators include:  Clinical Information:  Patient is a 89 year old male with a history of hemophilia who presented to [facility] as a transfer from [facility] due to concern for rectal bleeding in the setting of his known Hemophilia A and protein C deficiency.  On 10/22 patient had a colonoscopy with biopsy taken.    Surgical Pathology collected on 10/22 and resulted on 10/29 as, \" Final Diagnosis: A.  Anus, mass, biopsy - Invasive moderately differentiated squamous cell carcinoma \"    Clinical Indicators:  Rectal bleeding    Treatment:  Colonoscopy with biopsy, Colorectal outpatient follow up, Hematology outpatient follow up    Risk Factors:  Rectal bleeding, friable ulcerated mass in the anus, constipation, hemorrhoids, Hemophilia A, Protein C deficiency, thrombocytopenia  Options provided:  -- " Pathology findings, Please specify additional information below  -- Other - I will add my own diagnosis  -- Refer to Clinical Documentation Reviewer    Query created by: Sarah Mtz on 11/1/2024 11:34 AM      Electronically signed by:  ELIE STEIN MD 11/5/2024 8:15 AM

## 2024-11-07 ENCOUNTER — HOSPITAL ENCOUNTER (OUTPATIENT)
Dept: RADIOLOGY | Facility: CLINIC | Age: 89
Discharge: HOME | End: 2024-11-07
Payer: MEDICARE

## 2024-11-07 DIAGNOSIS — C21.0 ANAL CANCER (MULTI): ICD-10-CM

## 2024-11-07 PROCEDURE — 3430000001 HC RX 343 DIAGNOSTIC RADIOPHARMACEUTICALS: Performed by: RADIOLOGY

## 2024-11-07 PROCEDURE — 78815 PET IMAGE W/CT SKULL-THIGH: CPT | Mod: PI

## 2024-11-07 PROCEDURE — 78815 PET IMAGE W/CT SKULL-THIGH: CPT | Mod: PET TUMOR INIT TX STRAT | Performed by: NUCLEAR MEDICINE

## 2024-11-07 PROCEDURE — A9552 F18 FDG: HCPCS | Performed by: RADIOLOGY

## 2024-11-07 RX ORDER — FLUDEOXYGLUCOSE F 18 200 MCI/ML
12.38 INJECTION, SOLUTION INTRAVENOUS
Status: COMPLETED | OUTPATIENT
Start: 2024-11-07 | End: 2024-11-07

## 2024-11-07 NOTE — PROGRESS NOTES
No chief complaint on file.      History Of Present Illness  Ramón Flores is a 89 y.o. 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 He was recently admitted to [facility] for rectal bleeding and underwent colonoscopy. He was found to have an anal mass and pathology demonstrated invasive moderately differentiated squamous cell carcinoma.     Most recent PET scan 11/7/24:   1. Intense focal hypermetabolic anal lesion, consistent with known anal cancer.  2. No FDG avid oksana or distant metastasis.    Most recent MRI:     Colonoscopy 10/22/24 ([facility]):   Single malignant-appearing and friable ulcerated mass (traversable) measuring 4 cm in the anal region along the anterior wall occupying approximately 20% of the circumference; there was stigmata of recent hemorrhage; performed cold forceps biopsy  Three sessile polyps measuring smaller than 5 mm (one in the ascending colon, one in the descending colon, and one in the sigmoid colon)  Few medium, scattered diverticula of moderate severity in the sigmoid colon; no bleeding was observed  Moderate internal and external hemorrhoids     Non-smoker/No ETOH/No Illicit drug use  PMH: hemophilia a, atrial fibrillation, protein C deficiency, hypertension, nephrolithiasis, BPH, CAD status post CABG 20 years ago   PSH:  No family history of CRC or IBD  Employment:       Past Medical History    Past Medical History:   Diagnosis Date    Acute deep vein thrombosis (DVT) of left femoral vein (Multi) 09/10/2023    ACUTE DEEP VEIN THROMBOSIS, L FEMORAL, HEMOPHILIA    Acute diastolic heart failure 04/16/2023    Benign prostatic hyperplasia without lower urinary tract symptoms 01/07/2016    Enlarged prostate without lower urinary tract symptoms (luts)    Bleeding hemorrhoids 03/01/2023    CAP (community acquired pneumonia) 01/18/2024    Closed nondisplaced fracture of head of left radius 09/05/2023    COVID-19 05/21/2023    Enlarged prostate with lower urinary  tract symptoms (LUTS) 03/01/2023    Fracture of bone of hip (Multi) 09/05/2023    Fracture of femoral neck, right 03/01/2023    Gastrointestinal hemorrhage 09/05/2023    LOWER GI BLEED    Gingiva hemorrhage 09/05/2023    Gross hematuria 03/01/2023    Hemarthrosis of ankle joint 04/18/2019    Hemarthrosis of knee 05/09/2022    Hematochezia 09/05/2023    Hematoma of lower extremity 09/05/2023    Hematoma of right thigh 03/01/2023    History of recent fall 05/31/2023    Knee effusion 09/05/2023    Knee effusion, left 03/01/2023    Lumbar pain 03/01/2023    Obstructive uropathy 04/28/2023    Orthostatic syncope 09/05/2023    Personal history of other endocrine, nutritional and metabolic disease     History of hyperlipidemia    Pneumonia of left lower lobe due to infectious organism 03/19/2024    Pneumonia of right lung due to infectious organism 03/01/2023    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 He has never used smokeless tobacco. Alcohol use questions deferred to the physician. He reports that he does not use drugs.    Family History  Family History   Problem Relation Name Age of Onset    Hemophilia Mother          Allergies  Aspirin and Penicillins    Home Medications  Prior to Admission medications    Medication Sig Start Date End Date Taking? Authorizing Provider   acetaminophen (Tylenol Extra Strength) 500 mg tablet Take 2 tablets (1,000 mg) by mouth every 8 hours if needed for mild pain (1 - 3). 8/29/24 8/29/25  SUSI Mckeon-CNS   antihemophilic RF VIII (Altuviiio) 3,000 (+/-) unit recon soln Infuse 3,030 Units into a venous catheter every 7 days. 3030 units +/-10% dose  every 7 days.  Additionally,  prn when directed. 10/25/24 10/25/25  Rufino Dillon MD   atorvastatin (Lipitor) 40 mg tablet Take 1 tablet (40 mg) by mouth once daily. 1/25/24   SHERRY Cervantes   calcium carbonate 600 mg calcium (1,500 mg) tablet Take 1 tablet (1,500 mg) by mouth 2 times daily (morning and late afternoon). 5/18/23   Historical Provider, MD   empagliflozin (Jardiance) 10 mg Take 1 tablet (10 mg) by mouth once daily. 7/15/24 7/10/25  Lashonda Dugan MD MPH   finasteride (Proscar) 5 mg tablet Take 1 tablet (5 mg) by mouth once daily.  Patient not taking: Reported on 11/4/2024 1/25/24   SHERRY Cervantes   folic acid (Folvite) 1 mg tablet Take 1 tablet (1 mg) by mouth once daily. 4/12/23   Historical Provider, MD   Lactobacillus acidoph-L.bulgar 1 million cell tablet tablet Take 1 tablet by mouth once daily. 4/21/23   Historical Provider, MD   pantoprazole (ProtoNix) 40 mg EC tablet Take 1 tablet (40 mg) by mouth once daily in the morning. Take before meals. Do not crush, chew, or split. 1/25/24   SHERRY Cervantes   tamsulosin (Flomax) 0.4 mg 24 hr capsule Take 1 capsule (0.4 mg) by mouth 2 times a day. 1/25/24   SHERRY Cervantes   vit A/vit C/vit E/zinc/copper (PRESERVISION AREDS ORAL) Take 1 capsule by mouth once daily. 4/21/17   Historical Provider, MD       Review of Systems     Physical Exam    Perineal/ROBBY:  Perineum: anterior mass at verge with ulcerated center  ROBBY: anterior mass extending up to about 6cm, occupying 25% wall, spans anal canal with small protruding component and significant internal component. Good tone. No tenderness.       Last Recorded Vitals  There were no vitals taken for this visit.      Assessment/Plan   89 y.o. male here for new diagnosis of anal cancer. Discussed staging, PET results, treatment plan, and my role in his care  Follow up after treatment to assess response.

## 2024-11-08 ENCOUNTER — OFFICE VISIT (OUTPATIENT)
Dept: SURGERY | Facility: CLINIC | Age: 89
End: 2024-11-08
Payer: MEDICARE

## 2024-11-08 ENCOUNTER — TELEPHONE (OUTPATIENT)
Dept: RADIATION ONCOLOGY | Facility: HOSPITAL | Age: 89
End: 2024-11-08
Payer: MEDICARE

## 2024-11-08 VITALS
WEIGHT: 138 LBS | DIASTOLIC BLOOD PRESSURE: 65 MMHG | SYSTOLIC BLOOD PRESSURE: 129 MMHG | BODY MASS INDEX: 22.53 KG/M2 | HEART RATE: 75 BPM

## 2024-11-08 DIAGNOSIS — C21.0 ANAL CANCER (MULTI): Primary | ICD-10-CM

## 2024-11-08 PROCEDURE — 1157F ADVNC CARE PLAN IN RCRD: CPT | Performed by: SURGERY

## 2024-11-08 PROCEDURE — 1111F DSCHRG MED/CURRENT MED MERGE: CPT | Performed by: SURGERY

## 2024-11-08 PROCEDURE — 1159F MED LIST DOCD IN RCRD: CPT | Performed by: SURGERY

## 2024-11-08 PROCEDURE — 99215 OFFICE O/P EST HI 40 MIN: CPT | Performed by: SURGERY

## 2024-11-08 PROCEDURE — 99205 OFFICE O/P NEW HI 60 MIN: CPT | Performed by: SURGERY

## 2024-11-08 PROCEDURE — 3074F SYST BP LT 130 MM HG: CPT | Performed by: SURGERY

## 2024-11-08 PROCEDURE — 3078F DIAST BP <80 MM HG: CPT | Performed by: SURGERY

## 2024-11-08 NOTE — LETTER
November 8, 2024     Deidra Muro MD  [id number]  Department Of Radiation Oncology  [facility] [address]    Patient: Ramón Flores   YOB: 1928   Date of Visit: 11/8/2024       Dear Dr. Deidra Muro MD:    Thank you for referring Ramón Flores to me for evaluation. Below are my notes for this consultation.  If you have questions, please do not hesitate to call me. I look forward to following your patient along with you.       Sincerely,     Carla Caraballo MD      CC: No Recipients  ______________________________________________________________________________________      No chief complaint on file.      History Of Present Illness  Ramón Flores is a 89 y.o. 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 He was recently admitted to [facility] for rectal bleeding and underwent colonoscopy. He was found to have an anal mass and pathology demonstrated invasive moderately differentiated squamous cell carcinoma.     Most recent PET scan 11/7/24:   1. Intense focal hypermetabolic anal lesion, consistent with known anal cancer.  2. No FDG avid oksana or distant metastasis.    Most recent MRI:     Colonoscopy 10/22/24 ([facility]):   Single malignant-appearing and friable ulcerated mass (traversable) measuring 4 cm in the anal region along the anterior wall occupying approximately 20% of the circumference; there was stigmata of recent hemorrhage; performed cold forceps biopsy  Three sessile polyps measuring smaller than 5 mm (one in the ascending colon, one in the descending colon, and one in the sigmoid colon)  Few medium, scattered diverticula of moderate severity in the sigmoid colon; no bleeding was observed  Moderate internal and external hemorrhoids     Non-smoker/No ETOH/No Illicit drug use  PMH: hemophilia a, atrial fibrillation, protein C deficiency, hypertension, nephrolithiasis, BPH, CAD status post CABG 20 years ago   PSH:  No family history of CRC or  IBD  Employment:       Past Medical History    Past Medical History:   Diagnosis Date   • Acute deep vein thrombosis (DVT) of left femoral vein (Multi) 09/10/2023    ACUTE DEEP VEIN THROMBOSIS, L FEMORAL, HEMOPHILIA   • Acute diastolic heart failure 04/16/2023   • Benign prostatic hyperplasia without lower urinary tract symptoms 01/07/2016    Enlarged prostate without lower urinary tract symptoms (luts)   • Bleeding hemorrhoids 03/01/2023   • CAP (community acquired pneumonia) 01/18/2024   • Closed nondisplaced fracture of head of left radius 09/05/2023   • COVID-19 05/21/2023   • Enlarged prostate with lower urinary tract symptoms (LUTS) 03/01/2023   • Fracture of bone of hip (Multi) 09/05/2023   • Fracture of femoral neck, right 03/01/2023   • Gastrointestinal hemorrhage 09/05/2023    LOWER GI BLEED   • Gingiva hemorrhage 09/05/2023   • Gross hematuria 03/01/2023   • Hemarthrosis of ankle joint 04/18/2019   • Hemarthrosis of knee 05/09/2022   • Hematochezia 09/05/2023   • Hematoma of lower extremity 09/05/2023   • Hematoma of right thigh 03/01/2023   • History of recent fall 05/31/2023   • Knee effusion 09/05/2023   • Knee effusion, left 03/01/2023   • Lumbar pain 03/01/2023   • Obstructive uropathy 04/28/2023   • Orthostatic syncope 09/05/2023   • Personal history of other endocrine, nutritional and metabolic disease     History of hyperlipidemia   • Pneumonia of left lower lobe due to infectious organism 03/19/2024   • Pneumonia of right lung due to infectious organism 03/01/2023   • 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 He has never used smokeless tobacco. Alcohol use  questions deferred to the physician. He reports that he does not use drugs.    Family History  Family History   Problem Relation Name Age of Onset   • Hemophilia Mother          Allergies  Aspirin and Penicillins    Home Medications  Prior to Admission medications    Medication Sig Start Date End Date Taking? Authorizing Provider   acetaminophen (Tylenol Extra Strength) 500 mg tablet Take 2 tablets (1,000 mg) by mouth every 8 hours if needed for mild pain (1 - 3). 8/29/24 8/29/25  SUSI Mckeon-CNS   antihemophilic RF VIII (Altuviiio) 3,000 (+/-) unit recon soln Infuse 3,030 Units into a venous catheter every 7 days. 3030 units +/-10% dose every 7 days.  Additionally,  prn when directed. 10/25/24 10/25/25  Rufino Dillon MD   atorvastatin (Lipitor) 40 mg tablet Take 1 tablet (40 mg) by mouth once daily. 1/25/24   SHERRY Cervantes   calcium carbonate 600 mg calcium (1,500 mg) tablet Take 1 tablet (1,500 mg) by mouth 2 times daily (morning and late afternoon). 5/18/23   Historical Provider, MD   empagliflozin (Jardiance) 10 mg Take 1 tablet (10 mg) by mouth once daily. 7/15/24 7/10/25  Lashonda Dugan MD MPH   finasteride (Proscar) 5 mg tablet Take 1 tablet (5 mg) by mouth once daily.  Patient not taking: Reported on 11/4/2024 1/25/24   SHERRY Cervantes   folic acid (Folvite) 1 mg tablet Take 1 tablet (1 mg) by mouth once daily. 4/12/23   Historical Provider, MD   Lactobacillus acidoph-L.bulgar 1 million cell tablet tablet Take 1 tablet by mouth once daily. 4/21/23   Historical Provider, MD   pantoprazole (ProtoNix) 40 mg EC tablet Take 1 tablet (40 mg) by mouth once daily in the morning. Take before meals. Do not crush, chew, or split. 1/25/24   SHERRY Cervantes   tamsulosin (Flomax) 0.4 mg 24 hr capsule Take 1 capsule (0.4 mg) by mouth 2 times a day. 1/25/24   SUSI Cervantes-CNP   vit A/vit C/vit E/zinc/copper (PRESERVISION AREDS ORAL) Take 1 capsule by mouth once  daily. 4/21/17   Historical Provider, MD       Review of Systems     Physical Exam    Perineal/ROBBY:  Perineum: anterior mass at verge with ulcerated center  ROBBY: anterior mass extending up to about 6cm, occupying 25% wall, spans anal canal with small protruding component and significant internal component. Good tone. No tenderness.       Last Recorded Vitals  There were no vitals taken for this visit.      Assessment/Plan  89 y.o. male here for new diagnosis of anal cancer. Discussed staging, PET results, treatment plan, and my role in his care  Follow up after treatment to assess response.

## 2024-11-11 ENCOUNTER — TRANSCRIBE ORDERS (OUTPATIENT)
Dept: RADIATION ONCOLOGY | Facility: CLINIC | Age: 89
End: 2024-11-11
Payer: MEDICARE

## 2024-11-11 ENCOUNTER — HOSPITAL ENCOUNTER (OUTPATIENT)
Dept: RADIOLOGY | Facility: EXTERNAL LOCATION | Age: 89
Discharge: HOME | End: 2024-11-11

## 2024-11-11 ENCOUNTER — HOSPITAL ENCOUNTER (OUTPATIENT)
Dept: RADIATION ONCOLOGY | Facility: HOSPITAL | Age: 89
Setting detail: RADIATION/ONCOLOGY SERIES
Discharge: HOME | End: 2024-11-11
Payer: MEDICARE

## 2024-11-11 DIAGNOSIS — C21.1 MALIGNANT NEOPLASM OF ANAL CANAL (MULTI): Primary | ICD-10-CM

## 2024-11-11 DIAGNOSIS — C21.1 MALIGNANT NEOPLASM OF ANAL CANAL (MULTI): ICD-10-CM

## 2024-11-11 PROCEDURE — 99213 OFFICE O/P EST LOW 20 MIN: CPT | Mod: 25 | Performed by: RADIOLOGY

## 2024-11-11 PROCEDURE — 77470 SPECIAL RADIATION TREATMENT: CPT | Performed by: RADIOLOGY

## 2024-11-11 PROCEDURE — 99213 OFFICE O/P EST LOW 20 MIN: CPT | Performed by: RADIOLOGY

## 2024-11-11 NOTE — PROGRESS NOTES
Staff Physician: Deidra Muro MD  Referring Physician: Carla Tracey  Date of Service: 11/11/2024    RADIATION ONCOLOGY CONSENT VISIT NOTE:    IDENTIFYING DATA:   Cancer Staging   Anal cancer (Multi)  Staging form: Anus, AJCC V9  - Clinical: cT2, cNX - Unsigned    Problem List Items Addressed This Visit    None    Mr. Flores is a 89-year-old man with squamous cell carcinoma of the anus, [id number] by PET, biopsy proven and p16+. He is a candidate for definitive radiation alone.  He presents today for consent visit for radiation therapy.    INTERVAL HISTORY:  Since we last saw Ramón Flores in clinic last week, he has felt well. He had his PET last week showing disease limited to the primary tumor and no oksana or distant disease.   His weight is stable from last visit and his appetite is unchanged. He denies any significant symptoms at this time. He has had no new medical problems or medications since our last visit.    PAST MEDICAL, SURGICAL, FAMILY, AND SOCIAL HISTORY:  Past Medical History:   Diagnosis Date    Acute deep vein thrombosis (DVT) of left femoral vein (Multi) 09/10/2023    ACUTE DEEP VEIN THROMBOSIS, L FEMORAL, HEMOPHILIA    Acute diastolic heart failure 04/16/2023    Benign prostatic hyperplasia without lower urinary tract symptoms 01/07/2016    Enlarged prostate without lower urinary tract symptoms (luts)    Bleeding hemorrhoids 03/01/2023    CAP (community acquired pneumonia) 01/18/2024    Closed nondisplaced fracture of head of left radius 09/05/2023    COVID-19 05/21/2023    Enlarged prostate with lower urinary tract symptoms (LUTS) 03/01/2023    Fracture of bone of hip (Multi) 09/05/2023    Fracture of femoral neck, right 03/01/2023    Gastrointestinal hemorrhage 09/05/2023    LOWER GI BLEED    Gingiva hemorrhage 09/05/2023    Gross hematuria 03/01/2023    Hemarthrosis of ankle joint 04/18/2019    Hemarthrosis of knee 05/09/2022    Hematochezia 09/05/2023    Hematoma of lower  extremity 09/05/2023    Hematoma of right thigh 03/01/2023    History of recent fall 05/31/2023    Knee effusion 09/05/2023    Knee effusion, left 03/01/2023    Lumbar pain 03/01/2023    Obstructive uropathy 04/28/2023    Orthostatic syncope 09/05/2023    Personal history of other endocrine, nutritional and metabolic disease     History of hyperlipidemia    Pneumonia of left lower lobe due to infectious organism 03/19/2024    Pneumonia of right lung due to infectious organism 03/01/2023    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    Subdural hematoma (Multi) 04/09/2023    Syncope 05/12/2023    Urinary retention 03/01/2023    UTI (urinary tract infection) 03/01/2023    Viral gastroenteritis 03/01/2023     Past Surgical History:   Procedure Laterality Date    CORONARY ARTERY BYPASS GRAFT       ALLERGIES:  Allergies   Allergen Reactions    Aspirin Bleeding     hemophiliac    Penicillins Rash     MEDICATIONS:    Current Outpatient Medications:     acetaminophen (Tylenol Extra Strength) 500 mg tablet, Take 2 tablets (1,000 mg) by mouth every 8 hours if needed for mild pain (1 - 3)., Disp: 60 tablet, Rfl: 11    antihemophilic RF VIII (Altuviiio) 3,000 (+/-) unit recon soln, Infuse 3,030 Units into a venous catheter every 7 days. 3030 units +/-10% dose every 7 days.  Additionally,  prn when directed., Disp: 5 each, Rfl: 12    atorvastatin (Lipitor) 40 mg tablet, Take 1 tablet (40 mg) by mouth once daily., Disp: 90 tablet, Rfl: 1    calcium carbonate 600 mg calcium (1,500 mg) tablet, Take 1 tablet (1,500 mg) by mouth 2 times daily (morning and late afternoon)., Disp: , Rfl:     empagliflozin (Jardiance) 10 mg, Take 1 tablet (10 mg) by mouth once daily., Disp: 90 tablet, Rfl: 3    finasteride (Proscar) 5 mg tablet, Take 1 tablet (5 mg) by mouth once daily. (Patient not taking: Reported on 11/4/2024), Disp: 90 tablet, Rfl: 1    folic acid (Folvite) 1 mg tablet, Take 1 tablet (1 mg) by mouth once daily., Disp: , Rfl:     Lactobacillus  delmarphoenix-L.estrellitagar 1 million cell tablet tablet, Take 1 tablet by mouth once daily., Disp: , Rfl:     pantoprazole (ProtoNix) 40 mg EC tablet, Take 1 tablet (40 mg) by mouth once daily in the morning. Take before meals. Do not crush, chew, or split., Disp: 90 tablet, Rfl: 1    tamsulosin (Flomax) 0.4 mg 24 hr capsule, Take 1 capsule (0.4 mg) by mouth 2 times a day., Disp: 180 capsule, Rfl: 1    vit A/vit C/vit E/zinc/copper (PRESERVISION AREDS ORAL), Take 1 capsule by mouth once daily., Disp: , Rfl:      REVIEW OF SYSTEMS:  Except for the symptoms described in the interval history, the review of systems is negative. Specifically, except as noted, when asked the patient expressed no complaints relative to constitutional (fever, weight loss), eyes, ears, nose, mouth, throat, neurologic, cardiovascular, pulmonary, breast, GI, , skin, musculoskeletal, endocrine, hematologic/lymphatic, or immunologic systems.    PERFORMANCE STATUS:  Karnofsky Performance Score/ECO, Normal activity with effort; some signs or symptoms of disease (ECOG equivalent 1)    PHYSICAL EXAMINATION:  There were no vitals taken for this visit.  Pain score: 0/10  General: no acute distress, engaged in conversation.   HEENT: Normocephalic, atraumatic. Extraocular movements are intact.   Neck: supple with trachea at midline, no palpable adenopathy.   Pulmonary: Breathing comfortably on room air, no respiratory distress  Cardiovascular: Regular rate, no cyanosis, well-perfused  Abdomen: Soft, nontender, nondistended.   Extremities: No lower extremity edema or cyanosis. Normal range of motion.  Skin: Without rash or obvious lesions.   Musculoskeletal: Normal range of motion. Able to raise both arms above head without issues  Neurologic: Alert and oriented x3. Cranial nerves grossly intact.   Anorectal exam: External exam with some small perianal/verge involvement by tumor, auto-boluses well on simulation set up.    DIAGNOSTIC REPORTS  REVIEWED:  Imaging: All imaging was personally reviewed and interpreted in clinic. Findings as per interval history and EMR.  Laboratory/Pathology:  All pertinent labs and pathology were personally reviewed and interpreted in clinic. Findings as per interval history and EMR.    IMPRESSION:  Mr. Flores is planned for definitive radiation and is here for simulation today.    PLAN:  Consent signed today and simulation later today; re-reviewed s/e to expect from RT (documented at time of consult).    Thank you for the opportunity to participate in the ongoing care of this pleasant man.    Deidra Muro MD  11/11/2024  , Radiation Oncology

## 2024-11-14 ENCOUNTER — TELEPHONE (OUTPATIENT)
Dept: ADMISSION | Facility: HOSPITAL | Age: 89
End: 2024-11-14
Payer: MEDICARE

## 2024-11-14 NOTE — TELEPHONE ENCOUNTER
Javier called in and left a VM on the RN Triage VM line that he wanted to speak with an Carla.     I called Javier back, Javier states that the patient is going to start radiation in 1 week, the problem they have is that he is wheel chair bound and will need to go everyday for 5 weeks, hoping to get some information on transportation assistance for him, will route to the RN and social work.    Javier can be reached at [phone]

## 2024-11-15 ENCOUNTER — DOCUMENTATION (OUTPATIENT)
Dept: CASE MANAGEMENT | Facility: HOSPITAL | Age: 89
End: 2024-11-15
Payer: MEDICARE

## 2024-11-15 NOTE — PROGRESS NOTES
SW received referral on patient regarding transportation. Patient is 89 year old male with Anal cancer. Treatment plans for patient to undergo radiation for 5 days a week for 5 weeks. Patient's niece and nephew are involved with his care and assist with transportation. Patient is a wheelchair but is able to transfer to and from their with assistance. Patient's niece and nephew will not be able to transfer patient for every radiation appointment. Patient resides in [place] and from the [facility] website offers wheelchair transportation. SW provided niece with contact information to follow up and inquire their services. [facility] offers transportation for patient's in need for no charge. However, that is used after all options have been exhausted. SW ill assist and follow up with patient and his family.

## 2024-11-18 ENCOUNTER — DOCUMENTATION (OUTPATIENT)
Dept: CASE MANAGEMENT | Facility: HOSPITAL | Age: 89
End: 2024-11-18
Payer: MEDICARE

## 2024-11-18 ENCOUNTER — HOSPITAL ENCOUNTER (OUTPATIENT)
Dept: RADIATION ONCOLOGY | Facility: HOSPITAL | Age: 89
Setting detail: RADIATION/ONCOLOGY SERIES
Discharge: HOME | End: 2024-11-18
Payer: MEDICARE

## 2024-11-18 DIAGNOSIS — E78.5 HYPERLIPIDEMIA, UNSPECIFIED HYPERLIPIDEMIA TYPE: ICD-10-CM

## 2024-11-18 DIAGNOSIS — R35.1 BPH ASSOCIATED WITH NOCTURIA: ICD-10-CM

## 2024-11-18 DIAGNOSIS — N40.1 BPH ASSOCIATED WITH NOCTURIA: ICD-10-CM

## 2024-11-18 DIAGNOSIS — K21.9 GASTROESOPHAGEAL REFLUX DISEASE WITHOUT ESOPHAGITIS: ICD-10-CM

## 2024-11-18 DIAGNOSIS — I10 PRIMARY HYPERTENSION: ICD-10-CM

## 2024-11-18 PROCEDURE — 77301 RADIOTHERAPY DOSE PLAN IMRT: CPT | Performed by: RADIOLOGY

## 2024-11-18 PROCEDURE — 77338 DESIGN MLC DEVICE FOR IMRT: CPT | Performed by: RADIOLOGY

## 2024-11-18 PROCEDURE — 77300 RADIATION THERAPY DOSE PLAN: CPT | Performed by: RADIOLOGY

## 2024-11-18 RX ORDER — FINASTERIDE 5 MG/1
5 TABLET, FILM COATED ORAL DAILY
Qty: 90 TABLET | Refills: 1 | Status: CANCELLED | OUTPATIENT
Start: 2024-11-18

## 2024-11-18 RX ORDER — PANTOPRAZOLE SODIUM 40 MG/1
40 TABLET, DELAYED RELEASE ORAL
Qty: 90 TABLET | Refills: 1 | Status: CANCELLED | OUTPATIENT
Start: 2024-11-18

## 2024-11-18 RX ORDER — ATORVASTATIN CALCIUM 40 MG/1
40 TABLET, FILM COATED ORAL DAILY
Qty: 90 TABLET | Refills: 1 | Status: CANCELLED | OUTPATIENT
Start: 2024-11-18

## 2024-11-18 RX ORDER — TAMSULOSIN HYDROCHLORIDE 0.4 MG/1
0.4 CAPSULE ORAL 2 TIMES DAILY
Qty: 180 CAPSULE | Refills: 1 | Status: CANCELLED | OUTPATIENT
Start: 2024-11-18

## 2024-11-18 NOTE — PROGRESS NOTES
SW received a call from patient's niece and nephew about his transportation. SW had spoken to niece last week and suggested contacting the [facility] transportation services.  They were not sure if it was the [facility] offering transport or a company under the same name. PORFIRIO explained that the [facility] does offer wheelchair van service to and from medical appointments. They are going to call today and update PORFIRIO on their findings. SW explained the transportation ana and how and when it is used. They understood that the ana is not automatically. That patient's and families must exhaust all options. PORFIRIO will follow up

## 2024-11-18 NOTE — PROGRESS NOTES
Radiation therapist contacted SW and provided schedule for patient. SW contacted patient's nephew who will let patient know.

## 2024-11-18 NOTE — PROGRESS NOTES
Family contacted [facility] ([facility])and signed him up. SW confirmed with [facility] that patient can be scheduled for all rides needed for Radiation therapy.

## 2024-11-18 NOTE — PROGRESS NOTES
Spoke to patient via phone about transportation schedule. SW asked rad/onc if patient could be scheduled around the same time due to relying on wheelchair van provided by the city.

## 2024-11-20 DIAGNOSIS — E78.5 HYPERLIPIDEMIA, UNSPECIFIED HYPERLIPIDEMIA TYPE: ICD-10-CM

## 2024-11-20 DIAGNOSIS — R35.1 BPH ASSOCIATED WITH NOCTURIA: ICD-10-CM

## 2024-11-20 DIAGNOSIS — K21.9 GASTROESOPHAGEAL REFLUX DISEASE WITHOUT ESOPHAGITIS: ICD-10-CM

## 2024-11-20 DIAGNOSIS — N40.1 BPH ASSOCIATED WITH NOCTURIA: ICD-10-CM

## 2024-11-20 RX ORDER — FINASTERIDE 5 MG/1
5 TABLET, FILM COATED ORAL DAILY
Qty: 90 TABLET | Refills: 3 | Status: SHIPPED | OUTPATIENT
Start: 2024-11-20 | End: 2025-11-20

## 2024-11-20 RX ORDER — ATORVASTATIN CALCIUM 40 MG/1
40 TABLET, FILM COATED ORAL DAILY
Qty: 90 TABLET | Refills: 3 | OUTPATIENT
Start: 2024-11-20

## 2024-11-20 RX ORDER — TAMSULOSIN HYDROCHLORIDE 0.4 MG/1
0.4 CAPSULE ORAL 2 TIMES DAILY
Qty: 180 CAPSULE | Refills: 3 | Status: SHIPPED | OUTPATIENT
Start: 2024-11-20 | End: 2025-11-20

## 2024-11-20 RX ORDER — TAMSULOSIN HYDROCHLORIDE 0.4 MG/1
0.4 CAPSULE ORAL 2 TIMES DAILY
Qty: 180 CAPSULE | Refills: 3 | OUTPATIENT
Start: 2024-11-20

## 2024-11-20 RX ORDER — ATORVASTATIN CALCIUM 40 MG/1
40 TABLET, FILM COATED ORAL DAILY
Qty: 90 TABLET | Refills: 1 | Status: SHIPPED | OUTPATIENT
Start: 2024-11-20

## 2024-11-20 RX ORDER — FINASTERIDE 5 MG/1
5 TABLET, FILM COATED ORAL DAILY
Qty: 90 TABLET | Refills: 3 | OUTPATIENT
Start: 2024-11-20

## 2024-11-20 RX ORDER — PANTOPRAZOLE SODIUM 40 MG/1
TABLET, DELAYED RELEASE ORAL
Qty: 90 TABLET | Refills: 3 | OUTPATIENT
Start: 2024-11-20

## 2024-11-20 RX ORDER — METOPROLOL SUCCINATE 50 MG/1
TABLET, EXTENDED RELEASE ORAL DAILY
Qty: 45 TABLET | Refills: 3 | OUTPATIENT
Start: 2024-11-20

## 2024-11-20 RX ORDER — PANTOPRAZOLE SODIUM 40 MG/1
40 TABLET, DELAYED RELEASE ORAL
Qty: 90 TABLET | Refills: 3 | Status: SHIPPED | OUTPATIENT
Start: 2024-11-20 | End: 2025-11-20

## 2024-11-25 ENCOUNTER — TELEPHONE (OUTPATIENT)
Dept: GERIATRIC MEDICINE | Facility: CLINIC | Age: 89
End: 2024-11-25

## 2024-11-26 ENCOUNTER — APPOINTMENT (OUTPATIENT)
Dept: UROLOGY | Facility: CLINIC | Age: 89
End: 2024-11-26
Payer: MEDICARE

## 2024-11-26 DIAGNOSIS — N40.1 BPH ASSOCIATED WITH NOCTURIA: Primary | ICD-10-CM

## 2024-11-26 DIAGNOSIS — R35.1 BPH ASSOCIATED WITH NOCTURIA: Primary | ICD-10-CM

## 2024-11-26 PROCEDURE — 1157F ADVNC CARE PLAN IN RCRD: CPT | Performed by: UROLOGY

## 2024-11-26 PROCEDURE — 99443 PR PHYS/QHP TELEPHONE EVALUATION 21-30 MIN: CPT | Performed by: UROLOGY

## 2024-11-26 RX ORDER — FINASTERIDE 5 MG/1
5 TABLET, FILM COATED ORAL DAILY
Qty: 90 TABLET | Refills: 3 | Status: SHIPPED | OUTPATIENT
Start: 2024-11-26 | End: 2025-11-26

## 2024-11-26 RX ORDER — TAMSULOSIN HYDROCHLORIDE 0.4 MG/1
0.4 CAPSULE ORAL 2 TIMES DAILY
Qty: 180 CAPSULE | Refills: 3 | Status: SHIPPED | OUTPATIENT
Start: 2024-11-26 | End: 2025-11-26

## 2024-11-26 NOTE — PROGRESS NOTES
Subjective     This visit was completed via telephone. All issues as below were discussed and addressed but no physical exam was performed. Patient verbally consented to visit.        Ramón Flores is a 89 y.o. male  the oldest living hemophiliac, with a history of nephrolithiasis and BPH on Tamsulosin 0.4 mg and Finasteride 5 mg. He presents today for reevaluation and medication refill. Patient has no new urinary complaints today. He denies flank pain, gross hematuria, kidney stones, and recurrent UTI.           Past Medical History:   Diagnosis Date    Acute deep vein thrombosis (DVT) of left femoral vein (Multi) 09/10/2023    ACUTE DEEP VEIN THROMBOSIS, L FEMORAL, HEMOPHILIA    Acute diastolic heart failure 04/16/2023    Benign prostatic hyperplasia without lower urinary tract symptoms 01/07/2016    Enlarged prostate without lower urinary tract symptoms (luts)    Bleeding hemorrhoids 03/01/2023    CAP (community acquired pneumonia) 01/18/2024    Closed nondisplaced fracture of head of left radius 09/05/2023    COVID-19 05/21/2023    Enlarged prostate with lower urinary tract symptoms (LUTS) 03/01/2023    Fracture of bone of hip (Multi) 09/05/2023    Fracture of femoral neck, right 03/01/2023    Gastrointestinal hemorrhage 09/05/2023    LOWER GI BLEED    Gingiva hemorrhage 09/05/2023    Gross hematuria 03/01/2023    Hemarthrosis of ankle joint 04/18/2019    Hemarthrosis of knee 05/09/2022    Hematochezia 09/05/2023    Hematoma of lower extremity 09/05/2023    Hematoma of right thigh 03/01/2023    History of recent fall 05/31/2023    Knee effusion 09/05/2023    Knee effusion, left 03/01/2023    Lumbar pain 03/01/2023    Obstructive uropathy 04/28/2023    Orthostatic syncope 09/05/2023    Personal history of other endocrine, nutritional and metabolic disease     History of hyperlipidemia    Pneumonia of left lower lobe due to infectious organism 03/19/2024    Pneumonia of right lung due to infectious organism  03/01/2023    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 60 tablet 11    antihemophilic RF VIII (Altuviiio) 3,000 (+/-) unit recon soln Infuse 3,030 Units into a venous catheter every 7 days. 3030 units +/-10% dose every 7 days.  Additionally,  prn when directed. 5 each 12    atorvastatin (Lipitor) 40 mg tablet Take 1 tablet (40 mg) by mouth once daily. 90 tablet 1    calcium carbonate 600 mg calcium (1,500 mg) tablet Take 1 tablet (1,500 mg) by mouth 2 times daily (morning and late afternoon).      empagliflozin (Jardiance) 10 mg Take 1 tablet (10 mg) by mouth once daily. 90 tablet 3    finasteride (Proscar) 5 mg tablet Take 1 tablet (5 mg) by mouth once daily. 90 tablet 3    folic acid (Folvite) 1 mg tablet Take 1 tablet (1 mg) by mouth once daily.      Lactobacillus acidoph-L.bulgar 1 million cell tablet tablet Take 1 tablet by mouth once daily.      pantoprazole (ProtoNix) 40 mg EC tablet Take 1 tablet (40 mg) by mouth once daily in the morning. Take before meals. Do not crush, chew, or split. 90 tablet 3    tamsulosin (Flomax) 0.4 mg 24 hr capsule Take 1 capsule (0.4 mg) by mouth 2 times a day. 180 capsule 3    vit A/vit C/vit E/zinc/copper (PRESERVISION AREDS ORAL) Take 1 capsule by mouth once daily.       No current facility-administered medications for this visit.     Allergies   Allergen Reactions    Aspirin Bleeding     hemophiliac    Penicillins Rash     Social History      Socioeconomic History    Marital status:      Spouse name: Not on file    Number of children: Not on file    Years of education: Not on file    Highest education level: Not on file   Occupational History    Not on file   Tobacco Use    Smoking status: Never    Smokeless tobacco: Never   Substance and Sexual Activity    Alcohol use: Defer    Drug use: Never    Sexual activity: Not on file   Other Topics Concern    Not on file   Social History Narrative    Patient lives alone with support for bathing and purchasing/preparing food. He broke his hip 2 years ago and recovered. He had a twin sister in [place] who is recently  from dementia. Wife  14 years ago. Adopted daughter is alive. 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      Social Drivers of Health     Financial Resource Strain: Low Risk  (10/21/2024)    Overall Financial Resource Strain (CARDIA)     Difficulty of Paying Living Expenses: Not hard at all   Food Insecurity: Not on file   Transportation Needs: No Transportation Needs (10/21/2024)    PRAPARE - Transportation     Lack of Transportation (Medical): No     Lack of Transportation (Non-Medical): No   Physical Activity: Not on file   Stress: Not on file   Social Connections: Not on file   Intimate Partner Violence: Not on file   Housing Stability: Low Risk  (10/21/2024)    Housing Stability Vital Sign     Unable to Pay for Housing in the Last Year: No     Number of Times Moved in the Last Year: 0     Homeless in the Last Year: No       Review of Systems  Pertinent items are noted in HPI.    Objective       Lab Review  Lab Results   Component Value Date    WBC 4.9 10/23/2024    RBC 3.51 (L) 10/23/2024    HGB 12.2 (L) 10/23/2024    HCT 35.7 (L) 10/23/2024     (L) 10/23/2024      Lab Results   Component Value Date    BUN 23 10/23/2024    CREATININE 1.19  10/23/2024      Lab Results   Component Value Date    PSA 6.86 (H) 07/12/2021           Assessment/Plan   There are no diagnoses linked to this encounter.    BPH on Tamsulosin 0.4 mg and Finasteride 5 mg     Patient's urinary symptoms are stable.     We will refill Tamsulosin 0.4mg and Fiansteride 5 mg.      All questions were answered to the patient's satisfaction. Patient agrees with the plan and wishes to proceed. Follow-up will be scheduled appropriately.     E&M visit today is associated with current or anticipated ongoing medical care services related to a patient's single, serious condition or a complex condition.    I spent  25 minutes of dedicated E&M time, including preparation and review of records, notes, and data, time spent with patient/family, and documentation.     Scribed for Dr. Sierra by Mildred Gloria. I , Dr Sierra, have personally reviewed and agreed with the information entered by the Virtual Scribe.

## 2024-12-02 ENCOUNTER — HOSPITAL ENCOUNTER (OUTPATIENT)
Dept: RADIATION ONCOLOGY | Facility: CLINIC | Age: 89
Setting detail: RADIATION/ONCOLOGY SERIES
Discharge: HOME | End: 2024-12-02
Payer: MEDICARE

## 2024-12-02 ENCOUNTER — RADIATION ONCOLOGY OTV (OUTPATIENT)
Dept: RADIATION ONCOLOGY | Facility: CLINIC | Age: 89
End: 2024-12-02
Payer: MEDICARE

## 2024-12-02 VITALS
BODY MASS INDEX: 23.16 KG/M2 | RESPIRATION RATE: 18 BRPM | DIASTOLIC BLOOD PRESSURE: 77 MMHG | SYSTOLIC BLOOD PRESSURE: 131 MMHG | TEMPERATURE: 97.5 F | WEIGHT: 141.9 LBS | HEART RATE: 87 BPM | OXYGEN SATURATION: 91 %

## 2024-12-02 DIAGNOSIS — C21.1 MALIGNANT NEOPLASM OF ANAL CANAL (MULTI): ICD-10-CM

## 2024-12-02 DIAGNOSIS — Z51.0 ENCOUNTER FOR ANTINEOPLASTIC RADIATION THERAPY: ICD-10-CM

## 2024-12-02 LAB
RAD ONC MSQ ACTUAL FRACTIONS DELIVERED: 1
RAD ONC MSQ ACTUAL SESSION DELIVERED DOSE: 180 CGRAY
RAD ONC MSQ ACTUAL TOTAL DOSE: 180 CGRAY
RAD ONC MSQ ELAPSED DAYS: 0
RAD ONC MSQ LAST DATE: NORMAL
RAD ONC MSQ PRESCRIBED FRACTIONAL DOSE: 180 CGRAY
RAD ONC MSQ PRESCRIBED NUMBER OF FRACTIONS: 30
RAD ONC MSQ PRESCRIBED TECHNIQUE: NORMAL
RAD ONC MSQ PRESCRIBED TOTAL DOSE: 5400 CGRAY
RAD ONC MSQ PRESCRIPTION PATTERN COMMENT: NORMAL
RAD ONC MSQ START DATE: NORMAL
RAD ONC MSQ TREATMENT COURSE NUMBER: 1
RAD ONC MSQ TREATMENT SITE: NORMAL

## 2024-12-02 PROCEDURE — 77386 HC INTENSITY-MODULATED RADIATION THERAPY (IMRT), COMPLEX: CPT | Performed by: STUDENT IN AN ORGANIZED HEALTH CARE EDUCATION/TRAINING PROGRAM

## 2024-12-02 ASSESSMENT — PAIN SCALES - GENERAL: PAINLEVEL_OUTOF10: 0-NO PAIN

## 2024-12-02 NOTE — PROGRESS NOTES
RADIATION ONCOLOGY ON-TREATMENT VISIT NOTE  Patient Name:  Ramón Flores  MRN:  [id number]  :  1928    Radiation Oncologist: Petrona Sheets MD PhD  Primary Care Provider: CHAY Mckeon  Care Team: Patient Care Team:  CHAY Mckeon as PCP - General (Gerontology)  Gallito Layne MD as Consulting Physician (Hospitalist)  Rufino Dillon MD as Consulting Physician (Hematology and Oncology)  Ayde Locke MD as Consulting Physician (Hematology and Oncology)  Deidra Muro MD as Radiation Oncologist (Radiation Oncology)  Carla Guzman RN as Nurse Navigator (Hematology and Oncology)    Date of Service: 2024     Ramón Flores is a 89 y.o.-year-old with:   Cancer Staging   Anal cancer (Multi)  Staging form: Anus, AJCC V9  - Clinical: cT2, cNX - Unsigned    Specialty Problems          Radiation Oncology Problems    Anal cancer (Multi)         Treatment Summary:  Radiation Therapy    Treatment Period Technique Fraction Dose Fractions Total Dose   Course 1 2024-2024  (days elapsed: 0)         anus 2024-2024 VMAT 180 / 180 cGy  180 / 5,400 cGy     Concurrent systemic therapy: none    SUBJECTIVE: tolerated day 1 set up and treatment well.   OBJECTIVE:   Vital Signs:  /77 (BP Location: Left arm, Patient Position: Sitting, BP Cuff Size: Adult)   Pulse 87   Temp 36.4 °C (97.5 °F) (Temporal)   Resp 18   Wt 64.4 kg (141 lb 14.4 oz)   SpO2 91%   BMI 23.16 kg/m²    Pain Scale: 0 /10.      Toxicity Assessment          2024    11:51   Toxicity Assessment   Adverse Events Reviewed (WDL) No (Exceptions to WDL)   Treatment Site Pelvis - male   Dermatitis Radiation Grade 0   Diarrhea Grade 0   Fatigue Grade 0   Rectal Hemorrhage Grade 0       prior rectal bleeding has reduced   Rectal Pain Grade 0      ASSESSMENT/PLAN:  The patient is tolerating radiation therapy as anticipated.  Continue per current treatment plan.        Petrona Sheets  Assistant  Professor, Radiation Oncology

## 2024-12-03 ENCOUNTER — HOSPITAL ENCOUNTER (OUTPATIENT)
Dept: RADIATION ONCOLOGY | Facility: CLINIC | Age: 89
Setting detail: RADIATION/ONCOLOGY SERIES
Discharge: HOME | End: 2024-12-03
Payer: MEDICARE

## 2024-12-03 ENCOUNTER — OFFICE VISIT (OUTPATIENT)
Dept: GERIATRIC MEDICINE | Facility: CLINIC | Age: 89
End: 2024-12-03
Payer: MEDICARE

## 2024-12-03 DIAGNOSIS — C21.0 ANAL CANCER (MULTI): ICD-10-CM

## 2024-12-03 DIAGNOSIS — I50.32 CHRONIC HEART FAILURE WITH PRESERVED EJECTION FRACTION: ICD-10-CM

## 2024-12-03 DIAGNOSIS — D66 HEMOPHILIA A (MULTI): ICD-10-CM

## 2024-12-03 DIAGNOSIS — Z51.0 ENCOUNTER FOR ANTINEOPLASTIC RADIATION THERAPY: ICD-10-CM

## 2024-12-03 DIAGNOSIS — I10 PRIMARY HYPERTENSION: ICD-10-CM

## 2024-12-03 DIAGNOSIS — I25.10 ASHD (ARTERIOSCLEROTIC HEART DISEASE): Primary | ICD-10-CM

## 2024-12-03 DIAGNOSIS — I48.20 ATRIAL FIBRILLATION, CHRONIC (MULTI): ICD-10-CM

## 2024-12-03 DIAGNOSIS — C21.1 MALIGNANT NEOPLASM OF ANAL CANAL (MULTI): ICD-10-CM

## 2024-12-03 LAB
RAD ONC MSQ ACTUAL FRACTIONS DELIVERED: 2
RAD ONC MSQ ACTUAL SESSION DELIVERED DOSE: 180 CGRAY
RAD ONC MSQ ACTUAL TOTAL DOSE: 360 CGRAY
RAD ONC MSQ ELAPSED DAYS: 1
RAD ONC MSQ LAST DATE: NORMAL
RAD ONC MSQ PRESCRIBED FRACTIONAL DOSE: 180 CGRAY
RAD ONC MSQ PRESCRIBED NUMBER OF FRACTIONS: 30
RAD ONC MSQ PRESCRIBED TECHNIQUE: NORMAL
RAD ONC MSQ PRESCRIBED TOTAL DOSE: 5400 CGRAY
RAD ONC MSQ PRESCRIPTION PATTERN COMMENT: NORMAL
RAD ONC MSQ START DATE: NORMAL
RAD ONC MSQ TREATMENT COURSE NUMBER: 1
RAD ONC MSQ TREATMENT SITE: NORMAL

## 2024-12-03 PROCEDURE — 1159F MED LIST DOCD IN RCRD: CPT | Performed by: CLINICAL NURSE SPECIALIST

## 2024-12-03 PROCEDURE — 1157F ADVNC CARE PLAN IN RCRD: CPT | Performed by: CLINICAL NURSE SPECIALIST

## 2024-12-03 PROCEDURE — 77386 HC INTENSITY-MODULATED RADIATION THERAPY (IMRT), COMPLEX: CPT | Performed by: STUDENT IN AN ORGANIZED HEALTH CARE EDUCATION/TRAINING PROGRAM

## 2024-12-03 PROCEDURE — 3074F SYST BP LT 130 MM HG: CPT | Performed by: CLINICAL NURSE SPECIALIST

## 2024-12-03 PROCEDURE — 3078F DIAST BP <80 MM HG: CPT | Performed by: CLINICAL NURSE SPECIALIST

## 2024-12-03 PROCEDURE — 99309 SBSQ NF CARE MODERATE MDM 30: CPT | Performed by: CLINICAL NURSE SPECIALIST

## 2024-12-04 ENCOUNTER — HOSPITAL ENCOUNTER (OUTPATIENT)
Dept: RADIATION ONCOLOGY | Facility: CLINIC | Age: 89
Setting detail: RADIATION/ONCOLOGY SERIES
Discharge: HOME | End: 2024-12-04
Payer: MEDICARE

## 2024-12-04 ENCOUNTER — TELEPHONE (OUTPATIENT)
Dept: HEMATOLOGY/ONCOLOGY | Facility: HOSPITAL | Age: 89
End: 2024-12-04
Payer: MEDICARE

## 2024-12-04 VITALS
HEART RATE: 60 BPM | DIASTOLIC BLOOD PRESSURE: 70 MMHG | RESPIRATION RATE: 16 BRPM | SYSTOLIC BLOOD PRESSURE: 118 MMHG | TEMPERATURE: 97.2 F

## 2024-12-04 DIAGNOSIS — Z51.0 ENCOUNTER FOR ANTINEOPLASTIC RADIATION THERAPY: ICD-10-CM

## 2024-12-04 DIAGNOSIS — C21.1 MALIGNANT NEOPLASM OF ANAL CANAL (MULTI): ICD-10-CM

## 2024-12-04 LAB
RAD ONC MSQ ACTUAL FRACTIONS DELIVERED: 3
RAD ONC MSQ ACTUAL SESSION DELIVERED DOSE: 180 CGRAY
RAD ONC MSQ ACTUAL TOTAL DOSE: 540 CGRAY
RAD ONC MSQ ELAPSED DAYS: 2
RAD ONC MSQ LAST DATE: NORMAL
RAD ONC MSQ PRESCRIBED FRACTIONAL DOSE: 180 CGRAY
RAD ONC MSQ PRESCRIBED NUMBER OF FRACTIONS: 30
RAD ONC MSQ PRESCRIBED TECHNIQUE: NORMAL
RAD ONC MSQ PRESCRIBED TOTAL DOSE: 5400 CGRAY
RAD ONC MSQ PRESCRIPTION PATTERN COMMENT: NORMAL
RAD ONC MSQ START DATE: NORMAL
RAD ONC MSQ TREATMENT COURSE NUMBER: 1
RAD ONC MSQ TREATMENT SITE: NORMAL

## 2024-12-04 PROCEDURE — 77386 HC INTENSITY-MODULATED RADIATION THERAPY (IMRT), COMPLEX: CPT | Performed by: RADIOLOGY

## 2024-12-04 RX ORDER — BISMUTH SUBSALICYLATE 262 MG
1 TABLET,CHEWABLE ORAL DAILY
COMMUNITY

## 2024-12-04 RX ORDER — FERROUS SULFATE 325(65) MG
325 TABLET, DELAYED RELEASE (ENTERIC COATED) ORAL
COMMUNITY

## 2024-12-04 NOTE — PROGRESS NOTES
"Reason for visit: follow up home visit after starting radiation therapy and resuming diuretics    Reason for homebound status: BLE weakness and instability of knees and inability to do steps makes leaving his home quite taxing on Ramón and his caregivers    HPI: Ramón was seen today at his home. He started radiation treatments on Monday and is getting it 5 days a week for the next 5 weeks. 2 weeks ago his home care RN called me and stated that Ramón was short of breath when he was doing ADLs and had ankle swelling. I hesitated to resume the diuretics as they were stopped about 2 months ago while in the hospital by the cardiologist. I did resume the bumex and spironolactone but at half dose.     Chief Complaint: \"I am feeling normal. I had just a tiny bit of swelling in my ankles\"    Review of Systems   General: feels fine today and so far tolerating the radiation, stating the women there are exceptionally professional and courteous as he had been worried about being exposed during the treatments. His aide is here as well and the 2 of them agree his anxiety was heightened the past few weeks anticipating radiation, feeling that what was causing the mild shortness of breath, more so mild panic episodes. no fever or chills, sleeping good at night; feels rested during day;  appetite is good  Skin: no lesions, bruising, no pruritus  EENT: glasses, adequate vision with no recent vision changes; hearing is good; no sinus drainage or congestion; no oral pain or sore throat; no lesions or sores; no loose teeth, no trouble chewing or swallowing  Respiratory: no cough, congestion or shortness of breath today and does not recall being short of breath when the nurse called me  Cardiac: no chest pain or palpitations; not aware of HR ever going fast; had tiny bit of edema few weeks ago but none now  Gastrointestinal: no abdominal pain, no nausea, vomiting, heartburn, diarrhea or constipation. BM soft every day, making sure he " takes prune juice daily; no rectal pain or bleeding; no hemorrhoids  Urinary: continent; no leakage; no discomfort with urination; no frequency, has mild urgency; gets up to void at night 1-2 times; normal color of urine  Musculoskeletal: mild bilateral knee pain but tylenol is enough; knees are weak and relies on wheelchair; can stand by himself and transfer independently; arms are strong; no swelling  Neurologic: long and short term memory intact; no headaches, dizziness or lightheadedness; no tremors or involuntary movements; no altered sensation; no unilateral weakness  Psychiatric: understands diagnosis and workup; glad he decided to treat the cancer, he is positive and has no worries    Physical Examination   Vitals: Blood pressure 118/70, pulse 60, temperature 36.2 °C (97.2 °F), resp. rate 16.  Pulse ox 96%  General: sitting in wheelchair in his kitchen, dressed in personal clothes, very neat; good historian and conversationalist; well nourished, appears comfortable; his C aide is here as well  Neurologic: alert, oriented x4 with what appears to be good executive function; adequate and equal arm strength; positive sensation in all extremities; no involuntary movements  Skin: no abnormalities of hair or nails; right lower cheek with large mole, no discolorations, or excessive dryness   EENT: vision and hearing seem adequate; teeth in good condition with no trouble chewing or swallowing; no sinus congestion or drainage  Respiratory: unlabored; chest symmetrical w/ good rise and fall; regular rhythm and depth, breath sounds equal and clear throughout  Heart: irregular rhythm, rate controlled; normal heart sounds w/ no murmurs, rubs and gallops  Abdomen: nondistended, nontenderness, bowel sounds active x4, no masses  Extremities: no deformities, tenderness, swelling; palpable peripheral pulses, no cyanosis, clubbing; normal temperature  Musculoskeletal: no limited joint mobility with good ROM of upper  extremities but about 50% limited ROM of legs., knees stiff, not able to stand upright completly, no tenderness, effusion, erythema, minimal kyphosis and no scoliosis, no edema  Psych: pleasant, good attitude, good conversationalist; calm    Diagnoses and Plan:  anal cancer - started radiation Monday; 2 treatments and no symptoms yet but he is aware that after a couple weeks he will start getting some symptoms; reviewed symptoms and treatment and need to keep staff updated  HFpEF - no longer on metoprolol but last week added back spironolactone and bumex at half dose; now I do not think he needs to be on it so will send message to Vanna his [facility] nurse who does his pill box; no edema; lungs clear, no shortness of breath; told Ramón to report any edema or sob; he also had a bottle of Farxiga on his tray where he keeps his pills he uses every day but it had only 2 pills in it and they both were different looking. He could not tell me if he was taking it and I did not see either of them in his pill box for the week. 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

## 2024-12-04 NOTE — PATIENT INSTRUCTIONS
Follow up in 4 weeks for monitoring swelling/shortness of breath  Continue prune juice daily and keep stools soft and no straining!! drink more prune juice if needed; this will prevent any additional irritation to the anal area

## 2024-12-05 ENCOUNTER — HOSPITAL ENCOUNTER (OUTPATIENT)
Dept: RADIATION ONCOLOGY | Facility: CLINIC | Age: 89
Setting detail: RADIATION/ONCOLOGY SERIES
Discharge: HOME | End: 2024-12-05
Payer: MEDICARE

## 2024-12-05 DIAGNOSIS — Z51.0 ENCOUNTER FOR ANTINEOPLASTIC RADIATION THERAPY: ICD-10-CM

## 2024-12-05 DIAGNOSIS — C21.1 MALIGNANT NEOPLASM OF ANAL CANAL (MULTI): ICD-10-CM

## 2024-12-05 LAB
RAD ONC MSQ ACTUAL FRACTIONS DELIVERED: 4
RAD ONC MSQ ACTUAL SESSION DELIVERED DOSE: 180 CGRAY
RAD ONC MSQ ACTUAL TOTAL DOSE: 720 CGRAY
RAD ONC MSQ ELAPSED DAYS: 3
RAD ONC MSQ LAST DATE: NORMAL
RAD ONC MSQ PRESCRIBED FRACTIONAL DOSE: 180 CGRAY
RAD ONC MSQ PRESCRIBED NUMBER OF FRACTIONS: 30
RAD ONC MSQ PRESCRIBED TECHNIQUE: NORMAL
RAD ONC MSQ PRESCRIBED TOTAL DOSE: 5400 CGRAY
RAD ONC MSQ PRESCRIPTION PATTERN COMMENT: NORMAL
RAD ONC MSQ START DATE: NORMAL
RAD ONC MSQ TREATMENT COURSE NUMBER: 1
RAD ONC MSQ TREATMENT SITE: NORMAL

## 2024-12-05 PROCEDURE — 77386 HC INTENSITY-MODULATED RADIATION THERAPY (IMRT), COMPLEX: CPT | Performed by: STUDENT IN AN ORGANIZED HEALTH CARE EDUCATION/TRAINING PROGRAM

## 2024-12-05 NOTE — TELEPHONE ENCOUNTER
I called Javier to discuss follow-up with medical oncology would be for surveillance only following completion of radiation as he is not getting any chemotherapy. The voicemail was not identified so name and call back number were left.

## 2024-12-06 ENCOUNTER — HOSPITAL ENCOUNTER (OUTPATIENT)
Dept: RADIATION ONCOLOGY | Facility: CLINIC | Age: 89
Setting detail: RADIATION/ONCOLOGY SERIES
Discharge: HOME | End: 2024-12-06
Payer: MEDICARE

## 2024-12-06 DIAGNOSIS — C21.1 MALIGNANT NEOPLASM OF ANAL CANAL (MULTI): ICD-10-CM

## 2024-12-06 DIAGNOSIS — Z51.0 ENCOUNTER FOR ANTINEOPLASTIC RADIATION THERAPY: ICD-10-CM

## 2024-12-06 LAB
RAD ONC MSQ ACTUAL FRACTIONS DELIVERED: 5
RAD ONC MSQ ACTUAL SESSION DELIVERED DOSE: 180 CGRAY
RAD ONC MSQ ACTUAL TOTAL DOSE: 900 CGRAY
RAD ONC MSQ ELAPSED DAYS: 4
RAD ONC MSQ LAST DATE: NORMAL
RAD ONC MSQ PRESCRIBED FRACTIONAL DOSE: 180 CGRAY
RAD ONC MSQ PRESCRIBED NUMBER OF FRACTIONS: 30
RAD ONC MSQ PRESCRIBED TECHNIQUE: NORMAL
RAD ONC MSQ PRESCRIBED TOTAL DOSE: 5400 CGRAY
RAD ONC MSQ PRESCRIPTION PATTERN COMMENT: NORMAL
RAD ONC MSQ START DATE: NORMAL
RAD ONC MSQ TREATMENT COURSE NUMBER: 1
RAD ONC MSQ TREATMENT SITE: NORMAL

## 2024-12-06 PROCEDURE — 77386 HC INTENSITY-MODULATED RADIATION THERAPY (IMRT), COMPLEX: CPT | Performed by: STUDENT IN AN ORGANIZED HEALTH CARE EDUCATION/TRAINING PROGRAM

## 2024-12-09 ENCOUNTER — HOSPITAL ENCOUNTER (OUTPATIENT)
Dept: RADIATION ONCOLOGY | Facility: CLINIC | Age: 89
Setting detail: RADIATION/ONCOLOGY SERIES
Discharge: HOME | End: 2024-12-09
Payer: MEDICARE

## 2024-12-09 ENCOUNTER — RADIATION ONCOLOGY OTV (OUTPATIENT)
Dept: RADIATION ONCOLOGY | Facility: CLINIC | Age: 89
End: 2024-12-09
Payer: MEDICARE

## 2024-12-09 VITALS
OXYGEN SATURATION: 96 % | TEMPERATURE: 97.8 F | HEART RATE: 94 BPM | BODY MASS INDEX: 22.26 KG/M2 | DIASTOLIC BLOOD PRESSURE: 88 MMHG | WEIGHT: 136.4 LBS | RESPIRATION RATE: 18 BRPM | SYSTOLIC BLOOD PRESSURE: 156 MMHG

## 2024-12-09 DIAGNOSIS — Z51.0 ENCOUNTER FOR ANTINEOPLASTIC RADIATION THERAPY: ICD-10-CM

## 2024-12-09 DIAGNOSIS — C21.1 MALIGNANT NEOPLASM OF ANAL CANAL (MULTI): ICD-10-CM

## 2024-12-09 LAB
RAD ONC MSQ ACTUAL FRACTIONS DELIVERED: 6
RAD ONC MSQ ACTUAL SESSION DELIVERED DOSE: 180 CGRAY
RAD ONC MSQ ACTUAL TOTAL DOSE: 1080 CGRAY
RAD ONC MSQ ELAPSED DAYS: 7
RAD ONC MSQ LAST DATE: NORMAL
RAD ONC MSQ PRESCRIBED FRACTIONAL DOSE: 180 CGRAY
RAD ONC MSQ PRESCRIBED NUMBER OF FRACTIONS: 30
RAD ONC MSQ PRESCRIBED TECHNIQUE: NORMAL
RAD ONC MSQ PRESCRIBED TOTAL DOSE: 5400 CGRAY
RAD ONC MSQ PRESCRIPTION PATTERN COMMENT: NORMAL
RAD ONC MSQ START DATE: NORMAL
RAD ONC MSQ TREATMENT COURSE NUMBER: 1
RAD ONC MSQ TREATMENT SITE: NORMAL

## 2024-12-09 PROCEDURE — 77336 RADIATION PHYSICS CONSULT: CPT | Performed by: STUDENT IN AN ORGANIZED HEALTH CARE EDUCATION/TRAINING PROGRAM

## 2024-12-09 PROCEDURE — 77386 HC INTENSITY-MODULATED RADIATION THERAPY (IMRT), COMPLEX: CPT | Performed by: STUDENT IN AN ORGANIZED HEALTH CARE EDUCATION/TRAINING PROGRAM

## 2024-12-09 ASSESSMENT — PAIN SCALES - GENERAL: PAINLEVEL_OUTOF10: 0-NO PAIN

## 2024-12-09 NOTE — PROGRESS NOTES
RADIATION ONCOLOGY ON-TREATMENT VISIT NOTE  Patient Name:  Ramón Flores  MRN:  [id number]  :  1928    Radiation Oncologist: Petrona Sheets MD PhD  Primary Care Provider: CHAY Mckeon  Care Team: Patient Care Team:  CHAY Mckeon as PCP - General (Gerontology)  Gallito Layne MD as Consulting Physician (Hospitalist)  Rufino Dillon MD as Consulting Physician (Hematology and Oncology)  Ayde Locke MD as Consulting Physician (Hematology and Oncology)  Deidra Muro MD as Radiation Oncologist (Radiation Oncology)  Carla Guzman RN as Nurse Navigator (Hematology and Oncology)    Date of Service: 2024     Ramón Flores is a 89 y.o.-year-old with:   Cancer Staging   Anal cancer (Multi)  Staging form: Anus, AJCC V9  - Clinical: cT2, cNX - Unsigned    Specialty Problems          Radiation Oncology Problems    Anal cancer (Multi)         Treatment Summary:  Radiation Therapy    Treatment Period Technique Fraction Dose Fractions Total Dose   Course 1 2024-2024  (days elapsed: 7)         anus 2024-2024 VMAT 180 / 180 cGy  1080 / 5,400 cGy     Concurrent systemic therapy: none    SUBJECTIVE: Feeling well. Denies any urinary or bowel changes compared to baseline.     OBJECTIVE:   Vital Signs:  /88 (BP Location: Right arm, Patient Position: Sitting, BP Cuff Size: Adult)   Pulse 94   Temp 36.6 °C (97.8 °F) (Temporal)   Resp 18   Wt 61.9 kg (136 lb 6.4 oz)   SpO2 96%   BMI 22.26 kg/m²    Pain Scale: 0 /10.      Toxicity Assessment          2024    11:51 2024    10:30   Toxicity Assessment   Adverse Events Reviewed (WDL) No (Exceptions to WDL) No (Exceptions to WDL)   Treatment Site Pelvis - male Pelvis - male   Dermatitis Radiation Grade 0 Grade 0   Diarrhea Grade 0 Grade 0   Fatigue Grade 0 Grade 0   Rectal Hemorrhage Grade 0       prior rectal bleeding has reduced Grade 0   Rectal Pain Grade 0 Grade 0      ASSESSMENT/PLAN:  The  patient is tolerating radiation therapy as anticipated.  Continue per current treatment plan.        Petrona Sheets  , Radiation Oncology

## 2024-12-10 ENCOUNTER — HOSPITAL ENCOUNTER (OUTPATIENT)
Dept: RADIATION ONCOLOGY | Facility: CLINIC | Age: 89
Setting detail: RADIATION/ONCOLOGY SERIES
Discharge: HOME | End: 2024-12-10
Payer: MEDICARE

## 2024-12-10 DIAGNOSIS — C21.1 MALIGNANT NEOPLASM OF ANAL CANAL (MULTI): ICD-10-CM

## 2024-12-10 DIAGNOSIS — Z51.0 ENCOUNTER FOR ANTINEOPLASTIC RADIATION THERAPY: ICD-10-CM

## 2024-12-10 LAB
RAD ONC MSQ ACTUAL FRACTIONS DELIVERED: 7
RAD ONC MSQ ACTUAL SESSION DELIVERED DOSE: 180 CGRAY
RAD ONC MSQ ACTUAL TOTAL DOSE: 1260 CGRAY
RAD ONC MSQ ELAPSED DAYS: 8
RAD ONC MSQ LAST DATE: NORMAL
RAD ONC MSQ PRESCRIBED FRACTIONAL DOSE: 180 CGRAY
RAD ONC MSQ PRESCRIBED NUMBER OF FRACTIONS: 30
RAD ONC MSQ PRESCRIBED TECHNIQUE: NORMAL
RAD ONC MSQ PRESCRIBED TOTAL DOSE: 5400 CGRAY
RAD ONC MSQ PRESCRIPTION PATTERN COMMENT: NORMAL
RAD ONC MSQ START DATE: NORMAL
RAD ONC MSQ TREATMENT COURSE NUMBER: 1
RAD ONC MSQ TREATMENT SITE: NORMAL

## 2024-12-10 PROCEDURE — 77386 HC INTENSITY-MODULATED RADIATION THERAPY (IMRT), COMPLEX: CPT | Performed by: STUDENT IN AN ORGANIZED HEALTH CARE EDUCATION/TRAINING PROGRAM

## 2024-12-11 ENCOUNTER — HOSPITAL ENCOUNTER (OUTPATIENT)
Dept: RADIATION ONCOLOGY | Facility: CLINIC | Age: 89
Setting detail: RADIATION/ONCOLOGY SERIES
Discharge: HOME | End: 2024-12-11
Payer: MEDICARE

## 2024-12-11 DIAGNOSIS — C21.1 MALIGNANT NEOPLASM OF ANAL CANAL (MULTI): ICD-10-CM

## 2024-12-11 DIAGNOSIS — Z51.0 ENCOUNTER FOR ANTINEOPLASTIC RADIATION THERAPY: ICD-10-CM

## 2024-12-11 LAB
RAD ONC MSQ ACTUAL FRACTIONS DELIVERED: 8
RAD ONC MSQ ACTUAL SESSION DELIVERED DOSE: 180 CGRAY
RAD ONC MSQ ACTUAL TOTAL DOSE: 1440 CGRAY
RAD ONC MSQ ELAPSED DAYS: 9
RAD ONC MSQ LAST DATE: NORMAL
RAD ONC MSQ PRESCRIBED FRACTIONAL DOSE: 180 CGRAY
RAD ONC MSQ PRESCRIBED NUMBER OF FRACTIONS: 30
RAD ONC MSQ PRESCRIBED TECHNIQUE: NORMAL
RAD ONC MSQ PRESCRIBED TOTAL DOSE: 5400 CGRAY
RAD ONC MSQ PRESCRIPTION PATTERN COMMENT: NORMAL
RAD ONC MSQ START DATE: NORMAL
RAD ONC MSQ TREATMENT COURSE NUMBER: 1
RAD ONC MSQ TREATMENT SITE: NORMAL

## 2024-12-11 PROCEDURE — 77386 HC INTENSITY-MODULATED RADIATION THERAPY (IMRT), COMPLEX: CPT | Performed by: STUDENT IN AN ORGANIZED HEALTH CARE EDUCATION/TRAINING PROGRAM

## 2024-12-12 ENCOUNTER — HOSPITAL ENCOUNTER (OUTPATIENT)
Dept: RADIATION ONCOLOGY | Facility: CLINIC | Age: 89
Setting detail: RADIATION/ONCOLOGY SERIES
Discharge: HOME | End: 2024-12-12
Payer: MEDICARE

## 2024-12-12 DIAGNOSIS — I50.9 CONGESTIVE HEART FAILURE, UNSPECIFIED HF CHRONICITY, UNSPECIFIED HEART FAILURE TYPE: Primary | ICD-10-CM

## 2024-12-12 DIAGNOSIS — C21.1 MALIGNANT NEOPLASM OF ANAL CANAL (MULTI): ICD-10-CM

## 2024-12-12 DIAGNOSIS — Z51.0 ENCOUNTER FOR ANTINEOPLASTIC RADIATION THERAPY: ICD-10-CM

## 2024-12-12 LAB
RAD ONC MSQ ACTUAL FRACTIONS DELIVERED: 9
RAD ONC MSQ ACTUAL SESSION DELIVERED DOSE: 180 CGRAY
RAD ONC MSQ ACTUAL TOTAL DOSE: 1620 CGRAY
RAD ONC MSQ ELAPSED DAYS: 10
RAD ONC MSQ LAST DATE: NORMAL
RAD ONC MSQ PRESCRIBED FRACTIONAL DOSE: 180 CGRAY
RAD ONC MSQ PRESCRIBED NUMBER OF FRACTIONS: 30
RAD ONC MSQ PRESCRIBED TECHNIQUE: NORMAL
RAD ONC MSQ PRESCRIBED TOTAL DOSE: 5400 CGRAY
RAD ONC MSQ PRESCRIPTION PATTERN COMMENT: NORMAL
RAD ONC MSQ START DATE: NORMAL
RAD ONC MSQ TREATMENT COURSE NUMBER: 1
RAD ONC MSQ TREATMENT SITE: NORMAL

## 2024-12-12 PROCEDURE — 77386 HC INTENSITY-MODULATED RADIATION THERAPY (IMRT), COMPLEX: CPT | Performed by: STUDENT IN AN ORGANIZED HEALTH CARE EDUCATION/TRAINING PROGRAM

## 2024-12-12 RX ORDER — DAPAGLIFLOZIN 5 MG/1
5 TABLET, FILM COATED ORAL DAILY
Qty: 100 TABLET | Refills: 3 | Status: SHIPPED | OUTPATIENT
Start: 2024-12-12 | End: 2026-01-16

## 2024-12-12 NOTE — PROGRESS NOTES
Talked with RN from [facility] and Ramón is actually taking farxiga and not jardiance due to cost.

## 2024-12-13 ENCOUNTER — HOSPITAL ENCOUNTER (OUTPATIENT)
Dept: RADIATION ONCOLOGY | Facility: CLINIC | Age: 89
Setting detail: RADIATION/ONCOLOGY SERIES
Discharge: HOME | End: 2024-12-13
Payer: MEDICARE

## 2024-12-13 DIAGNOSIS — C21.1 MALIGNANT NEOPLASM OF ANAL CANAL (MULTI): ICD-10-CM

## 2024-12-13 DIAGNOSIS — Z51.0 ENCOUNTER FOR ANTINEOPLASTIC RADIATION THERAPY: ICD-10-CM

## 2024-12-13 LAB
RAD ONC MSQ ACTUAL FRACTIONS DELIVERED: 10
RAD ONC MSQ ACTUAL SESSION DELIVERED DOSE: 180 CGRAY
RAD ONC MSQ ACTUAL TOTAL DOSE: 1800 CGRAY
RAD ONC MSQ ELAPSED DAYS: 11
RAD ONC MSQ LAST DATE: NORMAL
RAD ONC MSQ PRESCRIBED FRACTIONAL DOSE: 180 CGRAY
RAD ONC MSQ PRESCRIBED NUMBER OF FRACTIONS: 30
RAD ONC MSQ PRESCRIBED TECHNIQUE: NORMAL
RAD ONC MSQ PRESCRIBED TOTAL DOSE: 5400 CGRAY
RAD ONC MSQ PRESCRIPTION PATTERN COMMENT: NORMAL
RAD ONC MSQ START DATE: NORMAL
RAD ONC MSQ TREATMENT COURSE NUMBER: 1
RAD ONC MSQ TREATMENT SITE: NORMAL

## 2024-12-13 PROCEDURE — 77386 HC INTENSITY-MODULATED RADIATION THERAPY (IMRT), COMPLEX: CPT | Performed by: STUDENT IN AN ORGANIZED HEALTH CARE EDUCATION/TRAINING PROGRAM

## 2024-12-16 ENCOUNTER — HOSPITAL ENCOUNTER (OUTPATIENT)
Dept: RADIATION ONCOLOGY | Facility: CLINIC | Age: 89
Setting detail: RADIATION/ONCOLOGY SERIES
Discharge: HOME | End: 2024-12-16
Payer: MEDICARE

## 2024-12-16 ENCOUNTER — RADIATION ONCOLOGY OTV (OUTPATIENT)
Dept: RADIATION ONCOLOGY | Facility: CLINIC | Age: 89
End: 2024-12-16
Payer: MEDICARE

## 2024-12-16 VITALS
DIASTOLIC BLOOD PRESSURE: 82 MMHG | HEART RATE: 75 BPM | TEMPERATURE: 96.4 F | BODY MASS INDEX: 22.38 KG/M2 | RESPIRATION RATE: 18 BRPM | OXYGEN SATURATION: 99 % | SYSTOLIC BLOOD PRESSURE: 155 MMHG | WEIGHT: 137.1 LBS

## 2024-12-16 DIAGNOSIS — C21.1 MALIGNANT NEOPLASM OF ANAL CANAL (MULTI): ICD-10-CM

## 2024-12-16 DIAGNOSIS — Z51.0 ENCOUNTER FOR ANTINEOPLASTIC RADIATION THERAPY: ICD-10-CM

## 2024-12-16 LAB
RAD ONC MSQ ACTUAL FRACTIONS DELIVERED: 11
RAD ONC MSQ ACTUAL SESSION DELIVERED DOSE: 180 CGRAY
RAD ONC MSQ ACTUAL TOTAL DOSE: 1980 CGRAY
RAD ONC MSQ ELAPSED DAYS: 14
RAD ONC MSQ LAST DATE: NORMAL
RAD ONC MSQ PRESCRIBED FRACTIONAL DOSE: 180 CGRAY
RAD ONC MSQ PRESCRIBED NUMBER OF FRACTIONS: 30
RAD ONC MSQ PRESCRIBED TECHNIQUE: NORMAL
RAD ONC MSQ PRESCRIBED TOTAL DOSE: 5400 CGRAY
RAD ONC MSQ PRESCRIPTION PATTERN COMMENT: NORMAL
RAD ONC MSQ START DATE: NORMAL
RAD ONC MSQ TREATMENT COURSE NUMBER: 1
RAD ONC MSQ TREATMENT SITE: NORMAL

## 2024-12-16 PROCEDURE — 77336 RADIATION PHYSICS CONSULT: CPT | Performed by: STUDENT IN AN ORGANIZED HEALTH CARE EDUCATION/TRAINING PROGRAM

## 2024-12-16 PROCEDURE — 77386 HC INTENSITY-MODULATED RADIATION THERAPY (IMRT), COMPLEX: CPT | Performed by: STUDENT IN AN ORGANIZED HEALTH CARE EDUCATION/TRAINING PROGRAM

## 2024-12-16 ASSESSMENT — PAIN SCALES - GENERAL: PAINLEVEL_OUTOF10: 0-NO PAIN

## 2024-12-16 NOTE — PROGRESS NOTES
RADIATION ONCOLOGY ON-TREATMENT VISIT NOTE  Patient Name:  Ramón Flores  MRN:  [id number]  :  1928    Radiation Oncologist: Petrona Sheets MD PhD  Primary Care Provider: CHAY Mckeon  Care Team: Patient Care Team:  CHAY Mckeon as PCP - General (Gerontology)  Gallito Layne MD as Consulting Physician (Hospitalist)  Rufino Dillon MD as Consulting Physician (Hematology and Oncology)  Ayde Locke MD as Consulting Physician (Hematology and Oncology)  Deidra Muro MD as Radiation Oncologist (Radiation Oncology)  Carla Guzman RN as Nurse Navigator (Hematology and Oncology)    Date of Service: 2024     Ramón Flores is a 89 y.o.-year-old with:   Cancer Staging   Anal cancer (Multi)  Staging form: Anus, AJCC V9  - Clinical: cT2, cNX - Unsigned    Specialty Problems          Radiation Oncology Problems    Anal cancer (Multi)         Treatment Summary:  Radiation Therapy    Treatment Period Technique Fraction Dose Fractions Total Dose   Course 1 2024-2024  (days elapsed: 14)         anus 2024-2024 VMAT 180 / 180 cGy 1980,400 cGy     Concurrent systemic therapy: none    SUBJECTIVE: Feeling well. Denies any urinary or bowel changes compared to baseline.     OBJECTIVE:   Vital Signs:  /82 (BP Location: Right arm, Patient Position: Sitting, BP Cuff Size: Adult)   Pulse 75   Temp 35.8 °C (96.4 °F) (Temporal)   Resp 18   Wt 62.2 kg (137 lb 1.6 oz)   SpO2 99%   BMI 22.38 kg/m²    Pain Scale: 0 /10.    Skin: erythema in RT field, skin intact    Toxicity Assessment          2024    11:51 2024    10:30 2024    09:59   Toxicity Assessment   Adverse Events Reviewed (WDL) No (Exceptions to WDL) No (Exceptions to WDL) No (Exceptions to WDL)   Treatment Site Pelvis - male Pelvis - male Pelvis - male   Dermatitis Radiation Grade 0 Grade 0 Grade 1       skin is intact.  mild erythema; can use aquaphor PRN   Diarrhea  Grade 0 Grade 0 Grade 0   Fatigue Grade 0 Grade 0 Grade 0   Rectal Hemorrhage Grade 0       prior rectal bleeding has reduced Grade 0 Grade 0   Rectal Pain Grade 0 Grade 0 Grade 0      ASSESSMENT/PLAN:  The patient is tolerating radiation therapy as anticipated.  Continue per current treatment plan.        Petrona Sheets  , Radiation Oncology

## 2024-12-17 ENCOUNTER — HOSPITAL ENCOUNTER (OUTPATIENT)
Dept: RADIATION ONCOLOGY | Facility: CLINIC | Age: 89
Setting detail: RADIATION/ONCOLOGY SERIES
Discharge: HOME | End: 2024-12-17
Payer: MEDICARE

## 2024-12-17 DIAGNOSIS — C21.1 MALIGNANT NEOPLASM OF ANAL CANAL (MULTI): ICD-10-CM

## 2024-12-17 DIAGNOSIS — Z51.0 ENCOUNTER FOR ANTINEOPLASTIC RADIATION THERAPY: ICD-10-CM

## 2024-12-17 LAB
RAD ONC MSQ ACTUAL FRACTIONS DELIVERED: 12
RAD ONC MSQ ACTUAL SESSION DELIVERED DOSE: 180 CGRAY
RAD ONC MSQ ACTUAL TOTAL DOSE: 2160 CGRAY
RAD ONC MSQ ELAPSED DAYS: 15
RAD ONC MSQ LAST DATE: NORMAL
RAD ONC MSQ PRESCRIBED FRACTIONAL DOSE: 180 CGRAY
RAD ONC MSQ PRESCRIBED NUMBER OF FRACTIONS: 30
RAD ONC MSQ PRESCRIBED TECHNIQUE: NORMAL
RAD ONC MSQ PRESCRIBED TOTAL DOSE: 5400 CGRAY
RAD ONC MSQ PRESCRIPTION PATTERN COMMENT: NORMAL
RAD ONC MSQ START DATE: NORMAL
RAD ONC MSQ TREATMENT COURSE NUMBER: 1
RAD ONC MSQ TREATMENT SITE: NORMAL

## 2024-12-17 PROCEDURE — 77386 HC INTENSITY-MODULATED RADIATION THERAPY (IMRT), COMPLEX: CPT | Performed by: STUDENT IN AN ORGANIZED HEALTH CARE EDUCATION/TRAINING PROGRAM

## 2024-12-18 ENCOUNTER — HOSPITAL ENCOUNTER (OUTPATIENT)
Dept: RADIATION ONCOLOGY | Facility: CLINIC | Age: 89
Setting detail: RADIATION/ONCOLOGY SERIES
Discharge: HOME | End: 2024-12-18
Payer: MEDICARE

## 2024-12-18 DIAGNOSIS — Z51.0 ENCOUNTER FOR ANTINEOPLASTIC RADIATION THERAPY: ICD-10-CM

## 2024-12-18 DIAGNOSIS — C21.1 MALIGNANT NEOPLASM OF ANAL CANAL (MULTI): ICD-10-CM

## 2024-12-18 LAB
RAD ONC MSQ ACTUAL FRACTIONS DELIVERED: 13
RAD ONC MSQ ACTUAL SESSION DELIVERED DOSE: 180 CGRAY
RAD ONC MSQ ACTUAL TOTAL DOSE: 2340 CGRAY
RAD ONC MSQ ELAPSED DAYS: 16
RAD ONC MSQ LAST DATE: NORMAL
RAD ONC MSQ PRESCRIBED FRACTIONAL DOSE: 180 CGRAY
RAD ONC MSQ PRESCRIBED NUMBER OF FRACTIONS: 30
RAD ONC MSQ PRESCRIBED TECHNIQUE: NORMAL
RAD ONC MSQ PRESCRIBED TOTAL DOSE: 5400 CGRAY
RAD ONC MSQ PRESCRIPTION PATTERN COMMENT: NORMAL
RAD ONC MSQ START DATE: NORMAL
RAD ONC MSQ TREATMENT COURSE NUMBER: 1
RAD ONC MSQ TREATMENT SITE: NORMAL

## 2024-12-18 PROCEDURE — 77386 HC INTENSITY-MODULATED RADIATION THERAPY (IMRT), COMPLEX: CPT | Performed by: STUDENT IN AN ORGANIZED HEALTH CARE EDUCATION/TRAINING PROGRAM

## 2024-12-19 ENCOUNTER — HOSPITAL ENCOUNTER (OUTPATIENT)
Dept: RADIATION ONCOLOGY | Facility: CLINIC | Age: 89
Setting detail: RADIATION/ONCOLOGY SERIES
Discharge: HOME | End: 2024-12-19
Payer: MEDICARE

## 2024-12-19 DIAGNOSIS — Z51.0 ENCOUNTER FOR ANTINEOPLASTIC RADIATION THERAPY: ICD-10-CM

## 2024-12-19 DIAGNOSIS — C21.1 MALIGNANT NEOPLASM OF ANAL CANAL (MULTI): ICD-10-CM

## 2024-12-19 LAB
RAD ONC MSQ ACTUAL FRACTIONS DELIVERED: 14
RAD ONC MSQ ACTUAL SESSION DELIVERED DOSE: 180 CGRAY
RAD ONC MSQ ACTUAL TOTAL DOSE: 2520 CGRAY
RAD ONC MSQ ELAPSED DAYS: 17
RAD ONC MSQ LAST DATE: NORMAL
RAD ONC MSQ PRESCRIBED FRACTIONAL DOSE: 180 CGRAY
RAD ONC MSQ PRESCRIBED NUMBER OF FRACTIONS: 30
RAD ONC MSQ PRESCRIBED TECHNIQUE: NORMAL
RAD ONC MSQ PRESCRIBED TOTAL DOSE: 5400 CGRAY
RAD ONC MSQ PRESCRIPTION PATTERN COMMENT: NORMAL
RAD ONC MSQ START DATE: NORMAL
RAD ONC MSQ TREATMENT COURSE NUMBER: 1
RAD ONC MSQ TREATMENT SITE: NORMAL

## 2024-12-19 PROCEDURE — 77386 HC INTENSITY-MODULATED RADIATION THERAPY (IMRT), COMPLEX: CPT | Performed by: STUDENT IN AN ORGANIZED HEALTH CARE EDUCATION/TRAINING PROGRAM

## 2024-12-20 ENCOUNTER — HOSPITAL ENCOUNTER (OUTPATIENT)
Dept: RADIATION ONCOLOGY | Facility: CLINIC | Age: 89
Setting detail: RADIATION/ONCOLOGY SERIES
Discharge: HOME | End: 2024-12-20
Payer: MEDICARE

## 2024-12-20 DIAGNOSIS — Z51.0 ENCOUNTER FOR ANTINEOPLASTIC RADIATION THERAPY: ICD-10-CM

## 2024-12-20 DIAGNOSIS — C21.1 MALIGNANT NEOPLASM OF ANAL CANAL (MULTI): ICD-10-CM

## 2024-12-20 LAB
RAD ONC MSQ ACTUAL FRACTIONS DELIVERED: 15
RAD ONC MSQ ACTUAL SESSION DELIVERED DOSE: 180 CGRAY
RAD ONC MSQ ACTUAL TOTAL DOSE: 2700 CGRAY
RAD ONC MSQ ELAPSED DAYS: 18
RAD ONC MSQ LAST DATE: NORMAL
RAD ONC MSQ PRESCRIBED FRACTIONAL DOSE: 180 CGRAY
RAD ONC MSQ PRESCRIBED NUMBER OF FRACTIONS: 30
RAD ONC MSQ PRESCRIBED TECHNIQUE: NORMAL
RAD ONC MSQ PRESCRIBED TOTAL DOSE: 5400 CGRAY
RAD ONC MSQ PRESCRIPTION PATTERN COMMENT: NORMAL
RAD ONC MSQ START DATE: NORMAL
RAD ONC MSQ TREATMENT COURSE NUMBER: 1
RAD ONC MSQ TREATMENT SITE: NORMAL

## 2024-12-20 PROCEDURE — 77386 HC INTENSITY-MODULATED RADIATION THERAPY (IMRT), COMPLEX: CPT | Performed by: STUDENT IN AN ORGANIZED HEALTH CARE EDUCATION/TRAINING PROGRAM

## 2024-12-23 ENCOUNTER — RADIATION ONCOLOGY OTV (OUTPATIENT)
Dept: RADIATION ONCOLOGY | Facility: CLINIC | Age: 89
End: 2024-12-23
Payer: MEDICARE

## 2024-12-23 ENCOUNTER — HOSPITAL ENCOUNTER (OUTPATIENT)
Dept: RADIATION ONCOLOGY | Facility: CLINIC | Age: 89
Setting detail: RADIATION/ONCOLOGY SERIES
Discharge: HOME | End: 2024-12-23
Payer: MEDICARE

## 2024-12-23 VITALS
RESPIRATION RATE: 18 BRPM | SYSTOLIC BLOOD PRESSURE: 105 MMHG | DIASTOLIC BLOOD PRESSURE: 74 MMHG | HEART RATE: 91 BPM | OXYGEN SATURATION: 94 % | WEIGHT: 137.1 LBS | TEMPERATURE: 97.4 F | BODY MASS INDEX: 22.38 KG/M2

## 2024-12-23 DIAGNOSIS — Z51.0 ENCOUNTER FOR ANTINEOPLASTIC RADIATION THERAPY: ICD-10-CM

## 2024-12-23 DIAGNOSIS — C21.1 MALIGNANT NEOPLASM OF ANAL CANAL (MULTI): ICD-10-CM

## 2024-12-23 LAB
RAD ONC MSQ ACTUAL FRACTIONS DELIVERED: 16
RAD ONC MSQ ACTUAL SESSION DELIVERED DOSE: 180 CGRAY
RAD ONC MSQ ACTUAL TOTAL DOSE: 2880 CGRAY
RAD ONC MSQ ELAPSED DAYS: 21
RAD ONC MSQ LAST DATE: NORMAL
RAD ONC MSQ PRESCRIBED FRACTIONAL DOSE: 180 CGRAY
RAD ONC MSQ PRESCRIBED NUMBER OF FRACTIONS: 30
RAD ONC MSQ PRESCRIBED TECHNIQUE: NORMAL
RAD ONC MSQ PRESCRIBED TOTAL DOSE: 5400 CGRAY
RAD ONC MSQ PRESCRIPTION PATTERN COMMENT: NORMAL
RAD ONC MSQ START DATE: NORMAL
RAD ONC MSQ TREATMENT COURSE NUMBER: 1
RAD ONC MSQ TREATMENT SITE: NORMAL

## 2024-12-23 PROCEDURE — 77386 HC INTENSITY-MODULATED RADIATION THERAPY (IMRT), COMPLEX: CPT | Performed by: STUDENT IN AN ORGANIZED HEALTH CARE EDUCATION/TRAINING PROGRAM

## 2024-12-23 ASSESSMENT — PAIN SCALES - GENERAL: PAINLEVEL_OUTOF10: 0-NO PAIN

## 2024-12-23 NOTE — PROGRESS NOTES
Radiation Oncology On Treatment Visit    Patient Name:  Ramón Flores  MRN:  [id number]  :  1928    Referring Provider: No ref. provider found  Primary Care Provider: CHAY Mckeon  Care Team: Patient Care Team:  CHAY Mckeon as PCP - General (Gerontology)  Gallito Layne MD as Consulting Physician (Hospitalist)  Rufino Dillon MD as Consulting Physician (Hematology and Oncology)  Ayde Locke MD as Consulting Physician (Hematology and Oncology)  Deidra Muro MD as Radiation Oncologist (Radiation Oncology)  Carla Guzman RN as Nurse Navigator (Hematology and Oncology)    Date of Service: 2024     Diagnosis:   Specialty Problems          Radiation Oncology Problems    Anal cancer (Multi)         Treatment Summary:  Radiation Therapy    Treatment Period Technique Fraction Dose Fractions Total Dose   Course 1 2024-2024  (days elapsed: 21)         anus 2024-2024 VMAT 180 / 180 cGy  2880 / 5,400 cGy     SUBJECTIVE: Feeling well, no pain.       OBJECTIVE:   Vital Signs:  /74 (BP Location: Left arm, Patient Position: Sitting, BP Cuff Size: Adult)   Pulse 91   Temp 36.3 °C (97.4 °F) (Temporal)   Resp 18   Wt 62.2 kg (137 lb 1.6 oz)   SpO2 94%   BMI 22.38 kg/m²     Other Pertinent Findings: Mild erythema, no skin breakdown.    Toxicity Assessment          2024    11:51 2024    10:30 2024    09:59 2024    09:58   Toxicity Assessment   Adverse Events Reviewed (WDL) No (Exceptions to WDL) No (Exceptions to WDL) No (Exceptions to WDL) No (Exceptions to WDL)   Treatment Site Pelvis - male Pelvis - male Pelvis - male Pelvis - male   Dermatitis Radiation Grade 0 Grade 0 Grade 1       skin is intact.  mild erythema; can use aquaphor PRN Grade 1       skin is intact.  mild erythema; can use aquaphor PRN   Diarrhea Grade 0 Grade 0 Grade 0 Grade 0   Fatigue Grade 0 Grade 0 Grade 0 Grade 0   Rectal Hemorrhage Grade 0        prior rectal bleeding has reduced Grade 0 Grade 0 Grade 0   Rectal Pain Grade 0 Grade 0 Grade 0 Grade 0        Assessment / Plan:  The patient is tolerating radiation therapy as anticipated.  Continue per current treatment plan.   Chart and films reviewed and approved.

## 2024-12-24 ENCOUNTER — HOSPITAL ENCOUNTER (OUTPATIENT)
Dept: RADIATION ONCOLOGY | Facility: CLINIC | Age: 89
Setting detail: RADIATION/ONCOLOGY SERIES
Discharge: HOME | End: 2024-12-24
Payer: MEDICARE

## 2024-12-24 DIAGNOSIS — Z51.0 ENCOUNTER FOR ANTINEOPLASTIC RADIATION THERAPY: ICD-10-CM

## 2024-12-24 DIAGNOSIS — C21.1 MALIGNANT NEOPLASM OF ANAL CANAL (MULTI): ICD-10-CM

## 2024-12-24 LAB
RAD ONC MSQ ACTUAL FRACTIONS DELIVERED: 17
RAD ONC MSQ ACTUAL SESSION DELIVERED DOSE: 180 CGRAY
RAD ONC MSQ ACTUAL TOTAL DOSE: 3060 CGRAY
RAD ONC MSQ ELAPSED DAYS: 22
RAD ONC MSQ LAST DATE: NORMAL
RAD ONC MSQ PRESCRIBED FRACTIONAL DOSE: 180 CGRAY
RAD ONC MSQ PRESCRIBED NUMBER OF FRACTIONS: 30
RAD ONC MSQ PRESCRIBED TECHNIQUE: NORMAL
RAD ONC MSQ PRESCRIBED TOTAL DOSE: 5400 CGRAY
RAD ONC MSQ PRESCRIPTION PATTERN COMMENT: NORMAL
RAD ONC MSQ START DATE: NORMAL
RAD ONC MSQ TREATMENT COURSE NUMBER: 1
RAD ONC MSQ TREATMENT SITE: NORMAL

## 2024-12-24 PROCEDURE — 77386 HC INTENSITY-MODULATED RADIATION THERAPY (IMRT), COMPLEX: CPT | Performed by: RADIOLOGY

## 2024-12-26 ENCOUNTER — HOSPITAL ENCOUNTER (OUTPATIENT)
Dept: RADIATION ONCOLOGY | Facility: CLINIC | Age: 89
Setting detail: RADIATION/ONCOLOGY SERIES
Discharge: HOME | End: 2024-12-26
Payer: MEDICARE

## 2024-12-26 DIAGNOSIS — C21.1 MALIGNANT NEOPLASM OF ANAL CANAL (MULTI): ICD-10-CM

## 2024-12-26 DIAGNOSIS — Z51.0 ENCOUNTER FOR ANTINEOPLASTIC RADIATION THERAPY: ICD-10-CM

## 2024-12-26 LAB
RAD ONC MSQ ACTUAL FRACTIONS DELIVERED: 18
RAD ONC MSQ ACTUAL SESSION DELIVERED DOSE: 180 CGRAY
RAD ONC MSQ ACTUAL TOTAL DOSE: 3240 CGRAY
RAD ONC MSQ ELAPSED DAYS: 24
RAD ONC MSQ LAST DATE: NORMAL
RAD ONC MSQ PRESCRIBED FRACTIONAL DOSE: 180 CGRAY
RAD ONC MSQ PRESCRIBED NUMBER OF FRACTIONS: 30
RAD ONC MSQ PRESCRIBED TECHNIQUE: NORMAL
RAD ONC MSQ PRESCRIBED TOTAL DOSE: 5400 CGRAY
RAD ONC MSQ PRESCRIPTION PATTERN COMMENT: NORMAL
RAD ONC MSQ START DATE: NORMAL
RAD ONC MSQ TREATMENT COURSE NUMBER: 1
RAD ONC MSQ TREATMENT SITE: NORMAL

## 2024-12-26 PROCEDURE — 77386 HC INTENSITY-MODULATED RADIATION THERAPY (IMRT), COMPLEX: CPT | Performed by: RADIOLOGY

## 2024-12-27 ENCOUNTER — HOSPITAL ENCOUNTER (OUTPATIENT)
Dept: RADIATION ONCOLOGY | Facility: CLINIC | Age: 89
Setting detail: RADIATION/ONCOLOGY SERIES
Discharge: HOME | End: 2024-12-27
Payer: MEDICARE

## 2024-12-27 DIAGNOSIS — C21.1 MALIGNANT NEOPLASM OF ANAL CANAL (MULTI): ICD-10-CM

## 2024-12-27 DIAGNOSIS — Z51.0 ENCOUNTER FOR ANTINEOPLASTIC RADIATION THERAPY: ICD-10-CM

## 2024-12-27 LAB
RAD ONC MSQ ACTUAL FRACTIONS DELIVERED: 19
RAD ONC MSQ ACTUAL SESSION DELIVERED DOSE: 180 CGRAY
RAD ONC MSQ ACTUAL TOTAL DOSE: 3420 CGRAY
RAD ONC MSQ ELAPSED DAYS: 25
RAD ONC MSQ LAST DATE: NORMAL
RAD ONC MSQ PRESCRIBED FRACTIONAL DOSE: 180 CGRAY
RAD ONC MSQ PRESCRIBED NUMBER OF FRACTIONS: 30
RAD ONC MSQ PRESCRIBED TECHNIQUE: NORMAL
RAD ONC MSQ PRESCRIBED TOTAL DOSE: 5400 CGRAY
RAD ONC MSQ PRESCRIPTION PATTERN COMMENT: NORMAL
RAD ONC MSQ START DATE: NORMAL
RAD ONC MSQ TREATMENT COURSE NUMBER: 1
RAD ONC MSQ TREATMENT SITE: NORMAL

## 2024-12-27 PROCEDURE — 77386 HC INTENSITY-MODULATED RADIATION THERAPY (IMRT), COMPLEX: CPT | Performed by: RADIOLOGY

## 2024-12-30 ENCOUNTER — HOSPITAL ENCOUNTER (OUTPATIENT)
Dept: RADIATION ONCOLOGY | Facility: CLINIC | Age: 89
Setting detail: RADIATION/ONCOLOGY SERIES
Discharge: HOME | End: 2024-12-30
Payer: MEDICARE

## 2024-12-30 DIAGNOSIS — Z51.0 ENCOUNTER FOR ANTINEOPLASTIC RADIATION THERAPY: ICD-10-CM

## 2024-12-30 DIAGNOSIS — C21.1 MALIGNANT NEOPLASM OF ANAL CANAL (MULTI): ICD-10-CM

## 2024-12-30 LAB
RAD ONC MSQ ACTUAL FRACTIONS DELIVERED: 20
RAD ONC MSQ ACTUAL SESSION DELIVERED DOSE: 180 CGRAY
RAD ONC MSQ ACTUAL TOTAL DOSE: 3600 CGRAY
RAD ONC MSQ ELAPSED DAYS: 28
RAD ONC MSQ LAST DATE: NORMAL
RAD ONC MSQ PRESCRIBED FRACTIONAL DOSE: 180 CGRAY
RAD ONC MSQ PRESCRIBED NUMBER OF FRACTIONS: 30
RAD ONC MSQ PRESCRIBED TECHNIQUE: NORMAL
RAD ONC MSQ PRESCRIBED TOTAL DOSE: 5400 CGRAY
RAD ONC MSQ PRESCRIPTION PATTERN COMMENT: NORMAL
RAD ONC MSQ START DATE: NORMAL
RAD ONC MSQ TREATMENT COURSE NUMBER: 1
RAD ONC MSQ TREATMENT SITE: NORMAL

## 2024-12-30 PROCEDURE — 77386 HC INTENSITY-MODULATED RADIATION THERAPY (IMRT), COMPLEX: CPT | Performed by: RADIOLOGY

## 2024-12-31 ENCOUNTER — HOSPITAL ENCOUNTER (OUTPATIENT)
Dept: RADIATION ONCOLOGY | Facility: CLINIC | Age: 89
Setting detail: RADIATION/ONCOLOGY SERIES
Discharge: HOME | End: 2024-12-31
Payer: MEDICARE

## 2024-12-31 DIAGNOSIS — C21.1 MALIGNANT NEOPLASM OF ANAL CANAL (MULTI): ICD-10-CM

## 2024-12-31 DIAGNOSIS — Z51.0 ENCOUNTER FOR ANTINEOPLASTIC RADIATION THERAPY: ICD-10-CM

## 2024-12-31 LAB
RAD ONC MSQ ACTUAL FRACTIONS DELIVERED: 21
RAD ONC MSQ ACTUAL SESSION DELIVERED DOSE: 180 CGRAY
RAD ONC MSQ ACTUAL TOTAL DOSE: 3780 CGRAY
RAD ONC MSQ ELAPSED DAYS: 29
RAD ONC MSQ LAST DATE: NORMAL
RAD ONC MSQ PRESCRIBED FRACTIONAL DOSE: 180 CGRAY
RAD ONC MSQ PRESCRIBED NUMBER OF FRACTIONS: 30
RAD ONC MSQ PRESCRIBED TECHNIQUE: NORMAL
RAD ONC MSQ PRESCRIBED TOTAL DOSE: 5400 CGRAY
RAD ONC MSQ PRESCRIPTION PATTERN COMMENT: NORMAL
RAD ONC MSQ START DATE: NORMAL
RAD ONC MSQ TREATMENT COURSE NUMBER: 1
RAD ONC MSQ TREATMENT SITE: NORMAL

## 2024-12-31 PROCEDURE — 77386 HC INTENSITY-MODULATED RADIATION THERAPY (IMRT), COMPLEX: CPT | Performed by: STUDENT IN AN ORGANIZED HEALTH CARE EDUCATION/TRAINING PROGRAM

## 2025-01-02 ENCOUNTER — HOSPITAL ENCOUNTER (OUTPATIENT)
Dept: RADIATION ONCOLOGY | Facility: CLINIC | Age: OVER 89
Setting detail: RADIATION/ONCOLOGY SERIES
Discharge: HOME | End: 2025-01-02
Payer: MEDICARE

## 2025-01-02 DIAGNOSIS — C21.1 MALIGNANT NEOPLASM OF ANAL CANAL (MULTI): ICD-10-CM

## 2025-01-02 DIAGNOSIS — Z51.0 ENCOUNTER FOR ANTINEOPLASTIC RADIATION THERAPY: ICD-10-CM

## 2025-01-02 LAB
RAD ONC MSQ ACTUAL FRACTIONS DELIVERED: 22
RAD ONC MSQ ACTUAL SESSION DELIVERED DOSE: 180 CGRAY
RAD ONC MSQ ACTUAL TOTAL DOSE: 3960 CGRAY
RAD ONC MSQ ELAPSED DAYS: 31
RAD ONC MSQ LAST DATE: NORMAL
RAD ONC MSQ PRESCRIBED FRACTIONAL DOSE: 180 CGRAY
RAD ONC MSQ PRESCRIBED NUMBER OF FRACTIONS: 30
RAD ONC MSQ PRESCRIBED TECHNIQUE: NORMAL
RAD ONC MSQ PRESCRIBED TOTAL DOSE: 5400 CGRAY
RAD ONC MSQ PRESCRIPTION PATTERN COMMENT: NORMAL
RAD ONC MSQ START DATE: NORMAL
RAD ONC MSQ TREATMENT COURSE NUMBER: 1
RAD ONC MSQ TREATMENT SITE: NORMAL

## 2025-01-02 PROCEDURE — 77386 HC INTENSITY-MODULATED RADIATION THERAPY (IMRT), COMPLEX: CPT | Performed by: STUDENT IN AN ORGANIZED HEALTH CARE EDUCATION/TRAINING PROGRAM

## 2025-01-03 ENCOUNTER — HOSPITAL ENCOUNTER (OUTPATIENT)
Dept: RADIATION ONCOLOGY | Facility: CLINIC | Age: OVER 89
Setting detail: RADIATION/ONCOLOGY SERIES
Discharge: HOME | End: 2025-01-03
Payer: MEDICARE

## 2025-01-03 DIAGNOSIS — C21.1 MALIGNANT NEOPLASM OF ANAL CANAL (MULTI): ICD-10-CM

## 2025-01-03 DIAGNOSIS — Z51.0 ENCOUNTER FOR ANTINEOPLASTIC RADIATION THERAPY: ICD-10-CM

## 2025-01-03 LAB
RAD ONC MSQ ACTUAL FRACTIONS DELIVERED: 23
RAD ONC MSQ ACTUAL SESSION DELIVERED DOSE: 180 CGRAY
RAD ONC MSQ ACTUAL TOTAL DOSE: 4140 CGRAY
RAD ONC MSQ ELAPSED DAYS: 32
RAD ONC MSQ LAST DATE: NORMAL
RAD ONC MSQ PRESCRIBED FRACTIONAL DOSE: 180 CGRAY
RAD ONC MSQ PRESCRIBED NUMBER OF FRACTIONS: 30
RAD ONC MSQ PRESCRIBED TECHNIQUE: NORMAL
RAD ONC MSQ PRESCRIBED TOTAL DOSE: 5400 CGRAY
RAD ONC MSQ PRESCRIPTION PATTERN COMMENT: NORMAL
RAD ONC MSQ START DATE: NORMAL
RAD ONC MSQ TREATMENT COURSE NUMBER: 1
RAD ONC MSQ TREATMENT SITE: NORMAL

## 2025-01-03 PROCEDURE — 77386 HC INTENSITY-MODULATED RADIATION THERAPY (IMRT), COMPLEX: CPT | Performed by: STUDENT IN AN ORGANIZED HEALTH CARE EDUCATION/TRAINING PROGRAM

## 2025-01-06 ENCOUNTER — RADIATION ONCOLOGY OTV (OUTPATIENT)
Dept: RADIATION ONCOLOGY | Facility: CLINIC | Age: OVER 89
End: 2025-01-06
Payer: MEDICARE

## 2025-01-06 ENCOUNTER — HOSPITAL ENCOUNTER (OUTPATIENT)
Dept: RADIATION ONCOLOGY | Facility: CLINIC | Age: OVER 89
Setting detail: RADIATION/ONCOLOGY SERIES
Discharge: HOME | End: 2025-01-06
Payer: MEDICARE

## 2025-01-06 VITALS
HEART RATE: 84 BPM | BODY MASS INDEX: 22.92 KG/M2 | SYSTOLIC BLOOD PRESSURE: 128 MMHG | WEIGHT: 140.4 LBS | RESPIRATION RATE: 16 BRPM | TEMPERATURE: 97.6 F | DIASTOLIC BLOOD PRESSURE: 77 MMHG | OXYGEN SATURATION: 96 %

## 2025-01-06 DIAGNOSIS — C21.1 MALIGNANT NEOPLASM OF ANAL CANAL (MULTI): ICD-10-CM

## 2025-01-06 DIAGNOSIS — Z51.0 ENCOUNTER FOR ANTINEOPLASTIC RADIATION THERAPY: ICD-10-CM

## 2025-01-06 LAB
RAD ONC MSQ ACTUAL FRACTIONS DELIVERED: 24
RAD ONC MSQ ACTUAL SESSION DELIVERED DOSE: 180 CGRAY
RAD ONC MSQ ACTUAL TOTAL DOSE: 4320 CGRAY
RAD ONC MSQ ELAPSED DAYS: 35
RAD ONC MSQ LAST DATE: NORMAL
RAD ONC MSQ PRESCRIBED FRACTIONAL DOSE: 180 CGRAY
RAD ONC MSQ PRESCRIBED NUMBER OF FRACTIONS: 30
RAD ONC MSQ PRESCRIBED TECHNIQUE: NORMAL
RAD ONC MSQ PRESCRIBED TOTAL DOSE: 5400 CGRAY
RAD ONC MSQ PRESCRIPTION PATTERN COMMENT: NORMAL
RAD ONC MSQ START DATE: NORMAL
RAD ONC MSQ TREATMENT COURSE NUMBER: 1
RAD ONC MSQ TREATMENT SITE: NORMAL

## 2025-01-06 PROCEDURE — 77386 HC INTENSITY-MODULATED RADIATION THERAPY (IMRT), COMPLEX: CPT | Performed by: RADIOLOGY

## 2025-01-06 PROCEDURE — 77336 RADIATION PHYSICS CONSULT: CPT | Performed by: STUDENT IN AN ORGANIZED HEALTH CARE EDUCATION/TRAINING PROGRAM

## 2025-01-06 ASSESSMENT — PAIN SCALES - GENERAL: PAINLEVEL_OUTOF10: 0-NO PAIN

## 2025-01-06 NOTE — PROGRESS NOTES
RADIATION ONCOLOGY ON-TREATMENT VISIT NOTE  Patient Name:  Ramón Flores  MRN:  [id number]  :  1928    Radiation Oncologist: Petrona Sheets MD PhD  Primary Care Provider: CHAY Mckeon  Care Team: Patient Care Team:  CHAY Mckeon as PCP - General (Gerontology)  Gallito Layne MD as Consulting Physician (Hospitalist)  Rufino Dillon MD as Consulting Physician (Hematology and Oncology)  Ayde Locke MD as Consulting Physician (Hematology and Oncology)  Deidra Muro MD as Radiation Oncologist (Radiation Oncology)  Carla Guzman RN as Nurse Navigator (Hematology and Oncology)    Date of Service: 2025     Ramón Flores is a 89 y.o.-year-old with:   Cancer Staging   Anal cancer (Multi)  Staging form: Anus, AJCC V9  - Clinical: cT2, cNX - Unsigned    Specialty Problems          Radiation Oncology Problems    Anal cancer (Multi)         Treatment Summary:  Radiation Therapy    Treatment Period Technique Fraction Dose Fractions Total Dose   Course 1 2024-2025  (days elapsed: 35)         anus 2024-2025 VMAT 180 / 180 cGy  4320 / 5,400 cGy     Concurrent systemic therapy: none    SUBJECTIVE: Feeling well. Denies any urinary or bowel changes compared to baseline. Denies blood in stool, or perianal pain.    OBJECTIVE:   Vital Signs:  /77 (BP Location: Left arm, Patient Position: Sitting, BP Cuff Size: Large adult)   Pulse 84   Temp 36.4 °C (97.6 °F) (Temporal)   Resp 16   Wt 63.7 kg (140 lb 6.4 oz)   SpO2 96%   BMI 22.92 kg/m²    Pain Scale: 0 /10.    Skin: small area of patchy moist desquamation radiation dermatitis in field    Toxicity Assessment          2024    11:51 2024    10:30 2024    09:59 2024    09:58 2025    10:05   Toxicity Assessment   Adverse Events Reviewed (WDL) No (Exceptions to WDL) No (Exceptions to WDL) No (Exceptions to WDL) No (Exceptions to WDL) No (Exceptions to WDL)   Treatment Site  Pelvis - male Pelvis - male Pelvis - male Pelvis - male Pelvis - male   Dermatitis Radiation Grade 0 Grade 0 Grade 1       skin is intact.  mild erythema; can use aquaphor PRN Grade 1       skin is intact.  mild erythema; can use aquaphor PRN Grade 2       using aquaphor daily at bedtime.  moderate erythema; moist desquamation.   Diarrhea Grade 0 Grade 0 Grade 0 Grade 0 Grade 0   Fatigue Grade 0 Grade 0 Grade 0 Grade 0 Grade 0   Rectal Hemorrhage Grade 0       prior rectal bleeding has reduced Grade 0 Grade 0 Grade 0 Grade 0   Rectal Pain Grade 0 Grade 0 Grade 0 Grade 0 Grade 0      ASSESSMENT/PLAN:  The patient is tolerating radiation therapy as anticipated.  Continue per current treatment plan.        Petrona Sheets  , Radiation Oncology

## 2025-01-07 ENCOUNTER — HOSPITAL ENCOUNTER (OUTPATIENT)
Dept: RADIATION ONCOLOGY | Facility: CLINIC | Age: OVER 89
Setting detail: RADIATION/ONCOLOGY SERIES
Discharge: HOME | End: 2025-01-07
Payer: MEDICARE

## 2025-01-07 DIAGNOSIS — B37.2 SKIN CANDIDIASIS: Primary | ICD-10-CM

## 2025-01-07 DIAGNOSIS — Z51.0 ENCOUNTER FOR ANTINEOPLASTIC RADIATION THERAPY: ICD-10-CM

## 2025-01-07 DIAGNOSIS — C21.1 MALIGNANT NEOPLASM OF ANAL CANAL (MULTI): ICD-10-CM

## 2025-01-07 LAB
RAD ONC MSQ ACTUAL FRACTIONS DELIVERED: 25
RAD ONC MSQ ACTUAL SESSION DELIVERED DOSE: 180 CGRAY
RAD ONC MSQ ACTUAL TOTAL DOSE: 4500 CGRAY
RAD ONC MSQ ELAPSED DAYS: 36
RAD ONC MSQ LAST DATE: NORMAL
RAD ONC MSQ PRESCRIBED FRACTIONAL DOSE: 180 CGRAY
RAD ONC MSQ PRESCRIBED NUMBER OF FRACTIONS: 30
RAD ONC MSQ PRESCRIBED TECHNIQUE: NORMAL
RAD ONC MSQ PRESCRIBED TOTAL DOSE: 5400 CGRAY
RAD ONC MSQ PRESCRIPTION PATTERN COMMENT: NORMAL
RAD ONC MSQ START DATE: NORMAL
RAD ONC MSQ TREATMENT COURSE NUMBER: 1
RAD ONC MSQ TREATMENT SITE: NORMAL

## 2025-01-07 PROCEDURE — 77386 HC INTENSITY-MODULATED RADIATION THERAPY (IMRT), COMPLEX: CPT | Performed by: STUDENT IN AN ORGANIZED HEALTH CARE EDUCATION/TRAINING PROGRAM

## 2025-01-07 RX ORDER — NYSTATIN 100000 U/G
CREAM TOPICAL 2 TIMES DAILY
Qty: 30 G | Refills: 2 | Status: SHIPPED | OUTPATIENT
Start: 2025-01-07 | End: 2025-02-06

## 2025-01-08 ENCOUNTER — HOSPITAL ENCOUNTER (OUTPATIENT)
Dept: RADIATION ONCOLOGY | Facility: CLINIC | Age: OVER 89
Setting detail: RADIATION/ONCOLOGY SERIES
Discharge: HOME | End: 2025-01-08
Payer: MEDICARE

## 2025-01-08 ENCOUNTER — APPOINTMENT (OUTPATIENT)
Dept: OPHTHALMOLOGY | Facility: CLINIC | Age: OVER 89
End: 2025-01-08
Payer: MEDICARE

## 2025-01-08 DIAGNOSIS — Z51.0 ENCOUNTER FOR ANTINEOPLASTIC RADIATION THERAPY: ICD-10-CM

## 2025-01-08 DIAGNOSIS — C21.1 MALIGNANT NEOPLASM OF ANAL CANAL (MULTI): ICD-10-CM

## 2025-01-08 LAB
RAD ONC MSQ ACTUAL FRACTIONS DELIVERED: 26
RAD ONC MSQ ACTUAL SESSION DELIVERED DOSE: 180 CGRAY
RAD ONC MSQ ACTUAL TOTAL DOSE: 4680 CGRAY
RAD ONC MSQ ELAPSED DAYS: 37
RAD ONC MSQ LAST DATE: NORMAL
RAD ONC MSQ PRESCRIBED FRACTIONAL DOSE: 180 CGRAY
RAD ONC MSQ PRESCRIBED NUMBER OF FRACTIONS: 30
RAD ONC MSQ PRESCRIBED TECHNIQUE: NORMAL
RAD ONC MSQ PRESCRIBED TOTAL DOSE: 5400 CGRAY
RAD ONC MSQ PRESCRIPTION PATTERN COMMENT: NORMAL
RAD ONC MSQ START DATE: NORMAL
RAD ONC MSQ TREATMENT COURSE NUMBER: 1
RAD ONC MSQ TREATMENT SITE: NORMAL

## 2025-01-08 PROCEDURE — 77386 HC INTENSITY-MODULATED RADIATION THERAPY (IMRT), COMPLEX: CPT | Performed by: STUDENT IN AN ORGANIZED HEALTH CARE EDUCATION/TRAINING PROGRAM

## 2025-01-09 ENCOUNTER — HOSPITAL ENCOUNTER (OUTPATIENT)
Dept: RADIATION ONCOLOGY | Facility: CLINIC | Age: OVER 89
Setting detail: RADIATION/ONCOLOGY SERIES
Discharge: HOME | End: 2025-01-09
Payer: MEDICARE

## 2025-01-09 DIAGNOSIS — C21.1 MALIGNANT NEOPLASM OF ANAL CANAL (MULTI): ICD-10-CM

## 2025-01-09 DIAGNOSIS — Z51.0 ENCOUNTER FOR ANTINEOPLASTIC RADIATION THERAPY: ICD-10-CM

## 2025-01-09 LAB
RAD ONC MSQ ACTUAL FRACTIONS DELIVERED: 27
RAD ONC MSQ ACTUAL SESSION DELIVERED DOSE: 180 CGRAY
RAD ONC MSQ ACTUAL TOTAL DOSE: 4860 CGRAY
RAD ONC MSQ ELAPSED DAYS: 38
RAD ONC MSQ LAST DATE: NORMAL
RAD ONC MSQ PRESCRIBED FRACTIONAL DOSE: 180 CGRAY
RAD ONC MSQ PRESCRIBED NUMBER OF FRACTIONS: 30
RAD ONC MSQ PRESCRIBED TECHNIQUE: NORMAL
RAD ONC MSQ PRESCRIBED TOTAL DOSE: 5400 CGRAY
RAD ONC MSQ PRESCRIPTION PATTERN COMMENT: NORMAL
RAD ONC MSQ START DATE: NORMAL
RAD ONC MSQ TREATMENT COURSE NUMBER: 1
RAD ONC MSQ TREATMENT SITE: NORMAL

## 2025-01-09 PROCEDURE — 77386 HC INTENSITY-MODULATED RADIATION THERAPY (IMRT), COMPLEX: CPT | Performed by: STUDENT IN AN ORGANIZED HEALTH CARE EDUCATION/TRAINING PROGRAM

## 2025-01-10 ENCOUNTER — HOSPITAL ENCOUNTER (OUTPATIENT)
Dept: RADIATION ONCOLOGY | Facility: CLINIC | Age: OVER 89
Setting detail: RADIATION/ONCOLOGY SERIES
Discharge: HOME | End: 2025-01-10
Payer: MEDICARE

## 2025-01-10 DIAGNOSIS — C21.1 MALIGNANT NEOPLASM OF ANAL CANAL (MULTI): ICD-10-CM

## 2025-01-10 DIAGNOSIS — Z51.0 ENCOUNTER FOR ANTINEOPLASTIC RADIATION THERAPY: ICD-10-CM

## 2025-01-10 LAB
RAD ONC MSQ ACTUAL FRACTIONS DELIVERED: 28
RAD ONC MSQ ACTUAL SESSION DELIVERED DOSE: 180 CGRAY
RAD ONC MSQ ACTUAL TOTAL DOSE: 5040 CGRAY
RAD ONC MSQ ELAPSED DAYS: 39
RAD ONC MSQ LAST DATE: NORMAL
RAD ONC MSQ PRESCRIBED FRACTIONAL DOSE: 180 CGRAY
RAD ONC MSQ PRESCRIBED NUMBER OF FRACTIONS: 30
RAD ONC MSQ PRESCRIBED TECHNIQUE: NORMAL
RAD ONC MSQ PRESCRIBED TOTAL DOSE: 5400 CGRAY
RAD ONC MSQ PRESCRIPTION PATTERN COMMENT: NORMAL
RAD ONC MSQ START DATE: NORMAL
RAD ONC MSQ TREATMENT COURSE NUMBER: 1
RAD ONC MSQ TREATMENT SITE: NORMAL

## 2025-01-10 PROCEDURE — 77386 HC INTENSITY-MODULATED RADIATION THERAPY (IMRT), COMPLEX: CPT | Performed by: STUDENT IN AN ORGANIZED HEALTH CARE EDUCATION/TRAINING PROGRAM

## 2025-01-13 ENCOUNTER — RADIATION ONCOLOGY OTV (OUTPATIENT)
Dept: RADIATION ONCOLOGY | Facility: CLINIC | Age: OVER 89
End: 2025-01-13
Payer: MEDICARE

## 2025-01-13 ENCOUNTER — HOSPITAL ENCOUNTER (OUTPATIENT)
Dept: RADIATION ONCOLOGY | Facility: CLINIC | Age: OVER 89
Setting detail: RADIATION/ONCOLOGY SERIES
Discharge: HOME | End: 2025-01-13
Payer: MEDICARE

## 2025-01-13 VITALS
SYSTOLIC BLOOD PRESSURE: 141 MMHG | RESPIRATION RATE: 18 BRPM | HEART RATE: 71 BPM | TEMPERATURE: 97.5 F | OXYGEN SATURATION: 98 % | BODY MASS INDEX: 23.11 KG/M2 | DIASTOLIC BLOOD PRESSURE: 75 MMHG | WEIGHT: 141.6 LBS

## 2025-01-13 DIAGNOSIS — C21.1 MALIGNANT NEOPLASM OF ANAL CANAL (MULTI): ICD-10-CM

## 2025-01-13 DIAGNOSIS — Z51.0 ENCOUNTER FOR ANTINEOPLASTIC RADIATION THERAPY: ICD-10-CM

## 2025-01-13 LAB
RAD ONC MSQ ACTUAL FRACTIONS DELIVERED: 29
RAD ONC MSQ ACTUAL SESSION DELIVERED DOSE: 180 CGRAY
RAD ONC MSQ ACTUAL TOTAL DOSE: 5220 CGRAY
RAD ONC MSQ ELAPSED DAYS: 42
RAD ONC MSQ LAST DATE: NORMAL
RAD ONC MSQ PRESCRIBED FRACTIONAL DOSE: 180 CGRAY
RAD ONC MSQ PRESCRIBED NUMBER OF FRACTIONS: 30
RAD ONC MSQ PRESCRIBED TECHNIQUE: NORMAL
RAD ONC MSQ PRESCRIBED TOTAL DOSE: 5400 CGRAY
RAD ONC MSQ PRESCRIPTION PATTERN COMMENT: NORMAL
RAD ONC MSQ START DATE: NORMAL
RAD ONC MSQ TREATMENT COURSE NUMBER: 1
RAD ONC MSQ TREATMENT SITE: NORMAL

## 2025-01-13 PROCEDURE — 77386 HC INTENSITY-MODULATED RADIATION THERAPY (IMRT), COMPLEX: CPT | Performed by: RADIOLOGY

## 2025-01-13 PROCEDURE — 77336 RADIATION PHYSICS CONSULT: CPT | Performed by: STUDENT IN AN ORGANIZED HEALTH CARE EDUCATION/TRAINING PROGRAM

## 2025-01-13 ASSESSMENT — PAIN SCALES - GENERAL: PAINLEVEL_OUTOF10: 0-NO PAIN

## 2025-01-13 NOTE — PROGRESS NOTES
RADIATION ONCOLOGY ON-TREATMENT VISIT NOTE  Patient Name:  Ramón Flores  MRN:  [id number]  :  1928    Radiation Oncologist: Petrona Sheets MD PhD  Primary Care Provider: CHAY Mckeon  Care Team: Patient Care Team:  CHAY Mckeon as PCP - General (Gerontology)  Gallito Layne MD as Consulting Physician (Hospitalist)  Rufino Dillon MD as Consulting Physician (Hematology and Oncology)  Ayde Locke MD as Consulting Physician (Hematology and Oncology)  Deidra Muro MD as Radiation Oncologist (Radiation Oncology)  Carla Guzman RN as Nurse Navigator (Hematology and Oncology)    Date of Service: 2025     Ramón Flores is a 89 y.o.-year-old with:   Cancer Staging   Anal cancer (Multi)  Staging form: Anus, AJCC V9  - Clinical: cT2, cNX - Unsigned    Specialty Problems          Radiation Oncology Problems    Anal cancer (Multi)         Treatment Summary:  Radiation Therapy    Treatment Period Technique Fraction Dose Fractions Total Dose   Course 1 2024-2025  (days elapsed: 42)         anus 2024-2025 VMAT 180 / 180 cGy  5220 / 5,400 cGy     Concurrent systemic therapy: none    SUBJECTIVE: feeling well, slight discomfort in groin. Denies any hematochezia or pain. Denies changes to baseline urinary function.   OBJECTIVE:   Vital Signs:  /75 (BP Location: Left arm, Patient Position: Sitting, BP Cuff Size: Adult)   Pulse 71   Temp 36.4 °C (97.5 °F) (Temporal)   Resp 18   Wt 64.2 kg (141 lb 9.6 oz)   SpO2 98%   BMI 23.11 kg/m²    Pain Scale: 0 /10.    Skin: very small area of perianal Gr 2 dermatitis, yellow exudate in right inguinal crease      Toxicity Assessment          2024    11:51 2024    10:30 2024    09:59 2024    09:58 2025    10:05 2025    10:18   Toxicity Assessment   Adverse Events Reviewed (WDL) No (Exceptions to WDL) No (Exceptions to WDL) No (Exceptions to WDL) No (Exceptions to WDL) No  (Exceptions to WDL) No (Exceptions to WDL)   Treatment Site Pelvis - male Pelvis - male Pelvis - male Pelvis - male Pelvis - male Pelvis - male   Anorexia      Grade 1       decreased appetite   Dermatitis Radiation Grade 0 Grade 0 Grade 1       skin is intact.  mild erythema; can use aquaphor PRN Grade 1       skin is intact.  mild erythema; can use aquaphor PRN Grade 2       using aquaphor daily at bedtime.  moderate erythema; moist desquamation. Grade 2       perianal; moist desquamation ujsing aquaphor/cicaplast balm.  fungal infection right groin   Diarrhea Grade 0 Grade 0 Grade 0 Grade 0 Grade 0 Grade 0   Fatigue Grade 0 Grade 0 Grade 0 Grade 0 Grade 0 Grade 1   Rectal Hemorrhage Grade 0       prior rectal bleeding has reduced Grade 0 Grade 0 Grade 0 Grade 0 Grade 0   Rectal Pain Grade 0 Grade 0 Grade 0 Grade 0 Grade 0 Grade 0      ASSESSMENT/PLAN:  The patient is tolerating radiation therapy as anticipated.  Continue per current treatment plan.      Nystatin cream prescribed.   RTC in 1 week for skin check.    Petrona Sheets  , Radiation Oncology

## 2025-01-14 ENCOUNTER — DOCUMENTATION (OUTPATIENT)
Dept: RADIATION ONCOLOGY | Facility: CLINIC | Age: OVER 89
End: 2025-01-14
Payer: MEDICARE

## 2025-01-14 ENCOUNTER — HOSPITAL ENCOUNTER (OUTPATIENT)
Dept: RADIATION ONCOLOGY | Facility: CLINIC | Age: OVER 89
Setting detail: RADIATION/ONCOLOGY SERIES
Discharge: HOME | End: 2025-01-14
Payer: MEDICARE

## 2025-01-14 ENCOUNTER — TELEPHONE (OUTPATIENT)
Dept: POST ACUTE CARE | Facility: EXTERNAL LOCATION | Age: OVER 89
End: 2025-01-14

## 2025-01-14 DIAGNOSIS — Z51.0 ENCOUNTER FOR ANTINEOPLASTIC RADIATION THERAPY: ICD-10-CM

## 2025-01-14 DIAGNOSIS — C21.1 MALIGNANT NEOPLASM OF ANAL CANAL (MULTI): ICD-10-CM

## 2025-01-14 DIAGNOSIS — B37.2 SKIN CANDIDIASIS: Primary | ICD-10-CM

## 2025-01-14 LAB
RAD ONC MSQ ACTUAL FRACTIONS DELIVERED: 30
RAD ONC MSQ ACTUAL SESSION DELIVERED DOSE: 180 CGRAY
RAD ONC MSQ ACTUAL TOTAL DOSE: 5400 CGRAY
RAD ONC MSQ ELAPSED DAYS: 43
RAD ONC MSQ LAST DATE: NORMAL
RAD ONC MSQ PRESCRIBED FRACTIONAL DOSE: 180 CGRAY
RAD ONC MSQ PRESCRIBED NUMBER OF FRACTIONS: 30
RAD ONC MSQ PRESCRIBED TECHNIQUE: NORMAL
RAD ONC MSQ PRESCRIBED TOTAL DOSE: 5400 CGRAY
RAD ONC MSQ PRESCRIPTION PATTERN COMMENT: NORMAL
RAD ONC MSQ START DATE: NORMAL
RAD ONC MSQ TREATMENT COURSE NUMBER: 1
RAD ONC MSQ TREATMENT SITE: NORMAL

## 2025-01-14 PROCEDURE — 77386 HC INTENSITY-MODULATED RADIATION THERAPY (IMRT), COMPLEX: CPT | Performed by: RADIOLOGY

## 2025-01-14 RX ORDER — NYSTATIN 100000 U/G
CREAM TOPICAL 2 TIMES DAILY
Qty: 30 G | Refills: 2 | Status: SHIPPED | OUTPATIENT
Start: 2025-01-14 | End: 2025-02-13

## 2025-01-14 NOTE — PROGRESS NOTES
RADIATION COMPLETION OF THERAPY NOTE    Patient Name:  Ramón Flores  MRN:  [id number]  :  1928    Radiation Oncologist: Petrona Sheets MD PhD  Primary Care Provider: SUSI Mckeon-CNS    Brief History: Ramón Flores is a 89 y.o. male with Cancer Staging   Anal cancer (Multi)  Staging form: Anus, AJCC V9  - Clinical: cT2, cNX - Unsigned      The patient completed radiotherapy as outlined below.    Radiation Treatment Summary:    Radiation Therapy    Treatment Period Technique Fraction Dose Fractions Total Dose   Course 1 2024-2025  (days elapsed: 43)         anus 2024-2025 VMAT 180 / 180 cGy  5400 / 5,400 cGy     Concurrent Systemic Therapy: none    CTCAE Toxicity Overview:   Toxicity Assessment          2024    11:51 2024    10:30 2024    09:59 2024    09:58 2025    10:05 2025    10:18   Toxicity Assessment   Adverse Events Reviewed (WDL) No (Exceptions to WDL) No (Exceptions to WDL) No (Exceptions to WDL) No (Exceptions to WDL) No (Exceptions to WDL) No (Exceptions to WDL)   Treatment Site Pelvis - male Pelvis - male Pelvis - male Pelvis - male Pelvis - male Pelvis - male   Anorexia      Grade 1       decreased appetite   Dermatitis Radiation Grade 0 Grade 0 Grade 1       skin is intact.  mild erythema; can use aquaphor PRN Grade 1       skin is intact.  mild erythema; can use aquaphor PRN Grade 2       using aquaphor daily at bedtime.  moderate erythema; moist desquamation. Grade 2       perianal; moist desquamation ujsing aquaphor/cicaplast balm.  fungal infection right groin   Diarrhea Grade 0 Grade 0 Grade 0 Grade 0 Grade 0 Grade 0   Fatigue Grade 0 Grade 0 Grade 0 Grade 0 Grade 0 Grade 1   Rectal Hemorrhage Grade 0       prior rectal bleeding has reduced Grade 0 Grade 0 Grade 0 Grade 0 Grade 0   Rectal Pain Grade 0 Grade 0 Grade 0 Grade 0 Grade 0 Grade 0     Patient Disposition: Mr. Flores will return to the clinic on Monday,  1/20/2025 for a skin check.  Nystatin cream and mepilex were applied to the affected area post-treatment today.  Mr. Flores was given instructions for mepilex use and to contact the clinic with any concerns prior to the follow-up appointment next Monday.     Future Appointments   Date Time Provider Department Center   1/20/2025  9:30 AM Petrona Sheets MD PhD [id number] [facility]   4/9/2025 11:20 AM Rufino Dillon MD [id number] [facility]   4/23/2025 10:15 AM Lito Ackerman MD [id number] [facility]   12/1/2025 10:50 AM Raisa Sierra MD [id number] [facility]

## 2025-01-16 NOTE — TELEPHONE ENCOUNTER
Sent antifungal cream to local pharmacy per oncology nurse request but then found out pt actually received it from mail order few days ago

## 2025-01-20 ENCOUNTER — HOSPITAL ENCOUNTER (OUTPATIENT)
Dept: RADIATION ONCOLOGY | Facility: CLINIC | Age: OVER 89
Setting detail: RADIATION/ONCOLOGY SERIES
Discharge: HOME | End: 2025-01-20
Payer: MEDICARE

## 2025-01-20 VITALS
HEIGHT: 66 IN | TEMPERATURE: 97 F | RESPIRATION RATE: 18 BRPM | WEIGHT: 141 LBS | HEART RATE: 74 BPM | DIASTOLIC BLOOD PRESSURE: 73 MMHG | SYSTOLIC BLOOD PRESSURE: 122 MMHG | BODY MASS INDEX: 22.66 KG/M2 | OXYGEN SATURATION: 99 %

## 2025-01-20 DIAGNOSIS — B37.2 SKIN CANDIDIASIS: ICD-10-CM

## 2025-01-20 PROCEDURE — 99212 OFFICE O/P EST SF 10 MIN: CPT | Performed by: STUDENT IN AN ORGANIZED HEALTH CARE EDUCATION/TRAINING PROGRAM

## 2025-01-20 RX ORDER — NYSTATIN 100000 U/G
CREAM TOPICAL 2 TIMES DAILY
Qty: 30 G | Refills: 2 | Status: SHIPPED | OUTPATIENT
Start: 2025-01-20

## 2025-01-20 ASSESSMENT — ENCOUNTER SYMPTOMS
RESPIRATORY NEGATIVE: 1
CARDIOVASCULAR NEGATIVE: 1
ARTHRALGIAS: 1
PSYCHIATRIC NEGATIVE: 1
ENDOCRINE NEGATIVE: 1
BRUISES/BLEEDS EASILY: 1
NEUROLOGICAL NEGATIVE: 1
RECTAL PAIN: 1
APPETITE CHANGE: 1
EYES NEGATIVE: 1

## 2025-01-20 ASSESSMENT — PAIN SCALES - GENERAL: PAINLEVEL_OUTOF10: 0-NO PAIN

## 2025-01-20 NOTE — PROGRESS NOTES
"Staff Physician: Petrona Sheets MD PhD  Date of Service: 1/20/2025  Patient name: Ramón Flores   MRN: [id number]    RADIATION ONCOLOGY SKIN CHECK  Cancer Staging   Anal cancer (Multi)  Staging form: Anus, AJCC V9  - Clinical: cT2, cNX - Unsigned    Problem List Items Addressed This Visit    None  Visit Diagnoses       Skin candidiasis        Relevant Medications    nystatin (Mycostatin) cream          He was last seen in Radiation Oncology on 1/14/25 at the completion of radiation and returns today for skin check    Radiation Therapy (Resolved)    Treatment Period Technique Fraction Dose Fractions Total Dose   Course 1 12/2/2024-1/14/2025  (days elapsed: 43)         anus 12/2/2024-1/14/2025 VMAT 180 / 180 cGy 30 / 30 5400 / 5,400 cGy     PHYSICAL EXAMINATION:  /73 (BP Location: Left arm, Patient Position: Sitting, BP Cuff Size: Adult)   Pulse 74   Temp 36.1 °C (97 °F) (Temporal)   Resp 18   Ht 1.67 m (5' 5.75\")   Wt 64 kg (141 lb)   SpO2 99%   BMI 22.93 kg/m²   Constitutional: well developed, no distress, alert & oriented, cooperative, wheelchair bound  Skin: Gr 2 dermatitis in gluteal cleft, Gr 1 dermatitis in right inguinal crease consistent with yeast infection     CTCAE (v5) ADVERSE EVENTS:  Toxicity Assessment          12/2/2024    11:51 12/9/2024    10:30 12/16/2024    09:59 12/23/2024    09:58 1/6/2025    10:05 1/13/2025    10:18   Toxicity Assessment   Adverse Events Reviewed (WDL) No (Exceptions to WDL) No (Exceptions to WDL) No (Exceptions to WDL) No (Exceptions to WDL) No (Exceptions to WDL) No (Exceptions to WDL)   Treatment Site Pelvis - male Pelvis - male Pelvis - male Pelvis - male Pelvis - male Pelvis - male   Anorexia      Grade 1       decreased appetite   Dermatitis Radiation Grade 0 Grade 0 Grade 1       skin is intact.  mild erythema; can use aquaphor PRN Grade 1       skin is intact.  mild erythema; can use aquaphor PRN Grade 2       using aquaphor daily at bedtime.  moderate " erythema; moist desquamation. Grade 2       perianal; moist desquamation ujsing aquaphor/cicaplast balm.  fungal infection right groin   Diarrhea Grade 0 Grade 0 Grade 0 Grade 0 Grade 0 Grade 0   Fatigue Grade 0 Grade 0 Grade 0 Grade 0 Grade 0 Grade 1   Rectal Hemorrhage Grade 0       prior rectal bleeding has reduced Grade 0 Grade 0 Grade 0 Grade 0 Grade 0   Rectal Pain Grade 0 Grade 0 Grade 0 Grade 0 Grade 0 Grade 0     IMPRESSION:  89 year old man with cT2-3N0M0 anal SCC who completed definitive radiation alone, 54Gy in 30 fractions (1/14/2025).  Skin is recovering well.    PLAN:  Nystatin cream refilled  RTC in 1 week for skin check  FUV with Dr. Muro in 3 months    He knows to call with any questions or concerns in the interim.    Petrona Sheets MD PhD  , Radiation Oncology

## 2025-01-20 NOTE — PROGRESS NOTES
Radiation Oncology Nursing Note    Pain: The patient's current pain level was assessed.  They report currently having a pain of 0 out of 10.  They feel their pain is under control without the use of pain medications.    Review of Systems:  Review of Systems   Constitutional:  Positive for appetite change (decreased).   HENT:  Negative.     Eyes: Negative.    Respiratory: Negative.     Cardiovascular: Negative.    Gastrointestinal:  Positive for rectal pain (feels like bruising, is tolerable).   Endocrine: Negative.    Genitourinary: Negative.     Musculoskeletal:  Positive for arthralgias (bilateral knees).   Skin:  Positive for rash (fungal rash right groin has improved.  using nystatin cream daily.).        Grade 2 radiation dermatitis of gluteal cleft; using cicoplast balm.  Will see next week for skin check.   Neurological: Negative.    Hematological:  Bruises/bleeds easily (hemophilia).   Psychiatric/Behavioral: Negative.

## 2025-01-24 ENCOUNTER — OFFICE VISIT (OUTPATIENT)
Dept: GERIATRIC MEDICINE | Facility: CLINIC | Age: OVER 89
End: 2025-01-24
Payer: MEDICARE

## 2025-01-24 VITALS
DIASTOLIC BLOOD PRESSURE: 74 MMHG | HEART RATE: 66 BPM | RESPIRATION RATE: 16 BRPM | TEMPERATURE: 97.4 F | SYSTOLIC BLOOD PRESSURE: 100 MMHG

## 2025-01-24 DIAGNOSIS — R53.81 PHYSICAL DEBILITY: ICD-10-CM

## 2025-01-24 DIAGNOSIS — M17.11 PRIMARY OSTEOARTHRITIS OF RIGHT KNEE: ICD-10-CM

## 2025-01-24 DIAGNOSIS — I50.32 CHRONIC HEART FAILURE WITH PRESERVED EJECTION FRACTION: Primary | ICD-10-CM

## 2025-01-24 DIAGNOSIS — I25.10 ASHD (ARTERIOSCLEROTIC HEART DISEASE): ICD-10-CM

## 2025-01-24 DIAGNOSIS — C21.0 ANAL CANCER (MULTI): ICD-10-CM

## 2025-01-24 DIAGNOSIS — M17.12 PRIMARY OSTEOARTHRITIS OF LEFT KNEE: ICD-10-CM

## 2025-01-24 DIAGNOSIS — E46 PROTEIN-CALORIE MALNUTRITION, UNSPECIFIED SEVERITY (MULTI): ICD-10-CM

## 2025-01-24 RX ORDER — SPIRONOLACTONE 25 MG/1
12.5 TABLET ORAL DAILY
Qty: 45 TABLET | Refills: 3 | Status: SHIPPED | OUTPATIENT
Start: 2025-01-24 | End: 2026-01-24

## 2025-01-24 RX ORDER — BUMETANIDE 0.5 MG/1
0.5 TABLET ORAL DAILY
Qty: 90 TABLET | Refills: 3 | Status: SHIPPED | OUTPATIENT
Start: 2025-01-24 | End: 2026-01-24

## 2025-01-25 NOTE — PROGRESS NOTES
"Reason for visit: follow up home visit    Reason for homebound status: BLE weakness and instability of knees and inability to do steps makes leaving his home quite taxing on Ramón and his caregivers    HPI: Ramón was seen today at his home. He has now finished the 5 weeks of radiation.     Chief Complaint: \"I definitely lost some strength and weight\"    Review of Systems   General: feels fine today, appetite is a little decreased; sleeping all night and stays awake all day  Skin: nearly healed irritated area in both groins, still using antifungal cream  EENT: glasses, adequate vision with no recent vision changes; hearing is good; no sinus drainage or congestion; no oral pain or sore throat; no lesions or sores; no loose teeth, no trouble chewing or swallowing  Respiratory: no cough, congestion or shortness of breath   Cardiac: no chest pain or palpitations;  Gastrointestinal: no abdominal pain, no nausea, vomiting, heartburn, diarrhea or constipation. BM soft every day, making sure he takes prune juice daily; no rectal pain or bleeding; no hemorrhoids  Urinary: continent; no leakage; no discomfort with urination; no frequency, has mild urgency; gets up to void at night 1-2 times; normal color of urine  Musculoskeletal: mild bilateral knee pain but tylenol is enough; knees are weak and relies on wheelchair; can stand by himself and transfer independently; arms are strong but overall feels weaker since the radiation finished; swelling mild in legs  Neurologic: long and short term memory intact; no headaches, dizziness or lightheadedness; no tremors or involuntary movements; no altered sensation; no unilateral weakness  Psychiatric: managing fine, has plenty of hellp, no depression or anxiety, feels he nora well    Physical Examination   Vitals: Blood pressure 100/74, pulse 66, temperature 36.3 °C (97.4 °F), resp. rate 16.  Pulse ox 96%  General: sitting in wheelchair in his kitchen, dressed in personal clothes, " very neat; good historian and conversationalist; looks as though he may have lost a little weight, not able to stand to weigh though;, appears comfortable  Neurologic: alert, oriented x4 with good executive function; adequate and equal arm strength; positive sensation in all extremities; no involuntary movements  Skin: no abnormalities of hair or nails; right lower cheek with large mole, no discolorations, or excessive dryness   EENT: vision and hearing seem adequate; teeth in good condition with no trouble chewing or swallowing; no sinus congestion or drainage  Respiratory: unlabored; chest symmetrical w/ good rise and fall; regular rhythm and depth, breath sounds equal and clear throughout  Heart: iegular rhythm, rate controlled; normal heart sounds w/ no murmurs, rubs and gallops  Abdomen: nondistended, nontenderness, bowel sounds active x4, no masses  Extremities: no deformities, tenderness; palpable peripheral pulses, no cyanosis, clubbing; normal temperature; 1+ edema right LL and trace LLL  Musculoskeletal: no limited joint mobility with good ROM of upper extremities but about 50% limited ROM of legs., knees stiff, not able to stand upright completly, no tenderness, effusion, erythema, minimal kyphosis and no scoliosis  Psych: pleasant, good attitude, good conversationalist; calm    Diagnoses and Plan:  HFpEF - continue low dose bumex and spironolactone, Farxiga as well; mild edema, otherwise no acute s/s acute chf  Chronic atrial fibrillation - no rate controlling drugs; no blood thinners due to hemophilia; rate controlled  Loss of appetite - add Boost bid; he currently drinks a few a week but explained he needs to gain wt back and needs the energy to do PT as well  Primary OA knees and increased weakness - takes tylenol routinely which is effective; relies on wheelchair; has stiffened up more the past month; will start PT at home  Anal cancer - finished radiation; will have follow up appts  BPH w/ lower  urinary symptoms - controlled with flomax and finasteride  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

## 2025-01-27 ENCOUNTER — DOCUMENTATION (OUTPATIENT)
Dept: RADIATION ONCOLOGY | Facility: CLINIC | Age: OVER 89
End: 2025-01-27
Payer: MEDICARE

## 2025-01-27 DIAGNOSIS — I50.9 CONGESTIVE HEART FAILURE, UNSPECIFIED HF CHRONICITY, UNSPECIFIED HEART FAILURE TYPE: ICD-10-CM

## 2025-01-27 DIAGNOSIS — I50.30 HEART FAILURE WITH PRESERVED EJECTION FRACTION, UNSPECIFIED HF CHRONICITY: Primary | ICD-10-CM

## 2025-01-27 NOTE — PROGRESS NOTES
[facility] Hospitalist Progress Note     CC: Hospital Follow up    PCP: Guerline Gonzales MD       Assessment/Plan:     Principal Problem:    Acute on chronic congestive heart failure, unspecified heart failure type [facility])  Active Problems:    Acute on chroni Gr 1 dermatitis in perineum and gluteal cleft, skin intact.   Gr 1 dermatitis in right inguinal crease, minor residual yeast infection, continue nystatin cream.   FUV with Dr. Muro 4/21/25.    Petrona Sheets MD PhD  , Radiation Oncology     family by bedside.     Thank Yamilex Ray MD    [facility] Hospitalist     Subjective:     No CP, breathing improved, feels better, no nausea or vomiting    OBJECTIVE:    Blood pressure 94/67, pulse 94, temperature 98.1 °F (36.7 °C), temperature source Oral, re Chest (cpt=71046)    Result Date: 11/13/2018  CONCLUSION:  1. CHF with pulmonary edema and bibasilar pleural effusions.      Dictated by (CST): Jenny Pinto MD on 11/13/2018 at 13:36     Approved by (CST): Jenny Pinto MD on 11/13/2018

## 2025-01-28 DIAGNOSIS — I50.32 CHRONIC HEART FAILURE WITH PRESERVED EJECTION FRACTION: ICD-10-CM

## 2025-01-28 RX ORDER — SPIRONOLACTONE 25 MG/1
12.5 TABLET ORAL EVERY OTHER DAY
Qty: 23 TABLET | Refills: 3 | Status: ON HOLD | OUTPATIENT
Start: 2025-01-28 | End: 2026-01-28

## 2025-01-28 RX ORDER — BUMETANIDE 0.5 MG/1
0.5 TABLET ORAL EVERY OTHER DAY
Qty: 45 TABLET | Refills: 3 | Status: ON HOLD | OUTPATIENT
Start: 2025-01-28 | End: 2026-01-28

## 2025-01-28 NOTE — PROGRESS NOTES
Increase in edema after radiation therapy finished; talked with home nurse who stated the day after I saw Ramón he called 911 due to sob but was ok. Was not taken to the hospital. Was only taking diruetics MWF so instead changed to every other day for now and she will monitor him for need to make them daily.

## 2025-01-31 DIAGNOSIS — I50.30 HEART FAILURE WITH PRESERVED EJECTION FRACTION, UNSPECIFIED HF CHRONICITY: ICD-10-CM

## 2025-02-01 ENCOUNTER — APPOINTMENT (OUTPATIENT)
Dept: RADIOLOGY | Facility: HOSPITAL | Age: OVER 89
End: 2025-02-01
Payer: MEDICARE

## 2025-02-01 ENCOUNTER — APPOINTMENT (OUTPATIENT)
Dept: CARDIOLOGY | Facility: HOSPITAL | Age: OVER 89
End: 2025-02-01
Payer: MEDICARE

## 2025-02-01 ENCOUNTER — HOSPITAL ENCOUNTER (INPATIENT)
Facility: HOSPITAL | Age: OVER 89
Discharge: HOME HEALTH | End: 2025-02-01
Attending: EMERGENCY MEDICINE | Admitting: INTERNAL MEDICINE
Payer: MEDICARE

## 2025-02-01 DIAGNOSIS — I50.32 CHRONIC HEART FAILURE WITH PRESERVED EJECTION FRACTION: ICD-10-CM

## 2025-02-01 DIAGNOSIS — J18.9 PNEUMONIA OF BOTH LOWER LOBES DUE TO INFECTIOUS ORGANISM: Primary | ICD-10-CM

## 2025-02-01 DIAGNOSIS — R09.02 HYPOXIA: ICD-10-CM

## 2025-02-01 DIAGNOSIS — I50.33 ACUTE ON CHRONIC HEART FAILURE WITH PRESERVED EJECTION FRACTION: ICD-10-CM

## 2025-02-01 LAB
ALBUMIN SERPL BCP-MCNC: 4 G/DL (ref 3.4–5)
ALP SERPL-CCNC: 98 U/L (ref 33–136)
ALT SERPL W P-5'-P-CCNC: 35 U/L (ref 10–52)
ANION GAP SERPL CALC-SCNC: 16 MMOL/L (ref 10–20)
AST SERPL W P-5'-P-CCNC: 35 U/L (ref 9–39)
BASOPHILS # BLD MANUAL: 0 X10*3/UL (ref 0–0.1)
BASOPHILS NFR BLD MANUAL: 0 %
BILIRUB SERPL-MCNC: 2 MG/DL (ref 0–1.2)
BNP SERPL-MCNC: 679 PG/ML (ref 0–99)
BUN SERPL-MCNC: 19 MG/DL (ref 6–23)
CALCIUM SERPL-MCNC: 6.6 MG/DL (ref 8.6–10.3)
CARDIAC TROPONIN I PNL SERPL HS: 85 NG/L (ref 0–20)
CARDIAC TROPONIN I PNL SERPL HS: 92 NG/L (ref 0–20)
CHLORIDE SERPL-SCNC: 106 MMOL/L (ref 98–107)
CO2 SERPL-SCNC: 22 MMOL/L (ref 21–32)
CREAT SERPL-MCNC: 0.98 MG/DL (ref 0.5–1.3)
EGFRCR SERPLBLD CKD-EPI 2021: 71 ML/MIN/1.73M*2
EOSINOPHIL # BLD MANUAL: 0.1 X10*3/UL (ref 0–0.4)
EOSINOPHIL NFR BLD MANUAL: 3 %
ERYTHROCYTE [DISTWIDTH] IN BLOOD BY AUTOMATED COUNT: 18.2 % (ref 11.5–14.5)
FLUAV RNA RESP QL NAA+PROBE: NOT DETECTED
FLUBV RNA RESP QL NAA+PROBE: NOT DETECTED
GLUCOSE SERPL-MCNC: 105 MG/DL (ref 74–99)
HCT VFR BLD AUTO: 32.3 % (ref 41–52)
HGB BLD-MCNC: 11 G/DL (ref 13.5–17.5)
IMM GRANULOCYTES # BLD AUTO: 0.23 X10*3/UL (ref 0–0.5)
IMM GRANULOCYTES NFR BLD AUTO: 6.9 % (ref 0–0.9)
LYMPHOCYTES # BLD MANUAL: 0.79 X10*3/UL (ref 0.8–3)
LYMPHOCYTES NFR BLD MANUAL: 24 %
MCH RBC QN AUTO: 36.4 PG (ref 26–34)
MCHC RBC AUTO-ENTMCNC: 34.1 G/DL (ref 32–36)
MCV RBC AUTO: 107 FL (ref 80–100)
MONOCYTES # BLD MANUAL: 0.2 X10*3/UL (ref 0.05–0.8)
MONOCYTES NFR BLD MANUAL: 6 %
NEUTS SEG # BLD MANUAL: 2.11 X10*3/UL (ref 1.6–5)
NEUTS SEG NFR BLD MANUAL: 64 %
NRBC BLD-RTO: 0 /100 WBCS (ref 0–0)
OVALOCYTES BLD QL SMEAR: ABNORMAL
PLATELET # BLD AUTO: 63 X10*3/UL (ref 150–450)
POTASSIUM SERPL-SCNC: 3.5 MMOL/L (ref 3.5–5.3)
PROT SERPL-MCNC: 6.5 G/DL (ref 6.4–8.2)
RBC # BLD AUTO: 3.02 X10*6/UL (ref 4.5–5.9)
RBC MORPH BLD: ABNORMAL
RSV RNA RESP QL NAA+PROBE: NOT DETECTED
SARS-COV-2 RNA RESP QL NAA+PROBE: NOT DETECTED
SCHISTOCYTES BLD QL SMEAR: ABNORMAL
SODIUM SERPL-SCNC: 140 MMOL/L (ref 136–145)
TOTAL CELLS COUNTED BLD: 100
VARIANT LYMPHS # BLD MANUAL: 0.1 X10*3/UL (ref 0–0.3)
VARIANT LYMPHS NFR BLD: 3 %
WBC # BLD AUTO: 3.3 X10*3/UL (ref 4.4–11.3)

## 2025-02-01 PROCEDURE — 36415 COLL VENOUS BLD VENIPUNCTURE: CPT | Performed by: EMERGENCY MEDICINE

## 2025-02-01 PROCEDURE — 94640 AIRWAY INHALATION TREATMENT: CPT

## 2025-02-01 PROCEDURE — 87899 AGENT NOS ASSAY W/OPTIC: CPT | Mod: AHULAB | Performed by: INTERNAL MEDICINE

## 2025-02-01 PROCEDURE — 87077 CULTURE AEROBIC IDENTIFY: CPT | Mod: AHULAB | Performed by: INTERNAL MEDICINE

## 2025-02-01 PROCEDURE — 96365 THER/PROPH/DIAG IV INF INIT: CPT

## 2025-02-01 PROCEDURE — 1200000002 HC GENERAL ROOM WITH TELEMETRY DAILY

## 2025-02-01 PROCEDURE — 2500000002 HC RX 250 W HCPCS SELF ADMINISTERED DRUGS (ALT 637 FOR MEDICARE OP, ALT 636 FOR OP/ED): Performed by: INTERNAL MEDICINE

## 2025-02-01 PROCEDURE — 71045 X-RAY EXAM CHEST 1 VIEW: CPT

## 2025-02-01 PROCEDURE — 96366 THER/PROPH/DIAG IV INF ADDON: CPT

## 2025-02-01 PROCEDURE — 71275 CT ANGIOGRAPHY CHEST: CPT | Mod: FOREIGN READ | Performed by: RADIOLOGY

## 2025-02-01 PROCEDURE — 2500000001 HC RX 250 WO HCPCS SELF ADMINISTERED DRUGS (ALT 637 FOR MEDICARE OP): Performed by: INTERNAL MEDICINE

## 2025-02-01 PROCEDURE — 84075 ASSAY ALKALINE PHOSPHATASE: CPT | Performed by: EMERGENCY MEDICINE

## 2025-02-01 PROCEDURE — 71045 X-RAY EXAM CHEST 1 VIEW: CPT | Performed by: RADIOLOGY

## 2025-02-01 PROCEDURE — 84484 ASSAY OF TROPONIN QUANT: CPT | Performed by: EMERGENCY MEDICINE

## 2025-02-01 PROCEDURE — 93005 ELECTROCARDIOGRAM TRACING: CPT

## 2025-02-01 PROCEDURE — 2500000004 HC RX 250 GENERAL PHARMACY W/ HCPCS (ALT 636 FOR OP/ED): Performed by: EMERGENCY MEDICINE

## 2025-02-01 PROCEDURE — 99285 EMERGENCY DEPT VISIT HI MDM: CPT | Mod: 25 | Performed by: EMERGENCY MEDICINE

## 2025-02-01 PROCEDURE — 87449 NOS EACH ORGANISM AG IA: CPT | Mod: AHULAB | Performed by: INTERNAL MEDICINE

## 2025-02-01 PROCEDURE — 2550000001 HC RX 255 CONTRASTS: Performed by: EMERGENCY MEDICINE

## 2025-02-01 PROCEDURE — 2500000004 HC RX 250 GENERAL PHARMACY W/ HCPCS (ALT 636 FOR OP/ED): Performed by: INTERNAL MEDICINE

## 2025-02-01 PROCEDURE — 2500000005 HC RX 250 GENERAL PHARMACY W/O HCPCS: Performed by: INTERNAL MEDICINE

## 2025-02-01 PROCEDURE — 87637 SARSCOV2&INF A&B&RSV AMP PRB: CPT | Performed by: EMERGENCY MEDICINE

## 2025-02-01 PROCEDURE — 96375 TX/PRO/DX INJ NEW DRUG ADDON: CPT

## 2025-02-01 PROCEDURE — 71275 CT ANGIOGRAPHY CHEST: CPT

## 2025-02-01 PROCEDURE — 85007 BL SMEAR W/DIFF WBC COUNT: CPT | Performed by: EMERGENCY MEDICINE

## 2025-02-01 PROCEDURE — 85027 COMPLETE CBC AUTOMATED: CPT | Performed by: EMERGENCY MEDICINE

## 2025-02-01 PROCEDURE — 83880 ASSAY OF NATRIURETIC PEPTIDE: CPT | Performed by: EMERGENCY MEDICINE

## 2025-02-01 RX ORDER — CEFTRIAXONE 1 G/50ML
1 INJECTION, SOLUTION INTRAVENOUS ONCE
Status: COMPLETED | OUTPATIENT
Start: 2025-02-01 | End: 2025-02-01

## 2025-02-01 RX ORDER — ACETAMINOPHEN 325 MG/1
1000 TABLET ORAL EVERY 8 HOURS PRN
Status: DISCONTINUED | OUTPATIENT
Start: 2025-02-01 | End: 2025-02-05 | Stop reason: HOSPADM

## 2025-02-01 RX ORDER — IPRATROPIUM BROMIDE AND ALBUTEROL SULFATE 2.5; .5 MG/3ML; MG/3ML
SOLUTION RESPIRATORY (INHALATION)
Status: DISPENSED
Start: 2025-02-01 | End: 2025-02-01

## 2025-02-01 RX ORDER — TAMSULOSIN HYDROCHLORIDE 0.4 MG/1
0.4 CAPSULE ORAL 2 TIMES DAILY
Status: DISCONTINUED | OUTPATIENT
Start: 2025-02-01 | End: 2025-02-05 | Stop reason: HOSPADM

## 2025-02-01 RX ORDER — L. ACIDOPHILUS/L.BULGARICUS 1MM CELL
1 TABLET ORAL DAILY
Status: DISCONTINUED | OUTPATIENT
Start: 2025-02-01 | End: 2025-02-01

## 2025-02-01 RX ORDER — FUROSEMIDE 10 MG/ML
20 INJECTION INTRAMUSCULAR; INTRAVENOUS ONCE
Status: COMPLETED | OUTPATIENT
Start: 2025-02-01 | End: 2025-02-01

## 2025-02-01 RX ORDER — ATORVASTATIN CALCIUM 40 MG/1
40 TABLET, FILM COATED ORAL DAILY
Status: DISCONTINUED | OUTPATIENT
Start: 2025-02-01 | End: 2025-02-05 | Stop reason: HOSPADM

## 2025-02-01 RX ORDER — PANTOPRAZOLE SODIUM 40 MG/1
40 TABLET, DELAYED RELEASE ORAL
Status: DISCONTINUED | OUTPATIENT
Start: 2025-02-02 | End: 2025-02-05 | Stop reason: HOSPADM

## 2025-02-01 RX ORDER — ERTAPENEM 1 G/1
1 INJECTION, POWDER, LYOPHILIZED, FOR SOLUTION INTRAMUSCULAR; INTRAVENOUS EVERY 24 HOURS
Status: DISCONTINUED | OUTPATIENT
Start: 2025-02-01 | End: 2025-02-01

## 2025-02-01 RX ORDER — LEVOFLOXACIN 5 MG/ML
750 INJECTION, SOLUTION INTRAVENOUS
Status: DISCONTINUED | OUTPATIENT
Start: 2025-02-02 | End: 2025-02-01

## 2025-02-01 RX ORDER — FERROUS SULFATE 325(65) MG
65 TABLET ORAL
Status: DISCONTINUED | OUTPATIENT
Start: 2025-02-02 | End: 2025-02-05 | Stop reason: HOSPADM

## 2025-02-01 RX ORDER — FINASTERIDE 5 MG/1
5 TABLET, FILM COATED ORAL DAILY
Status: DISCONTINUED | OUTPATIENT
Start: 2025-02-01 | End: 2025-02-05 | Stop reason: HOSPADM

## 2025-02-01 RX ORDER — IPRATROPIUM BROMIDE AND ALBUTEROL SULFATE 2.5; .5 MG/3ML; MG/3ML
3 SOLUTION RESPIRATORY (INHALATION) ONCE
Status: DISCONTINUED | OUTPATIENT
Start: 2025-02-01 | End: 2025-02-02

## 2025-02-01 RX ORDER — IPRATROPIUM BROMIDE AND ALBUTEROL SULFATE 2.5; .5 MG/3ML; MG/3ML
3 SOLUTION RESPIRATORY (INHALATION)
Status: DISCONTINUED | OUTPATIENT
Start: 2025-02-01 | End: 2025-02-02

## 2025-02-01 RX ORDER — POLYETHYLENE GLYCOL 3350 17 G/17G
17 POWDER, FOR SOLUTION ORAL DAILY
Status: DISCONTINUED | OUTPATIENT
Start: 2025-02-01 | End: 2025-02-05 | Stop reason: HOSPADM

## 2025-02-01 RX ORDER — CALCIUM CARBONATE 500(1250)
1250 TABLET ORAL
Status: DISCONTINUED | OUTPATIENT
Start: 2025-02-01 | End: 2025-02-05 | Stop reason: HOSPADM

## 2025-02-01 RX ORDER — FOLIC ACID 1 MG/1
1 TABLET ORAL DAILY
Status: DISCONTINUED | OUTPATIENT
Start: 2025-02-01 | End: 2025-02-05 | Stop reason: HOSPADM

## 2025-02-01 RX ORDER — CALCIUM CARBONATE 500(1250)
1500 TABLET ORAL
Status: DISCONTINUED | OUTPATIENT
Start: 2025-02-01 | End: 2025-02-01

## 2025-02-01 RX ORDER — CEFTRIAXONE 1 G/50ML
1 INJECTION, SOLUTION INTRAVENOUS EVERY 24 HOURS
Status: DISCONTINUED | OUTPATIENT
Start: 2025-02-02 | End: 2025-02-03

## 2025-02-01 RX ORDER — FERROUS SULFATE 325(65) MG
325 TABLET ORAL
Status: DISCONTINUED | OUTPATIENT
Start: 2025-02-02 | End: 2025-02-01

## 2025-02-01 RX ORDER — BUMETANIDE 1 MG/1
0.5 TABLET ORAL EVERY OTHER DAY
Status: DISCONTINUED | OUTPATIENT
Start: 2025-02-02 | End: 2025-02-02

## 2025-02-01 RX ADMIN — Medication 2 L/MIN: at 14:10

## 2025-02-01 RX ADMIN — FOLIC ACID 1 MG: 1 TABLET ORAL at 13:43

## 2025-02-01 RX ADMIN — IPRATROPIUM BROMIDE AND ALBUTEROL SULFATE 3 ML: 2.5; .5 SOLUTION RESPIRATORY (INHALATION) at 19:49

## 2025-02-01 RX ADMIN — CEFTRIAXONE SODIUM 1 G: 1 INJECTION, SOLUTION INTRAVENOUS at 08:32

## 2025-02-01 RX ADMIN — TAMSULOSIN HYDROCHLORIDE 0.4 MG: 0.4 CAPSULE ORAL at 13:43

## 2025-02-01 RX ADMIN — TAMSULOSIN HYDROCHLORIDE 0.4 MG: 0.4 CAPSULE ORAL at 20:54

## 2025-02-01 RX ADMIN — EMPAGLIFLOZIN 10 MG: 10 TABLET, FILM COATED ORAL at 13:44

## 2025-02-01 RX ADMIN — IPRATROPIUM BROMIDE AND ALBUTEROL SULFATE 3 ML: 2.5; .5 SOLUTION RESPIRATORY (INHALATION) at 12:20

## 2025-02-01 RX ADMIN — FUROSEMIDE 20 MG: 10 INJECTION, SOLUTION INTRAMUSCULAR; INTRAVENOUS at 13:42

## 2025-02-01 RX ADMIN — ATORVASTATIN CALCIUM 40 MG: 40 TABLET, FILM COATED ORAL at 13:43

## 2025-02-01 RX ADMIN — CALCIUM 1250 MG: 500 TABLET ORAL at 17:51

## 2025-02-01 RX ADMIN — IOHEXOL 85 ML: 350 INJECTION, SOLUTION INTRAVENOUS at 10:13

## 2025-02-01 RX ADMIN — Medication 3 L/MIN: at 19:49

## 2025-02-01 RX ADMIN — AZITHROMYCIN MONOHYDRATE 500 MG: 500 INJECTION, POWDER, LYOPHILIZED, FOR SOLUTION INTRAVENOUS at 10:22

## 2025-02-01 RX ADMIN — FINASTERIDE 5 MG: 5 TABLET, FILM COATED ORAL at 13:43

## 2025-02-01 SDOH — SOCIAL STABILITY: SOCIAL INSECURITY: ARE THERE ANY APPARENT SIGNS OF INJURIES/BEHAVIORS THAT COULD BE RELATED TO ABUSE/NEGLECT?: NO

## 2025-02-01 SDOH — SOCIAL STABILITY: SOCIAL INSECURITY: WITHIN THE LAST YEAR, HAVE YOU BEEN AFRAID OF YOUR PARTNER OR EX-PARTNER?: NO

## 2025-02-01 SDOH — SOCIAL STABILITY: SOCIAL INSECURITY: DO YOU FEEL UNSAFE GOING BACK TO THE PLACE WHERE YOU ARE LIVING?: NO

## 2025-02-01 SDOH — ECONOMIC STABILITY: INCOME INSECURITY: IN THE PAST 12 MONTHS HAS THE ELECTRIC, GAS, OIL, OR WATER COMPANY THREATENED TO SHUT OFF SERVICES IN YOUR HOME?: NO

## 2025-02-01 SDOH — ECONOMIC STABILITY: FOOD INSECURITY: WITHIN THE PAST 12 MONTHS, THE FOOD YOU BOUGHT JUST DIDN'T LAST AND YOU DIDN'T HAVE MONEY TO GET MORE.: NEVER TRUE

## 2025-02-01 SDOH — SOCIAL STABILITY: SOCIAL INSECURITY
WITHIN THE LAST YEAR, HAVE YOU BEEN KICKED, HIT, SLAPPED, OR OTHERWISE PHYSICALLY HURT BY YOUR PARTNER OR EX-PARTNER?: NO

## 2025-02-01 SDOH — ECONOMIC STABILITY: TRANSPORTATION INSECURITY: IN THE PAST 12 MONTHS, HAS LACK OF TRANSPORTATION KEPT YOU FROM MEDICAL APPOINTMENTS OR FROM GETTING MEDICATIONS?: NO

## 2025-02-01 SDOH — SOCIAL STABILITY: SOCIAL INSECURITY: ABUSE: ADULT

## 2025-02-01 SDOH — ECONOMIC STABILITY: FOOD INSECURITY: HOW HARD IS IT FOR YOU TO PAY FOR THE VERY BASICS LIKE FOOD, HOUSING, MEDICAL CARE, AND HEATING?: NOT HARD AT ALL

## 2025-02-01 SDOH — SOCIAL STABILITY: SOCIAL INSECURITY
WITHIN THE LAST YEAR, HAVE YOU BEEN RAPED OR FORCED TO HAVE ANY KIND OF SEXUAL ACTIVITY BY YOUR PARTNER OR EX-PARTNER?: NO

## 2025-02-01 SDOH — SOCIAL STABILITY: SOCIAL INSECURITY: HAS ANYONE EVER THREATENED TO HURT YOUR FAMILY OR YOUR PETS?: NO

## 2025-02-01 SDOH — SOCIAL STABILITY: SOCIAL INSECURITY: HAVE YOU HAD THOUGHTS OF HARMING ANYONE ELSE?: NO

## 2025-02-01 SDOH — ECONOMIC STABILITY: HOUSING INSECURITY: AT ANY TIME IN THE PAST 12 MONTHS, WERE YOU HOMELESS OR LIVING IN A SHELTER (INCLUDING NOW)?: NO

## 2025-02-01 SDOH — ECONOMIC STABILITY: HOUSING INSECURITY: IN THE LAST 12 MONTHS, WAS THERE A TIME WHEN YOU WERE NOT ABLE TO PAY THE MORTGAGE OR RENT ON TIME?: NO

## 2025-02-01 SDOH — ECONOMIC STABILITY: FOOD INSECURITY: WITHIN THE PAST 12 MONTHS, YOU WORRIED THAT YOUR FOOD WOULD RUN OUT BEFORE YOU GOT THE MONEY TO BUY MORE.: NEVER TRUE

## 2025-02-01 SDOH — ECONOMIC STABILITY: HOUSING INSECURITY: IN THE PAST 12 MONTHS, HOW MANY TIMES HAVE YOU MOVED WHERE YOU WERE LIVING?: 0

## 2025-02-01 SDOH — SOCIAL STABILITY: SOCIAL INSECURITY: WITHIN THE LAST YEAR, HAVE YOU BEEN HUMILIATED OR EMOTIONALLY ABUSED IN OTHER WAYS BY YOUR PARTNER OR EX-PARTNER?: NO

## 2025-02-01 SDOH — SOCIAL STABILITY: SOCIAL INSECURITY: ARE YOU OR HAVE YOU BEEN THREATENED OR ABUSED PHYSICALLY, EMOTIONALLY, OR SEXUALLY BY ANYONE?: NO

## 2025-02-01 SDOH — SOCIAL STABILITY: SOCIAL INSECURITY: HAVE YOU HAD ANY THOUGHTS OF HARMING ANYONE ELSE?: NO

## 2025-02-01 SDOH — SOCIAL STABILITY: SOCIAL INSECURITY: DOES ANYONE TRY TO KEEP YOU FROM HAVING/CONTACTING OTHER FRIENDS OR DOING THINGS OUTSIDE YOUR HOME?: NO

## 2025-02-01 SDOH — SOCIAL STABILITY: SOCIAL INSECURITY: WERE YOU ABLE TO COMPLETE ALL THE BEHAVIORAL HEALTH SCREENINGS?: YES

## 2025-02-01 SDOH — SOCIAL STABILITY: SOCIAL INSECURITY: DO YOU FEEL ANYONE HAS EXPLOITED OR TAKEN ADVANTAGE OF YOU FINANCIALLY OR OF YOUR PERSONAL PROPERTY?: NO

## 2025-02-01 ASSESSMENT — COGNITIVE AND FUNCTIONAL STATUS - GENERAL
MOBILITY SCORE: 17
MOBILITY SCORE: 17
DRESSING REGULAR UPPER BODY CLOTHING: A LITTLE
PATIENT BASELINE BEDBOUND: NO
DRESSING REGULAR LOWER BODY CLOTHING: A LITTLE
STANDING UP FROM CHAIR USING ARMS: A LITTLE
WALKING IN HOSPITAL ROOM: A LITTLE
TOILETING: A LITTLE
TURNING FROM BACK TO SIDE WHILE IN FLAT BAD: A LITTLE
MOVING FROM LYING ON BACK TO SITTING ON SIDE OF FLAT BED WITH BEDRAILS: A LITTLE
CLIMB 3 TO 5 STEPS WITH RAILING: A LOT
DAILY ACTIVITIY SCORE: 20
TOILETING: A LITTLE
TURNING FROM BACK TO SIDE WHILE IN FLAT BAD: A LITTLE
MOVING TO AND FROM BED TO CHAIR: A LITTLE
WALKING IN HOSPITAL ROOM: A LITTLE
MOVING TO AND FROM BED TO CHAIR: A LITTLE
DRESSING REGULAR LOWER BODY CLOTHING: A LITTLE
HELP NEEDED FOR BATHING: A LITTLE
STANDING UP FROM CHAIR USING ARMS: A LITTLE
CLIMB 3 TO 5 STEPS WITH RAILING: A LOT
DAILY ACTIVITIY SCORE: 20
HELP NEEDED FOR BATHING: A LITTLE
DRESSING REGULAR UPPER BODY CLOTHING: A LITTLE
MOVING FROM LYING ON BACK TO SITTING ON SIDE OF FLAT BED WITH BEDRAILS: A LITTLE

## 2025-02-01 ASSESSMENT — ACTIVITIES OF DAILY LIVING (ADL)
BATHING: INDEPENDENT
HEARING - RIGHT EAR: FUNCTIONAL
WALKS IN HOME: NEEDS ASSISTANCE
LACK_OF_TRANSPORTATION: NO
HEARING - LEFT EAR: FUNCTIONAL
DRESSING YOURSELF: INDEPENDENT
FEEDING YOURSELF: INDEPENDENT
GROOMING: INDEPENDENT
JUDGMENT_ADEQUATE_SAFELY_COMPLETE_DAILY_ACTIVITIES: YES
ADEQUATE_TO_COMPLETE_ADL: YES
PATIENT'S MEMORY ADEQUATE TO SAFELY COMPLETE DAILY ACTIVITIES?: YES
LACK_OF_TRANSPORTATION: NO
TOILETING: INDEPENDENT
ASSISTIVE_DEVICE: EYEGLASSES

## 2025-02-01 ASSESSMENT — LIFESTYLE VARIABLES
HOW OFTEN DO YOU HAVE 6 OR MORE DRINKS ON ONE OCCASION: NEVER
AUDIT-C TOTAL SCORE: 0
HOW OFTEN DO YOU HAVE A DRINK CONTAINING ALCOHOL: NEVER
SKIP TO QUESTIONS 9-10: 1
PRESCIPTION_ABUSE_PAST_12_MONTHS: NO
HOW MANY STANDARD DRINKS CONTAINING ALCOHOL DO YOU HAVE ON A TYPICAL DAY: PATIENT DOES NOT DRINK
AUDIT-C TOTAL SCORE: 0
SUBSTANCE_ABUSE_PAST_12_MONTHS: NO

## 2025-02-01 ASSESSMENT — PAIN SCALES - GENERAL
PAINLEVEL_OUTOF10: 0 - NO PAIN

## 2025-02-01 ASSESSMENT — PAIN - FUNCTIONAL ASSESSMENT
PAIN_FUNCTIONAL_ASSESSMENT: 0-10

## 2025-02-01 NOTE — H&P
PRIMARY CARE PHYSICIAN:  SUSI Mckeon-CNS ADMITTING PHYSICIAN Donta Echeverria MD   MRN# [id number]   Admission Date: 2/1/2025     Subjective   CHIEF COMPLAINT: Shortness of breath.    HISTORY OF PRESENT ILLNESS Ramón Flores is a 89 y.o. male with a history of chronic diastolic CHF, Atria fibrillation and VTE presented with cough & shortness of breath for couple of days.He denies any fever chest pain or hemoptysis.    Past Medical history     Past Medical History:   Diagnosis Date    Acute deep vein thrombosis (DVT) of left femoral vein (Multi) 09/10/2023    ACUTE DEEP VEIN THROMBOSIS, L FEMORAL, HEMOPHILIA    Acute diastolic heart failure 04/16/2023    Benign prostatic hyperplasia without lower urinary tract symptoms 01/07/2016    Enlarged prostate without lower urinary tract symptoms (luts)    Bleeding hemorrhoids 03/01/2023    CAP (community acquired pneumonia) 01/18/2024    Closed nondisplaced fracture of head of left radius 09/05/2023    COVID-19 05/21/2023    Enlarged prostate with lower urinary tract symptoms (LUTS) 03/01/2023    Fracture of bone of hip (Multi) 09/05/2023    Fracture of femoral neck, right 03/01/2023    Gastrointestinal hemorrhage 09/05/2023    LOWER GI BLEED    Gingiva hemorrhage 09/05/2023    Gross hematuria 03/01/2023    Hemarthrosis of ankle joint 04/18/2019    Hemarthrosis of knee 05/09/2022    Hematochezia 09/05/2023    Hematoma of lower extremity 09/05/2023    Hematoma of right thigh 03/01/2023    History of recent fall 05/31/2023    Knee effusion 09/05/2023    Knee effusion, left 03/01/2023    Lumbar pain 03/01/2023    Obstructive uropathy 04/28/2023    Orthostatic syncope 09/05/2023    Personal history of other endocrine, nutritional and metabolic disease     History of hyperlipidemia    Pneumonia of left lower lobe due to infectious organism 03/19/2024    Pneumonia of right lung due to infectious organism 03/01/2023    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     Subdural hematoma (Multi) 2023    Syncope 2023    Urinary retention 2023    UTI (urinary tract infection) 2023    Viral gastroenteritis 2023        Past Surgical history  Past Surgical History:   Procedure Laterality Date    CORONARY ARTERY BYPASS GRAFT         Family History   Problem Relation Name Age of Onset    Hemophilia Mother         Social History     Socioeconomic History    Marital status:    Tobacco Use    Smoking status: Never    Smokeless tobacco: Never   Substance and Sexual Activity    Alcohol use: Defer    Drug use: Never   Social History Narrative    Patient lives alone with support for bathing and purchasing/preparing food. He broke his hip 2 years ago and recovered. He had a twin sister in [place] who is recently  from dementia. Wife  14 years ago. Adopted daughter is alive. 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      Social Drivers of Health     Financial Resource Strain: Low Risk  (10/21/2024)    Overall Financial Resource Strain (CARDIA)     Difficulty of Paying Living Expenses: Not hard at all   Transportation Needs: No Transportation Needs (10/21/2024)    PRAPARE - Transportation     Lack of Transportation (Medical): No     Lack of Transportation (Non-Medical): No   Housing Stability: Low Risk  (10/21/2024)    Housing Stability Vital Sign     Unable to Pay for Housing in the Last Year: No     Number of Times Moved in the Last Year: 0     Homeless in the Last Year: No       Medications  (Not in a hospital admission)       Allergies   Allergen Reactions    Aspirin Bleeding     hemophiliac    Penicillins Rash       Complete Review of symptoms  GENERAL: No, fever, or chills  HEENT: No, blurred vision, or hearing loss  CARDIAC: See HPI  RESPIRATORY: No or hemoptysis  GI: No nausea, vomiting, diarrhea, or constipation  :  "No, dysuria, or frequency  SKIN: no rash, itching discoloration, jaundice or rash  ENDOCRINE: no or cold intolerance  PSYCH: No anxiety, mood disorder hallucinations or psychiatric symptoms  NEURO: No, blurred vision, facial droop, or word finding difficulties    Objective     PHYSICAL EXAM:  Patient Vitals for the past 24 hrs:   BP Temp Temp src Pulse Resp SpO2 Height Weight   02/01/25 0837 117/73 -- -- 74 18 95 % -- --   02/01/25 0732 108/73 -- -- 69 -- 96 % -- --   02/01/25 0700 111/75 -- -- -- -- 96 % -- --   02/01/25 0621 -- 36.5 °C (97.7 °F) Temporal -- -- -- -- --   02/01/25 0618 -- -- -- -- -- (!) 93 % -- --   02/01/25 0614 103/73 -- -- 67 20 98 % 1.676 m (5' 6\") 59.9 kg (132 lb)     Blood pressure 117/73, pulse 74, temperature 36.5 °C (97.7 °F), temperature source Temporal, resp. rate 18, height 1.676 m (5' 6\"), weight 59.9 kg (132 lb), SpO2 95%.  Patient Vitals for the past 24 hrs:   BP Temp Temp src Pulse Resp SpO2 Height Weight   02/01/25 0837 117/73 -- -- 74 18 95 % -- --   02/01/25 0732 108/73 -- -- 69 -- 96 % -- --   02/01/25 0700 111/75 -- -- -- -- 96 % -- --   02/01/25 0621 -- 36.5 °C (97.7 °F) Temporal -- -- -- -- --   02/01/25 0618 -- -- -- -- -- (!) 93 % -- --   02/01/25 0614 103/73 -- -- 67 20 98 % 1.676 m (5' 6\") 59.9 kg (132 lb)     Body mass index is 21.31 kg/m².  GENERAL: alert, cooperative, or no distress  SKIN: no rashes  HEENT Atraumatic, Normocephalic and Not pale, no icterus  NECK: supple, no thyromegaly, JVP within normal limits  LUNGS:  not in respiratory distress, respiratory rate normal, clear to auscultation  CARDIAC: rate regular and regular rhythm,, normal S1, S2, no murmur, rub, or gallop heard.  ABDOMEN: Soft, non-tender, normal bowel sounds; no bruits, organomegaly or masses.  EXTREMETIES: No edema  NEURO: Alert and oriented x 3,   ., reflexes normal and symmetric, strength and  sensation grossly normal    DATA:   Diagnostic tests reviewed for today's visit:    Most recent  " labs and imaging results  Results for orders placed or performed during the hospital encounter of 02/01/25 (from the past 96 hours)   CBC and Auto Differential   Result Value Ref Range    WBC 3.3 (L) 4.4 - 11.3 x10*3/uL    nRBC 0.0 0.0 - 0.0 /100 WBCs    RBC 3.02 (L) 4.50 - 5.90 x10*6/uL    Hemoglobin 11.0 (L) 13.5 - 17.5 g/dL    Hematocrit 32.3 (L) 41.0 - 52.0 %     (H) 80 - 100 fL    MCH 36.4 (H) 26.0 - 34.0 pg    MCHC 34.1 32.0 - 36.0 g/dL    RDW 18.2 (H) 11.5 - 14.5 %    Platelets 63 (L) 150 - 450 x10*3/uL    Immature Granulocytes %, Automated 6.9 (H) 0.0 - 0.9 %    Immature Granulocytes Absolute, Automated 0.23 0.00 - 0.50 x10*3/uL   Comprehensive metabolic panel   Result Value Ref Range    Glucose 105 (H) 74 - 99 mg/dL    Sodium 140 136 - 145 mmol/L    Potassium 3.5 3.5 - 5.3 mmol/L    Chloride 106 98 - 107 mmol/L    Bicarbonate 22 21 - 32 mmol/L    Anion Gap 16 10 - 20 mmol/L    Urea Nitrogen 19 6 - 23 mg/dL    Creatinine 0.98 0.50 - 1.30 mg/dL    eGFR 71 >60 mL/min/1.73m*2    Calcium 6.6 (L) 8.6 - 10.3 mg/dL    Albumin 4.0 3.4 - 5.0 g/dL    Alkaline Phosphatase 98 33 - 136 U/L    Total Protein 6.5 6.4 - 8.2 g/dL    AST 35 9 - 39 U/L    Bilirubin, Total 2.0 (H) 0.0 - 1.2 mg/dL    ALT 35 10 - 52 U/L   Sars-CoV-2 PCR   Result Value Ref Range    Coronavirus 2019, PCR Not Detected Not Detected   RSV PCR   Result Value Ref Range    RSV PCR Not Detected Not Detected   Influenza A, and B PCR   Result Value Ref Range    Flu A Result Not Detected Not Detected    Flu B Result Not Detected Not Detected   Troponin I, High Sensitivity, Initial   Result Value Ref Range    Troponin I, High Sensitivity 92 (HH) 0 - 20 ng/L   Manual Differential   Result Value Ref Range    Neutrophils %, Manual 64.0 40.0 - 80.0 %    Lymphocytes %, Manual 24.0 13.0 - 44.0 %    Monocytes %, Manual 6.0 2.0 - 10.0 %    Eosinophils %, Manual 3.0 0.0 - 6.0 %    Basophils %, Manual 0.0 0.0 - 2.0 %    Atypical Lymphocytes %, Manual 3.0 0.0 -  2.0 %    Seg Neutrophils Absolute, Manual 2.11 1.60 - 5.00 x10*3/uL    Lymphocytes Absolute, Manual 0.79 (L) 0.80 - 3.00 x10*3/uL    Monocytes Absolute, Manual 0.20 0.05 - 0.80 x10*3/uL    Eosinophils Absolute, Manual 0.10 0.00 - 0.40 x10*3/uL    Basophils Absolute, Manual 0.00 0.00 - 0.10 x10*3/uL    Atypical Lymphs Absolute, Manual 0.10 0.00 - 0.30 x10*3/uL    Total Cells Counted 100     RBC Morphology See Below     RBC Fragments Few     Ovalocytes Few    B-type Natriuretic Peptide   Result Value Ref Range     (H) 0 - 99 pg/mL   Troponin, High Sensitivity, 1 Hour   Result Value Ref Range    Troponin I, High Sensitivity 85 (HH) 0 - 20 ng/L     *Note: Due to a large number of results and/or encounters for the requested time period, some results have not been displayed. A complete set of results can be found in Results Review.        XR chest 1 view    Result Date: 2/1/2025  Interpreted By:  Roseline Luther, STUDY: XR CHEST 1 VIEW 2/1/2025 6:41 am   INDICATION: Signs/Symptoms:cough + hypoxia   COMPARISON: 05/31/2024   ACCESSION NUMBER(S): [id number]   ORDERING CLINICIAN: SABINE MINA   TECHNIQUE: AP view   FINDINGS: Sternal wires are noted. Electronic device overlies the heart. There is an enlarged cardiac silhouette.   The lungs are hyperinflated. There are bibasilar infiltrates or atelectasis since the prior exam.   No significant pleural effusions. No pneumothorax       Bibasilar atelectasis or infiltrates are superimposed on chronic changes. Enlarged cardiac silhouette with post thoracotomy changes   Signed by: Roseline Luther 2/1/2025 7:54 AM Dictation workstation:   [id number]    [unfilled]   Medication and Non-Pharmacologic VTE Prophylaxis/Anticoagulants   Last Anticoag Admin            No anticoagulants administered    No unadministered anticoagulant orders found.            Assessment/Plan     Ramón Flores  has    Assessment & Plan  Acute on chronic CHF  Pneumonia of both lower lobes due to  infectious organism  Elevated troponin flat profile     IV lasix  Will treat with Rocephin & Azithromycin   Other Hospital problems        Abnormal findings not addressed during hospitalization, but require out patient follow up. None        I spent 75 minutes taking history and examining Ramón Flores, reviewing the labs & imaging results, reviewing past hospital encounters,  speaking with & reviewing notes from emergency room physician.  SIGNATURE: Donta Echeverria MD PATIENT NAME: Ramón Flores   DATE: February 1, 2025 MRN: [id number]   TIME: 10:27 AM

## 2025-02-01 NOTE — NURSING NOTE
Patient arrived from ER, stood at bedside to change pants, arrived short of breath, audible wheezing, 99% on 3L nasal cannula, on RA at baseline. Nurse called Dr. Echeverria and RT, RT came to bedside, MD entering orders for duonebulizers, now patient less shortness of breath, oriented to room and call light, educated reason for SCDs, patient agrees to SCDs. Educated to call before getting up. Red fall risk socks at bedside, patient declined at this time. Bed alarm on patient is alert and oriented x4.

## 2025-02-01 NOTE — PROGRESS NOTES
Pharmacy Medication History     Source of Information: Patient not a good historian, spoke with him he stated he took all his meds , but then informed me to call his nephew , nephew didn't really know much. Gave me the information of who to contact Harmeet ricardo from  Home visit, tried calling the number he provided [phone] but closed     Additional concerns with the patient's PTA list.   N/A  The following updates were made to the Prior to Admission medication list:     Medications ADDED:   N/A  Medications CHANGED:  N/A  Medications REMOVED:   N/A  Medications NOT TAKING:   N/A    Allergy reviewed : Yes    Meds 2 Beds : Yes    Outpatient pharmacy confirmed and updated in chart : Yes    Pharmacy name: [facility] ( [address]    The list below reflectives the updated PTA list. Please review each medication in order reconciliation for additional clarification and justification.    Prior to Admission Medications   Prescriptions Last Dose Informant   Lactobacillus acidoph-L.bulgar 1 million cell tablet tablet 1/31/2025 Self, Family Member   Sig: Take 1 tablet by mouth once daily.   acetaminophen (Tylenol Extra Strength) 500 mg tablet Past Week    Sig: Take 2 tablets (1,000 mg) by mouth every 8 hours if needed for mild pain (1 - 3).   antihemophilic RF VIII (Altuviiio) 3,000 (+/-) unit recon soln Past Week    Sig: Infuse 3,030 Units into a venous catheter every 7 days. 3030 units +/-10% dose every 7 days.  Additionally,  prn when directed.   atorvastatin (Lipitor) 40 mg tablet 1/31/2025 Morning    Sig: Take 1 tablet (40 mg) by mouth once daily.   bumetanide (Bumex) 0.5 mg tablet 1/31/2025 Morning    Sig: Take 1 tablet (0.5 mg) by mouth every other day.   calcium carbonate 600 mg calcium (1,500 mg) tablet 1/31/2025 Morning Self, Family Member   Sig: Take 1 tablet (1,500 mg) by mouth 2 times daily (morning and late afternoon).   empagliflozin (Jardiance) 10 mg 1/31/2025 Morning    Sig: Take 1 tablet (10 mg) by mouth once  daily.   ferrous sulfate 325 (65 Fe) MG EC tablet 1/31/2025    Sig: Take 1 tablet by mouth once daily with breakfast. Do not crush, chew, or split.   finasteride (Proscar) 5 mg tablet 1/31/2025    Sig: Take 1 tablet (5 mg) by mouth once daily.   folic acid (Folvite) 1 mg tablet 1/31/2025 Morning Self, Family Member   Sig: Take 1 tablet (1 mg) by mouth once daily.   multivitamin tablet 1/31/2025    Sig: Take 1 tablet by mouth once daily.   nystatin (Mycostatin) cream 1/31/2025 Morning    Sig: Apply topically 2 times a day. To reddenned area of groin   pantoprazole (ProtoNix) 40 mg EC tablet 1/31/2025    Sig: Take 1 tablet (40 mg) by mouth once daily in the morning. Take before meals. Do not crush, chew, or split.   spironolactone (Aldactone) 25 mg tablet 1/31/2025    Sig: Take 0.5 tablets (12.5 mg) by mouth every other day.   tamsulosin (Flomax) 0.4 mg 24 hr capsule 1/31/2025 Morning    Sig: Take 1 capsule (0.4 mg) by mouth 2 times a day.   vit A/vit C/vit E/zinc/copper (PRESERVISION AREDS ORAL) 1/31/2025 Morning Self, Family Member   Sig: Take 1 capsule by mouth once daily.      Facility-Administered Medications: None       The list below reflectives the updated allergy list. Please review each documented allergy for additional clarification and justification.    Allergies   Allergen Reactions    Aspirin Bleeding     hemophiliac    Penicillins Rash          02/01/25 at 2:24 PM - Berta Salinas

## 2025-02-01 NOTE — SIGNIFICANT EVENT
Patient has been identified as having an emergent need for administration of iodinated contrast for CT scan prior to result of laboratory studies OR despite known elevated GFR due to possibility of life and/or limb threatening pathology.    I acknowledge the risks and benefits of emergently proceeding with contrast administration including that, at present, it is the position of the American College of [employer] that contrast induced nephropathy (ALFREDO) is a rare but possible consequence. At this time the benefits of proceeding with contrast administration outweigh the risks.    Attempts will be made to mitigate possible ALFREDO risk with IV fluid hydration if able.    Callum Tamayo MD  Emergency Medicine

## 2025-02-01 NOTE — ED PROVIDER NOTES
HPI   No chief complaint on file.      This is a 89-year-old man with past medical history of hemophilia, heart failure, chronic A-fib, prior anal cancer status post radiation, GERD, CKD who does present with complaint of worsening cough and shortness of breath.  Nonproductive.  No nausea vomiting or diarrhea.  No dysuria hematuria, flank pain.  No associated chest pain.  No palpitations.  States has been feeling more weak for the last 2 to 3 days.            Patient History   Past Medical History:   Diagnosis Date   • Acute deep vein thrombosis (DVT) of left femoral vein (Multi) 09/10/2023    ACUTE DEEP VEIN THROMBOSIS, L FEMORAL, HEMOPHILIA   • Acute diastolic heart failure 04/16/2023   • Benign prostatic hyperplasia without lower urinary tract symptoms 01/07/2016    Enlarged prostate without lower urinary tract symptoms (luts)   • Bleeding hemorrhoids 03/01/2023   • CAP (community acquired pneumonia) 01/18/2024   • Closed nondisplaced fracture of head of left radius 09/05/2023   • COVID-19 05/21/2023   • Enlarged prostate with lower urinary tract symptoms (LUTS) 03/01/2023   • Fracture of bone of hip (Multi) 09/05/2023   • Fracture of femoral neck, right 03/01/2023   • Gastrointestinal hemorrhage 09/05/2023    LOWER GI BLEED   • Gingiva hemorrhage 09/05/2023   • Gross hematuria 03/01/2023   • Hemarthrosis of ankle joint 04/18/2019   • Hemarthrosis of knee 05/09/2022   • Hematochezia 09/05/2023   • Hematoma of lower extremity 09/05/2023   • Hematoma of right thigh 03/01/2023   • History of recent fall 05/31/2023   • Knee effusion 09/05/2023   • Knee effusion, left 03/01/2023   • Lumbar pain 03/01/2023   • Obstructive uropathy 04/28/2023   • Orthostatic syncope 09/05/2023   • Personal history of other endocrine, nutritional and metabolic disease     History of hyperlipidemia   • Pneumonia of left lower lobe due to infectious organism 03/19/2024   • Pneumonia of right lung due to infectious organism 03/01/2023   •  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   • Subdural hematoma (Multi) 04/09/2023   • Syncope 05/12/2023   • Urinary retention 03/01/2023   • UTI (urinary tract infection) 03/01/2023   • Viral gastroenteritis 03/01/2023     Past Surgical History:   Procedure Laterality Date   • CORONARY ARTERY BYPASS GRAFT       Family History   Problem Relation Name Age of Onset   • Hemophilia Mother       Social History     Tobacco Use   • Smoking status: Never   • Smokeless tobacco: Never   Substance Use Topics   • Alcohol use: Defer   • Drug use: Never       Physical Exam   ED Triage Vitals   Temperature Heart Rate Respirations BP   02/01/25 0621 02/01/25 0614 02/01/25 0614 02/01/25 0614   36.5 °C (97.7 °F) 67 20 103/73      Pulse Ox Temp Source Heart Rate Source Patient Position   02/01/25 0614 02/01/25 0621 02/01/25 0614 02/01/25 0614   98 % Temporal Monitor Sitting      BP Location FiO2 (%)     02/01/25 0614 --     Right arm        Physical Exam  Vitals and nursing note reviewed.   Constitutional:       General: He is not in acute distress.     Appearance: Normal appearance. He is normal weight. He is not ill-appearing.   HENT:      Head: Normocephalic and atraumatic.      Nose: Nose normal.      Mouth/Throat:      Mouth: Mucous membranes are moist.      Pharynx: Oropharynx is clear.   Eyes:      Extraocular Movements: Extraocular movements intact.      Conjunctiva/sclera: Conjunctivae normal.      Pupils: Pupils are equal, round, and reactive to light.   Cardiovascular:      Rate and Rhythm: Normal rate and regular rhythm.      Pulses: Normal pulses.      Heart sounds: Normal heart sounds.   Pulmonary:      Effort: Pulmonary effort is normal.      Breath sounds: Normal breath sounds.   Abdominal:      General: Abdomen is flat. Bowel sounds are normal. There is no distension.      Palpations: Abdomen is soft.      Tenderness: There is no abdominal tenderness. There is no right CVA tenderness, left CVA tenderness, guarding or rebound.    Musculoskeletal:         General: No tenderness or deformity. Normal range of motion.      Cervical back: Normal range of motion and neck supple.   Skin:     General: Skin is warm and dry.      Capillary Refill: Capillary refill takes less than 2 seconds.   Neurological:      General: No focal deficit present.      Mental Status: He is alert and oriented to person, place, and time. Mental status is at baseline.      Sensory: No sensory deficit.      Motor: No weakness.   Psychiatric:         Mood and Affect: Mood normal.         Behavior: Behavior normal.         Thought Content: Thought content normal.         Judgment: Judgment normal.           ED Course & MDM   Diagnoses as of 02/01/25 0852   Pneumonia of both lower lobes due to infectious organism   Hypoxia                 No data recorded     Evan Coma Scale Score: 15 (02/01/25 0618 : Tc Whitmore RN)                           Medical Decision Making  EKG interpreted by me showed atrial fibrillation at a rate of 77 with no acute ischemic changes.  No STEMI.  IV is placed and labs drawn including CBC, CMP, troponin, BNP, COVID flu and RSV.  COVID flu and RSV were all negative.  There was no clinically significant anemia.  There was no acute kidney injury.  Initial troponin was noted 92 downtrending to 85.  BNP is pending.  Chest x-ray did show bilateral infiltrates versus fluid overload superimposed on his chronic changes.  Given cough and shortness of breath associated my suspicion is that he has underlying pneumonia.  He is started on ceftriaxone azithromycin.  He will be admitted given new onset hypoxia requiring 2 L nasal cannula.    Amount and/or Complexity of Data Reviewed  Labs: ordered. Decision-making details documented in ED Course.  Radiology: ordered. Decision-making details documented in ED Course.  ECG/medicine tests: ordered. Decision-making details documented in ED Course.        Procedure  Procedures     Callum Tamayo  MD  02/01/25 0852

## 2025-02-01 NOTE — NURSING NOTE
Patient states he is a DNR states no CPR. Nurse messaged Dr. Echeverria to ask him to talk to patient and enter advance directive orders.

## 2025-02-01 NOTE — CARE PLAN
Problem: Pain - Adult  Goal: Verbalizes/displays adequate comfort level or baseline comfort level  Outcome: Progressing     Problem: Safety - Adult  Goal: Free from fall injury  Outcome: Progressing     Problem: Discharge Planning  Goal: Discharge to home or other facility with appropriate resources  Outcome: Progressing     Problem: Chronic Conditions and Co-morbidities  Goal: Patient's chronic conditions and co-morbidity symptoms are monitored and maintained or improved  Outcome: Progressing     Problem: Nutrition  Goal: Nutrient intake appropriate for maintaining nutritional needs  Outcome: Progressing   The patient's goals for the shift include      The clinical goals for the shift include

## 2025-02-01 NOTE — ED NOTES
Per MD lau to give patient food, diet placed and nutrition host contacted for breakfast tray.      Lloyd Deal RN  02/01/25 [id number]

## 2025-02-01 NOTE — ED TRIAGE NOTES
Pt dame to the ED via EMS from home stating pt has been congested and sob for a couple days now. Pt got Duoneb enroute. Pt has a hx of Hemophilia. Pt is axo3. Pt is 89% on RA, RN placed pt on 3L of oxygen.

## 2025-02-02 VITALS
HEART RATE: 83 BPM | WEIGHT: 132 LBS | TEMPERATURE: 97.4 F | BODY MASS INDEX: 21.21 KG/M2 | HEIGHT: 66 IN | SYSTOLIC BLOOD PRESSURE: 104 MMHG | RESPIRATION RATE: 16 BRPM | DIASTOLIC BLOOD PRESSURE: 69 MMHG | OXYGEN SATURATION: 99 %

## 2025-02-02 LAB
ACANTHOCYTES BLD QL SMEAR: ABNORMAL
ANION GAP SERPL CALC-SCNC: 11 MMOL/L (ref 10–20)
BACTERIA BLD CULT: ABNORMAL
BACTERIA BLD CULT: NORMAL
BASOPHILS # BLD MANUAL: 0 X10*3/UL (ref 0–0.1)
BASOPHILS NFR BLD MANUAL: 0 %
BUN SERPL-MCNC: 18 MG/DL (ref 6–23)
BURR CELLS BLD QL SMEAR: ABNORMAL
CALCIUM SERPL-MCNC: 6.2 MG/DL (ref 8.6–10.3)
CHLORIDE SERPL-SCNC: 110 MMOL/L (ref 98–107)
CO2 SERPL-SCNC: 24 MMOL/L (ref 21–32)
CREAT SERPL-MCNC: 0.99 MG/DL (ref 0.5–1.3)
DACRYOCYTES BLD QL SMEAR: ABNORMAL
EGFRCR SERPLBLD CKD-EPI 2021: 70 ML/MIN/1.73M*2
EOSINOPHIL # BLD MANUAL: 0.11 X10*3/UL (ref 0–0.4)
EOSINOPHIL NFR BLD MANUAL: 4 %
ERYTHROCYTE [DISTWIDTH] IN BLOOD BY AUTOMATED COUNT: 18.4 % (ref 11.5–14.5)
GLUCOSE SERPL-MCNC: 96 MG/DL (ref 74–99)
GRAM STN SPEC: ABNORMAL
HCT VFR BLD AUTO: 28.8 % (ref 41–52)
HGB BLD-MCNC: 9.7 G/DL (ref 13.5–17.5)
IMM GRANULOCYTES # BLD AUTO: 0.2 X10*3/UL (ref 0–0.5)
IMM GRANULOCYTES NFR BLD AUTO: 7.1 % (ref 0–0.9)
LEGIONELLA AG UR QL: NEGATIVE
LYMPHOCYTES # BLD MANUAL: 0.53 X10*3/UL (ref 0.8–3)
LYMPHOCYTES NFR BLD MANUAL: 19 %
MCH RBC QN AUTO: 36.2 PG (ref 26–34)
MCHC RBC AUTO-ENTMCNC: 33.7 G/DL (ref 32–36)
MCV RBC AUTO: 108 FL (ref 80–100)
METAMYELOCYTES # BLD MANUAL: 0.03 X10*3/UL
METAMYELOCYTES NFR BLD MANUAL: 1 %
MONOCYTES # BLD MANUAL: 0.14 X10*3/UL (ref 0.05–0.8)
MONOCYTES NFR BLD MANUAL: 5 %
NEUTROPHILS # BLD MANUAL: 1.79 X10*3/UL (ref 1.6–5.5)
NEUTS BAND # BLD MANUAL: 0.08 X10*3/UL (ref 0–0.5)
NEUTS BAND NFR BLD MANUAL: 3 %
NEUTS SEG # BLD MANUAL: 1.71 X10*3/UL (ref 1.6–5)
NEUTS SEG NFR BLD MANUAL: 61 %
NRBC BLD-RTO: 0 /100 WBCS (ref 0–0)
OVALOCYTES BLD QL SMEAR: ABNORMAL
PLATELET # BLD AUTO: 53 X10*3/UL (ref 150–450)
POLYCHROMASIA BLD QL SMEAR: ABNORMAL
POTASSIUM SERPL-SCNC: 3.2 MMOL/L (ref 3.5–5.3)
RBC # BLD AUTO: 2.68 X10*6/UL (ref 4.5–5.9)
RBC MORPH BLD: ABNORMAL
S PNEUM AG UR QL: NEGATIVE
SCHISTOCYTES BLD QL SMEAR: ABNORMAL
SODIUM SERPL-SCNC: 142 MMOL/L (ref 136–145)
TOTAL CELLS COUNTED BLD: 100
VARIANT LYMPHS # BLD MANUAL: 0.2 X10*3/UL (ref 0–0.3)
VARIANT LYMPHS NFR BLD: 7 %
WBC # BLD AUTO: 2.8 X10*3/UL (ref 4.4–11.3)

## 2025-02-02 PROCEDURE — 2500000005 HC RX 250 GENERAL PHARMACY W/O HCPCS: Performed by: INTERNAL MEDICINE

## 2025-02-02 PROCEDURE — 80048 BASIC METABOLIC PNL TOTAL CA: CPT | Performed by: INTERNAL MEDICINE

## 2025-02-02 PROCEDURE — 99223 1ST HOSP IP/OBS HIGH 75: CPT | Performed by: INTERNAL MEDICINE

## 2025-02-02 PROCEDURE — 36415 COLL VENOUS BLD VENIPUNCTURE: CPT | Performed by: INTERNAL MEDICINE

## 2025-02-02 PROCEDURE — 1200000002 HC GENERAL ROOM WITH TELEMETRY DAILY

## 2025-02-02 PROCEDURE — 2500000004 HC RX 250 GENERAL PHARMACY W/ HCPCS (ALT 636 FOR OP/ED): Performed by: INTERNAL MEDICINE

## 2025-02-02 PROCEDURE — 2500000004 HC RX 250 GENERAL PHARMACY W/ HCPCS (ALT 636 FOR OP/ED): Performed by: NURSE PRACTITIONER

## 2025-02-02 PROCEDURE — 2500000001 HC RX 250 WO HCPCS SELF ADMINISTERED DRUGS (ALT 637 FOR MEDICARE OP): Performed by: INTERNAL MEDICINE

## 2025-02-02 PROCEDURE — 94640 AIRWAY INHALATION TREATMENT: CPT

## 2025-02-02 PROCEDURE — 2500000002 HC RX 250 W HCPCS SELF ADMINISTERED DRUGS (ALT 637 FOR MEDICARE OP, ALT 636 FOR OP/ED): Performed by: NURSE PRACTITIONER

## 2025-02-02 PROCEDURE — 85027 COMPLETE CBC AUTOMATED: CPT | Performed by: INTERNAL MEDICINE

## 2025-02-02 PROCEDURE — 85007 BL SMEAR W/DIFF WBC COUNT: CPT | Performed by: INTERNAL MEDICINE

## 2025-02-02 PROCEDURE — 2500000002 HC RX 250 W HCPCS SELF ADMINISTERED DRUGS (ALT 637 FOR MEDICARE OP, ALT 636 FOR OP/ED): Performed by: INTERNAL MEDICINE

## 2025-02-02 RX ORDER — GUAIFENESIN 100 MG/5ML
200 SOLUTION ORAL EVERY 4 HOURS PRN
Status: DISCONTINUED | OUTPATIENT
Start: 2025-02-02 | End: 2025-02-05 | Stop reason: HOSPADM

## 2025-02-02 RX ORDER — FUROSEMIDE 10 MG/ML
40 INJECTION INTRAMUSCULAR; INTRAVENOUS
Status: DISCONTINUED | OUTPATIENT
Start: 2025-02-02 | End: 2025-02-04

## 2025-02-02 RX ORDER — VANCOMYCIN HYDROCHLORIDE 1 G/200ML
1000 INJECTION, SOLUTION INTRAVENOUS EVERY 24 HOURS
Status: DISCONTINUED | OUTPATIENT
Start: 2025-02-02 | End: 2025-02-03

## 2025-02-02 RX ORDER — VANCOMYCIN HYDROCHLORIDE 1 G/20ML
INJECTION, POWDER, LYOPHILIZED, FOR SOLUTION INTRAVENOUS DAILY PRN
Status: DISCONTINUED | OUTPATIENT
Start: 2025-02-02 | End: 2025-02-03

## 2025-02-02 RX ORDER — IPRATROPIUM BROMIDE AND ALBUTEROL SULFATE 2.5; .5 MG/3ML; MG/3ML
3 SOLUTION RESPIRATORY (INHALATION) EVERY 2 HOUR PRN
Status: DISCONTINUED | OUTPATIENT
Start: 2025-02-02 | End: 2025-02-05 | Stop reason: HOSPADM

## 2025-02-02 RX ORDER — IPRATROPIUM BROMIDE AND ALBUTEROL SULFATE 2.5; .5 MG/3ML; MG/3ML
3 SOLUTION RESPIRATORY (INHALATION)
Status: DISCONTINUED | OUTPATIENT
Start: 2025-02-03 | End: 2025-02-05

## 2025-02-02 RX ORDER — POTASSIUM CHLORIDE 20 MEQ/1
40 TABLET, EXTENDED RELEASE ORAL DAILY
Status: DISCONTINUED | OUTPATIENT
Start: 2025-02-02 | End: 2025-02-05 | Stop reason: HOSPADM

## 2025-02-02 RX ADMIN — Medication 2 L/MIN: at 11:20

## 2025-02-02 RX ADMIN — FOLIC ACID 1 MG: 1 TABLET ORAL at 09:24

## 2025-02-02 RX ADMIN — BUMETANIDE 0.5 MG: 1 TABLET ORAL at 09:25

## 2025-02-02 RX ADMIN — IPRATROPIUM BROMIDE AND ALBUTEROL SULFATE 3 ML: 2.5; .5 SOLUTION RESPIRATORY (INHALATION) at 20:51

## 2025-02-02 RX ADMIN — CALCIUM 1250 MG: 500 TABLET ORAL at 09:24

## 2025-02-02 RX ADMIN — CEFTRIAXONE SODIUM 1 G: 1 INJECTION, SOLUTION INTRAVENOUS at 09:25

## 2025-02-02 RX ADMIN — IPRATROPIUM BROMIDE AND ALBUTEROL SULFATE 3 ML: 2.5; .5 SOLUTION RESPIRATORY (INHALATION) at 06:41

## 2025-02-02 RX ADMIN — POLYETHYLENE GLYCOL 3350 17 G: 17 POWDER, FOR SOLUTION ORAL at 09:24

## 2025-02-02 RX ADMIN — Medication 2 L/MIN: at 20:30

## 2025-02-02 RX ADMIN — IPRATROPIUM BROMIDE AND ALBUTEROL SULFATE 3 ML: 2.5; .5 SOLUTION RESPIRATORY (INHALATION) at 11:42

## 2025-02-02 RX ADMIN — FINASTERIDE 5 MG: 5 TABLET, FILM COATED ORAL at 09:24

## 2025-02-02 RX ADMIN — ATORVASTATIN CALCIUM 40 MG: 40 TABLET, FILM COATED ORAL at 09:25

## 2025-02-02 RX ADMIN — TAMSULOSIN HYDROCHLORIDE 0.4 MG: 0.4 CAPSULE ORAL at 09:24

## 2025-02-02 RX ADMIN — FERROUS SULFATE TAB 325 MG (65 MG ELEMENTAL FE) 325 MG: 325 (65 FE) TAB at 09:24

## 2025-02-02 RX ADMIN — POTASSIUM CHLORIDE 40 MEQ: 1500 TABLET, EXTENDED RELEASE ORAL at 11:10

## 2025-02-02 RX ADMIN — PANTOPRAZOLE SODIUM 40 MG: 40 TABLET, DELAYED RELEASE ORAL at 06:14

## 2025-02-02 RX ADMIN — GUAIFENESIN 200 MG: 200 SOLUTION ORAL at 20:27

## 2025-02-02 RX ADMIN — FUROSEMIDE 40 MG: 10 INJECTION, SOLUTION INTRAMUSCULAR; INTRAVENOUS at 11:10

## 2025-02-02 RX ADMIN — FUROSEMIDE 40 MG: 10 INJECTION, SOLUTION INTRAMUSCULAR; INTRAVENOUS at 16:35

## 2025-02-02 RX ADMIN — EMPAGLIFLOZIN 10 MG: 10 TABLET, FILM COATED ORAL at 09:24

## 2025-02-02 RX ADMIN — TAMSULOSIN HYDROCHLORIDE 0.4 MG: 0.4 CAPSULE ORAL at 20:27

## 2025-02-02 RX ADMIN — CALCIUM 1250 MG: 500 TABLET ORAL at 16:35

## 2025-02-02 RX ADMIN — IPRATROPIUM BROMIDE AND ALBUTEROL SULFATE 3 ML: 2.5; .5 SOLUTION RESPIRATORY (INHALATION) at 01:04

## 2025-02-02 RX ADMIN — GUAIFENESIN 200 MG: 200 SOLUTION ORAL at 11:10

## 2025-02-02 RX ADMIN — VANCOMYCIN HYDROCHLORIDE 1000 MG: 1 INJECTION, SOLUTION INTRAVENOUS at 16:43

## 2025-02-02 RX ADMIN — AZITHROMYCIN MONOHYDRATE 500 MG: 500 INJECTION, POWDER, LYOPHILIZED, FOR SOLUTION INTRAVENOUS at 11:11

## 2025-02-02 ASSESSMENT — COGNITIVE AND FUNCTIONAL STATUS - GENERAL
DAILY ACTIVITIY SCORE: 20
TURNING FROM BACK TO SIDE WHILE IN FLAT BAD: A LITTLE
MOVING TO AND FROM BED TO CHAIR: A LITTLE
MOBILITY SCORE: 18
WALKING IN HOSPITAL ROOM: A LITTLE
MOBILITY SCORE: 17
TURNING FROM BACK TO SIDE WHILE IN FLAT BAD: A LITTLE
EATING MEALS: A LITTLE
CLIMB 3 TO 5 STEPS WITH RAILING: A LOT
DRESSING REGULAR UPPER BODY CLOTHING: A LITTLE
STANDING UP FROM CHAIR USING ARMS: A LITTLE
MOVING FROM LYING ON BACK TO SITTING ON SIDE OF FLAT BED WITH BEDRAILS: A LITTLE
DRESSING REGULAR UPPER BODY CLOTHING: A LITTLE
STANDING UP FROM CHAIR USING ARMS: A LITTLE
DRESSING REGULAR LOWER BODY CLOTHING: A LITTLE
MOVING FROM LYING ON BACK TO SITTING ON SIDE OF FLAT BED WITH BEDRAILS: A LITTLE
MOVING TO AND FROM BED TO CHAIR: A LITTLE
HELP NEEDED FOR BATHING: A LITTLE
TOILETING: A LITTLE
TOILETING: A LITTLE
PERSONAL GROOMING: A LITTLE
DAILY ACTIVITIY SCORE: 18
CLIMB 3 TO 5 STEPS WITH RAILING: A LITTLE
WALKING IN HOSPITAL ROOM: A LITTLE
DRESSING REGULAR LOWER BODY CLOTHING: A LITTLE
HELP NEEDED FOR BATHING: A LITTLE

## 2025-02-02 ASSESSMENT — PAIN SCALES - GENERAL
PAINLEVEL_OUTOF10: 0 - NO PAIN
PAINLEVEL_OUTOF10: 0 - NO PAIN

## 2025-02-02 NOTE — CONSULTS
Inpatient consult to Cardiology  Consult performed by: Melissa Luong, APRN-CNP  Consult ordered by: Donta Echeverria MD  Reason for consult: acute CHF      History Of Present Illness:    Ramón Flores is a 89 y.o. 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  Cardiology consulted for acute CHF. He reports about 3 days ago he started to develop non-productive cough that continued to worsen with dyspnea on exertion, fatigue/weakness. No chest pain. He denied LH/dizziness/palpitations/syncope. Denies fever/chills. No change in bowel or bladder habits.     Of note, his home Bumex 1 mg and Spironolactone 25 mg were stopped following October admission for rectal bleeding, found to have renal mass. Bumex  and Spironolactone restarted per PCP at reduced doses (0.5 mg every other day, 12.5 mg every other day).     ED course  VS 36.5/HR 67/RR 20//73/ Pox 98% on 3L NC. Pox was 89% on RA in triage and she was placed on 3L NC.   COVID flu and RSV were all negative.  CMP with normal renal fx, hypocalcemic 6.6, T bili elevated to 2. BNP elevated 679 (317 10/2024, 1024 5/2024). CBC with leukopenia 3.3, H&H 11/32.3, thrombocytopenic platelets 63. HsTrop 92/85. Chest x-ray with bibasilar atectasis vs infiltrates superimposed on chronic changes. Cardiomegaly with post thoracotomy changes.   Given 1 gm Ceftriaxone IV, Duo-Neb, Azithromycin 500 mg IV. CTA Chest negatie for PE, CHF with small pleural effusions and pulmonary venous congestion/interstitial edema & cardiomegaly. LLL patchy consolidation consistent with PNA or aspiration. Admitted to [facility].       Tele: Afib, rates  controlled  Cardiologist: Dr. Dugan, last visit 7/2024  Outpatient cardiac medications: Atorvastatin 40 mg daily, Jardiance 10 mg daily, Bumex 0.5 mg every other day, Spironolactone 12.5 mg every other day    Social history: Denies smoking, alcohol abuse, or illicit drug use.           Past Cardiology Tests (Last 3 Years):  EKG 2/1/25:            Results for orders placed during the hospital encounter of 05/31/24    Electrocardiogram, 12-lead PRN ACS symptoms    Narrative  Atrial fibrillation with premature ventricular or aberrantly conducted complexes  Right superior axis deviation  Incomplete right bundle branch block  Nonspecific T wave abnormality  Prolonged QT  Abnormal ECG  When compared with ECG of 19-MAR-2024 10:02,  Sinus rhythm is no longer with ventricular escape complexes  Left anterior fascicular block is no longer Present  See ED provider note for full interpretation and clinical correlation  Confirmed by Juliann Nicholas ([id number]) on 5/31/2024 10:44:02 AM      Results for orders placed during the hospital encounter of 03/19/24    ECG 12 lead    Narrative  Atrial fibrillation with premature ventricular or aberrantly conducted complexes and with ventricular escape complexes  Left anterior fascicular block  Possible Anterior infarct , age undetermined  Abnormal ECG  When compared with ECG of 20-JAN-2024 15:42,  Sinus rhythm is now with ventricular escape complexes    See ED provider note for full interpretation and clinical correlation  Confirmed by Latanya Thomas ([id number]) on 3/19/2024 11:03:20 PM      Results for orders placed during the hospital encounter of 01/20/24    ECG 12 Lead    Narrative  Atrial fibrillation with slow ventricular response  Nonspecific ST abnormality  Abnormal ECG  When compared with ECG of 18-JAN-2024 13:33, (unconfirmed)  No significant change was found  Confirmed by Ramón Maradiaga ([id number]) on 1/25/2024 1:09:09 PM    Echo:  Results for orders placed during the hospital encounter of  05/31/24    Transthoracic Echo (TTE) Complete    Narrative  [facility], [address] [place] [address]  Tel [phone] and Fax [phone]    TRANSTHORACIC ECHOCARDIOGRAM REPORT      Patient Name:      KRISTIN DAY   Reading Physician:    [id number] Davis Cabello MD  Study Date:        6/1/2024            Ordering Provider:    [id number] ADRIANNA CUMMINS  MRN/PID:           [id number]            Fellow:  Accession#:        [id number]        Nurse:  Date of Birth/Age: 7/24/1928 / 89      Sonographer:          Devin shaikh                                     RDIESHA  Gender:            M                   Additional Staff:  Height:            167.60 cm           Admit Date:           5/31/2024  Weight:            63.00 kg            Admission Status:     Observation -  Routine  BSA / BMI:         1.71 m2 / 22.43     Encounter#:           [id number]  kg/m2  Department Location:  [facility] ED  Blood Pressure: 109 /70 mmHg    Study Type:    TRANSTHORACIC ECHO (TTE) COMPLETE  Diagnosis/ICD: Unspecified diastolic (congestive) heart failure (CHF)-I50.30;  Unspecified systolic (congestive) heart failure (CHF)-I50.20  Indication:    Congestive Heart Failure  CPT Code:      Echo Limited-78176; Doppler Limited-64475; Color Doppler-90266    Patient History:  Pertinent History: PMH Hemophilia A, Protein C deficiency, HTN, Afib, HFpEF,  HLD, nephrolithiasis, CAD s/p CABG (2002), BPH, and R fem  neck fx s/p THR 1/13/23) that presents to the ED with 2x days  SOB, dry cough and hematuria.    Study Detail: The following Echo studies were performed: 2D, M-Mode, Doppler and  color flow. Technically challenging study due to body habitus.      PHYSICIAN INTERPRETATION:  Left Ventricle: The left ventricular systolic function is low normal, with an estimated ejection fraction of 50%. There are no regional wall motion abnormalities. The left ventricular cavity size is normal. There is moderate to severe concentric  left ventricular hypertrophy. Left ventricular diastolic filling was indeterminate.  Left Atrium: The left atrium was not assessed.  Right Ventricle: The right ventricle is mildly enlarged. There is reduced right ventricular systolic function.  Right Atrium: The right atrium was not assessed.  Aortic Valve: The aortic valve is trileaflet. There is no evidence of aortic valve regurgitation.  Mitral Valve: The mitral valve is normal in structure. The mitral valve spectral Doppler A-wave is absent, consistent with atrial fibrillation. There is mild mitral valve regurgitation.  Tricuspid Valve: The tricuspid valve is structurally normal. There is mild tricuspid regurgitation. The Doppler estimated RVSP is moderately elevated at 53.0 mmHg.  Pulmonic Valve: The pulmonic valve is structurally normal. There is physiologic pulmonic valve regurgitation.  Pericardium: There is no pericardial effusion noted.  Aorta: The aortic root was not assessed.  Systemic Veins: The inferior vena cava appears dilated. There is less than 50% IVC collapse with inspiration.  In comparison to the previous echocardiogram(s): Compared with study from 3/19/2024, the findings are similar.      CONCLUSIONS:  1. Left ventricular systolic function is low normal with a 50% estimated ejection fraction.  2. There is moderate to severe concentric left ventricular hypertrophy.  3. There is reduced right ventricular systolic function.  4. Absent A-wave on MV spectral Doppler tracing, consistent with atrial fibrillation.  5. Moderately elevated right ventricular systolic pressure.    QUANTITATIVE DATA SUMMARY:  2D MEASUREMENTS:  Normal Ranges:  IVSd:          1.61 cm    (0.6-1.1cm)  LVPWd:         1.46 cm    (0.6-1.1cm)  LVIDd:         3.60 cm    (3.9-5.9cm)  LVIDs:         2.97 cm  LV Mass Index: 121.8 g/m2  LV % FS        17.5 %    LV SYSTOLIC FUNCTION BY 2D PLANIMETRY (MOD):  Normal Ranges:  EF-A4C View: 32.6 % (>=55%)  EF-A2C View: 51.2 %  EF-Biplane:   40.9 %      RIGHT VENTRICLE:  RV Major 8.8 cm    TRICUSPID VALVE/RVSP:  Normal Ranges:  Peak TR Velocity: 3.08 m/s  RV Syst Pressure: 53.0 mmHg (< 30mmHg)  IVC Diam:         2.40 cm      [id number] Davis Cabello MD  Electronically signed on 6/1/2024 at 9:52:14 AM        ** Final **      Results for orders placed during the hospital encounter of 03/19/24    Transthoracic Echo (TTE) Complete    Narrative  [facility], [address] [place] [address]  Tel [phone] and Fax [phone]    TRANSTHORACIC ECHOCARDIOGRAM REPORT      Patient Name:      KRISTIN DAY    Reading Physician:    [id number] Beto Hauser MD  Study Date:        3/20/2024            Ordering Provider:    [id number] MARK MACIEL  MRN/PID:           [id number]             Fellow:  Accession#:        [id number]         Nurse:  Date of Birth/Age: 7/24/1928 / 89 years Sonographer:          Kash Simon  [facility]  Gender:            M                    Additional Staff:  Height:            152.40 cm            Admit Date:           3/19/2024  Weight:            63.50 kg             Admission Status:     Inpatient -  Routine  BSA / BMI:         1.60 m2 / 27.34      Encounter#:           [id number]  kg/m2  Department Location:  [place] [address]  Blood Pressure: 97 /59 mmHg    Study Type:    TRANSTHORACIC ECHO (TTE) COMPLETE  Diagnosis/ICD: Chronic diastolic (congestive) heart failure (CHF)-I50.32  Indication:    CHF, HFpEF  CPT Code:      Echo Complete w Full Doppler-72625    Patient History:  Pertinent History: PMH Hemophilia A, Protein C deficiency, HTN, Afib, HFpEF,  HLD, nephrolithiasis, CAD s/p CABG (2002), BPH, and R fem  neck fx s/p THR 1/13/23) that presents to the ED with 2x days  SOB, dry cough and hematuria.    Study Detail: The following Echo studies were performed: 2D, M-Mode, Doppler and  color flow.      PHYSICIAN INTERPRETATION:  Left Ventricle: The left ventricular systolic function is mildly decreased, with an estimated  ejection fraction of 50%. There is global hypokinesis of the left ventricle with minor regional variations. The left ventricular cavity size is normal. Left ventricular diastolic filling was not assessed.  Left Atrium: The left atrium is moderately dilated.  Right Ventricle: The right ventricle is normal in size. There is mildly reduced right ventricular systolic function.  Right Atrium: The right atrium is normal in size.  Aortic Valve: The aortic valve is trileaflet. There is minimal aortic valve cusp calcification. There is mild aortic valve thickening. There is no evidence of aortic valve regurgitation. The peak instantaneous gradient of the aortic valve is 3.8 mmHg. The mean gradient of the aortic valve is 2.0 mmHg.  Mitral Valve: The mitral valve is normal in structure. There is trace mitral valve regurgitation.  Tricuspid Valve: The tricuspid valve is structurally normal. There is mild to moderate tricuspid regurgitation. The Doppler estimated RVSP is mildly elevated at 34.8 mmHg.  Pulmonic Valve: The pulmonic valve is structurally normal. There is trace pulmonic valve regurgitation.  Pericardium: There is no pericardial effusion noted.  Aorta: The aortic root is normal.  Systemic Veins: The inferior vena cava appears dilated. There is IVC inspiratory collapse greater than 50%. The hepatic vein shows a normal flow pattern.  In comparison to the previous echocardiogram(s): Compared with study from 2/24/2023, the LVEF appears reduced (50%) when compared with the prior assessment (60%). Also, TR appears mild to moderate on today's study compared to trace.      CONCLUSIONS:  1. Left ventricular systolic function is mildly decreased with a 50% estimated ejection fraction.  2. There is global hypokinesis of the left ventricle with minor regional variations.  3. There is mildly reduced right ventricular systolic function.  4. Mild to moderate tricuspid regurgitation visualized.  5. Mildly elevated RVSP.  6. The left  atrium is moderately dilated.  7. Compared with study from 2023, the LVEF appears reduced (50%) when compared with the prior assessment (60%). Also, TR appears mild to moderate on today's study compared to trace.    QUANTITATIVE DATA SUMMARY:  2D MEASUREMENTS:  Normal Ranges:  Ao Root d:     3.20 cm   (2.0-3.7cm)  LAs:           3.70 cm   (2.7-4.0cm)  IVSd:          1.10 cm   (0.6-1.1cm)  LVPWd:         1.10 cm   (0.6-1.1cm)  LVIDd:         4.00 cm   (3.9-5.9cm)  LVIDs:         2.90 cm  LV Mass Index: 90.8 g/m2  LV % FS        27.5 %    LA VOLUME:  Normal Ranges:  LA Vol A4C:        74.6 ml    (22+/-6mL/m2)  LA Vol A2C:        59.8 ml  LA Vol BP:         67.3 ml  LA Vol Index A4C:  46.5ml/m2  LA Vol Index A2C:  37.3 ml/m2  LA Vol Index BP:   41.9 ml/m2  LA Area A4C:       22.6 cm2  LA Area A2C:       20.1 cm2  LA Major Axis A4C: 5.8 cm  LA Major Axis A2C: 5.7 cm  LA Volume Index:   42.0 ml/m2    AORTA MEASUREMENTS:  Normal Ranges:  Ao Sinus, d: 3.44 cm (2.1-3.5cm)  Ao STJ, d:   2.60 cm (1.7-3.4cm)  Asc Ao, d:   3.40 cm (2.1-3.4cm)    LV SYSTOLIC FUNCTION BY 2D PLANIMETRY (MOD):  Normal Ranges:  EF-A4C View: 51.2 % (>=55%)  EF-A2C View: 50.5 %  EF-Biplane:  50.7 %    LV DIASTOLIC FUNCTION:  Normal Ranges:  MV Peak E:      0.66 m/s   (0.7-1.2 m/s)  MV e'           0.06 m/s   (>8.0)  MV lateral e'   0.06 m/s  MV medial e'    0.05 m/s  E/e' Ratio:     11.03      (<8.0)  PulmV Sys Daniel:  16.30 cm/s  PulmV Tierney Daniel: 29.00 cm/s  PulmV S/D Daniel:  0.60    MITRAL VALVE:  Normal Ranges:  MV DT: 310 msec (150-240msec)    AORTIC VALVE:  Normal Ranges:  AoV Vmax:                0.97 m/s (<=1.7m/s)  AoV Peak PG:             3.8 mmHg (<20mmHg)  AoV Mean P.0 mmHg (1.7-11.5mmHg)  LVOT Max Daniel:            0.72 m/s (<=1.1m/s)  AoV VTI:                 19.20 cm (18-25cm)  LVOT VTI:                13.50 cm  LVOT Diameter:           2.00 cm  (1.8-2.4cm)  AoV Area, VTI:           2.21 cm2 (2.5-5.5cm2)  AoV Area,Vmax:            2.32 cm2 (2.5-4.5cm2)  AoV Dimensionless Index: 0.70      RIGHT VENTRICLE:  RV Basal 3.80 cm  RV Mid   3.10 cm  RV Major 8.4 cm  TAPSE:   12.0 mm  RV s'    0.06 m/s    TRICUSPID VALVE/RVSP:  Normal Ranges:  Peak TR Velocity: 2.59 m/s  Est. RA Pressure: 8 mmHg  RV Syst Pressure: 34.8 mmHg (< 30mmHg)  IVC Diam:         2.25 cm    PULMONIC VALVE:  Normal Ranges:  PV Max Daniel: 0.5 m/s  (0.6-0.9m/s)  PV Max P.2 mmHg    Pulmonary Veins:  PulmV Tierney Daniel: 29.00 cm/s  PulmV S/D Daniel:  0.60  PulmV Sys Daniel:  16.30 cm/s      [id number] Beto Hauser MD  Electronically signed on 3/20/2024 at 5:18:03 PM        ** Final **    Ejection Fractions:  LV EF   Date/Time Value Ref Range Status   2024 03:17 PM 51 %      Cath:  No results found for this or any previous visit.    Stress Test:  No results found for this or any previous visit.    Cardiac Imaging:  No results found for this or any previous visit.      Past Medical History:  He has a past medical history of Acute deep vein thrombosis (DVT) of left femoral vein (Multi) (09/10/2023), Acute diastolic heart failure (2023), Benign prostatic hyperplasia without lower urinary tract symptoms (2016), Bleeding hemorrhoids (2023), CAP (community acquired pneumonia) (2024), Closed nondisplaced fracture of head of left radius (2023), COVID-19 (2023), Enlarged prostate with lower urinary tract symptoms (LUTS) (2023), Fracture of bone of hip (Multi) (2023), Fracture of femoral neck, right (2023), Gastrointestinal hemorrhage (2023), Gingiva hemorrhage (2023), Gross hematuria (2023), Hemarthrosis of ankle joint (2019), Hemarthrosis of knee (2022), Hematochezia (2023), Hematoma of lower extremity (2023), Hematoma of right thigh (2023), History of recent fall (2023), Knee effusion (2023), Knee effusion, left (2023), Lumbar pain (2023), Obstructive uropathy  (2023), Orthostatic syncope (2023), Personal history of other endocrine, nutritional and metabolic disease, Pneumonia of left lower lobe due to infectious organism (2024), Pneumonia of right lung due to infectious organism (2023), Psychogenic syncope (2023), Subdural hematoma (Multi) (2023), Syncope (2023), Urinary retention (2023), UTI (urinary tract infection) (2023), and Viral gastroenteritis (2023).    Past Surgical History:  He has a past surgical history that includes Coronary artery bypass graft.      Social History:  He reports that he has never smoked. He has never used smokeless tobacco. Alcohol use questions deferred to the physician. He reports that he does not use drugs.    Family History:  Family History   Problem Relation Name Age of Onset    Hemophilia Mother          Allergies:  Aspirin and Penicillins    ROS:  10 point review of systems including (Constitutional, Eyes, ENMT, Respiratory, Cardiac, Gastrointestinal, Neurological, Psychiatric, and Hematologic) was performed and is otherwise negative.    Objective Data:  Last Recorded Vitals:  Vitals:    25 1949 25 2330 25 0100 25 0357   BP:  105/69  101/64   BP Location:  Left arm  Left arm   Patient Position:  Lying  Lying   Pulse:  72 70 73   Resp:  20  20   Temp:  36.7 °C (98 °F)  36.7 °C (98 °F)   TempSrc:  Temporal  Temporal   SpO2: 94% 96% 96% 95%   Weight:       Height:         Medical Gas Therapy: Supplemental oxygen  Medical Gas Delivery Method: Nasal cannula  Weight  Av.9 kg (132 lb)  Min: 59.9 kg (132 lb)  Max: 59.9 kg (132 lb)    LABS:  CMP:  Results from last 7 days   Lab Units 25  0617   SODIUM mmol/L 140   POTASSIUM mmol/L 3.5   CHLORIDE mmol/L 106   CO2 mmol/L 22   ANION GAP mmol/L 16   BUN mg/dL 19   CREATININE mg/dL 0.98   EGFR mL/min/1.73m*2 71   ALBUMIN g/dL 4.0   ALT U/L 35   AST U/L 35   BILIRUBIN TOTAL mg/dL 2.0*     CBC:  Results from  "last 7 days   Lab Units 02/01/25  0617   WBC AUTO x10*3/uL 3.3*   HEMOGLOBIN g/dL 11.0*   HEMATOCRIT % 32.3*   PLATELETS AUTO x10*3/uL 63*   MCV fL 107*     COAG:     ABO: No results found for: \"ABO\"  HEME/ENDO:     CARDIAC:   Results from last 7 days   Lab Units 02/01/25  0715 02/01/25  0617   TROPHS ng/L 85* 92*   BNP pg/mL  --  679*             Last I/O:    Intake/Output Summary (Last 24 hours) at 2/2/2025 0759  Last data filed at 2/2/2025 0357  Gross per 24 hour   Intake 250 ml   Output 600 ml   Net -350 ml     Net IO Since Admission: -350 mL [02/02/25 0759]      Imaging Results:  CT angio chest for pulmonary embolism    Result Date: 2/1/2025  STUDY: CT Angiogram of the Chest; 2/1/2025 10:26 AM INDICATION: Shortness of breath. COMPARISON: CTA chest 1/18/2024. ACCESSION NUMBER(S): [id number] ORDERING CLINICIAN: SABINE MINA TECHNIQUE:  CTA of the chest was performed with intravenous contrast. Images are reviewed and processed at a workstation according to the CT angiogram protocol with 3-D and/or MIP post processing imaging generated.  Omnipaque 350--85 mL was administered intravenously. Automated mA/kV exposure control was utilized and patient examination was performed in strict accordance with principles of ALARA. FINDINGS: Pulmonary arteries are adequately opacified without acute or chronic filling defects.  Thoracic aorta is not adequately opacified (essentially noncontrast appearance secondary to contrast bolus timing) to evaluate for dissection.  There is calcific plaque of the thoracic aorta without aneurysm. Cardiomegaly.  No pericardial effusion.  Coronary artery calcifications.  Status post median sternotomy.  Mild bilateral gynecomastia.  Mild generalized subcutaneous edema of the body wall.  Thoracic lymph nodes are not enlarged. Small bilateral pleural effusions layering dependently, slightly larger on the right than left.  Associated compressive atelectasis of the bilateral lower lobes.  " Superimposed on this are areas of patchy consolidation in the left lower lobe from the peribronchial vascular structures consistent with pneumonia or aspiration.  There is likely pulmonary venous hypertension/low-grade interstitial edema.  No definite alveolar edema.  The central airways are patent.  No pneumothorax.  There is no pleural thickening Upper abdomen demonstrates no acute pathology. There are no acute fractures.  No suspicious bony lesions.    No pulmonary embolism.  Findings suggest CHF with small pleural effusions and pulmonary venous hypertension/interstitial edema and cardiomegaly.  Superimposed patchy left lower lobe consolidation consistent with pneumonia or aspiration. Signed by Lloyd Epps DO    XR chest 1 view    Result Date: 2/1/2025  Interpreted By:  Roseline Luther, STUDY: XR CHEST 1 VIEW 2/1/2025 6:41 am   INDICATION: Signs/Symptoms:cough + hypoxia   COMPARISON: 05/31/2024   ACCESSION NUMBER(S): [id number]   ORDERING CLINICIAN: SABINE MINA   TECHNIQUE: AP view   FINDINGS: Sternal wires are noted. Electronic device overlies the heart. There is an enlarged cardiac silhouette.   The lungs are hyperinflated. There are bibasilar infiltrates or atelectasis since the prior exam.   No significant pleural effusions. No pneumothorax       Bibasilar atelectasis or infiltrates are superimposed on chronic changes. Enlarged cardiac silhouette with post thoracotomy changes   Signed by: Roseline Luther 2/1/2025 7:54 AM Dictation workstation:   [id number]      Inpatient Medications:  Scheduled medications   Medication Dose Route Frequency    atorvastatin  40 mg oral Daily    azithromycin  500 mg intravenous q24h    bumetanide  0.5 mg oral Every other day    calcium carbonate  1,250 mg oral BID    cefTRIAXone  1 g intravenous q24h    empagliflozin  10 mg oral Daily    ferrous sulfate (325 mg ferrous sulfate)  65 mg of iron oral Daily with breakfast    finasteride  5 mg oral Daily    influenza  0.5  mL intramuscular During hospitalization    folic acid  1 mg oral Daily    ipratropium-albuteroL  3 mL nebulization Once    ipratropium-albuteroL  3 mL nebulization q6h    oxygen   inhalation Continuous - Inhalation    pantoprazole  40 mg oral Daily before breakfast    polyethylene glycol  17 g oral Daily    tamsulosin  0.4 mg oral BID     PRN medications   Medication    acetaminophen     Continuous Medications   Medication Dose Last Rate       Outpatient Medications:  Current Outpatient Medications   Medication Instructions    acetaminophen (TYLENOL EXTRA STRENGTH) 1,000 mg, oral, Every 8 hours PRN    antihemophilic RF VIII (Altuviiio) 3,000 (+/-) unit recon soln 50 Units/kg, intravenous, Every 7 days, 3030 units +/-10% dose every 7 days.  Additionally,  prn when directed.    atorvastatin (LIPITOR) 40 mg, oral, Daily    bumetanide (BUMEX) 0.5 mg, oral, Every other day    calcium carbonate 600 mg calcium (1,500 mg) tablet 1 tablet, 2 times daily (morning and late afternoon)    empagliflozin (JARDIANCE) 10 mg, oral, Daily    ferrous sulfate 325 mg, Daily with breakfast    finasteride (PROSCAR) 5 mg, oral, Daily    folic acid (Folvite) 1 mg tablet 1 tablet, Daily    Lactobacillus acidoph-L.bulgar 1 million cell tablet tablet 1 Million Cells, Daily    multivitamin tablet 1 tablet, Daily    nystatin (Mycostatin) cream Topical, 2 times daily, To reddenned area of groin    pantoprazole (PROTONIX) 40 mg, oral, Daily before breakfast, Do not crush, chew, or split.    spironolactone (ALDACTONE) 12.5 mg, oral, Every other day    tamsulosin (FLOMAX) 0.4 mg, oral, 2 times daily    vit A/vit C/vit E/zinc/copper (PRESERVISION AREDS ORAL) 1 capsule, Daily       Physical Exam:  General:  Elderly male. Patient is awake, alert, and oriented.  Patient is in no acute distress.  HEENT:  Pupils equal and reactive.  Normocephalic.  Moist mucosa.    Neck:  +JVD 14 cm  Cardiovascular:  IRR. +Murmur  Pulmonary:  Scattered rhonchi in upper  lobes, diminished in lower lobes bilaterally   Abdomen:  Soft. Non-tender.   Non-distended.  Positive bowel sounds.  Lower Extremities:  2+ pedal pulses. No LE edema.  Neurologic:  Cranial nerves intact.  No focal deficit.   Skin: Skin warm and dry, normal skin turgor.   Psychiatric: Normal affect.     Assessment/Plan   Ramón Flores is a 89 y.o. 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  Cardiology consulted for acute CHF.     ED course  VS 36.5/HR 67/RR 20//73/ Pox 98% on 3L NC. Pox was 89% on RA in triage and she was placed on 3L NC.   COVID flu and RSV were all negative.  CMP with normal renal fx, hypocalcemic 6.6, T bili elevated to 2. BNP elevated 679 (317 10/2024, 1024 5/2024). CBC with leukopenia 3.3, H&H 11/32.3, thrombocytopenic platelets 63. HsTrop 92/85. Chest x-ray with bibasilar atectasis vs infiltrates superimposed on chronic changes. Cardiomegaly with post thoracotomy changes.   Given 1 gm Ceftriaxone IV, Duo-Neb, Azithromycin 500 mg IV. CTA Chest negatie for PE, CHF with small pleural effusions and pulmonary venous congestion/interstitial edema & cardiomegaly. LLL patchy consolidation consistent with PNA or aspiration. Admitted to [facility].     Tele: Afib, rates controlled  Cardiologist: Dr. Dugan, last visit 7/2024  Outpatient cardiac medications: Atorvastatin 40 mg daily, Jardiance 10 mg daily, Bumex 0.5 mg every other day, Spironolactone 12.5 mg every other day    #Acute on Chronic HFpEF in the setting of LLL PNA  -TTE limited 6/2024: LVEF 50%, moderate to severe LVH, LVIDd 3.6 cm, reduced RV fx, moderate pHTN, RVSP 53  -Severely volume overloaded  on exam, hypoxic on arrival Pox 88%, improved with 2L NC.  +JVD, ,  CTA Chest negatie for PE, CHF with small pleural effusions and pulmonary venous congestion/interstitial edema & cardiomegaly. LLL patchy consolidation consistent with PNA or aspiration.   -Minimal response with 20 mg Lasix IVP; will increase diuresis with Furosemide 40 mg IV BID. Was taking Bumex 0.5 mg every other day prior to admit  -Continue Jardiance  -Hold off on Spironolactone for now given low normal BP    #CAD s/p multivessel CABG ( 2002 with LIMA --> LAD , SVG --> OM1). 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     #Chronic Afib. Rates controlled. Not on OAC 2/2 hemophilia, risk for bleeding.     Plan:   -Furosemide 40 mg IV BID. Daily standing weights, 2gm sodium diet, 2L fluid restriction, strict I&Os keep k >4, Mag>2, replace as needed.   -Will plan to go home with daily PO Bumex, dose to be determined pending response  -Jardiance 10 mg daily. Hold off on Spironlactone given soft Bps.   -Follow up with Dr. Dugan as outpatient 2-3 weeks      Code Status:  DNR and No Intubation            Melissa Luong, SUSI-CNP

## 2025-02-02 NOTE — CARE PLAN
The patient's goals for the shift include      The clinical goals for the shift include patient will remain safe    Over the shift, the patient did not make progress toward the following goals. Barriers to progression include . Recommendations to address these barriers include .  Problem: Pain - Adult  Goal: Verbalizes/displays adequate comfort level or baseline comfort level  Outcome: Progressing     Problem: Safety - Adult  Goal: Free from fall injury  Outcome: Progressing

## 2025-02-02 NOTE — PROGRESS NOTES
"[facility]          Admitting Provider: Donta Echeverria MD Admission Date: 2/1/2025.   Attending Provider: Donta Echeverria MD MRN: [id number]       Beatriz Flores is a 89 y.o. male on day 1 of admission presenting with Pneumonia of both lower lobes due to infectious organism.  Interval History  89 y.o. male with a history of chronic diastolic CHF, Atria fibrillation and VTE presented with cough & shortness of breath for few days Has chest congestion. has difficulty swallowing with coughing more with solids.     Objective   Physical Exam  Last Recorded Vitals: Blood pressure 96/56, pulse 70, temperature 36.7 °C (98.1 °F), resp. rate 16, height 1.676 m (5' 6\"), weight 59.9 kg (132 lb), SpO2 95%.  Patient Vitals for the past 24 hrs:   BP Temp Temp src Pulse Resp SpO2   02/02/25 0758 96/56 36.7 °C (98.1 °F) -- 70 16 95 %   02/02/25 0357 101/64 36.7 °C (98 °F) Temporal 73 20 95 %   02/02/25 0100 -- -- -- 70 -- 96 %   02/01/25 2330 105/69 36.7 °C (98 °F) Temporal 72 20 96 %   02/01/25 1949 -- -- -- -- -- 94 %   02/01/25 1645 102/70 36.9 °C (98.4 °F) Temporal -- -- 94 %   02/01/25 1202 123/67 37.2 °C (99 °F) -- 66 22 99 %   02/01/25 1136 94/79 -- -- 74 -- 97 %   02/01/25 1030 103/79 -- -- -- -- 98 %   02/01/25 1000 125/84 -- -- -- -- 99 %   02/01/25 0945 -- -- -- -- -- 97 %   02/01/25 0930 110/79 -- -- -- -- 97 %   02/01/25 0915 -- -- -- -- -- 96 %   02/01/25 0900 114/73 -- -- -- -- 97 %     Body mass index is 21.31 kg/m².  GENERAL: alert, cooperative, or no distress  SKIN: no rashes  HEENT Atraumatic, Normocephalic and Not pale, no icterus  NECK: supple, no thyromegaly, JVP within normal limits  LUNGS:  not in respiratory distress, respiratory rate normal, clear to auscultation  CARDIAC: regular rate and rhythm, normal S1 and S2, no murmur, rub, or gallop  ABDOMEN: Soft, non-tender, normal bowel sounds; no bruits, organomegaly or masses.  EXTREMITIES: No edema  NEURO: Alert " "and oriented x 3., reflexes normal and symmetric, strength and  sensation grossly normal  Intake/Output last 3 Shifts:  I/O last 3 completed shifts:  In: 250 (4.2 mL/kg) [IV Piggyback:250]  Out: 600 (10 mL/kg) [Urine:600 (0.3 mL/kg/hr)]  Weight: 59.9 kg   DATA:   Diagnostic tests reviewed for today's visit:    Most recent Labs  Results for orders placed or performed during the hospital encounter of 02/01/25 (from the past 24 hours)   Blood Culture    Specimen: Peripheral Venipuncture; Blood culture   Result Value Ref Range    Blood Culture Loaded on Instrument - Culture in progress    Blood Culture    Specimen: Peripheral Venipuncture; Blood culture   Result Value Ref Range    Blood Culture Loaded on Instrument - Culture in progress      *Note: Due to a large number of results and/or encounters for the requested time period, some results have not been displayed. A complete set of results can be found in Results Review.     Urine Culture   Date Value Ref Range Status   03/21/2024 (A)  Final    >100,000 Methicillin Resistant Staphylococcus aureus (MRSA)     Comment:     Methicillin (Oxacillin) resistant Staphylococci are resistant to all currently available Penicillins, Beta-lactam/Beta-lactamase inhibitor combinations (including Ampicillin/Sulbactam, Amoxicillin/Clavulanate and Pipercillin/Tazobactam), Carbapenems and   Cephalosporins (except Ceftaroline).     Blood Culture   Date Value Ref Range Status   02/01/2025 Loaded on Instrument - Culture in progress  Preliminary   02/01/2025 Loaded on Instrument - Culture in progress  Preliminary    No results found for: \"RESPCULTSM\" No results found for: \"PERDIAFLDCUL\"   Sterile Fluid Culture/Smear   Date Value Ref Range Status   05/31/2024 No growth aerobically and anaerobically  Final      CT angio chest for pulmonary embolism    Result Date: 2/1/2025  STUDY: CT Angiogram of the Chest; 2/1/2025 10:26 AM INDICATION: Shortness of breath. COMPARISON: CTA chest 1/18/2024. " ACCESSION NUMBER(S): [id number] ORDERING CLINICIAN: SABINE MINA TECHNIQUE:  CTA of the chest was performed with intravenous contrast. Images are reviewed and processed at a workstation according to the CT angiogram protocol with 3-D and/or MIP post processing imaging generated.  Omnipaque 350--85 mL was administered intravenously. Automated mA/kV exposure control was utilized and patient examination was performed in strict accordance with principles of ALARA. FINDINGS: Pulmonary arteries are adequately opacified without acute or chronic filling defects.  Thoracic aorta is not adequately opacified (essentially noncontrast appearance secondary to contrast bolus timing) to evaluate for dissection.  There is calcific plaque of the thoracic aorta without aneurysm. Cardiomegaly.  No pericardial effusion.  Coronary artery calcifications.  Status post median sternotomy.  Mild bilateral gynecomastia.  Mild generalized subcutaneous edema of the body wall.  Thoracic lymph nodes are not enlarged. Small bilateral pleural effusions layering dependently, slightly larger on the right than left.  Associated compressive atelectasis of the bilateral lower lobes.  Superimposed on this are areas of patchy consolidation in the left lower lobe from the peribronchial vascular structures consistent with pneumonia or aspiration.  There is likely pulmonary venous hypertension/low-grade interstitial edema.  No definite alveolar edema.  The central airways are patent.  No pneumothorax.  There is no pleural thickening Upper abdomen demonstrates no acute pathology. There are no acute fractures.  No suspicious bony lesions.    No pulmonary embolism.  Findings suggest CHF with small pleural effusions and pulmonary venous hypertension/interstitial edema and cardiomegaly.  Superimposed patchy left lower lobe consolidation consistent with pneumonia or aspiration. Signed by Lloyd Epps,     XR chest 1 view    Result Date:  2/1/2025  Interpreted By:  Roseline Luther, STUDY: XR CHEST 1 VIEW 2/1/2025 6:41 am   INDICATION: Signs/Symptoms:cough + hypoxia   COMPARISON: 05/31/2024   ACCESSION NUMBER(S): [id number]   ORDERING CLINICIAN: SABINE MINA   TECHNIQUE: AP view   FINDINGS: Sternal wires are noted. Electronic device overlies the heart. There is an enlarged cardiac silhouette.   The lungs are hyperinflated. There are bibasilar infiltrates or atelectasis since the prior exam.   No significant pleural effusions. No pneumothorax       Bibasilar atelectasis or infiltrates are superimposed on chronic changes. Enlarged cardiac silhouette with post thoracotomy changes   Signed by: Roseline Luther 2/1/2025 7:54 AM Dictation workstation:   [id number]   Current Facility-Administered Medications   Medication Dose Route Frequency Provider Last Rate Last Admin    acetaminophen (Tylenol) tablet 975 mg  975 mg oral q8h PRN Donta Echeverria MD        atorvastatin (Lipitor) tablet 40 mg  40 mg oral Daily Donta Echeverria MD   40 mg at 02/01/25 1343    azithromycin (Zithromax) 500 mg in dextrose 5%  mL  500 mg intravenous q24h Donta Echeverria MD        bumetanide (Bumex) tablet 0.5 mg  0.5 mg oral Every other day Donta Echeverria MD        calcium carbonate (Oscal) tablet 1,250 mg  1,250 mg oral BID Donta Echeverria MD   1,250 mg at 02/01/25 1751    cefTRIAXone (Rocephin) 1 g in dextrose (iso) IV 50 mL  1 g intravenous q24h Donta Echeverria MD        empagliflozin (Jardiance) tablet 10 mg  10 mg oral Daily Donta Echeverria MD   10 mg at 02/01/25 1344    ferrous sulfate (325 mg ferrous sulfate) tablet 325 mg  65 mg of iron oral Daily with breakfast Donta Echeverria MD        finasteride (Proscar) tablet 5 mg  5 mg oral Daily Donta Echeverria MD   5 mg at 02/01/25 1343    flu vaccine, trivalent, preservative free, HIGH-DOSE, age 65y+ (Fluzone)  0.5 mL intramuscular During hospitalization Donta Echeverria MD         folic acid (Folvite) tablet 1 mg  1 mg oral Daily Donta Echeverria MD   1 mg at 02/01/25 1343    guaiFENesin (Robitussin) 100 mg/5 mL syrup 200 mg  200 mg oral q4h PRN Donta Echeverria MD        ipratropium-albuteroL (Duo-Neb) 0.5-2.5 mg/3 mL nebulizer solution 3 mL  3 mL nebulization Once Callum Tamayo MD        ipratropium-albuteroL (Duo-Neb) 0.5-2.5 mg/3 mL nebulizer solution 3 mL  3 mL nebulization q6h Donta Echeverria MD   3 mL at 02/02/25 0641    oxygen (O2) therapy   inhalation Continuous - Inhalation Donta Echeverria ,000 mL/hr at 02/01/25 1949 3 L/min at 02/01/25 1949    pantoprazole (ProtoNix) EC tablet 40 mg  40 mg oral Daily before breakfast Donta Echeverria MD   40 mg at 02/02/25 0614    polyethylene glycol (Glycolax, Miralax) packet 17 g  17 g oral Daily Donta Echeverria MD        tamsulosin (Flomax) 24 hr capsule 0.4 mg  0.4 mg oral BID Donta Echeverria MD   0.4 mg at 02/01/25 2054     No current outpatient medications on file.   ..     Medication and Non-Pharmacologic VTE Prophylaxis/Anticoagulants      Last Anticoag Admin            No anticoagulants administered    No unadministered anticoagulant orders found.          Assessment/Plan   Ramón Flores has  Assessment & Plan  Pneumonia of both lower lobes due to infectious organism  Acute on chronic diastolic CHF  Atrial fibrillation  Hemophilia   Elevated troponin  Thrombocytopenia     On Rocephin and azithromycin.  Swallow evaluation to rule out aspiration.   Other Hospital problems        Abnormal findings not addressed during hospitalization, but require out patient follow up. None        I spent 35 minutes talking and examining Ramón Flores, reviewing the labs & medications, formulating plan of care & discussing with patient and nursing staff.    Donta Echeverria MD

## 2025-02-02 NOTE — PROGRESS NOTES
Vancomycin Dosing by Pharmacy- INITIAL    Ramón Flores is a 89 y.o. year old male who Pharmacy has been consulted for vancomycin dosing for other Bacteremia . Based on the patient's indication and renal status this patient will be dosed based on a goal AUC of 500-600.     Renal function is currently stable.    Visit Vitals  /70   Pulse 76   Temp 36.3 °C (97.4 °F)   Resp 16        Lab Results   Component Value Date    CREATININE 0.99 2025    CREATININE 0.98 2025    CREATININE 1.19 10/23/2024    CREATININE 1.16 10/22/2024        Patient weight is as follows:   Vitals:    25 0614   Weight: 59.9 kg (132 lb)       Cultures:  No results found for the encounter in last 14 days.        I/O last 3 completed shifts:  In: 250 (4.2 mL/kg) [IV Piggyback:250]  Out: 600 (10 mL/kg) [Urine:600 (0.3 mL/kg/hr)]  Weight: 59.9 kg   I/O during current shift:  I/O this shift:  In: 660 [P.O.:360; IV Piggyback:300]  Out: 800 [Urine:800]    Temp (24hrs), Av.7 °C (98 °F), Min:36.3 °C (97.4 °F), Max:36.9 °C (98.4 °F)         Assessment/Plan     Patient will not be given a loading dose.  Will initiate vancomycin maintenance,  1000 mg every 24 hours.    This dosing regimen is predicted by [employer] to result in the following pharmacokinetic parameters:  Loading dose: N/A  Regimen: 1000 mg IV every 24 hours.  Start time: 15:43 on 2025  Exposure target: AUC24 (range)400-600 mg/L.hr   [id number]: 351 mg/L.hr  AUC24,ss: 517 mg/L.hr  Probability of AUC24 > 400: 79 %  Ctrough,ss: 16.2 mg/L  Probability of Ctrough,ss > 20: 27 %      Follow-up level will be ordered on 2/3 at AM lab unless clinically indicated sooner.  Will continue to monitor renal function daily while on vancomycin and order serum creatinine at least every 48 hours if not already ordered.  Follow for continued vancomycin needs, clinical response, and signs/symptoms of toxicity.       Eunice Vargas, PharmD

## 2025-02-03 ENCOUNTER — APPOINTMENT (OUTPATIENT)
Dept: RADIOLOGY | Facility: CLINIC | Age: OVER 89
End: 2025-02-03
Payer: MEDICARE

## 2025-02-03 ENCOUNTER — APPOINTMENT (OUTPATIENT)
Dept: CARDIOLOGY | Facility: HOSPITAL | Age: OVER 89
End: 2025-02-03
Payer: MEDICARE

## 2025-02-03 ENCOUNTER — APPOINTMENT (OUTPATIENT)
Dept: RADIOLOGY | Facility: HOSPITAL | Age: OVER 89
End: 2025-02-03
Payer: MEDICARE

## 2025-02-03 LAB
ANION GAP SERPL CALC-SCNC: 13 MMOL/L (ref 10–20)
AORTIC VALVE MEAN GRADIENT: 2 MMHG
AORTIC VALVE PEAK VELOCITY: 0.93 M/S
AV PEAK GRADIENT: 3 MMHG
AVA (PEAK VEL): 2.47 CM2
AVA (VTI): 2.79 CM2
BASOPHILS # BLD AUTO: 0.01 X10*3/UL (ref 0–0.1)
BASOPHILS NFR BLD AUTO: 0.4 %
BUN SERPL-MCNC: 16 MG/DL (ref 6–23)
CALCIUM SERPL-MCNC: 6.5 MG/DL (ref 8.6–10.3)
CHLORIDE SERPL-SCNC: 104 MMOL/L (ref 98–107)
CO2 SERPL-SCNC: 28 MMOL/L (ref 21–32)
CREAT SERPL-MCNC: 1.03 MG/DL (ref 0.5–1.3)
EGFRCR SERPLBLD CKD-EPI 2021: 66 ML/MIN/1.73M*2
EJECTION FRACTION APICAL 4 CHAMBER: 40.5
EJECTION FRACTION: 49 %
EOSINOPHIL # BLD AUTO: 0.06 X10*3/UL (ref 0–0.4)
EOSINOPHIL NFR BLD AUTO: 2.3 %
ERYTHROCYTE [DISTWIDTH] IN BLOOD BY AUTOMATED COUNT: 18.5 % (ref 11.5–14.5)
GLUCOSE SERPL-MCNC: 96 MG/DL (ref 74–99)
HCT VFR BLD AUTO: 29.9 % (ref 41–52)
HGB BLD-MCNC: 10.2 G/DL (ref 13.5–17.5)
HOLD SPECIMEN: NORMAL
HOLD SPECIMEN: NORMAL
IMM GRANULOCYTES # BLD AUTO: 0.04 X10*3/UL (ref 0–0.5)
IMM GRANULOCYTES NFR BLD AUTO: 1.5 % (ref 0–0.9)
LEFT ATRIUM VOLUME AREA LENGTH INDEX BSA: 57.7 ML/M2
LEFT VENTRICLE INTERNAL DIMENSION DIASTOLE: 3.76 CM (ref 3.5–6)
LEFT VENTRICULAR OUTFLOW TRACT DIAMETER: 1.97 CM
LYMPHOCYTES # BLD AUTO: 0.49 X10*3/UL (ref 0.8–3)
LYMPHOCYTES NFR BLD AUTO: 18.6 %
MCH RBC QN AUTO: 35.9 PG (ref 26–34)
MCHC RBC AUTO-ENTMCNC: 34.1 G/DL (ref 32–36)
MCV RBC AUTO: 105 FL (ref 80–100)
MONOCYTES # BLD AUTO: 0.45 X10*3/UL (ref 0.05–0.8)
MONOCYTES NFR BLD AUTO: 17.1 %
NEUTROPHILS # BLD AUTO: 1.58 X10*3/UL (ref 1.6–5.5)
NEUTROPHILS NFR BLD AUTO: 60.1 %
NRBC BLD-RTO: 0 /100 WBCS (ref 0–0)
PLATELET # BLD AUTO: 54 X10*3/UL (ref 150–450)
POTASSIUM SERPL-SCNC: 3.5 MMOL/L (ref 3.5–5.3)
Q ONSET: 211 MS
QRS COUNT: 13 BEATS
QRS DURATION: 94 MS
QT INTERVAL: 438 MS
QTC CALCULATION(BAZETT): 495 MS
QTC FREDERICIA: 475 MS
R AXIS: -46 DEGREES
RBC # BLD AUTO: 2.84 X10*6/UL (ref 4.5–5.9)
RIGHT VENTRICLE FREE WALL PEAK S': 8.03 CM/S
RIGHT VENTRICLE PEAK SYSTOLIC PRESSURE: 43.9 MMHG
SODIUM SERPL-SCNC: 141 MMOL/L (ref 136–145)
T AXIS: 88 DEGREES
T OFFSET: 430 MS
TRICUSPID ANNULAR PLANE SYSTOLIC EXCURSION: 1.2 CM
VANCOMYCIN SERPL-MCNC: 8.1 UG/ML (ref 5–20)
VENTRICULAR RATE: 77 BPM
WBC # BLD AUTO: 2.6 X10*3/UL (ref 4.4–11.3)

## 2025-02-03 PROCEDURE — 1200000002 HC GENERAL ROOM WITH TELEMETRY DAILY

## 2025-02-03 PROCEDURE — 2500000004 HC RX 250 GENERAL PHARMACY W/ HCPCS (ALT 636 FOR OP/ED): Performed by: NURSE PRACTITIONER

## 2025-02-03 PROCEDURE — 36415 COLL VENOUS BLD VENIPUNCTURE: CPT | Performed by: INTERNAL MEDICINE

## 2025-02-03 PROCEDURE — 2500000002 HC RX 250 W HCPCS SELF ADMINISTERED DRUGS (ALT 637 FOR MEDICARE OP, ALT 636 FOR OP/ED): Performed by: INTERNAL MEDICINE

## 2025-02-03 PROCEDURE — 99222 1ST HOSP IP/OBS MODERATE 55: CPT | Performed by: NURSE PRACTITIONER

## 2025-02-03 PROCEDURE — 74230 X-RAY XM SWLNG FUNCJ C+: CPT

## 2025-02-03 PROCEDURE — 2500000005 HC RX 250 GENERAL PHARMACY W/O HCPCS: Performed by: INTERNAL MEDICINE

## 2025-02-03 PROCEDURE — 93306 TTE W/DOPPLER COMPLETE: CPT

## 2025-02-03 PROCEDURE — 92610 EVALUATE SWALLOWING FUNCTION: CPT | Mod: GN

## 2025-02-03 PROCEDURE — 92611 MOTION FLUOROSCOPY/SWALLOW: CPT | Mod: GN

## 2025-02-03 PROCEDURE — 80048 BASIC METABOLIC PNL TOTAL CA: CPT | Performed by: INTERNAL MEDICINE

## 2025-02-03 PROCEDURE — 85025 COMPLETE CBC W/AUTO DIFF WBC: CPT | Performed by: INTERNAL MEDICINE

## 2025-02-03 PROCEDURE — 74230 X-RAY XM SWLNG FUNCJ C+: CPT | Performed by: RADIOLOGY

## 2025-02-03 PROCEDURE — 2500000001 HC RX 250 WO HCPCS SELF ADMINISTERED DRUGS (ALT 637 FOR MEDICARE OP): Performed by: INTERNAL MEDICINE

## 2025-02-03 PROCEDURE — 94640 AIRWAY INHALATION TREATMENT: CPT

## 2025-02-03 PROCEDURE — 2500000004 HC RX 250 GENERAL PHARMACY W/ HCPCS (ALT 636 FOR OP/ED): Performed by: INTERNAL MEDICINE

## 2025-02-03 PROCEDURE — 93306 TTE W/DOPPLER COMPLETE: CPT | Performed by: INTERNAL MEDICINE

## 2025-02-03 PROCEDURE — 80202 ASSAY OF VANCOMYCIN: CPT | Performed by: INTERNAL MEDICINE

## 2025-02-03 PROCEDURE — 99232 SBSQ HOSP IP/OBS MODERATE 35: CPT | Performed by: NURSE PRACTITIONER

## 2025-02-03 RX ADMIN — DEXTROSE MONOHYDRATE 2 G: 5 INJECTION INTRAVENOUS at 11:17

## 2025-02-03 RX ADMIN — IPRATROPIUM BROMIDE AND ALBUTEROL SULFATE 3 ML: 2.5; .5 SOLUTION RESPIRATORY (INHALATION) at 15:22

## 2025-02-03 RX ADMIN — FUROSEMIDE 40 MG: 10 INJECTION, SOLUTION INTRAMUSCULAR; INTRAVENOUS at 11:17

## 2025-02-03 RX ADMIN — BARIUM SULFATE 5 ML: 400 SUSPENSION ORAL at 11:27

## 2025-02-03 RX ADMIN — BARIUM SULFATE 10 ML: 400 PASTE ORAL at 11:27

## 2025-02-03 RX ADMIN — BARIUM SULFATE 40 ML: 400 SUSPENSION ORAL at 11:26

## 2025-02-03 RX ADMIN — CALCIUM 1250 MG: 500 TABLET ORAL at 17:14

## 2025-02-03 RX ADMIN — FUROSEMIDE 40 MG: 10 INJECTION, SOLUTION INTRAMUSCULAR; INTRAVENOUS at 17:14

## 2025-02-03 RX ADMIN — TAMSULOSIN HYDROCHLORIDE 0.4 MG: 0.4 CAPSULE ORAL at 20:01

## 2025-02-03 RX ADMIN — Medication 1 L/MIN: at 22:15

## 2025-02-03 RX ADMIN — BARIUM SULFATE 700 MG: 700 TABLET ORAL at 11:28

## 2025-02-03 RX ADMIN — IPRATROPIUM BROMIDE AND ALBUTEROL SULFATE 3 ML: 2.5; .5 SOLUTION RESPIRATORY (INHALATION) at 07:44

## 2025-02-03 RX ADMIN — BARIUM SULFATE 60 ML: 0.81 POWDER, FOR SUSPENSION ORAL at 11:26

## 2025-02-03 RX ADMIN — PANTOPRAZOLE SODIUM 40 MG: 40 TABLET, DELAYED RELEASE ORAL at 06:28

## 2025-02-03 ASSESSMENT — ENCOUNTER SYMPTOMS
HEMATURIA: 0
VOMITING: 0
COUGH: 1
ABDOMINAL PAIN: 0
DIZZINESS: 0
SHORTNESS OF BREATH: 1
BLOOD IN STOOL: 0
FATIGUE: 1
PALPITATIONS: 0
HEADACHES: 0
SORE THROAT: 0
NAUSEA: 0
JOINT SWELLING: 0
DIARRHEA: 0
APPETITE CHANGE: 1
ACTIVITY CHANGE: 1
FEVER: 0

## 2025-02-03 ASSESSMENT — PAIN SCALES - GENERAL
PAINLEVEL_OUTOF10: 0 - NO PAIN
PAINLEVEL_OUTOF10: 0 - NO PAIN

## 2025-02-03 ASSESSMENT — ACTIVITIES OF DAILY LIVING (ADL): LACK_OF_TRANSPORTATION: NO

## 2025-02-03 ASSESSMENT — PAIN - FUNCTIONAL ASSESSMENT: PAIN_FUNCTIONAL_ASSESSMENT: 0-10

## 2025-02-03 NOTE — CONSULTS
INFECTIOUS DISEASE INPATIENT INITIAL CONSULTATION    Referred By: Donta Echeverria    Reason For Consult: bacteremia    HPI:  This is a 89 y.o. male with PMH of hemophilia, A. Fib, heart failure, CKD who presented with cough and shortness of breath.    Dry cough and shortness of breath. No abd pain, urinary issues. No n/v/c/d.    Afebrile here and no leukocytosis. Blood cx x2 from 2/1 have 1/4 vials with GPC clusters. CT/PE is showing signs of pulmonary edema and also LLL consolidation PNA with potential aspiration. Is on IV CTX/Azithro. COVID/Flu/RSV negative. S. PNA urine Ag negative.     Allergies:  Aspirin and Penicillins     Vitals (Last 24 Hours):  Heart Rate:  [70-83]   Temp:  [36.1 °C (97 °F)-36.7 °C (98.1 °F)]   Resp:  [16]   BP: ()/(56-70)   SpO2:  [95 %-99 %]      PHYSICAL EXAM:  Gen - NAD  Heart - RRR, no murmurs  Lungs - clear bilaterally, no wheezing  Abd - soft, no ttp, BS present  Ext - no LE edema  Skin - no rash    MEDS:    Current Facility-Administered Medications:     acetaminophen (Tylenol) tablet 975 mg, 975 mg, oral, q8h PRN, Donta Echeverria MD    atorvastatin (Lipitor) tablet 40 mg, 40 mg, oral, Daily, Donta Echeverria MD, 40 mg at 02/02/25 0925    azithromycin (Zithromax) 500 mg in dextrose 5%  mL, 500 mg, intravenous, q24h, Donta Echeverria MD, Stopped at 02/02/25 1211    calcium carbonate (Oscal) tablet 1,250 mg, 1,250 mg, oral, BID, Donta Echeverria MD, 1,250 mg at 02/02/25 1635    cefTRIAXone (Rocephin) 1 g in dextrose (iso) IV 50 mL, 1 g, intravenous, q24h, Donta Echeverria MD, Stopped at 02/02/25 1000    empagliflozin (Jardiance) tablet 10 mg, 10 mg, oral, Daily, Donta Echeverria MD, 10 mg at 02/02/25 0924    ferrous sulfate (325 mg ferrous sulfate) tablet 325 mg, 65 mg of iron, oral, Daily with breakfast, Donta Echeverria MD, 325 mg at 02/02/25 0924    finasteride (Proscar) tablet 5 mg, 5 mg, oral, Daily, Donta Echeverria MD, 5 mg at 02/02/25  0924    flu vaccine, trivalent, preservative free, HIGH-DOSE, age 65y+ (Fluzone), 0.5 mL, intramuscular, During hospitalization, Donta Echeverria MD    folic acid (Folvite) tablet 1 mg, 1 mg, oral, Daily, Donta Echeverria MD, 1 mg at 02/02/25 0924    furosemide (Lasix) injection 40 mg, 40 mg, intravenous, BID, SHERRY Vu, 40 mg at 02/02/25 1635    guaiFENesin (Robitussin) 100 mg/5 mL syrup 200 mg, 200 mg, oral, q4h PRN, Donta Echeverria MD, 200 mg at 02/02/25 2027    ipratropium-albuteroL (Duo-Neb) 0.5-2.5 mg/3 mL nebulizer solution 3 mL, 3 mL, nebulization, 4x daily, Donta Echeverria MD    ipratropium-albuteroL (Duo-Neb) 0.5-2.5 mg/3 mL nebulizer solution 3 mL, 3 mL, nebulization, q2h PRN, Donta Echeverria MD    oxygen (O2) therapy, , inhalation, Continuous - Inhalation, Donta Echeverria MD, Last Rate: 120,000 mL/hr at 02/02/25 2030, 2 L/min at 02/02/25 2030    pantoprazole (ProtoNix) EC tablet 40 mg, 40 mg, oral, Daily before breakfast, Donta Echeverria MD, 40 mg at 02/03/25 0628    polyethylene glycol (Glycolax, Miralax) packet 17 g, 17 g, oral, Daily, Donta Echeverria MD, 17 g at 02/02/25 0924    potassium chloride CR (Klor-Con M20) ER tablet 40 mEq, 40 mEq, oral, Daily, SHERRY Vu, 40 mEq at 02/02/25 1110    tamsulosin (Flomax) 24 hr capsule 0.4 mg, 0.4 mg, oral, BID, Donta Echeverria MD, 0.4 mg at 02/02/25 2027    vancomycin (Vancocin) 1,000 mg in dextrose 5%  mL, 1,000 mg, intravenous, q24h, Donta Echeverria MD, Stopped at 02/02/25 1743    vancomycin (Vancocin) pharmacy to dose - pharmacy monitoring, , miscellaneous, Daily PRN, Donta Echeverria MD     LABS:  Lab Results   Component Value Date    WBC 2.8 (L) 02/02/2025    HGB 9.7 (L) 02/02/2025    HCT 28.8 (L) 02/02/2025     (H) 02/02/2025    PLT 53 (L) 02/02/2025      Results from last 72 hours   Lab Units 02/02/25  0909   SODIUM mmol/L 142   POTASSIUM mmol/L 3.2*   CHLORIDE  mmol/L 110*   CO2 mmol/L 24   BUN mg/dL 18   CREATININE mg/dL 0.99   GLUCOSE mg/dL 96   CALCIUM mg/dL 6.2*   ANION GAP mmol/L 11   EGFR mL/min/1.73m*2 70     Results from last 72 hours   Lab Units 02/01/25  0617   ALK PHOS U/L 98   BILIRUBIN TOTAL mg/dL 2.0*   PROTEIN TOTAL g/dL 6.5   ALT U/L 35   AST U/L 35   ALBUMIN g/dL 4.0     Estimated Creatinine Clearance: 37 mL/min (by C-G formula based on SCr of 0.99 mg/dL).    IMAGING:  CT/PE 2.1  Impression:     No pulmonary embolism.  Findings suggest CHF with small pleural  effusions and pulmonary venous hypertension/interstitial edema and  cardiomegaly.  Superimposed patchy left lower lobe consolidation  consistent with pneumonia or aspiration.     ASSESSMENT/PLAN:    LLL PNA  Bacteremia due to Staph epi - 1/4 vials positive, skin contaminant, needs no specific treatment  CKD - CrCl about 37, affects abx dosing/decisions  Penicillin Allergy - limits abx options, seems OK with cephalosporins    Stopping IV Vanc. Legionella urine Ag negative and will stop Azithro.    Continue on IV CTX but increased to 2g Q24H.    When ready for home can go with PO Cefdinir 300mg BID for 5 more days.    Monitoring for adverse effects of abx such as rash/itching/diarrhea.    Will sign off. Please call back with questions. Thanks!    Juan Antonio Hutchinson MD  [facility]  Office #[phone]

## 2025-02-03 NOTE — PROCEDURES
Speech-Language Pathology    Speech-Language Pathology   Inpatient Modified Barium Swallow Study    Patient Name: Ramnó Flores  MRN: [id number]  : 1928  Today's Date: 25  Time Calculation  Start Time: 1044  Stop Time: 1058  Time Calculation (min): 14 min        Modified Barium Swallow Study completed. Informed verbal consent obtained prior to completion of exam. Trials of thin liquids, mildly/moderately thick liquids, purees, solids, and barium tablet with thin liquids were administered for exam this date.      SLP: Gricel Loo, SLP   Contact info: [employer] secure chat    Reason for Referral: C/f aspiration/oropharyngeal dysphagia  Patient Hx: hemophilia, heart failure, chronic A-fib, prior anal cancer status post radiation, GERD, CKD   Respiratory Status: nasal cannula  Current diet: NPO w/ no means of nutrition/hydration    Pain:  Patient exhibits no s/s of pain or discomfort during study.      RECOMMENDATIONS:   - Easy to Chew (IDDSI Level 7)  - Mildly/nectar thick liquids (IDDSI Level 2)    *NO MIXED CONSISTENCIES    STRATEGIES:  Upright for all PO intake  Small bites/sips  Slow rate of consumption  Medication crushed in purees    SLP PLAN:  Skilled SLP Services: Skilled SLP intervention for dysphagia is warranted.  SLP Frequency: 2x per week  Duration: 2 WEEKS  Treatment/Interventions:   - Bolus trials  - Compensatory strategy training  - Diet tolerance/advancement  - Patient/caregiver education    Discussed POC: patient  Discussed Risks/Benefits: Yes  Patient/Caregiver Agreeable: Yes    Short term goals established:  - Patient will tolerate current diet without noted pulmonary compromise or evidence of pulmonary sequela as noted in patient chart and/or reported by patient/family.  Start Date: 2/3/25  End Date: 25  Status: Goal Initiated this date    Education Provided:  Results and recommendations of MBSS reviewed with patient upon completion of exam. POC discussed at this time with  need for modification of diet and compensatory swallowing strategies to maximize swallowing safety and efficiency. Verbal understanding and agreement given on all accounts.     Treatment Provided Today: N/A    Additional Medical Consults Suggested:   N/A    Repeat Study: Tentatively in 6-8 weeks contingent on display of overall improvement, as pt appropriate, and radiology schedule permits.    Mechanics of the Swallow Summary:  ORAL PHASE:  Lip Closure - Intact  Tongue Control During Bolus Hold - Impaired  Bolus prep/mastication - Impaired  Bolus transport/lingual motion - Impaired  Oral residue - present with solids    PHARYNGEAL PHASE:  Initiation of pharyngeal swallow - Intact  Soft palate elevation - Intact  Laryngeal elevation -impaired  Anterior hyoid excursion - Intact  Epiglottic movement - Impaired  Laryngeal vestibule closure - Impaired  Pharyngeal stripping wave - Impaired  Pharyngeal contraction (A/P view) - Not tested  Pharyngoesophageal segment opening - Intact  Tongue base retraction - Impaired  Pharyngeal residue - present    ESOPHAGEAL PHASE:  Esophageal clearance - Intact    SLP Impressions with Severity Rating:   Pt presents with mild-moderate oropharyngeal dysphagia upon completion of modified barium swallow study this date. Swallowing physiology is characterized by the above noted deficits. Premature pharyngeal entry noted with liquids boluses with reduced TB retraction and pharyngeal stripping wave. Decreased closure of upper laryngeal vestibule noted with mild laryngeal penetration and trace silent aspiration given thin liquids only during continuous drinking using large bolus sizes. No further laryngeal penetration was observed for any other consistency. Vallecular and piriform sinus residue noted with thin, mildly/moderately thick liquids. Greater pharyngeal compressive force noted during consumption of purees and solids resulting in less pharyngeal residue post swallows. Retention of barium  tablet in valleculae noted, which cleared with subsequent liquid boluses. A CP prominence noted, which did not appear to obstruct bolus flow. Modification of diet required to maximize swallowing safety given weakened cough, recurrent pna, and rapid rate of liquid intake exacerbating dysphagia exhibited this date.     *Of note: The  oblique bolus follow-through is not intended to be utilized as a diagnostic assessment of the esophagus, but rather as a tool to observe the biomechanical aspects of the swallow continuum to inform the need for further evaluation by medical specialists, as applicable.     OUTCOME MEASURES:  Functional Oral Intake Scale  Functional Oral Intake Scale: Level 5        total oral diet with multiple consistencies, but requires special preparations and compensations    EAT-10  N/A     Rosenbek's Penetration Aspiration Scale    Thin Liquids: 8. SILENT ASPIRATION - contrast passes glottis, visible residue, NO pt response]     Nectar Thick Liquids: 1. NO ASPIRATION & NO PENETRATION - no aspiration, contrast does not enter airway    Honey Thick Liquids: 1. NO ASPIRATION & NO PENETRATION - no aspiration, contrast does not enter airway    Puree: 1. NO ASPIRATION & NO PENETRATION - no aspiration, contrast does not enter airway    Solids: 1. NO ASPIRATION & NO PENETRATION - no aspiration, contrast does not enter airway

## 2025-02-03 NOTE — CARE PLAN
The patient's goals for the shift include      The clinical goals for the shift include pt will be free of injuries throughout the shift    Problem: Pain - Adult  Goal: Verbalizes/displays adequate comfort level or baseline comfort level  Outcome: Progressing     Problem: Safety - Adult  Goal: Free from fall injury  Outcome: Progressing     Problem: Discharge Planning  Goal: Discharge to home or other facility with appropriate resources  Outcome: Progressing     Problem: Chronic Conditions and Co-morbidities  Goal: Patient's chronic conditions and co-morbidity symptoms are monitored and maintained or improved  Outcome: Progressing     Problem: Nutrition  Goal: Nutrient intake appropriate for maintaining nutritional needs  Outcome: Progressing     Problem: Fall/Injury  Goal: Not fall by end of shift  Outcome: Progressing  Goal: Be free from injury by end of the shift  Outcome: Progressing  Goal: Verbalize understanding of personal risk factors for fall in the hospital  Outcome: Progressing  Goal: Verbalize understanding of risk factor reduction measures to prevent injury from fall in the home  Outcome: Progressing  Goal: Use assistive devices by end of the shift  Outcome: Progressing  Goal: Pace activities to prevent fatigue by end of the shift  Outcome: Progressing

## 2025-02-03 NOTE — PROGRESS NOTES
"Speech-Language Pathology    Inpatient Speech-Language Pathology Clinical Swallow Evaluation    Patient Name: Ramón Flores  MRN: [id number]  : 1928  Today's Date: 25   Time Calculation  Start Time: 0815  Stop Time: 08  Time Calculation (min): 28 min        RECOMMENDATIONS:   1.    NPO WITH MODIFIED BARIUM SWALLOW (DO NOT RECOMMEND PO MEDS)    Assessment:  Assessment Results: Patient seen to assess swallowing function. As SLP arrived to room, patient completed breakfast and drinking milk. Post prandial cough noted following multiple boluses consumed. Patient c/o \"phlegm\" as reason for cough. Water boluses presented as patient seated upright in bed. Bolus formation slowed. Patient exhibited an immediate cough during consumption of 3 oz water challenge. Discontinued further bolus trials due to aspiration concerns. Explained to patient need to pursue MBSS for objective evaluation of pharyngeal function. Unable to determine safety with modified food and liquid consistencies at bedside.     Baseline Assessment:  Respiratory Status: Oxygen via nasal cannula  History of Intubation: No        Behavior/Cognition: Alert, Cooperative, Pleasant mood  Patient Positioning: Upright in Bed  Baseline Vocal Quality: Weak    Oral-Motor Assessment:  Dentition: Some Missing Teeth  Oral Motor: Impaired Function (generalized weakness)    Plan:  SLP Plan:  (TBD)           Discussed POC: Patient  Discussed Risks/Benefits: Yes  Patient/Caregiver Agreeable: Yes  SLP - OK to Discharge: No    Goals established :   TBD following MBSS    General Visit Information:  Patient admitted: 25    Past Medical History: hemophilia, heart failure, chronic A-fib, prior anal cancer status post radiation, GERD, CKD     Chief Complaint/Reason for admission: admitted with c/o worsening cough and SOB    Relevant Imaging Results: CT chest 25> \"Findings suggest CHF with small pleural  effusions and pulmonary venous " "hypertension/interstitial edema and  cardiomegaly.  Superimposed patchy left lower lobe consolidation consistent with pneumonia or aspiration.\"    Living Environment: Home  Reason for Referral: assess for dysphagia  Ordering Physician: Dr. Echeverria  Current Diet : regular/thin diet ordered     Pain:  Patient exhibits no s/s of pain or discomfort during study.     Treatment:    N/A    Inpatient Education:  Patient educated on current swallow function, recommendation of Modified Barium swallow study and procedure  Patient education results: gave verbal understanding    "

## 2025-02-03 NOTE — PROGRESS NOTES
"   02/03/25 1150   Discharge Planning   Living Arrangements Alone   Support Systems Home care staff   Assistance Needed has private aides a few days a week thru [employer], otherwise cares for self in his home   Type of Residence Private residence   Who is requesting discharge planning? Provider   Home or Post Acute Services In home services   Type of Home Care Services Home nursing visits;Home health aide;Home OT;Home PT   Expected Discharge Disposition Home H   Does the patient need discharge transport arranged? Yes   RoundTrip coordination needed? Yes   Financial Resource Strain   How hard is it for you to pay for the very basics like food, housing, medical care, and heating? Not hard   Housing Stability   In the last 12 months, was there a time when you were not able to pay the mortgage or rent on time? N   In the past 12 months, how many times have you moved where you were living? 0   At any time in the past 12 months, were you homeless or living in a shelter (including now)? N   Transportation Needs   In the past 12 months, has lack of transportation kept you from medical appointments or from getting medications? no   In the past 12 months, has lack of transportation kept you from meetings, work, or from getting things needed for daily living? No   Patient Choice   Patient / Family choosing to utilize agency / facility established prior to hospitalization Yes     Met with patient at bedside  Explained role of TCC  Patient admitted for SOB from home    Patient states he is active with [facility] services for nursing (weekly factor 8 injections) and aides a few times a week.  Otherwise patient states he cares for himself, has a \"house calls\" pcp NP  His niece and nephew drive [place] to [place], do his shopping etc.  Family to provide transport home at discharge  Resume referral placed with [facility] for HHC.  Niece to bring in factor 8 injection from home and nursing to take injections to inpatient pharmacy.    ADOD 1-3 days  BARRIERS " ID and cards clearance for pna and CHF exacerbation  DISPO home resume with HHC

## 2025-02-03 NOTE — PROGRESS NOTES
Vancomycin Dosing by Pharmacy- Cessation of Therapy    Consult to pharmacy for vancomycin dosing has been discontinued by the prescriber, pharmacy will sign off at this time.    Please call pharmacy if there are further questions or re-enter a consult if vancomycin is resumed.     Nan Contreras, WesleyD

## 2025-02-03 NOTE — PROGRESS NOTES
"Subjective Data:  -1280  -Afib rate controlled 70s  -BUN/CR stable   -volume status improved   -on 1.5L NC      Objective Data:  Last Recorded Vitals:  Vitals:    25 0744   BP: 104/69  108/65 115/75   BP Location: Left arm  Left arm Right arm   Patient Position: Lying  Lying Lying   Pulse: 78 83     Resp: 16  16 18   Temp: 36.3 °C (97.4 °F)  36.1 °C (97 °F) 36.4 °C (97.6 °F)   TempSrc: Temporal  Temporal Temporal   SpO2: 97% 99% 97% 98%   Weight:       Height:         Medical Gas Therapy: Supplemental oxygen  Medical Gas Delivery Method: Nasal cannula  Weight  Av.9 kg (132 lb)  Min: 59.9 kg (132 lb)  Max: 59.9 kg (132 lb)    LABS:  CMP:  Results from last 7 days   Lab Units 25  0909 25  0617   SODIUM mmol/L 142 140   POTASSIUM mmol/L 3.2* 3.5   CHLORIDE mmol/L 110* 106   CO2 mmol/L 24 22   ANION GAP mmol/L 11 16   BUN mg/dL 18 19   CREATININE mg/dL 0.99 0.98   EGFR mL/min/1.73m*2 70 71   ALBUMIN g/dL  --  4.0   ALT U/L  --  35   AST U/L  --  35   BILIRUBIN TOTAL mg/dL  --  2.0*     CBC:  Results from last 7 days   Lab Units 25  0909 25  0617   WBC AUTO x10*3/uL 2.8* 3.3*   HEMOGLOBIN g/dL 9.7* 11.0*   HEMATOCRIT % 28.8* 32.3*   PLATELETS AUTO x10*3/uL 53* 63*   MCV fL 108* 107*     COAG:     ABO: No results found for: \"ABO\"  HEME/ENDO:     CARDIAC:   Results from last 7 days   Lab Units 25  0715 25  0617   TROPHS ng/L 85* 92*   BNP pg/mL  --  679*             Last I/O:    Intake/Output Summary (Last 24 hours) at 2/3/2025 1006  Last data filed at 2/3/2025 0401  Gross per 24 hour   Intake 810 ml   Output 2500 ml   Net -1690 ml     Net IO Since Admission: -1,630 mL [25 1006]      Imaging Results:  ECG 12 lead    Result Date: 2/3/2025  Atrial fibrillation with premature ventricular or aberrantly conducted complexes Incomplete right bundle branch block Left anterior fascicular block QTcB >= 480 msec Abnormal ECG When compared " with ECG of 20-OCT-2024 02:18, (unconfirmed) No significant change was found    CT angio chest for pulmonary embolism    Result Date: 2/1/2025  STUDY: CT Angiogram of the Chest; 2/1/2025 10:26 AM INDICATION: Shortness of breath. COMPARISON: CTA chest 1/18/2024. ACCESSION NUMBER(S): [id number] ORDERING CLINICIAN: SABINE MINA TECHNIQUE:  CTA of the chest was performed with intravenous contrast. Images are reviewed and processed at a workstation according to the CT angiogram protocol with 3-D and/or MIP post processing imaging generated.  Omnipaque 350--85 mL was administered intravenously. Automated mA/kV exposure control was utilized and patient examination was performed in strict accordance with principles of ALARA. FINDINGS: Pulmonary arteries are adequately opacified without acute or chronic filling defects.  Thoracic aorta is not adequately opacified (essentially noncontrast appearance secondary to contrast bolus timing) to evaluate for dissection.  There is calcific plaque of the thoracic aorta without aneurysm. Cardiomegaly.  No pericardial effusion.  Coronary artery calcifications.  Status post median sternotomy.  Mild bilateral gynecomastia.  Mild generalized subcutaneous edema of the body wall.  Thoracic lymph nodes are not enlarged. Small bilateral pleural effusions layering dependently, slightly larger on the right than left.  Associated compressive atelectasis of the bilateral lower lobes.  Superimposed on this are areas of patchy consolidation in the left lower lobe from the peribronchial vascular structures consistent with pneumonia or aspiration.  There is likely pulmonary venous hypertension/low-grade interstitial edema.  No definite alveolar edema.  The central airways are patent.  No pneumothorax.  There is no pleural thickening Upper abdomen demonstrates no acute pathology. There are no acute fractures.  No suspicious bony lesions.    No pulmonary embolism.  Findings suggest CHF with  small pleural effusions and pulmonary venous hypertension/interstitial edema and cardiomegaly.  Superimposed patchy left lower lobe consolidation consistent with pneumonia or aspiration. Signed by Lloyd Epps DO    XR chest 1 view    Result Date: 2/1/2025  Interpreted By:  Roseline Luther, STUDY: XR CHEST 1 VIEW 2/1/2025 6:41 am   INDICATION: Signs/Symptoms:cough + hypoxia   COMPARISON: 05/31/2024   ACCESSION NUMBER(S): [id number]   ORDERING CLINICIAN: SABINE MINA   TECHNIQUE: AP view   FINDINGS: Sternal wires are noted. Electronic device overlies the heart. There is an enlarged cardiac silhouette.   The lungs are hyperinflated. There are bibasilar infiltrates or atelectasis since the prior exam.   No significant pleural effusions. No pneumothorax       Bibasilar atelectasis or infiltrates are superimposed on chronic changes. Enlarged cardiac silhouette with post thoracotomy changes   Signed by: Roseline Luther 2/1/2025 7:54 AM Dictation workstation:   [id number]          Past Cardiology Tests (Last 3 Years):  EKG:  Results for orders placed during the hospital encounter of 02/01/25    ECG 12 lead (Preliminary)  This result has not been signed. Information might be incomplete.    Narrative  Atrial fibrillation with premature ventricular or aberrantly conducted complexes  Incomplete right bundle branch block  Left anterior fascicular block  QTcB >= 480 msec  Abnormal ECG  When compared with ECG of 20-OCT-2024 02:18, (unconfirmed)  No significant change was found      Results for orders placed during the hospital encounter of 05/31/24    Electrocardiogram, 12-lead PRN ACS symptoms    Narrative  Atrial fibrillation with premature ventricular or aberrantly conducted complexes  Right superior axis deviation  Incomplete right bundle branch block  Nonspecific T wave abnormality  Prolonged QT  Abnormal ECG  When compared with ECG of 19-MAR-2024 10:02,  Sinus rhythm is no longer with ventricular escape  complexes  Left anterior fascicular block is no longer Present  See ED provider note for full interpretation and clinical correlation  Confirmed by Juliann Nicholas ([id number]) on 5/31/2024 10:44:02 AM      Results for orders placed during the hospital encounter of 03/19/24    ECG 12 lead    Narrative  Atrial fibrillation with premature ventricular or aberrantly conducted complexes and with ventricular escape complexes  Left anterior fascicular block  Possible Anterior infarct , age undetermined  Abnormal ECG  When compared with ECG of 20-JAN-2024 15:42,  Sinus rhythm is now with ventricular escape complexes    See ED provider note for full interpretation and clinical correlation  Confirmed by Latanya Thomas ([id number]) on 3/19/2024 11:03:20 PM      Results for orders placed during the hospital encounter of 01/20/24    ECG 12 Lead    Narrative  Atrial fibrillation with slow ventricular response  Nonspecific ST abnormality  Abnormal ECG  When compared with ECG of 18-JAN-2024 13:33, (unconfirmed)  No significant change was found  Confirmed by Kristin Maradiaga ([id number]) on 1/25/2024 1:09:09 PM    Echo:  Results for orders placed during the hospital encounter of 05/31/24    Transthoracic Echo (TTE) Complete    [facility], [address] [place] [address]  Tel [phone] and Fax [phone]    TRANSTHORACIC ECHOCARDIOGRAM REPORT      Patient Name:      KRISTIN Simpson Physician:    [id number] Davis Cabello MD  Study Date:        6/1/2024            Ordering Provider:    [id number] ADRIANNA CUMMINS  MRN/PID:           [id number]            Fellow:  Accession#:        [id number]        Nurse:  Date of Birth/Age: 7/24/1928 / 89      Sonographer:          Devin shaikh RDCS  Gender:            M                   Additional Staff:  Height:            167.60 cm           Admit Date:           5/31/2024  Weight:            63.00 kg            Admission Status:      Observation -  Routine  BSA / BMI:         1.71 m2 / 22.43     Encounter#:           [id number]  kg/m2  Department Location:  [facility] ED  Blood Pressure: 109 /70 mmHg    Study Type:    TRANSTHORACIC ECHO (TTE) COMPLETE  Diagnosis/ICD: Unspecified diastolic (congestive) heart failure (CHF)-I50.30;  Unspecified systolic (congestive) heart failure (CHF)-I50.20  Indication:    Congestive Heart Failure  CPT Code:      Echo Limited-33719; Doppler Limited-48277; Color Doppler-87616    Patient History:  Pertinent History: PMH Hemophilia A, Protein C deficiency, HTN, Afib, HFpEF,  HLD, nephrolithiasis, CAD s/p CABG (2002), BPH, and R fem  neck fx s/p THR 1/13/23) that presents to the ED with 2x days  SOB, dry cough and hematuria.    Study Detail: The following Echo studies were performed: 2D, M-Mode, Doppler and  color flow. Technically challenging study due to body habitus.      PHYSICIAN INTERPRETATION:  Left Ventricle: The left ventricular systolic function is low normal, with an estimated ejection fraction of 50%. There are no regional wall motion abnormalities. The left ventricular cavity size is normal. There is moderate to severe concentric left ventricular hypertrophy. Left ventricular diastolic filling was indeterminate.  Left Atrium: The left atrium was not assessed.  Right Ventricle: The right ventricle is mildly enlarged. There is reduced right ventricular systolic function.  Right Atrium: The right atrium was not assessed.  Aortic Valve: The aortic valve is trileaflet. There is no evidence of aortic valve regurgitation.  Mitral Valve: The mitral valve is normal in structure. The mitral valve spectral Doppler A-wave is absent, consistent with atrial fibrillation. There is mild mitral valve regurgitation.  Tricuspid Valve: The tricuspid valve is structurally normal. There is mild tricuspid regurgitation. The Doppler estimated RVSP is moderately elevated at 53.0 mmHg.  Pulmonic Valve: The pulmonic valve is  structurally normal. There is physiologic pulmonic valve regurgitation.  Pericardium: There is no pericardial effusion noted.  Aorta: The aortic root was not assessed.  Systemic Veins: The inferior vena cava appears dilated. There is less than 50% IVC collapse with inspiration.  In comparison to the previous echocardiogram(s): Compared with study from 3/19/2024, the findings are similar.      CONCLUSIONS:  1. Left ventricular systolic function is low normal with a 50% estimated ejection fraction.  2. There is moderate to severe concentric left ventricular hypertrophy.  3. There is reduced right ventricular systolic function.  4. Absent A-wave on MV spectral Doppler tracing, consistent with atrial fibrillation.  5. Moderately elevated right ventricular systolic pressure.    QUANTITATIVE DATA SUMMARY:  2D MEASUREMENTS:  Normal Ranges:  IVSd:          1.61 cm    (0.6-1.1cm)  LVPWd:         1.46 cm    (0.6-1.1cm)  LVIDd:         3.60 cm    (3.9-5.9cm)  LVIDs:         2.97 cm  LV Mass Index: 121.8 g/m2  LV % FS        17.5 %    LV SYSTOLIC FUNCTION BY 2D PLANIMETRY (MOD):  Normal Ranges:  EF-A4C View: 32.6 % (>=55%)  EF-A2C View: 51.2 %  EF-Biplane:  40.9 %      RIGHT VENTRICLE:  RV Major 8.8 cm    TRICUSPID VALVE/RVSP:  Normal Ranges:  Peak TR Velocity: 3.08 m/s  RV Syst Pressure: 53.0 mmHg (< 30mmHg)  IVC Diam:         2.40 cm      [id number] Davis Cabello MD  Electronically signed on 6/1/2024 at 9:52:14 AM        ** Final **      Results for orders placed during the hospital encounter of 03/19/24    Transthoracic Echo (TTE) Complete    Narrative  [facility], [address] [place] [address]  Tel [phone] and Fax [phone]    TRANSTHORACIC ECHOCARDIOGRAM REPORT      Patient Name:      KRISTIN Simspon Physician:    [id number] Beto Hauser MD  Study Date:        3/20/2024            Ordering Provider:    [id number] MARK MACIEL  MRN/PID:           [id number]              Fellow:  Accession#:        [id number]         Nurse:  Date of Birth/Age: 7/24/1928 / 89 years Sonographer:          Kash Simon  RDCS  Gender:            M                    Additional Staff:  Height:            152.40 cm            Admit Date:           3/19/2024  Weight:            63.50 kg             Admission Status:     Inpatient -  Routine  BSA / BMI:         1.60 m2 / 27.34      Encounter#:           [id number]  kg/m2  Department Location:  [place] [address]  Blood Pressure: 97 /59 mmHg    Study Type:    TRANSTHORACIC ECHO (TTE) COMPLETE  Diagnosis/ICD: Chronic diastolic (congestive) heart failure (CHF)-I50.32  Indication:    CHF, HFpEF  CPT Code:      Echo Complete w Full Doppler-42826    Patient History:  Pertinent History: PMH Hemophilia A, Protein C deficiency, HTN, Afib, HFpEF,  HLD, nephrolithiasis, CAD s/p CABG (2002), BPH, and R fem  neck fx s/p THR 1/13/23) that presents to the ED with 2x days  SOB, dry cough and hematuria.    Study Detail: The following Echo studies were performed: 2D, M-Mode, Doppler and  color flow.      PHYSICIAN INTERPRETATION:  Left Ventricle: The left ventricular systolic function is mildly decreased, with an estimated ejection fraction of 50%. There is global hypokinesis of the left ventricle with minor regional variations. The left ventricular cavity size is normal. Left ventricular diastolic filling was not assessed.  Left Atrium: The left atrium is moderately dilated.  Right Ventricle: The right ventricle is normal in size. There is mildly reduced right ventricular systolic function.  Right Atrium: The right atrium is normal in size.  Aortic Valve: The aortic valve is trileaflet. There is minimal aortic valve cusp calcification. There is mild aortic valve thickening. There is no evidence of aortic valve regurgitation. The peak instantaneous gradient of the aortic valve is 3.8 mmHg. The mean gradient of the aortic valve is 2.0 mmHg.  Mitral Valve: The mitral valve is normal  in structure. There is trace mitral valve regurgitation.  Tricuspid Valve: The tricuspid valve is structurally normal. There is mild to moderate tricuspid regurgitation. The Doppler estimated RVSP is mildly elevated at 34.8 mmHg.  Pulmonic Valve: The pulmonic valve is structurally normal. There is trace pulmonic valve regurgitation.  Pericardium: There is no pericardial effusion noted.  Aorta: The aortic root is normal.  Systemic Veins: The inferior vena cava appears dilated. There is IVC inspiratory collapse greater than 50%. The hepatic vein shows a normal flow pattern.  In comparison to the previous echocardiogram(s): Compared with study from 2/24/2023, the LVEF appears reduced (50%) when compared with the prior assessment (60%). Also, TR appears mild to moderate on today's study compared to trace.      CONCLUSIONS:  1. Left ventricular systolic function is mildly decreased with a 50% estimated ejection fraction.  2. There is global hypokinesis of the left ventricle with minor regional variations.  3. There is mildly reduced right ventricular systolic function.  4. Mild to moderate tricuspid regurgitation visualized.  5. Mildly elevated RVSP.  6. The left atrium is moderately dilated.  7. Compared with study from 2/24/2023, the LVEF appears reduced (50%) when compared with the prior assessment (60%). Also, TR appears mild to moderate on today's study compared to trace.    QUANTITATIVE DATA SUMMARY:  2D MEASUREMENTS:  Normal Ranges:  Ao Root d:     3.20 cm   (2.0-3.7cm)  LAs:           3.70 cm   (2.7-4.0cm)  IVSd:          1.10 cm   (0.6-1.1cm)  LVPWd:         1.10 cm   (0.6-1.1cm)  LVIDd:         4.00 cm   (3.9-5.9cm)  LVIDs:         2.90 cm  LV Mass Index: 90.8 g/m2  LV % FS        27.5 %    LA VOLUME:  Normal Ranges:  LA Vol A4C:        74.6 ml    (22+/-6mL/m2)  LA Vol A2C:        59.8 ml  LA Vol BP:         67.3 ml  LA Vol Index A4C:  46.5ml/m2  LA Vol Index A2C:  37.3 ml/m2  LA Vol Index BP:   41.9 ml/m2  LA  Area A4C:       22.6 cm2  LA Area A2C:       20.1 cm2  LA Major Axis A4C: 5.8 cm  LA Major Axis A2C: 5.7 cm  LA Volume Index:   42.0 ml/m2    AORTA MEASUREMENTS:  Normal Ranges:  Ao Sinus, d: 3.44 cm (2.1-3.5cm)  Ao STJ, d:   2.60 cm (1.7-3.4cm)  Asc Ao, d:   3.40 cm (2.1-3.4cm)    LV SYSTOLIC FUNCTION BY 2D PLANIMETRY (MOD):  Normal Ranges:  EF-A4C View: 51.2 % (>=55%)  EF-A2C View: 50.5 %  EF-Biplane:  50.7 %    LV DIASTOLIC FUNCTION:  Normal Ranges:  MV Peak E:      0.66 m/s   (0.7-1.2 m/s)  MV e'           0.06 m/s   (>8.0)  MV lateral e'   0.06 m/s  MV medial e'    0.05 m/s  E/e' Ratio:     11.03      (<8.0)  PulmV Sys Daniel:  16.30 cm/s  PulmV Tierney Daniel: 29.00 cm/s  PulmV S/D Daniel:  0.60    MITRAL VALVE:  Normal Ranges:  MV DT: 310 msec (150-240msec)    AORTIC VALVE:  Normal Ranges:  AoV Vmax:                0.97 m/s (<=1.7m/s)  AoV Peak PG:             3.8 mmHg (<20mmHg)  AoV Mean P.0 mmHg (1.7-11.5mmHg)  LVOT Max Daniel:            0.72 m/s (<=1.1m/s)  AoV VTI:                 19.20 cm (18-25cm)  LVOT VTI:                13.50 cm  LVOT Diameter:           2.00 cm  (1.8-2.4cm)  AoV Area, VTI:           2.21 cm2 (2.5-5.5cm2)  AoV Area,Vmax:           2.32 cm2 (2.5-4.5cm2)  AoV Dimensionless Index: 0.70      RIGHT VENTRICLE:  RV Basal 3.80 cm  RV Mid   3.10 cm  RV Major 8.4 cm  TAPSE:   12.0 mm  RV s'    0.06 m/s    TRICUSPID VALVE/RVSP:  Normal Ranges:  Peak TR Velocity: 2.59 m/s  Est. RA Pressure: 8 mmHg  RV Syst Pressure: 34.8 mmHg (< 30mmHg)  IVC Diam:         2.25 cm    PULMONIC VALVE:  Normal Ranges:  PV Max Daniel: 0.5 m/s  (0.6-0.9m/s)  PV Max P.2 mmHg    Pulmonary Veins:  PulmV Tierney Daniel: 29.00 cm/s  PulmV S/D Daniel:  0.60  PulmV Sys Daniel:  16.30 cm/s      [id number] Beto Hauser MD  Electronically signed on 3/20/2024 at 5:18:03 PM        ** Final **    Ejection Fractions:  LV EF   Date/Time Value Ref Range Status   2024 03:17 PM 51 %      Cath:  No results found for this or any previous  visit.    Stress Test:  No results found for this or any previous visit.    Cardiac Imaging:  No results found for this or any previous visit.      Inpatient Medications:  Scheduled medications   Medication Dose Route Frequency    atorvastatin  40 mg oral Daily    calcium carbonate  1,250 mg oral BID    cefTRIAXone  2 g intravenous q24h    empagliflozin  10 mg oral Daily    ferrous sulfate (325 mg ferrous sulfate)  65 mg of iron oral Daily with breakfast    finasteride  5 mg oral Daily    influenza  0.5 mL intramuscular During hospitalization    folic acid  1 mg oral Daily    furosemide  40 mg intravenous BID    ipratropium-albuteroL  3 mL nebulization 4x daily    oxygen   inhalation Continuous - Inhalation    pantoprazole  40 mg oral Daily before breakfast    polyethylene glycol  17 g oral Daily    potassium chloride CR  40 mEq oral Daily    tamsulosin  0.4 mg oral BID     PRN medications   Medication    acetaminophen    guaiFENesin    ipratropium-albuteroL     Continuous Medications   Medication Dose Last Rate       Physical Exam:  General:  Elderly male. Patient is awake, alert, and oriented.  Patient is in no acute distress.  HEENT:  Pupils equal and reactive.  Normocephalic.  Moist mucosa.    Neck:  +JVD 14 cm  Cardiovascular:  IRR. +Murmur  Pulmonary:  Scattered rhonchi in upper lobes, diminished in lower lobes bilaterally   Abdomen:  Soft. Non-tender.   Non-distended.  Positive bowel sounds.  Lower Extremities:  2+ pedal pulses. No LE edema.  Neurologic:  Cranial nerves intact.  No focal deficit.   Skin: Skin warm and dry, normal skin turgor.   Psychiatric: Normal affect.     Assessment/Plan   Ramón Flores is a 89 y.o. 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 ED via EMS with c/o sob, cough, hypoxia, weakness.  Cardiology consulted for acute CHF.      ED course  VS 36.5/HR 67/RR 20//73/ Pox 98% on 3L NC. Pox was 89% on RA in triage and she was placed on 3L NC.   COVID flu and RSV were all negative.  CMP with normal renal fx, hypocalcemic 6.6, T bili elevated to 2. BNP elevated 679 (317 10/2024, 1024 5/2024). CBC with leukopenia 3.3, H&H 11/32.3, thrombocytopenic platelets 63. HsTrop 92/85. Chest x-ray with bibasilar atectasis vs infiltrates superimposed on chronic changes. Cardiomegaly with post thoracotomy changes.   Given 1 gm Ceftriaxone IV, Duo-Neb, Azithromycin 500 mg IV. CTA Chest negatie for PE, CHF with small pleural effusions and pulmonary venous congestion/interstitial edema & cardiomegaly. LLL patchy consolidation consistent with PNA or aspiration. Admitted to [facility].      Tele: Afib, rates controlled  Cardiologist: Dr. Dugan, last visit 7/2024  Outpatient cardiac medications: Atorvastatin 40 mg daily, Jardiance 10 mg daily, Bumex 0.5 mg every other day, Spironolactone 12.5 mg every other day     #Acute on Chronic HFpEF in the setting of LLL PNA  -TTE limited 6/2024: LVEF 50%, moderate to severe LVH, LVIDd 3.6 cm, reduced RV fx, moderate pHTN, RVSP 53  -Severely volume overloaded on exam, hypoxic on arrival Pox 88%, improved with 2L NC.  +JVD, ,  CTA Chest negatie for PE, CHF with small pleural effusions and pulmonary venous congestion/interstitial edema & cardiomegaly. LLL patchy consolidation consistent with PNA or aspiration.   -Minimal response with 20 mg Lasix IVP; will increase diuresis with Furosemide 40 mg IV BID. Was taking Bumex 0.5 mg every other day prior to admit  -Continue Jardiance  -Hold off on Spironolactone for now given low normal BP     #CAD s/p multivessel CABG ( 2002 with LIMA --> LAD , SVG --> OM1). 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      #Chronic Afib. Rates  controlled. Not on OAC 2/2 hemophilia, risk for bleeding.      Plan:   -c/w Furosemide 40 mg IV BID. Daily standing weights, 2gm sodium diet, 2L fluid restriction, strict I&Os keep k >4, Mag>2, replace as needed.   -Will plan to go home with daily PO Bumex, dose to be determined pending response  -Jardiance 10 mg daily. Hold off on Spironlactone given soft Bps.   -Follow up with Dr. Dugan as outpatient 2-3 weeks       Code Status:  DNR and No Intubation    I spent 30 minutes in the professional and overall care of this patient.        Aziza Garrido, APRN-CNP

## 2025-02-03 NOTE — PROGRESS NOTES
Vancomycin Dosing by Pharmacy- FOLLOW UP    Ramón Flores is a 89 y.o. year old male who Pharmacy has been consulted for vancomycin dosing for line infections/bacteremia. Based on the patient's indication and renal status this patient is being dosed based on a goal AUC of 500-600.     Renal function is currently stable.    Current vancomycin dose: 1000 mg given every 24 hours    Estimated vancomycin AUC on current dose: 532 mg/L.hr     Visit Vitals  /65 (BP Location: Left arm, Patient Position: Lying)   Pulse 83   Temp 36.1 °C (97 °F) (Temporal)   Resp 16        Lab Results   Component Value Date    CREATININE 0.99 2025    CREATININE 0.98 2025    CREATININE 1.19 10/23/2024    CREATININE 1.16 10/22/2024        Patient weight is as follows:   Vitals:    25 0614   Weight: 59.9 kg (132 lb)       Cultures:  No results found for the encounter in last 14 days.       I/O last 3 completed shifts:  In: 1220 (20.4 mL/kg) [P.O.:720; IV Piggyback:500]  Out: 3100 (51.8 mL/kg) [Urine:3100 (1.4 mL/kg/hr)]  Weight: 59.9 kg   I/O during current shift:  No intake/output data recorded.    Temp (24hrs), Av.3 °C (97.4 °F), Min:36.1 °C (97 °F), Max:36.7 °C (98.1 °F)      Assessment/Plan    Within goal AUC range. Continue current vancomycin regimen.    This dosing regimen is predicted by [employer] to result in the following pharmacokinetic parameters:  Exposure target: AUC24 (range)400-600 mg/L.hr   [id number]: 435 mg/L.hr  AUC24,ss: 532 mg/L.hr  Probability of AUC24 > 400: 93 %  Ctrough,ss: 16.9 mg/L  Probability of Ctrough,ss > 20: 26 %    The next level will be obtained on  at 0500. May be obtained sooner if clinically indicated.   Will continue to monitor renal function daily while on vancomycin and order serum creatinine at least every 48 hours if not already ordered.  Follow for continued vancomycin needs, clinical response, and signs/symptoms of toxicity.       Cecille Pizarro RP

## 2025-02-03 NOTE — SIGNIFICANT EVENT
Dose: 50 Units/kg × 60.6 kg Route: intravenous Frequency: Every 7 days   Dispense Quantity: 5 each Refills: 12    Note to Pharmacy: Dose 3030 units +/-10%. Every 7 days.   Disp up to 5 doses per dispense.  Home infusion nurse for peripheral administration.   Indications of Use: hemophilia A         Sig: Infuse 3,030 Units into a venous catheter every 7 days. 3030 units +/-10% dose every 7 days.  Additionally,  prn when directed.     Order placed for [facility] in direct collaboration with Dr. Kee Hematologist.    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 Patients niece will bring the factor and RN will need to transport the medication to pharmacy.    SUSI Gross-CNP

## 2025-02-03 NOTE — PROGRESS NOTES
"               [facility]          Admitting Provider: Donta Echeverria MD Admission Date: 2/1/2025.   Attending Provider: Donta Echeverria MD MRN: [id number]       Beatriz Flores is a 89 y.o. male on day 2 of admission presenting with Pneumonia of both lower lobes due to infectious organism.  Interval History  89 y.o. male with a history of chronic diastolic CHF, Atria fibrillation and VTE presented with cough & shortness of breath for few days Has chest congestion. Swallowing difficulty has improved. Feeling better.      Objective   Physical Exam  Last Recorded Vitals: Blood pressure 115/75, pulse 83, temperature 36.4 °C (97.6 °F), temperature source Temporal, resp. rate 18, height 1.676 m (5' 6\"), weight 59.9 kg (132 lb), SpO2 98%.  Patient Vitals for the past 24 hrs:   BP Temp Temp src Pulse Resp SpO2   02/03/25 0744 115/75 36.4 °C (97.6 °F) Temporal -- 18 98 %   02/03/25 0401 108/65 36.1 °C (97 °F) Temporal -- 16 97 %   02/02/25 2051 -- -- -- 83 -- 99 %   02/02/25 2003 104/69 36.3 °C (97.4 °F) Temporal 78 16 97 %   02/02/25 1600 107/65 36.3 °C (97.3 °F) -- 74 16 97 %   02/02/25 1154 110/70 36.3 °C (97.4 °F) -- 76 16 98 %     Body mass index is 21.31 kg/m².  GENERAL: alert, cooperative, or no distress  SKIN: no rashes  HEENT Atraumatic, Normocephalic and Not pale, no icterus  NECK: supple, no thyromegaly, JVP within normal limits  LUNGS:  not in respiratory distress, respiratory rate normal, clear to auscultation  CARDIAC: regular rate and rhythm, normal S1 and S2, no murmur, rub, or gallop  ABDOMEN: Soft, non-tender, normal bowel sounds; no bruits, organomegaly or masses.  EXTREMITIES: No edema  NEURO: Alert and oriented x 3, reflexes normal and symmetric, strength and  sensation grossly normal  Intake/Output last 3 Shifts:  I/O last 3 completed shifts:  In: 1220 (20.4 mL/kg) [P.O.:720; IV Piggyback:500]  Out: 3100 (51.8 mL/kg) [Urine:3100 (1.4 mL/kg/hr)]  Weight: 59.9 kg   DATA: "   Diagnostic tests reviewed for today's visit:    Most recent Labs  Results for orders placed or performed during the hospital encounter of 02/01/25 (from the past 24 hours)   CBC and Auto Differential   Result Value Ref Range    WBC 2.8 (L) 4.4 - 11.3 x10*3/uL    nRBC 0.0 0.0 - 0.0 /100 WBCs    RBC 2.68 (L) 4.50 - 5.90 x10*6/uL    Hemoglobin 9.7 (L) 13.5 - 17.5 g/dL    Hematocrit 28.8 (L) 41.0 - 52.0 %     (H) 80 - 100 fL    MCH 36.2 (H) 26.0 - 34.0 pg    MCHC 33.7 32.0 - 36.0 g/dL    RDW 18.4 (H) 11.5 - 14.5 %    Platelets 53 (L) 150 - 450 x10*3/uL    Immature Granulocytes %, Automated 7.1 (H) 0.0 - 0.9 %    Immature Granulocytes Absolute, Automated 0.20 0.00 - 0.50 x10*3/uL   Basic Metabolic Panel   Result Value Ref Range    Glucose 96 74 - 99 mg/dL    Sodium 142 136 - 145 mmol/L    Potassium 3.2 (L) 3.5 - 5.3 mmol/L    Chloride 110 (H) 98 - 107 mmol/L    Bicarbonate 24 21 - 32 mmol/L    Anion Gap 11 10 - 20 mmol/L    Urea Nitrogen 18 6 - 23 mg/dL    Creatinine 0.99 0.50 - 1.30 mg/dL    eGFR 70 >60 mL/min/1.73m*2    Calcium 6.2 (L) 8.6 - 10.3 mg/dL   Manual Differential   Result Value Ref Range    Neutrophils %, Manual 61.0 40.0 - 80.0 %    Bands %, Manual 3.0 0.0 - 5.0 %    Lymphocytes %, Manual 19.0 13.0 - 44.0 %    Monocytes %, Manual 5.0 2.0 - 10.0 %    Eosinophils %, Manual 4.0 0.0 - 6.0 %    Basophils %, Manual 0.0 0.0 - 2.0 %    Atypical Lymphocytes %, Manual 7.0 0.0 - 2.0 %    Metamyelocytes %, Manual 1.0 0.0 - 0.0 %    Seg Neutrophils Absolute, Manual 1.71 1.60 - 5.00 x10*3/uL    Bands Absolute, Manual 0.08 0.00 - 0.50 x10*3/uL    Lymphocytes Absolute, Manual 0.53 (L) 0.80 - 3.00 x10*3/uL    Monocytes Absolute, Manual 0.14 0.05 - 0.80 x10*3/uL    Eosinophils Absolute, Manual 0.11 0.00 - 0.40 x10*3/uL    Basophils Absolute, Manual 0.00 0.00 - 0.10 x10*3/uL    Atypical Lymphs Absolute, Manual 0.20 0.00 - 0.30 x10*3/uL    Metamyelocytes Absolute, Manual 0.03 0.00 - 0.00 x10*3/uL    Total Cells  "Counted 100     Neutrophils Absolute, Manual 1.79 1.60 - 5.50 x10*3/uL    RBC Morphology See Below     Polychromasia Mild     RBC Fragments Few     Ovalocytes Few     Teardrop Cells Few     Evans Cells Few     Acanthocytes Few    Vancomycin   Result Value Ref Range    Vancomycin 8.1 5.0 - 20.0 ug/mL   Lavender Top   Result Value Ref Range    Extra Tube Hold for add-ons.      *Note: Due to a large number of results and/or encounters for the requested time period, some results have not been displayed. A complete set of results can be found in Results Review.     Urine Culture   Date Value Ref Range Status   03/21/2024 (A)  Final    >100,000 Methicillin Resistant Staphylococcus aureus (MRSA)     Comment:     Methicillin (Oxacillin) resistant Staphylococci are resistant to all currently available Penicillins, Beta-lactam/Beta-lactamase inhibitor combinations (including Ampicillin/Sulbactam, Amoxicillin/Clavulanate and Pipercillin/Tazobactam), Carbapenems and   Cephalosporins (except Ceftaroline).     Blood Culture   Date Value Ref Range Status   02/01/2025 No growth at 1 day  Preliminary   02/01/2025   Preliminary    Identification and susceptibility testing to follow    No results found for: \"RESPCULTSM\" No results found for: \"PERDIAFLDCUL\"   Sterile Fluid Culture/Smear   Date Value Ref Range Status   05/31/2024 No growth aerobically and anaerobically  Final      CT angio chest for pulmonary embolism    Result Date: 2/1/2025  STUDY: CT Angiogram of the Chest; 2/1/2025 10:26 AM INDICATION: Shortness of breath. COMPARISON: CTA chest 1/18/2024. ACCESSION NUMBER(S): [id number] ORDERING CLINICIAN: SABINE MINA TECHNIQUE:  CTA of the chest was performed with intravenous contrast. Images are reviewed and processed at a workstation according to the CT angiogram protocol with 3-D and/or MIP post processing imaging generated.  Omnipaque 350--85 mL was administered intravenously. Automated mA/kV exposure control was " utilized and patient examination was performed in strict accordance with principles of ALARA. FINDINGS: Pulmonary arteries are adequately opacified without acute or chronic filling defects.  Thoracic aorta is not adequately opacified (essentially noncontrast appearance secondary to contrast bolus timing) to evaluate for dissection.  There is calcific plaque of the thoracic aorta without aneurysm. Cardiomegaly.  No pericardial effusion.  Coronary artery calcifications.  Status post median sternotomy.  Mild bilateral gynecomastia.  Mild generalized subcutaneous edema of the body wall.  Thoracic lymph nodes are not enlarged. Small bilateral pleural effusions layering dependently, slightly larger on the right than left.  Associated compressive atelectasis of the bilateral lower lobes.  Superimposed on this are areas of patchy consolidation in the left lower lobe from the peribronchial vascular structures consistent with pneumonia or aspiration.  There is likely pulmonary venous hypertension/low-grade interstitial edema.  No definite alveolar edema.  The central airways are patent.  No pneumothorax.  There is no pleural thickening Upper abdomen demonstrates no acute pathology. There are no acute fractures.  No suspicious bony lesions.    No pulmonary embolism.  Findings suggest CHF with small pleural effusions and pulmonary venous hypertension/interstitial edema and cardiomegaly.  Superimposed patchy left lower lobe consolidation consistent with pneumonia or aspiration. Signed by Lloyd Epps DO   Current Facility-Administered Medications   Medication Dose Route Frequency Provider Last Rate Last Admin    acetaminophen (Tylenol) tablet 975 mg  975 mg oral q8h PRN Donta Echeverria MD        atorvastatin (Lipitor) tablet 40 mg  40 mg oral Daily Donta Echeverria MD   40 mg at 02/02/25 0916    calcium carbonate (Oscal) tablet 1,250 mg  1,250 mg oral BID Donta Echeverria MD   1,250 mg at 02/02/25 4717     cefTRIAXone (Rocephin) 2 g in dextrose 5% IV 50 mL  2 g intravenous q24h Juan Antonio Hutchinson MD        empagliflozin (Jardiance) tablet 10 mg  10 mg oral Daily Donta Echeverria MD   10 mg at 02/02/25 0924    ferrous sulfate (325 mg ferrous sulfate) tablet 325 mg  65 mg of iron oral Daily with breakfast Donta Echeverria MD   325 mg at 02/02/25 0924    finasteride (Proscar) tablet 5 mg  5 mg oral Daily Donta Echeverria MD   5 mg at 02/02/25 0924    flu vaccine, trivalent, preservative free, HIGH-DOSE, age 65y+ (Fluzone)  0.5 mL intramuscular During hospitalization Donta Echeverria MD        folic acid (Folvite) tablet 1 mg  1 mg oral Daily Donta Echeverria MD   1 mg at 02/02/25 0924    furosemide (Lasix) injection 40 mg  40 mg intravenous BID Melissa Luong, APRN-CNP   40 mg at 02/02/25 1635    guaiFENesin (Robitussin) 100 mg/5 mL syrup 200 mg  200 mg oral q4h PRN Donta Echeverria MD   200 mg at 02/02/25 2027    ipratropium-albuteroL (Duo-Neb) 0.5-2.5 mg/3 mL nebulizer solution 3 mL  3 mL nebulization 4x daily Donta Echeverria MD   3 mL at 02/03/25 0744    ipratropium-albuteroL (Duo-Neb) 0.5-2.5 mg/3 mL nebulizer solution 3 mL  3 mL nebulization q2h PRN Donta Echeverria MD        oxygen (O2) therapy   inhalation Continuous - Inhalation Donta Echeverria MD 90,000 mL/hr at 02/03/25 0744 1.5 L/min at 02/03/25 0744    pantoprazole (ProtoNix) EC tablet 40 mg  40 mg oral Daily before breakfast Donta Echeverria MD   40 mg at 02/03/25 0628    polyethylene glycol (Glycolax, Miralax) packet 17 g  17 g oral Daily Donta Echeverria MD   17 g at 02/02/25 0924    potassium chloride CR (Klor-Con M20) ER tablet 40 mEq  40 mEq oral Daily Melissa Luong, APRN-CNP   40 mEq at 02/02/25 1110    tamsulosin (Flomax) 24 hr capsule 0.4 mg  0.4 mg oral BID Donta Echeverria MD   0.4 mg at 02/02/25 2027     No current outpatient medications on file.   ..     Medication and Non-Pharmacologic VTE  Prophylaxis/Anticoagulants      Last Anticoag Admin            No anticoagulants administered    No unadministered anticoagulant orders found.          Assessment/Plan   Ramón Flores has  Assessment & Plan  Pneumonia of both lower lobes due to infectious organism  Acute on chronic diastolic CHF  Atrial fibrillation  Hemophilia   Elevated troponin  Thrombocytopenia  Positive blood culture ([facility] in clusters)   On Rocephin and azithromycin.  Swallow evaluation to rule out aspiration.    On Lasix.   Other Hospital problems        Abnormal findings not addressed during hospitalization, but require out patient follow up. None        I spent 35 minutes talking and examining Ramón Flores, reviewing the labs & medications, formulating plan of care & discussing with patient and nursing staff.    Donta Echeverria MD

## 2025-02-04 LAB
ALBUMIN SERPL BCP-MCNC: 3.4 G/DL (ref 3.4–5)
ALP SERPL-CCNC: 94 U/L (ref 33–136)
ALT SERPL W P-5'-P-CCNC: 27 U/L (ref 10–52)
ANION GAP SERPL CALC-SCNC: 15 MMOL/L (ref 10–20)
AST SERPL W P-5'-P-CCNC: 23 U/L (ref 9–39)
BASOPHILS # BLD AUTO: 0.01 X10*3/UL (ref 0–0.1)
BASOPHILS NFR BLD AUTO: 0.2 %
BILIRUB SERPL-MCNC: 1.8 MG/DL (ref 0–1.2)
BUN SERPL-MCNC: 21 MG/DL (ref 6–23)
CALCIUM SERPL-MCNC: 6.4 MG/DL (ref 8.6–10.3)
CHLORIDE SERPL-SCNC: 101 MMOL/L (ref 98–107)
CO2 SERPL-SCNC: 29 MMOL/L (ref 21–32)
CREAT SERPL-MCNC: 1.21 MG/DL (ref 0.5–1.3)
EGFRCR SERPLBLD CKD-EPI 2021: 55 ML/MIN/1.73M*2
EOSINOPHIL # BLD AUTO: 0.06 X10*3/UL (ref 0–0.4)
EOSINOPHIL NFR BLD AUTO: 1.5 %
ERYTHROCYTE [DISTWIDTH] IN BLOOD BY AUTOMATED COUNT: 18 % (ref 11.5–14.5)
GLUCOSE SERPL-MCNC: 93 MG/DL (ref 74–99)
HCT VFR BLD AUTO: 31.6 % (ref 41–52)
HGB BLD-MCNC: 10.5 G/DL (ref 13.5–17.5)
IMM GRANULOCYTES # BLD AUTO: 0.18 X10*3/UL (ref 0–0.5)
IMM GRANULOCYTES NFR BLD AUTO: 4.4 % (ref 0–0.9)
LYMPHOCYTES # BLD AUTO: 0.69 X10*3/UL (ref 0.8–3)
LYMPHOCYTES NFR BLD AUTO: 16.7 %
MCH RBC QN AUTO: 35.1 PG (ref 26–34)
MCHC RBC AUTO-ENTMCNC: 33.2 G/DL (ref 32–36)
MCV RBC AUTO: 106 FL (ref 80–100)
MONOCYTES # BLD AUTO: 0.94 X10*3/UL (ref 0.05–0.8)
MONOCYTES NFR BLD AUTO: 22.8 %
NEUTROPHILS # BLD AUTO: 2.25 X10*3/UL (ref 1.6–5.5)
NEUTROPHILS NFR BLD AUTO: 54.4 %
NRBC BLD-RTO: 0 /100 WBCS (ref 0–0)
PLATELET # BLD AUTO: 68 X10*3/UL (ref 150–450)
POTASSIUM SERPL-SCNC: 3.5 MMOL/L (ref 3.5–5.3)
PROT SERPL-MCNC: 6 G/DL (ref 6.4–8.2)
RBC # BLD AUTO: 2.99 X10*6/UL (ref 4.5–5.9)
SODIUM SERPL-SCNC: 141 MMOL/L (ref 136–145)
WBC # BLD AUTO: 4.1 X10*3/UL (ref 4.4–11.3)

## 2025-02-04 PROCEDURE — 2500000004 HC RX 250 GENERAL PHARMACY W/ HCPCS (ALT 636 FOR OP/ED): Performed by: NURSE PRACTITIONER

## 2025-02-04 PROCEDURE — 97161 PT EVAL LOW COMPLEX 20 MIN: CPT | Mod: GP

## 2025-02-04 PROCEDURE — 6360000002 HC RX 636 FACTOR: Mod: JZ | Performed by: NURSE PRACTITIONER

## 2025-02-04 PROCEDURE — 99232 SBSQ HOSP IP/OBS MODERATE 35: CPT | Performed by: NURSE PRACTITIONER

## 2025-02-04 PROCEDURE — 1100000001 HC PRIVATE ROOM DAILY

## 2025-02-04 PROCEDURE — 36415 COLL VENOUS BLD VENIPUNCTURE: CPT | Performed by: NURSE PRACTITIONER

## 2025-02-04 PROCEDURE — 85025 COMPLETE CBC W/AUTO DIFF WBC: CPT | Performed by: NURSE PRACTITIONER

## 2025-02-04 PROCEDURE — 2500000002 HC RX 250 W HCPCS SELF ADMINISTERED DRUGS (ALT 637 FOR MEDICARE OP, ALT 636 FOR OP/ED): Performed by: INTERNAL MEDICINE

## 2025-02-04 PROCEDURE — 2500000001 HC RX 250 WO HCPCS SELF ADMINISTERED DRUGS (ALT 637 FOR MEDICARE OP): Performed by: INTERNAL MEDICINE

## 2025-02-04 PROCEDURE — 2500000004 HC RX 250 GENERAL PHARMACY W/ HCPCS (ALT 636 FOR OP/ED): Performed by: INTERNAL MEDICINE

## 2025-02-04 PROCEDURE — 94640 AIRWAY INHALATION TREATMENT: CPT

## 2025-02-04 PROCEDURE — 84075 ASSAY ALKALINE PHOSPHATASE: CPT | Performed by: NURSE PRACTITIONER

## 2025-02-04 PROCEDURE — 2500000002 HC RX 250 W HCPCS SELF ADMINISTERED DRUGS (ALT 637 FOR MEDICARE OP, ALT 636 FOR OP/ED): Performed by: NURSE PRACTITIONER

## 2025-02-04 PROCEDURE — 97530 THERAPEUTIC ACTIVITIES: CPT | Mod: GP

## 2025-02-04 RX ORDER — BUMETANIDE 1 MG/1
0.5 TABLET ORAL DAILY
Status: DISCONTINUED | OUTPATIENT
Start: 2025-02-05 | End: 2025-02-05 | Stop reason: HOSPADM

## 2025-02-04 RX ADMIN — TAMSULOSIN HYDROCHLORIDE 0.4 MG: 0.4 CAPSULE ORAL at 20:45

## 2025-02-04 RX ADMIN — CALCIUM 1250 MG: 500 TABLET ORAL at 18:05

## 2025-02-04 RX ADMIN — PANTOPRAZOLE SODIUM 40 MG: 40 TABLET, DELAYED RELEASE ORAL at 06:55

## 2025-02-04 RX ADMIN — IPRATROPIUM BROMIDE AND ALBUTEROL SULFATE 3 ML: 2.5; .5 SOLUTION RESPIRATORY (INHALATION) at 16:23

## 2025-02-04 RX ADMIN — IPRATROPIUM BROMIDE AND ALBUTEROL SULFATE 3 ML: 2.5; .5 SOLUTION RESPIRATORY (INHALATION) at 11:33

## 2025-02-04 RX ADMIN — FERROUS SULFATE TAB 325 MG (65 MG ELEMENTAL FE) 325 MG: 325 (65 FE) TAB at 08:25

## 2025-02-04 RX ADMIN — DEXTROSE MONOHYDRATE 2 G: 5 INJECTION INTRAVENOUS at 08:49

## 2025-02-04 RX ADMIN — FINASTERIDE 5 MG: 5 TABLET, FILM COATED ORAL at 08:25

## 2025-02-04 RX ADMIN — CALCIUM 1250 MG: 500 TABLET ORAL at 08:25

## 2025-02-04 RX ADMIN — POTASSIUM CHLORIDE 40 MEQ: 1500 TABLET, EXTENDED RELEASE ORAL at 08:25

## 2025-02-04 RX ADMIN — IPRATROPIUM BROMIDE AND ALBUTEROL SULFATE 3 ML: 2.5; .5 SOLUTION RESPIRATORY (INHALATION) at 07:32

## 2025-02-04 RX ADMIN — TAMSULOSIN HYDROCHLORIDE 0.4 MG: 0.4 CAPSULE ORAL at 08:25

## 2025-02-04 RX ADMIN — ANTIHEMOPHILIC FACTOR (RECOMBINANT), FC-VWF-XTEN FUSION PROTEIN-EHTL 3030 UNITS: KIT at 10:40

## 2025-02-04 RX ADMIN — IPRATROPIUM BROMIDE AND ALBUTEROL SULFATE 3 ML: 2.5; .5 SOLUTION RESPIRATORY (INHALATION) at 21:02

## 2025-02-04 RX ADMIN — ATORVASTATIN CALCIUM 40 MG: 40 TABLET, FILM COATED ORAL at 08:25

## 2025-02-04 RX ADMIN — FUROSEMIDE 40 MG: 10 INJECTION, SOLUTION INTRAMUSCULAR; INTRAVENOUS at 08:26

## 2025-02-04 RX ADMIN — FOLIC ACID 1 MG: 1 TABLET ORAL at 08:25

## 2025-02-04 RX ADMIN — EMPAGLIFLOZIN 10 MG: 10 TABLET, FILM COATED ORAL at 08:25

## 2025-02-04 ASSESSMENT — COGNITIVE AND FUNCTIONAL STATUS - GENERAL
TURNING FROM BACK TO SIDE WHILE IN FLAT BAD: A LITTLE
STANDING UP FROM CHAIR USING ARMS: A LITTLE
WALKING IN HOSPITAL ROOM: A LOT
CLIMB 3 TO 5 STEPS WITH RAILING: TOTAL
DRESSING REGULAR UPPER BODY CLOTHING: A LITTLE
CLIMB 3 TO 5 STEPS WITH RAILING: A LOT
TURNING FROM BACK TO SIDE WHILE IN FLAT BAD: A LITTLE
MOVING FROM LYING ON BACK TO SITTING ON SIDE OF FLAT BED WITH BEDRAILS: A LITTLE
DRESSING REGULAR UPPER BODY CLOTHING: A LITTLE
STANDING UP FROM CHAIR USING ARMS: A LITTLE
CLIMB 3 TO 5 STEPS WITH RAILING: A LOT
TOILETING: A LITTLE
DRESSING REGULAR LOWER BODY CLOTHING: A LITTLE
HELP NEEDED FOR BATHING: A LITTLE
MOBILITY SCORE: 15
MOVING TO AND FROM BED TO CHAIR: A LITTLE
MOVING TO AND FROM BED TO CHAIR: A LITTLE
TOILETING: A LITTLE
DRESSING REGULAR LOWER BODY CLOTHING: A LITTLE
WALKING IN HOSPITAL ROOM: A LOT
HELP NEEDED FOR BATHING: A LITTLE
MOVING TO AND FROM BED TO CHAIR: A LOT
DAILY ACTIVITIY SCORE: 20
MOBILITY SCORE: 16
WALKING IN HOSPITAL ROOM: A LITTLE
STANDING UP FROM CHAIR USING ARMS: A LITTLE
TURNING FROM BACK TO SIDE WHILE IN FLAT BAD: A LITTLE
MOVING FROM LYING ON BACK TO SITTING ON SIDE OF FLAT BED WITH BEDRAILS: A LITTLE
MOBILITY SCORE: 16
DAILY ACTIVITIY SCORE: 20
MOVING FROM LYING ON BACK TO SITTING ON SIDE OF FLAT BED WITH BEDRAILS: A LITTLE

## 2025-02-04 ASSESSMENT — PAIN SCALES - GENERAL
PAINLEVEL_OUTOF10: 0 - NO PAIN

## 2025-02-04 ASSESSMENT — PAIN - FUNCTIONAL ASSESSMENT
PAIN_FUNCTIONAL_ASSESSMENT: 0-10

## 2025-02-04 ASSESSMENT — ACTIVITIES OF DAILY LIVING (ADL)
ADL_ASSISTANCE: NEEDS ASSISTANCE
BATHING_ASSISTANCE: SPONGE BATHING

## 2025-02-04 NOTE — CARE PLAN
The patient's goals for the shift include      The clinical goals for the shift include maintain patient safety    Over the shift, the patient did not make progress toward the following goals. Barriers to progression include . Recommendations to address these barriers include promote skin healing.

## 2025-02-04 NOTE — CONSULTS
Inpatient consult to Hematology  Consult performed by: SUSI Gross-CNP  Consult ordered by: Donta Echeverria MD  Reason for consult: Hemophilia A - factor orders  Assessment/Recommendations: Hemophilia A, Hematology is consulted to manage Factor VIII replenishment  - Atuviiio, 3,030 mg weekly dose on Tuesdays ordered in collaboration with Dr. Kee, family brought in from home  - cardiology is following, no AC for AF given Hemophilia A  - monitor for bleeding, low threshold to transfer to [facility] if bleeding concerns  - if patient has any bleeding concerns or requires any procedures * including a kelley catheter* he will need to be transferred to [facility] for management not available at [facility] emergently- provider should call [facility] and ask for STAT emergency transfer   - Denies any overt bleeding this admission or recently  - WBC 2.9 ANC 1.58 ALC 0.49, Hb below baseline of 11 to 10.2, VSS  - repeat CBC w diff in am  - defer factor VIII activity monitoring to outpatient hematology   - Patient is DNR no intubation    Follow up with Hematology  Discussed with Dr. Kee and Dr. Echeverria          Reason For Consult  Hemophilia A - factor orders     History Of Present Illness  Ramón Flores is a 89 y.o. male presenting with cough and shortness of breath, he is on CAP coverage for pneumonia.  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 In 2022 he was started on prophylaxis with twice weekly Adynovate (rFVIII) then transitioned to weekly Atuviiio, anal SCC diagnosed 10/22/24,. He was recently admitted to [facility] for rectal bleeding and underwent colonoscopy. He was found to have an anal mass and pathology demonstrated invasive moderately differentiated squamous cell carcinoma. Follows with  Radiation Oncology, last treatment 1/14/25. Follows with Dr. Dillon for hemophilia. Patient has been Afebrile here and no leukocytosis is noted. Blood cx x2 from 2/1 have 1/4 vials with GPC clusters. CT/PE is showing signs of pulmonary edema and also LLL consolidation PNA with potential aspiration. Is on IV CTX/Azithro. COVID/Flu/RSV negative. S. PNA urine Ag negative. ID recommending PO Cefdinir 300mg BID for 5 more days at discharge.  Hematology service was consulted to manage Factor VIII replenishment.      Most recent PET scan 11/7/24:   1. Intense focal hypermetabolic anal lesion, consistent with known anal cancer.  2. No FDG avid oksana or distant metastasis.     Most recent MRI:      Colonoscopy 10/22/24 ([facility]):   Single malignant-appearing and friable ulcerated mass (traversable) measuring 4 cm in the anal region along the anterior wall occupying approximately 20% of the circumference; there was stigmata of recent hemorrhage; performed cold forceps biopsy  Three sessile polyps measuring smaller than 5 mm (one in the ascending colon, one in the descending colon, and one in the sigmoid colon)  Few medium, scattered diverticula of moderate severity in the sigmoid colon; no bleeding was observed  Moderate internal and external hemorrhoids        Past Medical History  He has a past medical history of Acute deep vein thrombosis (DVT) of left femoral vein (Multi) (09/10/2023), Acute diastolic heart failure (04/16/2023), Benign prostatic hyperplasia without lower urinary tract symptoms (01/07/2016), Bleeding hemorrhoids (03/01/2023), CAP (community acquired pneumonia) (01/18/2024), Closed nondisplaced fracture of head of left radius (09/05/2023), COVID-19 (05/21/2023), Enlarged prostate with lower urinary tract symptoms (LUTS) (03/01/2023), Fracture of bone of hip (Multi) (09/05/2023), Fracture of femoral neck, right (03/01/2023), Gastrointestinal hemorrhage (09/05/2023), Gingiva hemorrhage (09/05/2023), Gross  hematuria (03/01/2023), Hemarthrosis of ankle joint (04/18/2019), Hemarthrosis of knee (05/09/2022), Hematochezia (09/05/2023), Hematoma of lower extremity (09/05/2023), Hematoma of right thigh (03/01/2023), History of recent fall (05/31/2023), Knee effusion (09/05/2023), Knee effusion, left (03/01/2023), Lumbar pain (03/01/2023), Obstructive uropathy (04/28/2023), Orthostatic syncope (09/05/2023), Personal history of other endocrine, nutritional and metabolic disease, Pneumonia of left lower lobe due to infectious organism (03/19/2024), Pneumonia of right lung due to infectious organism (03/01/2023), 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    Surgical History  He has a past surgical history that includes Coronary artery bypass graft. Right fem neck fracture s/p THR 1/13/23     Social History  He reports that he has never smoked. He has never used smokeless tobacco. Alcohol use questions deferred to the physician. He reports that he does not use drugs.    Family History  Family History   Problem Relation Name Age of Onset    Hemophilia Mother          Allergies  Aspirin and Penicillins    Review of Systems   Constitutional:  Positive for activity change, appetite change and fatigue. Negative for fever.   HENT:  Positive for congestion. Negative for mouth sores, nosebleeds and sore throat.    Eyes:  Negative for visual disturbance.   Respiratory:  Positive for cough and shortness of breath.    Cardiovascular:  Negative for chest pain, palpitations and leg swelling.   Gastrointestinal:  Negative for abdominal pain, blood in stool, diarrhea, nausea and vomiting.   Genitourinary:  Negative for hematuria.   Musculoskeletal:  Negative for joint swelling.   Skin:  Negative for rash.   Allergic/Immunologic: Positive for immunocompromised state.  "  Neurological:  Negative for dizziness and headaches.        Physical Exam  Vitals and nursing note reviewed.   Constitutional:       Appearance: He is ill-appearing.   HENT:      Head: Normocephalic and atraumatic.      Nose: No congestion.      Mouth/Throat:      Mouth: Mucous membranes are moist.   Eyes:      General: No scleral icterus.  Cardiovascular:      Rate and Rhythm: Normal rate.      Pulses: Normal pulses.   Pulmonary:      Effort: No respiratory distress.   Abdominal:      Tenderness: There is no abdominal tenderness.   Musculoskeletal:      Cervical back: No tenderness.      Right lower leg: Edema present.      Left lower leg: Edema present.      Comments: LE edema   Skin:     General: Skin is warm and dry.      Findings: No rash.   Neurological:      Mental Status: He is alert and oriented to person, place, and time.          Last Recorded Vitals  Blood pressure 113/68, pulse 83, temperature 36.9 °C (98.4 °F), temperature source Temporal, resp. rate 18, height 1.676 m (5' 6\"), weight 59.9 kg (132 lb), SpO2 94%.    Relevant Results  Results for orders placed or performed during the hospital encounter of 02/01/25 (from the past 96 hours)   CBC and Auto Differential   Result Value Ref Range    WBC 3.3 (L) 4.4 - 11.3 x10*3/uL    nRBC 0.0 0.0 - 0.0 /100 WBCs    RBC 3.02 (L) 4.50 - 5.90 x10*6/uL    Hemoglobin 11.0 (L) 13.5 - 17.5 g/dL    Hematocrit 32.3 (L) 41.0 - 52.0 %     (H) 80 - 100 fL    MCH 36.4 (H) 26.0 - 34.0 pg    MCHC 34.1 32.0 - 36.0 g/dL    RDW 18.2 (H) 11.5 - 14.5 %    Platelets 63 (L) 150 - 450 x10*3/uL    Immature Granulocytes %, Automated 6.9 (H) 0.0 - 0.9 %    Immature Granulocytes Absolute, Automated 0.23 0.00 - 0.50 x10*3/uL   Comprehensive metabolic panel   Result Value Ref Range    Glucose 105 (H) 74 - 99 mg/dL    Sodium 140 136 - 145 mmol/L    Potassium 3.5 3.5 - 5.3 mmol/L    Chloride 106 98 - 107 mmol/L    Bicarbonate 22 21 - 32 mmol/L    Anion Gap 16 10 - 20 mmol/L    Urea " Nitrogen 19 6 - 23 mg/dL    Creatinine 0.98 0.50 - 1.30 mg/dL    eGFR 71 >60 mL/min/1.73m*2    Calcium 6.6 (L) 8.6 - 10.3 mg/dL    Albumin 4.0 3.4 - 5.0 g/dL    Alkaline Phosphatase 98 33 - 136 U/L    Total Protein 6.5 6.4 - 8.2 g/dL    AST 35 9 - 39 U/L    Bilirubin, Total 2.0 (H) 0.0 - 1.2 mg/dL    ALT 35 10 - 52 U/L   Sars-CoV-2 PCR   Result Value Ref Range    Coronavirus 2019, PCR Not Detected Not Detected   RSV PCR   Result Value Ref Range    RSV PCR Not Detected Not Detected   Influenza A, and B PCR   Result Value Ref Range    Flu A Result Not Detected Not Detected    Flu B Result Not Detected Not Detected   Troponin I, High Sensitivity, Initial   Result Value Ref Range    Troponin I, High Sensitivity 92 (HH) 0 - 20 ng/L   Manual Differential   Result Value Ref Range    Neutrophils %, Manual 64.0 40.0 - 80.0 %    Lymphocytes %, Manual 24.0 13.0 - 44.0 %    Monocytes %, Manual 6.0 2.0 - 10.0 %    Eosinophils %, Manual 3.0 0.0 - 6.0 %    Basophils %, Manual 0.0 0.0 - 2.0 %    Atypical Lymphocytes %, Manual 3.0 0.0 - 2.0 %    Seg Neutrophils Absolute, Manual 2.11 1.60 - 5.00 x10*3/uL    Lymphocytes Absolute, Manual 0.79 (L) 0.80 - 3.00 x10*3/uL    Monocytes Absolute, Manual 0.20 0.05 - 0.80 x10*3/uL    Eosinophils Absolute, Manual 0.10 0.00 - 0.40 x10*3/uL    Basophils Absolute, Manual 0.00 0.00 - 0.10 x10*3/uL    Atypical Lymphs Absolute, Manual 0.10 0.00 - 0.30 x10*3/uL    Total Cells Counted 100     RBC Morphology See Below     RBC Fragments Few     Ovalocytes Few    B-type Natriuretic Peptide   Result Value Ref Range     (H) 0 - 99 pg/mL   ECG 12 lead   Result Value Ref Range    Ventricular Rate 77 BPM    QRS Duration 94 ms    QT Interval 438 ms    QTC Calculation(Bazett) 495 ms    R Axis -46 degrees    T Axis 88 degrees    QRS Count 13 beats    Q Onset 211 ms    T Offset 430 ms    QTC Fredericia 475 ms   Troponin, High Sensitivity, 1 Hour   Result Value Ref Range    Troponin I, High Sensitivity 85  (HH) 0 - 20 ng/L   Blood Culture    Specimen: Peripheral Venipuncture; Blood culture   Result Value Ref Range    Blood Culture No growth at 2 days    Blood Culture    Specimen: Peripheral Venipuncture; Blood culture   Result Value Ref Range    Blood Culture Staphylococcus epidermidis (A)     BLOOD CULTURE BOTTLE  Positive Aerobic Bottle     Gram Stain Gram positive cocci, clusters (AA)    Legionella Antigen, Urine    Specimen: Clean Catch/Voided; Urine   Result Value Ref Range    L. pneumophila Urine Ag Negative Negative   Streptococcus pneumoniae Antigen, Urine    Specimen: Clean Catch/Voided; Urine   Result Value Ref Range    Streptococcus pneumoniae Ag, Urine Negative Negative   CBC and Auto Differential   Result Value Ref Range    WBC 2.8 (L) 4.4 - 11.3 x10*3/uL    nRBC 0.0 0.0 - 0.0 /100 WBCs    RBC 2.68 (L) 4.50 - 5.90 x10*6/uL    Hemoglobin 9.7 (L) 13.5 - 17.5 g/dL    Hematocrit 28.8 (L) 41.0 - 52.0 %     (H) 80 - 100 fL    MCH 36.2 (H) 26.0 - 34.0 pg    MCHC 33.7 32.0 - 36.0 g/dL    RDW 18.4 (H) 11.5 - 14.5 %    Platelets 53 (L) 150 - 450 x10*3/uL    Immature Granulocytes %, Automated 7.1 (H) 0.0 - 0.9 %    Immature Granulocytes Absolute, Automated 0.20 0.00 - 0.50 x10*3/uL   Basic Metabolic Panel   Result Value Ref Range    Glucose 96 74 - 99 mg/dL    Sodium 142 136 - 145 mmol/L    Potassium 3.2 (L) 3.5 - 5.3 mmol/L    Chloride 110 (H) 98 - 107 mmol/L    Bicarbonate 24 21 - 32 mmol/L    Anion Gap 11 10 - 20 mmol/L    Urea Nitrogen 18 6 - 23 mg/dL    Creatinine 0.99 0.50 - 1.30 mg/dL    eGFR 70 >60 mL/min/1.73m*2    Calcium 6.2 (L) 8.6 - 10.3 mg/dL   Manual Differential   Result Value Ref Range    Neutrophils %, Manual 61.0 40.0 - 80.0 %    Bands %, Manual 3.0 0.0 - 5.0 %    Lymphocytes %, Manual 19.0 13.0 - 44.0 %    Monocytes %, Manual 5.0 2.0 - 10.0 %    Eosinophils %, Manual 4.0 0.0 - 6.0 %    Basophils %, Manual 0.0 0.0 - 2.0 %    Atypical Lymphocytes %, Manual 7.0 0.0 - 2.0 %    Metamyelocytes  %, Manual 1.0 0.0 - 0.0 %    Seg Neutrophils Absolute, Manual 1.71 1.60 - 5.00 x10*3/uL    Bands Absolute, Manual 0.08 0.00 - 0.50 x10*3/uL    Lymphocytes Absolute, Manual 0.53 (L) 0.80 - 3.00 x10*3/uL    Monocytes Absolute, Manual 0.14 0.05 - 0.80 x10*3/uL    Eosinophils Absolute, Manual 0.11 0.00 - 0.40 x10*3/uL    Basophils Absolute, Manual 0.00 0.00 - 0.10 x10*3/uL    Atypical Lymphs Absolute, Manual 0.20 0.00 - 0.30 x10*3/uL    Metamyelocytes Absolute, Manual 0.03 0.00 - 0.00 x10*3/uL    Total Cells Counted 100     Neutrophils Absolute, Manual 1.79 1.60 - 5.50 x10*3/uL    RBC Morphology See Below     Polychromasia Mild     RBC Fragments Few     Ovalocytes Few     Teardrop Cells Few     Laona Cells Few     Acanthocytes Few    Vancomycin   Result Value Ref Range    Vancomycin 8.1 5.0 - 20.0 ug/mL   Lavender Top   Result Value Ref Range    Extra Tube Hold for add-ons.    CBC and Auto Differential   Result Value Ref Range    WBC 2.6 (L) 4.4 - 11.3 x10*3/uL    nRBC 0.0 0.0 - 0.0 /100 WBCs    RBC 2.84 (L) 4.50 - 5.90 x10*6/uL    Hemoglobin 10.2 (L) 13.5 - 17.5 g/dL    Hematocrit 29.9 (L) 41.0 - 52.0 %     (H) 80 - 100 fL    MCH 35.9 (H) 26.0 - 34.0 pg    MCHC 34.1 32.0 - 36.0 g/dL    RDW 18.5 (H) 11.5 - 14.5 %    Platelets 54 (L) 150 - 450 x10*3/uL    Neutrophils % 60.1 40.0 - 80.0 %    Immature Granulocytes %, Automated 1.5 (H) 0.0 - 0.9 %    Lymphocytes % 18.6 13.0 - 44.0 %    Monocytes % 17.1 2.0 - 10.0 %    Eosinophils % 2.3 0.0 - 6.0 %    Basophils % 0.4 0.0 - 2.0 %    Neutrophils Absolute 1.58 (L) 1.60 - 5.50 x10*3/uL    Immature Granulocytes Absolute, Automated 0.04 0.00 - 0.50 x10*3/uL    Lymphocytes Absolute 0.49 (L) 0.80 - 3.00 x10*3/uL    Monocytes Absolute 0.45 0.05 - 0.80 x10*3/uL    Eosinophils Absolute 0.06 0.00 - 0.40 x10*3/uL    Basophils Absolute 0.01 0.00 - 0.10 x10*3/uL   Basic Metabolic Panel   Result Value Ref Range    Glucose 96 74 - 99 mg/dL    Sodium 141 136 - 145 mmol/L    Potassium  3.5 3.5 - 5.3 mmol/L    Chloride 104 98 - 107 mmol/L    Bicarbonate 28 21 - 32 mmol/L    Anion Gap 13 10 - 20 mmol/L    Urea Nitrogen 16 6 - 23 mg/dL    Creatinine 1.03 0.50 - 1.30 mg/dL    eGFR 66 >60 mL/min/1.73m*2    Calcium 6.5 (L) 8.6 - 10.3 mg/dL   SST TOP   Result Value Ref Range    Extra Tube Hold for add-ons.    Transthoracic Echo (TTE) Complete   Result Value Ref Range    AV pk leni 0.93 m/s    AV mn grad 2 mmHg    LVOT diam 1.97 cm    LA vol index A/L 57.7 ml/m2    Tricuspid annular plane systolic excursion 1.2 cm    LV EF 49 %    RV free wall pk S' 8.03 cm/s    LVIDd 3.76 cm    RVSP 43.9 mmHg    Aortic Valve Area by Continuity of VTI 2.79 cm2    Aortic Valve Area by Continuity of Peak Velocity 2.47 cm2    AV pk grad 3 mmHg    LV A4C EF 40.5      *Note: Due to a large number of results and/or encounters for the requested time period, some results have not been displayed. A complete set of results can be found in Results Review.       I spent 60 minutes in the professional and overall care of this patient.

## 2025-02-04 NOTE — PROGRESS NOTES
Physical Therapy    Physical Therapy Evaluation & Treatment    Patient Name: Ramón Flores  MRN: [id number]  Department: [facility] [address]  Room: [address] [place] [address]  Today's Date: 2/4/2025   Time Calculation  Start Time: 1327  Stop Time: 1401  Time Calculation (min): 34 min    Assessment/Plan   PT Assessment  PT Assessment Results: Decreased strength, Decreased range of motion, Decreased endurance, Impaired balance, Decreased mobility  Rehab Prognosis: Fair  Barriers to Discharge Home: Physical needs  Physical Needs: Ambulating household distances limited by function/safety, 24hr mobility assistance needed, 24hr ADL assistance needed, High falls risk due to function or environment  Evaluation/Treatment Tolerance: Patient tolerated treatment well, Patient limited by fatigue  Medical Staff Made Aware: Yes  Strengths: Ability to acquire knowledge, Access to adaptive/assistive products, Support of Caregivers  Barriers to Participation: Comorbidities  End of Session Communication: Bedside nurse  Assessment Comment: Pt presents today with decreased BLE strength, ROM, balance, and endurance. Currently, pt is below the reported PLOF requiring min to mod assist with transfers. Continued PT would benefit the pt to address the above deficits and bring the pt closer to the PLOF while reducing fall risk. At D/C, pt is anticipated to benefit from moderate intensity therapy.  End of Session Patient Position: Up in chair, Alarm on   IP OR SWING BED PT PLAN  Inpatient or Swing Bed: Inpatient  PT Plan  Treatment/Interventions: Bed mobility, Transfer training, Gait training, Balance training, Strengthening, Endurance training, Range of motion, Therapeutic exercise, Therapeutic activity, Home exercise program, Postural re-education  PT Plan: Ongoing PT  PT Frequency: 3 times per week  PT Discharge Recommendations: Moderate intensity level of continued care  Equipment Recommended upon Discharge: Wheeled walker (Pt owns a FWW)  PT Recommended Transfer  Status: Assist x1, Assistive device  PT - OK to Discharge: Yes (PT POC initiated today)    Subjective     General Visit Information:  General  Reason for Referral: 89 y/o M presenting with cough and shortness of breath. Pt being treated for bilateral lower lobe pneumonia.  Referred By: JANETTE Echeverria  Past Medical History Relevant to Rehab:   Past Medical History:   Diagnosis Date    Acute deep vein thrombosis (DVT) of left femoral vein (Multi) 09/10/2023    ACUTE DEEP VEIN THROMBOSIS, L FEMORAL, HEMOPHILIA    Acute diastolic heart failure 04/16/2023    Benign prostatic hyperplasia without lower urinary tract symptoms 01/07/2016    Enlarged prostate without lower urinary tract symptoms (luts)    Bleeding hemorrhoids 03/01/2023    CAP (community acquired pneumonia) 01/18/2024    Closed nondisplaced fracture of head of left radius 09/05/2023    COVID-19 05/21/2023    Enlarged prostate with lower urinary tract symptoms (LUTS) 03/01/2023    Fracture of bone of hip (Multi) 09/05/2023    Fracture of femoral neck, right 03/01/2023    Gastrointestinal hemorrhage 09/05/2023    LOWER GI BLEED    Gingiva hemorrhage 09/05/2023    Gross hematuria 03/01/2023    Hemarthrosis of ankle joint 04/18/2019    Hemarthrosis of knee 05/09/2022    Hematochezia 09/05/2023    Hematoma of lower extremity 09/05/2023    Hematoma of right thigh 03/01/2023    History of recent fall 05/31/2023    Knee effusion 09/05/2023    Knee effusion, left 03/01/2023    Lumbar pain 03/01/2023    Obstructive uropathy 04/28/2023    Orthostatic syncope 09/05/2023    Personal history of other endocrine, nutritional and metabolic disease     History of hyperlipidemia    Pneumonia of left lower lobe due to infectious organism 03/19/2024    Pneumonia of right lung due to infectious organism 03/01/2023    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    Subdural hematoma (Multi) 04/09/2023    Syncope 05/12/2023    Urinary retention 03/01/2023    UTI (urinary tract infection) 03/01/2023     Viral gastroenteritis 03/01/2023     Family/Caregiver Present: No  Prior to Session Communication: Bedside nurse  Patient Position Received: Bed, 3 rail up, Alarm on  Preferred Learning Style: auditory, kinesthetic, visual  General Comment: Pt supine in bed upon PT arrival. Cleared to participate with RN, and agreeable to PT evaluation.  Home Living:  Home Living  Type of Home: House  Lives With: Alone  Home Adaptive Equipment: Walker rolling or standard, Wheelchair-manual (FWW)  Home Layout: Multi-level, Bed/bath upstairs (Pt reports 4-level house)  Home Access: Ramped entrance  Bathroom Shower/Tub: Tub/shower unit  Bathroom Toilet: Standard  Bathroom Equipment: Grab bars in shower, Grab bars around toilet  Home Living Comments: Pt has chair lift between 1st and 2nd levels of home. Pt's bed/bath located on second level of home.  Prior Level of Function:  Prior Function Per Pt/Caregiver Report  Level of [place]: Needs assistance with ADLs, Needs assistance with homemaking  Receives Help From: Personal care attendant (Pt has private aides daily from 9 am to 5 pm)  ADL Assistance: Needs assistance  Bath: Sponge bathing (Pt reports bathing in bed with assist. Pt also reports fear of completing tub transfers)  Dressing:  (Independent)  Homemaking Assistance: Needs assistance  Meal Prep:  (Pt reports receiving meals on wheels)  Homemaking Assistance Comments: Private aides complete IADLs.  Ambulatory Assistance: Independent  Transfers: Independent, Assistive device (FWW)  Gait: Independent, Assistive device (Mod IND using FWW short distances, and IND WC propulsion longer distances)  Stairs:  (Pt has chair lift between 1st and 2nd levels of home.)  Hand Dominance: Ambidextrous  Prior Function Comments: Pt denies recent falls  Precautions:  Precautions  Hearing/Visual Limitations: +Glasses  Medical Precautions: Fall precautions    Objective   Pain:  Pain Assessment  Pain Assessment: 0-10  0-10 (Numeric) Pain Score:  0 - No pain  Cognition:  Cognition  Orientation Level: Oriented X4  Insight: Within function limits  Impulsive: Within functional limits    General Assessments:  Activity Tolerance  Activity Tolerance Comments: Fair tolerance to ambulation and multiple transer trials.    Sensation  Light Touch: No apparent deficits    Coordination  Movements are Fluid and Coordinated: Yes    Postural Control  Postural Control: Impaired  Head Control: Forward head posture  Trunk Control: Trunk flexion in standing with FWW support    Static Sitting Balance  Static Sitting-Balance Support: Bilateral upper extremity supported, Feet supported  Static Sitting-Level of Assistance: Close supervision  Static Sitting-Comment/Number of Minutes: At EOB  Dynamic Sitting Balance  Dynamic Sitting-Balance Support: No upper extremity supported, Feet supported, Feet unsupported  Dynamic Sitting-Level of Assistance: Contact guard, Close supervision  Dynamic Sitting-Balance: Forward lean, Trunk control activities  Dynamic Sitting-Comments: During donning of pants in EOB sitting. Initial CGA due to posterior trunk lean witihout UE or LE support. Pt able to maintain balance with cueing for foot placement on ground.    Static Standing Balance  Static Standing-Balance Support: Bilateral upper extremity supported  Static Standing-Level of Assistance: Minimum assistance, Contact guard  Static Standing-Comment/Number of Minutes: +Gait belt with FWW support. Pt demonstrates posterior lean in standing with both knees in increased flexion.  Dynamic Standing Balance  Dynamic Standing-Balance Support: Bilateral upper extremity supported  Dynamic Standing-Level of Assistance: Contact guard  Dynamic Standing-Balance: Turning  Dynamic Standing-Comments: +Gait belt with FWW support  Functional Assessments:  Ambulation/Gait Training  Ambulation/Gait Training Performed: Yes  Ambulation/Gait Training 1  Surface 1: Level tile  Device 1: Rolling walker  Gait Support  Devices: Gait belt  Assistance 1: Contact guard  Quality of Gait 1: Narrow base of support, Forward flexed posture, Decreased step length, Diminished heel strike  Comments/Distance (ft) 1: 2 ft x 1, 16 ft with a step-to pattern. Pt demonstrates decreased jese and step length with B knees in increased flexion. Pt with gaze directed at the ground with minimum correction with VC for forward gaze.    Stairs  Stairs: No  Extremity/Trunk Assessments:  RLE   RLE : Exceptions to WFL  PROM RLE (degrees)  R Knee Extension 0-130:  (At least 25 degree AROM deficit in short sittigng)  Strength RLE  R Hip Flexion: 4-/5  R Knee Extension: 3+/5  R Ankle Dorsiflexion: 4-/5  R Ankle Plantar Flexion: 2+/5  LLE   LLE : Exceptions to WFL  PROM LLE (degrees)  L Knee Extension 0-130:  (At least 25 degree AROM deficit in short sittigng)  Strength LLE  L Hip Flexion: 4-/5  L Knee Extension: 3+/5  L Ankle Dorsiflexion: 4-/5  L Ankle Plantar Flexion: 2+/5  Treatments:  Bed Mobility  Bed Mobility: Yes  Bed Mobility 1  Bed Mobility 1: Supine to sitting  Level of Assistance 1: Close supervision  Bed Mobility Comments 1: HOB elevated. Pt able to progress BLE off the EOB and use bed rail to assist trunk into upright sitting with increased effort    Transfers  Transfer: Yes  Transfer 1  Transfer From 1: Bed to  Transfer to 1: Stand  Technique 1: Sit to stand  Transfer Device 1: Walker, Gait belt  Transfer Level of Assistance 1: Moderate assistance, Minimal verbal cues  Trials/Comments 1: x 2 Trials. VC for hand placement on the bed for BUE push to stand.  Transfers 2  Transfer From 2: Stand to  Transfer to 2: Bed  Technique 2: Stand to sit  Transfer Device 2: Walker, Gait belt  Transfer Level of Assistance 2: Minimum assistance  Trials/Comments 2: Pt initiates BUE reach for the bed when sitting. Assist provided for controlled descent.  Transfers 3  Transfer From 3: Stand to  Transfer to 3: Chair with arms  Technique 3: Stand to sit  Transfer  Device 3: Walker, Gait belt  Transfer Level of Assistance 3: Minimum assistance, Minimal verbal cues  Trials/Comments 3: x 3 Trials. Pt demonstrates good BLE positioning against the chair before attempting to sit. VC for BUE reach for the armrests of the chair when sitting. Decreased control noted on descent requiring assist.  Transfers 4  Transfer From 4: Chair with arms to  Transfer to 4: Stand  Technique 4: Sit to stand  Transfer Device 4: Walker, Gait belt  Transfer Level of Assistance 4: Moderate assistance, Minimum assistance, Minimal verbal cues, Moderate tactile cues  Trials/Comments 4: x 3 Trials. Mod assist in trials 1 and 2, and min assist in 3rd trial. Pt initiates BUE push from the armrests of the chair to attempt to stand. Assist provided to reach full stand. Pt demonstrates posterior lean, BLE shakiness, and difficulty releasing  on chair armrest with the R hand to transfer to the FWW. VC/TC for anterior weight shift and increased weight bearing through FWW for improved balance.  Outcome Measures:  [facility] Basic Mobility  Turning from your back to your side while in a flat bed without using bedrails: A little  Moving from lying on your back to sitting on the side of a flat bed without using bedrails: A little  Moving to and from bed to chair (including a wheelchair): A lot  Standing up from a chair using your arms (e.g. wheelchair or bedside chair): A little  To walk in hospital room: A little  Climbing 3-5 steps with railing: Total  Basic Mobility - Total Score: 15    Encounter Problems       Encounter Problems (Active)       Balance       Goal 1 (Progressing)       Start:  02/04/25    Expected End:  02/18/25       Pt performs all sitting and standing balance with supervision using FWW            Mobility       STG - Patient will ambulate (Progressing)       Start:  02/04/25    Expected End:  02/18/25       40 ft with supervision using FWW with proper gait mechanics            PT Transfers        STG - Patient to transfer to and from sit to supine (Progressing)       Start:  02/04/25    Expected End:  02/18/25       IND         STG - Patient will transfer sit to and from stand (Progressing)       Start:  02/04/25    Expected End:  02/18/25       With CGA using FWW with proper gait mechanics              Education Documentation  Body Mechanics, taught by Braeden Bach, PT at 2/4/2025  2:48 PM.  Learner: Patient  Readiness: Acceptance  Method: Explanation, Demonstration  Response: Verbalizes Understanding, Demonstrated Understanding    Precautions, taught by Braeden Bach PT at 2/4/2025  2:48 PM.  Learner: Patient  Readiness: Acceptance  Method: Explanation, Demonstration  Response: Verbalizes Understanding, Demonstrated Understanding    Mobility Training, taught by Braeden Bach PT at 2/4/2025  2:48 PM.  Learner: Patient  Readiness: Acceptance  Method: Explanation, Demonstration  Response: Verbalizes Understanding, Demonstrated Understanding

## 2025-02-04 NOTE — PROGRESS NOTES
"Ramón Flores is a 89 y.o. male on day 3 of admission presenting with Pneumonia of both lower lobes due to infectious organism.    Subjective   \"I feel pretty good in the chair\"       Objective     Physical Exam  Vitals and nursing note reviewed.   Constitutional:       Appearance: He is ill-appearing.   HENT:      Head: Normocephalic and atraumatic.      Nose: Nose normal.      Mouth/Throat:      Mouth: Mucous membranes are moist.   Eyes:      General: No scleral icterus.  Cardiovascular:      Rate and Rhythm: Normal rate.      Pulses: Normal pulses.   Pulmonary:      Effort: No respiratory distress.   Abdominal:      Palpations: Abdomen is soft.      Tenderness: There is no abdominal tenderness.   Musculoskeletal:      Cervical back: No tenderness.      Right lower leg: Edema present.      Left lower leg: Edema present.   Skin:     General: Skin is warm and dry.      Findings: Bruising present. No rash.   Neurological:      General: No focal deficit present.      Mental Status: He is alert and oriented to person, place, and time.         Last Recorded Vitals  Blood pressure 101/66, pulse 90, temperature 37.1 °C (98.8 °F), temperature source Temporal, resp. rate 18, height 1.676 m (5' 6\"), weight 59.9 kg (132 lb), SpO2 92%.  Intake/Output last 3 Shifts:  I/O last 3 completed shifts:  In: - (0 mL/kg)   Out: 4100 (68.5 mL/kg) [Urine:4100 (1.9 mL/kg/hr)]  Weight: 59.9 kg     Relevant Results  Results for orders placed or performed during the hospital encounter of 02/01/25 (from the past 96 hours)   CBC and Auto Differential   Result Value Ref Range    WBC 3.3 (L) 4.4 - 11.3 x10*3/uL    nRBC 0.0 0.0 - 0.0 /100 WBCs    RBC 3.02 (L) 4.50 - 5.90 x10*6/uL    Hemoglobin 11.0 (L) 13.5 - 17.5 g/dL    Hematocrit 32.3 (L) 41.0 - 52.0 %     (H) 80 - 100 fL    MCH 36.4 (H) 26.0 - 34.0 pg    MCHC 34.1 32.0 - 36.0 g/dL    RDW 18.2 (H) 11.5 - 14.5 %    Platelets 63 (L) 150 - 450 x10*3/uL    Immature Granulocytes %, " Automated 6.9 (H) 0.0 - 0.9 %    Immature Granulocytes Absolute, Automated 0.23 0.00 - 0.50 x10*3/uL   Comprehensive metabolic panel   Result Value Ref Range    Glucose 105 (H) 74 - 99 mg/dL    Sodium 140 136 - 145 mmol/L    Potassium 3.5 3.5 - 5.3 mmol/L    Chloride 106 98 - 107 mmol/L    Bicarbonate 22 21 - 32 mmol/L    Anion Gap 16 10 - 20 mmol/L    Urea Nitrogen 19 6 - 23 mg/dL    Creatinine 0.98 0.50 - 1.30 mg/dL    eGFR 71 >60 mL/min/1.73m*2    Calcium 6.6 (L) 8.6 - 10.3 mg/dL    Albumin 4.0 3.4 - 5.0 g/dL    Alkaline Phosphatase 98 33 - 136 U/L    Total Protein 6.5 6.4 - 8.2 g/dL    AST 35 9 - 39 U/L    Bilirubin, Total 2.0 (H) 0.0 - 1.2 mg/dL    ALT 35 10 - 52 U/L   Sars-CoV-2 PCR   Result Value Ref Range    Coronavirus 2019, PCR Not Detected Not Detected   RSV PCR   Result Value Ref Range    RSV PCR Not Detected Not Detected   Influenza A, and B PCR   Result Value Ref Range    Flu A Result Not Detected Not Detected    Flu B Result Not Detected Not Detected   Troponin I, High Sensitivity, Initial   Result Value Ref Range    Troponin I, High Sensitivity 92 (HH) 0 - 20 ng/L   Manual Differential   Result Value Ref Range    Neutrophils %, Manual 64.0 40.0 - 80.0 %    Lymphocytes %, Manual 24.0 13.0 - 44.0 %    Monocytes %, Manual 6.0 2.0 - 10.0 %    Eosinophils %, Manual 3.0 0.0 - 6.0 %    Basophils %, Manual 0.0 0.0 - 2.0 %    Atypical Lymphocytes %, Manual 3.0 0.0 - 2.0 %    Seg Neutrophils Absolute, Manual 2.11 1.60 - 5.00 x10*3/uL    Lymphocytes Absolute, Manual 0.79 (L) 0.80 - 3.00 x10*3/uL    Monocytes Absolute, Manual 0.20 0.05 - 0.80 x10*3/uL    Eosinophils Absolute, Manual 0.10 0.00 - 0.40 x10*3/uL    Basophils Absolute, Manual 0.00 0.00 - 0.10 x10*3/uL    Atypical Lymphs Absolute, Manual 0.10 0.00 - 0.30 x10*3/uL    Total Cells Counted 100     RBC Morphology See Below     RBC Fragments Few     Ovalocytes Few    B-type Natriuretic Peptide   Result Value Ref Range     (H) 0 - 99 pg/mL   ECG 12  lead   Result Value Ref Range    Ventricular Rate 77 BPM    QRS Duration 94 ms    QT Interval 438 ms    QTC Calculation(Bazett) 495 ms    R Axis -46 degrees    T Axis 88 degrees    QRS Count 13 beats    Q Onset 211 ms    T Offset 430 ms    QTC Fredericia 475 ms   Troponin, High Sensitivity, 1 Hour   Result Value Ref Range    Troponin I, High Sensitivity 85 (HH) 0 - 20 ng/L   Blood Culture    Specimen: Peripheral Venipuncture; Blood culture   Result Value Ref Range    Blood Culture No growth at 2 days    Blood Culture    Specimen: Peripheral Venipuncture; Blood culture   Result Value Ref Range    Blood Culture Staphylococcus epidermidis (A)     BLOOD CULTURE BOTTLE  Positive Aerobic Bottle     Gram Stain Gram positive cocci, clusters (AA)    Legionella Antigen, Urine    Specimen: Clean Catch/Voided; Urine   Result Value Ref Range    L. pneumophila Urine Ag Negative Negative   Streptococcus pneumoniae Antigen, Urine    Specimen: Clean Catch/Voided; Urine   Result Value Ref Range    Streptococcus pneumoniae Ag, Urine Negative Negative   CBC and Auto Differential   Result Value Ref Range    WBC 2.8 (L) 4.4 - 11.3 x10*3/uL    nRBC 0.0 0.0 - 0.0 /100 WBCs    RBC 2.68 (L) 4.50 - 5.90 x10*6/uL    Hemoglobin 9.7 (L) 13.5 - 17.5 g/dL    Hematocrit 28.8 (L) 41.0 - 52.0 %     (H) 80 - 100 fL    MCH 36.2 (H) 26.0 - 34.0 pg    MCHC 33.7 32.0 - 36.0 g/dL    RDW 18.4 (H) 11.5 - 14.5 %    Platelets 53 (L) 150 - 450 x10*3/uL    Immature Granulocytes %, Automated 7.1 (H) 0.0 - 0.9 %    Immature Granulocytes Absolute, Automated 0.20 0.00 - 0.50 x10*3/uL   Basic Metabolic Panel   Result Value Ref Range    Glucose 96 74 - 99 mg/dL    Sodium 142 136 - 145 mmol/L    Potassium 3.2 (L) 3.5 - 5.3 mmol/L    Chloride 110 (H) 98 - 107 mmol/L    Bicarbonate 24 21 - 32 mmol/L    Anion Gap 11 10 - 20 mmol/L    Urea Nitrogen 18 6 - 23 mg/dL    Creatinine 0.99 0.50 - 1.30 mg/dL    eGFR 70 >60 mL/min/1.73m*2    Calcium 6.2 (L) 8.6 - 10.3 mg/dL    Manual Differential   Result Value Ref Range    Neutrophils %, Manual 61.0 40.0 - 80.0 %    Bands %, Manual 3.0 0.0 - 5.0 %    Lymphocytes %, Manual 19.0 13.0 - 44.0 %    Monocytes %, Manual 5.0 2.0 - 10.0 %    Eosinophils %, Manual 4.0 0.0 - 6.0 %    Basophils %, Manual 0.0 0.0 - 2.0 %    Atypical Lymphocytes %, Manual 7.0 0.0 - 2.0 %    Metamyelocytes %, Manual 1.0 0.0 - 0.0 %    Seg Neutrophils Absolute, Manual 1.71 1.60 - 5.00 x10*3/uL    Bands Absolute, Manual 0.08 0.00 - 0.50 x10*3/uL    Lymphocytes Absolute, Manual 0.53 (L) 0.80 - 3.00 x10*3/uL    Monocytes Absolute, Manual 0.14 0.05 - 0.80 x10*3/uL    Eosinophils Absolute, Manual 0.11 0.00 - 0.40 x10*3/uL    Basophils Absolute, Manual 0.00 0.00 - 0.10 x10*3/uL    Atypical Lymphs Absolute, Manual 0.20 0.00 - 0.30 x10*3/uL    Metamyelocytes Absolute, Manual 0.03 0.00 - 0.00 x10*3/uL    Total Cells Counted 100     Neutrophils Absolute, Manual 1.79 1.60 - 5.50 x10*3/uL    RBC Morphology See Below     Polychromasia Mild     RBC Fragments Few     Ovalocytes Few     Teardrop Cells Few     [place] Cells Few     Acanthocytes Few    Vancomycin   Result Value Ref Range    Vancomycin 8.1 5.0 - 20.0 ug/mL   Lavender Top   Result Value Ref Range    Extra Tube Hold for add-ons.    CBC and Auto Differential   Result Value Ref Range    WBC 2.6 (L) 4.4 - 11.3 x10*3/uL    nRBC 0.0 0.0 - 0.0 /100 WBCs    RBC 2.84 (L) 4.50 - 5.90 x10*6/uL    Hemoglobin 10.2 (L) 13.5 - 17.5 g/dL    Hematocrit 29.9 (L) 41.0 - 52.0 %     (H) 80 - 100 fL    MCH 35.9 (H) 26.0 - 34.0 pg    MCHC 34.1 32.0 - 36.0 g/dL    RDW 18.5 (H) 11.5 - 14.5 %    Platelets 54 (L) 150 - 450 x10*3/uL    Neutrophils % 60.1 40.0 - 80.0 %    Immature Granulocytes %, Automated 1.5 (H) 0.0 - 0.9 %    Lymphocytes % 18.6 13.0 - 44.0 %    Monocytes % 17.1 2.0 - 10.0 %    Eosinophils % 2.3 0.0 - 6.0 %    Basophils % 0.4 0.0 - 2.0 %    Neutrophils Absolute 1.58 (L) 1.60 - 5.50 x10*3/uL    Immature Granulocytes Absolute,  Automated 0.04 0.00 - 0.50 x10*3/uL    Lymphocytes Absolute 0.49 (L) 0.80 - 3.00 x10*3/uL    Monocytes Absolute 0.45 0.05 - 0.80 x10*3/uL    Eosinophils Absolute 0.06 0.00 - 0.40 x10*3/uL    Basophils Absolute 0.01 0.00 - 0.10 x10*3/uL   Basic Metabolic Panel   Result Value Ref Range    Glucose 96 74 - 99 mg/dL    Sodium 141 136 - 145 mmol/L    Potassium 3.5 3.5 - 5.3 mmol/L    Chloride 104 98 - 107 mmol/L    Bicarbonate 28 21 - 32 mmol/L    Anion Gap 13 10 - 20 mmol/L    Urea Nitrogen 16 6 - 23 mg/dL    Creatinine 1.03 0.50 - 1.30 mg/dL    eGFR 66 >60 mL/min/1.73m*2    Calcium 6.5 (L) 8.6 - 10.3 mg/dL   SST TOP   Result Value Ref Range    Extra Tube Hold for add-ons.    Transthoracic Echo (TTE) Complete   Result Value Ref Range    AV pk leni 0.93 m/s    AV mn grad 2 mmHg    LVOT diam 1.97 cm    LA vol index A/L 57.7 ml/m2    Tricuspid annular plane systolic excursion 1.2 cm    LV EF 49 %    RV free wall pk S' 8.03 cm/s    LVIDd 3.76 cm    RVSP 43.9 mmHg    Aortic Valve Area by Continuity of VTI 2.79 cm2    Aortic Valve Area by Continuity of Peak Velocity 2.47 cm2    AV pk grad 3 mmHg    LV A4C EF 40.5    CBC and Auto Differential   Result Value Ref Range    WBC 4.1 (L) 4.4 - 11.3 x10*3/uL    nRBC 0.0 0.0 - 0.0 /100 WBCs    RBC 2.99 (L) 4.50 - 5.90 x10*6/uL    Hemoglobin 10.5 (L) 13.5 - 17.5 g/dL    Hematocrit 31.6 (L) 41.0 - 52.0 %     (H) 80 - 100 fL    MCH 35.1 (H) 26.0 - 34.0 pg    MCHC 33.2 32.0 - 36.0 g/dL    RDW 18.0 (H) 11.5 - 14.5 %    Platelets 68 (L) 150 - 450 x10*3/uL    Neutrophils % 54.4 40.0 - 80.0 %    Immature Granulocytes %, Automated 4.4 (H) 0.0 - 0.9 %    Lymphocytes % 16.7 13.0 - 44.0 %    Monocytes % 22.8 2.0 - 10.0 %    Eosinophils % 1.5 0.0 - 6.0 %    Basophils % 0.2 0.0 - 2.0 %    Neutrophils Absolute 2.25 1.60 - 5.50 x10*3/uL    Immature Granulocytes Absolute, Automated 0.18 0.00 - 0.50 x10*3/uL    Lymphocytes Absolute 0.69 (L) 0.80 - 3.00 x10*3/uL    Monocytes Absolute 0.94 (H) 0.05  - 0.80 x10*3/uL    Eosinophils Absolute 0.06 0.00 - 0.40 x10*3/uL    Basophils Absolute 0.01 0.00 - 0.10 x10*3/uL   Comprehensive Metabolic Panel   Result Value Ref Range    Glucose 93 74 - 99 mg/dL    Sodium 141 136 - 145 mmol/L    Potassium 3.5 3.5 - 5.3 mmol/L    Chloride 101 98 - 107 mmol/L    Bicarbonate 29 21 - 32 mmol/L    Anion Gap 15 10 - 20 mmol/L    Urea Nitrogen 21 6 - 23 mg/dL    Creatinine 1.21 0.50 - 1.30 mg/dL    eGFR 55 (L) >60 mL/min/1.73m*2    Calcium 6.4 (L) 8.6 - 10.3 mg/dL    Albumin 3.4 3.4 - 5.0 g/dL    Alkaline Phosphatase 94 33 - 136 U/L    Total Protein 6.0 (L) 6.4 - 8.2 g/dL    AST 23 9 - 39 U/L    Bilirubin, Total 1.8 (H) 0.0 - 1.2 mg/dL    ALT 27 10 - 52 U/L     *Note: Due to a large number of results and/or encounters for the requested time period, some results have not been displayed. A complete set of results can be found in Results Review.           Assessment/Plan   Assessment & Plan  Pneumonia of both lower lobes due to infectious organism    Hemophilia A (Multi)    Factor VIII deficiency (Multi)    Reason for consult: Hemophilia A - factor orders  Assessment/Recommendations: Hemophilia A, Hematology is consulted to manage Factor VIII replenishment  - Atuviiio, 3,030 mg weekly dose on Tuesdays ordered in collaboration with Dr. Lindsey, family brought in from home  - cardiology is following, no AC for AF given Hemophilia A  - monitor for bleeding, low threshold [place] [facility]  - if patient has any bleeding concerns or requires any procedures * including a kelley catheter* he will need to be transferred to [facility] for management not available at [facility] emergently- provider should call [facility] and ask for STAT emergency transfer   - Denies any overt bleeding this admission or recently  - WBC 2.9 ANC 1.58 ALC 0.49, Hb below baseline of 11 to 10.2, VSS  - repeat CBC w diff in am  - defer factor VIII activity monitoring to outpatient hematology   - Patient is DNR no  intubation    2/4/25  - CBC is stable  - patient sitting up in a chair at the bedside, ambulates to  with assist  - BM yesterday, no blood noted  - continue to closely monitor  - he will need his factor VIII replacement weekly  - if patient has any bleeding concerns or requires any procedures * including a kelley catheter* he will need to be transferred to [facility] for management not available at [facility] emergently- provider should call [facility] and ask for STAT emergency transfer     Follow up with Hematology  Discussed with Dr. Kee and Dr. Echeverria    I spent 30 minutes in the professional and overall care of this patient.      Amparo Gan, APRN-CNP

## 2025-02-04 NOTE — PROGRESS NOTES
"               [facility]          Admitting Provider: Donta Echeverria MD Admission Date: 2/1/2025.   Attending Provider: Donta Echeverria MD MRN: [id number]       Beatriz Flores is a 89 y.o. male on day 3 of admission presenting with Pneumonia of both lower lobes due to infectious organism.  Interval History with a history of chronic diastolic CHF, Atrial fibrillation and VTE presented with cough & shortness of breath for few days Has chest congestion. Swallowing difficulty has improved. Seen by speech therapy & dietary modifications made. Urine legionella antigen negative. GPC clusters in blood thought be a contaminant. Feeling better.         Objective   Physical Exam  Last Recorded Vitals: Blood pressure 99/64, pulse 82, temperature 36.7 °C (98.1 °F), temperature source Temporal, resp. rate 18, height 1.676 m (5' 6\"), weight 59.9 kg (132 lb), SpO2 96%.  Patient Vitals for the past 24 hrs:   BP Temp Temp src Pulse Resp SpO2   02/04/25 0732 -- -- -- -- -- 96 %   02/04/25 0708 99/64 36.7 °C (98.1 °F) Temporal 82 18 96 %   02/04/25 0500 105/79 37.7 °C (99.9 °F) Temporal 80 20 95 %   02/04/25 0001 102/59 36.7 °C (98 °F) Oral 80 20 94 %   02/03/25 2300 -- -- -- 82 -- --   02/03/25 2012 113/68 36.9 °C (98.4 °F) Temporal 83 18 94 %   02/03/25 1548 112/63 37.1 °C (98.8 °F) Temporal 82 18 96 %   02/03/25 1522 -- -- -- -- -- 96 %   02/03/25 1123 102/68 36.5 °C (97.7 °F) Temporal -- 18 96 %     Body mass index is 21.31 kg/m².  GENERAL: alert, cooperative, or no distress  SKIN: no rashes  HEENT Atraumatic, Normocephalic and Not pale, no icterus  NECK: supple, no thyromegaly, JVP within normal limits  LUNGS:  not in respiratory distress, respiratory rate normal, clear to auscultation  CARDIAC: regular rate and rhythm, normal S1 and S2, no murmur, rub, or gallop  ABDOMEN: Soft, non-tender, normal bowel sounds; no bruits, organomegaly or masses.  EXTREMITIES: No edema  NEURO: Alert and oriented x " 3, reflexes normal and symmetric, strength and  sensation grossly normal  Intake/Output last 3 Shifts:  I/O last 3 completed shifts:  In: - (0 mL/kg)   Out: 4100 (68.5 mL/kg) [Urine:4100 (1.9 mL/kg/hr)]  Weight: 59.9 kg   DATA:   Diagnostic tests reviewed for today's visit:    Most recent Labs  Results for orders placed or performed during the hospital encounter of 02/01/25 (from the past 24 hours)   CBC and Auto Differential   Result Value Ref Range    WBC 2.6 (L) 4.4 - 11.3 x10*3/uL    nRBC 0.0 0.0 - 0.0 /100 WBCs    RBC 2.84 (L) 4.50 - 5.90 x10*6/uL    Hemoglobin 10.2 (L) 13.5 - 17.5 g/dL    Hematocrit 29.9 (L) 41.0 - 52.0 %     (H) 80 - 100 fL    MCH 35.9 (H) 26.0 - 34.0 pg    MCHC 34.1 32.0 - 36.0 g/dL    RDW 18.5 (H) 11.5 - 14.5 %    Platelets 54 (L) 150 - 450 x10*3/uL    Neutrophils % 60.1 40.0 - 80.0 %    Immature Granulocytes %, Automated 1.5 (H) 0.0 - 0.9 %    Lymphocytes % 18.6 13.0 - 44.0 %    Monocytes % 17.1 2.0 - 10.0 %    Eosinophils % 2.3 0.0 - 6.0 %    Basophils % 0.4 0.0 - 2.0 %    Neutrophils Absolute 1.58 (L) 1.60 - 5.50 x10*3/uL    Immature Granulocytes Absolute, Automated 0.04 0.00 - 0.50 x10*3/uL    Lymphocytes Absolute 0.49 (L) 0.80 - 3.00 x10*3/uL    Monocytes Absolute 0.45 0.05 - 0.80 x10*3/uL    Eosinophils Absolute 0.06 0.00 - 0.40 x10*3/uL    Basophils Absolute 0.01 0.00 - 0.10 x10*3/uL   Basic Metabolic Panel   Result Value Ref Range    Glucose 96 74 - 99 mg/dL    Sodium 141 136 - 145 mmol/L    Potassium 3.5 3.5 - 5.3 mmol/L    Chloride 104 98 - 107 mmol/L    Bicarbonate 28 21 - 32 mmol/L    Anion Gap 13 10 - 20 mmol/L    Urea Nitrogen 16 6 - 23 mg/dL    Creatinine 1.03 0.50 - 1.30 mg/dL    eGFR 66 >60 mL/min/1.73m*2    Calcium 6.5 (L) 8.6 - 10.3 mg/dL   SST TOP   Result Value Ref Range    Extra Tube Hold for add-ons.    Transthoracic Echo (TTE) Complete   Result Value Ref Range    AV pk leni 0.93 m/s    AV mn grad 2 mmHg    LVOT diam 1.97 cm    LA vol index A/L 57.7 ml/m2     Tricuspid annular plane systolic excursion 1.2 cm    LV EF 49 %    RV free wall pk S' 8.03 cm/s    LVIDd 3.76 cm    RVSP 43.9 mmHg    Aortic Valve Area by Continuity of VTI 2.79 cm2    Aortic Valve Area by Continuity of Peak Velocity 2.47 cm2    AV pk grad 3 mmHg    LV A4C EF 40.5    CBC and Auto Differential   Result Value Ref Range    WBC 4.1 (L) 4.4 - 11.3 x10*3/uL    nRBC 0.0 0.0 - 0.0 /100 WBCs    RBC 2.99 (L) 4.50 - 5.90 x10*6/uL    Hemoglobin 10.5 (L) 13.5 - 17.5 g/dL    Hematocrit 31.6 (L) 41.0 - 52.0 %     (H) 80 - 100 fL    MCH 35.1 (H) 26.0 - 34.0 pg    MCHC 33.2 32.0 - 36.0 g/dL    RDW 18.0 (H) 11.5 - 14.5 %    Platelets 68 (L) 150 - 450 x10*3/uL    Neutrophils % 54.4 40.0 - 80.0 %    Immature Granulocytes %, Automated 4.4 (H) 0.0 - 0.9 %    Lymphocytes % 16.7 13.0 - 44.0 %    Monocytes % 22.8 2.0 - 10.0 %    Eosinophils % 1.5 0.0 - 6.0 %    Basophils % 0.2 0.0 - 2.0 %    Neutrophils Absolute 2.25 1.60 - 5.50 x10*3/uL    Immature Granulocytes Absolute, Automated 0.18 0.00 - 0.50 x10*3/uL    Lymphocytes Absolute 0.69 (L) 0.80 - 3.00 x10*3/uL    Monocytes Absolute 0.94 (H) 0.05 - 0.80 x10*3/uL    Eosinophils Absolute 0.06 0.00 - 0.40 x10*3/uL    Basophils Absolute 0.01 0.00 - 0.10 x10*3/uL   Comprehensive Metabolic Panel   Result Value Ref Range    Glucose 93 74 - 99 mg/dL    Sodium 141 136 - 145 mmol/L    Potassium 3.5 3.5 - 5.3 mmol/L    Chloride 101 98 - 107 mmol/L    Bicarbonate 29 21 - 32 mmol/L    Anion Gap 15 10 - 20 mmol/L    Urea Nitrogen 21 6 - 23 mg/dL    Creatinine 1.21 0.50 - 1.30 mg/dL    eGFR 55 (L) >60 mL/min/1.73m*2    Calcium 6.4 (L) 8.6 - 10.3 mg/dL    Albumin 3.4 3.4 - 5.0 g/dL    Alkaline Phosphatase 94 33 - 136 U/L    Total Protein 6.0 (L) 6.4 - 8.2 g/dL    AST 23 9 - 39 U/L    Bilirubin, Total 1.8 (H) 0.0 - 1.2 mg/dL    ALT 27 10 - 52 U/L     *Note: Due to a large number of results and/or encounters for the requested time period, some results have not been displayed. A  "complete set of results can be found in Results Review.     Urine Culture   Date Value Ref Range Status   03/21/2024 (A)  Final    >100,000 Methicillin Resistant Staphylococcus aureus (MRSA)     Comment:     Methicillin (Oxacillin) resistant Staphylococci are resistant to all currently available Penicillins, Beta-lactam/Beta-lactamase inhibitor combinations (including Ampicillin/Sulbactam, Amoxicillin/Clavulanate and Pipercillin/Tazobactam), Carbapenems and   Cephalosporins (except Ceftaroline).     Blood Culture   Date Value Ref Range Status   02/01/2025 No growth at 2 days  Preliminary   02/01/2025 Staphylococcus epidermidis (A)  Preliminary     Comment:     A single positive blood culture with this organism may indicate contamination at collection. Notify the microbiology laboratory at [phone] if clinical features indicate the need for further work-up.    No results found for: \"RESPCULTSM\" No results found for: \"PERDIAFLDCUL\"   Sterile Fluid Culture/Smear   Date Value Ref Range Status   05/31/2024 No growth aerobically and anaerobically  Final      Transthoracic Echo (TTE) Complete    Result Date: 2/3/2025   [facility], [address] [place] [address]              Tel [phone] and Fax [phone] TRANSTHORACIC ECHOCARDIOGRAM REPORT  Patient Name:       KRISTIN Simpson Physician:    [id number] Davis Cabello MD Study Date:         2/3/2025            Ordering Provider:    [id number] DIMPLE SANON MRN/PID:            [id number]            Fellow: Accession#:         [id number]        Nurse: Date of Birth/Age:  7/24/1928 / 89      Sonographer:          Leia Machado RDCS                     years Gender assigned at  M                   Additional Staff: Birth: Height:             167.64 cm           Admit Date:           2/1/2025 Weight:             59.87 kg    "         Admission Status:     Inpatient -                                                               Routine BSA / BMI:          1.68 m2 / 21.31     Encounter#:           [id number]                     kg/m2 Blood Pressure:     108/65 mmHg         Department Location:  [facility]                                                               Invasive Study Type:    TRANSTHORACIC ECHO (TTE) COMPLETE Diagnosis/ICD: Acute on chronic diastolic (congestive) heart failure                (CHF)-I50.33 Indication:    CHF, afib CPT Code:      Echo Complete w Full Doppler-32489 Patient History: Pertinent History: Hx CAD s/p CABG (2002), HFpEF, HLD, CHF, Afib. Study Detail: The following Echo studies were performed: 2D, M-Mode, Doppler and               color flow.  PHYSICIAN INTERPRETATION: Left Ventricle: Left ventricular ejection fraction is mildly decreased, calculated by Nguyen's biplane at 49%. There is global hypokinesis of the left ventricle with minor regional variations. The left ventricular cavity size is normal. There is moderately increased septal and moderately increased posterior left ventricular wall thickness. There is left ventricular concentric remodeling. Abnormal (paradoxical) septal motion consistent with post-operative status. Spectral Doppler shows an abnormal pattern of left ventricular diastolic filling. Left Atrium: The left atrial size is severely dilated. Right Ventricle: The right ventricle is normal in size. There is reduced right ventricular systolic function. Right Atrium: The right atrium is moderate to severely dilated. Aortic Valve: The aortic valve is trileaflet. The aortic valve dimensionless index is 0.91. There is no evidence of aortic valve regurgitation. The peak instantaneous gradient of the aortic valve is 3 mmHg. The mean gradient of the aortic valve is 2 mmHg. Mitral Valve: The mitral valve is normal in structure. There is trace to mild mitral valve regurgitation. Tricuspid  Valve: The tricuspid valve is structurally normal. There is mild tricuspid regurgitation. The Doppler estimated RVSP is mildly elevated at 43.9 mmHg. Pulmonic Valve: The pulmonic valve is structurally normal. There is mild pulmonic valve regurgitation. Pericardium: There is no pericardial effusion noted. Aorta: The aortic root is normal. There is mild dilatation of the ascending aorta. Systemic Veins: The inferior vena cava appears dilated, with IVC inspiratory collapse greater than 50%. In comparison to the previous echocardiogram(s): Compared with study dated 6/1/2024, no significant change.  CONCLUSIONS:  1. Left ventricular ejection fraction is mildly decreased, calculated by Nguyen's biplane at 49%.  2. There is global hypokinesis of the left ventricle with minor regional variations.  3. Spectral Doppler shows an abnormal pattern of left ventricular diastolic filling.  4. Abnormal septal motion consistent with post-operative status.  5. There is moderately increased septal and moderately increased posterior left ventricular wall thickness.  6. There is reduced right ventricular systolic function.  7. The left atrial size is severely dilated.  8. The right atrium is moderate to severely dilated.  9. Mildly elevated right ventricular systolic pressure. QUANTITATIVE DATA SUMMARY:  2D MEASUREMENTS:          Normal Ranges: Ao Root s:       2.96 cm IVSd:            1.37 cm  (0.6-1.1cm) LVPWd:           1.40 cm  (0.6-1.1cm) LVIDd:           3.76 cm  (3.9-5.9cm) LVIDs:           3.09 cm LV Mass Index:   112 g/m2 LVEDV Index:     56 ml/m2 LV % FS          17.7 %  LEFT ATRIUM:                  Normal Ranges: LA Vol A4C:        101.0 ml   (22+/-6mL/m2) LA Vol A2C:        88.4 ml LA Vol BP:         96.7 ml LA Vol Index A4C:  60.2ml/m2 LA Vol Index A2C:  52.7 ml/m2 LA Vol Index BP:   57.7 ml/m2 LA Area A4C:       28.0 cm2 LA Area A2C:       25.6 cm2 LA Major Axis A4C: 6.6 cm LA Major Axis A2C: 6.3 cm LA Volume Index:    57.7 ml/m2 LA Vol A4C:        92.1 ml LA Vol A2C:        85.3 ml LA Vol Index BSA:  52.9 ml/m2  RIGHT ATRIUM:                 Normal Ranges: RA Vol A4C:        84.3 ml    (8.3-19.5ml) RA Vol Index A4C:  50.3 ml/m2 RA Area A4C:       25.0 cm2 RA Major Axis A4C: 6.3 cm  M-MODE MEASUREMENTS:         Normal Ranges: LAs:                 4.55 cm (2.7-4.0cm)  AORTA MEASUREMENTS:         Normal Ranges: Ao Sinus, d:        3.00 cm (2.1-3.5cm) Ao STJ, d:          2.40 cm (1.7-3.4cm) Asc Ao, d:          3.50 cm (2.1-3.4cm)  LV SYSTOLIC FUNCTION:                      Normal Ranges: EF-A4C View:    41 % (>=55%) EF-A2C View:    56 % EF-Biplane:     49 % LV EF Reported: 49 %  LV DIASTOLIC FUNCTION:             Normal Ranges: MV e'                  0.075 m/s   (>8.0) MV lateral e'          0.08 m/s MV medial e'           0.07 m/s MV A Dur:              102.76 msec  AORTIC VALVE:                     Normal Ranges: AoV Vmax:                0.93 m/s (<=1.7m/s) AoV Peak PG:             3.4 mmHg (<20mmHg) AoV Mean P.7 mmHg (1.7-11.5mmHg) LVOT Max Daniel:            0.75 m/s (<=1.1m/s) AoV VTI:                 14.10 cm (18-25cm) LVOT VTI:                12.89 cm LVOT Diameter:           1.97 cm  (1.8-2.4cm) AoV Area, VTI:           2.79 cm2 (2.5-5.5cm2) AoV Area,Vmax:           2.47 cm2 (2.5-4.5cm2) AoV Dimensionless Index: 0.91  RIGHT VENTRICLE: RV Basal 3.90 cm RV Mid   2.80 cm RV Major 7.3 cm TAPSE:   12.3 mm RV s'    0.08 m/s  TRICUSPID VALVE/RVSP:          Normal Ranges: Peak TR Velocity:     3.00 m/s Est. RA Pressure:     8 mmHg RV Syst Pressure:     44 mmHg  (< 30mmHg) IVC Diam:             2.19 cm  PULMONIC VALVE:          Normal Ranges: PV Accel Time:  59 msec  (>120ms) PV Max Daniel:     0.7 m/s  (0.6-0.9m/s) PV Max P.7 mmHg  AORTA: Asc Ao Diam 3.45 cm  [id number] Davis Cabello MD Electronically signed on 2/3/2025 at 7:03:05 PM  ** Final **     FL modified barium swallow study    Result Date: 2/3/2025  Interpreted  By:  Nicholas Disla and Lovelace Elizabeth STUDY: FL MODIFIED BARIUM SWALLOW STUDY;; 2/3/2025 11:22 am   INDICATION: Signs/Symptoms:post prandial cough.     COMPARISON: None.   ACCESSION NUMBER(S): [id number]   ORDERING CLINICIAN: DIMPLE SANON   TECHNIQUE: Modified Barium Swallow Study completed. Informed verbal consent obtained prior to completion of exam. Trials of thin liquids, mildly/moderately thick liquids, purees, solids, and barium tablet with thin liquids were administered for exam this date.   SLP: Gricel Loo, SLP Contact info: [employer] secure chat   SPEECH FINDINGS:   Reason for Referral: C/f aspiration/oropharyngeal dysphagia Patient Hx: hemophilia, heart failure, chronic A-fib, prior anal cancer status post radiation, GERD, CKD Respiratory Status: nasal cannula Current diet: NPO w/ no means of nutrition/hydration   Pain: Patient exhibits no s/s of pain or discomfort during study.   RECOMMENDATIONS: - Easy to Chew (IDDSI Level 7) - Mildly/nectar thick liquids (IDDSI Level 2)   *NO MIXED CONSISTENCIES   STRATEGIES: Upright for all PO intake Small bites/sips Slow rate of consumption Medication crushed in purees   SLP PLAN: Skilled SLP Services: Skilled SLP intervention for dysphagia is warranted. SLP Frequency: 2x per week Duration: 2 WEEKS Treatment/Interventions: - Bolus trials - Compensatory strategy training - Diet tolerance/advancement - Patient/caregiver education   Discussed POC: patient Discussed Risks/Benefits: Yes Patient/Caregiver Agreeable: Yes   Short term goals established: - Patient will tolerate current diet without noted pulmonary compromise or evidence of pulmonary sequela as noted in patient chart and/or reported by patient/family. Start Date: 2/3/25 End Date: 2/17/25 Status: Goal Initiated this date   Education Provided:  Results and recommendations of MBSS reviewed with patient upon completion of exam. POC discussed at this time with need for modification of diet and  compensatory swallowing strategies to maximize swallowing safety and efficiency. Verbal understanding and agreement given on all accounts.   Treatment Provided Today: N/A   Additional Medical Consults Suggested: N/A   Repeat Study: Tentatively in 6-8 weeks contingent on display of overall improvement, as pt appropriate, and radiology schedule permits.   Mechanics of the Swallow Summary: ORAL PHASE: Lip Closure - Intact Tongue Control During Bolus Hold - Impaired Bolus prep/mastication - Impaired Bolus transport/lingual motion - Impaired Oral residue - present with solids   PHARYNGEAL PHASE: Initiation of pharyngeal swallow - Intact Soft palate elevation - Intact Laryngeal elevation -impaired Anterior hyoid excursion - Intact Epiglottic movement - Impaired Laryngeal vestibule closure - Impaired Pharyngeal stripping wave - Impaired Pharyngeal contraction (A/P view) - Not tested Pharyngoesophageal segment opening - Intact Tongue base retraction - Impaired Pharyngeal residue - present   ESOPHAGEAL PHASE: Esophageal clearance - Intact   SLP Impressions with Severity Rating: Pt presents with mild-moderate oropharyngeal dysphagia upon completion of modified barium swallow study this date. Swallowing physiology is characterized by the above noted deficits. Premature pharyngeal entry noted with liquids boluses with reduced TB retraction and pharyngeal stripping wave. Decreased closure of upper laryngeal vestibule noted with mild laryngeal penetration and trace silent aspiration given thin liquids only during continuous drinking using large bolus sizes. No further laryngeal penetration was observed for any other consistency. Vallecular and piriform sinus residue noted with thin, mildly/moderately thick liquids. Greater pharyngeal compressive force noted during consumption of purees and solids resulting in less pharyngeal residue post swallows. Retention of barium tablet in valleculae noted, which cleared with subsequent  liquid boluses. A CP prominence noted, which did not appear to obstruct bolus flow. Modification of diet required to maximize swallowing safety given weakened cough, recurrent pna, and rapid rate of liquid intake exacerbating dysphagia exhibited this date.   *Of note: The  oblique bolus follow-through is not intended to be utilized as a diagnostic assessment of the esophagus, but rather as a tool to observe the biomechanical aspects of the swallow continuum to inform the need for further evaluation by medical specialists, as applicable.   OUTCOME MEASURES: Functional Oral Intake Scale Functional Oral Intake Scale: Level 5        total oral diet with multiple consistencies, but requires special preparations and compensations   EAT-10 N/A   Rosenbek's Penetration Aspiration Scale   Thin Liquids: 8. SILENT ASPIRATION - contrast passes glottis, visible residue, NO pt response   Nectar Thick Liquids: 1. NO ASPIRATION & NO PENETRATION - no aspiration, contrast does not enter airway   Honey Thick Liquids: 1. NO ASPIRATION & NO PENETRATION - no aspiration, contrast does not enter airway   Puree: 1. NO ASPIRATION & NO PENETRATION - no aspiration, contrast does not enter airway   Solids: 1. NO ASPIRATION & NO PENETRATION - no aspiration, contrast does not enter airway     Speech Therapy section of this report signed by Gricel Loo MACCC/SLP on 2/3/2025 at 3:07 pm.   RADIOLOGY FINDINGS: Aspiration with thin liquids. Otherwise no evidence of aspiration with thicker consistencies.       Aspiration with thin liquids. Otherwise no evidence of aspiration with thicker consistencies.   MACRO: None   Signed by: Nicholas Disla 2/3/2025 3:22 PM Dictation workstation:   [id number]   Current Facility-Administered Medications   Medication Dose Route Frequency Provider Last Rate Last Admin    acetaminophen (Tylenol) tablet 975 mg  975 mg oral q8h PRN Donta Echeverria MD        antihemophilic RF VIII (Altuviiio) injection 3,030 Units  - PATIENT OWN MEDICATION  3,030 Units intravenous q7 days Amparo Gan, APRN-CNP        atorvastatin (Lipitor) tablet 40 mg  40 mg oral Daily Donta Echeverria MD   40 mg at 02/04/25 0825    calcium carbonate (Oscal) tablet 1,250 mg  1,250 mg oral BID Donta Echeverria MD   1,250 mg at 02/04/25 0825    cefTRIAXone (Rocephin) 2 g in dextrose 5% IV 50 mL  2 g intravenous q24h Juan Antonio Hutchinson MD   Stopped at 02/03/25 1400    empagliflozin (Jardiance) tablet 10 mg  10 mg oral Daily Donta Echeverria MD   10 mg at 02/04/25 0825    ferrous sulfate (325 mg ferrous sulfate) tablet 325 mg  65 mg of iron oral Daily with breakfast Donta Echeverria MD   325 mg at 02/04/25 0825    finasteride (Proscar) tablet 5 mg  5 mg oral Daily Donta Echeverria MD   5 mg at 02/04/25 0825    flu vaccine, trivalent, preservative free, HIGH-DOSE, age 65y+ (Fluzone)  0.5 mL intramuscular During hospitalization Donta Echeverria MD        folic acid (Folvite) tablet 1 mg  1 mg oral Daily Donta Echeverria MD   1 mg at 02/04/25 0825    furosemide (Lasix) injection 40 mg  40 mg intravenous BID Melissa Luong, APRN-CNP   40 mg at 02/04/25 0826    guaiFENesin (Robitussin) 100 mg/5 mL syrup 200 mg  200 mg oral q4h PRN Donta Echeverria MD   200 mg at 02/02/25 2027    ipratropium-albuteroL (Duo-Neb) 0.5-2.5 mg/3 mL nebulizer solution 3 mL  3 mL nebulization 4x daily Donta Echeverria MD   3 mL at 02/04/25 0732    ipratropium-albuteroL (Duo-Neb) 0.5-2.5 mg/3 mL nebulizer solution 3 mL  3 mL nebulization q2h PRN Donta Echeverria MD        oxygen (O2) therapy   inhalation Continuous - Inhalation Donta Echeverria MD 60,000 mL/hr at 02/03/25 2215 1 L/min at 02/03/25 2215    pantoprazole (ProtoNix) EC tablet 40 mg  40 mg oral Daily before breakfast Donta Echeverria MD   40 mg at 02/04/25 0655    polyethylene glycol (Glycolax, Miralax) packet 17 g  17 g oral Daily Donta Echeverria MD   17 g at 02/02/25 0924    potassium  chloride CR (Klor-Con M20) ER tablet 40 mEq  40 mEq oral Daily Melissa Luong, APRN-CNP   40 mEq at 02/04/25 0825    tamsulosin (Flomax) 24 hr capsule 0.4 mg  0.4 mg oral BID Donta Echeverria MD   0.4 mg at 02/04/25 0825     No current outpatient medications on file.   ..     Medication and Non-Pharmacologic VTE Prophylaxis/Anticoagulants      Last Anticoag Admin            No anticoagulants administered    No unadministered anticoagulant orders found.          Assessment/Plan   Ramón Flores has  Assessment & Plan  Pneumonia of both lower lobes due to infectious organism  Acute on chronic diastolic CHF  Atrial fibrillation  Hemophilia   Elevated troponin  Thrombocytopenia   On Rocephin.       On Lasix.      Will have Factor VIII infusion today.   Other Hospital problems        Abnormal findings not addressed during hospitalization, but require out patient follow up. None      I spent 35 minutes talking and examining Ramón Flores, reviewing the labs & medications, formulating plan of care & discussing with patient and nursing staff.    Donta Echeverria MD     Sotyktu Pregnancy And Lactation Text: There is insufficient data to evaluate whether or not Sotyktu is safe to use during pregnancy.   It is not known if Sotyktu passes into breast milk and whether or not it is safe to use when breastfeeding.

## 2025-02-04 NOTE — PROGRESS NOTES
"Subjective Data:  -2900  On 1 L nasal cannula on wall patient still in bed->> communicated with nursing about getting to the chair  -Patient claims he feels better was wondering when he was can go home       Objective Data:  Last Recorded Vitals:  Vitals:    25 0001 25 0500 25 0708 25 0732   BP: 102/59 105/79 99/64    BP Location: Right arm Right arm Right arm    Patient Position: Lying Lying Lying    Pulse: 80 80 82    Resp: 20 20 18    Temp: 36.7 °C (98 °F) 37.7 °C (99.9 °F) 36.7 °C (98.1 °F)    TempSrc: Oral Temporal Temporal    SpO2: 94% 95% 96% 96%   Weight:       Height:         Medical Gas Therapy: Supplemental oxygen  Medical Gas Delivery Method: Nasal cannula  Weight  Av.9 kg (132 lb)  Min: 59.9 kg (132 lb)  Max: 59.9 kg (132 lb)    LABS:  CMP:  Results from last 7 days   Lab Units 25  0942 25  0925  0617   SODIUM mmol/L 141 141 142 140   POTASSIUM mmol/L 3.5 3.5 3.2* 3.5   CHLORIDE mmol/L 101 104 110* 106   CO2 mmol/L 29 28 24 22   ANION GAP mmol/L 15 13 11 16   BUN mg/dL 21 16 18 19   CREATININE mg/dL 1.21 1.03 0.99 0.98   EGFR mL/min/1.73m*2 55* 66 70 71   ALBUMIN g/dL 3.4  --   --  4.0   ALT U/L 27  --   --  35   AST U/L 23  --   --  35   BILIRUBIN TOTAL mg/dL 1.8*  --   --  2.0*     CBC:  Results from last 7 days   Lab Units 2557 25  0942 25  0925  0617   WBC AUTO x10*3/uL 4.1* 2.6* 2.8* 3.3*   HEMOGLOBIN g/dL 10.5* 10.2* 9.7* 11.0*   HEMATOCRIT % 31.6* 29.9* 28.8* 32.3*   PLATELETS AUTO x10*3/uL 68* 54* 53* 63*   MCV fL 106* 105* 108* 107*     COAG:     ABO: No results found for: \"ABO\"  HEME/ENDO:     CARDIAC:   Results from last 7 days   Lab Units 25  0715 25  0617   TROPHS ng/L 85* 92*   BNP pg/mL  --  679*             Last I/O:    Intake/Output Summary (Last 24 hours) at 2025 0927  Last data filed at 2025 0732  Gross per 24 hour   Intake --   Output 2900 ml   Net -2900 ml     Net IO " Since Admission: -4,530 mL [02/04/25 0927]      Imaging Results:  ECG 12 lead    Result Date: 2/3/2025  Atrial fibrillation with premature ventricular or aberrantly conducted complexes Incomplete right bundle branch block Left anterior fascicular block QTcB >= 480 msec Abnormal ECG When compared with ECG of 20-OCT-2024 02:18, (unconfirmed) No significant change was found    CT angio chest for pulmonary embolism    Result Date: 2/1/2025  STUDY: CT Angiogram of the Chest; 2/1/2025 10:26 AM INDICATION: Shortness of breath. COMPARISON: CTA chest 1/18/2024. ACCESSION NUMBER(S): [id number] ORDERING CLINICIAN: SABINE MINA TECHNIQUE:  CTA of the chest was performed with intravenous contrast. Images are reviewed and processed at a workstation according to the CT angiogram protocol with 3-D and/or MIP post processing imaging generated.  Omnipaque 350--85 mL was administered intravenously. Automated mA/kV exposure control was utilized and patient examination was performed in strict accordance with principles of ALARA. FINDINGS: Pulmonary arteries are adequately opacified without acute or chronic filling defects.  Thoracic aorta is not adequately opacified (essentially noncontrast appearance secondary to contrast bolus timing) to evaluate for dissection.  There is calcific plaque of the thoracic aorta without aneurysm. Cardiomegaly.  No pericardial effusion.  Coronary artery calcifications.  Status post median sternotomy.  Mild bilateral gynecomastia.  Mild generalized subcutaneous edema of the body wall.  Thoracic lymph nodes are not enlarged. Small bilateral pleural effusions layering dependently, slightly larger on the right than left.  Associated compressive atelectasis of the bilateral lower lobes.  Superimposed on this are areas of patchy consolidation in the left lower lobe from the peribronchial vascular structures consistent with pneumonia or aspiration.  There is likely pulmonary venous  hypertension/low-grade interstitial edema.  No definite alveolar edema.  The central airways are patent.  No pneumothorax.  There is no pleural thickening Upper abdomen demonstrates no acute pathology. There are no acute fractures.  No suspicious bony lesions.    No pulmonary embolism.  Findings suggest CHF with small pleural effusions and pulmonary venous hypertension/interstitial edema and cardiomegaly.  Superimposed patchy left lower lobe consolidation consistent with pneumonia or aspiration. Signed by Lloyd Epps DO    XR chest 1 view    Result Date: 2/1/2025  Interpreted By:  Roseline Luther, STUDY: XR CHEST 1 VIEW 2/1/2025 6:41 am   INDICATION: Signs/Symptoms:cough + hypoxia   COMPARISON: 05/31/2024   ACCESSION NUMBER(S): [id number]   ORDERING CLINICIAN: SABINE MINA   TECHNIQUE: AP view   FINDINGS: Sternal wires are noted. Electronic device overlies the heart. There is an enlarged cardiac silhouette.   The lungs are hyperinflated. There are bibasilar infiltrates or atelectasis since the prior exam.   No significant pleural effusions. No pneumothorax       Bibasilar atelectasis or infiltrates are superimposed on chronic changes. Enlarged cardiac silhouette with post thoracotomy changes   Signed by: Roseline Luther 2/1/2025 7:54 AM Dictation workstation:   [id number]          Past Cardiology Tests (Last 3 Years):  EKG:  Results for orders placed during the hospital encounter of 02/01/25    ECG 12 lead (Preliminary)  This result has not been signed. Information might be incomplete.    Narrative  Atrial fibrillation with premature ventricular or aberrantly conducted complexes  Incomplete right bundle branch block  Left anterior fascicular block  QTcB >= 480 msec  Abnormal ECG  When compared with ECG of 20-OCT-2024 02:18, (unconfirmed)  No significant change was found      Results for orders placed during the hospital encounter of 05/31/24    Electrocardiogram, 12-lead PRN ACS  symptoms    Narrative  Atrial fibrillation with premature ventricular or aberrantly conducted complexes  Right superior axis deviation  Incomplete right bundle branch block  Nonspecific T wave abnormality  Prolonged QT  Abnormal ECG  When compared with ECG of 19-MAR-2024 10:02,  Sinus rhythm is no longer with ventricular escape complexes  Left anterior fascicular block is no longer Present  See ED provider note for full interpretation and clinical correlation  Confirmed by Juliann Nicholas ([id number]) on 5/31/2024 10:44:02 AM      Results for orders placed during the hospital encounter of 03/19/24    ECG 12 lead    Narrative  Atrial fibrillation with premature ventricular or aberrantly conducted complexes and with ventricular escape complexes  Left anterior fascicular block  Possible Anterior infarct , age undetermined  Abnormal ECG  When compared with ECG of 20-JAN-2024 15:42,  Sinus rhythm is now with ventricular escape complexes    See ED provider note for full interpretation and clinical correlation  Confirmed by Latanya Thomas ([id number]) on 3/19/2024 11:03:20 PM      Results for orders placed during the hospital encounter of 01/20/24    ECG 12 Lead    Narrative  Atrial fibrillation with slow ventricular response  Nonspecific ST abnormality  Abnormal ECG  When compared with ECG of 18-JAN-2024 13:33, (unconfirmed)  No significant change was found  Confirmed by Kristin Maradiaga ([id number]) on 1/25/2024 1:09:09 PM    Echo:  Results for orders placed during the hospital encounter of 05/31/24    Transthoracic Echo (TTE) Complete    [facility], [address] [place] [address]  Tel [phone] and Fax [phone]    TRANSTHORACIC ECHOCARDIOGRAM REPORT      Patient Name:      KRISTIN Simpson Physician:    [id number] Davis Cabello MD  Study Date:        6/1/2024            Ordering Provider:    [id number] ADRIANNA CUMMINS  MRN/PID:           [id number]            Fellow:  Accession#:        [id number]         Nurse:  Date of Birth/Age: 7/24/1928 / 89      Sonographer:          Devin Posada  years                                     RDCS  Gender:            M                   Additional Staff:  Height:            167.60 cm           Admit Date:           5/31/2024  Weight:            63.00 kg            Admission Status:     Observation -  Routine  BSA / BMI:         1.71 m2 / 22.43     Encounter#:           [id number]  kg/m2  Department Location:  [facility] ED  Blood Pressure: 109 /70 mmHg    Study Type:    TRANSTHORACIC ECHO (TTE) COMPLETE  Diagnosis/ICD: Unspecified diastolic (congestive) heart failure (CHF)-I50.30;  Unspecified systolic (congestive) heart failure (CHF)-I50.20  Indication:    Congestive Heart Failure  CPT Code:      Echo Limited-09209; Doppler Limited-17272; Color Doppler-28192    Patient History:  Pertinent History: PMH Hemophilia A, Protein C deficiency, HTN, Afib, HFpEF,  HLD, nephrolithiasis, CAD s/p CABG (2002), BPH, and R fem  neck fx s/p THR 1/13/23) that presents to the ED with 2x days  SOB, dry cough and hematuria.    Study Detail: The following Echo studies were performed: 2D, M-Mode, Doppler and  color flow. Technically challenging study due to body habitus.      PHYSICIAN INTERPRETATION:  Left Ventricle: The left ventricular systolic function is low normal, with an estimated ejection fraction of 50%. There are no regional wall motion abnormalities. The left ventricular cavity size is normal. There is moderate to severe concentric left ventricular hypertrophy. Left ventricular diastolic filling was indeterminate.  Left Atrium: The left atrium was not assessed.  Right Ventricle: The right ventricle is mildly enlarged. There is reduced right ventricular systolic function.  Right Atrium: The right atrium was not assessed.  Aortic Valve: The aortic valve is trileaflet. There is no evidence of aortic valve regurgitation.  Mitral Valve: The mitral valve is normal in structure. The mitral valve  spectral Doppler A-wave is absent, consistent with atrial fibrillation. There is mild mitral valve regurgitation.  Tricuspid Valve: The tricuspid valve is structurally normal. There is mild tricuspid regurgitation. The Doppler estimated RVSP is moderately elevated at 53.0 mmHg.  Pulmonic Valve: The pulmonic valve is structurally normal. There is physiologic pulmonic valve regurgitation.  Pericardium: There is no pericardial effusion noted.  Aorta: The aortic root was not assessed.  Systemic Veins: The inferior vena cava appears dilated. There is less than 50% IVC collapse with inspiration.  In comparison to the previous echocardiogram(s): Compared with study from 3/19/2024, the findings are similar.      CONCLUSIONS:  1. Left ventricular systolic function is low normal with a 50% estimated ejection fraction.  2. There is moderate to severe concentric left ventricular hypertrophy.  3. There is reduced right ventricular systolic function.  4. Absent A-wave on MV spectral Doppler tracing, consistent with atrial fibrillation.  5. Moderately elevated right ventricular systolic pressure.    QUANTITATIVE DATA SUMMARY:  2D MEASUREMENTS:  Normal Ranges:  IVSd:          1.61 cm    (0.6-1.1cm)  LVPWd:         1.46 cm    (0.6-1.1cm)  LVIDd:         3.60 cm    (3.9-5.9cm)  LVIDs:         2.97 cm  LV Mass Index: 121.8 g/m2  LV % FS        17.5 %    LV SYSTOLIC FUNCTION BY 2D PLANIMETRY (MOD):  Normal Ranges:  EF-A4C View: 32.6 % (>=55%)  EF-A2C View: 51.2 %  EF-Biplane:  40.9 %      RIGHT VENTRICLE:  RV Major 8.8 cm    TRICUSPID VALVE/RVSP:  Normal Ranges:  Peak TR Velocity: 3.08 m/s  RV Syst Pressure: 53.0 mmHg (< 30mmHg)  IVC Diam:         2.40 cm      [id number] Davis Cabello MD  Electronically signed on 6/1/2024 at 9:52:14 AM        ** Final **      Results for orders placed during the hospital encounter of 03/19/24    Transthoracic Echo (TTE) Complete    [facility], [address] [place] [address]  [id number]  Tel [phone] and Fax [phone]    TRANSTHORACIC ECHOCARDIOGRAM REPORT      Patient Name:      KRISTIN DAY    Reading Physician:    [id number] Beto Hauser MD  Study Date:        3/20/2024            Ordering Provider:    [id number] MARK MACIEL  MRN/PID:           [id number]             Fellow:  Accession#:        [id number]         Nurse:  Date of Birth/Age: 7/24/1928 / 89 years Sonographer:          Kash Simon  [facility]  Gender:            M                    Additional Staff:  Height:            152.40 cm            Admit Date:           3/19/2024  Weight:            63.50 kg             Admission Status:     Inpatient -  Routine  BSA / BMI:         1.60 m2 / 27.34      Encounter#:           [id number]  kg/m2  Department Location:  [place] [address]  Blood Pressure: 97 /59 mmHg    Study Type:    TRANSTHORACIC ECHO (TTE) COMPLETE  Diagnosis/ICD: Chronic diastolic (congestive) heart failure (CHF)-I50.32  Indication:    CHF, HFpEF  CPT Code:      Echo Complete w Full Doppler-86043    Patient History:  Pertinent History: PMH Hemophilia A, Protein C deficiency, HTN, Afib, HFpEF,  HLD, nephrolithiasis, CAD s/p CABG (2002), BPH, and R fem  neck fx s/p THR 1/13/23) that presents to the ED with 2x days  SOB, dry cough and hematuria.    Study Detail: The following Echo studies were performed: 2D, M-Mode, Doppler and  color flow.      PHYSICIAN INTERPRETATION:  Left Ventricle: The left ventricular systolic function is mildly decreased, with an estimated ejection fraction of 50%. There is global hypokinesis of the left ventricle with minor regional variations. The left ventricular cavity size is normal. Left ventricular diastolic filling was not assessed.  Left Atrium: The left atrium is moderately dilated.  Right Ventricle: The right ventricle is normal in size. There is mildly reduced right ventricular systolic function.  Right Atrium: The right atrium is normal in size.  Aortic Valve: The aortic valve is  trileaflet. There is minimal aortic valve cusp calcification. There is mild aortic valve thickening. There is no evidence of aortic valve regurgitation. The peak instantaneous gradient of the aortic valve is 3.8 mmHg. The mean gradient of the aortic valve is 2.0 mmHg.  Mitral Valve: The mitral valve is normal in structure. There is trace mitral valve regurgitation.  Tricuspid Valve: The tricuspid valve is structurally normal. There is mild to moderate tricuspid regurgitation. The Doppler estimated RVSP is mildly elevated at 34.8 mmHg.  Pulmonic Valve: The pulmonic valve is structurally normal. There is trace pulmonic valve regurgitation.  Pericardium: There is no pericardial effusion noted.  Aorta: The aortic root is normal.  Systemic Veins: The inferior vena cava appears dilated. There is IVC inspiratory collapse greater than 50%. The hepatic vein shows a normal flow pattern.  In comparison to the previous echocardiogram(s): Compared with study from 2/24/2023, the LVEF appears reduced (50%) when compared with the prior assessment (60%). Also, TR appears mild to moderate on today's study compared to trace.      CONCLUSIONS:  1. Left ventricular systolic function is mildly decreased with a 50% estimated ejection fraction.  2. There is global hypokinesis of the left ventricle with minor regional variations.  3. There is mildly reduced right ventricular systolic function.  4. Mild to moderate tricuspid regurgitation visualized.  5. Mildly elevated RVSP.  6. The left atrium is moderately dilated.  7. Compared with study from 2/24/2023, the LVEF appears reduced (50%) when compared with the prior assessment (60%). Also, TR appears mild to moderate on today's study compared to trace.    QUANTITATIVE DATA SUMMARY:  2D MEASUREMENTS:  Normal Ranges:  Ao Root d:     3.20 cm   (2.0-3.7cm)  LAs:           3.70 cm   (2.7-4.0cm)  IVSd:          1.10 cm   (0.6-1.1cm)  LVPWd:         1.10 cm   (0.6-1.1cm)  LVIDd:         4.00 cm    (3.9-5.9cm)  LVIDs:         2.90 cm  LV Mass Index: 90.8 g/m2  LV % FS        27.5 %    LA VOLUME:  Normal Ranges:  LA Vol A4C:        74.6 ml    (22+/-6mL/m2)  LA Vol A2C:        59.8 ml  LA Vol BP:         67.3 ml  LA Vol Index A4C:  46.5ml/m2  LA Vol Index A2C:  37.3 ml/m2  LA Vol Index BP:   41.9 ml/m2  LA Area A4C:       22.6 cm2  LA Area A2C:       20.1 cm2  LA Major Axis A4C: 5.8 cm  LA Major Axis A2C: 5.7 cm  LA Volume Index:   42.0 ml/m2    AORTA MEASUREMENTS:  Normal Ranges:  Ao Sinus, d: 3.44 cm (2.1-3.5cm)  Ao STJ, d:   2.60 cm (1.7-3.4cm)  Asc Ao, d:   3.40 cm (2.1-3.4cm)    LV SYSTOLIC FUNCTION BY 2D PLANIMETRY (MOD):  Normal Ranges:  EF-A4C View: 51.2 % (>=55%)  EF-A2C View: 50.5 %  EF-Biplane:  50.7 %    LV DIASTOLIC FUNCTION:  Normal Ranges:  MV Peak E:      0.66 m/s   (0.7-1.2 m/s)  MV e'           0.06 m/s   (>8.0)  MV lateral e'   0.06 m/s  MV medial e'    0.05 m/s  E/e' Ratio:     11.03      (<8.0)  PulmV Sys Daniel:  16.30 cm/s  PulmV Tierney Daniel: 29.00 cm/s  PulmV S/D Daniel:  0.60    MITRAL VALVE:  Normal Ranges:  MV DT: 310 msec (150-240msec)    AORTIC VALVE:  Normal Ranges:  AoV Vmax:                0.97 m/s (<=1.7m/s)  AoV Peak PG:             3.8 mmHg (<20mmHg)  AoV Mean P.0 mmHg (1.7-11.5mmHg)  LVOT Max Daniel:            0.72 m/s (<=1.1m/s)  AoV VTI:                 19.20 cm (18-25cm)  LVOT VTI:                13.50 cm  LVOT Diameter:           2.00 cm  (1.8-2.4cm)  AoV Area, VTI:           2.21 cm2 (2.5-5.5cm2)  AoV Area,Vmax:           2.32 cm2 (2.5-4.5cm2)  AoV Dimensionless Index: 0.70      RIGHT VENTRICLE:  RV Basal 3.80 cm  RV Mid   3.10 cm  RV Major 8.4 cm  TAPSE:   12.0 mm  RV s'    0.06 m/s    TRICUSPID VALVE/RVSP:  Normal Ranges:  Peak TR Velocity: 2.59 m/s  Est. RA Pressure: 8 mmHg  RV Syst Pressure: 34.8 mmHg (< 30mmHg)  IVC Diam:         2.25 cm    PULMONIC VALVE:  Normal Ranges:  PV Max Daniel: 0.5 m/s  (0.6-0.9m/s)  PV Max P.2 mmHg    Pulmonary Veins:  PulmV Tierney  Daniel: 29.00 cm/s  PulmV S/D Daniel:  0.60  PulmV Sys Daniel:  16.30 cm/s      [id number] Beto Hauser MD  Electronically signed on 3/20/2024 at 5:18:03 PM        ** Final **    Ejection Fractions:  LV EF   Date/Time Value Ref Range Status   02/03/2025 05:01 PM 49 %    03/20/2024 03:17 PM 51 %      Cath:  No results found for this or any previous visit.    Stress Test:  No results found for this or any previous visit.    Cardiac Imaging:  No results found for this or any previous visit.      Inpatient Medications:  Scheduled medications   Medication Dose Route Frequency    antihemophilic RF VIII  3,030 Units intravenous q7 days    atorvastatin  40 mg oral Daily    calcium carbonate  1,250 mg oral BID    cefTRIAXone  2 g intravenous q24h    empagliflozin  10 mg oral Daily    ferrous sulfate (325 mg ferrous sulfate)  65 mg of iron oral Daily with breakfast    finasteride  5 mg oral Daily    influenza  0.5 mL intramuscular During hospitalization    folic acid  1 mg oral Daily    furosemide  40 mg intravenous BID    ipratropium-albuteroL  3 mL nebulization 4x daily    oxygen   inhalation Continuous - Inhalation    pantoprazole  40 mg oral Daily before breakfast    polyethylene glycol  17 g oral Daily    potassium chloride CR  40 mEq oral Daily    tamsulosin  0.4 mg oral BID     PRN medications   Medication    acetaminophen    guaiFENesin    ipratropium-albuteroL     Continuous Medications   Medication Dose Last Rate       Physical Exam:  General:  Elderly male. Patient is awake, alert, and oriented.  Patient is in no acute distress.  HEENT:  Pupils equal and reactive.  Normocephalic.  Moist mucosa.    Neck:  +JVD 14 cm  Cardiovascular:  IRR. +Murmur  Pulmonary:  Scattered rhonchi in upper lobes, diminished in lower lobes bilaterally   Abdomen:  Soft. Non-tender.   Non-distended.  Positive bowel sounds.  Lower Extremities:  2+ pedal pulses. No LE edema.  Neurologic:  Cranial nerves intact.  No focal deficit.   Skin: Skin warm and  dry, normal skin turgor.   Psychiatric: Normal affect.     Assessment/Plan   Ramón Flores is a 89 y.o. 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  Cardiology consulted for acute CHF.      ED course  VS 36.5/HR 67/RR 20//73/ Pox 98% on 3L NC. Pox was 89% on RA in triage and she was placed on 3L NC.   COVID flu and RSV were all negative.  CMP with normal renal fx, hypocalcemic 6.6, T bili elevated to 2. BNP elevated 679 (317 10/2024, 1024 5/2024). CBC with leukopenia 3.3, H&H 11/32.3, thrombocytopenic platelets 63. HsTrop 92/85. Chest x-ray with bibasilar atectasis vs infiltrates superimposed on chronic changes. Cardiomegaly with post thoracotomy changes.   Given 1 gm Ceftriaxone IV, Duo-Neb, Azithromycin 500 mg IV. CTA Chest negatie for PE, CHF with small pleural effusions and pulmonary venous congestion/interstitial edema & cardiomegaly. LLL patchy consolidation consistent with PNA or aspiration. Admitted to [facility].      Tele: Afib, rates controlled  Cardiologist: Dr. Dugan, last visit 7/2024  Outpatient cardiac medications: Atorvastatin 40 mg daily, Jardiance 10 mg daily, Bumex 0.5 mg every other day, Spironolactone 12.5 mg every other day     #Acute on Chronic HFpEF in the setting of LLL PNA  -TTE limited 6/2024: LVEF 50%, moderate to severe LVH, LVIDd 3.6 cm, reduced RV fx, moderate pHTN, RVSP 53  -Severely volume overloaded on exam, hypoxic on arrival Pox 88%, improved with 2L NC.  +JVD, ,  CTA Chest negatie for PE, CHF with small pleural effusions and pulmonary venous congestion/interstitial edema & cardiomegaly. LLL patchy consolidation consistent with  PNA or aspiration.   -Minimal response with 20 mg Lasix IVP; will increase diuresis with Furosemide 40 mg IV BID. Was taking Bumex 0.5 mg every other day prior to admit  -Continue Jardiance  -Hold off on Spironolactone for now given low normal BP     #CAD s/p multivessel CABG ( 2002 with LIMA --> LAD , SVG --> OM1). 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      #Chronic Afib. Rates controlled. Not on OAC 2/2 hemophilia, risk for bleeding.      Plan:   -Volume status much improved -daily standing weights, 2gm sodium diet, 2L fluid restriction, strict I&Os keep k >4, Mag>2, replace as needed.   -Placed on 0.5 Bumex milligrams daily-> to be discharged on this  -Jardiance 10 mg daily. will Hold off on Spironlactone given soft Bps.   -Cards will sign off  -Follow up with Dr. Dugan as outpatient 2-3 weeks       Code Status:  DNR and No Intubation    I spent 30 minutes in the professional and overall care of this patient.        Aziza Garrido, APRN-CNP

## 2025-02-05 ENCOUNTER — APPOINTMENT (OUTPATIENT)
Dept: RADIOLOGY | Facility: CLINIC | Age: OVER 89
End: 2025-02-05
Payer: MEDICARE

## 2025-02-05 ENCOUNTER — PHARMACY VISIT (OUTPATIENT)
Dept: PHARMACY | Facility: CLINIC | Age: OVER 89
End: 2025-02-05
Payer: COMMERCIAL

## 2025-02-05 VITALS
SYSTOLIC BLOOD PRESSURE: 101 MMHG | TEMPERATURE: 97.5 F | RESPIRATION RATE: 20 BRPM | OXYGEN SATURATION: 95 % | WEIGHT: 132 LBS | DIASTOLIC BLOOD PRESSURE: 59 MMHG | BODY MASS INDEX: 21.21 KG/M2 | HEIGHT: 66 IN | HEART RATE: 79 BPM

## 2025-02-05 LAB — BACTERIA BLD CULT: NORMAL

## 2025-02-05 PROCEDURE — 2500000002 HC RX 250 W HCPCS SELF ADMINISTERED DRUGS (ALT 637 FOR MEDICARE OP, ALT 636 FOR OP/ED): Performed by: NURSE PRACTITIONER

## 2025-02-05 PROCEDURE — 2500000002 HC RX 250 W HCPCS SELF ADMINISTERED DRUGS (ALT 637 FOR MEDICARE OP, ALT 636 FOR OP/ED): Performed by: INTERNAL MEDICINE

## 2025-02-05 PROCEDURE — RXMED WILLOW AMBULATORY MEDICATION CHARGE

## 2025-02-05 PROCEDURE — 2500000004 HC RX 250 GENERAL PHARMACY W/ HCPCS (ALT 636 FOR OP/ED): Performed by: INTERNAL MEDICINE

## 2025-02-05 PROCEDURE — 94640 AIRWAY INHALATION TREATMENT: CPT

## 2025-02-05 PROCEDURE — 2500000001 HC RX 250 WO HCPCS SELF ADMINISTERED DRUGS (ALT 637 FOR MEDICARE OP): Performed by: INTERNAL MEDICINE

## 2025-02-05 PROCEDURE — 92526 ORAL FUNCTION THERAPY: CPT | Mod: GN

## 2025-02-05 PROCEDURE — 2500000001 HC RX 250 WO HCPCS SELF ADMINISTERED DRUGS (ALT 637 FOR MEDICARE OP): Performed by: NURSE PRACTITIONER

## 2025-02-05 PROCEDURE — 2500000005 HC RX 250 GENERAL PHARMACY W/O HCPCS: Performed by: INTERNAL MEDICINE

## 2025-02-05 RX ORDER — CEFDINIR 300 MG/1
300 CAPSULE ORAL 2 TIMES DAILY
Qty: 10 CAPSULE | Refills: 0 | Status: SHIPPED | OUTPATIENT
Start: 2025-02-05 | End: 2025-02-11 | Stop reason: WASHOUT

## 2025-02-05 RX ORDER — POTASSIUM CHLORIDE 20 MEQ/1
20 TABLET, EXTENDED RELEASE ORAL ONCE
Status: COMPLETED | OUTPATIENT
Start: 2025-02-05 | End: 2025-02-05

## 2025-02-05 RX ORDER — BUMETANIDE 0.5 MG/1
0.5 TABLET ORAL DAILY
Qty: 90 TABLET | Refills: 1 | Status: SHIPPED | OUTPATIENT
Start: 2025-02-05 | End: 2025-02-11 | Stop reason: DRUGHIGH

## 2025-02-05 RX ORDER — IPRATROPIUM BROMIDE AND ALBUTEROL SULFATE 2.5; .5 MG/3ML; MG/3ML
3 SOLUTION RESPIRATORY (INHALATION)
Status: DISCONTINUED | OUTPATIENT
Start: 2025-02-05 | End: 2025-02-05 | Stop reason: HOSPADM

## 2025-02-05 RX ORDER — GUAIFENESIN 100 MG/5ML
200 SOLUTION ORAL EVERY 4 HOURS PRN
Qty: 120 ML | Refills: 0 | Status: SHIPPED | OUTPATIENT
Start: 2025-02-05

## 2025-02-05 RX ADMIN — POTASSIUM CHLORIDE 40 MEQ: 1500 TABLET, EXTENDED RELEASE ORAL at 08:34

## 2025-02-05 RX ADMIN — PANTOPRAZOLE SODIUM 40 MG: 40 TABLET, DELAYED RELEASE ORAL at 08:41

## 2025-02-05 RX ADMIN — FOLIC ACID 1 MG: 1 TABLET ORAL at 08:33

## 2025-02-05 RX ADMIN — FERROUS SULFATE TAB 325 MG (65 MG ELEMENTAL FE) 325 MG: 325 (65 FE) TAB at 08:33

## 2025-02-05 RX ADMIN — CALCIUM 1250 MG: 500 TABLET ORAL at 08:33

## 2025-02-05 RX ADMIN — POTASSIUM CHLORIDE 20 MEQ: 1500 TABLET, EXTENDED RELEASE ORAL at 11:38

## 2025-02-05 RX ADMIN — POLYETHYLENE GLYCOL 3350 17 G: 17 POWDER, FOR SOLUTION ORAL at 08:34

## 2025-02-05 RX ADMIN — DEXTROSE MONOHYDRATE 2 G: 5 INJECTION INTRAVENOUS at 08:33

## 2025-02-05 RX ADMIN — TAMSULOSIN HYDROCHLORIDE 0.4 MG: 0.4 CAPSULE ORAL at 08:33

## 2025-02-05 RX ADMIN — BUMETANIDE 0.5 MG: 1 TABLET ORAL at 08:33

## 2025-02-05 RX ADMIN — EMPAGLIFLOZIN 10 MG: 10 TABLET, FILM COATED ORAL at 08:33

## 2025-02-05 RX ADMIN — FINASTERIDE 5 MG: 5 TABLET, FILM COATED ORAL at 08:34

## 2025-02-05 RX ADMIN — Medication 2 L/MIN: at 08:40

## 2025-02-05 RX ADMIN — IPRATROPIUM BROMIDE AND ALBUTEROL SULFATE 3 ML: 2.5; .5 SOLUTION RESPIRATORY (INHALATION) at 11:20

## 2025-02-05 RX ADMIN — IPRATROPIUM BROMIDE AND ALBUTEROL SULFATE 3 ML: 2.5; .5 SOLUTION RESPIRATORY (INHALATION) at 07:26

## 2025-02-05 RX ADMIN — ATORVASTATIN CALCIUM 40 MG: 40 TABLET, FILM COATED ORAL at 08:34

## 2025-02-05 ASSESSMENT — PAIN - FUNCTIONAL ASSESSMENT: PAIN_FUNCTIONAL_ASSESSMENT: 0-10

## 2025-02-05 ASSESSMENT — PAIN SCALES - GENERAL
PAINLEVEL_OUTOF10: 0 - NO PAIN
PAINLEVEL_OUTOF10: 0 - NO PAIN

## 2025-02-05 NOTE — PROGRESS NOTES
02/05/25 0855   Discharge Planning   Living Arrangements Alone   Support Systems Home care staff   Assistance Needed Patient has private aides a few days a week through [facility]. Referral sent, will need new homecare orders for SN once discharged   Type of Residence Private residence   Number of Stairs to Enter Residence 2   Do you have animals or pets at home? No   Home or Post Acute Services In home services   Type of Home Care Services Home nursing visits;Home health aide;Home OT;Home PT   Expected Discharge Disposition Home H   Does the patient need discharge transport arranged? Yes   RoundTrip coordination needed? Yes   Has discharge transport been arranged? No   Patient Choice   Patient / Family choosing to utilize agency / facility established prior to hospitalization Yes   Stroke Family Assessment   Stroke Family Assessment Needed No   Intensity of Service   Intensity of Service 0-30 min

## 2025-02-05 NOTE — PROGRESS NOTES
02/05/25 0902   Discharge Planning   Expected Discharge Disposition Home H     Pt will dc home with [facility]. Final home care orders sent.

## 2025-02-05 NOTE — PROGRESS NOTES
"Speech-Language Pathology    Inpatient  Speech-Language Pathology Dysphagia Treatment    Patient Name: Ramón Flores  MRN: [id number]  : 1928  Today's Date: 25   Time Calculation  Start Time: 1002  Stop Time: 1030  Time Calculation (min): 28 min        Subjective:   Alert and pleasant. Reports \"feeling fine\". Reports being scheduled for discharge later this date.    Objective:  Goals:   Patient will tolerate current diet without noted pulmonary compromise or evidence of pulmonary sequela as noted in patient chart and/or reported by patient/family     START DATE: 2/3/25      Progress: Patient seen for dysphagia treatment. Reviewed results of MBSS with reinforcement of compensatory swallowing strategies of upright position, slow rate and small bolus size. Avoidance of mixed consistencies advised to reduce aspiration risk. Suggestion made for consuming medications in puree carrier with rationale provided.  Written information on diet recommendations dispensed to patient, including thickened liquids and mixed consistency precautions. Patient expressed understanding of information presented.    Conducted trials of mildly thick liquids. Cough noted at baseline and following completion of session. Patient able to demonstrate implementation of swallowing strategies on 100% of boluses. Inconsistent throat clear noted x3 only with voice and respirations remaining clear post swallows. Patient intermittently completed reswallows during sessions to facilitate oropharyngeal clearance as identified as effective during swallow study.     Explained need for ongoing SLP services post discharge to address dysphagia and determine readiness for diet advancement.     STATUS: Progressing towards goal.    Assessment:  SLP TX Intervention Outcome: Making Progress Towards Goals    Patient with adequate tolerance of mildly thick liquids consumed this date. Patient demonstrates ability to adhere to recommended swallowing " strategies for maximizing swallowing efficiency and safety. Feel patient remains a good candidate for ongoing therapy. Patient receptive to SLP recommendations and guidance.    Plan:    CONTINUE EASY CHEW DIET WITH MILDLY THICK LIQUIDS  SLOW RATE AND SMALL BOLUS SIZE  UPRIGHT AT 90 DEGREES FOR ALL PO  MEDICATION IN PUREE CARRIER  SLP POST DISCHARGE FOR DYSPHAGIA MANAGEMENT    SLP TX Plan: Continue Plan of Care  SLP Plan: Skilled SLP  SLP Frequency: 3x per week  Duration: 2 weeks  SLP Discharge Recommendations: Continue skilled speech therapy services post discharge  Discussed POC: Patient  Discussed Risks/Benefits: Yes  Patient/Caregiver Agreeable: Yes  SLP - OK to Discharge: Yes

## 2025-02-05 NOTE — PROGRESS NOTES
"               [facility]          Admitting Provider: Donta Echeverria MD Admission Date: 2/1/2025.   Attending Provider: Donta Echeverria MD MRN: [id number]       Beatriz Flores is a 89 y.o. male on day 4 of admission presenting with Pneumonia of both lower lobes due to infectious organism.  Interval History with a history of chronic diastolic CHF, Atrial fibrillation and VTE presented with cough & shortness of breath for few days Has chest congestion. Swallowing difficulty has improved. Seen by speech therapy & dietary modifications made. Urine legionella antigen negative. GPC clusters in blood thought be a contaminant. Feeling improved..      Objective   Physical Exam  Last Recorded Vitals: Blood pressure 95/54, pulse 79, temperature 36.5 °C (97.7 °F), temperature source Temporal, resp. rate 16, height 1.676 m (5' 6\"), weight 59.9 kg (132 lb), SpO2 92%.  Patient Vitals for the past 24 hrs:   BP Temp Temp src Pulse Resp SpO2   02/05/25 0827 95/54 36.5 °C (97.7 °F) Temporal 79 16 92 %   02/05/25 0726 -- -- -- -- -- 94 %   02/05/25 0540 102/63 36.7 °C (98.1 °F) Temporal 77 18 94 %   02/05/25 0127 98/63 36.6 °C (97.8 °F) Temporal 78 17 93 %   02/04/25 2102 -- -- -- -- -- 99 %   02/04/25 1925 103/60 36.8 °C (98.2 °F) Oral 85 18 96 %   02/04/25 1623 -- -- -- -- -- 95 %   02/04/25 1543 114/69 36.9 °C (98.4 °F) Temporal 94 18 94 %   02/04/25 1143 101/66 37.1 °C (98.8 °F) Temporal 90 18 92 %   02/04/25 1133 -- -- -- -- -- 95 %     Body mass index is 21.31 kg/m².  GENERAL: alert, cooperative, or no distress  SKIN: no rashes  HEENT Atraumatic, Normocephalic and Not pale, no icterus  NECK: supple, no thyromegaly, JVP within normal limits  LUNGS:  not in respiratory distress, respiratory rate normal, clear to auscultation  CARDIAC: regular rate and rhythm, normal S1 and S2, no murmur, rub, or gallop  ABDOMEN: Soft, non-tender, normal bowel sounds; no bruits, organomegaly or " "masses.  EXTREMITIES: No edema  NEURO: Alert and oriented x 3., reflexes normal and symmetric, strength and  sensation grossly normal  Intake/Output last 3 Shifts:  I/O last 3 completed shifts:  In: - (0 mL/kg)   Out: 2950 (49.3 mL/kg) [Urine:2950 (1.4 mL/kg/hr)]  Weight: 59.9 kg   DATA:   Diagnostic tests reviewed for today's visit:    Most recent Labs  No results found. However, due to the size of the patient record, not all encounters were searched. Please check Results Review for a complete set of results.  Urine Culture   Date Value Ref Range Status   03/21/2024 (A)  Final    >100,000 Methicillin Resistant Staphylococcus aureus (MRSA)     Comment:     Methicillin (Oxacillin) resistant Staphylococci are resistant to all currently available Penicillins, Beta-lactam/Beta-lactamase inhibitor combinations (including Ampicillin/Sulbactam, Amoxicillin/Clavulanate and Pipercillin/Tazobactam), Carbapenems and   Cephalosporins (except Ceftaroline).     Blood Culture   Date Value Ref Range Status   02/01/2025 No growth at 3 days  Preliminary   02/01/2025 Staphylococcus epidermidis (A)  Preliminary     Comment:     A single positive blood culture with this organism may indicate contamination at collection. Notify the microbiology laboratory at [phone] if clinical features indicate the need for further work-up.    No results found for: \"RESPCULTSM\" No results found for: \"PERDIAFLDCUL\"   Sterile Fluid Culture/Smear   Date Value Ref Range Status   05/31/2024 No growth aerobically and anaerobically  Final      Transthoracic Echo (TTE) Complete    Result Date: 2/3/2025   [facility], [address] [place] [address]              Tel [phone] and Fax [phone] TRANSTHORACIC ECHOCARDIOGRAM REPORT  Patient Name:       [facility] Phys[facility]:    [id number] Davis Cabello MD Study Date:         2/3/2025            Ordering Provider:  "   [id number] DIMPLE SAONN MRN/PID:            [id number]            Fellow: Accession#:         [id number]        Nurse: Date of Birth/Age:  7/24/1928 / 89      Sonographer:          Leia Machado RDCS                     years Gender assigned at                     Additional Staff: Birth: Height:             167.64 cm           Admit Date:           2/1/2025 Weight:             59.87 kg            Admission Status:     Inpatient -                                                               Routine BSA / BMI:          1.68 m2 / 21.31     Encounter#:           [id number]                     kg/m2 Blood Pressure:     108/65 mmHg         Department Location:  [facility] Non                                                               Invasive Study Type:    TRANSTHORACIC ECHO (TTE) COMPLETE Diagnosis/ICD: Acute on chronic diastolic (congestive) heart failure                (CHF)-I50.33 Indication:    CHF, afib CPT Code:      Echo Complete w Full Doppler-[id number] Patient History: Pertinent History: Hx CAD s/p CABG (2002), HFpEF, HLD, CHF, Afib. Study Detail: The following Echo studies were performed: 2D, M-Mode, Doppler and               color flow.  PHYSICIAN INTERPRETATION: Left Ventricle: Left ventricular ejection fraction is mildly decreased, calculated by Nguyen's biplane at 49%. There is global hypokinesis of the left ventricle with minor regional variations. The left ventricular cavity size is normal. There is moderately increased septal and moderately increased posterior left ventricular wall thickness. There is left ventricular concentric remodeling. Abnormal (paradoxical) septal motion consistent with post-operative status. Spectral Doppler shows an abnormal pattern of left ventricular diastolic filling. Left Atrium: The left atrial size is severely dilated. Right Ventricle: The right ventricle is normal in size. There is reduced right ventricular  systolic function. Right Atrium: The right atrium is moderate to severely dilated. Aortic Valve: The aortic valve is trileaflet. The aortic valve dimensionless index is 0.91. There is no evidence of aortic valve regurgitation. The peak instantaneous gradient of the aortic valve is 3 mmHg. The mean gradient of the aortic valve is 2 mmHg. Mitral Valve: The mitral valve is normal in structure. There is trace to mild mitral valve regurgitation. Tricuspid Valve: The tricuspid valve is structurally normal. There is mild tricuspid regurgitation. The Doppler estimated RVSP is mildly elevated at 43.9 mmHg. Pulmonic Valve: The pulmonic valve is structurally normal. There is mild pulmonic valve regurgitation. Pericardium: There is no pericardial effusion noted. Aorta: The aortic root is normal. There is mild dilatation of the ascending aorta. Systemic Veins: The inferior vena cava appears dilated, with IVC inspiratory collapse greater than 50%. In comparison to the previous echocardiogram(s): Compared with study dated 6/1/2024, no significant change.  CONCLUSIONS:  1. Left ventricular ejection fraction is mildly decreased, calculated by Nguyen's biplane at 49%.  2. There is global hypokinesis of the left ventricle with minor regional variations.  3. Spectral Doppler shows an abnormal pattern of left ventricular diastolic filling.  4. Abnormal septal motion consistent with post-operative status.  5. There is moderately increased septal and moderately increased posterior left ventricular wall thickness.  6. There is reduced right ventricular systolic function.  7. The left atrial size is severely dilated.  8. The right atrium is moderate to severely dilated.  9. Mildly elevated right ventricular systolic pressure. QUANTITATIVE DATA SUMMARY:  2D MEASUREMENTS:          Normal Ranges: Ao Root s:       2.96 cm IVSd:            1.37 cm  (0.6-1.1cm) LVPWd:           1.40 cm  (0.6-1.1cm) LVIDd:           3.76 cm  (3.9-5.9cm) LVIDs:            3.09 cm LV Mass Index:   112 g/m2 LVEDV Index:     56 ml/m2 LV % FS          17.7 %  LEFT ATRIUM:                  Normal Ranges: LA Vol A4C:        101.0 ml   (22+/-6mL/m2) LA Vol A2C:        88.4 ml LA Vol BP:         96.7 ml LA Vol Index A4C:  60.2ml/m2 LA Vol Index A2C:  52.7 ml/m2 LA Vol Index BP:   57.7 ml/m2 LA Area A4C:       28.0 cm2 LA Area A2C:       25.6 cm2 LA Major Axis A4C: 6.6 cm LA Major Axis A2C: 6.3 cm LA Volume Index:   57.7 ml/m2 LA Vol A4C:        92.1 ml LA Vol A2C:        85.3 ml LA Vol Index BSA:  52.9 ml/m2  RIGHT ATRIUM:                 Normal Ranges: RA Vol A4C:        84.3 ml    (8.3-19.5ml) RA Vol Index A4C:  50.3 ml/m2 RA Area A4C:       25.0 cm2 RA Major Axis A4C: 6.3 cm  M-MODE MEASUREMENTS:         Normal Ranges: LAs:                 4.55 cm (2.7-4.0cm)  AORTA MEASUREMENTS:         Normal Ranges: Ao Sinus, d:        3.00 cm (2.1-3.5cm) Ao STJ, d:          2.40 cm (1.7-3.4cm) Asc Ao, d:          3.50 cm (2.1-3.4cm)  LV SYSTOLIC FUNCTION:                      Normal Ranges: EF-A4C View:    41 % (>=55%) EF-A2C View:    56 % EF-Biplane:     49 % LV EF Reported: 49 %  LV DIASTOLIC FUNCTION:             Normal Ranges: MV e'                  0.075 m/s   (>8.0) MV lateral e'          0.08 m/s MV medial e'           0.07 m/s MV A Dur:              102.76 msec  AORTIC VALVE:                     Normal Ranges: AoV Vmax:                0.93 m/s (<=1.7m/s) AoV Peak PG:             3.4 mmHg (<20mmHg) AoV Mean P.7 mmHg (1.7-11.5mmHg) LVOT Max Daniel:            0.75 m/s (<=1.1m/s) AoV VTI:                 14.10 cm (18-25cm) LVOT VTI:                12.89 cm LVOT Diameter:           1.97 cm  (1.8-2.4cm) AoV Area, VTI:           2.79 cm2 (2.5-5.5cm2) AoV Area,Vmax:           2.47 cm2 (2.5-4.5cm2) AoV Dimensionless Index: 0.91  RIGHT VENTRICLE: RV Basal 3.90 cm RV Mid   2.80 cm RV Major 7.3 cm TAPSE:   12.3 mm RV s'    0.08 m/s  TRICUSPID VALVE/RVSP:          Normal Ranges: Peak  TR Velocity:     3.00 m/s Est. RA Pressure:     8 mmHg RV Syst Pressure:     44 mmHg  (< 30mmHg) IVC Diam:             2.19 cm  PULMONIC VALVE:          Normal Ranges: PV Accel Time:  59 msec  (>120ms) PV Max Daniel:     0.7 m/s  (0.6-0.9m/s) PV Max P.7 mmHg  AORTA: Asc Ao Diam 3.45 cm  [id number] Davis Cabello MD Electronically signed on 2/3/2025 at 7:03:05 PM  ** Final **     FL modified barium swallow study    Result Date: 2/3/2025  Interpreted By:  Nicholas Disla  and Eze Monsalve STUDY: FL MODIFIED BARIUM SWALLOW STUDY;; 2/3/2025 11:22 am   INDICATION: Signs/Symptoms:post prandial cough.     COMPARISON: None.   ACCESSION NUMBER(S): [id number]   ORDERING CLINICIAN: DIMPLE SANON   TECHNIQUE: Modified Barium Swallow Study completed. Informed verbal consent obtained prior to completion of exam. Trials of thin liquids, mildly/moderately thick liquids, purees, solids, and barium tablet with thin liquids were administered for exam this date.   SLP: Gricel Loo, SLP Contact info: [employer] chat   SPEECH FINDINGS:   Reason for Referral: C/f aspiration/oropharyngeal dysphagia Patient Hx: hemophilia, heart failure, chronic A-fib, prior anal cancer status post radiation, GERD, CKD Respiratory Status: nasal cannula Current diet: NPO w/ no means of nutrition/hydration   Pain: Patient exhibits no s/s of pain or discomfort during study.   RECOMMENDATIONS: - Easy to Chew (IDDSI Level 7) - Mildly/nectar thick liquids (IDDSI Level 2)   *NO MIXED CONSISTENCIES   STRATEGIES: Upright for all PO intake Small bites/sips Slow rate of consumption Medication crushed in purees   SLP PLAN: Skilled SLP Services: Skilled SLP intervention for dysphagia is warranted. SLP Frequency: 2x per week Duration: 2 WEEKS Treatment/Interventions: - Bolus trials - Compensatory strategy training - Diet tolerance/advancement - Patient/caregiver education   Discussed POC: patient Discussed Risks/Benefits: Yes Patient/Caregiver  Agreeable: Yes   Short term goals established: - Patient will tolerate current diet without noted pulmonary compromise or evidence of pulmonary sequela as noted in patient chart and/or reported by patient/family. Start Date: 2/3/25 End Date: 2/17/25 Status: Goal Initiated this date   Education Provided:  Results and recommendations of MBSS reviewed with patient upon completion of exam. POC discussed at this time with need for modification of diet and compensatory swallowing strategies to maximize swallowing safety and efficiency. Verbal understanding and agreement given on all accounts.   Treatment Provided Today: N/A   Additional Medical Consults Suggested: N/A   Repeat Study: Tentatively in 6-8 weeks contingent on display of overall improvement, as pt appropriate, and radiology schedule permits.   Mechanics of the Swallow Summary: ORAL PHASE: Lip Closure - Intact Tongue Control During Bolus Hold - Impaired Bolus prep/mastication - Impaired Bolus transport/lingual motion - Impaired Oral residue - present with solids   PHARYNGEAL PHASE: Initiation of pharyngeal swallow - Intact Soft palate elevation - Intact Laryngeal elevation -impaired Anterior hyoid excursion - Intact Epiglottic movement - Impaired Laryngeal vestibule closure - Impaired Pharyngeal stripping wave - Impaired Pharyngeal contraction (A/P view) - Not tested Pharyngoesophageal segment opening - Intact Tongue base retraction - Impaired Pharyngeal residue - present   ESOPHAGEAL PHASE: Esophageal clearance - Intact   SLP Impressions with Severity Rating: Pt presents with mild-moderate oropharyngeal dysphagia upon completion of modified barium swallow study this date. Swallowing physiology is characterized by the above noted deficits. Premature pharyngeal entry noted with liquids boluses with reduced TB retraction and pharyngeal stripping wave. Decreased closure of upper laryngeal vestibule noted with mild laryngeal penetration and trace silent aspiration  given thin liquids only during continuous drinking using large bolus sizes. No further laryngeal penetration was observed for any other consistency. Vallecular and piriform sinus residue noted with thin, mildly/moderately thick liquids. Greater pharyngeal compressive force noted during consumption of purees and solids resulting in less pharyngeal residue post swallows. Retention of barium tablet in valleculae noted, which cleared with subsequent liquid boluses. A CP prominence noted, which did not appear to obstruct bolus flow. Modification of diet required to maximize swallowing safety given weakened cough, recurrent pna, and rapid rate of liquid intake exacerbating dysphagia exhibited this date.   *Of note: The  oblique bolus follow-through is not intended to be utilized as a diagnostic assessment of the esophagus, but rather as a tool to observe the biomechanical aspects of the swallow continuum to inform the need for further evaluation by medical specialists, as applicable.   OUTCOME MEASURES: Functional Oral Intake Scale Functional Oral Intake Scale: Level 5        total oral diet with multiple consistencies, but requires special preparations and compensations   EAT-10 N/A   Rosenbek's Penetration Aspiration Scale   Thin Liquids: 8. SILENT ASPIRATION - contrast passes glottis, visible residue, NO pt response   Nectar Thick Liquids: 1. NO ASPIRATION & NO PENETRATION - no aspiration, contrast does not enter airway   Honey Thick Liquids: 1. NO ASPIRATION & NO PENETRATION - no aspiration, contrast does not enter airway   Puree: 1. NO ASPIRATION & NO PENETRATION - no aspiration, contrast does not enter airway   Solids: 1. NO ASPIRATION & NO PENETRATION - no aspiration, contrast does not enter airway     Speech Therapy section of this report signed by JUDIE Santizo/SLP on 2/3/2025 at 3:07 pm.   RADIOLOGY FINDINGS: Aspiration with thin liquids. Otherwise no evidence of aspiration with thicker  consistencies.       Aspiration with thin liquids. Otherwise no evidence of aspiration with thicker consistencies.   MACRO: None   Signed by: Nicholas Disla 2/3/2025 3:22 PM Dictation workstation:   [id number]   Current Facility-Administered Medications   Medication Dose Route Frequency Provider Last Rate Last Admin    acetaminophen (Tylenol) tablet 975 mg  975 mg oral q8h PRN Donta Echeverria MD        antihemophilic RF VIII (Altuviiio) injection 3,030 Units - PATIENT'S OWN MEDICATION  3,030 Units intravenous q7 days SUSI Gross-CNP   3,030 Units at 02/04/25 1040    atorvastatin (Lipitor) tablet 40 mg  40 mg oral Daily Donta Echeverria MD   40 mg at 02/04/25 0825    bumetanide (Bumex) tablet 0.5 mg  0.5 mg oral Daily Aziza Garrido APRN-CNP        calcium carbonate (Oscal) tablet 1,250 mg  1,250 mg oral BID Donta Echeverria MD   1,250 mg at 02/04/25 1805    cefTRIAXone (Rocephin) 2 g in dextrose 5% IV 50 mL  2 g intravenous q24h Juan Antonio Hutchinson MD   Stopped at 02/04/25 1011    empagliflozin (Jardiance) tablet 10 mg  10 mg oral Daily Donta Echeverria MD   10 mg at 02/04/25 0825    ferrous sulfate (325 mg ferrous sulfate) tablet 325 mg  65 mg of iron oral Daily with breakfast Donta Echeverria MD   325 mg at 02/04/25 0825    finasteride (Proscar) tablet 5 mg  5 mg oral Daily Donta Echeverria MD   5 mg at 02/04/25 0825    flu vaccine, trivalent, preservative free, HIGH-DOSE, age 65y+ (Fluzone)  0.5 mL intramuscular During hospitalization Donta Echeverria MD        folic acid (Folvite) tablet 1 mg  1 mg oral Daily Donta Echeverria MD   1 mg at 02/04/25 0825    guaiFENesin (Robitussin) 100 mg/5 mL syrup 200 mg  200 mg oral q4h PRN Dnota Echeverria MD   200 mg at 02/02/25 2027    ipratropium-albuteroL (Duo-Neb) 0.5-2.5 mg/3 mL nebulizer solution 3 mL  3 mL nebulization 4x daily Donta Echeverria MD   3 mL at 02/05/25 0726    ipratropium-albuteroL (Duo-Neb) 0.5-2.5 mg/3 mL nebulizer  solution 3 mL  3 mL nebulization q2h PRN Donta Echeverria MD        oxygen (O2) therapy   inhalation Continuous - Inhalation Donta Echeverria MD 60,000 mL/hr at 02/03/25 2215 1 L/min at 02/03/25 2215    pantoprazole (ProtoNix) EC tablet 40 mg  40 mg oral Daily before breakfast Donta Echeverria MD   40 mg at 02/04/25 0655    polyethylene glycol (Glycolax, Miralax) packet 17 g  17 g oral Daily Donta Echeverria MD   17 g at 02/02/25 0924    potassium chloride CR (Klor-Con M20) ER tablet 40 mEq  40 mEq oral Daily SUSI Vu-CNP   40 mEq at 02/04/25 0825    tamsulosin (Flomax) 24 hr capsule 0.4 mg  0.4 mg oral BID Donta Echeverria MD   0.4 mg at 02/04/25 2045     No current outpatient medications on file.   ..     Medication and Non-Pharmacologic VTE Prophylaxis/Anticoagulants      Last Anticoag Admin            No anticoagulants administered    No unadministered anticoagulant orders found.          Assessment/Plan   Ramón Flores has  Assessment & Plan  Pneumonia of both lower lobes due to infectious organism  Acute on chronic diastolic CHF  Atrial fibrillation  Hemophilia   Elevated troponin  Thrombocytopenia      On Rocephin.        On Lasix.        Had Factor VIII infusion   Other Hospital problems        Abnormal findings not addressed during hospitalization, but require out patient follow up. None        I spent 40 minutes coordinating discharge of Ramón Flores, Including reconciling medications, reviewing the labs & formulating discharge plan. discussing with patient and nursing staff.  Donta Echeverria MD

## 2025-02-05 NOTE — CARE PLAN
The clinical goals for the shift include to remain safe    Problem: Pain - Adult  Goal: Verbalizes/displays adequate comfort level or baseline comfort level  Outcome: Progressing     Problem: Safety - Adult  Goal: Free from fall injury  Outcome: Progressing     Problem: Discharge Planning  Goal: Discharge to home or other facility with appropriate resources  Outcome: Progressing     Problem: Chronic Conditions and Co-morbidities  Goal: Patient's chronic conditions and co-morbidity symptoms are monitored and maintained or improved  Outcome: Progressing     Problem: Nutrition  Goal: Nutrient intake appropriate for maintaining nutritional needs  Outcome: Progressing     Problem: Fall/Injury  Goal: Not fall by end of shift  Outcome: Progressing  Goal: Be free from injury by end of the shift  Outcome: Progressing  Goal: Verbalize understanding of personal risk factors for fall in the hospital  Outcome: Progressing  Goal: Verbalize understanding of risk factor reduction measures to prevent injury from fall in the home  Outcome: Progressing  Goal: Use assistive devices by end of the shift  Outcome: Progressing  Goal: Pace activities to prevent fatigue by end of the shift  Outcome: Progressing     Problem: Skin  Goal: Promote skin healing  Outcome: Progressing

## 2025-02-05 NOTE — DISCHARGE INSTR - APPOINTMENTS
[facility]    [phone] [address] [place] [address]

## 2025-02-05 NOTE — NURSING NOTE
Discharge instructions provided using teach back method. Pt's health related  risk factors discussed with pt. pt educated to look for any worsening sign and symptoms. Pt educated to seek medical attention if experience any medical emergency. Pt aware to follow up with outpatient clinics as scheduled. Home going meds reviewed with pt. Pt verbalized understanding of disposition and discharge instructions. All questions answered to patient's satisfaction and within nursing scope of practice. Vitals stable pt states his family will  after work.

## 2025-02-05 NOTE — DOCUMENTATION CLARIFICATION NOTE
"    PATIENT:               KRISTIN DAY  ACCT #:                  [id number]  MRN:                       [id number]  :                       1928  ADMIT DATE:       2025 6:05 AM  DISCH DATE:  RESPONDING PROVIDER #:        [id number]          PROVIDER RESPONSE TEXT:    Aspiration PNA    CDI QUERY TEXT:    Clarification        Instruction:    Based on your assessment of the patient and the clinical information, please provide the requested documentation by clicking on the appropriate radio button and enter any additional information if prompted.    Question: Please further specify the type of pneumonia being treated    When answering this query, please exercise your independent professional judgment. The fact that a question is being asked, does not imply that any particular answer is desired or expected.    The patient's clinical indicators include:  Clinical Information: Admitted for treatment of pneumonia and acute on chronic HF    Clinical Indicators:    -25 CT angio of chest: \"Superimposed patchy left lower lobe consolidation consistent with pneumonia or aspiration.\"    -2/3/25 MBS  concern for aspiration/oropharyngeal dysphagia:  Thin Liquids: 8. SILENT ASPIRATION - contrast passes glottis, visible residue, NO pt response    Nectar Thick Liquids: 1. NO ASPIRATION & NO PENETRATION - no aspiration, contrast does not enter airway    Honey Thick Liquids: 1. NO ASPIRATION & NO PENETRATION - no aspiration, contrast does not enter airway    Puree: 1. NO ASPIRATION & NO PENETRATION - no aspiration, contrast does not enter airway    Solids: 1. NO ASPIRATION & NO PENETRATION - no aspiration, contrast does not enter airway    -2/3/25 ID consult-HPI:  CT/PE is showing signs of pulmonary edema and also LLL consolidation PNA with potential aspiration. Is on IV CTX/Azithro. COVID/Flu/RSV negative. S. PNA urine Ag negative.    Treatment: IV Vanco x 1, IV cefTRIAXone-25-25    Risk Factors: abnl CT " findings, advanced age, silent aspiration  Options provided:  -- Aspiration PNA  -- Other - I will add my own diagnosis  -- Refer to Clinical Documentation Reviewer    Query created by: Chioma Romano on 2/5/2025 12:54 PM      Electronically signed by:  MANNY NI MD 2/5/2025 1:35 PM

## 2025-02-06 ENCOUNTER — PHARMACY VISIT (OUTPATIENT)
Dept: PHARMACY | Facility: CLINIC | Age: OVER 89
End: 2025-02-06
Payer: COMMERCIAL

## 2025-02-06 LAB
BACTERIA BLD AEROBE CULT: ABNORMAL
BACTERIA BLD CULT: ABNORMAL
GRAM STN SPEC: ABNORMAL

## 2025-02-07 DIAGNOSIS — D66 FACTOR VIII DEFICIENCY (MULTI): ICD-10-CM

## 2025-02-07 DIAGNOSIS — D69.6 THROMBOCYTOPENIA (CMS-HCC): ICD-10-CM

## 2025-02-07 DIAGNOSIS — D66 HEMOPHILIA A (MULTI): ICD-10-CM

## 2025-02-10 DIAGNOSIS — C21.0 ANAL CANCER (MULTI): Primary | ICD-10-CM

## 2025-02-11 ENCOUNTER — OFFICE VISIT (OUTPATIENT)
Dept: GERIATRIC MEDICINE | Facility: CLINIC | Age: OVER 89
End: 2025-02-11
Payer: MEDICARE

## 2025-02-11 VITALS
BODY MASS INDEX: 21.14 KG/M2 | TEMPERATURE: 97.7 F | SYSTOLIC BLOOD PRESSURE: 112 MMHG | HEART RATE: 58 BPM | RESPIRATION RATE: 16 BRPM | DIASTOLIC BLOOD PRESSURE: 70 MMHG | WEIGHT: 131 LBS

## 2025-02-11 DIAGNOSIS — I48.20 ATRIAL FIBRILLATION, CHRONIC (MULTI): ICD-10-CM

## 2025-02-11 DIAGNOSIS — R35.1 BPH ASSOCIATED WITH NOCTURIA: ICD-10-CM

## 2025-02-11 DIAGNOSIS — R53.81 PHYSICAL DEBILITY: ICD-10-CM

## 2025-02-11 DIAGNOSIS — D66 FACTOR VIII DEFICIENCY (MULTI): ICD-10-CM

## 2025-02-11 DIAGNOSIS — I50.32 CHRONIC HEART FAILURE WITH PRESERVED EJECTION FRACTION: Primary | ICD-10-CM

## 2025-02-11 DIAGNOSIS — N18.31 STAGE 3A CHRONIC KIDNEY DISEASE (MULTI): ICD-10-CM

## 2025-02-11 DIAGNOSIS — R13.12 OROPHARYNGEAL DYSPHAGIA: ICD-10-CM

## 2025-02-11 DIAGNOSIS — N40.1 BPH ASSOCIATED WITH NOCTURIA: ICD-10-CM

## 2025-02-11 DIAGNOSIS — M17.11 PRIMARY OSTEOARTHRITIS OF RIGHT KNEE: ICD-10-CM

## 2025-02-11 PROCEDURE — 1159F MED LIST DOCD IN RCRD: CPT | Performed by: CLINICAL NURSE SPECIALIST

## 2025-02-11 PROCEDURE — 1111F DSCHRG MED/CURRENT MED MERGE: CPT | Performed by: CLINICAL NURSE SPECIALIST

## 2025-02-11 PROCEDURE — 1157F ADVNC CARE PLAN IN RCRD: CPT | Performed by: CLINICAL NURSE SPECIALIST

## 2025-02-11 PROCEDURE — 3078F DIAST BP <80 MM HG: CPT | Performed by: CLINICAL NURSE SPECIALIST

## 2025-02-11 PROCEDURE — 3074F SYST BP LT 130 MM HG: CPT | Performed by: CLINICAL NURSE SPECIALIST

## 2025-02-11 PROCEDURE — 99349 HOME/RES VST EST MOD MDM 40: CPT | Performed by: CLINICAL NURSE SPECIALIST

## 2025-02-11 RX ORDER — BUMETANIDE 0.5 MG/1
0.5 TABLET ORAL DAILY PRN
Qty: 90 TABLET | Refills: 1 | Status: SHIPPED | OUTPATIENT
Start: 2025-02-11

## 2025-02-12 PROBLEM — R13.12 OROPHARYNGEAL DYSPHAGIA: Status: ACTIVE | Noted: 2025-02-12

## 2025-02-12 RX ORDER — SPIRONOLACTONE 25 MG/1
12.5 TABLET ORAL DAILY PRN
Qty: 23 TABLET | Refills: 3 | Status: SHIPPED | OUTPATIENT
Start: 2025-02-12 | End: 2026-02-12

## 2025-02-12 NOTE — PATIENT INSTRUCTIONS
Speech therapy to come for few sessions to educate about dysphagia and to provide exercises/tips to prevent aspiration; HIGHLY RECOMMEND THICKENING AGENT FOR ALL LIQUIDS  do 2-3 days of diuretic (jonathan/bumex combo) if he notices symptoms; also on Farxiga  Recent bilateral lower lobe pneumonia - seems resolved  Silent aspiration - though pneumonia may not have been caused by aspiration (questionable since cali LL), his MBS showed silent aspiration of thin liquids.    RECOMMENDATIONS from MBW/SLP:  Easy to Chew foods (IDDSI Level 7)  Mildly/nectar thick liquids (IDDSI Level 2)  NO MIXED CONSISTENCIES  STRATEGIES:  Upright for all PO intake  Small bites/sips  Slow rate of consumption  Medication crushed in purees  Take bumetanide and spironolactone in combination for 2-3 days only for noticeable shortness of breath or increased swelling in legs  Boost or similar supplement twice a day for a month  Follow up with Roseline in 6 weeks

## 2025-02-12 NOTE — PROGRESS NOTES
"Reason for visit: hospital follow up for pneumonia    Reason for homebound status: BLE weakness and instability of knees and inability to do steps makes leaving his home quite taxing on Ramón and his caregivers    HPI: Ramón was seen today at his home after he had a 4 day hospital stay for BLL pneumonia and was discharged about 7 days ago. I was treating Ramón for a dry cough at home for a few days but then he became a little short of breath so went to the ED. He was treated with cefdinir. He also had a MBS eval while there and it was suggested to thicken liquids but he is not doing that. ST was to be consulted after discharge but no one has been in contact with him yet.     Chief Complaint: \"I feel a lot better\"    Review of Systems   General: feels fine today, appetite is fair; sleeping all night and stays awake all day  Skin: no skin problems  EENT: glasses, adequate vision with no recent vision changes; hearing is good; no sinus drainage or congestion; no oral pain or sore throat; no lesions or sores; no loose teeth, no trouble chewing or swallowing  Respiratory: occasional dry cough, no congestion or shortness of breath   Cardiac: no chest pain or palpitations;  Gastrointestinal: no abdominal pain, no nausea, vomiting, heartburn, diarrhea or constipation. BM soft every day, making sure he takes prune juice daily; no rectal pain or bleeding; no hemorrhoids  Urinary: continent; no discomfort with urination; aware he is taking diuretics again and says he has so much urgency and increased frequency and has even been incontinent on the way to the bathroom this week. He says he has always had intermittent swelling in ankles due to bad knees entire life and does not feel mild swelling is enough to make him take a diuretic. Up few times a night as well to void, interrupting sleep  Musculoskeletal: mild bilateral knee pain but tylenol is enough; knees are weak and relies on wheelchair; can stand by himself and " transfer independently; arms are strong but overall feels weaker since the radiation finished; has not had any swelling in legs past several weeks; was able to walk a short distance with help and walker this week  Neurologic: long and short term memory intact; no headaches, dizziness or lightheadedness; no tremors or involuntary movements; no altered sensation; no unilateral weakness  Psychiatric: managing fine, has plenty of hellp, no depression or anxiety, feels he nora well    Physical Examination   Vitals: Blood pressure 112/70, pulse 58, temperature 36.5 °C (97.7 °F), resp. rate 16, weight 59.4 kg (131 lb).  Pulse ox 96%  General: sitting in wheelchair in his kitchen, dressed in personal clothes, very neat; good historian and conversationalist;, not able to stand to weigh but had wt at radiation therapy few weeks ago;, appears comfortable  Neurologic: alert, oriented x4 with good executive function; adequate and equal arm strength; positive sensation in all extremities; no involuntary movements  Skin: no abnormalities of hair or nails; right lower cheek with large mole, no discolorations, or excessive dryness   EENT: vision and hearing seem adequate; teeth in good condition; drank iced tea and coughed once after; aide said he coughed few times after coffee this am; no sinus congestion or drainage  Respiratory: unlabored; chest symmetrical w/ good rise and fall; regular rhythm and depth, breath sounds equal and clear throughout  Heart: iegular rhythm, rate controlled; normal heart sounds w/ no murmurs, rubs and gallops  Abdomen: nondistended, nontenderness, bowel sounds active x4, no masses  Extremities: no deformities, tenderness; palpable peripheral pulses, no cyanosis, clubbing; normal temperature; no edema  Musculoskeletal: no limited joint mobility with good ROM of upper extremities but about 50% limited ROM of legs., knees stiff, not able to stand upright completly, no tenderness, effusion, erythema,  minimal kyphosis and no scoliosis  Psych: pleasant, good attitude, good conversationalist; calm    Diagnoses and Plan:  HFpEF - left hospital with only bumex but Ramón is not happy with taking it; he is very in tune with his health and  states that whenever his home nurse sees a little swelling she calls me and wants diuretic added back; I feel Ramón is capable of deciding to take diuretic when he notices sob and increased swelling; we talked about this for several minutes and I felt he is able to make this decision; explained that he can do 2-3 days of diuretic (jonathan/bumex combo) if he notices symptoms; also on Farxiga  Recent bilateral lower lobe pneumonia - seems resolved  Silent aspiration - though pneumonia may not have been caused by aspiration (questionable since cali LL), his MBS showed silent aspiration of thin liquids.  RECOMMENDATIONS:  - Easy to Chew (IDDSI Level 7)  - Mildly/nectar thick liquids (IDDSI Level 2)    *NO MIXED CONSISTENCIES  STRATEGIES:  -Upright for all PO intake  -Small bites/sips  -Slow rate of consumption  -Medication crushed in purees     Ramón is not thickening his liquids and did not have any idea he was supposed to do so, but he was given a few samples of thickened juice he had in his refrigerator. Instructed on all this and he refuses to thicken liquids. I will get SLP out here asap and talked with his aide about it.   Chronic atrial fibrillation - no rate controlling drugs, was regular today; no blood thinners due to hemophilia; rate controlled  Loss of appetite - appetite is improving; reminded of Boost bid which he forgot about; gave a case of 12 chocolate;  explained he needs to gain wt back and needs the energy to do PT as well  Primary OA knees and increased weakness - takes tylenol routinely which is effective; relies on wheelchair; has stiffened up more the past month; will start PT at home  Anal cancer - finished radiation; will have follow up appts  BPH w/ lower  urinary symptoms - controlled with flomax and finasteride typically but diuretics posing problems for derik  Hemophilia - factor VIII injection routinely  GERD w/o esophagitis - stable on pantoprazole  CKD 3a - gfr 50s  Unspecified constipation - contine prune juice daily per his choice; effective currently

## 2025-02-23 LAB
Q ONSET: 211 MS
QRS COUNT: 13 BEATS
QRS DURATION: 94 MS
QT INTERVAL: 438 MS
QTC CALCULATION(BAZETT): 495 MS
QTC FREDERICIA: 475 MS
R AXIS: -46 DEGREES
T AXIS: 88 DEGREES
T OFFSET: 430 MS
VENTRICULAR RATE: 77 BPM

## 2025-03-06 PROCEDURE — G0179 MD RECERTIFICATION HHA PT: HCPCS | Performed by: CLINICAL NURSE SPECIALIST

## 2025-03-08 DIAGNOSIS — R13.12 OROPHARYNGEAL DYSPHAGIA: Primary | ICD-10-CM

## 2025-03-08 RX ORDER — STARCH
POWDER IN PACKET (EA) ORAL
Qty: 200 EACH | Refills: 3 | Status: SHIPPED | OUTPATIENT
Start: 2025-03-08

## 2025-03-14 ENCOUNTER — TELEPHONE (OUTPATIENT)
Dept: GERIATRIC MEDICINE | Facility: CLINIC | Age: OVER 89
End: 2025-03-14
Payer: MEDICARE

## 2025-03-14 DIAGNOSIS — R13.12 OROPHARYNGEAL DYSPHAGIA: Primary | ICD-10-CM

## 2025-03-14 NOTE — TELEPHONE ENCOUNTER
Received a message stating that patient needs a follow up barium swallow - this is being requested by speech therapy - Order placed.  Thank you.

## 2025-03-14 NOTE — TELEPHONE ENCOUNTER
Dr. Gao wrote the order for the modified barium swallow. This nurse attempted to contact pt to instruct him to schedule his appointment, no answers at any of the phone numbers listed on pt's chart. This nurse called the speech therapist back to make her aware that I was unable to get in touch with him, stated she will be seeing pt again next Thursday and she will give him the number to central scheduling at that time.

## 2025-03-25 ENCOUNTER — HOSPITAL ENCOUNTER (OUTPATIENT)
Dept: RADIOLOGY | Facility: HOSPITAL | Age: OVER 89
Discharge: HOME | End: 2025-03-25
Payer: MEDICARE

## 2025-03-25 DIAGNOSIS — R13.12 OROPHARYNGEAL DYSPHAGIA: ICD-10-CM

## 2025-03-25 PROCEDURE — 74230 X-RAY XM SWLNG FUNCJ C+: CPT

## 2025-03-25 PROCEDURE — 74230 X-RAY XM SWLNG FUNCJ C+: CPT | Performed by: RADIOLOGY

## 2025-03-25 PROCEDURE — 92611 MOTION FLUOROSCOPY/SWALLOW: CPT | Mod: GN

## 2025-03-25 PROCEDURE — 2500000005 HC RX 250 GENERAL PHARMACY W/O HCPCS: Performed by: STUDENT IN AN ORGANIZED HEALTH CARE EDUCATION/TRAINING PROGRAM

## 2025-03-25 RX ADMIN — BARIUM SULFATE 40 ML: 400 SUSPENSION ORAL at 11:47

## 2025-03-25 RX ADMIN — BARIUM SULFATE 700 MG: 700 TABLET ORAL at 11:49

## 2025-03-25 RX ADMIN — BARIUM SULFATE 5 ML: 400 SUSPENSION ORAL at 11:48

## 2025-03-25 RX ADMIN — BARIUM SULFATE 5 ML: 400 PASTE ORAL at 11:48

## 2025-03-25 RX ADMIN — BARIUM SULFATE 60 ML: 0.81 POWDER, FOR SUSPENSION ORAL at 11:47

## 2025-03-25 NOTE — PROGRESS NOTES
Speech-Language Pathology    Outpatient Modified Barium Swallow Study    Patient Name: Ramón Flores  MRN: [id number]  : 1928  Today's Date: 25  Time Calculation  Start Time: 1126  Stop Time: 1150  Time Calculation (min): 24 min        Modified Barium Swallow Study completed. Informed verbal consent obtained prior to completion of exam. Trials of thin liquids, mildly/moderately thick liquids, purees, solids, and barium tablet with thin liquids were administered for exam this date. Anatomical marker placement not achieved.  AP view not obtained due to patient safety/mobility concerns.     SLP: Gricel Loo, SLP   Contact info: [employer] secure chat    Reason for Referral: C/f aspiration/oropharyngeal dysphagia  Patient Hx: hemophilia, heart failure, chronic A-fib, prior anal  cancer status post radiation, GERD, CKD, dysphagia requiring thickened liquids.  Respiratory Status: WFL  Current diet: Easy to Chew Solids and Mildly (Nectar) Thick Liquids    Pain:  Patient exhibits no s/s of pain or discomfort during exam.      RECOMMENDATIONS:   -EASY CHEW diet (IDDSI Level 7)  -THIN liquids (IDDSI Level 0)    STRATEGIES:    -Upright at 90 degrees for all po intake  -Slow rate of consumption  -Small bolus size  -Medication whole in pudding or apple sauce if post prandial cough noted.  -Reswallow with each bite/sip  -Alternate solids with liquids  -Remain upright for 1 hour after eating/drinking    SLP PLAN:  Skilled SLP Services: No further skilled SLP intervention is warranted for dysphagia at this time.    Education Provided:  Results and recommendations of MBSS reviewed with patient upon completion of exam. POC discussed at this time with  need for modification of diet and compensatory swallowing strategies to maximize swallowing safety and efficiency. Verbal understanding and agreement given on all accounts.     Treatment Provided Today: N/A     Additional Medical Consults Suggested:   N/A    Repeat  Study: as needed    Mechanics of the Swallow Summary:  ORAL PHASE:  Lip Closure - Intact  Tongue Control During Bolus Hold - Impaired  Bolus prep/mastication - Intact  Bolus transport/lingual motion - Impaired  Oral residue - Present    PHARYNGEAL PHASE:  Initiation of pharyngeal swallow - Intact  Soft palate elevation - Intact  Laryngeal elevation - Intact  Anterior hyoid excursion - Intact  Epiglottic movement - Impaired  Laryngeal vestibule closure - Impaired  Pharyngeal stripping wave - Intact  Pharyngeal contraction (A/P view) - Not tested  Pharyngoesophageal segment opening - Impaired  Tongue base retraction - Impaired  Pharyngeal residue - Present    ESOPHAGEAL PHASE:  Esophageal clearance - Impaired    SLP Impressions with Severity Rating:   Pt presents with mild oropharyngeal dysphagia upon completion of modified barium swallow study this date accompanied by an esophageal component. Swallowing physiology is characterized by the above noted deficits. Impairments that most negatively impact swallowing safety and efficiency include reduced bolus formation and decreased epiglottic deflection/laryngeal vestibule closure. Premature pharyngeal entry viewed with thin liquid boluses extending to piriforms prior to swallow. Improved epiglottic deflection noted with higher viscosity boluses. Trace, shallow laryngeal penetration viewed with large bolus thin liquid x1 only, which fully cleared upper laryngeal vestibule as swallow completed.  No further laryngeal penetration was observed for any other consistency, with no aspiration visualized during the study. Oral residue noted with thin and mildly/moderately thick liquids. Piriform sinus residue noted with thin and mildly thick liquids. Residue did not appear to accumulate and cleared with subsequent swallows. Retention of barium tablet in  valleculae noted, which cleared given subsequent thin liquid boluses. A cervical osteophytic spur viewed impinging into  esophageal lumen, however did not create an obstruction to bolus flow. Retention of contrast in distal esophagus noted with tertiary contraction and retrograde bolus flow. Delayed esophageal emptying occurred during exam.     *Of note: The  A-P/oblique bolus follow-through is not intended to be utilized as a diagnostic assessment of the esophagus, but rather as a tool to observe the biomechanical aspects of the swallow continuum to inform the need for further evaluation by medical specialists, as applicable.     OUTCOME MEASURES:  Functional Oral Intake Scale  Functional Oral Intake Scale: Level 6        total oral diet with multiple consistencies without special preparations but specific food limitations    EAT-10  Unable to complete     Rosenbek's Penetration Aspiration Scale    Thin Liquids: 2. PENETRATION that CLEARS - contrast enter airway, above vocal cords, no residue    Nectar Thick Liquids: 1. NO ASPIRATION & NO PENETRATION - no aspiration, contrast does not enter airway    Honey Thick Liquids: 1. NO ASPIRATION & NO PENETRATION - no aspiration, contrast does not enter airway    Puree: 1. NO ASPIRATION & NO PENETRATION - no aspiration, contrast does not enter airway    Solids: 1. NO ASPIRATION & NO PENETRATION - no aspiration, contrast does not enter airway

## 2025-03-28 ENCOUNTER — OFFICE VISIT (OUTPATIENT)
Dept: GERIATRIC MEDICINE | Facility: CLINIC | Age: OVER 89
End: 2025-03-28
Payer: MEDICARE

## 2025-03-28 VITALS
RESPIRATION RATE: 16 BRPM | HEART RATE: 68 BPM | DIASTOLIC BLOOD PRESSURE: 64 MMHG | TEMPERATURE: 97.8 F | SYSTOLIC BLOOD PRESSURE: 112 MMHG

## 2025-03-28 DIAGNOSIS — R13.12 OROPHARYNGEAL DYSPHAGIA: ICD-10-CM

## 2025-03-28 DIAGNOSIS — I50.32 CHRONIC HEART FAILURE WITH PRESERVED EJECTION FRACTION: Primary | ICD-10-CM

## 2025-03-28 DIAGNOSIS — I48.20 ATRIAL FIBRILLATION, CHRONIC (MULTI): ICD-10-CM

## 2025-03-28 DIAGNOSIS — M17.12 PRIMARY OSTEOARTHRITIS OF LEFT KNEE: ICD-10-CM

## 2025-03-28 DIAGNOSIS — D66 HEMOPHILIA A (MULTI): ICD-10-CM

## 2025-03-28 RX ORDER — BUMETANIDE 0.5 MG/1
0.5 TABLET ORAL DAILY
Qty: 90 TABLET | Refills: 3 | Status: SHIPPED | OUTPATIENT
Start: 2025-03-28 | End: 2026-03-28

## 2025-03-28 NOTE — PROGRESS NOTES
"Reason for visit: routine follow up    Reason for homebound status: BLE weakness and instability of knees and inability to do steps makes leaving his home quite taxing on Ramón and his caregivers    HPI: Ramón was seen today at his home  for a routine follow up.     Chief Complaint: \"I feel great\"    Review of Systems   General: feels fine today, appetite is good but does not like the food he gets from meals on wheels; sleeping all night and stays awake all day  Skin: no skin problems  EENT: glasses, adequate vision with no recent vision changes; hearing is good; no sinus drainage or congestion; no oral pain or sore throat; no lesions or sores; no loose teeth, no trouble chewing or swallowing  Respiratory: no cough, congestion or shortness of breath   Cardiac: no chest pain or palpitations;  Gastrointestinal: no abdominal pain, no nausea, vomiting, heartburn, diarrhea or constipation. BM soft every day, making sure he takes prune juice daily; no rectal pain or bleeding; no hemorrhoids  Urinary: continent; no discomfort with urination; urgency and frequency that is annoying at times  Musculoskeletal: mild bilateral knee pain but tylenol is enough; knees are weak and relies on wheelchair; can stand by himself and transfer independently; arms are strong but overall feels weaker since the radiation finished; has not had any swelling in legs for few weeks; was able to walk a short distance with help and walker this week  Neurologic: long and short term memory intact; no headaches, dizziness or lightheadedness; no tremors or involuntary movements; no altered sensation; no unilateral weakness  Psychiatric: managing fine, has plenty of help, no depression or anxiety, feels he nora well    Physical Examination   Vitals: Blood pressure 112/64, pulse 68, temperature 36.6 °C (97.8 °F), resp. rate 16.  Pulse ox 96%  General: sitting in wheelchair in his kitchen, dressed in personal clothes, very neat; good historian and " conversationalist;, not able to stand to weigh but had wt at radiation therapy few weeks ago;, appears comfortable  Neurologic: alert, oriented x4 with good executive function; adequate and equal arm strength; positive sensation in all extremities; no involuntary movements  Skin: no abnormalities of hair or nails; right lower cheek with large mole, no discolorations, or excessive dryness   EENT: vision and hearing seem adequate; teeth in good condition; drank iced tea and coughed once after; aide said he coughed few times after coffee this am; no sinus congestion or drainage  Respiratory: unlabored; chest symmetrical w/ good rise and fall; regular rhythm and depth, breath sounds equal and clear throughout  Heart: irregular rhythm, rate controlled; normal heart sounds w/ no murmurs, rubs and gallops  Abdomen: nondistended, nontenderness, bowel sounds active x4, no masses  Extremities: no deformities, tenderness; palpable peripheral pulses, no cyanosis, clubbing; normal temperature; trace edema left ankle  Musculoskeletal: no limited joint mobility with good ROM of upper extremities but about 70% ROM of knees., not able to stand upright completly, no tenderness, effusion, erythema, minimal kyphosis and no scoliosis  Psych: pleasant, good attitude, good conversationalist; calm    Diagnoses and Plan:  HFpEF - bumex 0.5mg daily; also on Jardiance  Silent aspiration - passed swallowing study; ok for thin liquids; he is following the recommendations left by the ST  Chronic atrial fibrillation - no rate controlling drugs, was irregular today; no blood thinners due to hemophilia; rate controlled  Loss of appetite -gets meals on wheels and he reports they are not usually appetizing; suggested ordering out, he likes a local [employer]; talked with his aid as well about cooking for him  Primary OA knees and increased weakness - takes tylenol routinely which is effective; relies on wheelchair; PT has helped  BPH w/ lower  urinary symptoms - controlled with flomax and finasteride typically but diuretics posing problems for derik and he insists on going upstairs to urinate due to not wanting to use a urinal on the first floor so has to go up often  Hemophilia - factor VIII injection routinely  GERD w/o esophagitis - stable on pantoprazole  CKD 3a - gfr 50s  Unspecified constipation - contine prune juice daily per his choice; effective currently  Will follow up in 2 months as he is very stable.

## 2025-03-29 NOTE — PATIENT INSTRUCTIONS
Continue doing what you are doing!!  Try to eat out more for additional calories and enjoyment of food  Will see you in about 2 months but call earlier if needed.

## 2025-04-09 ENCOUNTER — DOCUMENTATION (OUTPATIENT)
Dept: HOME HEALTH SERVICES | Facility: HOME HEALTH | Age: OVER 89
End: 2025-04-09
Payer: MEDICARE

## 2025-04-09 ENCOUNTER — DOCUMENTATION (OUTPATIENT)
Dept: PEDIATRIC HEMATOLOGY/ONCOLOGY | Facility: HOSPITAL | Age: OVER 89
End: 2025-04-09
Payer: MEDICARE

## 2025-04-09 ENCOUNTER — LAB (OUTPATIENT)
Dept: LAB | Facility: HOSPITAL | Age: OVER 89
End: 2025-04-09
Payer: MEDICARE

## 2025-04-09 ENCOUNTER — OFFICE VISIT (OUTPATIENT)
Dept: HEMATOLOGY/ONCOLOGY | Facility: HOSPITAL | Age: OVER 89
End: 2025-04-09
Payer: MEDICARE

## 2025-04-09 VITALS
SYSTOLIC BLOOD PRESSURE: 121 MMHG | HEART RATE: 65 BPM | WEIGHT: 131.17 LBS | RESPIRATION RATE: 18 BRPM | BODY MASS INDEX: 21.17 KG/M2 | OXYGEN SATURATION: 99 % | TEMPERATURE: 97.3 F | DIASTOLIC BLOOD PRESSURE: 66 MMHG

## 2025-04-09 DIAGNOSIS — D45 POLYCYTHEMIA VERA: ICD-10-CM

## 2025-04-09 DIAGNOSIS — D66 HEMOPHILIA A (MULTI): ICD-10-CM

## 2025-04-09 DIAGNOSIS — D46.9 MDS (MYELODYSPLASTIC SYNDROME) (MULTI): ICD-10-CM

## 2025-04-09 DIAGNOSIS — D75.89 MACROCYTOSIS: ICD-10-CM

## 2025-04-09 DIAGNOSIS — I25.10 CARDIOVASCULAR DISEASE: ICD-10-CM

## 2025-04-09 DIAGNOSIS — R17 ELEVATED BILIRUBIN: ICD-10-CM

## 2025-04-09 DIAGNOSIS — D50.0 IRON DEFICIENCY ANEMIA DUE TO CHRONIC BLOOD LOSS: ICD-10-CM

## 2025-04-09 DIAGNOSIS — D66 FACTOR VIII DEFICIENCY (MULTI): ICD-10-CM

## 2025-04-09 DIAGNOSIS — D61.9 HYPOPROLIFERATIVE ANEMIA: ICD-10-CM

## 2025-04-09 DIAGNOSIS — D66 HEMOPHILIA A (MULTI): Primary | ICD-10-CM

## 2025-04-09 LAB
ACANTHOCYTES BLD QL SMEAR: ABNORMAL
ALBUMIN SERPL BCP-MCNC: 4.9 G/DL (ref 3.4–5)
ALP SERPL-CCNC: 78 U/L (ref 33–136)
ALT SERPL W P-5'-P-CCNC: 16 U/L (ref 10–52)
ANION GAP SERPL CALC-SCNC: 15 MMOL/L (ref 10–20)
APTT PPP: 38 SECONDS (ref 26–36)
AST SERPL W P-5'-P-CCNC: 21 U/L (ref 9–39)
BASOPHILS # BLD MANUAL: 0 X10*3/UL (ref 0–0.1)
BASOPHILS NFR BLD MANUAL: 0 %
BILIRUB DIRECT SERPL-MCNC: 0.5 MG/DL (ref 0–0.3)
BILIRUB SERPL-MCNC: 2.2 MG/DL (ref 0–1.2)
BUN SERPL-MCNC: 29 MG/DL (ref 6–23)
CALCIUM SERPL-MCNC: 8.3 MG/DL (ref 8.6–10.3)
CHLORIDE SERPL-SCNC: 101 MMOL/L (ref 98–107)
CO2 SERPL-SCNC: 29 MMOL/L (ref 21–32)
CREAT SERPL-MCNC: 1.22 MG/DL (ref 0.5–1.3)
DACRYOCYTES BLD QL SMEAR: ABNORMAL
EGFRCR SERPLBLD CKD-EPI 2021: 54 ML/MIN/1.73M*2
EOSINOPHIL # BLD MANUAL: 0 X10*3/UL (ref 0–0.4)
EOSINOPHIL NFR BLD MANUAL: 0 %
ERYTHROCYTE [DISTWIDTH] IN BLOOD BY AUTOMATED COUNT: 17.1 % (ref 11.5–14.5)
FACT VIII ACT/NOR PPP: 56 % (ref 55–180)
FERRITIN SERPL-MCNC: 203 NG/ML (ref 20–300)
FIBRINOGEN PPP-MCNC: 282 MG/DL (ref 200–400)
GLUCOSE SERPL-MCNC: 112 MG/DL (ref 74–99)
HCT VFR BLD AUTO: 35.5 % (ref 41–52)
HGB BLD-MCNC: 12 G/DL (ref 13.5–17.5)
HGB RETIC QN: 38 PG (ref 28–38)
IGA SERPL-MCNC: 533 MG/DL (ref 70–400)
IGG SERPL-MCNC: 860 MG/DL (ref 700–1600)
IGM SERPL-MCNC: 111 MG/DL (ref 40–230)
IMM GRANULOCYTES # BLD AUTO: 0.16 X10*3/UL (ref 0–0.5)
IMM GRANULOCYTES NFR BLD AUTO: 6.2 % (ref 0–0.9)
IMMATURE RETIC FRACTION: 14.2 %
INR PPP: 1.4 (ref 0.9–1.1)
IRON SATN MFR SERPL: 49 % (ref 25–45)
IRON SERPL-MCNC: 151 UG/DL (ref 35–150)
LYMPHOCYTES # BLD MANUAL: 0.81 X10*3/UL (ref 0.8–3)
LYMPHOCYTES NFR BLD MANUAL: 31 %
MCH RBC QN AUTO: 36.4 PG (ref 26–34)
MCHC RBC AUTO-ENTMCNC: 33.8 G/DL (ref 32–36)
MCV RBC AUTO: 108 FL (ref 80–100)
MONOCYTES # BLD MANUAL: 0.29 X10*3/UL (ref 0.05–0.8)
MONOCYTES NFR BLD MANUAL: 11 %
NEUTS SEG # BLD MANUAL: 1.51 X10*3/UL (ref 1.6–5)
NEUTS SEG NFR BLD MANUAL: 58 %
NRBC BLD-RTO: 0 /100 WBCS (ref 0–0)
OVALOCYTES BLD QL SMEAR: ABNORMAL
PLATELET # BLD AUTO: 61 X10*3/UL (ref 150–450)
POTASSIUM SERPL-SCNC: 4.1 MMOL/L (ref 3.5–5.3)
PROT SERPL-MCNC: 7.3 G/DL (ref 6.4–8.2)
PROT SERPL-MCNC: 7.8 G/DL (ref 6.4–8.2)
PROTHROMBIN TIME: 15.6 SECONDS (ref 9.8–12.4)
RBC # BLD AUTO: 3.3 X10*6/UL (ref 4.5–5.9)
RBC MORPH BLD: ABNORMAL
RETICS #: 0.06 X10*6/UL (ref 0.02–0.11)
RETICS/RBC NFR AUTO: 1.9 % (ref 0.5–2)
SODIUM SERPL-SCNC: 141 MMOL/L (ref 136–145)
TIBC SERPL-MCNC: 310 UG/DL (ref 240–445)
TOTAL CELLS COUNTED BLD: 100
UIBC SERPL-MCNC: 159 UG/DL (ref 110–370)
VIT B12 SERPL-MCNC: 738 PG/ML (ref 211–911)
WBC # BLD AUTO: 2.6 X10*3/UL (ref 4.4–11.3)

## 2025-04-09 PROCEDURE — 82248 BILIRUBIN DIRECT: CPT

## 2025-04-09 PROCEDURE — 3078F DIAST BP <80 MM HG: CPT | Performed by: INTERNAL MEDICINE

## 2025-04-09 PROCEDURE — 85027 COMPLETE CBC AUTOMATED: CPT

## 2025-04-09 PROCEDURE — 3074F SYST BP LT 130 MM HG: CPT | Performed by: INTERNAL MEDICINE

## 2025-04-09 PROCEDURE — 83615 LACTATE (LD) (LDH) ENZYME: CPT

## 2025-04-09 PROCEDURE — 36415 COLL VENOUS BLD VENIPUNCTURE: CPT

## 2025-04-09 PROCEDURE — 86706 HEP B SURFACE ANTIBODY: CPT

## 2025-04-09 PROCEDURE — 82784 ASSAY IGA/IGD/IGG/IGM EACH: CPT

## 2025-04-09 PROCEDURE — 85610 PROTHROMBIN TIME: CPT

## 2025-04-09 PROCEDURE — 86803 HEPATITIS C AB TEST: CPT

## 2025-04-09 PROCEDURE — 86023 IMMUNOGLOBULIN ASSAY: CPT

## 2025-04-09 PROCEDURE — 83090 ASSAY OF HOMOCYSTEINE: CPT

## 2025-04-09 PROCEDURE — 1126F AMNT PAIN NOTED NONE PRSNT: CPT | Performed by: INTERNAL MEDICINE

## 2025-04-09 PROCEDURE — 85045 AUTOMATED RETICULOCYTE COUNT: CPT

## 2025-04-09 PROCEDURE — 85007 BL SMEAR W/DIFF WBC COUNT: CPT

## 2025-04-09 PROCEDURE — 84165 PROTEIN E-PHORESIS SERUM: CPT

## 2025-04-09 PROCEDURE — 80053 COMPREHEN METABOLIC PANEL: CPT

## 2025-04-09 PROCEDURE — 83921 ORGANIC ACID SINGLE QUANT: CPT

## 2025-04-09 PROCEDURE — 82728 ASSAY OF FERRITIN: CPT

## 2025-04-09 PROCEDURE — 84155 ASSAY OF PROTEIN SERUM: CPT

## 2025-04-09 PROCEDURE — 85730 THROMBOPLASTIN TIME PARTIAL: CPT

## 2025-04-09 PROCEDURE — 83540 ASSAY OF IRON: CPT

## 2025-04-09 PROCEDURE — 1159F MED LIST DOCD IN RCRD: CPT | Performed by: INTERNAL MEDICINE

## 2025-04-09 PROCEDURE — 82607 VITAMIN B-12: CPT

## 2025-04-09 PROCEDURE — 1157F ADVNC CARE PLAN IN RCRD: CPT | Performed by: INTERNAL MEDICINE

## 2025-04-09 PROCEDURE — 83010 ASSAY OF HAPTOGLOBIN QUANT: CPT

## 2025-04-09 PROCEDURE — 85384 FIBRINOGEN ACTIVITY: CPT

## 2025-04-09 PROCEDURE — 99215 OFFICE O/P EST HI 40 MIN: CPT | Mod: 24 | Performed by: INTERNAL MEDICINE

## 2025-04-09 PROCEDURE — 99215 OFFICE O/P EST HI 40 MIN: CPT | Performed by: INTERNAL MEDICINE

## 2025-04-09 PROCEDURE — 85240 CLOT FACTOR VIII AHG 1 STAGE: CPT

## 2025-04-09 PROCEDURE — 83521 IG LIGHT CHAINS FREE EACH: CPT

## 2025-04-09 ASSESSMENT — PAIN SCALES - GENERAL: PAINLEVEL_OUTOF10: 0-NO PAIN

## 2025-04-09 NOTE — HH CARE COORDINATION
Home Care received a referral for Physical Therapy. Unfortunately, we are unable to accept and process the referral at this time.    Reason:  Patient is Active with Another Home Care Agency    Patients, please reach out to the referring provider or your PCP to assist in obtaining an alternative home care agency and/or guidance to meet your needs.    Providers, please reach out to  Home Care at [phone] with any questions regarding the declined referral.

## 2025-04-09 NOTE — PROGRESS NOTES
[facility] PT Consult    Patient: Ramón Flores  MRN: [id number]  04/09/25    Assessment   Ramón is a 89 y.o. with PMHx Hemophilia A who was seen by Physical Therapy in [facility] clinic on 4/9/2025 accompanied by his health aid. Arrives to clinic in a wheelchair today. Pt wanting to increase his mobility.     Upon assessment, as noted below, pt with limited flex and ext ROM cali knees. He is able to complete both sit to stand and stand to sit transfer with contact guard assist for safety. He is utilizing upper extremities to complete sit to stand and stand to sit transfers safely and appropriately.     Plan/Recommendations:  Home physical therapy    Subjective   Pt reports he would like to increase his mobility    Past Medical History:   Past Medical History:   Diagnosis Date    Acute deep vein thrombosis (DVT) of left femoral vein (Multi) 09/10/2023    ACUTE DEEP VEIN THROMBOSIS, L FEMORAL, HEMOPHILIA    Acute diastolic heart failure 04/16/2023    Benign prostatic hyperplasia without lower urinary tract symptoms 01/07/2016    Enlarged prostate without lower urinary tract symptoms (luts)    Bleeding hemorrhoids 03/01/2023    CAP (community acquired pneumonia) 01/18/2024    Closed nondisplaced fracture of head of left radius 09/05/2023    COVID-19 05/21/2023    Enlarged prostate with lower urinary tract symptoms (LUTS) 03/01/2023    Fracture of bone of hip (Multi) 09/05/2023    Fracture of femoral neck, right 03/01/2023    Gastrointestinal hemorrhage 09/05/2023    LOWER GI BLEED    Gingiva hemorrhage 09/05/2023    Gross hematuria 03/01/2023    Hemarthrosis of ankle joint 04/18/2019    Hemarthrosis of knee 05/09/2022    Hematochezia 09/05/2023    Hematoma of lower extremity 09/05/2023    Hematoma of right thigh 03/01/2023    History of recent fall 05/31/2023    Knee effusion 09/05/2023    Knee effusion, left 03/01/2023    Lumbar pain 03/01/2023    Obstructive uropathy 04/28/2023    Orthostatic syncope 09/05/2023    Personal  history of other endocrine, nutritional and metabolic disease     History of hyperlipidemia    Pneumonia of left lower lobe due to infectious organism 03/19/2024    Pneumonia of right lung due to infectious organism 03/01/2023    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    Subdural hematoma (Multi) 04/09/2023    Syncope 05/12/2023    Urinary retention 03/01/2023    UTI (urinary tract infection) 03/01/2023    Viral gastroenteritis 03/01/2023       Past Surgical History:   Past Surgical History:   Procedure Laterality Date    CORONARY ARTERY BYPASS GRAFT         Prophylaxis: Yes  ALTUVIIIO 1x/week (typically on Tuesdays)    Additional Review  Home environment/accessability: lives at home, caregivers come to the home   Equipment used at this time: wheelchairs, walkers, stair lift    Objective   Range of Motion:   Knee Flexion (0-135): Left limited and Right limited  Knee Extension (0): Left limited and Right limited    Transfers:  Sit to stand: contact guard assist for safety  Stand to sit: contact guard assist for safety       Kathie Kruger, PT

## 2025-04-10 DIAGNOSIS — B37.2 SKIN CANDIDIASIS: ICD-10-CM

## 2025-04-10 LAB
ALBUMIN: 4.4 G/DL (ref 3.4–5)
ALPHA 1 GLOBULIN: 0.3 G/DL (ref 0.2–0.6)
ALPHA 2 GLOBULIN: 0.8 G/DL (ref 0.4–1.1)
BETA GLOBULIN: 1 G/DL (ref 0.5–1.2)
GAMMA GLOBULIN: 0.8 G/DL (ref 0.5–1.4)
HAPTOGLOB SERPL NEPH-MCNC: 156 MG/DL (ref 30–200)
HBV SURFACE AB SER-ACNC: >1000 MIU/ML
HCV AB SER QL: NONREACTIVE
HCYS SERPL-SCNC: 18.95 UMOL/L (ref 5–13.9)
KAPPA LC SERPL-MCNC: 3.06 MG/DL (ref 0.33–1.94)
KAPPA LC/LAMBDA SER: 1.51 {RATIO} (ref 0.26–1.65)
LAMBDA LC SERPL-MCNC: 2.02 MG/DL (ref 0.57–2.63)
LDH SERPL L TO P-CCNC: 298 U/L (ref 84–246)
M-PROTEIN 1: 0.3 G/DL
PATH REVIEW-SERUM PROTEIN ELECTROPHORESIS: ABNORMAL
PROTEIN ELECTROPHORESIS COMMENT: ABNORMAL

## 2025-04-10 RX ORDER — NYSTATIN 100000 U/G
CREAM TOPICAL 2 TIMES DAILY
Qty: 30 G | Refills: 2 | Status: SHIPPED | OUTPATIENT
Start: 2025-04-10 | End: 2025-06-09

## 2025-04-10 RX ORDER — NYSTATIN 100000 U/G
CREAM TOPICAL 2 TIMES DAILY
Qty: 30 G | Refills: 2 | Status: SHIPPED | OUTPATIENT
Start: 2025-04-10 | End: 2025-04-10 | Stop reason: SDUPTHER

## 2025-04-10 NOTE — PROGRESS NOTES
Patient ID: Ramón Flores is a 89 y.o. male.  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  He had minimal bleeding during life due concurrent protein C deficiency.  However, he bleeds on interventions.  His major problem is recovery from several syncopal episodes that led to a prolonged rehabilitation in [place].  There, he spent most of his time in bed or in a chair and became weak,  In that period of time, his osteoarthritis stiffened and presently, he is unable to fully extend his knees to  an upright position.  This problems exists to today.  He lives at home with 3 wheelchairs on each of 3 levels in his home.  He can walk with a walker with assistance. He had good upper body support.  As of today, he is still unable to straighten out his legs.  Currently, he has a care worker who comes to his home 3 days a week, M,W,F.  His meals are mostly those sent by [employer] - note this is a program that is slated to be cut by the Trump administration.        PMHx: In January 2022, he went to see his opthalmologist for his macular degeneration.  The opthalmologist  decided that he needed another eye injection of ? (VEGF?).  One had been given in the office without incident 6 weeks prior - the hemophilia service was not  informed.  The injection was given without incidence.     However, overnight, the patient had a bleeding eye.  He had blood on his pillow.  He says his vision is OK.  We saw him and had opthalmology see the patient and gave him factor for 4 days.  It got better.      Although he had never been on FVIII prophylaxis, we felt that it was time to institute prophylaxis for bleeding with FVIII infusions.  At the present time, we have him on Altuviiio. This agent is given  once weekly and his bleeding issues have not been a problem.   He gets prophy weekly on Tuesdays.  Today is Wed, 24 h from his weekly Altuviii0 dose.     Meds: no changes      Review of Systems   Constitutional:         Overall feeling well.  Ambulation is his biggest problem, at present. Good mentation.  He feels he would be able to drive a car.    HENT:  Negative.     Eyes:         Getting Rx'd for age-related macular degeneration.   Respiratory: Negative.     Cardiovascular:         Recent short hospitalization for heart failure.  He was in atrial fibrillation.  Remains such.  No anticoagulation.  Need to consider Watchman procedure.     Gastrointestinal: Negative.    Genitourinary: Negative.     Musculoskeletal:         Knees are major arthritic problem; right knee worse than left knee.   Skin: Negative.    Neurological: Negative.    Hematological:         Hemostasis: under control for now.   Psychiatric/Behavioral: Negative.        Objective   BSA: 1.66 meters squared  /66   Pulse 65   Temp 36.3 °C (97.3 °F) (Core)   Resp 18   Wt 59.5 kg (131 lb 2.8 oz) Comment: with shoes unable to take off  SpO2 99%   BMI 21.17 kg/m²      has a past medical history of Acute deep vein thrombosis (DVT) of left femoral vein (Multi) (09/10/2023), Acute diastolic heart failure (04/16/2023), Benign prostatic hyperplasia without lower urinary tract symptoms (01/07/2016), Bleeding hemorrhoids (03/01/2023), CAP (community acquired pneumonia) (01/18/2024), Closed nondisplaced fracture of head of left radius (09/05/2023), COVID-19 (05/21/2023), Enlarged prostate with lower urinary tract symptoms (LUTS) (03/01/2023), Fracture of bone of hip (Multi) (09/05/2023), Fracture of femoral neck, right (03/01/2023), Gastrointestinal hemorrhage (09/05/2023), Gingiva hemorrhage (09/05/2023), Gross hematuria (03/01/2023), Hemarthrosis of ankle joint (04/18/2019), Hemarthrosis of knee (05/09/2022), Hematochezia (09/05/2023), Hematoma of  lower extremity (09/05/2023), Hematoma of right thigh (03/01/2023), History of recent fall (05/31/2023), Knee effusion (09/05/2023), Knee effusion, left (03/01/2023), Lumbar pain (03/01/2023), Obstructive uropathy (04/28/2023), Orthostatic syncope (09/05/2023), Personal history of other endocrine, nutritional and metabolic disease, Pneumonia of left lower lobe due to infectious organism (03/19/2024), Pneumonia of right lung due to infectious organism (03/01/2023), 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   has a past surgical history that includes Coronary artery bypass graft.  Family History   Problem Relation Name Age of Onset    Hemophilia Mother       Oncology History    No history exists.       Ramón Flores  reports that he has never smoked. He has never used smokeless tobacco.  He Alcohol use questions deferred to the physician.  He  reports no history of drug use.    Physical Exam  Vitals reviewed.   Constitutional:       Appearance: Normal appearance. He is normal weight.      Comments: Mentally positive.  He seems to understate his difficulties.  He appears to be getting little exercise and little push to get exercise.  Hardly no physical therapy to make him able to starighten his legs.  He appears to have lost weight.  This point is a concern because with meals dependent on [employer]-on-wheels, he may not be getting adequate nutrition.  Also, there is no one there 24/7 to provide meals.  I fear a slippery slope.   HENT:      Head: Normocephalic and atraumatic.      Nose: Nose normal.      Mouth/Throat:      Mouth: Mucous membranes are moist.   Eyes:      Extraocular Movements: Extraocular movements intact.      Conjunctiva/sclera: Conjunctivae normal.      Pupils: Pupils are equal, round, and reactive to light.   Cardiovascular:       Rate and Rhythm: Normal rate. Rhythm irregular.      Pulses: Normal pulses.      Heart sounds: Normal heart sounds.      Comments: I cannot determine if the abnormal rhythm of his heart is sinus with PVCs or AF.  Pulmonary:      Effort: Pulmonary effort is normal.      Breath sounds: Normal breath sounds.   Abdominal:      Palpations: Abdomen is soft.   Musculoskeletal:      Cervical back: Normal range of motion and neck supple.      Comments: Unable to fully extend knees; wheelchair bound.  Walks with a walker hunched over.   Skin:     General: Skin is warm.   Neurological:      General: No focal deficit present.      Mental Status: He is alert and oriented to person, place, and time.   Psychiatric:         Mood and Affect: Mood normal.         Behavior: Behavior normal.     Labs:  Glucose=112; BUN=29, creatinine=1.22, GFR=54, stage 1 CRF, calcium=8.3, total bilirubin=2.2 with 0.5 direct bili; indirect bili=1.7., wopeiuxv=022, 49% iron saturation; homocysteine=19; vitamin K28=664; PT=15.6 sec; aPTT=38 sec, FVIII:C=56%    DAO=3129 with 58% PMNs and 31% lymphs, 6.2% immature granulocytes; Hgb/PCV=12/35.5%; EFC=447; platelet=62,000/ul; retic count=1.9% (not elevated)    Assessment:  There are several issues with Mr. Flores.  He has a mild pancytopenia and it is not Vit B12 deficiency.  I fear a primary BM process since he has 6.2% immature WBCs on his smear.  I will obtain myeloid malignancy and MDS studies.      He also has an elevated PT and indirect bilirubin.  The latter can be due to hemolysis.  I ordered studies on that on yesterday's specimens.  I also ordered ab studies to Hep B and C.      His low WBC and platelets could be due to either BM or liver disease.  He is only mildly anemic, but there is no recticulocytosis.    I asked for a 4 month RTC, but may need to have him back sooner to get the BM studies.      Last: I am concerned about home situation and possible lack of good nutrition this patient may  cristina Dillon        WBC   Date/Time Value Ref Range Status   04/09/2025 01:16 PM 2.6 (L) 4.4 - 11.3 x10*3/uL Final   02/04/2025 05:57 AM 4.1 (L) 4.4 - 11.3 x10*3/uL Final   02/03/2025 09:42 AM 2.6 (L) 4.4 - 11.3 x10*3/uL Final     nRBC   Date Value Ref Range Status   04/09/2025 0.0 0.0 - 0.0 /100 WBCs Final   02/04/2025 0.0 0.0 - 0.0 /100 WBCs Final   02/03/2025 0.0 0.0 - 0.0 /100 WBCs Final     RBC   Date Value Ref Range Status   04/09/2025 3.30 (L) 4.50 - 5.90 x10*6/uL Final   02/04/2025 2.99 (L) 4.50 - 5.90 x10*6/uL Final   02/03/2025 2.84 (L) 4.50 - 5.90 x10*6/uL Final     Hemoglobin   Date Value Ref Range Status   04/09/2025 12.0 (L) 13.5 - 17.5 g/dL Final   02/04/2025 10.5 (L) 13.5 - 17.5 g/dL Final   02/03/2025 10.2 (L) 13.5 - 17.5 g/dL Final     Hematocrit   Date Value Ref Range Status   04/09/2025 35.5 (L) 41.0 - 52.0 % Final   02/04/2025 31.6 (L) 41.0 - 52.0 % Final   02/03/2025 29.9 (L) 41.0 - 52.0 % Final     MCV   Date/Time Value Ref Range Status   04/09/2025 01:16  (H) 80 - 100 fL Final   02/04/2025 05:57  (H) 80 - 100 fL Final   02/03/2025 09:42  (H) 80 - 100 fL Final     MCH   Date/Time Value Ref Range Status   04/09/2025 01:16 PM 36.4 (H) 26.0 - 34.0 pg Final   02/04/2025 05:57 AM 35.1 (H) 26.0 - 34.0 pg Final   02/03/2025 09:42 AM 35.9 (H) 26.0 - 34.0 pg Final     MCHC   Date/Time Value Ref Range Status   04/09/2025 01:16 PM 33.8 32.0 - 36.0 g/dL Final   02/04/2025 05:57 AM 33.2 32.0 - 36.0 g/dL Final   02/03/2025 09:42 AM 34.1 32.0 - 36.0 g/dL Final     RDW   Date/Time Value Ref Range Status   04/09/2025 01:16 PM 17.1 (H) 11.5 - 14.5 % Final   02/04/2025 05:57 AM 18.0 (H) 11.5 - 14.5 % Final   02/03/2025 09:42 AM 18.5 (H) 11.5 - 14.5 % Final     Platelets   Date/Time Value Ref Range Status   04/09/2025 01:16 PM 61 (L) 150 - 450 x10*3/uL Final   02/04/2025 05:57 AM 68 (L) 150 - 450 x10*3/uL Final   02/03/2025 09:42 AM 54 (L) 150 - 450 x10*3/uL Final     No  "results found for: \"MPV\"  Neutrophils %   Date/Time Value Ref Range Status   02/04/2025 05:57 AM 54.4 40.0 - 80.0 % Final   02/03/2025 09:42 AM 60.1 40.0 - 80.0 % Final   10/20/2024 07:00 PM 52.7 40.0 - 80.0 % Final     Immature Granulocytes %, Automated   Date/Time Value Ref Range Status   04/09/2025 01:16 PM 6.2 (H) 0.0 - 0.9 % Final     Comment:     Immature Granulocyte Count (IG) includes promyelocytes, myelocytes and metamyelocytes but does not include bands. Percent differential counts (%) should be interpreted in the context of the absolute cell counts (cells/UL).   02/04/2025 05:57 AM 4.4 (H) 0.0 - 0.9 % Final     Comment:     Immature Granulocyte Count (IG) includes promyelocytes, myelocytes and metamyelocytes but does not include bands. Percent differential counts (%) should be interpreted in the context of the absolute cell counts (cells/UL).   02/03/2025 09:42 AM 1.5 (H) 0.0 - 0.9 % Final     Comment:     Immature Granulocyte Count (IG) includes promyelocytes, myelocytes and metamyelocytes but does not include bands. Percent differential counts (%) should be interpreted in the context of the absolute cell counts (cells/UL).     Lymphocytes %, Manual   Date/Time Value Ref Range Status   04/09/2025 01:16 PM 31.0 13.0 - 44.0 % Final   02/02/2025 09:09 AM 19.0 13.0 - 44.0 % Final   02/01/2025 06:17 AM 24.0 13.0 - 44.0 % Final     Lymphocytes %   Date/Time Value Ref Range Status   02/04/2025 05:57 AM 16.7 13.0 - 44.0 % Final   02/03/2025 09:42 AM 18.6 13.0 - 44.0 % Final     Monocytes %, Manual   Date/Time Value Ref Range Status   04/09/2025 01:16 PM 11.0 2.0 - 10.0 % Final   02/02/2025 09:09 AM 5.0 2.0 - 10.0 % Final   02/01/2025 06:17 AM 6.0 2.0 - 10.0 % Final     Monocytes %   Date/Time Value Ref Range Status   02/04/2025 05:57 AM 22.8 2.0 - 10.0 % Final   02/03/2025 09:42 AM 17.1 2.0 - 10.0 % Final     Eosinophils %, Manual   Date/Time Value Ref Range Status   04/09/2025 01:16 PM 0.0 0.0 - 6.0 % Final "   02/02/2025 09:09 AM 4.0 0.0 - 6.0 % Final   02/01/2025 06:17 AM 3.0 0.0 - 6.0 % Final     Eosinophils %   Date/Time Value Ref Range Status   02/04/2025 05:57 AM 1.5 0.0 - 6.0 % Final   02/03/2025 09:42 AM 2.3 0.0 - 6.0 % Final     Basophils %, Manual   Date/Time Value Ref Range Status   04/09/2025 01:16 PM 0.0 0.0 - 2.0 % Final   02/02/2025 09:09 AM 0.0 0.0 - 2.0 % Final   02/01/2025 06:17 AM 0.0 0.0 - 2.0 % Final     Basophils %   Date/Time Value Ref Range Status   02/04/2025 05:57 AM 0.2 0.0 - 2.0 % Final   02/03/2025 09:42 AM 0.4 0.0 - 2.0 % Final     Neutrophils Absolute   Date/Time Value Ref Range Status   02/04/2025 05:57 AM 2.25 1.60 - 5.50 x10*3/uL Final     Comment:     Percent differential counts (%) should be interpreted in the context of the absolute cell counts (cells/uL).   02/03/2025 09:42 AM 1.58 (L) 1.60 - 5.50 x10*3/uL Final     Comment:     Percent differential counts (%) should be interpreted in the context of the absolute cell counts (cells/uL).   10/20/2024 07:00 PM 2.60 1.60 - 5.50 x10*3/uL Final     Comment:     Percent differential counts (%) should be interpreted in the context of the absolute cell counts (cells/uL).     Immature Granulocytes Absolute, Automated   Date/Time Value Ref Range Status   04/09/2025 01:16 PM 0.16 0.00 - 0.50 x10*3/uL Final   02/04/2025 05:57 AM 0.18 0.00 - 0.50 x10*3/uL Final   02/03/2025 09:42 AM 0.04 0.00 - 0.50 x10*3/uL Final     Lymphocytes Absolute   Date/Time Value Ref Range Status   02/04/2025 05:57 AM 0.69 (L) 0.80 - 3.00 x10*3/uL Final   02/03/2025 09:42 AM 0.49 (L) 0.80 - 3.00 x10*3/uL Final   10/20/2024 07:00 PM 1.60 0.80 - 3.00 x10*3/uL Final     Monocytes Absolute   Date/Time Value Ref Range Status   02/04/2025 05:57 AM 0.94 (H) 0.05 - 0.80 x10*3/uL Final   02/03/2025 09:42 AM 0.45 0.05 - 0.80 x10*3/uL Final   10/20/2024 07:00 PM 0.55 0.05 - 0.80 x10*3/uL Final     Eosinophils Absolute   Date/Time Value Ref Range Status   02/04/2025 05:57 AM 0.06  "0.00 - 0.40 x10*3/uL Final   02/03/2025 09:42 AM 0.06 0.00 - 0.40 x10*3/uL Final     Eosinophils Absolute, Manual   Date/Time Value Ref Range Status   04/09/2025 01:16 PM 0.00 0.00 - 0.40 x10*3/uL Final   02/02/2025 09:09 AM 0.11 0.00 - 0.40 x10*3/uL Final   02/01/2025 06:17 AM 0.10 0.00 - 0.40 x10*3/uL Final     Basophils Absolute   Date/Time Value Ref Range Status   02/04/2025 05:57 AM 0.01 0.00 - 0.10 x10*3/uL Final   02/03/2025 09:42 AM 0.01 0.00 - 0.10 x10*3/uL Final     Basophils Absolute, Manual   Date/Time Value Ref Range Status   04/09/2025 01:16 PM 0.00 0.00 - 0.10 x10*3/uL Final   02/02/2025 09:09 AM 0.00 0.00 - 0.10 x10*3/uL Final   02/01/2025 06:17 AM 0.00 0.00 - 0.10 x10*3/uL Final       No components found for: \"PT\"  aPTT   Date/Time Value Ref Range Status   04/09/2025 01:16 PM 38 (H) 26 - 36 seconds Final   10/23/2024 08:22 AM 34 27 - 38 seconds Final   10/22/2024 07:19 AM 32 27 - 38 seconds Final       Assessment/Plan         Diagnoses and all orders for this visit:    Hemophilia A (Multi)  -     Clinic Appointment Request Follow Up; [facility]  -     aPTT; Future  -     Protime-INR; Future  -     Fibrinogen; Future  -     Factor 8 Activity; Future  -     CBC and Auto Differential; Future  -     Comprehensive Metabolic Panel; Future  -     Iron and TIBC; Future  -     Ferritin; Future  -     Bilirubin, Direct; Future  -     Slide Request; Future  -     Reticulocytes; Future  -     Methylmalonic Acid; Future  -     Homocysteine; Future  -     Vitamin B12; Future  -     Platelet Antibody, Screen; Future  -     Serum Protein Electrophoresis; Future  -     Immunoglobulins (IgG, IgA, IgM); Future  -     Ray/Lambda Free Light Chain, Serum; Future  -     Clinic Appointment Request Follow Up; [facility]; Future  -     Referral to Home Health; Future  Factor VIII deficiency (Multi)  -     Clinic Appointment Request Follow Up; [facility]  -     Clinic Appointment Request Follow Up; " [facility]; Future  Iron deficiency anemia due to chronic blood loss  -     Clinic Appointment Request Follow Up; [facility]  -     CBC and Auto Differential; Future  -     Iron and TIBC; Future  -     Ferritin; Future  Macrocytosis  -     CBC and Auto Differential; Future  -     Comprehensive Metabolic Panel; Future  -     Methylmalonic Acid; Future  -     Homocysteine; Future  -     Vitamin B12; Future  Cardiovascular disease  -     Homocysteine; Future         Lisbeth Dillon MD

## 2025-04-12 LAB
METHYLMALONATE SERPL-SCNC: 0.18 UMOL/L (ref 0–0.4)
PLATELET ANTIBODY TARGET 1 IGG RESULT: NEGATIVE
PLATELET ANTIBODY TARGET 1 IGM RESULT: NEGATIVE
PLATELET ANTIBODY TARGET 2 IGG RESULT: NEGATIVE
PLATELET ANTIBODY TARGET 2 IGM RESULT: NEGATIVE
SCAN RESULT: NORMAL

## 2025-04-18 ENCOUNTER — TELEPHONE (OUTPATIENT)
Dept: RADIATION ONCOLOGY | Facility: HOSPITAL | Age: OVER 89
End: 2025-04-18
Payer: MEDICARE

## 2025-04-18 DIAGNOSIS — C21.0 ANAL CANCER (MULTI): Primary | ICD-10-CM

## 2025-04-21 ENCOUNTER — HOSPITAL ENCOUNTER (OUTPATIENT)
Dept: RADIOLOGY | Facility: HOSPITAL | Age: OVER 89
Discharge: HOME | End: 2025-04-21
Payer: MEDICARE

## 2025-04-21 ENCOUNTER — APPOINTMENT (OUTPATIENT)
Dept: RADIATION ONCOLOGY | Facility: HOSPITAL | Age: OVER 89
End: 2025-04-21
Payer: MEDICARE

## 2025-04-21 DIAGNOSIS — C21.0 ANAL CANCER (MULTI): Primary | ICD-10-CM

## 2025-04-21 DIAGNOSIS — C21.0 ANAL CANCER (MULTI): ICD-10-CM

## 2025-04-21 LAB — GLUCOSE BLD MANUAL STRIP-MCNC: 101 MG/DL (ref 74–99)

## 2025-04-21 PROCEDURE — 78815 PET IMAGE W/CT SKULL-THIGH: CPT | Mod: PS

## 2025-04-21 PROCEDURE — 3430000001 HC RX 343 DIAGNOSTIC RADIOPHARMACEUTICALS: Performed by: RADIOLOGY

## 2025-04-21 PROCEDURE — 82947 ASSAY GLUCOSE BLOOD QUANT: CPT

## 2025-04-21 PROCEDURE — A9552 F18 FDG: HCPCS | Performed by: RADIOLOGY

## 2025-04-21 PROCEDURE — 78815 PET IMAGE W/CT SKULL-THIGH: CPT | Mod: PET TUMOR SUBSQ TX STRATEGY | Performed by: RADIOLOGY

## 2025-04-21 RX ORDER — FLUDEOXYGLUCOSE F 18 200 MCI/ML
12.9 INJECTION, SOLUTION INTRAVENOUS
Status: COMPLETED | OUTPATIENT
Start: 2025-04-21 | End: 2025-04-21

## 2025-04-21 RX ADMIN — FLUDEOXYGLUCOSE F 18 12.9 MILLICURIE: 200 INJECTION, SOLUTION INTRAVENOUS at 10:56

## 2025-04-23 ENCOUNTER — APPOINTMENT (OUTPATIENT)
Dept: OPHTHALMOLOGY | Facility: CLINIC | Age: OVER 89
End: 2025-04-23
Payer: MEDICARE

## 2025-04-25 ENCOUNTER — TELEPHONE (OUTPATIENT)
Dept: RADIATION ONCOLOGY | Facility: HOSPITAL | Age: OVER 89
End: 2025-04-25
Payer: MEDICARE

## 2025-04-28 ENCOUNTER — TELEPHONE (OUTPATIENT)
Dept: RADIATION ONCOLOGY | Facility: HOSPITAL | Age: OVER 89
End: 2025-04-28
Payer: MEDICARE

## 2025-04-29 ENCOUNTER — DOCUMENTATION (OUTPATIENT)
Dept: HOME HEALTH SERVICES | Facility: HOME HEALTH | Age: OVER 89
End: 2025-04-29

## 2025-04-29 ENCOUNTER — HOSPITAL ENCOUNTER (OUTPATIENT)
Dept: RADIATION ONCOLOGY | Facility: HOSPITAL | Age: OVER 89
Setting detail: RADIATION/ONCOLOGY SERIES
Discharge: HOME | End: 2025-04-29
Payer: MEDICARE

## 2025-04-29 VITALS
TEMPERATURE: 97 F | RESPIRATION RATE: 18 BRPM | DIASTOLIC BLOOD PRESSURE: 77 MMHG | OXYGEN SATURATION: 93 % | SYSTOLIC BLOOD PRESSURE: 127 MMHG | BODY MASS INDEX: 21.27 KG/M2 | HEART RATE: 85 BPM | WEIGHT: 131.8 LBS

## 2025-04-29 DIAGNOSIS — C21.0 ANAL CANCER (MULTI): Primary | ICD-10-CM

## 2025-04-29 DIAGNOSIS — R53.81 PHYSICAL DECONDITIONING: ICD-10-CM

## 2025-04-29 PROCEDURE — 99213 OFFICE O/P EST LOW 20 MIN: CPT | Performed by: RADIOLOGY

## 2025-04-29 PROCEDURE — G2211 COMPLEX E/M VISIT ADD ON: HCPCS | Performed by: RADIOLOGY

## 2025-04-29 ASSESSMENT — PAIN SCALES - GENERAL: PAINLEVEL_OUTOF10: 0-NO PAIN

## 2025-04-29 ASSESSMENT — ENCOUNTER SYMPTOMS
NECK STIFFNESS: 0
RECTAL PAIN: 0
MYALGIAS: 0
SCLERAL ICTERUS: 0
BLOOD IN STOOL: 0
DECREASED CONCENTRATION: 0
VOMITING: 0
DIAPHORESIS: 0
BRUISES/BLEEDS EASILY: 1
EYE PROBLEMS: 1
DIZZINESS: 0
NERVOUS/ANXIOUS: 0
FATIGUE: 0
CONFUSION: 0
CONSTIPATION: 0
DEPRESSION: 0
SPEECH DIFFICULTY: 0
FEVER: 0
NUMBNESS: 0
BACK PAIN: 0
APPETITE CHANGE: 0
NECK PAIN: 0
ABDOMINAL DISTENTION: 0
DIARRHEA: 0
CARDIOVASCULAR NEGATIVE: 1
VOICE CHANGE: 0
LIGHT-HEADEDNESS: 0
FLANK PAIN: 0
HEADACHES: 0
OCCASIONAL FEELINGS OF UNSTEADINESS: 1
CHILLS: 0
TROUBLE SWALLOWING: 0
SEIZURES: 0
SORE THROAT: 0
ARTHRALGIAS: 1
LOSS OF SENSATION IN FEET: 0
UNEXPECTED WEIGHT CHANGE: 0
RESPIRATORY NEGATIVE: 1
SLEEP DISTURBANCE: 0
NAUSEA: 0
ABDOMINAL PAIN: 0
EXTREMITY WEAKNESS: 0

## 2025-04-29 NOTE — PROGRESS NOTES
Radiation Oncology Nursing Note    Pain: The patient's current pain level was assessed.  They report currently having a pain of 0 out of 10.  They feel their pain is under control without the use of pain medications.    Review of Systems:  Review of Systems   Constitutional:  Negative for appetite change, chills, diaphoresis, fatigue, fever and unexpected weight change.   HENT:   Negative for hearing loss, mouth sores, nosebleeds, sore throat, tinnitus, trouble swallowing and voice change.    Eyes:  Positive for eye problems (wears glasses; left eye vision change). Negative for icterus.   Respiratory: Negative.     Cardiovascular: Negative.    Gastrointestinal:  Negative for abdominal distention, abdominal pain, blood in stool, constipation, diarrhea, nausea, rectal pain and vomiting.   Genitourinary: Negative.     Musculoskeletal:  Positive for arthralgias and gait problem (arthritis in knees). Negative for back pain, flank pain, myalgias, neck pain and neck stiffness.        Knee arthritis   Skin: Negative.    Neurological:  Positive for gait problem (arthritis in knees). Negative for dizziness, extremity weakness, headaches, light-headedness, numbness, seizures and speech difficulty.   Hematological:  Bruises/bleeds easily.   Psychiatric/Behavioral:  Negative for confusion, decreased concentration, depression, sleep disturbance and suicidal ideas. The patient is not nervous/anxious.

## 2025-04-29 NOTE — PROGRESS NOTES
Staff Physician: Deidra Muro MD  Referring Physician: Deidra Muro MD  Date of Service: 2025  RADIATION ONCOLOGY FOLLOW UP NOTE  IDENTIFYING DATA:   Cancer Staging   Anal cancer (Multi)  Staging form: Anus, AJCC V9  - Clinical: cT2, cNX - Unsigned    Problem List Items Addressed This Visit       Anal cancer (Multi) - Primary       Mr. 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 He completed definitive radiation (not a chemo candidate) to 54Gy/30fx on 25.  He presents today for routine interval follow-up.    INTERVAL HISTORY  Since we last saw Ramón Flores in clinic on COT, he has felt well. His weight is stable from last visit and his appetite is unchanged and excellent. He denies any significant symptoms at this time. Specifically, he denies abdominal pain, diarrhea, blood or mucus in stool, fecal urgency, stool leakage, constipation, pain with bowel movements, dysuria, or change in urinary frequency.  He has had no new medical problems or medications since our last visit.    His last imaging, comprising PET on 2025, demonstrates minimal residual uptake at the anal canal, consistent with physiologic activity and no concern for residual disease.    PAST MEDICAL, SURGICAL, FAMILY, AND SOCIAL HISTORY:  Medical History[1]  Surgical History[2]  ALLERGIES:  Allergies[3]  MEDICATIONS:  Current Medications[4]     REVIEW OF SYSTEMS:  Except for the symptoms described in the interval history, the review of systems is negative. Specifically, except as noted, when asked the patient expressed no complaints relative to constitutional (fever, weight loss), eyes, ears, nose, mouth, throat, neurologic, cardiovascular, pulmonary, breast, GI, , skin, musculoskeletal, endocrine, hematologic/lymphatic, or immunologic systems.    PERFORMANCE STATUS:  Karnofsky Performance Score/ECO, Normal activity with effort; some signs or  symptoms of disease (ECOG equivalent 1)    PHYSICAL EXAMINATION:  /77   Pulse 85   Temp 36.1 °C (97 °F) (Temporal)   Resp 18   Wt 59.8 kg (131 lb 12.8 oz)   SpO2 93%   BMI 21.27 kg/m²   Pain score: 0/10  General: no acute distress, engaged in conversation.   HEENT: Normocephalic, atraumatic. Extraocular movements are intact.   Neck: supple with trachea at midline, no palpable adenopathy.   Pulmonary: Breathing comfortably on room air, no respiratory distress  Cardiovascular: Regular rate, no cyanosis, well-perfused  Abdomen: Soft, nontender, nondistended.   Extremities: No lower extremity edema or cyanosis. Normal range of motion.  Skin: Without rash or obvious lesions.   Musculoskeletal: Normal range of motion. Able to raise both arms above head without issues  Neurologic: Alert and oriented x3. Cranial nerves grossly intact.   Anorectal exam: External exam without concerning skin lesions or visible disease.Skin tag, soft. Upon ROBBY palpable defect at the left lateral canal where his tumor used to be, 0.5cm from the verge, without residual nodularity, any firmness, or any other concerning findings      DIAGNOSTIC REPORTS REVIEWED:  Imaging: All imaging was personally reviewed and interpreted in clinic. Findings as per interval history and EMR.  Laboratory/Pathology:  All pertinent labs and pathology were personally reviewed and interpreted in clinic. Findings as per interval history and EMR.     IMPRESSION:  MR. Flores has complete clinical response of his anal cancer without residual adverse RT effects at this time.    PLAN:  I have asked Ramón Flores to please return to clinic in 3 month's time with repeat CT-CAP with CBC, and CMP at that time, to be coordinated with the multidisciplinary team. He knows to call with any questions or concerns in the interim.    PAIN PLAN: The patient reports their pain is well-controlled on their current regimen.  Their pain regimen is currently managed by  Radiation Oncology.  No uncontrolled pain.    DISEASE STATUS: Controlled  NEW METACHRONOUS CANCER: No    Thank you for the opportunity to participate in the ongoing care of this pleasant man.    Deidra Muro MD  4/29/2025  , Radiation Oncology         [1]   Past Medical History:  Diagnosis Date    Acute deep vein thrombosis (DVT) of left femoral vein (Multi) 09/10/2023    ACUTE DEEP VEIN THROMBOSIS, L FEMORAL, HEMOPHILIA    Acute diastolic heart failure 04/16/2023    Benign prostatic hyperplasia without lower urinary tract symptoms 01/07/2016    Enlarged prostate without lower urinary tract symptoms (luts)    Bleeding hemorrhoids 03/01/2023    CAP (community acquired pneumonia) 01/18/2024    Closed nondisplaced fracture of head of left radius 09/05/2023    COVID-19 05/21/2023    Enlarged prostate with lower urinary tract symptoms (LUTS) 03/01/2023    Fracture of bone of hip (Multi) 09/05/2023    Fracture of femoral neck, right 03/01/2023    Gastrointestinal hemorrhage 09/05/2023    LOWER GI BLEED    Gingiva hemorrhage 09/05/2023    Gross hematuria 03/01/2023    Hemarthrosis of ankle joint 04/18/2019    Hemarthrosis of knee 05/09/2022    Hematochezia 09/05/2023    Hematoma of lower extremity 09/05/2023    Hematoma of right thigh 03/01/2023    History of recent fall 05/31/2023    Knee effusion 09/05/2023    Knee effusion, left 03/01/2023    Lumbar pain 03/01/2023    Obstructive uropathy 04/28/2023    Orthostatic syncope 09/05/2023    Personal history of other endocrine, nutritional and metabolic disease     History of hyperlipidemia    Pneumonia of left lower lobe due to infectious organism 03/19/2024    Pneumonia of right lung due to infectious organism 03/01/2023    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    Subdural hematoma (Multi) 04/09/2023    Syncope 05/12/2023    Urinary retention 03/01/2023    UTI (urinary tract infection) 03/01/2023    Viral gastroenteritis 03/01/2023   [2]   Past Surgical  History:  Procedure Laterality Date    CORONARY ARTERY BYPASS GRAFT     [3]   Allergies  Allergen Reactions    Aspirin Bleeding     hemophiliac    Penicillins Rash   [4]   Current Outpatient Medications:     acetaminophen (Tylenol Extra Strength) 500 mg tablet, Take 2 tablets (1,000 mg) by mouth every 8 hours if needed for mild pain (1 - 3)., Disp: 60 tablet, Rfl: 11    antihemophilic RF VIII (Altuviiio) 3,000 (+/-) unit recon soln, Infuse 3,030 Units into a venous catheter every 7 days. 3030 units +/-10% dose every 7 days.  Additionally,  prn when directed., Disp: 5 each, Rfl: 12    calcium carbonate 600 mg calcium (1,500 mg) tablet, Take 1 tablet (1,500 mg) by mouth 2 times daily (morning and late afternoon)., Disp: , Rfl:     empagliflozin (Jardiance) 10 mg, Take 1 tablet (10 mg) by mouth once daily., Disp: 90 tablet, Rfl: 1    ferrous sulfate 325 (65 Fe) MG EC tablet, Take 1 tablet by mouth once daily with breakfast. Do not crush, chew, or split., Disp: , Rfl:     finasteride (Proscar) 5 mg tablet, Take 1 tablet (5 mg) by mouth once daily., Disp: 90 tablet, Rfl: 3    folic acid (Folvite) 1 mg tablet, Take 1 tablet (1 mg) by mouth once daily., Disp: , Rfl:     Lactobacillus acidoph-L.bulgar 1 million cell tablet tablet, Take 1 tablet by mouth once daily., Disp: , Rfl:     multivitamin tablet, Take 1 tablet by mouth once daily., Disp: , Rfl:     nystatin (Mycostatin) cream, Apply topically 2 times a day. apply to affected area, Disp: 30 g, Rfl: 2    pantoprazole (ProtoNix) 40 mg EC tablet, Take 1 tablet (40 mg) by mouth once daily in the morning. Take before meals. Do not crush, chew, or split., Disp: 90 tablet, Rfl: 3    spironolactone (Aldactone) 25 mg tablet, Take 0.5 tablets (12.5 mg) by mouth once daily as needed (for shortness of breath and increaed swelling)., Disp: 23 tablet, Rfl: 3    tamsulosin (Flomax) 0.4 mg 24 hr capsule, Take 1 capsule (0.4 mg) by mouth 2 times a day., Disp: 180 capsule, Rfl: 3     vit A/vit C/vit E/zinc/copper (PRESERVISION AREDS ORAL), Take 1 capsule by mouth once daily., Disp: , Rfl:     atorvastatin (Lipitor) 40 mg tablet, Take 1 tablet (40 mg) by mouth once daily., Disp: 90 tablet, Rfl: 1    bumetanide (Bumex) 0.5 mg tablet, Take 1 tablet (0.5 mg) by mouth once daily., Disp: 90 tablet, Rfl: 3    guaiFENesin (Robitussin) 100 mg/5 mL syrup, Take 10 mL (200 mg) by mouth every 4 hours if needed for congestion. (Patient not taking: Reported on 4/29/2025), Disp: 120 mL, Rfl: 0

## 2025-04-29 NOTE — HH CARE COORDINATION
Home Care received a referral for Physical Therapy and Occupational Therapy. Unfortunately, we are unable to accept and process the referral at this time.    Reason:  Patient is Active with Another Home Care Agency    Patients, please reach out to the referring provider or your PCP to assist in obtaining an alternative home care agency and/or guidance to meet your needs.    Providers, please reach out to  Home Care at [phone] with any questions regarding the declined referral.

## 2025-04-30 PROCEDURE — RXMED WILLOW AMBULATORY MEDICATION CHARGE

## 2025-05-02 ENCOUNTER — PHARMACY VISIT (OUTPATIENT)
Dept: PHARMACY | Facility: CLINIC | Age: OVER 89
End: 2025-05-02
Payer: COMMERCIAL

## 2025-05-20 DIAGNOSIS — E78.5 HYPERLIPIDEMIA, UNSPECIFIED HYPERLIPIDEMIA TYPE: ICD-10-CM

## 2025-05-20 RX ORDER — ATORVASTATIN CALCIUM 40 MG/1
40 TABLET, FILM COATED ORAL DAILY
Qty: 90 TABLET | Refills: 1 | Status: SHIPPED | OUTPATIENT
Start: 2025-05-20

## 2025-05-28 ENCOUNTER — APPOINTMENT (OUTPATIENT)
Dept: OPHTHALMOLOGY | Facility: CLINIC | Age: OVER 89
End: 2025-05-28
Payer: MEDICARE

## 2025-05-28 DIAGNOSIS — H35.30 AMD (AGE-RELATED MACULAR DEGENERATION), BILATERAL: Primary | ICD-10-CM

## 2025-05-28 DIAGNOSIS — D31.32 NEVUS OF CHOROID OF LEFT EYE: ICD-10-CM

## 2025-05-28 PROCEDURE — 99213 OFFICE O/P EST LOW 20 MIN: CPT | Performed by: OPHTHALMOLOGY

## 2025-05-28 PROCEDURE — 92134 CPTRZ OPH DX IMG PST SGM RTA: CPT | Performed by: OPHTHALMOLOGY

## 2025-05-28 ASSESSMENT — CUP TO DISC RATIO
OS_RATIO: .30
OD_RATIO: .30

## 2025-05-28 ASSESSMENT — ENCOUNTER SYMPTOMS
NEUROLOGICAL NEGATIVE: 0
ENDOCRINE NEGATIVE: 0
PSYCHIATRIC NEGATIVE: 0
CONSTITUTIONAL NEGATIVE: 0
MUSCULOSKELETAL NEGATIVE: 0
EYES NEGATIVE: 1
GASTROINTESTINAL NEGATIVE: 0
ALLERGIC/IMMUNOLOGIC NEGATIVE: 0
RESPIRATORY NEGATIVE: 0
CARDIOVASCULAR NEGATIVE: 0
HEMATOLOGIC/LYMPHATIC NEGATIVE: 0

## 2025-05-28 ASSESSMENT — VISUAL ACUITY
OS_CC: 20/200
OD_CC: 20/200
CORRECTION_TYPE: GLASSES
METHOD: SNELLEN - LINEAR
OS_PH_CC: 20/100

## 2025-05-28 ASSESSMENT — REFRACTION_WEARINGRX
OD_CYLINDER: -0.75
OD_SPHERE: +1.00
OD_ADD: +2.50
OD_AXIS: 125
OS_ADD: +2.50
OS_CYLINDER: -0.75
OS_AXIS: 031
OS_SPHERE: +3.50

## 2025-05-28 ASSESSMENT — TONOMETRY
OD_IOP_MMHG: 15
IOP_METHOD: GOLDMANN APPLANATION
OS_IOP_MMHG: 14

## 2025-05-28 ASSESSMENT — SLIT LAMP EXAM - LIDS
COMMENTS: DERMATOCHALASIS UL
COMMENTS: DERMATOCHALASIS UL

## 2025-05-28 ASSESSMENT — EXTERNAL EXAM - RIGHT EYE: OD_EXAM: NORMAL

## 2025-05-28 ASSESSMENT — EXTERNAL EXAM - LEFT EYE: OS_EXAM: NORMAL

## 2025-05-28 NOTE — PROGRESS NOTES
Exudative age-related macular degeneration, right eye, with active choroidal neovascularization (CMS/HCC)  Intermediate stage nonexudative age-related macular degeneration of left eye  Choroidal nevus, left eye  Exudative age-related macular degeneration, right eye, with active choroidal [id number]  Nonexudative age-related macular degeneration of left [id number]    OCT : 5/28/2025   OD: preserved foveal contour, central areas of hypertransmission, no SRF/IRF, +drusen and PED, retinal atrophy with RPE loss   OS: no SRF/IRF, +drusen and reticular pseudodrusen     - exam stable both eyes today  - Overlying cystic changes OD is expected with geographic atrophy, no indications for treatment  - Optometry next available for updated refraction   - RTC 6 months for DFE/OCT. If an injection is needed, will need to schedule a separate visit to coordinate with Factor VIII infusion.      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 Santoyo at [facility] after a second Eylea injection for wet AMD OD led to excessive subconj hemorrhage  - Have been coordinating with patient`s hematologist Dr. Dillon to have Factor VIII infusion in preparation for injections. Factor VIII infusions need to occur within 12 hours of injections  - s/p treatment for squamous cell carcinoma (SCC) of anus, doing well without recurrence  - Patient reports he has been receiving twice weekly infusions recently through home care  - No need for RISA injections today, no significant edema      Choroidal nevus left eye  -Stable, lesions are about 1.5DD and flat, (-) orange pigment/heme/subretinal fluid (SRF)  - Fundus photos 05/28/25   - continue regular exams

## 2025-06-09 ENCOUNTER — TELEPHONE (OUTPATIENT)
Dept: GERIATRIC MEDICINE | Facility: CLINIC | Age: OVER 89
End: 2025-06-09
Payer: MEDICARE

## 2025-06-09 ENCOUNTER — APPOINTMENT (OUTPATIENT)
Dept: CARDIOLOGY | Facility: HOSPITAL | Age: OVER 89
DRG: 871 | End: 2025-06-09
Payer: MEDICARE

## 2025-06-09 ENCOUNTER — OFFICE VISIT (OUTPATIENT)
Dept: GERIATRIC MEDICINE | Facility: CLINIC | Age: OVER 89
End: 2025-06-09
Payer: MEDICARE

## 2025-06-09 ENCOUNTER — APPOINTMENT (OUTPATIENT)
Dept: RADIOLOGY | Facility: HOSPITAL | Age: OVER 89
DRG: 871 | End: 2025-06-09
Payer: MEDICARE

## 2025-06-09 ENCOUNTER — HOSPITAL ENCOUNTER (INPATIENT)
Facility: HOSPITAL | Age: OVER 89
LOS: 1 days | Discharge: HOME | DRG: 871 | End: 2025-06-10
Attending: EMERGENCY MEDICINE | Admitting: INTERNAL MEDICINE
Payer: MEDICARE

## 2025-06-09 DIAGNOSIS — R09.89 SYMPTOMS OF UPPER RESPIRATORY INFECTION (URI): Primary | ICD-10-CM

## 2025-06-09 DIAGNOSIS — E86.0 DEHYDRATION: Primary | ICD-10-CM

## 2025-06-09 DIAGNOSIS — J06.9 ACUTE UPPER RESPIRATORY INFECTION: Primary | ICD-10-CM

## 2025-06-09 DIAGNOSIS — I48.20 ATRIAL FIBRILLATION, CHRONIC (MULTI): ICD-10-CM

## 2025-06-09 DIAGNOSIS — I50.32 CHRONIC HEART FAILURE WITH PRESERVED EJECTION FRACTION: ICD-10-CM

## 2025-06-09 DIAGNOSIS — R79.89 ELEVATED TROPONIN: ICD-10-CM

## 2025-06-09 DIAGNOSIS — R11.2 NAUSEA AND VOMITING, UNSPECIFIED VOMITING TYPE: ICD-10-CM

## 2025-06-09 LAB
ALBUMIN SERPL BCP-MCNC: 4.1 G/DL (ref 3.4–5)
ALP SERPL-CCNC: 84 U/L (ref 33–136)
ALT SERPL W P-5'-P-CCNC: 23 U/L (ref 10–52)
ANION GAP SERPL CALC-SCNC: 20 MMOL/L (ref 10–20)
APPEARANCE UR: CLEAR
AST SERPL W P-5'-P-CCNC: 25 U/L (ref 9–39)
BASOPHILS # BLD AUTO: 0.02 X10*3/UL (ref 0–0.1)
BASOPHILS NFR BLD AUTO: 0.2 %
BILIRUB DIRECT SERPL-MCNC: 0.5 MG/DL (ref 0–0.3)
BILIRUB SERPL-MCNC: 2.6 MG/DL (ref 0–1.2)
BILIRUB UR STRIP.AUTO-MCNC: NEGATIVE MG/DL
BUN SERPL-MCNC: 31 MG/DL (ref 6–23)
BURR CELLS BLD QL SMEAR: NORMAL
CALCIUM SERPL-MCNC: 8 MG/DL (ref 8.6–10.3)
CARDIAC TROPONIN I PNL SERPL HS: 249 NG/L (ref 0–20)
CARDIAC TROPONIN I PNL SERPL HS: 287 NG/L (ref 0–20)
CARDIAC TROPONIN I PNL SERPL HS: 446 NG/L (ref 0–20)
CHLORIDE SERPL-SCNC: 103 MMOL/L (ref 98–107)
CO2 SERPL-SCNC: 19 MMOL/L (ref 21–32)
COLOR UR: ABNORMAL
CREAT SERPL-MCNC: 1.19 MG/DL (ref 0.5–1.3)
DACRYOCYTES BLD QL SMEAR: NORMAL
EGFRCR SERPLBLD CKD-EPI 2021: 56 ML/MIN/1.73M*2
EOSINOPHIL # BLD AUTO: 0.01 X10*3/UL (ref 0–0.4)
EOSINOPHIL NFR BLD AUTO: 0.1 %
ERYTHROCYTE [DISTWIDTH] IN BLOOD BY AUTOMATED COUNT: 22.3 % (ref 11.5–14.5)
GLUCOSE SERPL-MCNC: 145 MG/DL (ref 74–99)
GLUCOSE UR STRIP.AUTO-MCNC: ABNORMAL MG/DL
HCT VFR BLD AUTO: 28.2 % (ref 41–52)
HGB BLD-MCNC: 9.5 G/DL (ref 13.5–17.5)
IMM GRANULOCYTES # BLD AUTO: 0.45 X10*3/UL (ref 0–0.5)
IMM GRANULOCYTES NFR BLD AUTO: 4.2 % (ref 0–0.9)
INR PPP: 1.7 (ref 0.9–1.1)
KETONES UR STRIP.AUTO-MCNC: NEGATIVE MG/DL
LACTATE SERPL-SCNC: 1.8 MMOL/L (ref 0.4–2)
LACTATE SERPL-SCNC: 5.4 MMOL/L (ref 0.4–2)
LEUKOCYTE ESTERASE UR QL STRIP.AUTO: NEGATIVE
LIPASE SERPL-CCNC: 7 U/L (ref 9–82)
LYMPHOCYTES # BLD AUTO: 0.84 X10*3/UL (ref 0.8–3)
LYMPHOCYTES NFR BLD AUTO: 7.8 %
MCH RBC QN AUTO: 34.8 PG (ref 26–34)
MCHC RBC AUTO-ENTMCNC: 33.7 G/DL (ref 32–36)
MCV RBC AUTO: 103 FL (ref 80–100)
MONOCYTES # BLD AUTO: 1.95 X10*3/UL (ref 0.05–0.8)
MONOCYTES NFR BLD AUTO: 18 %
NEUTROPHILS # BLD AUTO: 7.55 X10*3/UL (ref 1.6–5.5)
NEUTROPHILS NFR BLD AUTO: 69.7 %
NITRITE UR QL STRIP.AUTO: NEGATIVE
NRBC BLD-RTO: 0 /100 WBCS (ref 0–0)
PH UR STRIP.AUTO: 5.5 [PH]
PLATELET # BLD AUTO: 53 X10*3/UL (ref 150–450)
POLYCHROMASIA BLD QL SMEAR: NORMAL
POTASSIUM SERPL-SCNC: 3.5 MMOL/L (ref 3.5–5.3)
PROT SERPL-MCNC: 6.7 G/DL (ref 6.4–8.2)
PROT UR STRIP.AUTO-MCNC: ABNORMAL MG/DL
PROTHROMBIN TIME: 18.7 SECONDS (ref 9.8–12.4)
RBC # BLD AUTO: 2.73 X10*6/UL (ref 4.5–5.9)
RBC # UR STRIP.AUTO: NEGATIVE MG/DL
RBC #/AREA URNS AUTO: NORMAL /HPF
RBC MORPH BLD: NORMAL
SARS-COV-2 RNA RESP QL NAA+PROBE: NOT DETECTED
SCHISTOCYTES BLD QL SMEAR: NORMAL
SODIUM SERPL-SCNC: 138 MMOL/L (ref 136–145)
SP GR UR STRIP.AUTO: <1.005
UROBILINOGEN UR STRIP.AUTO-MCNC: NORMAL MG/DL
WBC # BLD AUTO: 10.8 X10*3/UL (ref 4.4–11.3)
WBC #/AREA URNS AUTO: NORMAL /HPF

## 2025-06-09 PROCEDURE — 99285 EMERGENCY DEPT VISIT HI MDM: CPT | Mod: 25 | Performed by: EMERGENCY MEDICINE

## 2025-06-09 PROCEDURE — 71045 X-RAY EXAM CHEST 1 VIEW: CPT

## 2025-06-09 PROCEDURE — 3075F SYST BP GE 130 - 139MM HG: CPT | Performed by: CLINICAL NURSE SPECIALIST

## 2025-06-09 PROCEDURE — 2500000002 HC RX 250 W HCPCS SELF ADMINISTERED DRUGS (ALT 637 FOR MEDICARE OP, ALT 636 FOR OP/ED): Performed by: INTERNAL MEDICINE

## 2025-06-09 PROCEDURE — 96365 THER/PROPH/DIAG IV INF INIT: CPT

## 2025-06-09 PROCEDURE — 2500000004 HC RX 250 GENERAL PHARMACY W/ HCPCS (ALT 636 FOR OP/ED): Performed by: INTERNAL MEDICINE

## 2025-06-09 PROCEDURE — 83690 ASSAY OF LIPASE: CPT | Performed by: EMERGENCY MEDICINE

## 2025-06-09 PROCEDURE — 36415 COLL VENOUS BLD VENIPUNCTURE: CPT | Performed by: EMERGENCY MEDICINE

## 2025-06-09 PROCEDURE — 3079F DIAST BP 80-89 MM HG: CPT | Performed by: CLINICAL NURSE SPECIALIST

## 2025-06-09 PROCEDURE — 99222 1ST HOSP IP/OBS MODERATE 55: CPT | Performed by: INTERNAL MEDICINE

## 2025-06-09 PROCEDURE — 87631 RESP VIRUS 3-5 TARGETS: CPT | Mod: AHULAB | Performed by: NURSE PRACTITIONER

## 2025-06-09 PROCEDURE — 96361 HYDRATE IV INFUSION ADD-ON: CPT

## 2025-06-09 PROCEDURE — 84075 ASSAY ALKALINE PHOSPHATASE: CPT | Performed by: EMERGENCY MEDICINE

## 2025-06-09 PROCEDURE — 99348 HOME/RES VST EST LOW MDM 30: CPT | Performed by: CLINICAL NURSE SPECIALIST

## 2025-06-09 PROCEDURE — 87075 CULTR BACTERIA EXCEPT BLOOD: CPT | Mod: AHULAB | Performed by: EMERGENCY MEDICINE

## 2025-06-09 PROCEDURE — 87635 SARS-COV-2 COVID-19 AMP PRB: CPT | Performed by: EMERGENCY MEDICINE

## 2025-06-09 PROCEDURE — 2550000001 HC RX 255 CONTRASTS: Performed by: EMERGENCY MEDICINE

## 2025-06-09 PROCEDURE — 1159F MED LIST DOCD IN RCRD: CPT | Performed by: CLINICAL NURSE SPECIALIST

## 2025-06-09 PROCEDURE — 96375 TX/PRO/DX INJ NEW DRUG ADDON: CPT

## 2025-06-09 PROCEDURE — 2500000001 HC RX 250 WO HCPCS SELF ADMINISTERED DRUGS (ALT 637 FOR MEDICARE OP): Performed by: INTERNAL MEDICINE

## 2025-06-09 PROCEDURE — 81001 URINALYSIS AUTO W/SCOPE: CPT | Performed by: EMERGENCY MEDICINE

## 2025-06-09 PROCEDURE — 74177 CT ABD & PELVIS W/CONTRAST: CPT

## 2025-06-09 PROCEDURE — 85610 PROTHROMBIN TIME: CPT | Performed by: EMERGENCY MEDICINE

## 2025-06-09 PROCEDURE — 87637 SARSCOV2&INF A&B&RSV AMP PRB: CPT | Performed by: NURSE PRACTITIONER

## 2025-06-09 PROCEDURE — G2211 COMPLEX E/M VISIT ADD ON: HCPCS | Performed by: CLINICAL NURSE SPECIALIST

## 2025-06-09 PROCEDURE — 93005 ELECTROCARDIOGRAM TRACING: CPT

## 2025-06-09 PROCEDURE — 2500000004 HC RX 250 GENERAL PHARMACY W/ HCPCS (ALT 636 FOR OP/ED): Performed by: EMERGENCY MEDICINE

## 2025-06-09 PROCEDURE — 80048 BASIC METABOLIC PNL TOTAL CA: CPT | Performed by: EMERGENCY MEDICINE

## 2025-06-09 PROCEDURE — 87040 BLOOD CULTURE FOR BACTERIA: CPT | Mod: AHULAB | Performed by: EMERGENCY MEDICINE

## 2025-06-09 PROCEDURE — 71045 X-RAY EXAM CHEST 1 VIEW: CPT | Performed by: RADIOLOGY

## 2025-06-09 PROCEDURE — 74177 CT ABD & PELVIS W/CONTRAST: CPT | Mod: FOREIGN READ | Performed by: RADIOLOGY

## 2025-06-09 PROCEDURE — 83605 ASSAY OF LACTIC ACID: CPT | Performed by: EMERGENCY MEDICINE

## 2025-06-09 PROCEDURE — 96367 TX/PROPH/DG ADDL SEQ IV INF: CPT

## 2025-06-09 PROCEDURE — 2500000004 HC RX 250 GENERAL PHARMACY W/ HCPCS (ALT 636 FOR OP/ED)

## 2025-06-09 PROCEDURE — 84484 ASSAY OF TROPONIN QUANT: CPT | Performed by: EMERGENCY MEDICINE

## 2025-06-09 PROCEDURE — 1036F TOBACCO NON-USER: CPT | Performed by: CLINICAL NURSE SPECIALIST

## 2025-06-09 PROCEDURE — 85025 COMPLETE CBC W/AUTO DIFF WBC: CPT | Performed by: EMERGENCY MEDICINE

## 2025-06-09 PROCEDURE — 99291 CRITICAL CARE FIRST HOUR: CPT | Performed by: EMERGENCY MEDICINE

## 2025-06-09 PROCEDURE — 1200000002 HC GENERAL ROOM WITH TELEMETRY DAILY

## 2025-06-09 RX ORDER — CEFEPIME HYDROCHLORIDE 1 G/50ML
1 INJECTION, SOLUTION INTRAVENOUS EVERY 12 HOURS
Status: DISCONTINUED | OUTPATIENT
Start: 2025-06-10 | End: 2025-06-10

## 2025-06-09 RX ORDER — SPIRONOLACTONE 25 MG/1
12.5 TABLET ORAL DAILY PRN
Status: DISCONTINUED | OUTPATIENT
Start: 2025-06-09 | End: 2025-06-10 | Stop reason: HOSPADM

## 2025-06-09 RX ORDER — METRONIDAZOLE 500 MG/100ML
500 INJECTION, SOLUTION INTRAVENOUS EVERY 8 HOURS
Status: DISCONTINUED | OUTPATIENT
Start: 2025-06-10 | End: 2025-06-10

## 2025-06-09 RX ORDER — ACETAMINOPHEN 650 MG/1
650 SUPPOSITORY RECTAL EVERY 4 HOURS PRN
Status: DISCONTINUED | OUTPATIENT
Start: 2025-06-09 | End: 2025-06-10 | Stop reason: HOSPADM

## 2025-06-09 RX ORDER — FERROUS SULFATE 325(65) MG
65 TABLET ORAL
Status: DISCONTINUED | OUTPATIENT
Start: 2025-06-10 | End: 2025-06-10 | Stop reason: HOSPADM

## 2025-06-09 RX ORDER — PANTOPRAZOLE SODIUM 40 MG/1
40 TABLET, DELAYED RELEASE ORAL
Status: DISCONTINUED | OUTPATIENT
Start: 2025-06-10 | End: 2025-06-10 | Stop reason: HOSPADM

## 2025-06-09 RX ORDER — ONDANSETRON 4 MG/1
4 TABLET, FILM COATED ORAL EVERY 8 HOURS PRN
Status: DISCONTINUED | OUTPATIENT
Start: 2025-06-09 | End: 2025-06-10 | Stop reason: HOSPADM

## 2025-06-09 RX ORDER — ONDANSETRON HYDROCHLORIDE 2 MG/ML
4 INJECTION, SOLUTION INTRAVENOUS EVERY 8 HOURS PRN
Status: DISCONTINUED | OUTPATIENT
Start: 2025-06-09 | End: 2025-06-10 | Stop reason: HOSPADM

## 2025-06-09 RX ORDER — ACETAMINOPHEN 160 MG/5ML
650 SOLUTION ORAL EVERY 4 HOURS PRN
Status: DISCONTINUED | OUTPATIENT
Start: 2025-06-09 | End: 2025-06-10 | Stop reason: HOSPADM

## 2025-06-09 RX ORDER — NYSTATIN 100000 U/G
CREAM TOPICAL 2 TIMES DAILY
Status: DISCONTINUED | OUTPATIENT
Start: 2025-06-10 | End: 2025-06-10 | Stop reason: HOSPADM

## 2025-06-09 RX ORDER — CEFDINIR 300 MG/1
300 CAPSULE ORAL 2 TIMES DAILY
Qty: 10 CAPSULE | Refills: 0 | Status: ON HOLD | OUTPATIENT
Start: 2025-06-09 | End: 2025-06-14

## 2025-06-09 RX ORDER — SODIUM CHLORIDE 9 MG/ML
75 INJECTION, SOLUTION INTRAVENOUS CONTINUOUS
Status: DISCONTINUED | OUTPATIENT
Start: 2025-06-09 | End: 2025-06-10 | Stop reason: HOSPADM

## 2025-06-09 RX ORDER — FINASTERIDE 5 MG/1
5 TABLET, FILM COATED ORAL DAILY
Status: DISCONTINUED | OUTPATIENT
Start: 2025-06-10 | End: 2025-06-10 | Stop reason: HOSPADM

## 2025-06-09 RX ORDER — ONDANSETRON HYDROCHLORIDE 2 MG/ML
4 INJECTION, SOLUTION INTRAVENOUS ONCE
Status: COMPLETED | OUTPATIENT
Start: 2025-06-09 | End: 2025-06-09

## 2025-06-09 RX ORDER — TAMSULOSIN HYDROCHLORIDE 0.4 MG/1
0.4 CAPSULE ORAL 2 TIMES DAILY
Status: DISCONTINUED | OUTPATIENT
Start: 2025-06-09 | End: 2025-06-10 | Stop reason: HOSPADM

## 2025-06-09 RX ORDER — ATORVASTATIN CALCIUM 40 MG/1
40 TABLET, FILM COATED ORAL DAILY
Status: DISCONTINUED | OUTPATIENT
Start: 2025-06-10 | End: 2025-06-10 | Stop reason: HOSPADM

## 2025-06-09 RX ORDER — ACETAMINOPHEN 325 MG/1
975 TABLET ORAL ONCE
Status: DISCONTINUED | OUTPATIENT
Start: 2025-06-09 | End: 2025-06-10 | Stop reason: HOSPADM

## 2025-06-09 RX ORDER — ONDANSETRON HYDROCHLORIDE 2 MG/ML
INJECTION, SOLUTION INTRAVENOUS
Status: COMPLETED
Start: 2025-06-09 | End: 2025-06-09

## 2025-06-09 RX ORDER — METRONIDAZOLE 500 MG/100ML
500 INJECTION, SOLUTION INTRAVENOUS ONCE
Status: COMPLETED | OUTPATIENT
Start: 2025-06-09 | End: 2025-06-09

## 2025-06-09 RX ORDER — PANTOPRAZOLE SODIUM 40 MG/10ML
40 INJECTION, POWDER, LYOPHILIZED, FOR SOLUTION INTRAVENOUS
Status: DISCONTINUED | OUTPATIENT
Start: 2025-06-10 | End: 2025-06-10 | Stop reason: HOSPADM

## 2025-06-09 RX ORDER — ACETAMINOPHEN 325 MG/1
650 TABLET ORAL EVERY 4 HOURS PRN
Status: DISCONTINUED | OUTPATIENT
Start: 2025-06-09 | End: 2025-06-10 | Stop reason: HOSPADM

## 2025-06-09 RX ORDER — FOLIC ACID 1 MG/1
1 TABLET ORAL DAILY
Status: DISCONTINUED | OUTPATIENT
Start: 2025-06-09 | End: 2025-06-10 | Stop reason: HOSPADM

## 2025-06-09 RX ORDER — CALCIUM CARBONATE 500(1250)
1250 TABLET ORAL
Status: DISCONTINUED | OUTPATIENT
Start: 2025-06-10 | End: 2025-06-10 | Stop reason: HOSPADM

## 2025-06-09 RX ORDER — BUMETANIDE 1 MG/1
0.5 TABLET ORAL DAILY
Status: DISCONTINUED | OUTPATIENT
Start: 2025-06-10 | End: 2025-06-10 | Stop reason: HOSPADM

## 2025-06-09 RX ADMIN — TAMSULOSIN HYDROCHLORIDE 0.4 MG: 0.4 CAPSULE ORAL at 21:42

## 2025-06-09 RX ADMIN — SODIUM CHLORIDE, SODIUM LACTATE, POTASSIUM CHLORIDE, AND CALCIUM CHLORIDE 1000 ML: .6; .31; .03; .02 INJECTION, SOLUTION INTRAVENOUS at 17:14

## 2025-06-09 RX ADMIN — IOHEXOL 75 ML: 350 INJECTION, SOLUTION INTRAVENOUS at 16:02

## 2025-06-09 RX ADMIN — ONDANSETRON HYDROCHLORIDE 4 MG: 2 INJECTION, SOLUTION INTRAVENOUS at 15:15

## 2025-06-09 RX ADMIN — SODIUM CHLORIDE 500 ML: 0.9 INJECTION, SOLUTION INTRAVENOUS at 15:17

## 2025-06-09 RX ADMIN — SODIUM CHLORIDE 75 ML/HR: 0.9 INJECTION, SOLUTION INTRAVENOUS at 22:36

## 2025-06-09 RX ADMIN — METRONIDAZOLE 500 MG: 500 INJECTION, SOLUTION INTRAVENOUS at 17:14

## 2025-06-09 RX ADMIN — ONDANSETRON 4 MG: 2 INJECTION, SOLUTION INTRAMUSCULAR; INTRAVENOUS at 15:15

## 2025-06-09 RX ADMIN — CEFEPIME 2 G: 2 INJECTION, POWDER, FOR SOLUTION INTRAVENOUS at 15:16

## 2025-06-09 RX ADMIN — FOLIC ACID 1 MG: 1 TABLET ORAL at 21:42

## 2025-06-09 SDOH — SOCIAL STABILITY: SOCIAL INSECURITY: WITHIN THE LAST YEAR, HAVE YOU BEEN HUMILIATED OR EMOTIONALLY ABUSED IN OTHER WAYS BY YOUR PARTNER OR EX-PARTNER?: NO

## 2025-06-09 SDOH — ECONOMIC STABILITY: FOOD INSECURITY: WITHIN THE PAST 12 MONTHS, THE FOOD YOU BOUGHT JUST DIDN'T LAST AND YOU DIDN'T HAVE MONEY TO GET MORE.: NEVER TRUE

## 2025-06-09 SDOH — SOCIAL STABILITY: SOCIAL INSECURITY: WITHIN THE LAST YEAR, HAVE YOU BEEN AFRAID OF YOUR PARTNER OR EX-PARTNER?: NO

## 2025-06-09 SDOH — ECONOMIC STABILITY: INCOME INSECURITY: IN THE PAST 12 MONTHS HAS THE ELECTRIC, GAS, OIL, OR WATER COMPANY THREATENED TO SHUT OFF SERVICES IN YOUR HOME?: NO

## 2025-06-09 SDOH — SOCIAL STABILITY: SOCIAL INSECURITY: WERE YOU ABLE TO COMPLETE ALL THE BEHAVIORAL HEALTH SCREENINGS?: YES

## 2025-06-09 SDOH — SOCIAL STABILITY: SOCIAL INSECURITY: ARE YOU OR HAVE YOU BEEN THREATENED OR ABUSED PHYSICALLY, EMOTIONALLY, OR SEXUALLY BY ANYONE?: NO

## 2025-06-09 SDOH — SOCIAL STABILITY: SOCIAL INSECURITY: HAVE YOU HAD THOUGHTS OF HARMING ANYONE ELSE?: NO

## 2025-06-09 SDOH — SOCIAL STABILITY: SOCIAL INSECURITY: DO YOU FEEL UNSAFE GOING BACK TO THE PLACE WHERE YOU ARE LIVING?: NO

## 2025-06-09 SDOH — SOCIAL STABILITY: SOCIAL INSECURITY: ABUSE: ADULT

## 2025-06-09 SDOH — ECONOMIC STABILITY: FOOD INSECURITY: WITHIN THE PAST 12 MONTHS, YOU WORRIED THAT YOUR FOOD WOULD RUN OUT BEFORE YOU GOT THE MONEY TO BUY MORE.: NEVER TRUE

## 2025-06-09 SDOH — SOCIAL STABILITY: SOCIAL INSECURITY: DOES ANYONE TRY TO KEEP YOU FROM HAVING/CONTACTING OTHER FRIENDS OR DOING THINGS OUTSIDE YOUR HOME?: NO

## 2025-06-09 SDOH — SOCIAL STABILITY: SOCIAL INSECURITY: HAS ANYONE EVER THREATENED TO HURT YOUR FAMILY OR YOUR PETS?: NO

## 2025-06-09 SDOH — SOCIAL STABILITY: SOCIAL INSECURITY: HAVE YOU HAD ANY THOUGHTS OF HARMING ANYONE ELSE?: NO

## 2025-06-09 SDOH — SOCIAL STABILITY: SOCIAL INSECURITY: DO YOU FEEL ANYONE HAS EXPLOITED OR TAKEN ADVANTAGE OF YOU FINANCIALLY OR OF YOUR PERSONAL PROPERTY?: NO

## 2025-06-09 SDOH — SOCIAL STABILITY: SOCIAL INSECURITY: ARE THERE ANY APPARENT SIGNS OF INJURIES/BEHAVIORS THAT COULD BE RELATED TO ABUSE/NEGLECT?: NO

## 2025-06-09 ASSESSMENT — ENCOUNTER SYMPTOMS
DIARRHEA: 1
APPETITE CHANGE: 1
ACTIVITY CHANGE: 1
ALLERGIC/IMMUNOLOGIC NEGATIVE: 1
PSYCHIATRIC NEGATIVE: 1
ENDOCRINE NEGATIVE: 1
CARDIOVASCULAR NEGATIVE: 1
NEUROLOGICAL NEGATIVE: 1
NAUSEA: 1
FATIGUE: 1
EYES NEGATIVE: 1
VOMITING: 1
RESPIRATORY NEGATIVE: 1
MUSCULOSKELETAL NEGATIVE: 1
HEMATOLOGIC/LYMPHATIC NEGATIVE: 1

## 2025-06-09 ASSESSMENT — ACTIVITIES OF DAILY LIVING (ADL)
PATIENT'S MEMORY ADEQUATE TO SAFELY COMPLETE DAILY ACTIVITIES?: YES
TOILETING: NEEDS ASSISTANCE
DRESSING YOURSELF: INDEPENDENT
LACK_OF_TRANSPORTATION: NO
ASSISTIVE_DEVICE: WALKER;WHEELCHAIR
JUDGMENT_ADEQUATE_SAFELY_COMPLETE_DAILY_ACTIVITIES: YES
FEEDING YOURSELF: INDEPENDENT
GROOMING: INDEPENDENT
WALKS IN HOME: NEEDS ASSISTANCE
ADEQUATE_TO_COMPLETE_ADL: YES
BATHING: NEEDS ASSISTANCE
HEARING - RIGHT EAR: DIFFICULTY WITH NOISE
HEARING - LEFT EAR: DIFFICULTY WITH NOISE

## 2025-06-09 ASSESSMENT — COGNITIVE AND FUNCTIONAL STATUS - GENERAL
STANDING UP FROM CHAIR USING ARMS: A LITTLE
TURNING FROM BACK TO SIDE WHILE IN FLAT BAD: A LITTLE
CLIMB 3 TO 5 STEPS WITH RAILING: A LOT
TOILETING: A LITTLE
MOVING TO AND FROM BED TO CHAIR: A LITTLE
MOBILITY SCORE: 17
DRESSING REGULAR LOWER BODY CLOTHING: A LITTLE
PATIENT BASELINE BEDBOUND: NO
HELP NEEDED FOR BATHING: A LITTLE
DRESSING REGULAR UPPER BODY CLOTHING: A LITTLE
MOVING FROM LYING ON BACK TO SITTING ON SIDE OF FLAT BED WITH BEDRAILS: A LITTLE
DAILY ACTIVITIY SCORE: 20
WALKING IN HOSPITAL ROOM: A LITTLE

## 2025-06-09 ASSESSMENT — PATIENT HEALTH QUESTIONNAIRE - PHQ9
2. FEELING DOWN, DEPRESSED OR HOPELESS: NOT AT ALL
1. LITTLE INTEREST OR PLEASURE IN DOING THINGS: NOT AT ALL
SUM OF ALL RESPONSES TO PHQ9 QUESTIONS 1 & 2: 0

## 2025-06-09 ASSESSMENT — LIFESTYLE VARIABLES
AUDIT-C TOTAL SCORE: 0
HOW OFTEN DO YOU HAVE 6 OR MORE DRINKS ON ONE OCCASION: NEVER
HOW MANY STANDARD DRINKS CONTAINING ALCOHOL DO YOU HAVE ON A TYPICAL DAY: PATIENT DOES NOT DRINK
AUDIT-C TOTAL SCORE: 0
SKIP TO QUESTIONS 9-10: 1
HOW OFTEN DO YOU HAVE A DRINK CONTAINING ALCOHOL: NEVER

## 2025-06-09 ASSESSMENT — PAIN SCALES - GENERAL
PAINLEVEL_OUTOF10: 0 - NO PAIN

## 2025-06-09 ASSESSMENT — PAIN - FUNCTIONAL ASSESSMENT
PAIN_FUNCTIONAL_ASSESSMENT: 0-10
PAIN_FUNCTIONAL_ASSESSMENT: 0-10

## 2025-06-09 NOTE — ED TRIAGE NOTES
Pt biba from home for [id number] and vomiting x3days. Pt has a home health aid that told ems that the pt has been vomiting since she arrived this morning. Pt lives alone, pt has a pmh of hemophilia, open heart surgery

## 2025-06-09 NOTE — ED PROVIDER NOTES
History/Exam limitations: none.   Additional history was obtained from patient and past medical records.          HPI:    Ramón Flores is a 89 y.o. male PMH severe hemophilia A, vitamin C deficiency, A-fib not on anticoagulation due to his hemophilia presenting for evaluation of nausea vomiting diarrhea.  Patient states that he had numerous episodes of nausea vomiting diarrhea over the last day since 9 AM.  Started today.  No abdominal pain.  Nursing staff noticed some yellowing of the skin in his face on arrival.  No chest pain or shortness of breath.  States he feels somewhat fatigued.  Headache vision changes.  Emesis and stool have been nonbloody.  Denies any recent antibiotic use or travel.         Physical Exam:  ED Triage Vitals   Temperature Heart Rate Respirations BP   06/09/25 1426 06/09/25 1433 06/09/25 1433 06/09/25 1433   37.9 °C (100.2 °F) 88 18 129/73      Pulse Ox Temp Source Heart Rate Source Patient Position   06/09/25 1433 06/09/25 1426 06/09/25 1433 --   (!) 93 % Oral Monitor       BP Location FiO2 (%)     -- --              GEN:      Alert, NAD  Eyes:       PERRL, EOMI  HENT:      NC/AT, OP clear, airway patent, MM  CV:      RRR, no MRG, no LE pitting edema, 2+ radial and pedal pulses  PULM:     Scant crackles in right lower lung field, easy WOB, symmetric chest rise  ABD:      Soft, NT, ND, no masses, BS +  :       No CVA TTP  NEURO:   A&Ox3, no focal deficits    MSK:      FROM, no joint deformities or swelling, no e/o trauma  SKIN:       Warm and dry  PSYCH:    Appropriate mood and affect         MDM/ED Course:   Ramón Flores is a 89 y.o. male PMH severe hemophilia A, vitamin C deficiency, A-fib not on anticoagulation due to his hemophilia presenting for evaluation of nausea vomiting diarrhea.  Initial assessment vitals remarkable for temp 100.2, heart rate 80s.  Exam as documented above.  Differential is broad and includes viral syndrome, foodborne illness, diverticulitis, C.  difficile colitis, gastroenteritis.  Differential includes ACS however less likely given no chest pain, upper and lower GI symptoms.  Differential includes pancreatitis.  Patient denies any bloody stools.  IV placed and labs drawn.  Lactate 5.4.  Sepsis alert was initiated given borderline fever, suspect lactate may be related to dehydration in setting of significant GI losses.  Patient was covered with broad-spectrum antibiotics including cefepime, Flagyl and blood cultures were obtained.  30 cc/kg IV fluids were deferred given patient is reporting a CHF history and hemodynamically stable.  CBC with no leukocytosis however left shift is present.  Hemoglobin 9.5, baseline between 10 and 12.  Denies any bloody stools, black stools, blood in emesis.  COVID-negative.  Lipase negative unlikely pancreatitis.  Attic function panel shows a bilirubin total of 2.6 from last 2.2 direct bili of 0.5 consistent with 2 months ago.  Chemistry displays baseline renal function, bicarb of 19 likely related to lactic acid elevation.  Following 1.5 cc IV fluid patient's lactate improved to 1.8.  Blood pressure stable.  Chest x-ray with possible consolidation versus atelectasis in right infrahilar region, possible atelectasis versus infiltrate in the left basilar region.  There is concern for possible aspiration component in the setting of recurrent vomiting as patient did develop with oxygen requirement.  Was previously covered with cefepime and Flagyl.  CT abdomen pelvis with IV contrast obtained and no acute process per radiology read.  Discussed patient's hemophilia, patient adamantly that he would not like to be transferred to [facility] if possible.  They state that they have performed his factor infusions during hospitalizations here at [facility] multiple times in the past and are able to bring it in from home.  Remainder as below:     ED Course as of 06/12/25 1022   Mon Jun 09, 2025 1926 Patient's troponin uptrending to 446 from 287.   Patient has no chest pain.  No longer nauseous.  His lactic acid improved to 1.8 from 5.4 on arrival after 1.5 L IV fluids.  Does have a oxygen requirement of 2 L.  Concern for some component of aspiration pneumonitis.  Was covered with broad-spectrum antibiotics.  Has a history of hemophilia, discussed uptrending troponin via messenger with cardiology Dr. Cabello and agrees with deferring heparin and aspirin in the setting of uptrending troponin given patient's hemophilia history, suspected demand in the setting of GI losses.  Patient is not interested in transfer and would like to stay here at [facility].  He states that he stayed here multiple times in the past and they have even been able to provide his factor infusions here that they bring in from home.  Consider the possibility of euglycemic DKA, patient has no ketones in urine, unlikely.  Suspect that patient's bicarb of 19 and gap was related to having a lactate of 5. [JM]      ED Course User Index  [JM] Chance Brenner MD         Diagnoses as of 06/12/25 1022   Dehydration   Nausea and vomiting, unspecified vomiting type   Elevated troponin     Patient was accepted by admitting provider for continued management in stable condition.    EKG reviewed by me: 2:34 PM atrial fibrillation versus flutter, rate 84, left axis, normal QRS and QTc intervals, no STEMI, PVC present with appropriate discordance, similar to prior EKGs.    SEP-1 CORE MEASURE DATA    A targeted fluid bolus of less than 30ml/kg actual body weight, due to Concern for CHF and blood pressure stable and lactic acid elevation resolved with 1.5 L IV fluid.  Targeted fluid bolus of 1500cc was given.    Suspected infection source   gastroenteritis, possible aspiration pneumonitis/PNA        I performed a sepsis reperfusion exam on [facility] on 06/12/25 at 1630.      Chronic medical conditions affecting care listed in MDM. I independently reviewed imaging studies and agreed with radiology reads.  I reviewed recent and relevant outside records including PCP notes, Prior discharge summaries, and prior radiology reports.    Procedure  Critical Care    Performed by: Chance Brenner MD  Authorized by: Chance Brenner MD    Critical care provider statement:     Critical care time (minutes):  34    Critical care time was exclusive of:  Separately billable procedures and treating other patients    Critical care was necessary to treat or prevent imminent or life-threatening deterioration of the following conditions:  Sepsis and respiratory failure    Critical care was time spent personally by me on the following activities:  Development of treatment plan with patient or surrogate, evaluation of patient's response to treatment, examination of patient, obtaining history from patient or surrogate, ordering and performing treatments and interventions, ordering and review of laboratory studies, ordering and review of radiographic studies, pulse oximetry, re-evaluation of patient's condition and review of old charts    Care discussed with: admitting provider        Diagnosis:   1.  Gastroenteritis  2.  Dehydration  3.  Elevated troponin  4.  Suspected aspiration  5.  Hypoxia  6.  Elevated lactic acid  7.  History of hemophilia A    Dispo: Hospitalized in stable condition      Disclaimer: Portions of this note were dictated by speech recognition. An attempt at proof reading was made to minimize errors. Minor errors in transcription may be present.  Please call if questions.     Chance Brenner MD  06/12/25 2363

## 2025-06-10 ENCOUNTER — HOSPITAL ENCOUNTER (INPATIENT)
Facility: HOSPITAL | Age: OVER 89
DRG: 813 | End: 2025-06-10
Attending: HOSPITALIST | Admitting: NURSE PRACTITIONER
Payer: MEDICARE

## 2025-06-10 ENCOUNTER — APPOINTMENT (OUTPATIENT)
Dept: HEMATOLOGY/ONCOLOGY | Facility: HOSPITAL | Age: OVER 89
DRG: 813 | End: 2025-06-10
Payer: MEDICARE

## 2025-06-10 VITALS
TEMPERATURE: 97.6 F | SYSTOLIC BLOOD PRESSURE: 138 MMHG | RESPIRATION RATE: 16 BRPM | DIASTOLIC BLOOD PRESSURE: 80 MMHG | HEART RATE: 84 BPM

## 2025-06-10 VITALS
DIASTOLIC BLOOD PRESSURE: 60 MMHG | RESPIRATION RATE: 16 BRPM | SYSTOLIC BLOOD PRESSURE: 102 MMHG | TEMPERATURE: 97.5 F | HEART RATE: 67 BPM | BODY MASS INDEX: 21.05 KG/M2 | HEIGHT: 66 IN | OXYGEN SATURATION: 99 % | WEIGHT: 131 LBS

## 2025-06-10 DIAGNOSIS — J18.9 PNEUMONIA OF BOTH LOWER LOBES DUE TO INFECTIOUS ORGANISM: ICD-10-CM

## 2025-06-10 DIAGNOSIS — R09.02 HYPOXIA: ICD-10-CM

## 2025-06-10 DIAGNOSIS — D66 HEMOPHILIA A (MULTI): Primary | ICD-10-CM

## 2025-06-10 DIAGNOSIS — I50.32 CHRONIC HEART FAILURE WITH PRESERVED EJECTION FRACTION: ICD-10-CM

## 2025-06-10 DIAGNOSIS — D69.6 THROMBOCYTOPENIA: ICD-10-CM

## 2025-06-10 DIAGNOSIS — D66 FACTOR VIII DEFICIENCY (MULTI): ICD-10-CM

## 2025-06-10 LAB
ABO GROUP (TYPE) IN BLOOD: NORMAL
ANION GAP SERPL CALC-SCNC: 16 MMOL/L (ref 10–20)
ANTIBODY SCREEN: NORMAL
APTT PPP: 57 SECONDS (ref 26–36)
ATRIAL RATE: 366 BPM
BNP SERPL-MCNC: 580 PG/ML (ref 0–99)
BUN SERPL-MCNC: 30 MG/DL (ref 6–23)
CALCIUM SERPL-MCNC: 7.2 MG/DL (ref 8.6–10.3)
CARDIAC TROPONIN I PNL SERPL HS: 285 NG/L (ref 0–53)
CHLORIDE SERPL-SCNC: 106 MMOL/L (ref 98–107)
CO2 SERPL-SCNC: 21 MMOL/L (ref 21–32)
CREAT SERPL-MCNC: 1.35 MG/DL (ref 0.5–1.3)
D DIMER PPP FEU-MCNC: 2617 NG/ML FEU
EGFRCR SERPLBLD CKD-EPI 2021: 48 ML/MIN/1.73M*2
ERYTHROCYTE [DISTWIDTH] IN BLOOD BY AUTOMATED COUNT: 21.4 % (ref 11.5–14.5)
FERRITIN SERPL-MCNC: 328 NG/ML (ref 20–300)
FIBRINOGEN PPP-MCNC: 540 MG/DL (ref 200–400)
FLUAV RNA RESP QL NAA+PROBE: NOT DETECTED
FLUBV RNA RESP QL NAA+PROBE: NOT DETECTED
GLUCOSE SERPL-MCNC: 111 MG/DL (ref 74–99)
HAPTOGLOB SERPL NEPH-MCNC: 241 MG/DL (ref 30–200)
HCT VFR BLD AUTO: 22 % (ref 41–52)
HGB BLD-MCNC: 7.9 G/DL (ref 13.5–17.5)
HGB RETIC QN: 33 PG (ref 28–38)
IMMATURE RETIC FRACTION: 23.4 %
INR PPP: 1.6 (ref 0.9–1.1)
IRON SATN MFR SERPL: 23 % (ref 25–45)
IRON SERPL-MCNC: 44 UG/DL (ref 35–150)
LDH SERPL L TO P-CCNC: 219 U/L (ref 84–246)
MCH RBC QN AUTO: 35.1 PG (ref 26–34)
MCHC RBC AUTO-ENTMCNC: 35.9 G/DL (ref 32–36)
MCV RBC AUTO: 98 FL (ref 80–100)
NRBC BLD-RTO: 0 /100 WBCS (ref 0–0)
PLATELET # BLD AUTO: 41 X10*3/UL (ref 150–450)
POTASSIUM SERPL-SCNC: 3.6 MMOL/L (ref 3.5–5.3)
PROTHROMBIN TIME: 17.9 SECONDS (ref 9.8–12.4)
Q ONSET: 214 MS
QRS COUNT: 14 BEATS
QRS DURATION: 94 MS
QT INTERVAL: 384 MS
QTC CALCULATION(BAZETT): 453 MS
QTC FREDERICIA: 429 MS
R AXIS: -48 DEGREES
RBC # BLD AUTO: 2.25 X10*6/UL (ref 4.5–5.9)
RETICS #: 0.05 X10*6/UL (ref 0.02–0.11)
RETICS/RBC NFR AUTO: 2.4 % (ref 0.5–2)
RH FACTOR (ANTIGEN D): NORMAL
RSV RNA RESP QL NAA+PROBE: NOT DETECTED
SARS-COV-2 RNA RESP QL NAA+PROBE: NOT DETECTED
SODIUM SERPL-SCNC: 139 MMOL/L (ref 136–145)
T AXIS: 92 DEGREES
T OFFSET: 406 MS
TIBC SERPL-MCNC: 188 UG/DL (ref 240–445)
UIBC SERPL-MCNC: 144 UG/DL (ref 110–370)
VENTRICULAR RATE: 84 BPM
WBC # BLD AUTO: 7.7 X10*3/UL (ref 4.4–11.3)

## 2025-06-10 PROCEDURE — 85045 AUTOMATED RETICULOCYTE COUNT: CPT | Performed by: NURSE PRACTITIONER

## 2025-06-10 PROCEDURE — 85210 CLOT FACTOR II PROTHROM SPEC: CPT

## 2025-06-10 PROCEDURE — 1200000003 HC ONCOLOGY  ROOM WITH TELEMETRY DAILY

## 2025-06-10 PROCEDURE — 88291 CYTO/MOLECULAR REPORT: CPT | Performed by: PATHOLOGY

## 2025-06-10 PROCEDURE — 36415 COLL VENOUS BLD VENIPUNCTURE: CPT | Performed by: INTERNAL MEDICINE

## 2025-06-10 PROCEDURE — 2500000001 HC RX 250 WO HCPCS SELF ADMINISTERED DRUGS (ALT 637 FOR MEDICARE OP): Performed by: INTERNAL MEDICINE

## 2025-06-10 PROCEDURE — 83010 ASSAY OF HAPTOGLOBIN QUANT: CPT | Mod: AHULAB | Performed by: NURSE PRACTITIONER

## 2025-06-10 PROCEDURE — 2500000002 HC RX 250 W HCPCS SELF ADMINISTERED DRUGS (ALT 637 FOR MEDICARE OP, ALT 636 FOR OP/ED): Performed by: NURSE PRACTITIONER

## 2025-06-10 PROCEDURE — 85027 COMPLETE CBC AUTOMATED: CPT | Performed by: INTERNAL MEDICINE

## 2025-06-10 PROCEDURE — 99239 HOSP IP/OBS DSCHRG MGMT >30: CPT | Performed by: STUDENT IN AN ORGANIZED HEALTH CARE EDUCATION/TRAINING PROGRAM

## 2025-06-10 PROCEDURE — 2500000004 HC RX 250 GENERAL PHARMACY W/ HCPCS (ALT 636 FOR OP/ED): Performed by: NURSE PRACTITIONER

## 2025-06-10 PROCEDURE — 86900 BLOOD TYPING SEROLOGIC ABO: CPT | Performed by: NURSE PRACTITIONER

## 2025-06-10 PROCEDURE — 87081 CULTURE SCREEN ONLY: CPT

## 2025-06-10 PROCEDURE — 2500000004 HC RX 250 GENERAL PHARMACY W/ HCPCS (ALT 636 FOR OP/ED): Performed by: INTERNAL MEDICINE

## 2025-06-10 PROCEDURE — 85230 CLOT FACTOR VII PROCONVERTIN: CPT

## 2025-06-10 PROCEDURE — 85240 CLOT FACTOR VIII AHG 1 STAGE: CPT | Mod: AHULAB | Performed by: NURSE PRACTITIONER

## 2025-06-10 PROCEDURE — 2500000001 HC RX 250 WO HCPCS SELF ADMINISTERED DRUGS (ALT 637 FOR MEDICARE OP)

## 2025-06-10 PROCEDURE — 85610 PROTHROMBIN TIME: CPT | Performed by: NURSE PRACTITIONER

## 2025-06-10 PROCEDURE — 85270 CLOT FACTOR XI PTA: CPT

## 2025-06-10 PROCEDURE — 99223 1ST HOSP IP/OBS HIGH 75: CPT | Performed by: NURSE PRACTITIONER

## 2025-06-10 PROCEDURE — G0452 MOLECULAR PATHOLOGY INTERPR: HCPCS | Performed by: PATHOLOGY

## 2025-06-10 PROCEDURE — 2500000002 HC RX 250 W HCPCS SELF ADMINISTERED DRUGS (ALT 637 FOR MEDICARE OP, ALT 636 FOR OP/ED): Performed by: INTERNAL MEDICINE

## 2025-06-10 PROCEDURE — 88275 CYTOGENETICS 100-300: CPT | Performed by: NURSE PRACTITIONER

## 2025-06-10 PROCEDURE — 83880 ASSAY OF NATRIURETIC PEPTIDE: CPT | Performed by: NURSE PRACTITIONER

## 2025-06-10 PROCEDURE — 85384 FIBRINOGEN ACTIVITY: CPT | Performed by: NURSE PRACTITIONER

## 2025-06-10 PROCEDURE — 99223 1ST HOSP IP/OBS HIGH 75: CPT

## 2025-06-10 PROCEDURE — 93005 ELECTROCARDIOGRAM TRACING: CPT

## 2025-06-10 PROCEDURE — 80048 BASIC METABOLIC PNL TOTAL CA: CPT | Performed by: INTERNAL MEDICINE

## 2025-06-10 PROCEDURE — 82728 ASSAY OF FERRITIN: CPT | Performed by: NURSE PRACTITIONER

## 2025-06-10 PROCEDURE — 85379 FIBRIN DEGRADATION QUANT: CPT | Performed by: NURSE PRACTITIONER

## 2025-06-10 PROCEDURE — 83540 ASSAY OF IRON: CPT | Performed by: NURSE PRACTITIONER

## 2025-06-10 PROCEDURE — 84484 ASSAY OF TROPONIN QUANT: CPT | Performed by: NURSE PRACTITIONER

## 2025-06-10 PROCEDURE — 82746 ASSAY OF FOLIC ACID SERUM: CPT | Mod: AHULAB

## 2025-06-10 PROCEDURE — 36415 COLL VENOUS BLD VENIPUNCTURE: CPT | Performed by: NURSE PRACTITIONER

## 2025-06-10 PROCEDURE — 81450 HL NEO GSAP 5-50DNA/DNA&RNA: CPT | Performed by: NURSE PRACTITIONER

## 2025-06-10 PROCEDURE — 85730 THROMBOPLASTIN TIME PARTIAL: CPT | Performed by: NURSE PRACTITIONER

## 2025-06-10 PROCEDURE — 83615 LACTATE (LD) (LDH) ENZYME: CPT | Performed by: NURSE PRACTITIONER

## 2025-06-10 PROCEDURE — 6360000002 HC RX 636 FACTOR: Mod: JZ,TB | Performed by: NURSE PRACTITIONER

## 2025-06-10 PROCEDURE — 99222 1ST HOSP IP/OBS MODERATE 55: CPT

## 2025-06-10 PROCEDURE — 87340 HEPATITIS B SURFACE AG IA: CPT | Mod: AHULAB

## 2025-06-10 PROCEDURE — 83550 IRON BINDING TEST: CPT | Performed by: NURSE PRACTITIONER

## 2025-06-10 RX ORDER — TAMSULOSIN HYDROCHLORIDE 0.4 MG/1
0.4 CAPSULE ORAL 2 TIMES DAILY
Status: DISCONTINUED | OUTPATIENT
Start: 2025-06-10 | End: 2025-06-18 | Stop reason: HOSPADM

## 2025-06-10 RX ORDER — ONDANSETRON 4 MG/1
4 TABLET, FILM COATED ORAL EVERY 8 HOURS PRN
Status: DISCONTINUED | OUTPATIENT
Start: 2025-06-10 | End: 2025-06-18 | Stop reason: HOSPADM

## 2025-06-10 RX ORDER — FOLIC ACID 1 MG/1
1 TABLET ORAL DAILY
Status: DISCONTINUED | OUTPATIENT
Start: 2025-06-11 | End: 2025-06-18 | Stop reason: HOSPADM

## 2025-06-10 RX ORDER — FINASTERIDE 5 MG/1
5 TABLET, FILM COATED ORAL DAILY
Status: DISCONTINUED | OUTPATIENT
Start: 2025-06-11 | End: 2025-06-18 | Stop reason: HOSPADM

## 2025-06-10 RX ORDER — GUAIFENESIN 600 MG/1
600 TABLET, EXTENDED RELEASE ORAL 2 TIMES DAILY
Status: DISCONTINUED | OUTPATIENT
Start: 2025-06-10 | End: 2025-06-14

## 2025-06-10 RX ORDER — BENZONATATE 100 MG/1
100 CAPSULE ORAL 3 TIMES DAILY PRN
Status: DISCONTINUED | OUTPATIENT
Start: 2025-06-10 | End: 2025-06-13

## 2025-06-10 RX ORDER — PANTOPRAZOLE SODIUM 40 MG/1
40 TABLET, DELAYED RELEASE ORAL
Status: DISCONTINUED | OUTPATIENT
Start: 2025-06-11 | End: 2025-06-10

## 2025-06-10 RX ORDER — BUMETANIDE 0.5 MG/1
0.5 TABLET ORAL DAILY
Status: DISCONTINUED | OUTPATIENT
Start: 2025-06-11 | End: 2025-06-17

## 2025-06-10 RX ORDER — ATORVASTATIN CALCIUM 40 MG/1
40 TABLET, FILM COATED ORAL DAILY
Status: DISCONTINUED | OUTPATIENT
Start: 2025-06-11 | End: 2025-06-18 | Stop reason: HOSPADM

## 2025-06-10 RX ORDER — SODIUM CHLORIDE, SODIUM LACTATE, POTASSIUM CHLORIDE, CALCIUM CHLORIDE 600; 310; 30; 20 MG/100ML; MG/100ML; MG/100ML; MG/100ML
50 INJECTION, SOLUTION INTRAVENOUS CONTINUOUS
Status: DISCONTINUED | OUTPATIENT
Start: 2025-06-10 | End: 2025-06-11

## 2025-06-10 RX ORDER — FERROUS SULFATE 325(65) MG
65 TABLET ORAL DAILY
Status: DISCONTINUED | OUTPATIENT
Start: 2025-06-11 | End: 2025-06-18 | Stop reason: HOSPADM

## 2025-06-10 RX ORDER — FAMOTIDINE 20 MG/1
20 TABLET, FILM COATED ORAL DAILY
Status: DISCONTINUED | OUTPATIENT
Start: 2025-06-10 | End: 2025-06-18 | Stop reason: HOSPADM

## 2025-06-10 RX ORDER — PROCHLORPERAZINE MALEATE 10 MG
10 TABLET ORAL EVERY 8 HOURS PRN
Status: DISCONTINUED | OUTPATIENT
Start: 2025-06-10 | End: 2025-06-18 | Stop reason: HOSPADM

## 2025-06-10 RX ADMIN — BENZONATATE 100 MG: 100 CAPSULE ORAL at 22:37

## 2025-06-10 RX ADMIN — GUAIFENESIN 600 MG: 600 TABLET, EXTENDED RELEASE ORAL at 20:24

## 2025-06-10 RX ADMIN — ANTIHEMOPHILIC FACTOR (RECOMBINANT), FC-VWF-XTEN FUSION PROTEIN-EHTL 3263 UNITS: KIT at 20:25

## 2025-06-10 RX ADMIN — Medication 1 CAPSULE: at 09:46

## 2025-06-10 RX ADMIN — Medication 1 TABLET: at 09:56

## 2025-06-10 RX ADMIN — FOLIC ACID 1 MG: 1 TABLET ORAL at 09:47

## 2025-06-10 RX ADMIN — FINASTERIDE 5 MG: 5 TABLET, FILM COATED ORAL at 09:46

## 2025-06-10 RX ADMIN — EMPAGLIFLOZIN 10 MG: 10 TABLET, FILM COATED ORAL at 09:46

## 2025-06-10 RX ADMIN — TAMSULOSIN HYDROCHLORIDE 0.4 MG: 0.4 CAPSULE ORAL at 20:24

## 2025-06-10 RX ADMIN — METRONIDAZOLE 500 MG: 500 INJECTION, SOLUTION INTRAVENOUS at 01:14

## 2025-06-10 RX ADMIN — ATORVASTATIN CALCIUM 40 MG: 40 TABLET, FILM COATED ORAL at 09:46

## 2025-06-10 RX ADMIN — METRONIDAZOLE 500 MG: 500 INJECTION, SOLUTION INTRAVENOUS at 09:46

## 2025-06-10 RX ADMIN — BUMETANIDE 0.5 MG: 1 TABLET ORAL at 09:46

## 2025-06-10 RX ADMIN — SODIUM CHLORIDE, SODIUM LACTATE, POTASSIUM CHLORIDE, AND CALCIUM CHLORIDE 75 ML/HR: .6; .31; .03; .02 INJECTION, SOLUTION INTRAVENOUS at 17:04

## 2025-06-10 RX ADMIN — NYSTATIN CREAM: 100000 CREAM TOPICAL at 09:47

## 2025-06-10 RX ADMIN — PANTOPRAZOLE SODIUM 40 MG: 40 TABLET, DELAYED RELEASE ORAL at 06:04

## 2025-06-10 RX ADMIN — TAMSULOSIN HYDROCHLORIDE 0.4 MG: 0.4 CAPSULE ORAL at 09:46

## 2025-06-10 RX ADMIN — CEFEPIME HYDROCHLORIDE 1 G: 1 INJECTION, SOLUTION INTRAVENOUS at 04:49

## 2025-06-10 RX ADMIN — FERROUS SULFATE TAB 325 MG (65 MG ELEMENTAL FE) 1 TABLET: 325 (65 FE) TAB at 09:47

## 2025-06-10 SDOH — ECONOMIC STABILITY: FOOD INSECURITY: WITHIN THE PAST 12 MONTHS, THE FOOD YOU BOUGHT JUST DIDN'T LAST AND YOU DIDN'T HAVE MONEY TO GET MORE.: NEVER TRUE

## 2025-06-10 SDOH — SOCIAL STABILITY: SOCIAL INSECURITY: ABUSE: ADULT

## 2025-06-10 SDOH — SOCIAL STABILITY: SOCIAL INSECURITY: DOES ANYONE TRY TO KEEP YOU FROM HAVING/CONTACTING OTHER FRIENDS OR DOING THINGS OUTSIDE YOUR HOME?: NO

## 2025-06-10 SDOH — ECONOMIC STABILITY: INCOME INSECURITY: IN THE PAST 12 MONTHS HAS THE ELECTRIC, GAS, OIL, OR WATER COMPANY THREATENED TO SHUT OFF SERVICES IN YOUR HOME?: NO

## 2025-06-10 SDOH — SOCIAL STABILITY: SOCIAL INSECURITY: WITHIN THE LAST YEAR, HAVE YOU BEEN HUMILIATED OR EMOTIONALLY ABUSED IN OTHER WAYS BY YOUR PARTNER OR EX-PARTNER?: NO

## 2025-06-10 SDOH — ECONOMIC STABILITY: FOOD INSECURITY: WITHIN THE PAST 12 MONTHS, YOU WORRIED THAT YOUR FOOD WOULD RUN OUT BEFORE YOU GOT THE MONEY TO BUY MORE.: NEVER TRUE

## 2025-06-10 SDOH — SOCIAL STABILITY: SOCIAL INSECURITY: WITHIN THE LAST YEAR, HAVE YOU BEEN AFRAID OF YOUR PARTNER OR EX-PARTNER?: NO

## 2025-06-10 SDOH — SOCIAL STABILITY: SOCIAL INSECURITY: ARE YOU OR HAVE YOU BEEN THREATENED OR ABUSED PHYSICALLY, EMOTIONALLY, OR SEXUALLY BY ANYONE?: NO

## 2025-06-10 SDOH — SOCIAL STABILITY: SOCIAL INSECURITY: ARE THERE ANY APPARENT SIGNS OF INJURIES/BEHAVIORS THAT COULD BE RELATED TO ABUSE/NEGLECT?: NO

## 2025-06-10 SDOH — SOCIAL STABILITY: SOCIAL INSECURITY: HAS ANYONE EVER THREATENED TO HURT YOUR FAMILY OR YOUR PETS?: NO

## 2025-06-10 SDOH — SOCIAL STABILITY: SOCIAL INSECURITY: HAVE YOU HAD THOUGHTS OF HARMING ANYONE ELSE?: NO

## 2025-06-10 SDOH — SOCIAL STABILITY: SOCIAL INSECURITY: DO YOU FEEL UNSAFE GOING BACK TO THE PLACE WHERE YOU ARE LIVING?: NO

## 2025-06-10 SDOH — SOCIAL STABILITY: SOCIAL INSECURITY: WERE YOU ABLE TO COMPLETE ALL THE BEHAVIORAL HEALTH SCREENINGS?: YES

## 2025-06-10 SDOH — SOCIAL STABILITY: SOCIAL INSECURITY: HAVE YOU HAD ANY THOUGHTS OF HARMING ANYONE ELSE?: NO

## 2025-06-10 SDOH — SOCIAL STABILITY: SOCIAL INSECURITY: DO YOU FEEL ANYONE HAS EXPLOITED OR TAKEN ADVANTAGE OF YOU FINANCIALLY OR OF YOUR PERSONAL PROPERTY?: NO

## 2025-06-10 ASSESSMENT — COGNITIVE AND FUNCTIONAL STATUS - GENERAL
TOILETING: A LITTLE
HELP NEEDED FOR BATHING: A LITTLE
CLIMB 3 TO 5 STEPS WITH RAILING: A LITTLE
TOILETING: A LITTLE
TURNING FROM BACK TO SIDE WHILE IN FLAT BAD: A LITTLE
HELP NEEDED FOR BATHING: A LITTLE
WALKING IN HOSPITAL ROOM: A LITTLE
MOVING TO AND FROM BED TO CHAIR: A LITTLE
MOBILITY SCORE: 18
DRESSING REGULAR UPPER BODY CLOTHING: A LITTLE
DAILY ACTIVITIY SCORE: 19
CLIMB 3 TO 5 STEPS WITH RAILING: A LITTLE
CLIMB 3 TO 5 STEPS WITH RAILING: A LITTLE
TURNING FROM BACK TO SIDE WHILE IN FLAT BAD: A LITTLE
MOBILITY SCORE: 18
DRESSING REGULAR UPPER BODY CLOTHING: A LITTLE
MOVING TO AND FROM BED TO CHAIR: A LITTLE
MOBILITY SCORE: 18
DRESSING REGULAR LOWER BODY CLOTHING: A LITTLE
TOILETING: A LITTLE
MOVING FROM LYING ON BACK TO SITTING ON SIDE OF FLAT BED WITH BEDRAILS: A LITTLE
STANDING UP FROM CHAIR USING ARMS: A LITTLE
MOVING TO AND FROM BED TO CHAIR: A LITTLE
MOVING FROM LYING ON BACK TO SITTING ON SIDE OF FLAT BED WITH BEDRAILS: A LITTLE
HELP NEEDED FOR BATHING: A LITTLE
DRESSING REGULAR LOWER BODY CLOTHING: A LITTLE
PERSONAL GROOMING: A LITTLE
WALKING IN HOSPITAL ROOM: A LITTLE
MOVING FROM LYING ON BACK TO SITTING ON SIDE OF FLAT BED WITH BEDRAILS: A LITTLE
WALKING IN HOSPITAL ROOM: A LITTLE
DAILY ACTIVITIY SCORE: 19
TURNING FROM BACK TO SIDE WHILE IN FLAT BAD: A LITTLE
DAILY ACTIVITIY SCORE: 20
STANDING UP FROM CHAIR USING ARMS: A LITTLE
DRESSING REGULAR LOWER BODY CLOTHING: A LITTLE
PATIENT BASELINE BEDBOUND: NO
DRESSING REGULAR UPPER BODY CLOTHING: A LITTLE
PERSONAL GROOMING: A LITTLE
STANDING UP FROM CHAIR USING ARMS: A LITTLE

## 2025-06-10 ASSESSMENT — PAIN - FUNCTIONAL ASSESSMENT
PAIN_FUNCTIONAL_ASSESSMENT: 0-10
PAIN_FUNCTIONAL_ASSESSMENT: 0-10

## 2025-06-10 ASSESSMENT — ENCOUNTER SYMPTOMS
LIGHT-HEADEDNESS: 0
APPETITE CHANGE: 1
ARTHRALGIAS: 0
SHORTNESS OF BREATH: 1
SORE THROAT: 0
HEMATURIA: 0
UNEXPECTED WEIGHT CHANGE: 0
NAUSEA: 1
WEAKNESS: 1
ACTIVITY CHANGE: 1
FATIGUE: 1
FEVER: 1
VOMITING: 1
PALPITATIONS: 0
ABDOMINAL PAIN: 1
ADENOPATHY: 0
DIARRHEA: 1
COUGH: 1

## 2025-06-10 ASSESSMENT — ACTIVITIES OF DAILY LIVING (ADL)
HEARING - LEFT EAR: DIFFICULTY WITH NOISE
WALKS IN HOME: NEEDS ASSISTANCE
ASSISTIVE_DEVICE: WALKER;WHEELCHAIR
FEEDING YOURSELF: INDEPENDENT
JUDGMENT_ADEQUATE_SAFELY_COMPLETE_DAILY_ACTIVITIES: YES
DRESSING YOURSELF: INDEPENDENT
TOILETING: NEEDS ASSISTANCE
LACK_OF_TRANSPORTATION: NO
GROOMING: INDEPENDENT
LACK_OF_TRANSPORTATION: NO
PATIENT'S MEMORY ADEQUATE TO SAFELY COMPLETE DAILY ACTIVITIES?: YES
HEARING - RIGHT EAR: DIFFICULTY WITH NOISE
BATHING: NEEDS ASSISTANCE
ADEQUATE_TO_COMPLETE_ADL: YES

## 2025-06-10 ASSESSMENT — LIFESTYLE VARIABLES
SKIP TO QUESTIONS 9-10: 1
AUDIT-C TOTAL SCORE: 0
AUDIT-C TOTAL SCORE: 0
HOW OFTEN DO YOU HAVE 6 OR MORE DRINKS ON ONE OCCASION: NEVER
HOW OFTEN DO YOU HAVE A DRINK CONTAINING ALCOHOL: NEVER
HOW MANY STANDARD DRINKS CONTAINING ALCOHOL DO YOU HAVE ON A TYPICAL DAY: PATIENT DOES NOT DRINK

## 2025-06-10 ASSESSMENT — PAIN SCALES - GENERAL
PAINLEVEL_OUTOF10: 0 - NO PAIN

## 2025-06-10 NOTE — CONSULTS
Inpatient consult to Gastroenterology  Consult performed by: Nino Mai, SUSI-CNP  Consult ordered by: Thomas Mcnamara DO  Reason for consult: Persistent nausea vomiting          Reason For Consult  Persistent nausea vomiting    History Of Present Illness  Ramón Flores is a 89 y.o. 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  Patient reports that his symptoms started yesterday morning.  He felt miserable and started to vomit.  Also had diarrhea.  No further nausea/vomiting/diarrhea.  He feels much better today.  Reports that he lives alone at home and gets meals on the wheels twice a week.  Thinks that his symptoms were related to food poisoning.  ER labs notable for BUN/creatinine 31/1.19, GFR 56, bili total 2.6, bili direct 0.5, lactate 5.4, troponin 249, INR 1.7, WBC 10.8, hemoglobin 9.5/28.2, .    6/09/2025 CT abdomen pelvis w IV contrast  IMPRESSION:  1.  No evidence for intrahepatic or extrahepatic biliary ductal dilatation.  2.  No evidence for bowel obstruction.  3.  Colonic diverticulosis with no evidence for acute diverticulitis.  4.  Prostatomegaly.    10/22/2024 Colonoscopy for GIB  -Single malignant-appearing and friable ulcerated mass (traversable) measuring 4 cm in the anal region along the anterior wall occupying approximately 20% of the circumference; there was stigmata of recent hemorrhage; performed cold forceps biopsy  -Three sessile polyps measuring smaller than 5 mm (one in the ascending colon, one in the descending colon, and one in the sigmoid colon)  -Few medium, scattered diverticula of  moderate severity in the sigmoid colon; no bleeding was observed  Moderate internal and external hemorrhoids     A. Anus, mass, biopsy:  -- Invasive moderately differentiated squamous cell carcinoma (see note)   Note: The biopsy tissue demonstrates squamous cell carcinoma in situ (high-grade squamous intraepithelial lesion, HSIL) with focal invasion. An immunohistochemical stain for p16 has been ordered and the results will be reported as an addendum.     GI service was consulted for persistent nausea and vomiting.     Past Medical History  He has a past medical history of Acute deep vein thrombosis (DVT) of left femoral vein (Multi) (09/10/2023), Acute diastolic heart failure (04/16/2023), Benign prostatic hyperplasia without lower urinary tract symptoms (01/07/2016), Bleeding hemorrhoids (03/01/2023), CAP (community acquired pneumonia) (01/18/2024), Closed nondisplaced fracture of head of left radius (09/05/2023), COVID-19 (05/21/2023), Enlarged prostate with lower urinary tract symptoms (LUTS) (03/01/2023), Fracture of bone of hip (Multi) (09/05/2023), Fracture of femoral neck, right (03/01/2023), Gastrointestinal hemorrhage (09/05/2023), Gingiva hemorrhage (09/05/2023), Gross hematuria (03/01/2023), Hemarthrosis of ankle joint (04/18/2019), Hemarthrosis of knee (05/09/2022), Hematochezia (09/05/2023), Hematoma of lower extremity (09/05/2023), Hematoma of right thigh (03/01/2023), History of recent fall (05/31/2023), Knee effusion (09/05/2023), Knee effusion, left (03/01/2023), Lumbar pain (03/01/2023), Obstructive uropathy (04/28/2023), Orthostatic syncope (09/05/2023), Personal history of other endocrine, nutritional and metabolic disease, Pneumonia of left lower lobe due to infectious organism (03/19/2024), Pneumonia of right lung due to infectious organism (03/01/2023), 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    Surgical History  He has a past surgical history that includes Coronary artery bypass graft.     Social History  He reports that he has never smoked. He has never been exposed to tobacco smoke. He has never used smokeless tobacco. Alcohol use questions deferred to the physician. He reports that he does not use drugs.    Family History  Family History[1]     Allergies  Aspirin and Penicillins    Review of Systems  Review of Systems   Gastrointestinal:  Positive for diarrhea, nausea and vomiting.   All other systems reviewed and are negative.        Physical Exam  Physical Exam  Vitals reviewed.   Constitutional:       Appearance: He is ill-appearing.   HENT:      Head: Atraumatic.   Cardiovascular:      Rate and Rhythm: Regular rhythm.      Heart sounds: Normal heart sounds.   Pulmonary:      Effort: Pulmonary effort is normal.   Abdominal:      General: Abdomen is flat. Bowel sounds are normal. There is no distension.      Palpations: Abdomen is soft.      Tenderness: There is no abdominal tenderness. There is no guarding or rebound.   Musculoskeletal:         General: Normal range of motion.      Cervical back: Neck supple.   Skin:     General: Skin is warm and dry.   Neurological:      General: No focal deficit present.      Mental Status: He is alert and oriented to person, place, and time.   Psychiatric:         Mood and Affect: Mood normal.         Behavior: Behavior normal.             Last Recorded Vitals  /60 (BP Location: Left arm, Patient Position: Lying)   Pulse 67   Temp 36.3 °C (97.4 °F) (Temporal)   Resp 16   Wt 59.4 kg (131 lb)   SpO2 96%     Relevant Results  Scheduled medications  Scheduled Medications[2]  Continuous medications  Continuous Medications[3]  PRN medications  PRN Medications[4]      Results for orders placed or performed during the hospital encounter of 06/09/25 (from the past 24 hours)   Lactate    Result Value Ref Range    Lactate 1.8 0.4 - 2.0 mmol/L   Troponin I, High Sensitivity   Result Value Ref Range    Troponin I, High Sensitivity 287 (HH) 0 - 20 ng/L   Troponin I, High Sensitivity   Result Value Ref Range    Troponin I, High Sensitivity 446 (HH) 0 - 20 ng/L   Urinalysis with Reflex Culture and Microscopic   Result Value Ref Range    Color, Urine Light-Yellow Light-Yellow, Yellow, Dark-Yellow    Appearance, Urine Clear Clear    Specific Gravity, Urine <1.005 (A) 1.005 - 1.035    pH, Urine 5.5 5.0, 5.5, 6.0, 6.5, 7.0, 7.5, 8.0    Protein, Urine 50 (1+) (A) NEGATIVE, 10 (TRACE), 20 (TRACE) mg/dL    Glucose, Urine OVER (4+) (A) Normal mg/dL    Blood, Urine NEGATIVE NEGATIVE mg/dL    Ketones, Urine NEGATIVE NEGATIVE mg/dL    Bilirubin, Urine NEGATIVE NEGATIVE mg/dL    Urobilinogen, Urine Normal Normal mg/dL    Nitrite, Urine NEGATIVE NEGATIVE    Leukocyte Esterase, Urine NEGATIVE NEGATIVE   Urinalysis Microscopic   Result Value Ref Range    WBC, Urine NONE 1-5, NONE /HPF    RBC, Urine NONE NONE, 1-2, 3-5 /HPF   CBC   Result Value Ref Range    WBC 7.7 4.4 - 11.3 x10*3/uL    nRBC 0.0 0.0 - 0.0 /100 WBCs    RBC 2.25 (L) 4.50 - 5.90 x10*6/uL    Hemoglobin 7.9 (L) 13.5 - 17.5 g/dL    Hematocrit 22.0 (L) 41.0 - 52.0 %    MCV 98 80 - 100 fL    MCH 35.1 (H) 26.0 - 34.0 pg    MCHC 35.9 32.0 - 36.0 g/dL    RDW 21.4 (H) 11.5 - 14.5 %    Platelets 41 (L) 150 - 450 x10*3/uL   Basic metabolic panel   Result Value Ref Range    Glucose 111 (H) 74 - 99 mg/dL    Sodium 139 136 - 145 mmol/L    Potassium 3.6 3.5 - 5.3 mmol/L    Chloride 106 98 - 107 mmol/L    Bicarbonate 21 21 - 32 mmol/L    Anion Gap 16 10 - 20 mmol/L    Urea Nitrogen 30 (H) 6 - 23 mg/dL    Creatinine 1.35 (H) 0.50 - 1.30 mg/dL    eGFR 48 (L) >60 mL/min/1.73m*2    Calcium 7.2 (L) 8.6 - 10.3 mg/dL   Reticulocytes   Result Value Ref Range    Retic % 2.4 (H) 0.5 - 2.0 %    Retic Absolute 0.054 0.017 - 0.110 x10*6/uL    Reticulocyte Hemoglobin 33 28 - 38 pg     Immature Retic fraction 23.4 (H) <=16.0 %   Iron and TIBC   Result Value Ref Range    Iron 44 35 - 150 ug/dL    UIBC 144 110 - 370 ug/dL    TIBC 188 (L) 240 - 445 ug/dL    % Saturation 23 (L) 25 - 45 %   Ferritin   Result Value Ref Range    Ferritin 328 (H) 20 - 300 ng/mL   Coagulation Screen   Result Value Ref Range    Protime 17.9 (H) 9.8 - 12.4 seconds    INR 1.6 (H) 0.9 - 1.1    aPTT 57 (H) 26 - 36 seconds   Fibrinogen   Result Value Ref Range    Fibrinogen 540 (H) 200 - 400 mg/dL   D-dimer, Non VTE   Result Value Ref Range    D-Dimer Non VTE, Quant (ng/mL FEU) 2,617 (H) <=500 ng/mL FEU   Lactate Dehydrogenase   Result Value Ref Range     84 - 246 U/L   Type and screen   Result Value Ref Range    ABO TYPE O     Rh TYPE POS     ANTIBODY SCREEN NEG    Haptoglobin   Result Value Ref Range    Haptoglobin 241 (H) 30 - 200 mg/dL     *Note: Due to a large number of results and/or encounters for the requested time period, some results have not been displayed. A complete set of results can be found in Results Review.    Electrocardiogram, 12-lead PRN ACS symptoms  Result Date: 6/10/2025  Poor data quality, interpretation may be adversely affected Atrial flutter with variable AV block with premature ventricular or aberrantly conducted complexes Left anterior fascicular block Nonspecific T wave abnormality Abnormal ECG When compared with ECG of 01-FEB-2025 06:13, Atrial flutter has replaced Atrial fibrillation Incomplete right bundle branch block is no longer Present    CT abdomen pelvis w IV contrast  Result Date: 6/9/2025  STUDY: CT Abdomen and Pelvis with IV Contrast; 6/9/2025 4:12 PM INDICATION: Nausea/vomiting/diarrhea.  Concerns of jaundice. COMPARISON: PET/CT 4/21/2025.  CTA AP 10/20/2024. ACCESSION NUMBER(S): [id number] ORDERING CLINICIAN: YAW GRACE TECHNIQUE: CT of the abdomen and pelvis was performed.  Contiguous axial images were obtained at 3 mm slice thickness through the abdomen and pelvis.  Coronal and sagittal reconstructions at 3 mm slice thickness were performed.  Omnipaque 350--75 mL was administered intravenously.  FINDINGS: LOWER CHEST: No cardiomegaly.  No pericardial effusion.  Subsegmental dependent atelectasis identified at the bilateral lower lobes.  ABDOMEN:  LIVER: No hepatomegaly.  Smooth surface contour.  Normal attenuation.  BILE DUCTS: No intrahepatic or extrahepatic biliary ductal dilatation.  GALLBLADDER: The gallbladder is unremarkable. STOMACH: No abnormalities identified.  PANCREAS: No masses or ductal dilatation.  SPLEEN: No splenomegaly or focal splenic lesion.  ADRENAL GLANDS: No thickening or nodules.  KIDNEYS AND URETERS: Kidneys are normal in size and location.  No renal or ureteral calculi.  Cortical cystic lesions at the kidneys bilaterally represent simple cysts.  PELVIS:  BLADDER: No abnormalities identified.  REPRODUCTIVE ORGANS: The prostate gland is enlarged measuring 6.0 cm in transverse diameter and 5.7 cm in AP diameter.  BOWEL: Scattered diverticula in the large bowel represent diverticulosis. The loops small and large bowel demonstrate no abnormal dilatation or focal wall thickening.  The appendix is well-visualized and is within normal limits.  VESSELS: No abnormalities identified.  Abdominal aorta is normal in caliber.  PERITONEUM/RETROPERITONEUM/LYMPH NODES: No free fluid.  No pneumoperitoneum. No lymphadenopathy.  ABDOMINAL WALL: No abnormalities identified. SOFT TISSUES: No abnormalities identified.  BONES: No acute fracture or aggressive osseous lesion.  A bipolar hip prosthesis is in place on the right.    1.  No evidence for intrahepatic or extrahepatic biliary ductal dilatation. 2.  No evidence for bowel obstruction. 3.  Colonic diverticulosis with no evidence for acute diverticulitis. 4.  Prostatomegaly. Signed by Florian Bales MD    XR chest 1 view  Result Date: 6/9/2025  Interpreted By:  Casey Al, STUDY: XR CHEST 1 VIEW;  6/9/2025 3:03 pm    INDICATION: Signs/Symptoms:sepsis.     COMPARISON: 02/01/2025.   ACCESSION NUMBER(S): [id number]   ORDERING CLINICIAN: YAW GRACE   FINDINGS: CARDIOMEDIASTINAL SILHOUETTE: Mild cardiomegaly and postoperative changes of the mediastinum are similar to prior.   LUNGS: Irregular interstitial thickening is seen bilaterally with more prominent irregular region of consolidation and/or volume loss in the right infrahilar region. Left basilar atelectasis/scar versus small infiltrate also present. Small pleural effusions not excluded. No appreciable pneumothorax.   ABDOMEN: No remarkable upper abdominal findings.   BONES: Generalized osteopenia is present with thoracic mild dextrocurvature and partially visualized degenerative changes of the shoulder similar to prior.       1.  Irregular interstitial thickening with increased consolidation and/or volume loss in the right infrahilar region. 2. Left basilar atelectasis/scar versus small infiltrate.       MACRO: None.   Signed by: Casey Al 6/9/2025 3:18 PM Dictation workstation:   [id number]    OCT, Retina - OU - Both Eyes  Result Date: 5/28/2025  Right Eye Findings include no diabetic retinopathy. Left Eye Findings include no diabetic retinopathy. Notes Retinal multimodal imaging including photography was completed, and the findings are described in the examination.    Color Fundus Photography - OU - Both Eyes  Result Date: 5/28/2025  Retinal multimodal imaging including photography was completed, and the findings are described in the examination.     Assessment/Plan     Ramón Flores is a 89 y.o. 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 ER on 6/9/2025 complaining of nausea, vomiting and diarrhea.  GI service was consulted for persistent nausea and vomiting.    Suspect gastroenteritis.  Patient reports no further nausea/vomiting/diarrhea.  Feels much better.  Asked to advance his diet.    - Diet as tolerated  - Recommend infectious stool workup if further diarrhea  - Supportive cares per primary team  - GI will sign off.  Please reach out if any questions or further assistance needed    Case discussed with Dr. Burton Mai, APRN-CNP             [1]   Family History  Problem Relation Name Age of Onset    Hemophilia Mother     [2] acetaminophen, 975 mg, oral, Once  antihemophilic factor VIII (recombinant), Fc-VWF-XTEN fusion protein-ehtl, 3,030 Units, intravenous, Once  atorvastatin, 40 mg, oral, Daily  bumetanide, 0.5 mg, oral, Daily  calcium carbonate, 1,250 mg of calcium carbonate, oral, BID  empagliflozin, 10 mg, oral, Daily  ferrous sulfate, 65 mg of elemental iron, oral, Daily with breakfast  finasteride, 5 mg, oral, Daily  folic acid, 1 mg, oral, Daily  lactobacillus acidophilus, 1 capsule, oral, BID  nystatin, , Topical, BID  pantoprazole, 40 mg, oral, Daily before breakfast   Or  pantoprazole, 40 mg, intravenous, Daily before breakfast  tamsulosin, 0.4 mg, oral, BID  [3] sodium chloride 0.9%, 75 mL/hr, Last Rate: Stopped (06/10/25 [id number])  [4] PRN medications: acetaminophen **OR** acetaminophen **OR** acetaminophen, ondansetron **OR** ondansetron, spironolactone

## 2025-06-10 NOTE — CONSULTS
Reason For Consult  Bicytopenia    History Of Present Illness  Ramón Flores is a 89 y.o. male w PMHx severe hemophilia A, protein C deficiency, Aflutter/fibrillation, DVT, HFpEF, BPH, anal cancer, macular degeneration, IgAL MGUS who was seen in [facility] ED on 6/9 for repeated episodes of N&V, diarrhea of 24hrs duration. No abdominal pain, some jaundice noted. Bms have been non-bloody. He felt immensely fatigued and was unable to get out of bed and thus had EMS called to transport him to hospital. ED evaluation showed lactic acidosis at 5.4, which improved with IV fluids. He was seen by GI in [facility] and suspicion was of gastroenteritis. Hematology was consulted and recommended evaluation of cytopenias at [facility], to which pt was agreeable. He was transferred on 6/10. Hematology consulted for evaluation.     At bedside, pt reports he is feeling much improved. He denies any recent bleeding or bruising but notes that a week ago he did have a very dark black bowel movement, which resolved quickly. He takes his factor on Tuesdays and is still awaiting it today. He is aware that he has low cell counts and is being worked up for it. He has not discussed the possibility of BM biopsy with Dr. Dillon.     HEME DATA:  CBC: WBC 7.7, Hgb 7.9, PLT 41  Course: anemia since May of 2022 with variable severity, thrombocytopenia since January 2024 with worsening course  MCV: macrocytic  Prolif: RPI 0.6 -> hypoproliferative    Iron: iron 44, TIBC 188, ferritin 328 -> ACD  B12: 738  Folate: 20  Copper/Zn: none  SPEP: IgA lambda MGUS 0.3mg/dl  FOX: negative  RF: none  HCV: negative  HIV: none  Liver: no hepatomegaly, no history of liver disease  Spleen: no splenomegaly  Hemolysis: , hapto 241, bilirubin 2.6/0.5  DIC: PT 17.9, aPTT 57, fibrinogen 540, D-dimer 2617  Platelet antibodies: all negative    Peripheral smear: will review on 6/11     Past Medical History  He has a past medical history of Acute deep vein thrombosis (DVT)  of left femoral vein (Multi) (09/10/2023), Acute diastolic heart failure (04/16/2023), Benign prostatic hyperplasia without lower urinary tract symptoms (01/07/2016), Bleeding hemorrhoids (03/01/2023), CAP (community acquired pneumonia) (01/18/2024), Closed nondisplaced fracture of head of left radius (09/05/2023), COVID-19 (05/21/2023), Enlarged prostate with lower urinary tract symptoms (LUTS) (03/01/2023), Fracture of bone of hip (Multi) (09/05/2023), Fracture of femoral neck, right (03/01/2023), Gastrointestinal hemorrhage (09/05/2023), Gingiva hemorrhage (09/05/2023), Gross hematuria (03/01/2023), Hemarthrosis of ankle joint (04/18/2019), Hemarthrosis of knee (05/09/2022), Hematochezia (09/05/2023), Hematoma of lower extremity (09/05/2023), Hematoma of right thigh (03/01/2023), History of recent fall (05/31/2023), Knee effusion (09/05/2023), Knee effusion, left (03/01/2023), Lumbar pain (03/01/2023), Obstructive uropathy (04/28/2023), Orthostatic syncope (09/05/2023), Personal history of other endocrine, nutritional and metabolic disease, Pneumonia of left lower lobe due to infectious organism (03/19/2024), Pneumonia of right lung due to infectious organism (03/01/2023), 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    Surgical History  He has a past surgical history that includes Coronary artery bypass graft.     Social History  He reports that he has never smoked. He has never been exposed to tobacco smoke. He has never used smokeless tobacco. Alcohol use questions deferred to the physician. He reports that he does not use drugs.    Family History  Family History[1]     Allergies  Aspirin and Penicillins     Physical Exam  Gen: well-developed elderly man in no acute distress  Eyes: anicteric, no injection, no discharge, pupils equal and  "reactive  HENT: NCAT, no discharge from orifices, oral mucosa moist, dentition adequate, no masses in neck  Heart: RRR, no m/r/g, no JVD, no pitting edema in BL Les  Lungs: CTAB with vesicular breath sounds, no rhonchi/rales/wheezes/crackles, normal effort on N/C  Abdo: soft, non-distended, BS normal, no TTP, no masses or organomegaly, previous surgery with scar  MSK: no deformities of soft tissues or joints  Neuro: Aox4  Skin: warm, dry, and intact throughout  Psych: appropriate mood and affect, no phobias/delusions/hallucinations    Last Recorded Vitals  Blood pressure 107/62, pulse 85, temperature 36.8 °C (98.2 °F), temperature source Temporal, resp. rate 16, height 1.676 m (5' 6\"), weight 59.4 kg (131 lb), SpO2 94%.    Relevant Results  Results for orders placed or performed during the hospital encounter of 06/09/25 (from the past 24 hours)   Urinalysis with Reflex Culture and Microscopic   Result Value Ref Range    Color, Urine Light-Yellow Light-Yellow, Yellow, Dark-Yellow    Appearance, Urine Clear Clear    Specific Gravity, Urine <1.005 (A) 1.005 - 1.035    pH, Urine 5.5 5.0, 5.5, 6.0, 6.5, 7.0, 7.5, 8.0    Protein, Urine 50 (1+) (A) NEGATIVE, 10 (TRACE), 20 (TRACE) mg/dL    Glucose, Urine OVER (4+) (A) Normal mg/dL    Blood, Urine NEGATIVE NEGATIVE mg/dL    Ketones, Urine NEGATIVE NEGATIVE mg/dL    Bilirubin, Urine NEGATIVE NEGATIVE mg/dL    Urobilinogen, Urine Normal Normal mg/dL    Nitrite, Urine NEGATIVE NEGATIVE    Leukocyte Esterase, Urine NEGATIVE NEGATIVE   Urinalysis Microscopic   Result Value Ref Range    WBC, Urine NONE 1-5, NONE /HPF    RBC, Urine NONE NONE, 1-2, 3-5 /HPF   CBC   Result Value Ref Range    WBC 7.7 4.4 - 11.3 x10*3/uL    nRBC 0.0 0.0 - 0.0 /100 WBCs    RBC 2.25 (L) 4.50 - 5.90 x10*6/uL    Hemoglobin 7.9 (L) 13.5 - 17.5 g/dL    Hematocrit 22.0 (L) 41.0 - 52.0 %    MCV 98 80 - 100 fL    MCH 35.1 (H) 26.0 - 34.0 pg    MCHC 35.9 32.0 - 36.0 g/dL    RDW 21.4 (H) 11.5 - 14.5 %    " Platelets 41 (L) 150 - 450 x10*3/uL   Basic metabolic panel   Result Value Ref Range    Glucose 111 (H) 74 - 99 mg/dL    Sodium 139 136 - 145 mmol/L    Potassium 3.6 3.5 - 5.3 mmol/L    Chloride 106 98 - 107 mmol/L    Bicarbonate 21 21 - 32 mmol/L    Anion Gap 16 10 - 20 mmol/L    Urea Nitrogen 30 (H) 6 - 23 mg/dL    Creatinine 1.35 (H) 0.50 - 1.30 mg/dL    eGFR 48 (L) >60 mL/min/1.73m*2    Calcium 7.2 (L) 8.6 - 10.3 mg/dL   Reticulocytes   Result Value Ref Range    Retic % 2.4 (H) 0.5 - 2.0 %    Retic Absolute 0.054 0.017 - 0.110 x10*6/uL    Reticulocyte Hemoglobin 33 28 - 38 pg    Immature Retic fraction 23.4 (H) <=16.0 %   Iron and TIBC   Result Value Ref Range    Iron 44 35 - 150 ug/dL    UIBC 144 110 - 370 ug/dL    TIBC 188 (L) 240 - 445 ug/dL    % Saturation 23 (L) 25 - 45 %   Ferritin   Result Value Ref Range    Ferritin 328 (H) 20 - 300 ng/mL   Coagulation Screen   Result Value Ref Range    Protime 17.9 (H) 9.8 - 12.4 seconds    INR 1.6 (H) 0.9 - 1.1    aPTT 57 (H) 26 - 36 seconds   Fibrinogen   Result Value Ref Range    Fibrinogen 540 (H) 200 - 400 mg/dL   D-dimer, Non VTE   Result Value Ref Range    D-Dimer Non VTE, Quant (ng/mL FEU) 2,617 (H) <=500 ng/mL FEU   Lactate Dehydrogenase   Result Value Ref Range     84 - 246 U/L   Type and screen   Result Value Ref Range    ABO TYPE O     Rh TYPE POS     ANTIBODY SCREEN NEG    Haptoglobin   Result Value Ref Range    Haptoglobin 241 (H) 30 - 200 mg/dL     *Note: Due to a large number of results and/or encounters for the requested time period, some results have not been displayed. A complete set of results can be found in Results Review.      Assessment/Plan     89 y.o. male w PMHx severe hemophilia A, protein C deficiency, Aflutter/fibrillation, DVT, HFpEF, BPH, anal cancer, macular degeneration, IgAL MGUS who was seen in [facility] ED on 6/9 for repeated episodes of N&V, diarrhea of 24hrs duration. Subsequently transferred to [facility] for further workup of  chronic bicytopenia which is worsening.     Overall workup is unrevealing for other causes of cytopenias, with extensive previous workup and no evidence of worsening monoclonal gammopathy or dietary deficiency. Given the macrocytosis, there is concern for an underlying myelodysplastic syndrome. Myeloid NGS has been ordered as an initial foray into whether this may be the case. Whether the patient will pursue bone marrow biopsy and further treatment for this remains to be determined. We will confer with him and Dr. Dillon.     Further, although he has no active bleeding of note, he did report an episode of possible melena, which we would recommend investigating. His coagulation panel today does not seem consistent with only his known FVIII deficiency, given prolongation of protime. This should also be worked up.     #Hemophilia A, severe  #Protein C deficiency  #Macrocytic anemia  #Thrombocytopenia  #Prolonged PT    Recommendations:  -WE have added on additional factor assays to his existing blood specimen: FII, FVII, FVIII, FXI  -please draw serum copper and zinc level  -daily CBC w diff  -transfuse to maintain Hb > 7 or if symptomatic, PLT > 10 or > 50 if bleeding  -recommend GI consult for evaluation of potential GI bleeding and jaundice  -proceed with altuviiio administration today 6/10  -monitor and page us for any bleeding or bruising symptoms  -we will follow up on myeloid malignancy panel  -peripheral smear to be reviewed tomorrow  -pending further discussions with patient and Dr. Dillon on if BM biopsy is within GOC    Thank you for the consult. We will continue to follow.    I spent 60 minutes in the professional and overall care of this patient.    Ezio Scott MD PhD  Hem/onc fellow  [id number]         [1]   Family History  Problem Relation Name Age of Onset    Hemophilia Mother

## 2025-06-10 NOTE — CARE PLAN
The patient's goals for the shift include      The clinical goals for the shift include Remain safe, HDS, free of pain, ambulate several times, and maintain skin integrity throughout shift.    Problem: Pain - Adult  Goal: Verbalizes/displays adequate comfort level or baseline comfort level  Outcome: Progressing     Problem: Safety - Adult  Goal: Free from fall injury  Outcome: Progressing     Problem: Discharge Planning  Goal: Discharge to home or other facility with appropriate resources  Outcome: Progressing     Problem: Chronic Conditions and Co-morbidities  Goal: Patient's chronic conditions and co-morbidity symptoms are monitored and maintained or improved  Outcome: Progressing     Problem: Nutrition  Goal: Nutrient intake appropriate for maintaining nutritional needs  Outcome: Progressing

## 2025-06-10 NOTE — NURSING NOTE
Patient was refusing/being stubborn with lab to draw Factor 8 activity. Hematology APRN-CNP, Amparo Stanton, aware. Said to let [facility] nurse know that lab was finally drawn but we were not able to administer. Factor 8 medication being transported with patient to [facility].

## 2025-06-10 NOTE — PROGRESS NOTES
"Reason for visit: acute cough/congestion    Reason for homebound status: BLE weakness and instability of knees and inability to do steps makes leaving his home quite taxing on Ramón and his caregivers    HPI: Ramón was seen today at his home  after he called me yesterday stating he developed a cough 2 days prior.     Chief Complaint: \"I have sinus congestion and a cough and not much of an appetite the past 2 days\"    Review of Systems   General: no fever or chills; energy same, no fatigue, 3 days with congestion/cough; hasn't been around anyone sick, but has been sitting on his new deck a lot the past week; prior to that felt great  Skin: no skin problems  EENT: sinus congestion, a little runny nose, sneezing a few days ago; ears feel fine, no itchy/watery eyes; no coughing after eating or drinking  Respiratory: no shortness of breath; cough is dry, some mucus drainage from upper airway  Cardiac: no chest pain or palpitations;  Gastrointestinal: no abdominal pain, no nausea, vomiting, heartburn; BM today  Urinary: continent; no discomfort with urination; urgency and frequency that is chronic  Musculoskeletal: mild bilateral knee pain but tylenol is enough; knees are weak and relies on wheelchair; can stand by himself and transfer independently; arms are strong; no swelling in LL   Neurologic: long and short term memory intact; no headaches, dizziness or lightheadedness; no tremors or involuntary movements; no altered sensation; no unilateral weakness  Psychiatric: doing fine    Physical Examination   Vitals: Blood pressure 138/80, pulse 84, temperature 36.4 °C (97.6 °F), resp. rate 16.  Pulse ox 96%  General: sitting in wheelchair in his kitchen, dressed in personal clothes, very neat; good historian and conversationalist; appears comfortable  Neurologic: alert, oriented x4 with good executive function; adequate and equal arm strength; positive sensation in all extremities; no involuntary movements  Skin: no " abnormalities of hair or nails; right lower cheek with large mole, no discolorations, or excessive dryness   EENT: vision and hearing seem adequate; teeth in good condition; aide reports Ramón has not had any coughing with eating or drinking; no pharyngeal redness or drainage noted; sounds congested  Respiratory: unlabored; chest symmetrical w/ good rise and fall; breath sounds equal and clear throughout; harsh cough few times while I was there  Heart: regular rhythm, rate controlled; normal heart sounds w/ no murmurs, rubs and gallops  Abdomen: nondistended, nontenderness, bowel sounds active x4, no masses  Extremities: no deformities, tenderness; palpable peripheral pulses, no cyanosis, clubbing; normal temperature; ankles with trace pitting edema, R>L  Musculoskeletal: no limited joint mobility with good ROM of upper extremities but about 70% ROM of knees., not able to stand upright completly, no tenderness, effusion, erythema, minimal kyphosis and no scoliosis  Psych: pleasant, good attitude, good conversationalist; calm    Diagnoses and Plan:  Upper respiratory symptoms, viral vs allergic - very poor air quality could be contributing to symptoms; dont want to take chances however with his last hospital stay for pneumonia so will start cefdinir as he was on previously; does not seem to be aspiration but that is possible still; aide will be able to go out now and get antibiotic as well as guaifenesin and afrin; instructed to use afrin only for 3 days due to risks and to take guaifenesin 15ml tid 7 days; will check back in few days  HFpEF - bumex 0.5mg daily; also on Jardiance; does not seem to be chf related; seems euvolemic  Chronic atrial fibrillation - no rate controlling drugs, was regular today; no blood thinners due to hemophilia; rate controlled  Loss of appetite -chronic but also acute due to illness; will supplement with boost

## 2025-06-10 NOTE — ASSESSMENT & PLAN NOTE
Ramón Flores is a 89 y.o. male with PMH Hemophilia, Anal cancer (s/p curative RT 12/2024), CAD s/p CABG >20yrs ago, HFpEF, Afib, BPH, GERD, and CKD who presented to [facility] ED 6/9 with c/o n/v and diarrhea x24hrs. CT a/p with contrast (6/9) with no evidence for intrahepatic or extrahepatic biliary ductal dilation, no evidence of bowel obstruction, colonic diverticulosis with no evidence for acute diverticulitis, prostatomegaly. GI consulted, state s/sx likely gastroenteritis, rec stool path if diarrhea continues. Heme consulted, rec transfer to [facility] for further management of worsening anemia/thrombocytopenia I/s/o acute illness. Pt was transferred to [facility] for further management. Upon admission to [facility], pt reports his s/x have resolved. Heme consulted on admit 6/10, recs pending. DC pending improvement in anemia and thrombocytopenia    # Hemophilia A  - Follows with Dr. Dillon, LOV 4/9/25  - He has had minimal bleeding during life d/t concurrent protein C deficiency  - On Altuviiio weekly (Tuesdays), will admin today 6/10  - Hgb BL ~12; Hgb 7.9 (6/10)  - Plt BL ~ 80, Plt 41 (6/10)  - , Haptoglobin 241 (6/9)  - Ddimer 2,617, fibrinogen 540, PT 17.9, INR 1.6, aPTT 57 (6/9)  - Myeloid malignancies panel sent/pending and MDS panel sent/pending per [facility] at [facility]  - Factor 8 activity (6/10) pending  - Continue home folic acid 1mg daily and ferrous sulfate 325mg daily  - Heme consulted 6/10, appreciate recs    # N/V/D  - Likely viral gastroenteritis vs bacterial  - CT a/p with contrast (6/9) no evidence for intrahepatic or extrahepatic biliary ductal dilation, no evidence of bowel obstruction, colonic diverticulosis with no evidence for acute diverticulitis, prostatomegaly  - s/p 1x dose of cefepime and 1x dose of flagyl at [facility] 6/9  - s/p 500mL NS bolus and maintenance IVF with NS @ 75mL/hr at [facility] 6/9  - No fevers/chills or leukocytosis   - Lactate level 5.4 --> 1.8 (6/9)  - UA (6/9) + protein, neg leuks or  nitrites  - Blood cultures x2 (6/9): NGTD  - COVID (6/9) negative  - CXR showing left basilar atelectasis/scar vs small infiltrate  - GI consulted at [facility] s/sx likely gastroenteritis, rec stool path if diarrhea continues  - Stool path, Cdiff, lactoferrin, and fecal calprotectin ordered/pending 6/10  - PO Zofran 4mg q8hrs PRN n/v 1st line, PO Compazine 10mg q8hrs PRN n/v 2nd line  - Started IVF with LR @ 75mL/hr x24hrs (6/10-6/11)  - Will defer further atbs at this time pending clinical course    # Troponinemia  - Likely 2/2 demand I/s/o active illness with cardiac hx  - Hx elevated troponins in the past  - Trop 249->287-->446 (6/9)  - Trop pending 6/10  - EKG at OSH (6/9) showing aflutter with AV block, non-specific T-wave abnormalities  - Admit EKG pending 6/10    # URI  # c/f possible sepsis  - Pt was seen outpt 6/9 for c/o sinus congestion and cough x2 days, was started on Cefdinir and took one dose 6/9  - On admit, pt continues to endorse non-productive cough; no fevers/chills or SOB  - CXR (6/9) showing left basilar atelectasis/scar vs small infiltrate  - Lactate level 5.4 --> 1.8 (6/9)  - s/p 500mL NS bolus and maintenance IVF with NS @ 75mL/hr  - Started LR @ 75mL/hr x24hrs (6/10-6/11)  - Blood cultures x2 (6/9): NGTD  - COVID (6/9) negative  - Resp viral panel (6/10): pending  - Bps soft on admit (107/62), no c/f significant HoTN  - Presented to [facility] in 2L [place], SpO2 94% on admit, will continue for now and wean as tolerated  - Started Mucinex 600mg BID 6/10  - Will hold atbs at this time, jacob previously prescribed cefdinir as this can cause/worsen thrombocytopenia  - Low threshold for CT chest or atb initiation should clinical course change    # RONNEI/Hx CKD  - Likely pre-renal 2/2 contrast  - sCr BL ~1.1, sCr 1.19 (6/9)--> s/p IV contrast--> sCr 1.35 (6/10)  - s/p 500mL NS bolus and maintenance IVF with NS @ 75mL/hr  - Started LR @ 75mL/hr x24hrs (6/10-6/11)  - Urine lytes ordered/pending 6/10    # Anal  Cancer  - Previously followed with Dr. Locke, now follows with Dr. Cochran, followed with Dr. Muro for RadOn  - Initially presented with rectal bleeding in the setting of constipation over several consecutive days and then called EMS. He was brought to [facility] but then transferred to [facility] on 10/20/24 given no factor VIII at [facility]. Colonoscopy performed on 10/22/24 showed single malignant-appearing and friable ulcerated mass in the anal canal. 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   - s/p curative RT to anus 12/2024  - PET (4/18/25): no evidence of distant metastatic disease, small uptake in the prior anal lesion, likely non-specific but residual neoplasm not entirely excluded  - Now only following with med onc (Dr. Cochran) as surveillance if needed  - Has CT c/a/p and Rad Onc FUV on 8/4    # CAD  # Afib  # HFpEF  # HLD  - s/p CABG >20yrs ago  - ECHO (2/1/25): EF 49%, global hypokinesis of LV, mod increased septal and mod increased posterior LV wall thickness, sev dialted LA, mod-sev dilated RA, RVSP 44  - Continue home Jardiance 10mg daily and Bumex 0.5mg daily  - Continue home Lipitor 40mg daily  - BNP (6/10): pending    # BPH  - Continue home Flomax 0.4mg BID and Finasteride 5mg daily    # GERD  - Home Pantoprazole 40mg daily sub as Pepcid 20mg daily I/s/o thrombocytopenia    DVT prophy: No pharmacologic DVT prophy I/s/o hemophilia A and thrombocytopenia. SCDs, encourage safe ambulation    DISPO:  - DNR, DNI; confirmed on admit  - DC pending improvement in anemia and thrombocytopenia  - NOK (Javier Wallace, relative): [phone]  - CT c/a/p 8/4, Rad Onc 8/4, Dr. Dillon FUV 8/6, Ophthalmology FUV 8/27, Urology FUV 12/1, Ophthalmology FUV 12/3

## 2025-06-10 NOTE — H&P
History Of Present Illness  Ramón Flores is a 89 y.o. male with a past medical history of severe hemophila  A, vitamin C deficiency, persistent atrial flutter/atrial fibrillation not on anticoagulation due to his hemophilia who presented to [facility] complaining of nausea vomiting and diarrhea.  He endorses that he has had numerous episodes of nausea vomiting and diarrhea since 9:00 this morning.  Denies any abdominal pain chest pain difficulty breathing.  He does complain of fatigue.  His emesis and stool have been nonbloody.  No melena hematochezia.  Lactate was 5.4 after hydration 1.8     Past Medical History  Anal cancer  Left femoral DVT  Diastolic CHF  BPH  Hemorrhoidal bleeding  Community-acquired pneumonia  Closed nondisplaced fracture of the left radial head  COVID-19  Fractured of the femoral neck  GI bleed  Gingival hemorrhage  Lower urinary tract infection  Hemarthrosis of the ankle  Hemarthrosis of the knee  Hematoma of the right thigh  Syncope and collapse  Subdural hematoma  Viral gastroenteritis      Surgical History  S/p total hip replacement 1/13/2023 secondary to a right femoral neck fracture  Coronary artery bypass grafting     Social History  He reports that he has never smoked. He has never been exposed to tobacco smoke. He has never used smokeless tobacco. Alcohol use questions deferred to the physician. He reports that he does not use drugs.    Family History  Hemophilia A-mother     Allergies  Aspirin and Penicillins    Review of Systems   Constitutional:  Positive for activity change, appetite change and fatigue.   HENT: Negative.     Eyes: Negative.    Respiratory: Negative.     Cardiovascular: Negative.    Gastrointestinal:  Positive for diarrhea, nausea and vomiting.   Endocrine: Negative.    Genitourinary: Negative.    Musculoskeletal: Negative.    Skin: Negative.    Allergic/Immunologic: Negative.    Neurological: Negative.    Hematological: Negative.   "  Psychiatric/Behavioral: Negative.     All other systems reviewed and are negative.       Physical Exam  Vitals and nursing note reviewed.   Constitutional:       Appearance: Normal appearance. He is ill-appearing.   HENT:      Head: Normocephalic.      Right Ear: External ear normal.      Left Ear: External ear normal.      Nose: Nose normal.      Mouth/Throat:      Mouth: Mucous membranes are dry.      Pharynx: Oropharynx is clear.   Eyes:      Extraocular Movements: Extraocular movements intact.      Conjunctiva/sclera: Conjunctivae normal.      Pupils: Pupils are equal, round, and reactive to light.   Cardiovascular:      Rate and Rhythm: Normal rate. Rhythm irregular.   Pulmonary:      Effort: Pulmonary effort is normal.      Breath sounds: Normal breath sounds.      Comments: Bibasilar rales  Abdominal:      General: Abdomen is flat. Bowel sounds are normal.      Palpations: Abdomen is soft.   Musculoskeletal:         General: Normal range of motion.   Skin:     General: Skin is warm and dry.   Neurological:      General: No focal deficit present.      Mental Status: He is alert. Mental status is at baseline.   Psychiatric:         Mood and Affect: Mood normal.         Behavior: Behavior normal.          Last Recorded Vitals  Blood pressure 96/71, pulse 68, temperature 37 °C (98.6 °F), temperature source Oral, resp. rate 16, height 1.676 m (5' 6\"), weight 59.4 kg (131 lb), SpO2 97%.    Relevant Results  Meds:  Scheduled medications  Scheduled Medications[1]  Continuous medications  Continuous Medications[2]  PRN medications  PRN Medications[3]   Current Outpatient Medications   Medication Instructions    acetaminophen (TYLENOL EXTRA STRENGTH) 1,000 mg, oral, Every 8 hours PRN    antihemophilic factor VIII (recombinant), Fc-VWF-XTEN fusion protein-ehtl (ALTUVIIIO) 50 Units/kg, intravenous, Every 7 days, 3030 units +/-10% dose every 7 days.  Additionally,  prn when directed.    atorvastatin (LIPITOR) 40 mg, " oral, Daily    bumetanide (BUMEX) 0.5 mg, oral, Daily    calcium carbonate 600 mg calcium (1,500 mg) tablet 1 tablet, 2 times daily (morning and late afternoon)    cefdinir (OMNICEF) 300 mg, oral, 2 times daily    cefdinir (OMNICEF) 300 mg, oral, 2 times daily    Chest Congestion Relief 200 mg, oral, Every 4 hours PRN    ferrous sulfate 325 mg, Daily with breakfast    finasteride (PROSCAR) 5 mg, oral, Daily    folic acid (Folvite) 1 mg tablet 1 tablet, Daily    Jardiance 10 mg, oral, Daily    Lactobacillus acidoph-L.bulgar 1 million cell tablet tablet 1 Million Cells, Daily    multivitamin tablet 1 tablet, Daily    nystatin (Mycostatin) cream Topical, 2 times daily, apply to affected area    pantoprazole (PROTONIX) 40 mg, oral, Daily before breakfast, Do not crush, chew, or split.    spironolactone (ALDACTONE) 12.5 mg, oral, Daily PRN    tamsulosin (FLOMAX) 0.4 mg, oral, 2 times daily    vit A/vit C/vit E/zinc/copper (PRESERVISION AREDS ORAL) 1 capsule, Daily        Labs:  Results for orders placed or performed during the hospital encounter of 06/09/25 (from the past 24 hours)   CBC and Auto Differential   Result Value Ref Range    WBC 10.8 4.4 - 11.3 x10*3/uL    nRBC 0.0 0.0 - 0.0 /100 WBCs    RBC 2.73 (L) 4.50 - 5.90 x10*6/uL    Hemoglobin 9.5 (L) 13.5 - 17.5 g/dL    Hematocrit 28.2 (L) 41.0 - 52.0 %     (H) 80 - 100 fL    MCH 34.8 (H) 26.0 - 34.0 pg    MCHC 33.7 32.0 - 36.0 g/dL    RDW 22.3 (H) 11.5 - 14.5 %    Platelets 53 (L) 150 - 450 x10*3/uL    Neutrophils % 69.7 40.0 - 80.0 %    Immature Granulocytes %, Automated 4.2 (H) 0.0 - 0.9 %    Lymphocytes % 7.8 13.0 - 44.0 %    Monocytes % 18.0 2.0 - 10.0 %    Eosinophils % 0.1 0.0 - 6.0 %    Basophils % 0.2 0.0 - 2.0 %    Neutrophils Absolute 7.55 (H) 1.60 - 5.50 x10*3/uL    Immature Granulocytes Absolute, Automated 0.45 0.00 - 0.50 x10*3/uL    Lymphocytes Absolute 0.84 0.80 - 3.00 x10*3/uL    Monocytes Absolute 1.95 (H) 0.05 - 0.80 x10*3/uL    Eosinophils  Absolute 0.01 0.00 - 0.40 x10*3/uL    Basophils Absolute 0.02 0.00 - 0.10 x10*3/uL   Lactate   Result Value Ref Range    Lactate 5.4 (HH) 0.4 - 2.0 mmol/L   Troponin I, High Sensitivity   Result Value Ref Range    Troponin I, High Sensitivity 249 (HH) 0 - 20 ng/L   Protime-INR   Result Value Ref Range    Protime 18.7 (H) 9.8 - 12.4 seconds    INR 1.7 (H) 0.9 - 1.1   Blood Culture    Specimen: Peripheral Venipuncture; Blood culture   Result Value Ref Range    Blood Culture Loaded on Instrument - Culture in progress    Blood Culture    Specimen: Peripheral Venipuncture; Blood culture   Result Value Ref Range    Blood Culture Loaded on Instrument - Culture in progress    Lipase   Result Value Ref Range    Lipase 7 (L) 9 - 82 U/L   Hepatic function panel   Result Value Ref Range    Albumin 4.1 3.4 - 5.0 g/dL    Bilirubin, Total 2.6 (H) 0.0 - 1.2 mg/dL    Bilirubin, Direct 0.5 (H) 0.0 - 0.3 mg/dL    Alkaline Phosphatase 84 33 - 136 U/L    ALT 23 10 - 52 U/L    AST 25 9 - 39 U/L    Total Protein 6.7 6.4 - 8.2 g/dL   Basic metabolic panel   Result Value Ref Range    Glucose 145 (H) 74 - 99 mg/dL    Sodium 138 136 - 145 mmol/L    Potassium 3.5 3.5 - 5.3 mmol/L    Chloride 103 98 - 107 mmol/L    Bicarbonate 19 (L) 21 - 32 mmol/L    Anion Gap 20 10 - 20 mmol/L    Urea Nitrogen 31 (H) 6 - 23 mg/dL    Creatinine 1.19 0.50 - 1.30 mg/dL    eGFR 56 (L) >60 mL/min/1.73m*2    Calcium 8.0 (L) 8.6 - 10.3 mg/dL   Morphology   Result Value Ref Range    RBC Morphology See Below     Polychromasia Mild     RBC Fragments Few     Teardrop Cells Few     [place] Cells Few    Sars-CoV-2 PCR, Symptomatic   Result Value Ref Range    Coronavirus 2019, PCR Not Detected Not Detected   Lactate   Result Value Ref Range    Lactate 1.8 0.4 - 2.0 mmol/L   Troponin I, High Sensitivity   Result Value Ref Range    Troponin I, High Sensitivity 287 (HH) 0 - 20 ng/L   Troponin I, High Sensitivity   Result Value Ref Range    Troponin I, High Sensitivity 446 (HH) 0  - 20 ng/L   Urinalysis with Reflex Culture and Microscopic   Result Value Ref Range    Color, Urine Light-Yellow Light-Yellow, Yellow, Dark-Yellow    Appearance, Urine Clear Clear    Specific Gravity, Urine <1.005 (A) 1.005 - 1.035    pH, Urine 5.5 5.0, 5.5, 6.0, 6.5, 7.0, 7.5, 8.0    Protein, Urine 50 (1+) (A) NEGATIVE, 10 (TRACE), 20 (TRACE) mg/dL    Glucose, Urine OVER (4+) (A) Normal mg/dL    Blood, Urine NEGATIVE NEGATIVE mg/dL    Ketones, Urine NEGATIVE NEGATIVE mg/dL    Bilirubin, Urine NEGATIVE NEGATIVE mg/dL    Urobilinogen, Urine Normal Normal mg/dL    Nitrite, Urine NEGATIVE NEGATIVE    Leukocyte Esterase, Urine NEGATIVE NEGATIVE   Urinalysis Microscopic   Result Value Ref Range    WBC, Urine NONE 1-5, NONE /HPF    RBC, Urine NONE NONE, 1-2, 3-5 /HPF     *Note: Due to a large number of results and/or encounters for the requested time period, some results have not been displayed. A complete set of results can be found in Results Review.      Imaging:  Imaging  CT abdomen pelvis w IV contrast  Result Date: 6/9/2025  1.  No evidence for intrahepatic or extrahepatic biliary ductal dilatation. 2.  No evidence for bowel obstruction. 3.  Colonic diverticulosis with no evidence for acute diverticulitis. 4.  Prostatomegaly. Signed by Florian Bales MD    XR chest 1 view  Result Date: 6/9/2025  1.  Irregular interstitial thickening with increased consolidation and/or volume loss in the right infrahilar region. 2. Left basilar atelectasis/scar versus small infiltrate.       MACRO: None.   Signed by: Casey Al 6/9/2025 3:18 PM Dictation workstation:   [id number]      Cardiology, Vascular, and Other Imaging  No other imaging results found for the past 2 days       Assessment/Plan   Acute viral gastroenteritis  Plan:  Gentle IV hydration  Stool studies    Possible aspiration pneumonia  On cefepime and metronidazole    Sepsis with elevated lactate 5.4 recheck 1.8  On metronidazole and cefepime    CKD  3  Trend creratinine3    Rising troponin, suspect demand secondary to sepsis  Plan:  No aspirin or IV heparin secondary to hemophilia  Telemetry  Cardiology    Persistent atrial fibrillation/atrial flutter  Plan:  Not on oral anticoagulation due to hemophilia and thrombocytopenia    Thrombocytopenia, platelet count 53  Plan:  No heparin or Lovenox or aspirin    Hemophilia A  Hematology consultation    BPH with LUTS  Proscar 5 mg orally daily  Flomax 0.4 mg twice daily    DVT prophylaxis  No heparin or Lovenox due to severe thrombocytopenia and hemophilia A  SCDs        I spent 60 minutes in the professional and overall care of this patient.      Thomas Mcnamara DO                       [1] acetaminophen, 975 mg, oral, Once  antihemophilic factor VIII (recombinant), Fc-VWF-XTEN fusion protein-ehtl, 50 Units/kg, intravenous, q7 days  [START ON 6/10/2025] atorvastatin, 40 mg, oral, Daily  [START ON 6/10/2025] bumetanide, 0.5 mg, oral, Daily  [START ON 6/10/2025] calcium carbonate, 1,250 mg of calcium carbonate, oral, BID  [START ON 6/10/2025] empagliflozin, 10 mg, oral, Daily  [START ON 6/10/2025] ferrous sulfate, 65 mg of elemental iron, oral, Daily with breakfast  [START ON 6/10/2025] finasteride, 5 mg, oral, Daily  folic acid, 1 mg, oral, Daily  [START ON 6/10/2025] lactobacillus acidophilus, 1 capsule, oral, BID  [START ON 6/10/2025] nystatin, , Topical, BID  [START ON 6/10/2025] pantoprazole, 40 mg, oral, Daily before breakfast   Or  [START ON 6/10/2025] pantoprazole, 40 mg, intravenous, Daily before breakfast  tamsulosin, 0.4 mg, oral, BID    [2]    [3] PRN medications: acetaminophen **OR** acetaminophen **OR** acetaminophen, ondansetron **OR** ondansetron, spironolactone

## 2025-06-10 NOTE — CARE PLAN
Problem: Pain - Adult  Goal: Verbalizes/displays adequate comfort level or baseline comfort level  Outcome: Progressing     Problem: Safety - Adult  Goal: Free from fall injury  Outcome: Progressing     Problem: Discharge Planning  Goal: Discharge to home or other facility with appropriate resources  Outcome: Progressing     Problem: Chronic Conditions and Co-morbidities  Goal: Patient's chronic conditions and co-morbidity symptoms are monitored and maintained or improved  Outcome: Progressing     Problem: Nutrition  Goal: Nutrient intake appropriate for maintaining nutritional needs  Outcome: Progressing   The patient's goals for the shift include  remain safe in hospital     The clinical goals for the shift include maintain safety

## 2025-06-10 NOTE — CONSULTS
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 male presenting via EMS with nausea vomiting and diarrhea, was seen by his geriatrics NP on 6/9 in the morning for acute cough/congestion, poor appetite, temp 100.2 on arrival to ED. His outpatient PCP prescribed him cefdinir, guaifenesin and afrin yesterday d/t his c/o acute cough and congestion; instructed to use afrin only for 3 days due to risks and to take guaifenesin 15ml tid 7 days. He later developed N/V/D subsequently resulting in dehydration. His niece reported he was so weak he could not get out of bed by himself prompting her to call 911 last evening. Past medical history of severe hemophila A (weekly Altuviiio, protein C deficiency, thrombocytopenia, MGUS, CKD 3, persistent atrial flutter/atrial fibrillation not on anticoagulation due to his hemophilia, DVT 3/2023 s/p Eliquis x 3 months, CAD remote CABG, HFpEF on Bumex 0.5 mg daily, recent syncopal episodes and subsequent prolonged rehabilitation in [place] with deconditioning.     Patient stated that he has had numerous episodes of nausea vomiting and diarrhea since 9:00 yesterday morning, feeling better following fluid resuscitation. Denies any abdominal pain chest pain difficulty breathing or palpitations. He does complain of fatigue and weakness.  His emesis and stool have been nonbloody. No melena hematochezia or hematuria, no recent nose bleeds or mouth bleeding.  Lactate was 5.4 after hydration 1.8. Labs revealed progressively worsening -> 287-> 446 BUN/creatinine 31/1.19, repeat this am creatinine 1.35, HFP with total bili 2.6 direct bili 0.5, WBC 10 ANC 7.55, RBC 2.73, H&H 9.5/28.2,  MCH 34 PLT 53, H&H 7.9/22 PLT 41 this am following hydration. Patient seen by hematology  outpatient 4/10/25, pancytopenia noted work up for normal haptoglobin, normal B12 and folate, normal ferritin, mild elevated LDH, M protein 0.3, negative platelet antibody screen PLT Ct was 104, INR 1.4 PTT 38 in April. Stool pathogen is ordered to work up diarrhea. BC (P), Ucx (P), COVID was negative. Hematology is consulted for recommendations for h/o hemophilia A.     Patient stated he feels better today following IV fluid resuscitation. He is sitting up in the chair eating lunch currently.   - Altuviiio once weekly, last seen my Dr. Dillon 4/10/25, no recent bleeding issues. He gets factor VIII prophylaxis weekly on Tuesdays.  - Patients niece will bring Altuviiio from home, verified with hospital pharmacy and Dr. Li, patient refused repeat lab draw to collect factor VIII activity prior to treatment  - Hb and PLT Ct is trending down, no overt bleeding  - given his acute illness and progressively worsening anemia and thrombocytopenia, recommend transfer to [facility] for hematology expert around the clock management of his hemophilia A in anticipation of the need for additional factor, services and factor not available at [facility]. Patient requested EMS to go to [facility] but was told he needed to come to [facility].    - updated patients family about recommendations to transfer for additional hematology evaluation  - continue to closely monitor   - repeat iron and ferritin  - LDH retic, haptoglobin repeated   - coag panel, fibrinogen, ddimer and HFP  - T&S ordered, no indication for blood right now  - MDS FISH and MPN NGS ordered (noted this was ordered by Dr. Dillon for his next draw)  - Patient is accepted to [facility] service for additional work up and monitoring, discussed with [facility], Dr. Lindsey, Dr. Lieberman, and Dr. Ulloa (accepting)    Discussed with Dr. 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 male presenting  via EMS with nausea vomiting and diarrhea, was seen by his geriatrics NP on 6/9 in the morning for acute cough/congestion, poor appetite, temp 100.2 on arrival to ED. His outpatient PCP prescribed him cefdinir, guaifenesin and afrin yesterday d/t his c/o acute cough and congestion; instructed to use afrin only for 3 days due to risks and to take guaifenesin 15ml tid 7 days. He later developed N/V/D subsequently resulting in dehydration. His niece reported he was so weak he could not get out of bed by himself prompting her to call 911 last evening. Past medical history of severe hemophila A (weekly Altuviiio, protein C deficiency, thrombocytopenia, MGUS, CKD 3, persistent atrial flutter/atrial fibrillation not on anticoagulation due to his hemophilia, DVT 3/2023 s/p Eliquis x 3 months, CAD remote CABG, HFpEF on Bumex 0.5 mg daily, recent syncopal episodes and subsequent prolonged rehabilitation in [place] with deconditioning.     Patient stated that he has had numerous episodes of nausea vomiting and diarrhea since 9:00 yesterday morning, feeling better following fluid resuscitation. Denies any abdominal pain chest pain difficulty breathing or palpitations. He does complain of fatigue and weakness.  His emesis and stool have been nonbloody. No melena hematochezia or hematuria, no recent nose bleeds or mouth bleeding.  Lactate was 5.4 after hydration 1.8. Labs revealed progressively worsening -> 287-> 446 BUN/creatinine 31/1.19, repeat this am creatinine 1.35, HFP with total bili 2.6 direct bili 0.5, WBC 10 ANC 7.55, RBC 2.73, H&H 9.5/28.2,  MCH 34 PLT 53, H&H 7.9/22 PLT 41 this am following hydration. Patient seen by hematology outpatient 4/10/25, pancytopenia noted work up for normal haptoglobin, normal B12 and folate, normal ferritin, mild elevated LDH, M protein 0.3, negative platelet antibody screen PLT Ct was 104, INR 1.4 PTT 38 in April. Stool pathogen is ordered to work up diarrhea. BC (P), Ucx  (P). Hematology is consulted for recommendations for h/o hemophilia A.       Past Medical History  He has a past medical history of Acute deep vein thrombosis (DVT) of left femoral vein (Multi) (09/10/2023), Acute diastolic heart failure (04/16/2023), Benign prostatic hyperplasia without lower urinary tract symptoms (01/07/2016), Bleeding hemorrhoids (03/01/2023), CAP (community acquired pneumonia) (01/18/2024), Closed nondisplaced fracture of head of left radius (09/05/2023), COVID-19 (05/21/2023), Enlarged prostate with lower urinary tract symptoms (LUTS) (03/01/2023), Fracture of bone of hip (Multi) (09/05/2023), Fracture of femoral neck, right (03/01/2023), Gastrointestinal hemorrhage (09/05/2023), Gingiva hemorrhage (09/05/2023), Gross hematuria (03/01/2023), Hemarthrosis of ankle joint (04/18/2019), Hemarthrosis of knee (05/09/2022), Hematochezia (09/05/2023), Hematoma of lower extremity (09/05/2023), Hematoma of right thigh (03/01/2023), History of recent fall (05/31/2023), Knee effusion (09/05/2023), Knee effusion, left (03/01/2023), Lumbar pain (03/01/2023), Obstructive uropathy (04/28/2023), Orthostatic syncope (09/05/2023), Personal history of other endocrine, nutritional and metabolic disease, Pneumonia of left lower lobe due to infectious organism (03/19/2024), Pneumonia of right lung due to infectious organism (03/01/2023), 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    Surgical History  He has a past surgical history that includes Coronary artery bypass graft.     Social History  He reports that he has never smoked. He has never been exposed to tobacco smoke. He has never used smokeless tobacco. Alcohol use questions deferred to the physician. He reports that he does not use drugs.    Family History  Family History[1]    "  Allergies  Aspirin and Penicillins    Review of Systems   Constitutional:  Positive for activity change, appetite change, fatigue and fever. Negative for unexpected weight change.   HENT:  Negative for mouth sores, nosebleeds and sore throat.    Respiratory:  Positive for cough and shortness of breath.    Cardiovascular:  Negative for chest pain, palpitations and leg swelling.   Gastrointestinal:  Positive for abdominal pain, diarrhea, nausea and vomiting.   Genitourinary:  Negative for hematuria.   Musculoskeletal:  Negative for arthralgias.   Skin:  Negative for rash.   Neurological:  Positive for weakness. Negative for light-headedness.   Hematological:  Negative for adenopathy.        Physical Exam  Vitals and nursing note reviewed.   Constitutional:       Appearance: He is ill-appearing. He is not toxic-appearing.      Comments: Elderly frail man   HENT:      Head: Normocephalic and atraumatic.      Nose: Congestion present.      Mouth/Throat:      Mouth: Mucous membranes are moist.   Eyes:      General: No scleral icterus.  Cardiovascular:      Rate and Rhythm: Normal rate.      Pulses: Normal pulses.   Pulmonary:      Effort: No respiratory distress.   Abdominal:      Palpations: Abdomen is soft.      Tenderness: There is no abdominal tenderness.   Musculoskeletal:      Right lower leg: No edema.      Left lower leg: No edema.   Lymphadenopathy:      Cervical: No cervical adenopathy.   Skin:     Findings: Bruising present. No rash.   Neurological:      Mental Status: He is alert and oriented to person, place, and time.          Last Recorded Vitals  Blood pressure 100/60, pulse 67, temperature 36.3 °C (97.4 °F), temperature source Temporal, resp. rate 16, height 1.676 m (5' 6\"), weight 59.4 kg (131 lb), SpO2 96%.    Relevant Results  Results for orders placed or performed during the hospital encounter of 06/09/25 (from the past 96 hours)   Electrocardiogram, 12-lead PRN ACS symptoms   Result Value Ref Range "    Ventricular Rate 84 BPM    Atrial Rate 366 BPM    QRS Duration 94 ms    QT Interval 384 ms    QTC Calculation(Bazett) 453 ms    R Axis -48 degrees    T Axis 92 degrees    QRS Count 14 beats    Q Onset 214 ms    T Offset 406 ms    QTC Fredericia 429 ms   CBC and Auto Differential   Result Value Ref Range    WBC 10.8 4.4 - 11.3 x10*3/uL    nRBC 0.0 0.0 - 0.0 /100 WBCs    RBC 2.73 (L) 4.50 - 5.90 x10*6/uL    Hemoglobin 9.5 (L) 13.5 - 17.5 g/dL    Hematocrit 28.2 (L) 41.0 - 52.0 %     (H) 80 - 100 fL    MCH 34.8 (H) 26.0 - 34.0 pg    MCHC 33.7 32.0 - 36.0 g/dL    RDW 22.3 (H) 11.5 - 14.5 %    Platelets 53 (L) 150 - 450 x10*3/uL    Neutrophils % 69.7 40.0 - 80.0 %    Immature Granulocytes %, Automated 4.2 (H) 0.0 - 0.9 %    Lymphocytes % 7.8 13.0 - 44.0 %    Monocytes % 18.0 2.0 - 10.0 %    Eosinophils % 0.1 0.0 - 6.0 %    Basophils % 0.2 0.0 - 2.0 %    Neutrophils Absolute 7.55 (H) 1.60 - 5.50 x10*3/uL    Immature Granulocytes Absolute, Automated 0.45 0.00 - 0.50 x10*3/uL    Lymphocytes Absolute 0.84 0.80 - 3.00 x10*3/uL    Monocytes Absolute 1.95 (H) 0.05 - 0.80 x10*3/uL    Eosinophils Absolute 0.01 0.00 - 0.40 x10*3/uL    Basophils Absolute 0.02 0.00 - 0.10 x10*3/uL   Lactate   Result Value Ref Range    Lactate 5.4 (HH) 0.4 - 2.0 mmol/L   Troponin I, High Sensitivity   Result Value Ref Range    Troponin I, High Sensitivity 249 (HH) 0 - 20 ng/L   Protime-INR   Result Value Ref Range    Protime 18.7 (H) 9.8 - 12.4 seconds    INR 1.7 (H) 0.9 - 1.1   Blood Culture    Specimen: Peripheral Venipuncture; Blood culture   Result Value Ref Range    Blood Culture Loaded on Instrument - Culture in progress    Blood Culture    Specimen: Peripheral Venipuncture; Blood culture   Result Value Ref Range    Blood Culture Loaded on Instrument - Culture in progress    Lipase   Result Value Ref Range    Lipase 7 (L) 9 - 82 U/L   Hepatic function panel   Result Value Ref Range    Albumin 4.1 3.4 - 5.0 g/dL    Bilirubin, Total 2.6  (H) 0.0 - 1.2 mg/dL    Bilirubin, Direct 0.5 (H) 0.0 - 0.3 mg/dL    Alkaline Phosphatase 84 33 - 136 U/L    ALT 23 10 - 52 U/L    AST 25 9 - 39 U/L    Total Protein 6.7 6.4 - 8.2 g/dL   Basic metabolic panel   Result Value Ref Range    Glucose 145 (H) 74 - 99 mg/dL    Sodium 138 136 - 145 mmol/L    Potassium 3.5 3.5 - 5.3 mmol/L    Chloride 103 98 - 107 mmol/L    Bicarbonate 19 (L) 21 - 32 mmol/L    Anion Gap 20 10 - 20 mmol/L    Urea Nitrogen 31 (H) 6 - 23 mg/dL    Creatinine 1.19 0.50 - 1.30 mg/dL    eGFR 56 (L) >60 mL/min/1.73m*2    Calcium 8.0 (L) 8.6 - 10.3 mg/dL   Morphology   Result Value Ref Range    RBC Morphology See Below     Polychromasia Mild     RBC Fragments Few     Teardrop Cells Few     Evans Cells Few    Sars-CoV-2 PCR, Symptomatic   Result Value Ref Range    Coronavirus 2019, PCR Not Detected Not Detected   Lactate   Result Value Ref Range    Lactate 1.8 0.4 - 2.0 mmol/L   Troponin I, High Sensitivity   Result Value Ref Range    Troponin I, High Sensitivity 287 (HH) 0 - 20 ng/L   Troponin I, High Sensitivity   Result Value Ref Range    Troponin I, High Sensitivity 446 (HH) 0 - 20 ng/L   Urinalysis with Reflex Culture and Microscopic   Result Value Ref Range    Color, Urine Light-Yellow Light-Yellow, Yellow, Dark-Yellow    Appearance, Urine Clear Clear    Specific Gravity, Urine <1.005 (A) 1.005 - 1.035    pH, Urine 5.5 5.0, 5.5, 6.0, 6.5, 7.0, 7.5, 8.0    Protein, Urine 50 (1+) (A) NEGATIVE, 10 (TRACE), 20 (TRACE) mg/dL    Glucose, Urine OVER (4+) (A) Normal mg/dL    Blood, Urine NEGATIVE NEGATIVE mg/dL    Ketones, Urine NEGATIVE NEGATIVE mg/dL    Bilirubin, Urine NEGATIVE NEGATIVE mg/dL    Urobilinogen, Urine Normal Normal mg/dL    Nitrite, Urine NEGATIVE NEGATIVE    Leukocyte Esterase, Urine NEGATIVE NEGATIVE   Urinalysis Microscopic   Result Value Ref Range    WBC, Urine NONE 1-5, NONE /HPF    RBC, Urine NONE NONE, 1-2, 3-5 /HPF   CBC   Result Value Ref Range    WBC 7.7 4.4 - 11.3 x10*3/uL     nRBC 0.0 0.0 - 0.0 /100 WBCs    RBC 2.25 (L) 4.50 - 5.90 x10*6/uL    Hemoglobin 7.9 (L) 13.5 - 17.5 g/dL    Hematocrit 22.0 (L) 41.0 - 52.0 %    MCV 98 80 - 100 fL    MCH 35.1 (H) 26.0 - 34.0 pg    MCHC 35.9 32.0 - 36.0 g/dL    RDW 21.4 (H) 11.5 - 14.5 %    Platelets 41 (L) 150 - 450 x10*3/uL   Basic metabolic panel   Result Value Ref Range    Glucose 111 (H) 74 - 99 mg/dL    Sodium 139 136 - 145 mmol/L    Potassium 3.6 3.5 - 5.3 mmol/L    Chloride 106 98 - 107 mmol/L    Bicarbonate 21 21 - 32 mmol/L    Anion Gap 16 10 - 20 mmol/L    Urea Nitrogen 30 (H) 6 - 23 mg/dL    Creatinine 1.35 (H) 0.50 - 1.30 mg/dL    eGFR 48 (L) >60 mL/min/1.73m*2    Calcium 7.2 (L) 8.6 - 10.3 mg/dL     *Note: Due to a large number of results and/or encounters for the requested time period, some results have not been displayed. A complete set of results can be found in Results Review.          Assessment/Plan     Results for orders placed or performed during the hospital encounter of 06/09/25 (from the past 96 hours)   Electrocardiogram, 12-lead PRN ACS symptoms   Result Value Ref Range    Ventricular Rate 84 BPM    Atrial Rate 366 BPM    QRS Duration 94 ms    QT Interval 384 ms    QTC Calculation(Bazett) 453 ms    R Axis -48 degrees    T Axis 92 degrees    QRS Count 14 beats    Q Onset 214 ms    T Offset 406 ms    QTC Fredericia 429 ms   CBC and Auto Differential   Result Value Ref Range    WBC 10.8 4.4 - 11.3 x10*3/uL    nRBC 0.0 0.0 - 0.0 /100 WBCs    RBC 2.73 (L) 4.50 - 5.90 x10*6/uL    Hemoglobin 9.5 (L) 13.5 - 17.5 g/dL    Hematocrit 28.2 (L) 41.0 - 52.0 %     (H) 80 - 100 fL    MCH 34.8 (H) 26.0 - 34.0 pg    MCHC 33.7 32.0 - 36.0 g/dL    RDW 22.3 (H) 11.5 - 14.5 %    Platelets 53 (L) 150 - 450 x10*3/uL    Neutrophils % 69.7 40.0 - 80.0 %    Immature Granulocytes %, Automated 4.2 (H) 0.0 - 0.9 %    Lymphocytes % 7.8 13.0 - 44.0 %    Monocytes % 18.0 2.0 - 10.0 %    Eosinophils % 0.1 0.0 - 6.0 %    Basophils % 0.2 0.0 - 2.0 %     Neutrophils Absolute 7.55 (H) 1.60 - 5.50 x10*3/uL    Immature Granulocytes Absolute, Automated 0.45 0.00 - 0.50 x10*3/uL    Lymphocytes Absolute 0.84 0.80 - 3.00 x10*3/uL    Monocytes Absolute 1.95 (H) 0.05 - 0.80 x10*3/uL    Eosinophils Absolute 0.01 0.00 - 0.40 x10*3/uL    Basophils Absolute 0.02 0.00 - 0.10 x10*3/uL   Lactate   Result Value Ref Range    Lactate 5.4 (HH) 0.4 - 2.0 mmol/L   Troponin I, High Sensitivity   Result Value Ref Range    Troponin I, High Sensitivity 249 (HH) 0 - 20 ng/L   Protime-INR   Result Value Ref Range    Protime 18.7 (H) 9.8 - 12.4 seconds    INR 1.7 (H) 0.9 - 1.1   Blood Culture    Specimen: Peripheral Venipuncture; Blood culture   Result Value Ref Range    Blood Culture Loaded on Instrument - Culture in progress    Blood Culture    Specimen: Peripheral Venipuncture; Blood culture   Result Value Ref Range    Blood Culture Loaded on Instrument - Culture in progress    Lipase   Result Value Ref Range    Lipase 7 (L) 9 - 82 U/L   Hepatic function panel   Result Value Ref Range    Albumin 4.1 3.4 - 5.0 g/dL    Bilirubin, Total 2.6 (H) 0.0 - 1.2 mg/dL    Bilirubin, Direct 0.5 (H) 0.0 - 0.3 mg/dL    Alkaline Phosphatase 84 33 - 136 U/L    ALT 23 10 - 52 U/L    AST 25 9 - 39 U/L    Total Protein 6.7 6.4 - 8.2 g/dL   Basic metabolic panel   Result Value Ref Range    Glucose 145 (H) 74 - 99 mg/dL    Sodium 138 136 - 145 mmol/L    Potassium 3.5 3.5 - 5.3 mmol/L    Chloride 103 98 - 107 mmol/L    Bicarbonate 19 (L) 21 - 32 mmol/L    Anion Gap 20 10 - 20 mmol/L    Urea Nitrogen 31 (H) 6 - 23 mg/dL    Creatinine 1.19 0.50 - 1.30 mg/dL    eGFR 56 (L) >60 mL/min/1.73m*2    Calcium 8.0 (L) 8.6 - 10.3 mg/dL   Morphology   Result Value Ref Range    RBC Morphology See Below     Polychromasia Mild     RBC Fragments Few     Teardrop Cells Few     Evans Cells Few    Sars-CoV-2 PCR, Symptomatic   Result Value Ref Range    Coronavirus 2019, PCR Not Detected Not Detected   Lactate   Result Value Ref  Range    Lactate 1.8 0.4 - 2.0 mmol/L   Troponin I, High Sensitivity   Result Value Ref Range    Troponin I, High Sensitivity 287 (HH) 0 - 20 ng/L   Troponin I, High Sensitivity   Result Value Ref Range    Troponin I, High Sensitivity 446 (HH) 0 - 20 ng/L   Urinalysis with Reflex Culture and Microscopic   Result Value Ref Range    Color, Urine Light-Yellow Light-Yellow, Yellow, Dark-Yellow    Appearance, Urine Clear Clear    Specific Gravity, Urine <1.005 (A) 1.005 - 1.035    pH, Urine 5.5 5.0, 5.5, 6.0, 6.5, 7.0, 7.5, 8.0    Protein, Urine 50 (1+) (A) NEGATIVE, 10 (TRACE), 20 (TRACE) mg/dL    Glucose, Urine OVER (4+) (A) Normal mg/dL    Blood, Urine NEGATIVE NEGATIVE mg/dL    Ketones, Urine NEGATIVE NEGATIVE mg/dL    Bilirubin, Urine NEGATIVE NEGATIVE mg/dL    Urobilinogen, Urine Normal Normal mg/dL    Nitrite, Urine NEGATIVE NEGATIVE    Leukocyte Esterase, Urine NEGATIVE NEGATIVE   Urinalysis Microscopic   Result Value Ref Range    WBC, Urine NONE 1-5, NONE /HPF    RBC, Urine NONE NONE, 1-2, 3-5 /HPF   CBC   Result Value Ref Range    WBC 7.7 4.4 - 11.3 x10*3/uL    nRBC 0.0 0.0 - 0.0 /100 WBCs    RBC 2.25 (L) 4.50 - 5.90 x10*6/uL    Hemoglobin 7.9 (L) 13.5 - 17.5 g/dL    Hematocrit 22.0 (L) 41.0 - 52.0 %    MCV 98 80 - 100 fL    MCH 35.1 (H) 26.0 - 34.0 pg    MCHC 35.9 32.0 - 36.0 g/dL    RDW 21.4 (H) 11.5 - 14.5 %    Platelets 41 (L) 150 - 450 x10*3/uL   Basic metabolic panel   Result Value Ref Range    Glucose 111 (H) 74 - 99 mg/dL    Sodium 139 136 - 145 mmol/L    Potassium 3.6 3.5 - 5.3 mmol/L    Chloride 106 98 - 107 mmol/L    Bicarbonate 21 21 - 32 mmol/L    Anion Gap 16 10 - 20 mmol/L    Urea Nitrogen 30 (H) 6 - 23 mg/dL    Creatinine 1.35 (H) 0.50 - 1.30 mg/dL    eGFR 48 (L) >60 mL/min/1.73m*2    Calcium 7.2 (L) 8.6 - 10.3 mg/dL     SPEP 4/2025- 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   Component  Ref Range & Units 2 mo ago 1 yr ago   Platelet Antibody Target 1 -IgG Result  Negative Negative Negative     4/8/25:  Normal B12 738 folate 20.8  homocystine 18 MMA 0.18      I spent 80 minutes in the professional and overall care of this patient.               [1]   Family History  Problem Relation Name Age of Onset    Hemophilia Mother

## 2025-06-10 NOTE — NURSING NOTE
Attempted to call report to [facility] 4 nurse, was told she stepped off of unit and to call back in 10-15 minutes.    [phone]  Called [facility] again, gave report to nurse, Tremaine. Did update her about patient's factor 8 medication not being given before patient transfer and is being transported with patient. Let her know that patient is currently on his way. Non further questions/concerns at this time.

## 2025-06-10 NOTE — PROGRESS NOTES
06/10/25 1016   Discharge Planning   Living Arrangements Alone   Support Systems Home care staff;Family members   Assistance Needed [facility] aides daily 9am-5pm   Type of Residence Private residence   Number of Stairs to Enter Residence 2   Number of Stairs Within Residence 0   Do you have animals or pets at home? No   Who is requesting discharge planning? Provider   Home or Post Acute Services In home services   Expected Discharge Disposition Home Health   Does the patient need discharge transport arranged? No   Financial Resource Strain   How hard is it for you to pay for the very basics like food, housing, medical care, and heating? Not hard   Housing Stability   In the last 12 months, was there a time when you were not able to pay the mortgage or rent on time? N   In the past 12 months, how many times have you moved where you were living? 0   At any time in the past 12 months, were you homeless or living in a shelter (including now)? N   Transportation Needs   In the past 12 months, has lack of transportation kept you from medical appointments or from getting medications? no   In the past 12 months, has lack of transportation kept you from meetings, work, or from getting things needed for daily living? No   Patient Choice   Provider Choice list and CMS website (https://medicare.gov/care-compare#search) for post-acute Quality and Resource Measure Data were provided and reviewed with: Patient   Patient / Family choosing to utilize agency / facility established prior to hospitalization Yes     I met with patient at bedside and explained tcc role. He lives in a house alone but has aides daily from 9am-5pm through [facility]. Referral built and sent to [facility] in [facility]. Admitted with dehydration and possible aspiration pneumonia. States he uses a walker and wheelchair at home. Plan is to return home on discharge and nephew will transport.

## 2025-06-10 NOTE — CARE PLAN
The clinical goals for the shift include patient will remain safe and free from injury throughout shift 6/10 at 1900      Problem: Safety - Adult  Goal: Free from fall injury  Outcome: Progressing     Problem: Pain - Adult  Goal: Verbalizes/displays adequate comfort level or baseline comfort level  Outcome: Progressing     Problem: Nutrition  Goal: Nutrient intake appropriate for maintaining nutritional needs  Outcome: Progressing     Problem: Skin  Goal: Decreased wound size/increased tissue granulation at next dressing change  Outcome: Progressing

## 2025-06-10 NOTE — H&P
History Of Present Illness  Ramón Flores is a 89 y.o. male with PMH Hemophilia, Anal cancer (s/p curative RT 12/2024), CAD s/p CABG >20yrs ago, HFpEF, Afib, BPH, GERD, and CKD who presented to [facility] ED 6/9 with c/o n/v and diarrhea x24hrs.     Patient reports that his symptoms started 6/8 AM and began with multiple episodes of vomiting followed by diarrhea. Denies any hematemesis, melena, or hematochezia. He denies any fevers/chills or sick contacts. He believes he may have eaten something that caused food poisoning. He denies any further episodes of n/v or diarrhea. Denies any abdominal pain. He notes note some fatigue. Of note, he recently was seen outpt 6/9 for c/o sinus congestion and cough x2 days, was started on Cefdinir and took one dose on 6/9. He reports continued non-productive cough and congestion. No rhinorrhea, sore throat, or SOB. He otherwise denies any HA, dizziness/lightheadedness, CP, palpitations, dysuria, urgency/frequency, hematuria, numbness/tingling, bruising/bleeding or rashes. ROS otherwise negative.    OSH course:  At [facility], GI was consulted and felt etiology of symptoms likely gastroenteritis. They recommended diet as tolerated, and infectious stool studies should he have any further diarrhea. Heme was also consulted given his hx of Hemophilia A, they rec continuing his home Altuviiio as usual (every Tuesday), and [place] [facility] of worsening anemia/thrombocytopenia I/s/o acute illness.     - Vitals stable  - WBC 7.7, Hgb 7.9, Plt 41  - , Haptoglobin 241  - sCr 1.19--> after contrast--> sCr 1.35  - Ddimer 2,617, fibrinogen 540, PT 17.9, INR 1.6, aPTT 57  - Trop 287--> 446  - Lactate level 5.4 --> 1.8  - Myeloid malignancies panel sent/pending per heme, MDS panel sent/pending per heme  - UA + protein, neg leuks or nitrites  - Blood cultures x2 (6/9): NGTD  - COVID negative  - CXR showing left basilar atelectasis/scar vs small infiltrate  - CT a/p with  contrast: no evidence for intrahepatic or extrahepatic biliary ductal dilation, no evidence of bowel obstruction, colonic diverticulosis with no evidence for acute diverticulitis, prostatomegaly  - s/p 1x dose of cefepime and 1x dose of flagyl  - s/p 500mL NS bolus and maintenance IVF with NS @ 75mL/hr    ONC History (per chart review):  - Previously followed with Dr. Locke, now follows with Dr. Cochran, followed with Dr. Muro for [facility]  - Initially presented with rectal bleeding in the setting of constipation over several consecutive days and then called EMS. He was brought to [facility] but then transferred to [facility] on 10/20/24 given no factor VIII at [facility]. Colonoscopy performed on 10/22/24 showed single malignant-appearing and friable ulcerated mass in the anal canal. 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   - s/p curative RT to anus 12/2024  - PET (4/18/25): no evidence of distant metastatic disease, small uptake in the prior anal lesion, likely non-specific but residual neoplasm not entirely excluded  - Now only following with med onc (Dr. Cochran) as surveillance if needed    Past Medical History  He has a past medical history of Acute deep vein thrombosis (DVT) of left femoral vein (Multi) (09/10/2023), Acute diastolic heart failure (04/16/2023), Benign prostatic hyperplasia without lower urinary tract symptoms (01/07/2016), Bleeding hemorrhoids (03/01/2023), CAP (community acquired pneumonia) (01/18/2024), Closed nondisplaced fracture of head of left radius (09/05/2023), COVID-19 (05/21/2023), Enlarged prostate with lower urinary tract symptoms (LUTS) (03/01/2023), Fracture of bone of hip (Multi) (09/05/2023), Fracture of femoral neck, right (03/01/2023), Gastrointestinal hemorrhage (09/05/2023), Gingiva hemorrhage (09/05/2023), Gross hematuria (03/01/2023), Hemarthrosis of ankle joint (04/18/2019), Hemarthrosis of knee (05/09/2022), Hematochezia  (09/05/2023), Hematoma of lower extremity (09/05/2023), Hematoma of right thigh (03/01/2023), History of recent fall (05/31/2023), Knee effusion (09/05/2023), Knee effusion, left (03/01/2023), Lumbar pain (03/01/2023), Obstructive uropathy (04/28/2023), Orthostatic syncope (09/05/2023), Personal history of other endocrine, nutritional and metabolic disease, Pneumonia of left lower lobe due to infectious organism (03/19/2024), Pneumonia of right lung due to infectious organism (03/01/2023), 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    Surgical History  He has a past surgical history that includes Coronary artery bypass graft.    Oncology History    No history exists.        Social History  He reports that he has never smoked. He has never been exposed to tobacco smoke. He has never used smokeless tobacco. Alcohol use questions deferred to the physician. He reports that he does not use drugs.     Allergies  Aspirin and Penicillins     Physical Exam  Vitals reviewed.   Constitutional:       Appearance: He is ill-appearing.   HENT:      Head: Normocephalic and atraumatic.      Nose: Nose normal.      Mouth/Throat:      Mouth: Mucous membranes are moist.      Pharynx: Oropharynx is clear.   Eyes:      Extraocular Movements: Extraocular movements intact.      Pupils: Pupils are equal, round, and reactive to light.   Cardiovascular:      Rate and Rhythm: Normal rate and regular rhythm.      Pulses: Normal pulses.      Heart sounds: Normal heart sounds.   Pulmonary:      Effort: Pulmonary effort is normal.      Breath sounds: Normal breath sounds.   Abdominal:      General: Bowel sounds are normal.      Palpations: Abdomen is soft.   Musculoskeletal:         General: Normal range of motion.   Skin:     General: Skin is warm.      Coloration: Skin is pale.    Neurological:      General: No focal deficit present.      Mental Status: He is alert and oriented to person, place, and time. Mental status is at baseline.   Psychiatric:         Mood and Affect: Mood normal.         Behavior: Behavior normal.          Last Recorded Vitals  There were no vitals taken for this visit.    Relevant Results  Results for orders placed or performed during the hospital encounter of 06/09/25 (from the past 24 hours)   Troponin I, High Sensitivity   Result Value Ref Range    Troponin I, High Sensitivity 446 (HH) 0 - 20 ng/L   Urinalysis with Reflex Culture and Microscopic   Result Value Ref Range    Color, Urine Light-Yellow Light-Yellow, Yellow, Dark-Yellow    Appearance, Urine Clear Clear    Specific Gravity, Urine <1.005 (A) 1.005 - 1.035    pH, Urine 5.5 5.0, 5.5, 6.0, 6.5, 7.0, 7.5, 8.0    Protein, Urine 50 (1+) (A) NEGATIVE, 10 (TRACE), 20 (TRACE) mg/dL    Glucose, Urine OVER (4+) (A) Normal mg/dL    Blood, Urine NEGATIVE NEGATIVE mg/dL    Ketones, Urine NEGATIVE NEGATIVE mg/dL    Bilirubin, Urine NEGATIVE NEGATIVE mg/dL    Urobilinogen, Urine Normal Normal mg/dL    Nitrite, Urine NEGATIVE NEGATIVE    Leukocyte Esterase, Urine NEGATIVE NEGATIVE   Urinalysis Microscopic   Result Value Ref Range    WBC, Urine NONE 1-5, NONE /HPF    RBC, Urine NONE NONE, 1-2, 3-5 /HPF   CBC   Result Value Ref Range    WBC 7.7 4.4 - 11.3 x10*3/uL    nRBC 0.0 0.0 - 0.0 /100 WBCs    RBC 2.25 (L) 4.50 - 5.90 x10*6/uL    Hemoglobin 7.9 (L) 13.5 - 17.5 g/dL    Hematocrit 22.0 (L) 41.0 - 52.0 %    MCV 98 80 - 100 fL    MCH 35.1 (H) 26.0 - 34.0 pg    MCHC 35.9 32.0 - 36.0 g/dL    RDW 21.4 (H) 11.5 - 14.5 %    Platelets 41 (L) 150 - 450 x10*3/uL   Basic metabolic panel   Result Value Ref Range    Glucose 111 (H) 74 - 99 mg/dL    Sodium 139 136 - 145 mmol/L    Potassium 3.6 3.5 - 5.3 mmol/L    Chloride 106 98 - 107 mmol/L    Bicarbonate 21 21 - 32 mmol/L    Anion Gap 16 10 - 20 mmol/L    Urea Nitrogen 30 (H) 6 -  23 mg/dL    Creatinine 1.35 (H) 0.50 - 1.30 mg/dL    eGFR 48 (L) >60 mL/min/1.73m*2    Calcium 7.2 (L) 8.6 - 10.3 mg/dL   Reticulocytes   Result Value Ref Range    Retic % 2.4 (H) 0.5 - 2.0 %    Retic Absolute 0.054 0.017 - 0.110 x10*6/uL    Reticulocyte Hemoglobin 33 28 - 38 pg    Immature Retic fraction 23.4 (H) <=16.0 %   Iron and TIBC   Result Value Ref Range    Iron 44 35 - 150 ug/dL    UIBC 144 110 - 370 ug/dL    TIBC 188 (L) 240 - 445 ug/dL    % Saturation 23 (L) 25 - 45 %   Ferritin   Result Value Ref Range    Ferritin 328 (H) 20 - 300 ng/mL   Coagulation Screen   Result Value Ref Range    Protime 17.9 (H) 9.8 - 12.4 seconds    INR 1.6 (H) 0.9 - 1.1    aPTT 57 (H) 26 - 36 seconds   Fibrinogen   Result Value Ref Range    Fibrinogen 540 (H) 200 - 400 mg/dL   D-dimer, Non VTE   Result Value Ref Range    D-Dimer Non VTE, Quant (ng/mL FEU) 2,617 (H) <=500 ng/mL FEU   Lactate Dehydrogenase   Result Value Ref Range     84 - 246 U/L   Type and screen   Result Value Ref Range    ABO TYPE O     Rh TYPE POS     ANTIBODY SCREEN NEG    Haptoglobin   Result Value Ref Range    Haptoglobin 241 (H) 30 - 200 mg/dL       Scheduled medications  Scheduled Medications[1]  Continuous medications  Continuous Medications[2]  PRN medications  PRN Medications[3]    Assessment/Plan   Assessment & Plan  Hemophilia JOSE (Multi)  Ramón Flores is a 89 y.o. male with PMH Hemophilia, Anal cancer (s/p curative RT 12/2024), CAD s/p CABG >20yrs ago, HFpEF, Afib, BPH, GERD, and CKD who presented to [facility] ED 6/9 with c/o n/v and diarrhea x24hrs. CT a/p with contrast (6/9) with no evidence for intrahepatic or extrahepatic biliary ductal dilation, no evidence of bowel obstruction, colonic diverticulosis with no evidence for acute diverticulitis, prostatomegaly. GI consulted, state s/sx likely gastroenteritis, rec stool path if diarrhea continues. Heme consulted, rec transfer to [facility] for further management of worsening  anemia/thrombocytopenia I/s/o acute illness. Pt was transferred to [facility] for further management. Upon admission to [facility], pt reports his s/x have resolved. Heme consulted on admit 6/10, recs pending. DC pending improvement in anemia and thrombocytopenia    # Hemophilia A  - Follows with Dr. Dillon, LOV 4/9/25  - He has had minimal bleeding during life d/t concurrent protein C deficiency  - On Altuviiio weekly (Tuesdays), will admin today 6/10  - Hgb BL ~12; Hgb 7.9 (6/10)  - Plt BL ~ 80, Plt 41 (6/10)  - , Haptoglobin 241 (6/9)  - Ddimer 2,617, fibrinogen 540, PT 17.9, INR 1.6, aPTT 57 (6/9)  - Myeloid malignancies panel sent/pending and MDS panel sent/pending per heme at [facility]  - Factor 8 activity (6/10) pending  - Continue home folic acid 1mg daily and ferrous sulfate 325mg daily  - Heme consulted 6/10, appreciate recs    # N/V/D  - Likely viral gastroenteritis vs bacterial  - CT a/p with contrast (6/9) no evidence for intrahepatic or extrahepatic biliary ductal dilation, no evidence of bowel obstruction, colonic diverticulosis with no evidence for acute diverticulitis, prostatomegaly  - s/p 1x dose of cefepime and 1x dose of flagyl at [facility] 6/9  - s/p 500mL NS bolus and maintenance IVF with NS @ 75mL/hr at [facility] 6/9  - No fevers/chills or leukocytosis   - Lactate level 5.4 --> 1.8 (6/9)  - UA (6/9) + protein, neg leuks or nitrites  - Blood cultures x2 (6/9): NGTD  - COVID (6/9) negative  - CXR showing left basilar atelectasis/scar vs small infiltrate  - GI consulted at [facility], state s/sx likely gastroenteritis, rec stool path if diarrhea continues  - Stool path, Cdiff, lactoferrin, and fecal calprotectin ordered/pending 6/10  - PO Zofran 4mg q8hrs PRN n/v 1st line, PO Compazine 10mg q8hrs PRN n/v 2nd line  - Started IVF with LR @ 75mL/hr x24hrs (6/10-6/11)  - Will defer further atbs at this time pending clinical course    # Troponinemia  - Likely 2/2 demand I/s/o active illness with cardiac hx  - Hx  elevated troponins in the past  - Trop 249->287-->446 (6/9)  - Trop pending 6/10  - EKG at OSH (6/9) showing aflutter with AV block, non-specific T-wave abnormalities  - Admit EKG pending 6/10    # URI  # c/f possible sepsis  - Pt was seen outpt 6/9 for c/o sinus congestion and cough x2 days, was started on Cefdinir and took one dose 6/9  - On admit, pt continues to endorse non-productive cough; no fevers/chills or SOB  - CXR (6/9) showing left basilar atelectasis/scar vs small infiltrate  - Lactate level 5.4 --> 1.8 (6/9)  - s/p 500mL NS bolus and maintenance IVF with NS @ 75mL/hr  - Started LR @ 75mL/hr x24hrs (6/10-6/11)  - Blood cultures x2 (6/9): NGTD  - COVID (6/9) negative  - Resp viral panel (6/10): pending  - Bps soft on admit (107/62), no c/f significant HoTN  - Presented to [facility] in [place], SpO2 94% on admit, will continue for now and wean as tolerated  - Started Mucinex 600mg BID 6/10  - Will hold atbs at this time, jacob previously prescribed cefdinir as this can cause/worsen thrombocytopenia  - Low threshold for CT chest or atb initiation should clinical course change    # RONNIE/Hx CKD  - Likely pre-renal 2/2 contrast  - sCr BL ~1.1, sCr 1.19 (6/9)--> s/p IV contrast--> sCr 1.35 (6/10)  - s/p 500mL NS bolus and maintenance IVF with NS @ 75mL/hr  - Started LR @ 75mL/hr x24hrs (6/10-6/11)  - Urine lytes ordered/pending 6/10    # Anal Cancer  - Previously followed with Dr. Locke, now follows with Dr. Cochran, followed with Dr. Muro for [facility]  - Initially presented with rectal bleeding in the setting of constipation over several consecutive days and then called EMS. He was brought to [facility] but then transferred to [facility] on 10/20/24 given no factor VIII at [facility]. Colonoscopy performed on 10/22/24 showed single malignant-appearing and friable ulcerated mass in the anal canal. 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   - s/p curative RT to anus 12/2024  -  PET (4/18/25): no evidence of distant metastatic disease, small uptake in the prior anal lesion, likely non-specific but residual neoplasm not entirely excluded  - Now only following with med onc (Dr. Cochran) as surveillance if needed  - Has CT c/a/p and Rad Onc [facility] on 8/4    # CAD  # Afib  # HFpEF  # HLD  - s/p CABG >20yrs ago  - ECHO (2/1/25): EF 49%, global hypokinesis of LV, mod increased septal and mod increased posterior LV wall thickness, sev dialted LA, mod-sev dilated RA, RVSP 44  - Continue home Jardiance 10mg daily and Bumex 0.5mg daily  - Continue home Lipitor 40mg daily  - BNP (6/10): pending    # BPH  - Continue home Flomax 0.4mg BID and Finasteride 5mg daily    # GERD  - Home Pantoprazole 40mg daily sub as Pepcid 20mg daily I/s/o thrombocytopenia    DVT prophy: No pharmacologic DVT prophy I/s/o hemophilia A and thrombocytopenia. SCDs, encourage safe ambulation    DISPO:  - DNR, DNI; confirmed on admit  - DC pending improvement in anemia and thrombocytopenia  - NOK (Javier Wallace, relative): [phone]  - CT c/a/p 8/4, Rad Onc 8/4, Dr. Dillon [facility] 8/6, Ophthalmology [facility] 8/27, Urology [facility] 12/1, Ophthalmology [facility] 12/3    I spent >75 minutes in the professional and overall care of this patient    Assessment and plan as above to be discussed with attending physician Dr. William Ulloa 6/11 AM    Obdulia Li, SUSI-CNP       [1] [2] [3]

## 2025-06-11 ENCOUNTER — APPOINTMENT (OUTPATIENT)
Dept: OPHTHALMOLOGY | Facility: CLINIC | Age: OVER 89
End: 2025-06-11
Payer: MEDICARE

## 2025-06-11 LAB
ALBUMIN SERPL BCP-MCNC: 3.7 G/DL (ref 3.4–5)
ALP SERPL-CCNC: 86 U/L (ref 33–136)
ALT SERPL W P-5'-P-CCNC: 24 U/L (ref 10–52)
ANION GAP SERPL CALC-SCNC: 17 MMOL/L (ref 10–20)
AST SERPL W P-5'-P-CCNC: 25 U/L (ref 9–39)
BASOPHILS # BLD AUTO: 0.03 X10*3/UL (ref 0–0.1)
BASOPHILS NFR BLD AUTO: 0.5 %
BILIRUB SERPL-MCNC: 1.7 MG/DL (ref 0–1.2)
BUN SERPL-MCNC: 36 MG/DL (ref 6–23)
CALCIUM SERPL-MCNC: 7.1 MG/DL (ref 8.6–10.6)
CHLORIDE SERPL-SCNC: 105 MMOL/L (ref 98–107)
CHLORIDE UR-SCNC: 19 MMOL/L
CHLORIDE/CREATININE (MMOL/G) IN URINE: 19 MMOL/G CREAT (ref 23–275)
CO2 SERPL-SCNC: 22 MMOL/L (ref 21–32)
CREAT SERPL-MCNC: 1.38 MG/DL (ref 0.5–1.3)
CREAT UR-MCNC: 102.1 MG/DL (ref 20–370)
EGFRCR SERPLBLD CKD-EPI 2021: 47 ML/MIN/1.73M*2
EOSINOPHIL # BLD AUTO: 0 X10*3/UL (ref 0–0.4)
EOSINOPHIL NFR BLD AUTO: 0 %
ERYTHROCYTE [DISTWIDTH] IN BLOOD BY AUTOMATED COUNT: 21.5 % (ref 11.5–14.5)
FACT VII ACT/NOR PPP: 38 % (ref 50–150)
FACT VIII ACT/NOR PPP: 5 % (ref 55–180)
FACT XI ACT/NOR PPP: 57 % (ref 65–150)
FOLATE SERPL-MCNC: >24 NG/ML
GLUCOSE SERPL-MCNC: 101 MG/DL (ref 74–99)
HADV DNA SPEC QL NAA+PROBE: NOT DETECTED
HBV SURFACE AG SERPL QL IA: NONREACTIVE
HCT VFR BLD AUTO: 23.8 % (ref 41–52)
HGB BLD-MCNC: 8.2 G/DL (ref 13.5–17.5)
HMPV RNA SPEC QL NAA+PROBE: NOT DETECTED
HOLD SPECIMEN: NORMAL
HPIV1 RNA SPEC QL NAA+PROBE: NOT DETECTED
HPIV2 RNA SPEC QL NAA+PROBE: NOT DETECTED
HPIV3 RNA SPEC QL NAA+PROBE: NOT DETECTED
HPIV4 RNA SPEC QL NAA+PROBE: NOT DETECTED
IMM GRANULOCYTES # BLD AUTO: 0.13 X10*3/UL (ref 0–0.5)
IMM GRANULOCYTES NFR BLD AUTO: 2.1 % (ref 0–0.9)
LEGIONELLA AG UR QL: NEGATIVE
LYMPHOCYTES # BLD AUTO: 0.71 X10*3/UL (ref 0.8–3)
LYMPHOCYTES NFR BLD AUTO: 11.7 %
MAGNESIUM SERPL-MCNC: 0.74 MG/DL (ref 1.6–2.4)
MCH RBC QN AUTO: 34.9 PG (ref 26–34)
MCHC RBC AUTO-ENTMCNC: 34.5 G/DL (ref 32–36)
MCV RBC AUTO: 101 FL (ref 80–100)
MONOCYTES # BLD AUTO: 0.83 X10*3/UL (ref 0.05–0.8)
MONOCYTES NFR BLD AUTO: 13.7 %
NEUTROPHILS # BLD AUTO: 4.35 X10*3/UL (ref 1.6–5.5)
NEUTROPHILS NFR BLD AUTO: 72 %
NRBC BLD-RTO: 0 /100 WBCS (ref 0–0)
PLATELET # BLD AUTO: 42 X10*3/UL (ref 150–450)
POTASSIUM SERPL-SCNC: 3.7 MMOL/L (ref 3.5–5.3)
POTASSIUM UR-SCNC: 33 MMOL/L
POTASSIUM/CREAT UR-RTO: 32 MMOL/G CREAT
PROT SERPL-MCNC: 6.3 G/DL (ref 6.4–8.2)
PROTHROM ACT/NOR PPP: 51 % (ref 80–130)
RBC # BLD AUTO: 2.35 X10*6/UL (ref 4.5–5.9)
RHINOVIRUS RNA UPPER RESP QL NAA+PROBE: NOT DETECTED
S PNEUM AG UR QL: NEGATIVE
SODIUM SERPL-SCNC: 140 MMOL/L (ref 136–145)
SODIUM UR-SCNC: 11 MMOL/L
SODIUM/CREAT UR-RTO: 11 MMOL/G CREAT
WBC # BLD AUTO: 6.1 X10*3/UL (ref 4.4–11.3)

## 2025-06-11 PROCEDURE — 36415 COLL VENOUS BLD VENIPUNCTURE: CPT

## 2025-06-11 PROCEDURE — 2500000004 HC RX 250 GENERAL PHARMACY W/ HCPCS (ALT 636 FOR OP/ED)

## 2025-06-11 PROCEDURE — 84075 ASSAY ALKALINE PHOSPHATASE: CPT

## 2025-06-11 PROCEDURE — 84630 ASSAY OF ZINC: CPT

## 2025-06-11 PROCEDURE — 99233 SBSQ HOSP IP/OBS HIGH 50: CPT | Performed by: HOSPITALIST

## 2025-06-11 PROCEDURE — 99232 SBSQ HOSP IP/OBS MODERATE 35: CPT

## 2025-06-11 PROCEDURE — 2500000001 HC RX 250 WO HCPCS SELF ADMINISTERED DRUGS (ALT 637 FOR MEDICARE OP): Performed by: NURSE PRACTITIONER

## 2025-06-11 PROCEDURE — 2500000001 HC RX 250 WO HCPCS SELF ADMINISTERED DRUGS (ALT 637 FOR MEDICARE OP)

## 2025-06-11 PROCEDURE — 84238 ASSAY NONENDOCRINE RECEPTOR: CPT

## 2025-06-11 PROCEDURE — 87449 NOS EACH ORGANISM AG IA: CPT

## 2025-06-11 PROCEDURE — 2500000002 HC RX 250 W HCPCS SELF ADMINISTERED DRUGS (ALT 637 FOR MEDICARE OP, ALT 636 FOR OP/ED)

## 2025-06-11 PROCEDURE — 82525 ASSAY OF COPPER: CPT

## 2025-06-11 PROCEDURE — 83735 ASSAY OF MAGNESIUM: CPT

## 2025-06-11 PROCEDURE — 1200000003 HC ONCOLOGY  ROOM WITH TELEMETRY DAILY

## 2025-06-11 PROCEDURE — 2500000002 HC RX 250 W HCPCS SELF ADMINISTERED DRUGS (ALT 637 FOR MEDICARE OP, ALT 636 FOR OP/ED): Performed by: NURSE PRACTITIONER

## 2025-06-11 PROCEDURE — 87899 AGENT NOS ASSAY W/OPTIC: CPT

## 2025-06-11 PROCEDURE — 85025 COMPLETE CBC W/AUTO DIFF WBC: CPT

## 2025-06-11 PROCEDURE — 2500000004 HC RX 250 GENERAL PHARMACY W/ HCPCS (ALT 636 FOR OP/ED): Performed by: NURSE PRACTITIONER

## 2025-06-11 PROCEDURE — 82570 ASSAY OF URINE CREATININE: CPT | Performed by: NURSE PRACTITIONER

## 2025-06-11 RX ORDER — PHYTONADIONE 5 MG/1
5 TABLET ORAL DAILY
Status: DISCONTINUED | OUTPATIENT
Start: 2025-06-11 | End: 2025-06-12

## 2025-06-11 RX ORDER — CEFTRIAXONE 1 G/50ML
1 INJECTION, SOLUTION INTRAVENOUS EVERY 24 HOURS
Status: DISCONTINUED | OUTPATIENT
Start: 2025-06-11 | End: 2025-06-16

## 2025-06-11 RX ORDER — MAGNESIUM SULFATE HEPTAHYDRATE 40 MG/ML
4 INJECTION, SOLUTION INTRAVENOUS ONCE
Status: COMPLETED | OUTPATIENT
Start: 2025-06-11 | End: 2025-06-11

## 2025-06-11 RX ORDER — FLUTICASONE PROPIONATE 50 MCG
2 SPRAY, SUSPENSION (ML) NASAL DAILY
Status: DISCONTINUED | OUTPATIENT
Start: 2025-06-11 | End: 2025-06-18 | Stop reason: HOSPADM

## 2025-06-11 RX ORDER — AZITHROMYCIN 500 MG/1
500 TABLET, FILM COATED ORAL EVERY 24 HOURS
Status: DISCONTINUED | OUTPATIENT
Start: 2025-06-11 | End: 2025-06-14

## 2025-06-11 RX ADMIN — PHYTONADIONE 5 MG: 5 TABLET ORAL at 17:54

## 2025-06-11 RX ADMIN — TAMSULOSIN HYDROCHLORIDE 0.4 MG: 0.4 CAPSULE ORAL at 08:23

## 2025-06-11 RX ADMIN — FERROUS SULFATE TAB 325 MG (65 MG ELEMENTAL FE) 1 TABLET: 325 (65 FE) TAB at 08:23

## 2025-06-11 RX ADMIN — EMPAGLIFLOZIN 10 MG: 10 TABLET, FILM COATED ORAL at 08:46

## 2025-06-11 RX ADMIN — GUAIFENESIN 600 MG: 600 TABLET, EXTENDED RELEASE ORAL at 20:17

## 2025-06-11 RX ADMIN — ATORVASTATIN CALCIUM 40 MG: 40 TABLET, FILM COATED ORAL at 08:23

## 2025-06-11 RX ADMIN — BUMETANIDE 0.5 MG: 0.5 TABLET ORAL at 08:46

## 2025-06-11 RX ADMIN — FINASTERIDE 5 MG: 5 TABLET, FILM COATED ORAL at 08:23

## 2025-06-11 RX ADMIN — FAMOTIDINE 20 MG: 20 TABLET, FILM COATED ORAL at 08:22

## 2025-06-11 RX ADMIN — BENZONATATE 100 MG: 100 CAPSULE ORAL at 02:06

## 2025-06-11 RX ADMIN — ONDANSETRON HYDROCHLORIDE 4 MG: 4 TABLET, FILM COATED ORAL at 22:09

## 2025-06-11 RX ADMIN — FLUTICASONE PROPIONATE 2 SPRAY: 50 SPRAY, METERED NASAL at 01:53

## 2025-06-11 RX ADMIN — GUAIFENESIN 600 MG: 600 TABLET, EXTENDED RELEASE ORAL at 08:23

## 2025-06-11 RX ADMIN — CEFTRIAXONE SODIUM 1 G: 1 INJECTION, SOLUTION INTRAVENOUS at 11:22

## 2025-06-11 RX ADMIN — FOLIC ACID 1 MG: 1 TABLET ORAL at 08:23

## 2025-06-11 RX ADMIN — AZITHROMYCIN DIHYDRATE 500 MG: 500 TABLET ORAL at 13:24

## 2025-06-11 RX ADMIN — TAMSULOSIN HYDROCHLORIDE 0.4 MG: 0.4 CAPSULE ORAL at 20:17

## 2025-06-11 RX ADMIN — ONDANSETRON HYDROCHLORIDE 4 MG: 4 TABLET, FILM COATED ORAL at 02:29

## 2025-06-11 RX ADMIN — MAGNESIUM SULFATE HEPTAHYDRATE 4 G: 40 INJECTION, SOLUTION INTRAVENOUS at 12:06

## 2025-06-11 RX ADMIN — BENZONATATE 100 MG: 100 CAPSULE ORAL at 06:25

## 2025-06-11 ASSESSMENT — COGNITIVE AND FUNCTIONAL STATUS - GENERAL
TURNING FROM BACK TO SIDE WHILE IN FLAT BAD: A LITTLE
MOBILITY SCORE: 18
PERSONAL GROOMING: A LITTLE
DRESSING REGULAR LOWER BODY CLOTHING: A LITTLE
MOBILITY SCORE: 18
PERSONAL GROOMING: A LITTLE
HELP NEEDED FOR BATHING: A LITTLE
DRESSING REGULAR LOWER BODY CLOTHING: A LITTLE
HELP NEEDED FOR BATHING: A LITTLE
TOILETING: A LITTLE
DAILY ACTIVITIY SCORE: 19
STANDING UP FROM CHAIR USING ARMS: A LITTLE
TOILETING: A LITTLE
DRESSING REGULAR UPPER BODY CLOTHING: A LITTLE
WALKING IN HOSPITAL ROOM: A LITTLE
TURNING FROM BACK TO SIDE WHILE IN FLAT BAD: A LITTLE
STANDING UP FROM CHAIR USING ARMS: A LITTLE
WALKING IN HOSPITAL ROOM: A LITTLE
DAILY ACTIVITIY SCORE: 19
MOVING FROM LYING ON BACK TO SITTING ON SIDE OF FLAT BED WITH BEDRAILS: A LITTLE
CLIMB 3 TO 5 STEPS WITH RAILING: A LITTLE
MOVING FROM LYING ON BACK TO SITTING ON SIDE OF FLAT BED WITH BEDRAILS: A LITTLE
DRESSING REGULAR UPPER BODY CLOTHING: A LITTLE
MOVING TO AND FROM BED TO CHAIR: A LITTLE
CLIMB 3 TO 5 STEPS WITH RAILING: A LITTLE
MOVING TO AND FROM BED TO CHAIR: A LITTLE

## 2025-06-11 ASSESSMENT — PAIN - FUNCTIONAL ASSESSMENT
PAIN_FUNCTIONAL_ASSESSMENT: 0-10
PAIN_FUNCTIONAL_ASSESSMENT: 0-10

## 2025-06-11 ASSESSMENT — PAIN SCALES - GENERAL
PAINLEVEL_OUTOF10: 0 - NO PAIN
PAINLEVEL_OUTOF10: 0 - NO PAIN

## 2025-06-11 NOTE — PROGRESS NOTES
Pharmacy Medication History Review    Ramón Flores is a 89 y.o. male admitted for Hemophilia A (Multi). Pharmacy reviewed the patient's [id number] medications and allergies for accuracy.    Medications ADDED  N/A  Medications CHANGED  N/A  Medications REMOVED/NOT TAKING   Cefdinir 300 mg- duplicate order     The list below reflects the updated PTA list.   Prior to Admission Medications   Prescriptions Last Dose Informant   Lactobacillus acidoph-L.bulgar 1 million cell tablet tablet  Other   Sig: Take 1 tablet by mouth once daily.   acetaminophen (Tylenol Extra Strength) 500 mg tablet  Other   Sig: Take 2 tablets (1,000 mg) by mouth every 8 hours if needed for mild pain (1 - 3).   antihemophilic RF VIII (Altuviiio) 3,000 (+/-) unit recon soln  Other   Sig: Infuse 3,030 Units into a venous catheter every 7 days. 3030 units +/-10% dose every 7 days.  Additionally,  prn when directed.   atorvastatin (Lipitor) 40 mg tablet  Other   Sig: TAKE 1 TABLET BY MOUTH EVERY DAY   bumetanide (Bumex) 0.5 mg tablet  Other   Sig: Take 1 tablet (0.5 mg) by mouth once daily.   calcium carbonate 600 mg calcium (1,500 mg) tablet  Other   Sig: Take 1 tablet (1,500 mg) by mouth 2 times daily (morning and late afternoon).   cefdinir (Omnicef) 300 mg capsule  Other   Sig: Take 1 capsule (300 mg) by mouth 2 times a day for 5 days.   cefdinir (Omnicef) 300 mg capsule Not Taking Other   Sig: Take 1 capsule (300 mg) by mouth 2 times a day for 5 days.   Patient not taking: Reported on 6/11/2025   empagliflozin (Jardiance) 10 mg tablet  Other   Sig: Take 1 tablet (10 mg) by mouth once daily.   ferrous sulfate 325 (65 Fe) MG EC tablet  Other   Sig: Take 1 tablet by mouth once daily with breakfast. Do not crush, chew, or split.   finasteride (Proscar) 5 mg tablet  Other   Sig: Take 1 tablet (5 mg) by mouth once daily.   folic acid (Folvite) 1 mg tablet  Other   Sig: Take 1 tablet (1 mg) by mouth once daily.   guaiFENesin (Robitussin)  100 mg/5 mL syrup  Other   Sig: Take 10 mL (200 mg) by mouth every 4 hours if needed for congestion.   Patient not taking: Reported on 4/29/2025   multivitamin tablet  Other   Sig: Take 1 tablet by mouth once daily.   nystatin (Mycostatin) cream     Sig: Apply topically 2 times a day. apply to affected area   pantoprazole (ProtoNix) 40 mg EC tablet  Other   Sig: Take 1 tablet (40 mg) by mouth once daily in the morning. Take before meals. Do not crush, chew, or split.   spironolactone (Aldactone) 25 mg tablet  Other   Sig: Take 0.5 tablets (12.5 mg) by mouth once daily as needed (for shortness of breath and increaed swelling).   tamsulosin (Flomax) 0.4 mg 24 hr capsule  Other   Sig: Take 1 capsule (0.4 mg) by mouth 2 times a day.   vit A/vit C/vit E/zinc/copper (PRESERVISION AREDS ORAL)  Other   Sig: Take 1 capsule by mouth once daily.      Facility-Administered Medications: None       The list below reflects the updated allergy list. Please review each documented allergy for additional clarification and justification.  Allergies  Reviewed by CHAY Mckeon on 6/10/2025        Severity Reactions Comments    Aspirin High Bleeding hemophiliac    Penicillins Low Rash Tolerated cefdinir, cefepime            Patient was unable to be assessed for M2B at discharge. Pharmacy has been updated to [facility] in [place] .    Sources  [facility]  Pharmacy dispense history  Patient Interview Unable to provide any details  Patient reports he has health aids that set up weekly pill box for him, but is uncertain of the specific medications  Nephew,  Unable to provide any details  Chart Review     Additional Comments  Outpatient dispense records and chart review utilized to update prior to admission medication list  Unable to confirm products available over the counter     Jesus Weldon PharmD  [facility]  [address]  [facility], [address]  [place] [address]  Phone: [phone]  Please reach out via Secure  Chat for questions, or if no response call [facility] or [facility]

## 2025-06-11 NOTE — CARE PLAN
pt will remain safe and free from injury throughout this shift 6/11/25 @ 0700        Problem: Pain - Adult  Goal: Verbalizes/displays adequate comfort level or baseline comfort level  Outcome: Progressing     Problem: Safety - Adult  Goal: Free from fall injury  Outcome: Progressing     Problem: Discharge Planning  Goal: Discharge to home or other facility with appropriate resources  Outcome: Progressing     Problem: Chronic Conditions and Co-morbidities  Goal: Patient's chronic conditions and co-morbidity symptoms are monitored and maintained or improved  Outcome: Progressing     Problem: Nutrition  Goal: Nutrient intake appropriate for maintaining nutritional needs  Outcome: Progressing     Problem: Skin  Goal: Decreased wound size/increased tissue granulation at next dressing change  Outcome: Progressing  Flowsheets (Taken 6/11/2025 0245)  Decreased wound size/increased tissue granulation at next dressing change: Promote sleep for wound healing  Goal: Participates in plan/prevention/treatment measures  6/11/2025 0245 by Leandro Osborn RN  Flowsheets (Taken 6/11/2025 0245)  Participates in plan/prevention/treatment measures: Elevate heels  6/11/2025 0245 by Leandro Osborn RN  Outcome: Progressing  Flowsheets (Taken 6/11/2025 0245)  Participates in plan/prevention/treatment measures: Elevate heels  Goal: Prevent/manage excess moisture  6/11/2025 0245 by Leandro Osborn RN  Flowsheets (Taken 6/11/2025 0245)  Prevent/manage excess moisture:   Cleanse incontinence/protect with barrier cream   Monitor for/manage infection if present  6/11/2025 0245 by Leandro Osborn RN  Outcome: Progressing  Flowsheets (Taken 6/11/2025 0245)  Prevent/manage excess moisture:   Cleanse incontinence/protect with barrier cream   Monitor for/manage infection if present  Goal: Prevent/minimize sheer/friction injuries  6/11/2025 0245 by Leandro Osborn RN  Flowsheets (Taken 6/11/2025 0245)  Prevent/minimize sheer/friction injuries: Use  pull sheet  6/11/2025 0245 by Leandro Osborn RN  Outcome: Progressing  Flowsheets (Taken 6/11/2025 0245)  Prevent/minimize sheer/friction injuries: Use pull sheet  Goal: Promote/optimize nutrition  6/11/2025 0245 by Leandro Osborn RN  Flowsheets (Taken 6/11/2025 0245)  Promote/optimize nutrition: Offer water/supplements/favorite foods  6/11/2025 0245 by Leandro Osborn RN  Outcome: Progressing  Flowsheets (Taken 6/11/2025 0245)  Promote/optimize nutrition: Offer water/supplements/favorite foods  Goal: Promote skin healing  6/11/2025 0245 by Leandro Osborn RN  Flowsheets (Taken 6/11/2025 0245)  Promote skin healing: Assess skin/pad under line(s)/device(s)  6/11/2025 0245 by Leandro Osborn RN  Outcome: Progressing  Flowsheets (Taken 6/11/2025 0245)  Promote skin healing: Assess skin/pad under line(s)/device(s)

## 2025-06-11 NOTE — CARE PLAN
The clinical goals for the shift include Patient will remain safe and HDS this shift      Problem: Safety - Adult  Goal: Free from fall injury  Outcome: Progressing     Problem: Chronic Conditions and Co-morbidities  Goal: Patient's chronic conditions and co-morbidity symptoms are monitored and maintained or improved  Outcome: Progressing     Problem: Nutrition  Goal: Nutrient intake appropriate for maintaining nutritional needs  Outcome: Progressing

## 2025-06-11 NOTE — PROGRESS NOTES
"Ramón Flores is a 89 y.o. male on day 1 of admission presenting with Hemophilia A (Multi).    Subjective   Ramón is doing fine this morning. States he had no acute events overnight. Pt denies any signs of bleeding, just some bruising from IV pokes. Denies any chest pain, SOB, N/V/D/C, fever, chills, abdominal pain. We discussed heme recs and starting abx for congestion to treat poss PNA seen on CXR. Currently on 2L supp O2, wean as able. ROS: A complete review of systems was performed and is negative except for as mentioned above in the HPI     Objective     Physical Exam  HENT:      Nose: Nose normal.      Mouth/Throat:      Mouth: Mucous membranes are moist.   Eyes:      Pupils: Pupils are equal, round, and reactive to light.   Cardiovascular:      Rate and Rhythm: Normal rate.   Pulmonary:      Effort: Pulmonary effort is normal.      Comments: On 2L supp O2  Abdominal:      General: Abdomen is flat.   Musculoskeletal:         General: Normal range of motion.      Cervical back: Normal range of motion.   Neurological:      General: No focal deficit present.      Mental Status: He is alert.   Psychiatric:         Mood and Affect: Mood normal.       Last Recorded Vitals  Blood pressure 115/71, pulse 71, temperature 36.4 °C (97.5 °F), temperature source Temporal, resp. rate 16, height 1.676 m (5' 6\"), weight 59.4 kg (131 lb), SpO2 95%.  Intake/Output last 3 Shifts:  I/O last 3 completed shifts:  In: 501.7 (8.4 mL/kg) [I.V.:501.7 (8.4 mL/kg)]  Out: 200 (3.4 mL/kg) [Urine:200 (0.1 mL/kg/hr)]  Weight: 59.4 kg     Relevant Results  CT abdomen pelvis w IV contrast  Result Date: 6/9/2025  STUDY: CT Abdomen and Pelvis with IV Contrast; 6/9/2025 4:12 PM INDICATION: Nausea/vomiting/diarrhea.  Concerns of jaundice. COMPARISON: PET/CT 4/21/2025.  CTA AP 10/20/2024. ACCESSION NUMBER(S): [id number] ORDERING CLINICIAN: YAW GRACE TECHNIQUE: CT of the abdomen and pelvis was performed.  Contiguous axial images were " obtained at 3 mm slice thickness through the abdomen and pelvis. Coronal and sagittal reconstructions at 3 mm slice thickness were performed.  Omnipaque 350--75 mL was administered intravenously.  FINDINGS: LOWER CHEST: No cardiomegaly.  No pericardial effusion.  Subsegmental dependent atelectasis identified at the bilateral lower lobes.  ABDOMEN:  LIVER: No hepatomegaly.  Smooth surface contour.  Normal attenuation.  BILE DUCTS: No intrahepatic or extrahepatic biliary ductal dilatation.  GALLBLADDER: The gallbladder is unremarkable. STOMACH: No abnormalities identified.  PANCREAS: No masses or ductal dilatation.  SPLEEN: No splenomegaly or focal splenic lesion.  ADRENAL GLANDS: No thickening or nodules.  KIDNEYS AND URETERS: Kidneys are normal in size and location.  No renal or ureteral calculi.  Cortical cystic lesions at the kidneys bilaterally represent simple cysts.  PELVIS:  BLADDER: No abnormalities identified.  REPRODUCTIVE ORGANS: The prostate gland is enlarged measuring 6.0 cm in transverse diameter and 5.7 cm in AP diameter.  BOWEL: Scattered diverticula in the large bowel represent diverticulosis. The loops small and large bowel demonstrate no abnormal dilatation or focal wall thickening.  The appendix is well-visualized and is within normal limits.  VESSELS: No abnormalities identified.  Abdominal aorta is normal in caliber.  PERITONEUM/RETROPERITONEUM/LYMPH NODES: No free fluid.  No pneumoperitoneum. No lymphadenopathy.  ABDOMINAL WALL: No abnormalities identified. SOFT TISSUES: No abnormalities identified.  BONES: No acute fracture or aggressive osseous lesion.  A bipolar hip prosthesis is in place on the right.    1.  No evidence for intrahepatic or extrahepatic biliary ductal dilatation. 2.  No evidence for bowel obstruction. 3.  Colonic diverticulosis with no evidence for acute diverticulitis. 4.  Prostatomegaly. Signed by Florian Bales MD    XR chest 1 view  Result Date: 6/9/2025  Interpreted  By:  Casey Al, STUDY: XR CHEST 1 VIEW;  6/9/2025 3:03 pm   INDICATION: Signs/Symptoms:sepsis.     COMPARISON: 02/01/2025.   ACCESSION NUMBER(S): [id number]   ORDERING CLINICIAN: YAW GRACE   FINDINGS: CARDIOMEDIASTINAL SILHOUETTE: Mild cardiomegaly and postoperative changes of the mediastinum are similar to prior.   LUNGS: Irregular interstitial thickening is seen bilaterally with more prominent irregular region of consolidation and/or volume loss in the right infrahilar region. Left basilar atelectasis/scar versus small infiltrate also present. Small pleural effusions not excluded. No appreciable pneumothorax.   ABDOMEN: No remarkable upper abdominal findings.   BONES: Generalized osteopenia is present with thoracic mild dextrocurvature and partially visualized degenerative changes of the shoulder similar to prior.       1.  Irregular interstitial thickening with increased consolidation and/or volume loss in the right infrahilar region. 2. Left basilar atelectasis/scar versus small infiltrate.       MACRO: None.   Signed by: Casey Al 6/9/2025 3:18 PM Dictation workstation:   [id number]        Assessment & Plan  Hemophilia A (Multi)    Ramón Flores is a 89 y.o. male with PMH Hemophilia, Anal cancer (s/p curative RT 12/2024), CAD s/p CABG >20yrs ago, HFpEF, Afib, BPH, GERD, and CKD who [facility] ED [facility]9 with c/o n/v and diarrhea x24hrs. CT a/p with contrast (6/9) with no evidence for intrahepatic or extrahepatic biliary ductal dilation, no evidence of bowel obstruction, colonic diverticulosis with no evidence for acute diverticulitis, prostatomegaly. GI consulted, state s/sx likely gastroenteritis, rec stool path if diarrhea continues. Heme consulted, rec [place] [facility] [place] of worsening anemia/thrombocytopenia I/s/o acute illness. Pt was transferred to [facility] for further management. Upon admission to [facility], pt reports his s/x have resolved. Heme consulted on  admit 6/10, recs extensive hemophilia, pancytopenia workup. (6/11-current) start IV ceftriaxone and PO azithromycin for CAP treatment. DC pending improvement in anemia and thrombocytopenia    Update 6/11:   - Start IV ceftriaxone 1g daily and PO azithromycin 500mg daily for CAP coverage (6/11-plan stop 6/15) for 5 days total  - Mag 0.74, s/p 4g Mag replacement   - Heme consulted for pancytopenia, full hemophilia workup and pancytopenia workup   - zinc/copper level pending  - peripheral smear pending  - Stool path, Cdiff, lactoferrin, and fecal calprotectin ordered/pending     # Hemophilia A  # Pancytopenia  - Follows with Dr. Dillon, LOV 4/9/25 - emailed about admission 6/11  - He has had minimal bleeding during life d/t concurrent protein C deficiency  - On Altuviiio weekly (Tuesdays), pt got dose on 6/10  - Hgb BL ~12; Hgb 7.9 (6/10) -> 8.2 (6/11)  - Plt BL ~ 80, Plt 41 (6/10) -> 42 (6/11)  - , Haptoglobin 241 (6/9)  - Ddimer 2,617, fibrinogen 540, PT 17.9, INR 1.6, aPTT 57 (6/9)  - Iron 11, TIBC 188, Ferritin 328, haptoglobin 241 (6/10)  - , retic 2.4% (6/10)  - Myeloid malignancies panel sent/pending and MDS panel sent/pending per heme at [facility]  - Factor 8 activity (6/10) 5  - Continue home folic acid 1mg daily and ferrous sulfate 325mg daily  - Heme consulted 6/10, rec factor assays: FII, FVII, FVIII, FXI; drawing serum copper and zinc level, proceed with altuviio injection, Heme to FU with myeloid malignancy panel, peripheral smear  - FXI 57, FVIII 5, FVII 38, FII 51 (6/10)  - MDS panel and Myeloid malignancies NGS panel pending (6/11)  - Peripheral smear pending (6/11)  - serum copper/zinc levels pending (6/11)  - per Heme: transfuse to maintain Hb > 7 or if symptomatic, PLT > 10 or > 50 if bleeding      # N/V/D  - Likely viral gastroenteritis vs bacterial  - CT a/p with contrast (6/9) no evidence for intrahepatic or extrahepatic biliary ductal dilation, no evidence of bowel obstruction,  colonic diverticulosis with no evidence for acute diverticulitis, prostatomegaly  - s/p 1x dose of cefepime and 1x dose of flagyl at [facility] 6/9  - s/p 500mL NS bolus and maintenance IVF with NS @ 75mL/hr at [facility] 6/9  - No fevers/chills or leukocytosis   - Lactate level 5.4 --> 1.8 (6/9)  - UA (6/9) + protein, neg leuks or nitrites  - Blood cultures x2 (6/9): NGTD  - COVID (6/9) negative  - CXR showing left basilar atelectasis/scar vs small infiltrate  - GI consulted at [facility] (6/10), state s/sx likely gastroenteritis, rec stool path if diarrhea continues  - Stool path, Cdiff, lactoferrin, and fecal calprotectin ordered/pending 6/10  - PO Zofran 4mg q8hrs PRN n/v 1st line, PO Compazine 10mg q8hrs PRN n/v 2nd line  - s/p IVF with LR @ 75mL/hr x12hrs (6/10-6/11)     # Troponinemia - downtrending  - Likely 2/2 demand I/s/o active illness with cardiac hx, aflutter seen on EKG  - Hx elevated troponins in the past  - Trop 249->287-->446 (6/9)  - down-trending Trop 285 (6/10)  - EKG at OSH (6/9) showing aflutter with AV block, non-specific T-wave abnormalities  - Admit EKG pending 6/10     # URI vs CAP  - Pt was seen outpt 6/9 for c/o sinus congestion and cough x2 days, was started on Cefdinir and took one dose 6/9  - On admit, pt continues to endorse non-productive cough; no fevers/chills or SOB  - CXR (6/9) showing left basilar atelectasis/scar vs small infiltrate  - Lactate level 5.4 --> 1.8 (6/9)  - s/p 500mL NS bolus and maintenance IVF with NS @ 75mL/hr  - Started LR @ 75mL/hr x24hrs (6/10-6/11)  - Blood cultures x2 (6/9): NGTD  - COVID (6/9) negative  - Resp viral panel (6/10): pending  - Strep pneumo, legionella neg (6/10)  - MRSA pending (6/10)  - Bps soft on admit (107/62), no c/f significant HoTN  - Presented to [facility] in 2L NC, SpO2 94% on admit, will continue for now and wean as tolerated  - encourage IS  - Started Mucinex 600mg BID and tessalon pearls PRN 6/10  - Start IV ceftriaxone 1g daily and PO azithromycin  500mg daily (6/11-plan stop 6/15) for 5 days of CAP coverage given cough and supp O2 use     # RONNIE/Hx CKD  - Likely pre-renal 2/2 contrast  - sCr BL ~1.1, sCr 1.19 (6/9)--> s/p IV contrast--> sCr 1.38 (6/11)  - s/p 500mL NS bolus and maintenance IVF with NS @ 75mL/hr  - s/p LR @ 75mL/hr x 12hrs (6/10-6/11)  - Urine lytes WNL 6/10  - encourage increased PO intake     # Anal Cancer  - Previously followed with Dr. Locke, now follows with Dr. Cochran, followed with Dr. Muro for RadOnc  - Initially presented with rectal bleeding in the setting of constipation over several consecutive days and then called EMS. He was brought to [facility] but then transferred to [facility] on 10/20/24 given no factor VIII at [facility]. Colonoscopy performed on 10/22/24 showed single malignant-appearing and friable ulcerated mass in the anal canal. 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   - s/p curative RT to anus 12/2024  - PET (4/18/25): no evidence of distant metastatic disease, small uptake in the prior anal lesion, likely non-specific but residual neoplasm not entirely excluded  - Now only following with med onc (Dr. Cochran) as surveillance if needed  - Has CT c/a/p and Rad Onc FUV on 8/4     # CAD  # Afib  # HFpEF  # HLD  - s/p CABG >20yrs ago  - ECHO (2/1/25): EF 49%, global hypokinesis of LV, mod increased septal and mod increased posterior LV wall thickness, sev dialted LA, mod-sev dilated RA, RVSP 44  - Continue home Jardiance 10mg daily and Bumex 0.5mg daily  - Continue home Lipitor 40mg daily  - BNP (6/10): 580     # BPH  - Continue home Flomax 0.4mg BID and Finasteride 5mg daily     # GERD  - Home Pantoprazole 40mg daily sub as Pepcid 20mg daily I/s/o thrombocytopenia     DVT prophy: No pharmacologic DVT prophy I/s/o hemophilia A and thrombocytopenia. SCDs, encourage safe ambulation     DISPO:  - DNR, DNI; confirmed on admit  - DC pending improvement in anemia and  thrombocytopenia  - NOK (Javier Wallace, relative): [phone]  - CT c/a/p 8/4, Rad Onc 8/4, Dr. Dillon FUV 8/6, Ophthalmology FUV 8/27, Urology FUV 12/1, Ophthalmology FUV 12/3     I spent 60 minutes in the professional and overall care of this patient.    Assessment and plan as above discussed with attending physician Dr. Laila Sams PA-C

## 2025-06-11 NOTE — DISCHARGE SUMMARY
Discharge Diagnosis  Hemophilia   Gastroenteritis      Patient transferred to [facility]       Issues Requiring Follow-Up  Transfer to main [place]     Discharge Meds     Medication List      ASK your doctor about these medications     acetaminophen 500 mg tablet; Commonly known as: Tylenol Extra Strength;   Take 2 tablets (1,000 mg) by mouth every 8 hours if needed for mild pain   (1 - 3).   antihemophilic factor VIII (recombinant), Fc-VWF-XTEN fusion   protein-ehtl 3,000 unit recon soln; Commonly known as: Altuviiio; Infuse   3,030 Units into a venous catheter every 7 days. 3030 units +/-10% dose   every 7 days.  Additionally,  prn when directed.   atorvastatin 40 mg tablet; Commonly known as: Lipitor; TAKE 1 TABLET BY   MOUTH EVERY DAY   bumetanide 0.5 mg tablet; Commonly known as: Bumex; Take 1 tablet (0.5   mg) by mouth once daily.   calcium carbonate 600 mg calcium (1,500 mg) tablet   * cefdinir 300 mg capsule; Commonly known as: Omnicef; Take 1 capsule   (300 mg) by mouth 2 times a day for 5 days.   * cefdinir 300 mg capsule; Commonly known as: Omnicef; Take 1 capsule   (300 mg) by mouth 2 times a day for 5 days.   Chest Congestion Relief 100 mg/5 mL syrup; Generic drug: guaiFENesin;   Take 10 mL (200 mg) by mouth every 4 hours if needed for congestion.   ferrous sulfate 325 (65 Fe) mg EC tablet   finasteride 5 mg tablet; Commonly known as: Proscar; Take 1 tablet (5   mg) by mouth once daily.   folic acid 1 mg tablet; Commonly known as: Folvite   Jardiance 10 mg tablet; Generic drug: empagliflozin; Take 1 tablet (10   mg) by mouth once daily.   lactobacillus acidophilus tablet tablet   multivitamin tablet   nystatin cream; Commonly known as: Mycostatin; Apply topically 2 times a   day. apply to affected area; Ask about: Should I take this medication?   pantoprazole 40 mg EC tablet; Commonly known as: ProtoNix; Take 1 tablet   (40 mg) by mouth once daily in the morning. Take before meals. Do not   crush, chew, or  split.   PRESERVISION AREDS ORAL   spironolactone 25 mg tablet; Commonly known as: Aldactone; Take 0.5   tablets (12.5 mg) by mouth once daily as needed (for shortness of breath   and increaed swelling).   tamsulosin 0.4 mg 24 hr capsule; Commonly known as: Flomax; Take 1   capsule (0.4 mg) by mouth 2 times a day.  * This list has 2 medication(s) that are the same as other medications   prescribed for you. Read the directions carefully, and ask your doctor or   other care provider to review them with you.       Test Results Pending At Discharge  Pending Labs       Order Current Status    Extra Tubes In process    Factor 8 Activity In process    MDS Panel, FISH In process    Myeloid Malignancies NGS Panel In process    SST TOP In process    Blood Culture Preliminary result    Blood Culture Preliminary result            Hospital Course   Ramón Flores is a 89 y.o. male presenting via EMS with nausea vomiting and diarrhea, was seen by his geriatrics NP on 6/9 in the morning for acute cough/congestion, poor appetite, temp 100.2 on arrival to ED. His outpatient PCP prescribed him cefdinir, guaifenesin and afrin yesterday d/t his c/o acute cough and congestion; instructed to use afrin only for 3 days due to risks and to take guaifenesin 15ml tid 7 days. He later developed N/V/D subsequently resulting in dehydration. His niece reported he was so weak he could not get out of bed by himself prompting her to call 911 last evening. Past medical history of severe hemophila A (weekly Altuviiio, protein C deficiency, thrombocytopenia, MGUS, CKD 3, persistent atrial flutter/atrial fibrillation not on anticoagulation due to his hemophilia, DVT 3/2023 s/p Eliquis x 3 months, CAD remote CABG, HFpEF on Bumex 0.5 mg daily, recent syncopal episodes and subsequent prolonged rehabilitation in [place] with deconditioning.    Patient stated that he has had numerous episodes of nausea vomiting and diarrhea since 9:00 yesterday morning,  feeling better following fluid resuscitation in the setting of gastroenteritis. Patient transferred to [facility] for progressively worsening anemia and thrombocytopenia per hematology's recommendations     Spent >30 minutes on clinical evaluation and education with patient on day of discharge     Pertinent Physical Exam At Time of Discharge  Physical Exam  Vitals and nursing note reviewed.   Constitutional:       Appearance: Normal appearance. He is ill-appearing.   HENT:      Head: Normocephalic.      Right Ear: External ear normal.      Left Ear: External ear normal.      Nose: Nose normal.      Mouth/Throat:      Mouth: Mucous membranes are dry.      Pharynx: Oropharynx is clear.   Eyes:      Extraocular Movements: Extraocular movements intact.      Conjunctiva/sclera: Conjunctivae normal.      Pupils: Pupils are equal, round, and reactive to light.   Cardiovascular:      Rate and Rhythm: Normal rate and rhythm   Pulmonary:      Effort: Pulmonary effort is normal.      Breath sounds: Normal breath sounds.      Comments: Bibasilar rales  Abdominal:      General: Abdomen is flat. Bowel sounds are normal.      Palpations: Abdomen is soft.   Musculoskeletal:         General: Normal range of motion.   Skin:     General: Skin is warm and dry.   Neurological:      General: No focal deficit present.      Mental Status: He is alert. Mental status is at baseline.   Psychiatric:         Mood and Affect: Mood normal.         Behavior: Behavior normal.   Outpatient Follow-Up  Future Appointments   Date Time Provider Department Center   8/4/2025 10:00 AM [facility]   8/4/2025 11:00 AM Deidra Muro MD [id number] [facility]   8/6/2025 10:00 AM Rufino Dillon MD [id number] [facility]   8/27/2025 10:15 AM Ubaldo Smith OD [id number] [facility]   12/1/2025 10:50 AM Raisa Sierra MD [id number] [facility]   12/3/2025 10:45 AM Lito Ackerman MD [id number] [facility]         Nguyen Lieberman MD

## 2025-06-11 NOTE — PROGRESS NOTES
"Ramón Flores is a 89 y.o. male on day 1 of admission presenting with Hemophilia A (Multi).    Attending Provider Willaim Ulloa MD    Daily Progress Note    Interval Events   No acute event overnight.    Subjective    He was sleeping when we entered the room. He says that he's doing okay although his nose got congested and he started having some productive cough. He said that he wanted to work with physical therapists and sit on the chair during the day. We also talked about possible BMBx and he said that he's willing to do it if it's needed. No other complaints.    Review of Systems   12 Point ROS negative, except as above     Allergies   Allergies[1]    Medications   antihemophilic factor VIII (recombinant), Fc-VWF-XTEN fusion protein-ehtl, 3,263 Units, q7 days  atorvastatin, 40 mg, Daily  azithromycin, 500 mg, q24h  bumetanide, 0.5 mg, Daily  cefTRIAXone, 1 g, q24h  empagliflozin, 10 mg, Daily  famotidine, 20 mg, Daily  ferrous sulfate, 65 mg of elemental iron, Daily  finasteride, 5 mg, Daily  fluticasone, 2 spray, Daily  folic acid, 1 mg, Daily  guaiFENesin, 600 mg, BID  phytonadione, 5 mg, Daily  tamsulosin, 0.4 mg, BID         benzonatate, 100 mg, TID PRN  ondansetron, 4 mg, q8h PRN  prochlorperazine, 10 mg, q8h PRN        Physical Exam   Blood pressure 121/72, pulse 85, temperature 36.3 °C (97.3 °F), temperature source Temporal, resp. rate 16, height 1.676 m (5' 6\"), weight 59.4 kg (131 lb), SpO2 94%.    Alert and oriented x3  Clear lung sound w/o rale, no wheezing  RHB w/o murmur, normal S1/S2  Soft and flat abdomen w/o tenderness, no hepatosplenomegaly  No pitting edema    Labs   CBC 7.7/7.9/41 MCV: 101   Prolif: Reti 2.4% (RPI 0.7)   Cr 1.35, Vu 2.6/0.5   , hapto 241   Coag 17.9(1.6)/PTT 57, fibrinogen 540, D-dimer 2617   Iron 44, TIBC 188 (23%), ferritin 328    B12: 738, MMA 0.18, HOMO 18.95   Folate pending   Copper/Zn: pending   SPEP: IgA lambda MGUS 0.3mg/dl   HBV/HCV: negative "   MDS/myeloid NGS pending   APCT: no hepatosplenomegaly    Platelet antibodies: all negative     Imaging   Reviewed    Assessment/Plan     M/89 w PMHx severe hemophilia A, protein C deficiency, Aflutter/fibrillation, DVT, HFpEF, BPH, anal cancer, macular degeneration, IgAL MGUS who was seen in [facility] ED on 6/9 for repeated episodes of N&V, diarrhea of 24hrs duration, likely gastroenteritis given his symptomatic improvement with IV fluid. Subsequently transferred to [facility] for further workup of chronic bicytopenia which is worsening.      Overall workup is unrevealing the cause of cytopenias but his worsening macrocytic anemia and smear findings that we reviewed (6/11) showing many teardrop cells and several hypolobulated granulocytes (Pelger-Huet cells) indicates he could have MDS or other myeloid disorders. Smear also shows several hypochromic RBCs in abnormal shape c/f potential hidden iron deficiency, but a few schistocytes so less likely MAHA process.    He was also found to have PT prolongation and we sent factor levels (2, 7, 8, 11) all of which are low but his fibrinogen is 540 (unlikely DIC) so this could be d/t nutrient deficiency given that he lives alone and doesn't seem to take care of himself well.      #Hemophilia A, severe  #Protein C deficiency  #Worsening macrocytic anemia/thrombocytopenia, could be MDS vs other myeloid disorders  #Prolonged PT, could be nutrient deficiency  :: Trough factor VIII level 5%  :: Altuviiio given 6/10 20:00     Recommendations:  -recommend giving oral vitamin K 5mg x 3days  -please send factor VIII tomorrow morning  -please send soluble transferrin receptor to see if he has ZEYNEP I/s/o systemic inflammation that his current iron study results indicate  -FU with Copper/Zinc/Folate, MDS/myeloid malignancy panel  -daily CBC w diff  -transfuse to maintain Hb > 7 or if symptomatic, PLT > 10 or > 50 if bleeding  -consider GI consult for evaluation of potential GI  bleeding    Thank you for this consult, and hematology will continue to follow.   Patient was seen, examined, and discussed with Dr. Kee.    Yamil Kaufman MD, PhD  Hematology/Oncology Fellow [id number] or Epic  6/11/2025         [1]   Allergies  Allergen Reactions    Aspirin Bleeding     hemophiliac    Penicillins Rash     Tolerated cefdinir, cefepime

## 2025-06-12 LAB
ALBUMIN SERPL BCP-MCNC: 3.7 G/DL (ref 3.4–5)
ALP SERPL-CCNC: 86 U/L (ref 33–136)
ALT SERPL W P-5'-P-CCNC: 27 U/L (ref 10–52)
ANION GAP SERPL CALC-SCNC: 15 MMOL/L (ref 10–20)
AST SERPL W P-5'-P-CCNC: 26 U/L (ref 9–39)
BASOPHILS # BLD AUTO: 0.02 X10*3/UL (ref 0–0.1)
BASOPHILS NFR BLD AUTO: 0.4 %
BILIRUB SERPL-MCNC: 1.3 MG/DL (ref 0–1.2)
BUN SERPL-MCNC: 31 MG/DL (ref 6–23)
CALCIUM SERPL-MCNC: 7.2 MG/DL (ref 8.6–10.6)
CHLORIDE SERPL-SCNC: 106 MMOL/L (ref 98–107)
CO2 SERPL-SCNC: 22 MMOL/L (ref 21–32)
CREAT SERPL-MCNC: 1.34 MG/DL (ref 0.5–1.3)
EGFRCR SERPLBLD CKD-EPI 2021: 48 ML/MIN/1.73M*2
EOSINOPHIL # BLD AUTO: 0.01 X10*3/UL (ref 0–0.4)
EOSINOPHIL NFR BLD AUTO: 0.2 %
ERYTHROCYTE [DISTWIDTH] IN BLOOD BY AUTOMATED COUNT: 22.3 % (ref 11.5–14.5)
FACT VIII ACT/NOR PPP: 53 % (ref 55–180)
GLUCOSE SERPL-MCNC: 125 MG/DL (ref 74–99)
HCT VFR BLD AUTO: 24.8 % (ref 41–52)
HGB BLD-MCNC: 8.3 G/DL (ref 13.5–17.5)
IMM GRANULOCYTES # BLD AUTO: 0.18 X10*3/UL (ref 0–0.5)
IMM GRANULOCYTES NFR BLD AUTO: 3.6 % (ref 0–0.9)
LYMPHOCYTES # BLD AUTO: 0.64 X10*3/UL (ref 0.8–3)
LYMPHOCYTES NFR BLD AUTO: 12.8 %
MAGNESIUM SERPL-MCNC: 1.69 MG/DL (ref 1.6–2.4)
MCH RBC QN AUTO: 35 PG (ref 26–34)
MCHC RBC AUTO-ENTMCNC: 33.5 G/DL (ref 32–36)
MCV RBC AUTO: 105 FL (ref 80–100)
MONOCYTES # BLD AUTO: 0.74 X10*3/UL (ref 0.05–0.8)
MONOCYTES NFR BLD AUTO: 14.8 %
NEUTROPHILS # BLD AUTO: 3.41 X10*3/UL (ref 1.6–5.5)
NEUTROPHILS NFR BLD AUTO: 68.2 %
NRBC BLD-RTO: 0 /100 WBCS (ref 0–0)
PLATELET # BLD AUTO: 39 X10*3/UL (ref 150–450)
POTASSIUM SERPL-SCNC: 3.9 MMOL/L (ref 3.5–5.3)
PROT SERPL-MCNC: 6.3 G/DL (ref 6.4–8.2)
RBC # BLD AUTO: 2.37 X10*6/UL (ref 4.5–5.9)
SODIUM SERPL-SCNC: 139 MMOL/L (ref 136–145)
STAPHYLOCOCCUS SPEC CULT: NORMAL
WBC # BLD AUTO: 5 X10*3/UL (ref 4.4–11.3)

## 2025-06-12 PROCEDURE — 2500000002 HC RX 250 W HCPCS SELF ADMINISTERED DRUGS (ALT 637 FOR MEDICARE OP, ALT 636 FOR OP/ED)

## 2025-06-12 PROCEDURE — 1200000003 HC ONCOLOGY  ROOM WITH TELEMETRY DAILY

## 2025-06-12 PROCEDURE — 2500000001 HC RX 250 WO HCPCS SELF ADMINISTERED DRUGS (ALT 637 FOR MEDICARE OP)

## 2025-06-12 PROCEDURE — 2500000002 HC RX 250 W HCPCS SELF ADMINISTERED DRUGS (ALT 637 FOR MEDICARE OP, ALT 636 FOR OP/ED): Performed by: NURSE PRACTITIONER

## 2025-06-12 PROCEDURE — 84238 ASSAY NONENDOCRINE RECEPTOR: CPT

## 2025-06-12 PROCEDURE — 99233 SBSQ HOSP IP/OBS HIGH 50: CPT

## 2025-06-12 PROCEDURE — 2500000001 HC RX 250 WO HCPCS SELF ADMINISTERED DRUGS (ALT 637 FOR MEDICARE OP): Performed by: NURSE PRACTITIONER

## 2025-06-12 PROCEDURE — 2500000004 HC RX 250 GENERAL PHARMACY W/ HCPCS (ALT 636 FOR OP/ED): Performed by: NURSE PRACTITIONER

## 2025-06-12 PROCEDURE — 80053 COMPREHEN METABOLIC PANEL: CPT

## 2025-06-12 PROCEDURE — 2500000004 HC RX 250 GENERAL PHARMACY W/ HCPCS (ALT 636 FOR OP/ED)

## 2025-06-12 PROCEDURE — 99232 SBSQ HOSP IP/OBS MODERATE 35: CPT

## 2025-06-12 PROCEDURE — 83735 ASSAY OF MAGNESIUM: CPT

## 2025-06-12 PROCEDURE — 85025 COMPLETE CBC W/AUTO DIFF WBC: CPT

## 2025-06-12 PROCEDURE — 36415 COLL VENOUS BLD VENIPUNCTURE: CPT

## 2025-06-12 PROCEDURE — 97161 PT EVAL LOW COMPLEX 20 MIN: CPT | Mod: GP

## 2025-06-12 PROCEDURE — 85240 CLOT FACTOR VIII AHG 1 STAGE: CPT

## 2025-06-12 RX ORDER — PHYTONADIONE 5 MG/1
5 TABLET ORAL DAILY
Status: DISCONTINUED | OUTPATIENT
Start: 2025-06-12 | End: 2025-06-14

## 2025-06-12 RX ADMIN — BUMETANIDE 0.5 MG: 0.5 TABLET ORAL at 10:08

## 2025-06-12 RX ADMIN — EMPAGLIFLOZIN 10 MG: 10 TABLET, FILM COATED ORAL at 10:08

## 2025-06-12 RX ADMIN — GUAIFENESIN 600 MG: 600 TABLET, EXTENDED RELEASE ORAL at 20:24

## 2025-06-12 RX ADMIN — AZITHROMYCIN DIHYDRATE 500 MG: 500 TABLET ORAL at 10:16

## 2025-06-12 RX ADMIN — GUAIFENESIN 600 MG: 600 TABLET, EXTENDED RELEASE ORAL at 10:08

## 2025-06-12 RX ADMIN — FAMOTIDINE 20 MG: 20 TABLET, FILM COATED ORAL at 10:08

## 2025-06-12 RX ADMIN — FOLIC ACID 1 MG: 1 TABLET ORAL at 10:08

## 2025-06-12 RX ADMIN — PHYTONADIONE 5 MG: 5 TABLET ORAL at 10:08

## 2025-06-12 RX ADMIN — FLUTICASONE PROPIONATE 2 SPRAY: 50 SPRAY, METERED NASAL at 10:14

## 2025-06-12 RX ADMIN — ATORVASTATIN CALCIUM 40 MG: 40 TABLET, FILM COATED ORAL at 10:08

## 2025-06-12 RX ADMIN — CEFTRIAXONE SODIUM 1 G: 1 INJECTION, SOLUTION INTRAVENOUS at 12:48

## 2025-06-12 RX ADMIN — FERROUS SULFATE TAB 325 MG (65 MG ELEMENTAL FE) 1 TABLET: 325 (65 FE) TAB at 10:08

## 2025-06-12 RX ADMIN — TAMSULOSIN HYDROCHLORIDE 0.4 MG: 0.4 CAPSULE ORAL at 10:08

## 2025-06-12 RX ADMIN — TAMSULOSIN HYDROCHLORIDE 0.4 MG: 0.4 CAPSULE ORAL at 20:24

## 2025-06-12 RX ADMIN — FINASTERIDE 5 MG: 5 TABLET, FILM COATED ORAL at 10:08

## 2025-06-12 ASSESSMENT — PAIN - FUNCTIONAL ASSESSMENT
PAIN_FUNCTIONAL_ASSESSMENT: 0-10

## 2025-06-12 ASSESSMENT — PAIN SCALES - GENERAL
PAINLEVEL_OUTOF10: 0 - NO PAIN
PAINLEVEL_OUTOF10: 0 - NO PAIN

## 2025-06-12 ASSESSMENT — ACTIVITIES OF DAILY LIVING (ADL)
BATHING_ASSISTANCE: SPONGE BATHING
LACK_OF_TRANSPORTATION: NO
ADL_ASSISTANCE: NEEDS ASSISTANCE

## 2025-06-12 ASSESSMENT — COGNITIVE AND FUNCTIONAL STATUS - GENERAL
WALKING IN HOSPITAL ROOM: A LITTLE
MOBILITY SCORE: 16
CLIMB 3 TO 5 STEPS WITH RAILING: TOTAL
MOVING FROM LYING ON BACK TO SITTING ON SIDE OF FLAT BED WITH BEDRAILS: A LITTLE
TURNING FROM BACK TO SIDE WHILE IN FLAT BAD: A LITTLE
MOVING TO AND FROM BED TO CHAIR: A LITTLE
STANDING UP FROM CHAIR USING ARMS: A LITTLE

## 2025-06-12 NOTE — PROGRESS NOTES
"Ramón Flores is a 89 y.o. male on day 2 of admission presenting with Hemophilia A (Multi).    Subjective   Ramón is doing fine this morning. States he had no acute events overnight. He states that he finally had a BM this AM. Denies dark or bright red characteristics of stool. Pt also denies any nausea or vomiting. Denies pain. Discussed plans to wait for stool sample and labs to result, and continue to monitor for N/V, bowel movements and bleeding. Currently on 2L supp O2, wean as able. Denies fever/chills, N/V/C, bleeding, swelling, abdominal pain, SOB, CP, APARICIO, H/A and vision changes.     Objective     Physical Exam  HENT:      Nose: Nose normal.      Mouth/Throat:      Mouth: Mucous membranes are moist.   Eyes:      Pupils: Pupils are equal, round, and reactive to light.   Cardiovascular:      Rate and Rhythm: Normal rate.   Pulmonary:      Effort: Pulmonary effort is normal.      Comments: On 2L supp O2  Abdominal:      General: Abdomen is flat.   Musculoskeletal:         General: Normal range of motion.      Cervical back: Normal range of motion.   Neurological:      General: No focal deficit present.      Mental Status: He is alert.   Psychiatric:         Mood and Affect: Mood normal.       Last Recorded Vitals  Blood pressure 111/66, pulse 71, temperature 36.5 °C (97.7 °F), temperature source Temporal, resp. rate 16, height 1.676 m (5' 6\"), weight 59.4 kg (131 lb), SpO2 94%.  Intake/Output last 3 Shifts:  I/O last 3 completed shifts:  In: 1752.5 (29.5 mL/kg) [P.O.:800; I.V.:952.5 (16 mL/kg)]  Out: 1100 (18.5 mL/kg) [Urine:1100 (0.5 mL/kg/hr)]  Weight: 59.4 kg     Relevant Results  No results found.        Assessment & Plan  Hemophilia A (Multi)    Ramón Flores is a 89 y.o. male with PMH Hemophilia, Anal cancer (s/p curative RT 12/2024), CAD s/p CABG >20yrs ago, HFpEF, Afib, BPH, GERD, and CKD who presented to [facility] ED 6/9 with c/o n/v and diarrhea x24hrs. CT a/p with contrast (6/9) with no " evidence for intrahepatic or extrahepatic biliary ductal dilation, no evidence of bowel obstruction, colonic diverticulosis with no evidence for acute diverticulitis, prostatomegaly. GI consulted, state s/sx likely gastroenteritis, rec stool path if diarrhea continues. Heme consulted, rec transfer to [facility] for further management of worsening anemia/thrombocytopenia I/s/o acute illness. Pt was transferred to [facility] for further management. Upon admission to [facility], pt reports his s/x have resolved. Heme consulted on admit 6/10, recs extensive hemophilia, pancytopenia workup. (6/11-current) start IV ceftriaxone and PO azithromycin for CAP treatment. DC pending improvement in anemia and thrombocytopenia.    Update 6/12:   - continue IV ceftriaxone 1g daily and PO azithromycin 500mg daily for CAP coverage (6/11-plan stop 6/15) for 5 days total  - Heme consulted for pancytopenia, full hemophilia workup and pancytopenia workup; rec PO vitamin K x3days (6/11) (6/12- planned stop 6/14)  - zinc/copper level pending  - peripheral smear pending  - Stool path, Cdiff, lactoferrin, and fecal calprotectin ordered/pending     # Hemophilia A  # Pancytopenia  - Follows with Dr. Dillon, LOV 4/9/25 - emailed about admission 6/11  - He has had minimal bleeding during life d/t concurrent protein C deficiency  - On Altuviiio weekly (Tuesdays), pt got dose on 6/10  - Hgb BL ~12; Hgb 7.9 (6/10) -> 8.2 (6/11)--> 8.3 (6/12)  - Plt BL ~ 80, Plt 41 (6/10) -> 42 (6/11)--> 32 (6/12)  - , Haptoglobin 241 (6/9)  - Ddimer 2,617, fibrinogen 540, PT 17.9, INR 1.6, aPTT 57 (6/9)  - Iron 11, TIBC 188, Ferritin 328, haptoglobin 241 (6/10)  - , retic 2.4% (6/10)  - Myeloid malignancies panel sent/pending and MDS panel sent/pending per [facility]  - Factor 8 activity (6/10) 5--> 53 (6/12) (after first infusion on 6/11)  - Continue home folic acid 1mg daily and ferrous sulfate 325mg daily  - Heme consulted 6/10, rec factor assays: FII, FVII,  FVIII, FXI; drawing serum copper and zinc level, proceed with altuviio injection, Heme to FU with myeloid malignancy panel, peripheral smear  - FXI 57, FVIII 5, FVII 38, FII 51 (6/10)  - MDS panel and Myeloid malignancies NGS panel pending (6/11)  - Peripheral smear pending (6/11)  - serum copper/zinc levels pending (6/11)  - per Heme: transfuse to maintain Hb > 7 or if symptomatic, PLT > 10 or > 50 if bleeding      # N/V/D  - Likely viral gastroenteritis vs bacterial  - CT a/p with contrast (6/9) no evidence for intrahepatic or extrahepatic biliary ductal dilation, no evidence of bowel obstruction, colonic diverticulosis with no evidence for acute diverticulitis, prostatomegaly  - s/p 1x dose of cefepime and 1x dose of flagyl at [facility] 6/9  - s/p 500mL NS bolus and maintenance IVF with NS @ 75mL/hr at [facility] 6/9  - No fevers/chills or leukocytosis   - Lactate level 5.4 --> 1.8 (6/9)  - UA (6/9) + protein, neg leuks or nitrites  - Blood cultures x2 (6/9): NGTD  - COVID (6/9) negative  - CXR showing left basilar atelectasis/scar vs small infiltrate  - GI consulted at [facility] (6/10), state s/sx likely gastroenteritis, rec stool path if diarrhea continues  - Stool path, Cdiff, lactoferrin, and fecal calprotectin ordered/pending 6/10  - PO Zofran 4mg q8hrs PRN n/v 1st line, PO Compazine 10mg q8hrs PRN n/v 2nd line  - s/p IVF with LR @ 75mL/hr x12hrs (6/10-6/11)     # Troponinemia - downtrending  - Likely 2/2 demand I/s/o active illness with cardiac hx, aflutter seen on EKG  - Hx elevated troponins in the past  - Trop 249->287-->446 (6/9)  - down-trending Trop 285 (6/10)  - EKG at OSH (6/9) showing aflutter with AV block, non-specific T-wave abnormalities  - Admit EKG pending 6/10     # URI vs CAP  - Pt was seen outpt 6/9 for c/o sinus congestion and cough x2 days, was started on Cefdinir and took one dose 6/9  - On admit, pt continues to endorse non-productive cough; no fevers/chills or SOB  - CXR (6/9) showing left basilar  atelectasis/scar vs small infiltrate  - Lactate level 5.4 --> 1.8 (6/9)  - s/p 500mL NS bolus and maintenance IVF with NS @ 75mL/hr  - Started LR @ 75mL/hr x24hrs (6/10-6/11)  - Blood cultures x2 (6/9): NGTD  - COVID (6/9) negative  - Resp viral panel (6/10): pending  - Strep pneumo, legionella neg (6/10)  - MRSA pending (6/10)  - Bps soft on admit (107/62), no c/f significant HoTN  - Presented to [facility] in 2L NC, SpO2 94% on admit, will continue for now and wean as tolerated  - encourage IS  - Started Mucinex 600mg BID and tessalon pearls PRN 6/10  - Start IV ceftriaxone 1g daily and PO azithromycin 500mg daily (6/11-plan stop 6/15) for 5 days of CAP coverage given cough and supp O2 use     # RONNIE/Hx CKD  - Likely pre-renal 2/2 contrast  - sCr BL ~1.1, sCr 1.19 (6/9)--> s/p IV contrast--> sCr 1.38 (6/11)--> 1.34 (6/12)  - s/p 500mL NS bolus and maintenance IVF with NS @ 75mL/hr  - s/p LR @ 75mL/hr x 12hrs (6/10-6/11)  - Urine lytes WNL 6/10  - encourage increased PO intake     # Anal Cancer  - Previously followed with Dr. Locke, now follows with Dr. Cochran, followed with Dr. Muro for [facility]  - Initially presented with rectal bleeding in the setting of constipation over several consecutive days and then called EMS. He was brought to [facility] but then transferred to [facility] on 10/20/24 given no factor VIII at [facility]. Colonoscopy performed on 10/22/24 showed single malignant-appearing and friable ulcerated mass in the anal canal. 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   - s/p curative RT to anus 12/2024  - PET (4/18/25): no evidence of distant metastatic disease, small uptake in the prior anal lesion, likely non-specific but residual neoplasm not entirely excluded  - Now only following with med onc (Dr. Cochran) as surveillance if needed  - Has CT c/a/p and Rad Onc FUV on 8/4     # CAD  # Afib  # HFpEF  # HLD  - s/p CABG >20yrs ago  - ECHO (2/1/25): EF 49%, global  hypokinesis of LV, mod increased septal and mod increased posterior LV wall thickness, sev dialted LA, mod-sev dilated RA, RVSP 44  - Continue home Jardiance 10mg daily and Bumex 0.5mg daily  - Continue home Lipitor 40mg daily  - BNP (6/10): 580     # BPH  - Continue home Flomax 0.4mg BID and Finasteride 5mg daily     # GERD  - Home Pantoprazole 40mg daily sub as Pepcid 20mg daily I/s/o thrombocytopenia     DVT prophy: No pharmacologic DVT prophy I/s/o hemophilia A and thrombocytopenia. SCDs, encourage safe ambulation     DISPO:  - DNR, DNI; confirmed on admit  - DC pending improvement in anemia and thrombocytopenia  - NOK (Javier Wallace, relative): [phone]  - CT c/a/p 8/4, Rad Onc 8/4, Dr. Dillon FUV 8/6, Ophthalmology FUV 8/27, Urology FUV 12/1, Ophthalmology FUV 12/3     I spent 60 minutes in the professional and overall care of this patient.    Assessment and plan as above discussed with attending physician Dr. Ulloa.    Joanne Vega, APRN-CNP

## 2025-06-12 NOTE — CARE PLAN
Problem: Pain - Adult  Goal: Verbalizes/displays adequate comfort level or baseline comfort level  Outcome: Progressing     Problem: Safety - Adult  Goal: Free from fall injury  Outcome: Progressing     Problem: Discharge Planning  Goal: Discharge to home or other facility with appropriate resources  Outcome: Progressing     Problem: Chronic Conditions and Co-morbidities  Goal: Patient's chronic conditions and co-morbidity symptoms are monitored and maintained or improved  Outcome: Progressing     Problem: Nutrition  Goal: Nutrient intake appropriate for maintaining nutritional needs  Outcome: Progressing     Problem: Skin  Goal: Decreased wound size/increased tissue granulation at next dressing change  Outcome: Progressing  Flowsheets (Taken 6/12/2025 0626)  Decreased wound size/increased tissue granulation at next dressing change:   Promote sleep for wound healing   Protective dressings over bony prominences   Utilize specialty bed per algorithm  Goal: Participates in plan/prevention/treatment measures  Outcome: Progressing  Flowsheets (Taken 6/12/2025 0626)  Participates in plan/prevention/treatment measures:   Discuss with provider PT/OT consult   Elevate heels   Increase activity/out of bed for meals  Goal: Prevent/manage excess moisture  Outcome: Progressing  Flowsheets (Taken 6/12/2025 0626)  Prevent/manage excess moisture:   Cleanse incontinence/protect with barrier cream   Monitor for/manage infection if present   Follow provider orders for dressing changes   Use wicking fabric (obtain order)   Moisturize dry skin  Goal: Prevent/minimize sheer/friction injuries  Outcome: Progressing  Flowsheets (Taken 6/12/2025 0626)  Prevent/minimize sheer/friction injuries:   Complete micro-shifts as needed if patient unable. Adjust patient position to relieve pressure points, not a full turn   Use pull sheet   Increase activity/out of bed for meals   HOB 30 degrees or less   Turn/reposition every 2 hours/use  positioning/transfer devices   Utilize specialty bed per algorithm  Goal: Promote/optimize nutrition  Outcome: Progressing  Flowsheets (Taken 6/12/2025 0626)  Promote/optimize nutrition:   Monitor/record intake including meals   Assist with feeding   Consume > 50% meals/supplements   Offer water/supplements/favorite foods   Discuss with provider if NPO > 2 days   Reassess MST if dietician not consulted  Goal: Promote skin healing  Outcome: Progressing  Flowsheets (Taken 6/12/2025 0626)  Promote skin healing:   Protective dressings over bony prominences   Assess skin/pad under line(s)/device(s)   Turn/reposition every 2 hours/use positioning/transfer devices   Ensure correct size (line/device) and apply per  instructions   Rotate device position/do not position patient on device   The patient's goals for the shift include      The clinical goals for the shift include pt will remain safe and free from injury throughout shift    Patient remained safe and free from injury throughout shift.

## 2025-06-12 NOTE — PROGRESS NOTES
Physical Therapy    Physical Therapy Evaluation    Patient Name: Ramón Flores  MRN: [id number]  Department: [facility]  Room: [address] [place] [address]  Today's Date: 6/12/2025   Time Calculation  Start Time: 1452  Stop Time: 1516  Time Calculation (min): 24 min    Assessment/Plan   PT Assessment  PT Assessment Results: Decreased strength, Decreased range of motion, Decreased endurance, Impaired balance, Decreased mobility  Rehab Prognosis: Good  Barriers to Discharge Home: Caregiver assistance, Physical needs  Caregiver Assistance: Caregiver assistance needed per identified barriers - however, level of patient's required assistance exceeds assistance available at home (does not have 24/7 assist)  Physical Needs: Ambulating household distances limited by function/safety, Intermittent mobility assistance needed, Intermittent ADL assistance needed, High falls risk due to function or environment  Evaluation/Treatment Tolerance: Other (Comment) (increase wheezing and reports of SOB with OOB activity)  Strengths: Ability to acquire knowledge  End of Session Communication: Bedside nurse  Assessment Comment: 89 y/o M who presents as a transfer to [facility] for further management of worsening anemia/thrombocytopenia I/s/o acute illness. Patient demo's reduced functional mobility, endurance/activity tolerance, strength, balance, ROM. Patient does have 8 hours/day, daily, HHA assist however, not 24/7 assist. Patient would benefit from MOD intensity skilled PT services upon DC to address impairements as stated above.  End of Session Patient Position: Bed, 3 rail up, Alarm on  IP OR SWING BED PT PLAN  Inpatient or Swing Bed: Inpatient  PT Plan  Treatment/Interventions: Bed mobility, Transfer training, Gait training, Balance training, Strengthening, Endurance training, Range of motion, Therapeutic exercise, Therapeutic activity, Positioning, Postural re-education  PT Plan: Ongoing PT  PT Frequency: 4 times per week  PT Discharge Recommendations:  Moderate intensity level of continued care ((if declines, then LOW with 24/7 skilled assist))  Equipment Recommended upon Discharge:  (n/a)  PT Recommended Transfer Status: Assist x1, Assistive device (2ww; RN ordered BSC and walker for patient room)  PT - OK to Discharge: Yes (DC rec made)    Subjective     PT Visit Info:  PT Received On: 06/12/25  General Visit Information:  General  Reason for Referral: presented to [facility] ED 6/9 with c/o n/v and diarrhea x24hrs. CT a/p with contrast (6/9) with no evidence for intrahepatic or extrahepatic biliary ductal dilation, no evidence of bowel obstruction, colonic diverticulosis with no evidence for acute diverticulitis, prostatomegaly; GI consulted, likely gastroenteritis. transfer to [facility] for further management of worsening anemia/thrombocytopenia I/s/o acute illness.  Past Medical History Relevant to Rehab: Hemophilia, Anal cancer (s/p curative RT 12/2024), CAD s/p CABG >20yrs ago, HFpEF, Afib, BPH, GERD, and CKD; denied falls/6 months  Family/Caregiver Present: No  Prior to Session Communication: Bedside nurse  Patient Position Received: Bed, 3 rail up, Alarm on  General Comment: pleasant and agreeable to participate in therapy; 2l NC, tele. Patient with audible wheezing at rest that increased with activity; reported moderate SOB with OOB activity, desat to 89-90% with OOB activity, Rn made aware  Home Living:  Home Living  Type of Home: House  Lives With: Alone  Home Adaptive Equipment: Walker rolling or standard, Wheelchair-manual (stair glide)  Home Layout: Two level (No bathroom on 1st floor, uses stair glide to 2nd floor where tub/shower located and, bedroom on 2nd floor, ramp to enter house)  Home Access: Ramped entrance  Bathroom Shower/Tub: Tub/shower unit  Bathroom Equipment: Grab bars in shower, Tub transfer bench  Home Living Comments: stair glide to 2nd floor  Prior Level of Function:  Prior Function Per Pt/Caregiver Report  Level of [place]: Needs  assistance with homemaking, Needs assistance with ADLs  Receives Help From: Family, Personal care attendant (HHA 8 hours/day, 7 days/wk)  ADL Assistance: Needs assistance  Bath: Sponge bathing (HHA assists patient while patient in bed; reports fear of tub transfers)  Homemaking Assistance: Needs assistance (HHA and family; meals on wheels 2 days/wk, family also picks up groceries for patient)  Driving/Transportation:  ((-) drive)  Ambulatory Assistance: Independent (uses 2ww, reports recent decline in ambulation, reporting limited ambulating outside home)  Hand Dominance: Right  Precautions:  Precautions  Hearing/Visual Limitations: appears mildly [place], glasses  Medical Precautions: Fall precautions, Oxygen therapy device and L/min            Objective   Pain:  Pain Assessment  Pain Assessment: 0-10  0-10 (Numeric) Pain Score:  (reports chronic B knee pain, did not rate numerically)  Pain Type: Chronic pain  Pain Location: Knee  Pain Orientation:  (bilateral)  Cognition:  Cognition  Overall Cognitive Status: Within Functional Limits  Arousal/Alertness: Appropriate responses to stimuli  Orientation Level:  (AxO x3)  Following Commands: Follows one step commands consistently    General Assessments:       Activity Tolerance  Endurance: Tolerates less than 10 min exercise with changes in vital signs  Early Mobility/Exercise Safety Screen: Proceed with mobilization - No exclusion criteria met  Activity Tolerance Comments: reported mild SOB at rest with talking with PT; then reported moderate SOB with OOB mobility, required a few mins EOB to recover, desat to 89-90% with activity, reovered to 92% within 2-3 mins once returned to supine    Sensation  Light Touch: No apparent deficits    Strength  Strength Comments: BUEs:  4-/5, elbow flexion/ext 4-/5, shoulder flexion 3/5; BLEs: PF/DF 4-/5 knee ext <3+/5  Postural Control  Postural Control: Impaired  Posture Comment: favors increased forward flexed posture standing;  increase B knee flexion contractures    Static Sitting Balance  Static Sitting-Balance Support: Feet supported, Bilateral upper extremity supported  Static Sitting-Level of Assistance: Distant supervision  Static Sitting-Comment/Number of Minutes: x1  Dynamic Sitting Balance  Dynamic Sitting-Balance Support: Feet supported, Bilateral upper extremity supported  Dynamic Sitting-Level of Assistance: Close supervision  Dynamic Sitting-Comments: x1    Static Standing Balance  Static Standing-Balance Support: Bilateral upper extremity supported  Static Standing-Level of Assistance: Contact guard  Static Standing-Comment/Number of Minutes: x1, 2ww; B heels unable to reach floor 2/2 increase B knee flexion contractures  Dynamic Standing Balance  Dynamic Standing-Balance Support: Bilateral upper extremity supported  Dynamic Standing-Level of Assistance: Minimum assistance  Dynamic Standing-Comments: 2ww  Functional Assessments:  Bed Mobility  Bed Mobility: Yes  Bed Mobility 1  Bed Mobility 1: Supine to sitting  Level of Assistance 1: Contact guard, Minimal verbal cues  Bed Mobility Comments 1: HOB elevated, bedrail; increase time and effort to complete task  Bed Mobility 2  Bed Mobility  2: Sitting to supine  Level of Assistance 2: Contact guard, Minimal verbal cues  Bed Mobility Comments 2: HOB elevated    Transfers  Transfer: Yes  Transfer 1  Transfer From 1: Sit to, Stand to  Transfer to 1: Sit, Stand  Technique 1: Stand to sit, Sit to stand  Transfer Device 1: Walker  Transfer Level of Assistance 1: Moderate assistance, Minimal verbal cues, Minimal tactile cues  Trials/Comments 1: increase time and effort to complete task; patient very insistant on PT not helping patient complete task however, for safety purposes, PT had to assist patient to safely stand.    Ambulation/Gait Training  Ambulation/Gait Training Performed: Yes  Ambulation/Gait Training 1  Surface 1: Level tile  Device 1: Rolling walker  Assistance 1: Minimum  assistance, Minimal verbal cues, Minimal tactile cues  Quality of Gait 1: Diminished heel strike, Decreased step length, Forward flexed posture (B knee flexion contractures, further preventing patient to place B heels on ground; ambulates on forefeet.)  Comments/Distance (ft) 1: forward/backwards x4 ft, lateral side stepping x3 ft; patient with increase audible wheezing with OOB activity    Stairs  Stairs: No  Extremity/Trunk Assessments:  RUE   RUE :  (AROM grossly WFL)  LUE   LUE:  (AROM grossly WFL)  RLE   RLE :  (AROM grossly WFL with exception of patient with knee flexion contracture ~30 degrees)  LLE   LLE :  (AROM grossly WFL with exception of patient with knee flexion contracture ~30 degrees)  Outcome Measures:  [facility] Basic Mobility  Turning from your back to your side while in a flat bed without using bedrails: A little  Moving from lying on your back to sitting on the side of a flat bed without using bedrails: A little  Moving to and from bed to chair (including a wheelchair): A little  Standing up from a chair using your arms (e.g. wheelchair or bedside chair): A little  To walk in hospital room: A little  Climbing 3-5 steps with railing: Total  Basic Mobility - Total Score: 16    Encounter Problems       Encounter Problems (Active)       PT Problem       Patient will complete bed mobility with close supervision with HOB flat to mimic home set-up        Start:  06/12/25    Expected End:  07/03/25            Patient will complete STS with close supervision using LRAD without acute LOB         Start:  06/12/25    Expected End:  07/03/25            Patient will ambulate >/=50' with LRAD with close supervision without acute LOB        Start:  06/12/25    Expected End:  07/03/25            Patient will participate in BLE there-ex program in order to assist in improving strength and to assist with the completion of functional mobility tasks.        Start:  06/12/25    Expected End:  07/03/25            Patient  will score >25/28 on the Tinetti Mobility Outcome Assessment (combined gait and balance) in order to demonstrate low fall risk.        Start:  06/12/25    Expected End:  07/03/25               Pain - Adult              Education Documentation  Body Mechanics, taught by Cesilia To PT at 6/12/2025  3:51 PM.  Learner: Patient  Readiness: Acceptance  Method: Explanation  Response: Needs Reinforcement  Comment: mobility progression, activity pacing; use of incentive spirometer.    Mobility Training, taught by Cesilia To PT at 6/12/2025  3:51 PM.  Learner: Patient  Readiness: Acceptance  Method: Explanation  Response: Needs Reinforcement  Comment: mobility progression, activity pacing; use of incentive spirometer.    Education Comments  No comments found.      Cesilia To PT, DPT

## 2025-06-12 NOTE — PROGRESS NOTES
06/12/25 1300   Discharge Planning   Living Arrangements Alone   Support Systems Family members;Home care staff   Assistance Needed [facility] Home care   Type of Residence Private residence   Number of Stairs to Enter Residence 2   Number of Stairs Within Residence 0   Do you have animals or pets at home? No   Home or Post Acute Services In home services   Type of Home Care Services Home nursing visits;Home health aide   Expected Discharge Disposition Home Health   Does the patient need discharge transport arranged? No   Financial Resource Strain   How hard is it for you to pay for the very basics like food, housing, medical care, and heating? Not hard   Housing Stability   In the last 12 months, was there a time when you were not able to pay the mortgage or rent on time? N   In the past 12 months, how many times have you moved where you were living? 0   At any time in the past 12 months, were you homeless or living in a shelter (including now)? N   Transportation Needs   In the past 12 months, has lack of transportation kept you from medical appointments or from getting medications? no   In the past 12 months, has lack of transportation kept you from meetings, work, or from getting things needed for daily living? No     Care Transitions Admission Assessment  6/12/25    Plan per Medical/Surgical Team: Pt admitted for further management of worsening anemia/thrombocytopenia I/s/o acute illness.   Status: Inpatient  Payor Source: Medicare, [facility]  Discharge disposition: Home with resumed home care  Expected date of discharge: 6/13/25  Barriers: None identified at this time  PCP: SUSI Carmichael-CNS  Preferred Pharmacy: [facility] and [facility] mail order  Preferred home care agency: [facility]    Met with patient at bedside, introduced self and role. Demographics and insurance verifed with patient. Pt lives alone in his own 2 story home.  Pt has home health aids through [facility] 7 days a week for 8 hours a day.  The aids assist pt with all  ADL's.  Pt has a wheel chair, walker and a lift chair.  Pt receives meals on wheels twice a week.  Pts nephew and nephews wife shop for pt and also provide some meals.  Pt has been to SNF before, but will not agree to SNF if recommended.  Pt plans to return home with home health aids.  Updates sent to [facility] via [facility].  Will continue to follow for any discharge planning needs.   Elizabeth Gunsalus LISW-S  Care Transitions Supervisor

## 2025-06-13 ENCOUNTER — APPOINTMENT (OUTPATIENT)
Dept: RADIOLOGY | Facility: HOSPITAL | Age: OVER 89
DRG: 813 | End: 2025-06-13
Payer: MEDICARE

## 2025-06-13 LAB
ALBUMIN SERPL BCP-MCNC: 3.8 G/DL (ref 3.4–5)
ALP SERPL-CCNC: 86 U/L (ref 33–136)
ALT SERPL W P-5'-P-CCNC: 30 U/L (ref 10–52)
ANION GAP SERPL CALC-SCNC: 16 MMOL/L (ref 10–20)
APPEARANCE UR: CLEAR
APTT PPP: 40 SECONDS (ref 26–36)
AST SERPL W P-5'-P-CCNC: 27 U/L (ref 9–39)
BACTERIA BLD CULT: NORMAL
BACTERIA BLD CULT: NORMAL
BASOPHILS # BLD AUTO: 0.02 X10*3/UL (ref 0–0.1)
BASOPHILS NFR BLD AUTO: 0.4 %
BILIRUB SERPL-MCNC: 1.2 MG/DL (ref 0–1.2)
BILIRUB UR STRIP.AUTO-MCNC: NEGATIVE MG/DL
BUN SERPL-MCNC: 31 MG/DL (ref 6–23)
CALCIUM SERPL-MCNC: 7.7 MG/DL (ref 8.6–10.6)
CHLORIDE SERPL-SCNC: 105 MMOL/L (ref 98–107)
CHLORIDE UR-SCNC: 34 MMOL/L
CHLORIDE/CREATININE (MMOL/G) IN URINE: 45 MMOL/G CREAT (ref 23–275)
CO2 SERPL-SCNC: 22 MMOL/L (ref 21–32)
COLOR UR: ABNORMAL
COPPER SERPL-MCNC: 109.7 UG/DL (ref 70–140)
CREAT SERPL-MCNC: 1.4 MG/DL (ref 0.5–1.3)
CREAT UR-MCNC: 76 MG/DL (ref 20–370)
EGFRCR SERPLBLD CKD-EPI 2021: 46 ML/MIN/1.73M*2
EOSINOPHIL # BLD AUTO: 0.01 X10*3/UL (ref 0–0.4)
EOSINOPHIL NFR BLD AUTO: 0.2 %
ERYTHROCYTE [DISTWIDTH] IN BLOOD BY AUTOMATED COUNT: 22.1 % (ref 11.5–14.5)
GLUCOSE SERPL-MCNC: 123 MG/DL (ref 74–99)
GLUCOSE UR STRIP.AUTO-MCNC: ABNORMAL MG/DL
HCT VFR BLD AUTO: 26.4 % (ref 41–52)
HGB BLD-MCNC: 8.6 G/DL (ref 13.5–17.5)
HOLD SPECIMEN: NORMAL
HYALINE CASTS #/AREA URNS AUTO: ABNORMAL /LPF
IMM GRANULOCYTES # BLD AUTO: 0.16 X10*3/UL (ref 0–0.5)
IMM GRANULOCYTES NFR BLD AUTO: 3.5 % (ref 0–0.9)
INR PPP: 1.5 (ref 0.9–1.1)
KETONES UR STRIP.AUTO-MCNC: NEGATIVE MG/DL
LEUKOCYTE ESTERASE UR QL STRIP.AUTO: NEGATIVE
LYMPHOCYTES # BLD AUTO: 0.68 X10*3/UL (ref 0.8–3)
LYMPHOCYTES NFR BLD AUTO: 14.9 %
MAGNESIUM SERPL-MCNC: 1.68 MG/DL (ref 1.6–2.4)
MCH RBC QN AUTO: 34.8 PG (ref 26–34)
MCHC RBC AUTO-ENTMCNC: 32.6 G/DL (ref 32–36)
MCV RBC AUTO: 107 FL (ref 80–100)
MONOCYTES # BLD AUTO: 0.86 X10*3/UL (ref 0.05–0.8)
MONOCYTES NFR BLD AUTO: 18.8 %
NEUTROPHILS # BLD AUTO: 2.84 X10*3/UL (ref 1.6–5.5)
NEUTROPHILS NFR BLD AUTO: 62.2 %
NITRITE UR QL STRIP.AUTO: NEGATIVE
NRBC BLD-RTO: 0 /100 WBCS (ref 0–0)
PH UR STRIP.AUTO: 6 [PH]
PLATELET # BLD AUTO: 38 X10*3/UL (ref 150–450)
POTASSIUM SERPL-SCNC: 3.8 MMOL/L (ref 3.5–5.3)
POTASSIUM UR-SCNC: 32 MMOL/L
POTASSIUM/CREAT UR-RTO: 42 MMOL/G CREAT
PROT SERPL-MCNC: 6.4 G/DL (ref 6.4–8.2)
PROT UR STRIP.AUTO-MCNC: ABNORMAL MG/DL
PROTHROMBIN TIME: 16.4 SECONDS (ref 9.8–12.4)
RBC # BLD AUTO: 2.47 X10*6/UL (ref 4.5–5.9)
RBC # UR STRIP.AUTO: NEGATIVE MG/DL
RBC #/AREA URNS AUTO: ABNORMAL /HPF
SODIUM SERPL-SCNC: 139 MMOL/L (ref 136–145)
SODIUM UR-SCNC: 39 MMOL/L
SODIUM/CREAT UR-RTO: 51 MMOL/G CREAT
SP GR UR STRIP.AUTO: 1.02
STFR SERPL-MCNC: 2.6 MG/L (ref 2.2–5)
UROBILINOGEN UR STRIP.AUTO-MCNC: NORMAL MG/DL
WBC # BLD AUTO: 4.6 X10*3/UL (ref 4.4–11.3)
WBC #/AREA URNS AUTO: ABNORMAL /HPF
YEAST BUDDING #/AREA UR COMP ASSIST: PRESENT /HPF
ZINC SERPL-MCNC: 40.5 UG/DL (ref 60–120)

## 2025-06-13 PROCEDURE — 6360000002 HC RX 636 FACTOR: Mod: JZ,TB | Performed by: STUDENT IN AN ORGANIZED HEALTH CARE EDUCATION/TRAINING PROGRAM

## 2025-06-13 PROCEDURE — 88185 FLOWCYTOMETRY/TC ADD-ON: CPT | Mod: TC

## 2025-06-13 PROCEDURE — 2500000004 HC RX 250 GENERAL PHARMACY W/ HCPCS (ALT 636 FOR OP/ED): Performed by: NURSE PRACTITIONER

## 2025-06-13 PROCEDURE — 2500000002 HC RX 250 W HCPCS SELF ADMINISTERED DRUGS (ALT 637 FOR MEDICARE OP, ALT 636 FOR OP/ED)

## 2025-06-13 PROCEDURE — 2500000001 HC RX 250 WO HCPCS SELF ADMINISTERED DRUGS (ALT 637 FOR MEDICARE OP): Performed by: NURSE PRACTITIONER

## 2025-06-13 PROCEDURE — 85025 COMPLETE CBC W/AUTO DIFF WBC: CPT

## 2025-06-13 PROCEDURE — 1200000003 HC ONCOLOGY  ROOM WITH TELEMETRY DAILY

## 2025-06-13 PROCEDURE — 83735 ASSAY OF MAGNESIUM: CPT

## 2025-06-13 PROCEDURE — 85097 BONE MARROW INTERPRETATION: CPT

## 2025-06-13 PROCEDURE — 99233 SBSQ HOSP IP/OBS HIGH 50: CPT | Performed by: HOSPITALIST

## 2025-06-13 PROCEDURE — 85730 THROMBOPLASTIN TIME PARTIAL: CPT

## 2025-06-13 PROCEDURE — 079T3ZX DRAINAGE OF BONE MARROW, PERCUTANEOUS APPROACH, DIAGNOSTIC: ICD-10-PCS | Performed by: NURSE PRACTITIONER

## 2025-06-13 PROCEDURE — 71250 CT THORAX DX C-: CPT | Performed by: RADIOLOGY

## 2025-06-13 PROCEDURE — 38220 DX BONE MARROW ASPIRATIONS: CPT | Performed by: NURSE PRACTITIONER

## 2025-06-13 PROCEDURE — 2500000004 HC RX 250 GENERAL PHARMACY W/ HCPCS (ALT 636 FOR OP/ED)

## 2025-06-13 PROCEDURE — 88305 TISSUE EXAM BY PATHOLOGIST: CPT | Mod: TC,SUR

## 2025-06-13 PROCEDURE — 82570 ASSAY OF URINE CREATININE: CPT | Performed by: NURSE PRACTITIONER

## 2025-06-13 PROCEDURE — 99232 SBSQ HOSP IP/OBS MODERATE 35: CPT

## 2025-06-13 PROCEDURE — 36415 COLL VENOUS BLD VENIPUNCTURE: CPT

## 2025-06-13 PROCEDURE — 2500000001 HC RX 250 WO HCPCS SELF ADMINISTERED DRUGS (ALT 637 FOR MEDICARE OP)

## 2025-06-13 PROCEDURE — 81001 URINALYSIS AUTO W/SCOPE: CPT | Performed by: NURSE PRACTITIONER

## 2025-06-13 PROCEDURE — 71250 CT THORAX DX C-: CPT

## 2025-06-13 PROCEDURE — 80053 COMPREHEN METABOLIC PANEL: CPT

## 2025-06-13 PROCEDURE — 2500000002 HC RX 250 W HCPCS SELF ADMINISTERED DRUGS (ALT 637 FOR MEDICARE OP, ALT 636 FOR OP/ED): Performed by: NURSE PRACTITIONER

## 2025-06-13 RX ORDER — BENZONATATE 100 MG/1
100 CAPSULE ORAL 3 TIMES DAILY
Status: DISCONTINUED | OUTPATIENT
Start: 2025-06-13 | End: 2025-06-18 | Stop reason: HOSPADM

## 2025-06-13 RX ORDER — GUAIFENESIN 600 MG/1
600 TABLET, EXTENDED RELEASE ORAL 2 TIMES DAILY PRN
Status: DISCONTINUED | OUTPATIENT
Start: 2025-06-13 | End: 2025-06-14

## 2025-06-13 RX ORDER — ZINC SULFATE 50(220)MG
50 CAPSULE ORAL DAILY
Status: DISCONTINUED | OUTPATIENT
Start: 2025-06-13 | End: 2025-06-18 | Stop reason: HOSPADM

## 2025-06-13 RX ADMIN — BUMETANIDE 0.5 MG: 0.5 TABLET ORAL at 09:12

## 2025-06-13 RX ADMIN — BENZONATATE 100 MG: 100 CAPSULE ORAL at 16:43

## 2025-06-13 RX ADMIN — FINASTERIDE 5 MG: 5 TABLET, FILM COATED ORAL at 09:12

## 2025-06-13 RX ADMIN — GUAIFENESIN 600 MG: 600 TABLET, EXTENDED RELEASE ORAL at 21:20

## 2025-06-13 RX ADMIN — TAMSULOSIN HYDROCHLORIDE 0.4 MG: 0.4 CAPSULE ORAL at 09:12

## 2025-06-13 RX ADMIN — TAMSULOSIN HYDROCHLORIDE 0.4 MG: 0.4 CAPSULE ORAL at 21:19

## 2025-06-13 RX ADMIN — ANTIHEMOPHILIC FACTOR (RECOMBINANT) 1782 UNITS: KIT at 15:15

## 2025-06-13 RX ADMIN — AZITHROMYCIN DIHYDRATE 500 MG: 500 TABLET ORAL at 11:08

## 2025-06-13 RX ADMIN — FAMOTIDINE 20 MG: 20 TABLET, FILM COATED ORAL at 09:12

## 2025-06-13 RX ADMIN — ATORVASTATIN CALCIUM 40 MG: 40 TABLET, FILM COATED ORAL at 09:12

## 2025-06-13 RX ADMIN — BENZONATATE 100 MG: 100 CAPSULE ORAL at 21:19

## 2025-06-13 RX ADMIN — GUAIFENESIN 600 MG: 600 TABLET, EXTENDED RELEASE ORAL at 09:12

## 2025-06-13 RX ADMIN — FERROUS SULFATE TAB 325 MG (65 MG ELEMENTAL FE) 1 TABLET: 325 (65 FE) TAB at 09:12

## 2025-06-13 RX ADMIN — CEFTRIAXONE SODIUM 1 G: 1 INJECTION, SOLUTION INTRAVENOUS at 11:08

## 2025-06-13 RX ADMIN — FLUTICASONE PROPIONATE 2 SPRAY: 50 SPRAY, METERED NASAL at 09:18

## 2025-06-13 RX ADMIN — EMPAGLIFLOZIN 10 MG: 10 TABLET, FILM COATED ORAL at 09:12

## 2025-06-13 RX ADMIN — PHYTONADIONE 5 MG: 5 TABLET ORAL at 09:12

## 2025-06-13 RX ADMIN — ZINC SULFATE 220 MG (50 MG) CAPSULE 1 CAPSULE: CAPSULE at 11:08

## 2025-06-13 RX ADMIN — FOLIC ACID 1 MG: 1 TABLET ORAL at 09:12

## 2025-06-13 ASSESSMENT — COGNITIVE AND FUNCTIONAL STATUS - GENERAL
STANDING UP FROM CHAIR USING ARMS: A LITTLE
WALKING IN HOSPITAL ROOM: A LITTLE
DRESSING REGULAR LOWER BODY CLOTHING: A LITTLE
WALKING IN HOSPITAL ROOM: A LITTLE
CLIMB 3 TO 5 STEPS WITH RAILING: A LOT
TURNING FROM BACK TO SIDE WHILE IN FLAT BAD: A LITTLE
DRESSING REGULAR UPPER BODY CLOTHING: A LITTLE
EATING MEALS: A LITTLE
STANDING UP FROM CHAIR USING ARMS: A LITTLE
CLIMB 3 TO 5 STEPS WITH RAILING: A LOT
MOBILITY SCORE: 18
MOVING TO AND FROM BED TO CHAIR: A LITTLE
MOBILITY SCORE: 18
PERSONAL GROOMING: A LITTLE
DAILY ACTIVITIY SCORE: 18
TOILETING: A LITTLE
DAILY ACTIVITIY SCORE: 18
HELP NEEDED FOR BATHING: A LITTLE
TURNING FROM BACK TO SIDE WHILE IN FLAT BAD: A LITTLE
PERSONAL GROOMING: A LITTLE
DRESSING REGULAR LOWER BODY CLOTHING: A LITTLE
HELP NEEDED FOR BATHING: A LITTLE
TOILETING: A LITTLE
EATING MEALS: A LITTLE
DRESSING REGULAR UPPER BODY CLOTHING: A LITTLE
MOVING TO AND FROM BED TO CHAIR: A LITTLE

## 2025-06-13 ASSESSMENT — PAIN SCALES - GENERAL
PAINLEVEL_OUTOF10: 0 - NO PAIN
PAINLEVEL_OUTOF10: 0 - NO PAIN

## 2025-06-13 ASSESSMENT — PAIN - FUNCTIONAL ASSESSMENT: PAIN_FUNCTIONAL_ASSESSMENT: 0-10

## 2025-06-13 NOTE — PROGRESS NOTES
Occupational Therapy                 Therapy Communication Note    Patient Name: Ramón Flores  MRN: [id number]  Department: [facility]  Room: [address] [place] [address]  Today's Date: 6/13/2025     Discipline: Occupational Therapy    Missed Visit: OT Missed Visit: Yes     Missed Visit Reason: Missed Visit Reason:  (Pt on bedrest following bone marrow biopsy, will follow up as appropriate)    Missed Time: Attempt

## 2025-06-13 NOTE — CARE PLAN
The patient's goals for the shift include      The clinical goals for the shift include pt will remain safe and free from injury throughout shift

## 2025-06-13 NOTE — SIGNIFICANT EVENT
Rapid Response Nurse Note: RADAR alert: 7    Pager time: 15  Arrival time: 15  Event end time: 25  Location:   [x] Triage by phone or secure messaging    Rapid response initiated by:  [] Rapid response RN [] Family [] Nursing Supervisor [] Physician   [x] RADAR auto page [] Sepsis auto-page [] RN [] RT   [] NP/PA [] Other:     Primary reason for call:   [] BAT [] New CPAP/BiPAP [] Bleeding [] Change in mental status   [] Chest pain [] Code blue [] FiO2 >/= 50% [] HR </= 40 bpm   [] HR >/= 130 bpm [] Hyperglycemia [] Hypoglycemia [x] RADAR    [] RR </= 8 bpm [] RR >/= 30 bpm [] SBP </= 90 mmHg [] SpO2 < 90%   [] Seizure [] Sepsis [] Shortness of breath  [] Staff concern: see comments     Initial VS and/or RADAR VS: T 35.9 °C; HR 80; RR 16; /63; SPO2 91%.    Providers present at bedside (if applicable): NA    Name of ICU Provider contacted (if applicable): NA    Interventions:  [x] None [] ABG/VBG [] Assist w/ICU transfer [] BAT paged    [] Bag mask [] Blood [] Cardioversion [] Code Blue   [] Code blue for intubation [] Code status changed [] Chest x-ray [] EKG   [] IV fluid/bolus [] KUB x-ray [] Labs/cultures [] Medication   [] Nebulizer treatment [] NIPPV (CPAP/BiPAP) [] Oxygen [] Oral airway   [] Peripheral IV [] Palliative care consult [] CT/MRI [] Sepsis protocol    [] Suctioned [] Other:     Outcome:  [] Coded and  [] Code blue for intubation [] Coded and transferred to ICU []  on division   [x] Remained on division (no change) [] Remained on division + additional monitoring [] Remained in ED [] Transferred to ED   [] Transferred to ICU [] Transferred to inpatient status [] Transferred for interventions (procedure) [] Transferred to ICU stepdown    [] Transferred to surgery [] Transferred to telemetry [] Sepsis protocol [] STEMI protocol   [] Stroke protocol [x] Bedside nurse instructed to page rapid response for any concerns or acute change in condition/VS     Additional Comments:    Reviewed above RADAR VS with bedside RN via phone.  Vital signs within patient's current trends.  No acute change in condition.  No interventions by rapid response team indicated at this time.  Staff to page rapid response for any concerns or acute change in condition or VS.

## 2025-06-13 NOTE — PROGRESS NOTES
"Ramón Flores is a 89 y.o. male on day 3 of admission presenting with Hemophilia A (Multi).    Subjective   He is moving his bowels regularly, denies dark or bright red characteristics of stool. Pt also denies any nausea or vomiting. Denies pain. He notes an ongoing cough and some SOB when he tries to move. Discussed plans to wait for stool sample and labs to result, and continue to monitor for N/V, bowel movements and bleeding. He is currently on 2L supp O2, wean as able. Denies fever/chills, N/V/C, bleeding, swelling, abdominal pain, CP, APARICIO, H/A and vision changes.     Seen this afternoon. Notes he is feeling more SOB, especially when moving around in bed.    Objective     Physical Exam  HENT:      Nose: Nose normal.      Mouth/Throat:      Mouth: Mucous membranes are moist.   Eyes:      Pupils: Pupils are equal, round, and reactive to light.   Cardiovascular:      Rate and Rhythm: Normal rate.   Pulmonary:      Effort: Pulmonary effort is normal.      Breath sounds: Wheezing present.      Comments: On 2L supp O2  Abdominal:      General: Abdomen is flat.   Musculoskeletal:         General: Normal range of motion.      Cervical back: Normal range of motion.   Neurological:      General: No focal deficit present.      Mental Status: He is alert.   Psychiatric:         Mood and Affect: Mood normal.         Last Recorded Vitals  Blood pressure 107/65, pulse 74, temperature 36.3 °C (97.3 °F), temperature source Temporal, resp. rate 16, height 1.676 m (5' 6\"), weight 59.4 kg (131 lb), SpO2 95%.  Intake/Output last 3 Shifts:  I/O last 3 completed shifts:  In: 150 (2.5 mL/kg) [P.O.:150]  Out: 1000 (16.8 mL/kg) [Urine:1000 (0.5 mL/kg/hr)]  Weight: 59.4 kg     Relevant Results  No results found.        Assessment & Plan  Hemophilia A (Multi)    Ramón Flores is a 89 y.o. male with PMH Hemophilia, Anal cancer (s/p curative RT 12/2024), CAD s/p CABG >20yrs ago, HFpEF, Afib, BPH, GERD, and CKD who presented to [facility] ED 6/9 with c/o n/v and diarrhea x24hrs. CT a/p with contrast (6/9) with no evidence for intrahepatic or extrahepatic biliary ductal dilation, no evidence of bowel obstruction, colonic diverticulosis with no evidence for acute diverticulitis, prostatomegaly. GI consulted, state s/sx likely gastroenteritis, rec stool path if diarrhea continues. Heme consulted, rec transfer to [facility] for further management of worsening anemia/thrombocytopenia I/s/o acute illness. Pt was transferred to [facility] for further management. Upon admission to [facility], pt reports his s/x have resolved. Heme consulted on admit 6/10, recs extensive hemophilia, pancytopenia workup. (6/11-current) start IV ceftriaxone and PO azithromycin for CAP treatment. DC pending improvement in anemia and thrombocytopenia.    Update 6/13:   - continue IV ceftriaxone 1g daily and PO azithromycin 500mg daily for CAP coverage (6/11-plan stop 6/15) for 5 days total  - Heme consulted for pancytopenia, full hemophilia workup and pancytopenia workup; rec PO vitamin K x3days (6/11) (6/12- planned stop 6/14), bmbx 6/13- results pending  - worsening RONNIE, sent UA and urine lytes- bladder scan neg  - Stool path, Cdiff, lactoferrin, and fecal calprotectin ordered/pending  - check CT chest     # Hemophilia A  # Pancytopenia  - Follows with Dr. Dillon, LOV 4/9/25 - emailed about admission 6/11  - He has had minimal bleeding during life d/t concurrent protein C deficiency  - On Altuviiio weekly (Tuesdays), pt got dose on 6/10  - Hgb BL ~12; Hgb 7.9 (6/10) -> 8.2 (6/11)--> 8.3 (6/12)  - Plt BL ~ 80, Plt 41 (6/10) -> 42 (6/11)--> 32 (6/12)  - , Haptoglobin 241 (6/9)  - Ddimer 2,617, fibrinogen 540, PT 17.9, INR 1.6, aPTT 57 (6/9)  - Iron 11, TIBC 188, Ferritin 328, haptoglobin 241 (6/10)  - , retic 2.4% (6/10)  - Myeloid malignancies panel sent/pending and MDS panel sent/pending per heme at [facility]  - Factor 8 activity (6/10) 5--> 53 (6/12) (after first infusion on  6/11)  - Continue home folic acid 1mg daily and ferrous sulfate 325mg daily  - Heme consulted 6/10, rec factor assays: FII, FVII, FVIII, FXI; drawing serum copper and zinc level, proceed with altuviio injection, Heme to FU with myeloid malignancy panel, peripheral smear  - FXI 57, FVIII 5, FVII 38, FII 51 (6/10)  - MDS panel and Myeloid malignancies NGS panel pending (6/11)  - Peripheral smear pending (6/11)  - serum copper/zinc levels pending (6/11)  - per Heme: transfuse to maintain Hb > 7 or if symptomatic, PLT > 10 or > 50 if bleeding     # URI vs CAP  - Pt was seen outpt 6/9 for c/o sinus congestion and cough x2 days, was started on Cefdinir and took one dose 6/9  - On admit, pt continues to endorse non-productive cough; no fevers/chills or SOB  - CXR (6/9) showing left basilar atelectasis/scar vs small infiltrate  - worsening SOB, will check CT chest  - Lactate level 5.4 --> 1.8 (6/9)  - s/p 500mL NS bolus and maintenance IVF with NS @ 75mL/hr  - Started LR @ 75mL/hr x24hrs (6/10-6/11)  - Blood cultures x2 (6/9): NGTD  - COVID (6/9) negative  - Resp viral panel (6/10): pending  - Strep pneumo, legionella neg (6/10)  - MRSA pending (6/10)  - Bps soft on admit (107/62), no c/f significant HoTN  - Presented to [facility] in 2L NC, SpO2 94% on admit, will continue for now and wean as tolerated  - encourage IS  - Started Mucinex 600mg BID and tessalon pearls PRN 6/10  - Start IV ceftriaxone 1g daily and PO azithromycin 500mg daily (6/11-plan stop 6/15) for 5 days of CAP coverage given cough and supp O2 use    # RONNIE/Hx CKD  - Likely pre-renal 2/2 contrast  - sCr BL ~1.1, sCr 1.19 (6/9)--> s/p IV contrast--> sCr 1.38 (6/11)--> 1.34 (6/12)  - s/p 500mL NS bolus and maintenance IVF with NS @ 75mL/hr  - s/p LR @ 75mL/hr x 12hrs (6/10-6/11)  - Urine lytes WNL 6/10, will resend 6/13 given worsening  - send UA  - bladder scan with 178ml on 6/13  - encourage increased PO intake     # N/V/D  - Likely viral gastroenteritis vs  bacterial  - CT a/p with contrast (6/9) no evidence for intrahepatic or extrahepatic biliary ductal dilation, no evidence of bowel obstruction, colonic diverticulosis with no evidence for acute diverticulitis, prostatomegaly  - s/p 1x dose of cefepime and 1x dose of flagyl at [facility] 6/9  - s/p 500mL NS bolus and maintenance IVF with NS @ 75mL/hr at [facility] 6/9  - No fevers/chills or leukocytosis   - Lactate level 5.4 --> 1.8 (6/9)  - UA (6/9) + protein, neg leuks or nitrites  - Blood cultures x2 (6/9): NGTD  - COVID (6/9) negative  - CXR showing left basilar atelectasis/scar vs small infiltrate  - GI consulted at [facility] (6/10), state s/sx likely gastroenteritis, rec stool path if diarrhea continues  - Stool path, Cdiff, lactoferrin, and fecal calprotectin ordered/pending 6/10  - PO Zofran 4mg q8hrs PRN n/v 1st line, PO Compazine 10mg q8hrs PRN n/v 2nd line  - s/p IVF with LR @ 75mL/hr x12hrs (6/10-6/11)     # Troponinemia - downtrending  - Likely 2/2 demand I/s/o active illness with cardiac hx, aflutter seen on EKG  - Hx elevated troponins in the past  - Trop 249->287-->446 (6/9)  - down-trending Trop 285 (6/10)  - EKG at OSH (6/9) showing aflutter with AV block, non-specific T-wave abnormalities  - Admit EKG pending 6/10     # Anal Cancer  - Previously followed with Dr. Locke, now follows with Dr. Cochran, followed with Dr. Muro for Rad/Onc  - Initially presented with rectal bleeding in the setting of constipation over several consecutive days and then called EMS. He was brought to [facility] but then transferred to [facility] on 10/20/24 given no factor VIII at [facility]. Colonoscopy performed on 10/22/24 showed single malignant-appearing and friable ulcerated mass in the anal canal. 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   - s/p curative RT to anus 12/2024  - PET (4/18/25): no evidence of distant metastatic disease, small uptake in the prior anal lesion, likely  non-specific but residual neoplasm not entirely excluded  - Now only following with med onc (Dr. Cochran) as surveillance if needed  - Has CT c/a/p and Rad Onc FUV on 8/4     # CAD  # Afib  # HFpEF  # HLD  - s/p CABG >20yrs ago  - ECHO (2/1/25): EF 49%, global hypokinesis of LV, mod increased septal and mod increased posterior LV wall thickness, sev dialted LA, mod-sev dilated RA, RVSP 44  - Continue home Jardiance 10mg daily and Bumex 0.5mg daily  - Continue home Lipitor 40mg daily  - BNP (6/10): 580     # BPH  - Continue home Flomax 0.4mg BID and Finasteride 5mg daily     # GERD  - Home Pantoprazole 40mg daily sub as Pepcid 20mg daily I/s/o thrombocytopenia     DVT prophy: No pharmacologic DVT prophy I/s/o hemophilia A and thrombocytopenia. SCDs, encourage safe ambulation  Will attempt to get OOB as able to maintain functional status     DISPO:  - DNR, DNI; confirmed on admit  - DC pending improvement in anemia and thrombocytopenia  - NOK (Javier Wallace, relative): [phone]  - CT c/a/p 8/4, Rad Onc 8/4, Dr. Dillon FUV 8/6, Ophthalmology FUV 8/27, Urology FUV 12/1, Ophthalmology FUV 12/3  - Updated Adelea (niece) on plan 6/13     I spent 60 minutes in the professional and overall care of this patient.    Assessment and plan as above discussed with attending physician Dr. Ulloa.    Rosendo Pratt, APRN-CNP

## 2025-06-13 NOTE — PROGRESS NOTES
"Ramón Flores is a 89 y.o. male on day 3 of admission presenting with Hemophilia A (Multi).    Attending Provider William Ulloa MD    Daily Progress Note    Interval Events   No acute event overnight.    Subjective   He's seen on the bedside. He got BMBx on the bedside this morning (got aspirates but failed to get core). He's complaining of more congestion and cough, feeling not well today. No other complaints.    Review of Systems   12 Point ROS negative, except as above     Allergies   Allergies[1]    Medications   antihemophilic factor VIII (recombinant), Fc-VWF-XTEN fusion protein-ehtl, 3,263 Units, q7 days  atorvastatin, 40 mg, Daily  azithromycin, 500 mg, q24h  benzonatate, 100 mg, TID  bumetanide, 0.5 mg, Daily  cefTRIAXone, 1 g, q24h  empagliflozin, 10 mg, Daily  famotidine, 20 mg, Daily  ferrous sulfate, 65 mg of elemental iron, Daily  finasteride, 5 mg, Daily  fluticasone, 2 spray, Daily  folic acid, 1 mg, Daily  guaiFENesin, 600 mg, BID  phytonadione, 5 mg, Daily  tamsulosin, 0.4 mg, BID  zinc sulfate, 50 mg of elemental zinc, Daily         guaiFENesin, 600 mg, BID PRN  ondansetron, 4 mg, q8h PRN  prochlorperazine, 10 mg, q8h PRN        Physical Exam   Blood pressure 120/73, pulse 83, temperature 36.3 °C (97.3 °F), temperature source Temporal, resp. rate 16, height 1.676 m (5' 6\"), weight 59.4 kg (131 lb), SpO2 96%.    Alert and oriented x3  Clear lung sound w/o rale, no wheezing  RHB w/o murmur, normal S1/S2  Soft and flat abdomen w/o tenderness, no hepatosplenomegaly  No pitting edema    Labs   CBC 7.7/7.9/41 MCV: 101   Prolif: Reti 2.4% (RPI 0.7)   Cr 1.35, Vu 2.6/0.5   , hapto 241   Coag 17.9(1.6)/PTT 57, fibrinogen 540, D-dimer 2617   Iron 44, TIBC 188 (23%), ferritin 328    B12: 738, MMA 0.18, HOMO 18.95   Folate pending   Copper/Zn: pending   SPEP: IgA lambda MGUS 0.3mg/dl   HBV/HCV: negative   MDS/myeloid NGS pending   APCT: no hepatosplenomegaly    Platelet antibodies: all " negative     Imaging   Reviewed    Assessment/Plan     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 Subsequently transferred to [facility] for further workup of chronic bicytopenia which is worsening. He's been treated for CAP d/t his respiratory symptoms/oxygen requirement.     Overall workup is unrevealing the cause of cytopenias but his worsening macrocytic anemia and smear findings that we reviewed (6/11) showing many teardrop cells and several hypolobulated granulocytes (Pelger-Huet cells) indicates he could have MDS or other myeloid disorders. Smear also shows several hypochromic RBCs in abnormal shape c/f potential hidden iron deficiency, but a few schistocytes so less likely MAHA process. He got BMBx on the bedside 6/13 but only was able to get BM aspirate w/o core tissue.    He was also found to have PT prolongation and we sent factor levels (2, 7, 8, 11) all of which are low but his fibrinogen is 540 (unlikely DIC) so this could be d/t nutrient deficiency given that he lives alone and doesn't seem to take care of himself well. We gave oral vitamin K 5mg x 3 days (6/11-13) with slight improvement in PT (16.4<17.9).     #Hemophilia A, severe  #Protein C deficiency  #Worsening macrocytic anemia/thrombocytopenia, could be MDS vs other myeloid disorders  #Prolonged PT, could be nutrient deficiency  :: Trough factor VIII level 5% (6/10)  :: s/p Altuviiio 6/10 20:00   :: Factor VIII level (6/12 AM 53%)  :: s/p Advate 30 units/kg after BMBx (6/13)    Recommendations:  -please start zinc supplement  -Can stop vitamin K  -FU with soluble transferrin receptor, MDS/myeloid malignancy panel  -daily CBC w diff  -transfuse to maintain Hb > 7 or if symptomatic, PLT > 10  or > 50 if bleeding  -infection treatment per primary    Thank you for this consult, and hematology will continue to follow.   Patient was seen, examined, and discussed with Dr. Kee.    Yamil Kaufman MD, PhD  Hematology/Oncology Fellow [id number] or Epic  6/13/2025         [1]   Allergies  Allergen Reactions    Aspirin Bleeding     hemophiliac    Penicillins Rash     Tolerated cefdinir, cefepime

## 2025-06-13 NOTE — PROCEDURES
The patient was explained the risks and benefits of the bone marrow biopsy procedure. Their questions were answered and electronic consent was obtained. Patient’s medications and allergies were reviewed. Prior to the procedure the patient’s identity was confirmed using their name and date of birth. The patient lay in the prone position and their left iliac crest was cleansed and draped in a sterile fashion. 10ml of 1% lidocaine was injected locally.    Particles were seen in marrow aspirate, as per . Using a Jamshidi, aspirate samples were obtained without difficulty and sent to pathology for testing. A few attempts made for core without specimen. Patient turned to lateral. Given his anatomy, unable to obtain a core, hematology made aware. Pressure held for 5 minutes with no e/o bleeding. A sterile dressing was applied once hemostasis was achieved. The patient tolerated the procedure well with no complications and less than 5ml of blood loss. The patient was educated to leave the dressing in place for 24 hours and they verbalized an understanding.     Bmbx site checked 10 minutes following procedure and no e/o bleeding.

## 2025-06-13 NOTE — CARE PLAN
Problem: Pain - Adult  Goal: Verbalizes/displays adequate comfort level or baseline comfort level  Outcome: Progressing     Problem: Safety - Adult  Goal: Free from fall injury  Outcome: Progressing     Problem: Discharge Planning  Goal: Discharge to home or other facility with appropriate resources  Outcome: Progressing     Problem: Chronic Conditions and Co-morbidities  Goal: Patient's chronic conditions and co-morbidity symptoms are monitored and maintained or improved  Outcome: Progressing     Problem: Nutrition  Goal: Nutrient intake appropriate for maintaining nutritional needs  Outcome: Progressing     Problem: Skin  Goal: Decreased wound size/increased tissue granulation at next dressing change  Outcome: Progressing  Flowsheets (Taken 6/13/2025 0620)  Decreased wound size/increased tissue granulation at next dressing change:   Promote sleep for wound healing   Protective dressings over bony prominences   Utilize specialty bed per algorithm  Goal: Participates in plan/prevention/treatment measures  Outcome: Progressing  Flowsheets (Taken 6/13/2025 0620)  Participates in plan/prevention/treatment measures:   Discuss with provider PT/OT consult   Elevate heels   Increase activity/out of bed for meals  Goal: Prevent/manage excess moisture  Outcome: Progressing  Flowsheets (Taken 6/13/2025 0620)  Prevent/manage excess moisture:   Monitor for/manage infection if present   Follow provider orders for dressing changes   Cleanse incontinence/protect with barrier cream   Use wicking fabric (obtain order)   Moisturize dry skin  Goal: Prevent/minimize sheer/friction injuries  Outcome: Progressing  Flowsheets (Taken 6/13/2025 0620)  Prevent/minimize sheer/friction injuries:   Complete micro-shifts as needed if patient unable. Adjust patient position to relieve pressure points, not a full turn   Use pull sheet   Increase activity/out of bed for meals   HOB 30 degrees or less   Turn/reposition every 2 hours/use  positioning/transfer devices   Utilize specialty bed per algorithm  Goal: Promote/optimize nutrition  Outcome: Progressing  Flowsheets (Taken 6/13/2025 0620)  Promote/optimize nutrition:   Monitor/record intake including meals   Assist with feeding   Consume > 50% meals/supplements   Offer water/supplements/favorite foods   Discuss with provider if NPO > 2 days   Reassess MST if dietician not consulted  Goal: Promote skin healing  Outcome: Progressing  Flowsheets (Taken 6/13/2025 0620)  Promote skin healing:   Assess skin/pad under line(s)/device(s)   Protective dressings over bony prominences   Turn/reposition every 2 hours/use positioning/transfer devices   Ensure correct size (line/device) and apply per  instructions   Rotate device position/do not position patient on device   The patient's goals for the shift include      The clinical goals for the shift include pt will remain safe and free from injury throughout shift    Patient remained safe and free from injury throughout shift. Patient able to get rest throughout night. Plan for bone marrow biopsy today

## 2025-06-14 ENCOUNTER — APPOINTMENT (OUTPATIENT)
Dept: RADIOLOGY | Facility: HOSPITAL | Age: OVER 89
DRG: 813 | End: 2025-06-14
Payer: MEDICARE

## 2025-06-14 LAB
ACANTHOCYTES BLD QL SMEAR: ABNORMAL
ALBUMIN SERPL BCP-MCNC: 3.7 G/DL (ref 3.4–5)
ALP SERPL-CCNC: 80 U/L (ref 33–136)
ALT SERPL W P-5'-P-CCNC: 31 U/L (ref 10–52)
ANION GAP SERPL CALC-SCNC: 14 MMOL/L (ref 10–20)
APTT PPP: 38 SECONDS (ref 26–36)
AST SERPL W P-5'-P-CCNC: 26 U/L (ref 9–39)
BASOPHILS # BLD MANUAL: 0.03 X10*3/UL (ref 0–0.1)
BASOPHILS NFR BLD MANUAL: 0.8 %
BILIRUB SERPL-MCNC: 1.2 MG/DL (ref 0–1.2)
BLASTS # BLD MANUAL: 0 X10*3/UL
BLASTS NFR BLD MANUAL: 0 %
BNP SERPL-MCNC: 970 PG/ML (ref 0–99)
BUN SERPL-MCNC: 28 MG/DL (ref 6–23)
CALCIUM SERPL-MCNC: 7.9 MG/DL (ref 8.6–10.6)
CHLORIDE SERPL-SCNC: 105 MMOL/L (ref 98–107)
CO2 SERPL-SCNC: 24 MMOL/L (ref 21–32)
CREAT SERPL-MCNC: 1.12 MG/DL (ref 0.5–1.3)
EGFRCR SERPLBLD CKD-EPI 2021: 60 ML/MIN/1.73M*2
EOSINOPHIL # BLD MANUAL: 0 X10*3/UL (ref 0–0.4)
EOSINOPHIL NFR BLD MANUAL: 0 %
ERYTHROCYTE [DISTWIDTH] IN BLOOD BY AUTOMATED COUNT: 22.3 % (ref 11.5–14.5)
GLUCOSE SERPL-MCNC: 95 MG/DL (ref 74–99)
HCT VFR BLD AUTO: 24.8 % (ref 41–52)
HGB BLD-MCNC: 8.2 G/DL (ref 13.5–17.5)
HYPOCHROMIA BLD QL SMEAR: ABNORMAL
IMM GRANULOCYTES # BLD AUTO: 0.2 X10*3/UL (ref 0–0.5)
IMM GRANULOCYTES NFR BLD AUTO: 5.3 % (ref 0–0.9)
INR PPP: 1.4 (ref 0.9–1.1)
LYMPHOCYTES # BLD MANUAL: 0.75 X10*3/UL (ref 0.8–3)
LYMPHOCYTES NFR BLD MANUAL: 19.7 %
MCH RBC QN AUTO: 34.9 PG (ref 26–34)
MCHC RBC AUTO-ENTMCNC: 33.1 G/DL (ref 32–36)
MCV RBC AUTO: 106 FL (ref 80–100)
METAMYELOCYTES # BLD MANUAL: 0.03 X10*3/UL
METAMYELOCYTES NFR BLD MANUAL: 0.9 %
MONOCYTES # BLD MANUAL: 0.39 X10*3/UL (ref 0.05–0.8)
MONOCYTES NFR BLD MANUAL: 10.3 %
MYELOCYTES # BLD MANUAL: 0.03 X10*3/UL
MYELOCYTES NFR BLD MANUAL: 0.8 %
NEUTROPHILS # BLD MANUAL: 2.57 X10*3/UL (ref 1.6–5.5)
NEUTS BAND # BLD MANUAL: 0 X10*3/UL (ref 0–0.5)
NEUTS BAND NFR BLD MANUAL: 0 %
NEUTS SEG # BLD MANUAL: 2.57 X10*3/UL (ref 1.6–5)
NEUTS SEG NFR BLD MANUAL: 67.5 %
NRBC BLD MANUAL-RTO: 0 % (ref 0–0)
NRBC BLD-RTO: 0.5 /100 WBCS (ref 0–0)
OVALOCYTES BLD QL SMEAR: ABNORMAL
PLASMA CELLS # BLD MANUAL: 0 X10*3/UL
PLASMA CELLS NFR BLD MANUAL: 0 %
PLATELET # BLD AUTO: 56 X10*3/UL (ref 150–450)
POTASSIUM SERPL-SCNC: 4.1 MMOL/L (ref 3.5–5.3)
PROMYELOCYTES # BLD MANUAL: 0 X10*3/UL
PROMYELOCYTES NFR BLD MANUAL: 0 %
PROT SERPL-MCNC: 6.3 G/DL (ref 6.4–8.2)
PROTHROMBIN TIME: 15.8 SECONDS (ref 9.8–12.4)
RBC # BLD AUTO: 2.35 X10*6/UL (ref 4.5–5.9)
RBC MORPH BLD: ABNORMAL
SODIUM SERPL-SCNC: 139 MMOL/L (ref 136–145)
STFR SERPL-MCNC: 2.5 MG/L (ref 2.2–5)
TOTAL CELLS COUNTED BLD: 117
VARIANT LYMPHS # BLD MANUAL: 0 X10*3/UL (ref 0–0.3)
VARIANT LYMPHS NFR BLD: 0 %
WBC # BLD AUTO: 3.8 X10*3/UL (ref 4.4–11.3)

## 2025-06-14 PROCEDURE — 85610 PROTHROMBIN TIME: CPT

## 2025-06-14 PROCEDURE — 2500000001 HC RX 250 WO HCPCS SELF ADMINISTERED DRUGS (ALT 637 FOR MEDICARE OP)

## 2025-06-14 PROCEDURE — 2500000004 HC RX 250 GENERAL PHARMACY W/ HCPCS (ALT 636 FOR OP/ED)

## 2025-06-14 PROCEDURE — 2500000001 HC RX 250 WO HCPCS SELF ADMINISTERED DRUGS (ALT 637 FOR MEDICARE OP): Performed by: NURSE PRACTITIONER

## 2025-06-14 PROCEDURE — 80053 COMPREHEN METABOLIC PANEL: CPT

## 2025-06-14 PROCEDURE — 71045 X-RAY EXAM CHEST 1 VIEW: CPT

## 2025-06-14 PROCEDURE — 36415 COLL VENOUS BLD VENIPUNCTURE: CPT

## 2025-06-14 PROCEDURE — 99233 SBSQ HOSP IP/OBS HIGH 50: CPT

## 2025-06-14 PROCEDURE — 2500000002 HC RX 250 W HCPCS SELF ADMINISTERED DRUGS (ALT 637 FOR MEDICARE OP, ALT 636 FOR OP/ED): Performed by: NURSE PRACTITIONER

## 2025-06-14 PROCEDURE — RXMED WILLOW AMBULATORY MEDICATION CHARGE

## 2025-06-14 PROCEDURE — 83880 ASSAY OF NATRIURETIC PEPTIDE: CPT

## 2025-06-14 PROCEDURE — 85027 COMPLETE CBC AUTOMATED: CPT

## 2025-06-14 PROCEDURE — 1200000003 HC ONCOLOGY  ROOM WITH TELEMETRY DAILY

## 2025-06-14 PROCEDURE — 85240 CLOT FACTOR VIII AHG 1 STAGE: CPT

## 2025-06-14 PROCEDURE — 71045 X-RAY EXAM CHEST 1 VIEW: CPT | Performed by: RADIOLOGY

## 2025-06-14 PROCEDURE — 85007 BL SMEAR W/DIFF WBC COUNT: CPT

## 2025-06-14 RX ORDER — FUROSEMIDE 10 MG/ML
40 INJECTION INTRAMUSCULAR; INTRAVENOUS ONCE
Status: COMPLETED | OUTPATIENT
Start: 2025-06-14 | End: 2025-06-14

## 2025-06-14 RX ORDER — GUAIFENESIN 600 MG/1
600 TABLET, EXTENDED RELEASE ORAL 2 TIMES DAILY PRN
Status: DISCONTINUED | OUTPATIENT
Start: 2025-06-14 | End: 2025-06-18 | Stop reason: HOSPADM

## 2025-06-14 RX ORDER — ALBUTEROL SULFATE 0.83 MG/ML
2.5 SOLUTION RESPIRATORY (INHALATION) EVERY 6 HOURS PRN
Status: DISCONTINUED | OUTPATIENT
Start: 2025-06-14 | End: 2025-06-18 | Stop reason: HOSPADM

## 2025-06-14 RX ORDER — GUAIFENESIN 100 MG/5ML
200 LIQUID ORAL EVERY 4 HOURS
Status: DISCONTINUED | OUTPATIENT
Start: 2025-06-14 | End: 2025-06-14

## 2025-06-14 RX ORDER — ZINC SULFATE 50(220)MG
50 CAPSULE ORAL DAILY
Qty: 30 CAPSULE | Refills: 11 | Status: SHIPPED | OUTPATIENT
Start: 2025-06-15 | End: 2026-06-15

## 2025-06-14 RX ORDER — METRONIDAZOLE 500 MG/100ML
500 INJECTION, SOLUTION INTRAVENOUS EVERY 8 HOURS
Status: DISCONTINUED | OUTPATIENT
Start: 2025-06-14 | End: 2025-06-16

## 2025-06-14 RX ADMIN — BENZONATATE 100 MG: 100 CAPSULE ORAL at 21:10

## 2025-06-14 RX ADMIN — METRONIDAZOLE 500 MG: 500 INJECTION, SOLUTION INTRAVENOUS at 18:19

## 2025-06-14 RX ADMIN — FERROUS SULFATE TAB 325 MG (65 MG ELEMENTAL FE) 1 TABLET: 325 (65 FE) TAB at 10:32

## 2025-06-14 RX ADMIN — BENZONATATE 100 MG: 100 CAPSULE ORAL at 10:32

## 2025-06-14 RX ADMIN — TAMSULOSIN HYDROCHLORIDE 0.4 MG: 0.4 CAPSULE ORAL at 21:10

## 2025-06-14 RX ADMIN — BENZONATATE 100 MG: 100 CAPSULE ORAL at 16:02

## 2025-06-14 RX ADMIN — FLUTICASONE PROPIONATE 2 SPRAY: 50 SPRAY, METERED NASAL at 10:35

## 2025-06-14 RX ADMIN — ZINC SULFATE 220 MG (50 MG) CAPSULE 1 CAPSULE: CAPSULE at 10:33

## 2025-06-14 RX ADMIN — FUROSEMIDE 40 MG: 10 INJECTION INTRAMUSCULAR; INTRAVENOUS at 12:12

## 2025-06-14 RX ADMIN — TAMSULOSIN HYDROCHLORIDE 0.4 MG: 0.4 CAPSULE ORAL at 10:31

## 2025-06-14 RX ADMIN — SALINE NASAL SPRAY 1 SPRAY: 1.5 SOLUTION NASAL at 00:54

## 2025-06-14 RX ADMIN — AZITHROMYCIN DIHYDRATE 500 MG: 500 TABLET ORAL at 10:42

## 2025-06-14 RX ADMIN — BUMETANIDE 0.5 MG: 0.5 TABLET ORAL at 10:34

## 2025-06-14 RX ADMIN — EMPAGLIFLOZIN 10 MG: 10 TABLET, FILM COATED ORAL at 10:33

## 2025-06-14 RX ADMIN — GUAIFENESIN 200 MG: 200 SOLUTION ORAL at 10:31

## 2025-06-14 RX ADMIN — FAMOTIDINE 20 MG: 20 TABLET, FILM COATED ORAL at 10:32

## 2025-06-14 RX ADMIN — ATORVASTATIN CALCIUM 40 MG: 40 TABLET, FILM COATED ORAL at 10:31

## 2025-06-14 RX ADMIN — CEFTRIAXONE SODIUM 1 G: 1 INJECTION, SOLUTION INTRAVENOUS at 12:12

## 2025-06-14 RX ADMIN — GUAIFENESIN 200 MG: 200 SOLUTION ORAL at 16:02

## 2025-06-14 RX ADMIN — FOLIC ACID 1 MG: 1 TABLET ORAL at 10:32

## 2025-06-14 RX ADMIN — FINASTERIDE 5 MG: 5 TABLET, FILM COATED ORAL at 10:32

## 2025-06-14 ASSESSMENT — PAIN - FUNCTIONAL ASSESSMENT: PAIN_FUNCTIONAL_ASSESSMENT: 0-10

## 2025-06-14 ASSESSMENT — PAIN SCALES - GENERAL: PAINLEVEL_OUTOF10: 0 - NO PAIN

## 2025-06-14 NOTE — CARE PLAN
The clinical goals for the shift include Patient will remain HDS this shift    Over the shift, the patient did not make progress toward the following goals. Barriers to progression include possible aspiration while taking a liquid medication.. Provider and RT came to evaluate Recommendations to address these barriers include diet changed till SLP evaluation.      Problem: Pain - Adult  Goal: Verbalizes/displays adequate comfort level or baseline comfort level  Outcome: Progressing     Problem: Safety - Adult  Goal: Free from fall injury  Outcome: Progressing     Problem: Chronic Conditions and Co-morbidities  Goal: Patient's chronic conditions and co-morbidity symptoms are monitored and maintained or improved  Outcome: Progressing

## 2025-06-14 NOTE — CARE PLAN
pt will remain HDS and VSS throughout this shift 6/14/25 @ 0700      Problem: Pain - Adult  Goal: Verbalizes/displays adequate comfort level or baseline comfort level  Outcome: Progressing     Problem: Safety - Adult  Goal: Free from fall injury  Outcome: Progressing     Problem: Discharge Planning  Goal: Discharge to home or other facility with appropriate resources  Outcome: Progressing     Problem: Chronic Conditions and Co-morbidities  Goal: Patient's chronic conditions and co-morbidity symptoms are monitored and maintained or improved  Outcome: Progressing     Problem: Nutrition  Goal: Nutrient intake appropriate for maintaining nutritional needs  Outcome: Progressing     Problem: Skin  Goal: Decreased wound size/increased tissue granulation at next dressing change  Outcome: Progressing  Goal: Participates in plan/prevention/treatment measures  Outcome: Progressing  Goal: Prevent/manage excess moisture  Outcome: Progressing  Goal: Prevent/minimize sheer/friction injuries  Outcome: Progressing  Goal: Promote/optimize nutrition  Outcome: Progressing  Goal: Promote skin healing  Outcome: Progressing

## 2025-06-14 NOTE — PROGRESS NOTES
"Ramón Flores is a 89 y.o. male on day 4 of admission presenting with Hemophilia A (Multi).    Subjective   Ramón is doing fine this morning. States he had no acute events overnight. States the BMBx went okay, states it was a little painful. Denies chest pain, SOB, N/V/D/C, fever, chills, s/sx of bleeding or bruising. Still has a cough. We discussed CT results. We discussed waiting for heme recs for today. ROS: A complete review of systems was performed and is negative except for as mentioned above in the HPI     Objective     Physical Exam  HENT:      Nose: Nose normal.      Mouth/Throat:      Mouth: Mucous membranes are moist.   Eyes:      Pupils: Pupils are equal, round, and reactive to light.   Cardiovascular:      Rate and Rhythm: Normal rate.   Pulmonary:      Effort: Pulmonary effort is normal.      Breath sounds: Wheezing present.      Comments: On 2L supp O2  Abdominal:      General: Abdomen is flat.   Musculoskeletal:         General: Normal range of motion.      Cervical back: Normal range of motion.   Neurological:      General: No focal deficit present.      Mental Status: He is alert.   Psychiatric:         Mood and Affect: Mood normal.       Last Recorded Vitals  Blood pressure 113/72, pulse 89, temperature 36.3 °C (97.3 °F), temperature source Temporal, resp. rate 16, height 1.676 m (5' 6\"), weight 59.4 kg (131 lb), SpO2 96%.  Intake/Output last 3 Shifts:  I/O last 3 completed shifts:  In: 150 (2.5 mL/kg) [P.O.:150]  Out: 1600 (26.9 mL/kg) [Urine:1600 (0.7 mL/kg/hr)]  Weight: 59.4 kg     Relevant Results  CT chest wo IV contrast  Result Date: 6/13/2025  Interpreted By:  Cally Rosales and Mercado Amiel STUDY: CT CHEST WO IV CONTRAST;  6/13/2025 2:53 pm   INDICATION: Signs/Symptoms:SOB, new cough, neutropenic.     COMPARISON: CT ANGIO CHEST FOR PULMONARY EMBOLISM 2/1/2025   ACCESSION NUMBER(S): [id number]   ORDERING CLINICIAN: CYNTHIA HUANG   TECHNIQUE: Helical data acquisition of the chest " was obtained without intravenous contrast. Images were reformatted in axial, coronal, and sagittal planes.   FINDINGS: LUNGS AND AIRWAYS: The trachea and central airways are patent. No endobronchial lesion is seen.   Patchy opacities in the anterior superior bilateral upper lobes. Few mild scattered ground-glass opacities in the right upper lobe. Mild bilateral lower lobe bronchiectasis. Moderate right and small left pleural effusions with associated compressive atelectasis. Few punctate 1-2 mm nodules in the right lung are stable. Bandlike opacities along the bilateral upper lung lobes radiating from the hilum also seen, favored to represent atelectasis.   MEDIASTINUM AND DANIEL, LOWER NECK AND AXILLA: The visualized thyroid gland is within normal limits. No evidence of thoracic lymphadenopathy by CT criteria. There is a smallsized hiatal hernia. Otherwise, the esophagus is within normal limits.   HEART AND VESSELS: The thoracic aorta normal in course and caliber.There is mild scattered calcified atherosclerosis present. Main pulmonary artery is dilated measuring 3.7 cm. Mild coronary artery calcifications are seen.Please note,the study is not optimized for evaluation of coronary arteries. The mild cardiomegaly.There are minimal aortic valvular califications seen, and correlate with concern for aortic stenosis. There is no pericardial effusion seen.   UPPER ABDOMEN: The visualized subdiaphragmatic structures demonstrate no remarkable findings. Trace perihepatic ascites.     CHEST WALL AND OSSEOUS STRUCTURES: There is diffuse thoracic body wall edema. Stable chronic T3 moderate compression fracture with central height loss. Moderate midthoracic spondylosis with disc height loss and osteophytes. Median sternotomy. No acute osseous pathology. There are no suspicious osseous lesions. Degenerative changes throughout the thoracic spine. The patient is status post median sternotomy.       1. Bilateral upper lobe patchy  opacities and mild scattered right upper lobe ground-glass opacities concerning for multifocal infectious process. 2. Findings of pulmonary interstitial and alveolar edema with superimposed right more than left pleural effusions. 3. Dilated main pulmonary artery, which can be seen with pulmonary hypertension. 4. Mild thoracic body wall edema. 5. Additional incidental non-acute findings as detailed above.     I personally reviewed the images/study and I agree with the findings as stated by Dr. Latrell Hayden. This study was interpreted at [facility], [place], [place].   MACRO: None.   Signed by: Cally Rosales 6/13/2025 3:48 PM Dictation workstation:   [id number]          Assessment & Plan  Hemophilia A (Multi)    Ramón Flores is a 89 y.o. male with PMH Hemophilia, Anal cancer (s/p curative RT 12/2024), CAD s/p CABG >20yrs ago, HFpEF, Afib, BPH, GERD, and CKD who presented to [facility] ED 6/9 with c/o n/v and diarrhea x24hrs. CT a/p with contrast (6/9) with no evidence for intrahepatic or extrahepatic biliary ductal dilation, no evidence of bowel obstruction, colonic diverticulosis with no evidence for acute diverticulitis, prostatomegaly. GI consulted, state s/sx likely gastroenteritis, rec stool path if diarrhea continues. Heme consulted, rec transfer to [facility] for further management of worsening anemia/thrombocytopenia I/s/o acute illness. Pt was transferred to [facility] for further management. Upon admission to [facility], pt reports his s/x have resolved. Renato consulted on admit 6/10, recs extensive hemophilia, pancytopenia workup. (6/11-plan stop 6/15) start IV ceftriaxone and PO azithromycin for CAP treatment. S/p BMBx & Advate infusion 6/13 - results pending. DC pending improvement in anemia and thrombocytopenia.    Update 6/14:   - continue IV ceftriaxone 1g daily and PO azithromycin 500mg daily for CAP coverage (6/11-plan stop 6/15) for 5 days total of abx therapy per attending  - Renato  consulted for pancytopenia, full hemophilia workup and pancytopenia workup; rec PO vitamin K (s/p 3 days of Vit K per Heme team)  - s/p bmbx 6/13 & s/p Advate 30u/kg once after BMBx - results pending  - repeat factor 8 activity pending  - sCr improving today  - Stool path, Cdiff, lactoferrin, and fecal calprotectin ordered/pending  - CT Chest w/o (6/13) showed bilat upper lobe patchy and GGOs c/f multifocal infectious process, pulmonary & alveolar edema w r>L pleural effusions, dilated main pulm artery, mold thoracic body wall edema  - s/p 40 mg IV lasix once for pulm edema  - Wean O2 as able for homegoing  - PT rec SNF vs Acute rehab, pt would like to go home with home care     # Hemophilia A  # Pancytopenia  - Follows with Dr. Dillon, LOV 4/9/25 - emailed about admission 6/11  - He has had minimal bleeding during life d/t concurrent protein C deficiency  - On Altuviiio weekly (Tuesdays), pt got dose on 6/10 --> next dose 6/17  - Hgb BL ~12; Hgb 7.9 (6/10) -> 8.2 (6/11)--> 8.2 (6/14)  - Plt BL ~ 80, Plt 41 (6/10) -> 42 (6/11)--> 56 (6/14)  - , Haptoglobin 241 (6/9)  - Ddimer 2,617, fibrinogen 540, PT 17.9, INR 1.6, aPTT 57 (6/9)  - Iron 11, TIBC 188, Ferritin 328, haptoglobin 241 (6/10)  - , retic 2.4% (6/10)  - Myeloid malignancies panel sent/pending and MDS panel sent/pending per [facility]  - Factor 8 activity (6/10) 5--> 53 (6/12) (after first infusion on 6/11)  - Continue home folic acid 1mg daily and ferrous sulfate 325mg daily  - Heme consulted 6/10, rec factor assays: FII, FVII, FVIII, FXI; drawing serum copper and zinc level, proceed with altuviio injection, Heme to FU with myeloid malignancy panel, peripheral smear  - FXI 57, FVIII 5, FVII 38, FII 51 (6/10)  - Trough factor VIII level 5% (6/10)   - MDS panel and Myeloid malignancies NGS panel pending (6/11)  - Peripheral smear pending (6/11)  - serum copper 109.7/ zinc 40.5 (6/11)  - start zinc supplement daily (6/13-current)  - s/p  BMBX (6/13) and pt given Advate 30u/kg once after BMBx (6/13)  - Core bx unable to be done  - Flow Cytometry and Bone Marrow Evaluation pending (6/13)  - per heme (6/13), s/p Vit K daily x 3 days (6/11-6/13).   - soluble transferrin receptor pending (6/11)  - Factor 8 activity pending (6/14)  - per Heme: transfuse to maintain Hb > 7 or if symptomatic, PLT > 10 or > 50 if bleeding     # CAP  # Acute respiratory failure (on 2L supp O2)  # Bilat Pleural effusions/ pulm edema  - Pt was seen outpt 6/9 for c/o sinus congestion and cough x2 days, was started on Cefdinir and took one dose 6/9  - On admit, pt continues to endorse non-productive cough; no fevers/chills or SOB  - CXR (6/9) showing left basilar atelectasis/scar vs small infiltrate  - worsening SOB, will check CT chest  - Lactate level 5.4 --> 1.8 (6/9)  - s/p 500mL NS bolus and maintenance IVF with NS @ 75mL/hr  - S/p LR @ 75mL/hr x12hrs (6/10-6/11)  - Blood cultures x2 (6/9): NGTD  - COVID (6/9) negative  - Resp viral panel (6/10): neg  - Strep pneumo, legionella neg (6/10)  - MRSA neg (6/10)  - Bps soft on admit (107/62), no c/f significant HoTN  - Presented to [facility] in 2L NC, SpO2 94% on admit, will continue for now and wean as tolerated  - encourage IS  - Started Mucinex 200mg q4h scheduled and tessalon pearls TID   - CT Chest w/o (6/13) showed bilat upper lobe patchy and GGOs c/f multifocal infectious process, pulmonary & alveolar edema w r>L pleural effusions, dilated main pulm artery, mold thoracic body wall edema  - Start IV ceftriaxone 1g daily and PO azithromycin 500mg daily (6/11-plan stop 6/15) for 5 days of CAP coverage given cough and supp O2 use  - s/p 40mg IV lasix once (6/14) for pulm edema    # RONNIE/Hx CKD - improving   - Likely pre-renal 2/2 contrast  - sCr BL ~1.1, sCr 1.19 (6/9)--> s/p IV contrast--> sCr 1.38 (6/11)--> 1.12 (6/14)  - s/p 500mL NS bolus and maintenance IVF with NS @ 75mL/hr  - s/p LR @ 75mL/hr x 12hrs (6/10-6/11)  - Urine lytes  WNL 6/10, will resend 6/13 given worsening  - Fena 0.5% showing prerenal causes (6/14)  - bladder scan with 178ml on 6/13  - encourage increased PO intake, will hold off on IV fluids I/s/o pulm edema/pleural effusions shown on CT     # N/V/D - improved  - Likely viral gastroenteritis vs bacterial  - CT a/p with contrast (6/9) no evidence for intrahepatic or extrahepatic biliary ductal dilation, no evidence of bowel obstruction, colonic diverticulosis with no evidence for acute diverticulitis, prostatomegaly  - s/p 1x dose of cefepime and 1x dose of flagyl at [facility] 6/9  - s/p 500mL NS bolus and maintenance IVF with NS @ 75mL/hr at [facility] 6/9  - No fevers/chills or leukocytosis   - Lactate level 5.4 --> 1.8 (6/9)  - UA (6/9) + protein, neg leuks or nitrites  - Blood cultures x2 (6/9): NGTD  - COVID (6/9) negative  - CXR showing left basilar atelectasis/scar vs small infiltrate  - GI consulted at [facility] (6/10), state s/sx likely gastroenteritis, rec stool path if diarrhea continues  - Stool path, Cdiff, lactoferrin, and fecal calprotectin ordered/pending 6/10  - PO Zofran 4mg q8hrs PRN n/v 1st line, PO Compazine 10mg q8hrs PRN n/v 2nd line  - s/p IVF with LR @ 75mL/hr x12hrs (6/10-6/11)     # Troponinemia - downtrending  - Likely 2/2 demand I/s/o active illness with cardiac hx, aflutter seen on EKG  - Hx elevated troponins in the past  - Trop 249->287-->446 (6/9)  - down-trending Trop 285 (6/10)  - EKG at OSH (6/9) showing aflutter with AV block, non-specific T-wave abnormalities  - Admit EKG still pending 6/10  - c/w tele     # Anal Cancer  - Previously followed with Dr. Locke, now follows with Dr. Cochran, followed with Dr. Muro for Rad/Onc  - Initially presented with rectal bleeding in the setting of constipation over several consecutive days and then called EMS. He was brought to [facility] but then transferred to [facility] on 10/20/24 given no factor VIII at [facility]. Colonoscopy performed on 10/22/24 showed single  malignant-appearing and friable ulcerated mass in the anal canal. 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   - s/p curative RT to anus 12/2024  - PET (4/18/25): no evidence of distant metastatic disease, small uptake in the prior anal lesion, likely non-specific but residual neoplasm not entirely excluded  - Now only following with med onc (Dr. Cochran) as surveillance if needed  - Has CT c/a/p and Rad Onc [facility] on 8/4     # CAD  # Afib  # HFpEF  # HLD  - s/p CABG >20yrs ago  - ECHO (2/1/25): EF 49%, global hypokinesis of LV, mod increased septal and mod increased posterior LV wall thickness, sev dialted LA, mod-sev dilated RA, RVSP 44  - Continue home Jardiance 10mg daily and Bumex 0.5mg daily  - Continue home Lipitor 40mg daily  - BNP (6/10): 580 --> repeat pending 6/14     # BPH  - Continue home Flomax 0.4mg BID and Finasteride 5mg daily     # GERD  - Home Pantoprazole 40mg daily sub as Pepcid 20mg daily I/s/o thrombocytopenia     DVT prophy: No pharmacologic DVT prophy I/s/o hemophilia A and thrombocytopenia. SCDs, encourage safe ambulation  Will attempt to get OOB as able to maintain functional status     DISPO:  - DNR, DNI; confirmed on admit  - DC pending improvement in anemia and thrombocytopenia  - NOK (Javier Wallace, relative): [phone]  - CT c/a/p 8/4, Rad Onc 8/4, Dr. Dillon [facility]UV 8/6, Ophthalmology FUV 8/27, Urology FUV 12/1, Ophthalmology FUV 12/3  - Updated Adelea (niece) on plan 6/13     I spent 60 minutes in the professional and overall care of this patient.    Assessment and plan as above discussed with attending physician Dr. Ulloa.    Maye Sams PA-C

## 2025-06-15 ENCOUNTER — DOCUMENTATION (OUTPATIENT)
Dept: HOME HEALTH SERVICES | Facility: HOME HEALTH | Age: OVER 89
End: 2025-06-15
Payer: MEDICARE

## 2025-06-15 VITALS
SYSTOLIC BLOOD PRESSURE: 111 MMHG | OXYGEN SATURATION: 94 % | DIASTOLIC BLOOD PRESSURE: 63 MMHG | WEIGHT: 131 LBS | HEIGHT: 66 IN | HEART RATE: 71 BPM | RESPIRATION RATE: 16 BRPM | TEMPERATURE: 98.1 F | BODY MASS INDEX: 21.05 KG/M2

## 2025-06-15 LAB
ALBUMIN SERPL BCP-MCNC: 3.6 G/DL (ref 3.4–5)
ALP SERPL-CCNC: 75 U/L (ref 33–136)
ALT SERPL W P-5'-P-CCNC: 27 U/L (ref 10–52)
ANION GAP SERPL CALC-SCNC: 14 MMOL/L (ref 10–20)
APTT PPP: 43 SECONDS (ref 26–36)
AST SERPL W P-5'-P-CCNC: 21 U/L (ref 9–39)
BASOPHILS # BLD AUTO: 0.01 X10*3/UL (ref 0–0.1)
BASOPHILS NFR BLD AUTO: 0.2 %
BILIRUB SERPL-MCNC: 0.9 MG/DL (ref 0–1.2)
BNP SERPL-MCNC: 640 PG/ML (ref 0–99)
BUN SERPL-MCNC: 30 MG/DL (ref 6–23)
CALCIUM SERPL-MCNC: 8.1 MG/DL (ref 8.6–10.6)
CHLORIDE SERPL-SCNC: 105 MMOL/L (ref 98–107)
CO2 SERPL-SCNC: 25 MMOL/L (ref 21–32)
CREAT SERPL-MCNC: 1.18 MG/DL (ref 0.5–1.3)
EGFRCR SERPLBLD CKD-EPI 2021: 56 ML/MIN/1.73M*2
EOSINOPHIL # BLD AUTO: 0.01 X10*3/UL (ref 0–0.4)
EOSINOPHIL NFR BLD AUTO: 0.2 %
ERYTHROCYTE [DISTWIDTH] IN BLOOD BY AUTOMATED COUNT: 22 % (ref 11.5–14.5)
GLUCOSE SERPL-MCNC: 92 MG/DL (ref 74–99)
HCT VFR BLD AUTO: 24.8 % (ref 41–52)
HGB BLD-MCNC: 8 G/DL (ref 13.5–17.5)
IMM GRANULOCYTES # BLD AUTO: 0.13 X10*3/UL (ref 0–0.5)
IMM GRANULOCYTES NFR BLD AUTO: 3.2 % (ref 0–0.9)
INR PPP: 1.4 (ref 0.9–1.1)
LYMPHOCYTES # BLD AUTO: 0.69 X10*3/UL (ref 0.8–3)
LYMPHOCYTES NFR BLD AUTO: 17.1 %
MCH RBC QN AUTO: 35.1 PG (ref 26–34)
MCHC RBC AUTO-ENTMCNC: 32.3 G/DL (ref 32–36)
MCV RBC AUTO: 109 FL (ref 80–100)
MONOCYTES # BLD AUTO: 0.64 X10*3/UL (ref 0.05–0.8)
MONOCYTES NFR BLD AUTO: 15.8 %
NEUTROPHILS # BLD AUTO: 2.56 X10*3/UL (ref 1.6–5.5)
NEUTROPHILS NFR BLD AUTO: 63.5 %
NRBC BLD-RTO: 0.5 /100 WBCS (ref 0–0)
PLATELET # BLD AUTO: 59 X10*3/UL (ref 150–450)
POTASSIUM SERPL-SCNC: 3.9 MMOL/L (ref 3.5–5.3)
PROT SERPL-MCNC: 6.2 G/DL (ref 6.4–8.2)
PROTHROMBIN TIME: 15 SECONDS (ref 9.8–12.4)
RBC # BLD AUTO: 2.28 X10*6/UL (ref 4.5–5.9)
SODIUM SERPL-SCNC: 140 MMOL/L (ref 136–145)
WBC # BLD AUTO: 4 X10*3/UL (ref 4.4–11.3)

## 2025-06-15 PROCEDURE — 85025 COMPLETE CBC W/AUTO DIFF WBC: CPT

## 2025-06-15 PROCEDURE — 99233 SBSQ HOSP IP/OBS HIGH 50: CPT

## 2025-06-15 PROCEDURE — 83880 ASSAY OF NATRIURETIC PEPTIDE: CPT

## 2025-06-15 PROCEDURE — 84145 PROCALCITONIN (PCT): CPT

## 2025-06-15 PROCEDURE — 85610 PROTHROMBIN TIME: CPT

## 2025-06-15 PROCEDURE — 36415 COLL VENOUS BLD VENIPUNCTURE: CPT

## 2025-06-15 PROCEDURE — 2500000001 HC RX 250 WO HCPCS SELF ADMINISTERED DRUGS (ALT 637 FOR MEDICARE OP)

## 2025-06-15 PROCEDURE — 2500000004 HC RX 250 GENERAL PHARMACY W/ HCPCS (ALT 636 FOR OP/ED)

## 2025-06-15 PROCEDURE — 84075 ASSAY ALKALINE PHOSPHATASE: CPT

## 2025-06-15 PROCEDURE — 2500000001 HC RX 250 WO HCPCS SELF ADMINISTERED DRUGS (ALT 637 FOR MEDICARE OP): Performed by: NURSE PRACTITIONER

## 2025-06-15 PROCEDURE — 85240 CLOT FACTOR VIII AHG 1 STAGE: CPT

## 2025-06-15 PROCEDURE — 94640 AIRWAY INHALATION TREATMENT: CPT

## 2025-06-15 PROCEDURE — 1200000003 HC ONCOLOGY  ROOM WITH TELEMETRY DAILY

## 2025-06-15 PROCEDURE — 2500000002 HC RX 250 W HCPCS SELF ADMINISTERED DRUGS (ALT 637 FOR MEDICARE OP, ALT 636 FOR OP/ED)

## 2025-06-15 PROCEDURE — 2500000002 HC RX 250 W HCPCS SELF ADMINISTERED DRUGS (ALT 637 FOR MEDICARE OP, ALT 636 FOR OP/ED): Performed by: NURSE PRACTITIONER

## 2025-06-15 RX ORDER — POLYETHYLENE GLYCOL 3350 17 G/17G
17 POWDER, FOR SOLUTION ORAL DAILY PRN
Status: DISCONTINUED | OUTPATIENT
Start: 2025-06-15 | End: 2025-06-16

## 2025-06-15 RX ORDER — AZITHROMYCIN 500 MG/1
500 TABLET, FILM COATED ORAL ONCE
Status: COMPLETED | OUTPATIENT
Start: 2025-06-15 | End: 2025-06-15

## 2025-06-15 RX ORDER — BUMETANIDE 0.25 MG/ML
0.5 INJECTION, SOLUTION INTRAMUSCULAR; INTRAVENOUS ONCE
Status: COMPLETED | OUTPATIENT
Start: 2025-06-15 | End: 2025-06-15

## 2025-06-15 RX ORDER — FUROSEMIDE 10 MG/ML
20 INJECTION INTRAMUSCULAR; INTRAVENOUS ONCE
Status: COMPLETED | OUTPATIENT
Start: 2025-06-15 | End: 2025-06-15

## 2025-06-15 RX ADMIN — TAMSULOSIN HYDROCHLORIDE 0.4 MG: 0.4 CAPSULE ORAL at 09:00

## 2025-06-15 RX ADMIN — FUROSEMIDE 20 MG: 10 INJECTION, SOLUTION INTRAVENOUS at 10:22

## 2025-06-15 RX ADMIN — FAMOTIDINE 20 MG: 20 TABLET, FILM COATED ORAL at 09:00

## 2025-06-15 RX ADMIN — ALBUTEROL SULFATE 2.5 MG: 2.5 SOLUTION RESPIRATORY (INHALATION) at 15:49

## 2025-06-15 RX ADMIN — METRONIDAZOLE 500 MG: 500 INJECTION, SOLUTION INTRAVENOUS at 09:01

## 2025-06-15 RX ADMIN — CEFTRIAXONE SODIUM 1 G: 1 INJECTION, SOLUTION INTRAVENOUS at 11:34

## 2025-06-15 RX ADMIN — METRONIDAZOLE 500 MG: 500 INJECTION, SOLUTION INTRAVENOUS at 01:16

## 2025-06-15 RX ADMIN — ALBUTEROL SULFATE 2.5 MG: 2.5 SOLUTION RESPIRATORY (INHALATION) at 01:16

## 2025-06-15 RX ADMIN — TAMSULOSIN HYDROCHLORIDE 0.4 MG: 0.4 CAPSULE ORAL at 20:07

## 2025-06-15 RX ADMIN — AZITHROMYCIN DIHYDRATE 500 MG: 500 TABLET ORAL at 09:01

## 2025-06-15 RX ADMIN — BENZONATATE 100 MG: 100 CAPSULE ORAL at 15:44

## 2025-06-15 RX ADMIN — BENZONATATE 100 MG: 100 CAPSULE ORAL at 20:07

## 2025-06-15 RX ADMIN — ATORVASTATIN CALCIUM 40 MG: 40 TABLET, FILM COATED ORAL at 09:00

## 2025-06-15 RX ADMIN — EMPAGLIFLOZIN 10 MG: 10 TABLET, FILM COATED ORAL at 09:00

## 2025-06-15 RX ADMIN — POLYETHYLENE GLYCOL 3350 17 G: 17 POWDER, FOR SOLUTION ORAL at 12:58

## 2025-06-15 RX ADMIN — FINASTERIDE 5 MG: 5 TABLET, FILM COATED ORAL at 09:00

## 2025-06-15 RX ADMIN — BUMETANIDE 0.5 MG: 0.5 TABLET ORAL at 09:00

## 2025-06-15 RX ADMIN — METRONIDAZOLE 500 MG: 500 INJECTION, SOLUTION INTRAVENOUS at 17:57

## 2025-06-15 RX ADMIN — FOLIC ACID 1 MG: 1 TABLET ORAL at 09:00

## 2025-06-15 RX ADMIN — BUMETANIDE 0.5 MG: 0.25 INJECTION INTRAMUSCULAR; INTRAVENOUS at 12:57

## 2025-06-15 RX ADMIN — FERROUS SULFATE TAB 325 MG (65 MG ELEMENTAL FE) 1 TABLET: 325 (65 FE) TAB at 09:00

## 2025-06-15 RX ADMIN — FLUTICASONE PROPIONATE 2 SPRAY: 50 SPRAY, METERED NASAL at 09:02

## 2025-06-15 RX ADMIN — ZINC SULFATE 220 MG (50 MG) CAPSULE 1 CAPSULE: CAPSULE at 09:00

## 2025-06-15 RX ADMIN — BENZONATATE 100 MG: 100 CAPSULE ORAL at 09:00

## 2025-06-15 ASSESSMENT — COGNITIVE AND FUNCTIONAL STATUS - GENERAL
STANDING UP FROM CHAIR USING ARMS: A LITTLE
EATING MEALS: A LITTLE
PERSONAL GROOMING: A LITTLE
PERSONAL GROOMING: A LITTLE
WALKING IN HOSPITAL ROOM: A LITTLE
WALKING IN HOSPITAL ROOM: A LITTLE
DAILY ACTIVITIY SCORE: 19
TURNING FROM BACK TO SIDE WHILE IN FLAT BAD: A LITTLE
TURNING FROM BACK TO SIDE WHILE IN FLAT BAD: A LITTLE
EATING MEALS: A LITTLE
TURNING FROM BACK TO SIDE WHILE IN FLAT BAD: A LITTLE
TOILETING: A LITTLE
DAILY ACTIVITIY SCORE: 18
HELP NEEDED FOR BATHING: A LITTLE
MOBILITY SCORE: 18
DRESSING REGULAR UPPER BODY CLOTHING: A LITTLE
MOVING TO AND FROM BED TO CHAIR: A LITTLE
HELP NEEDED FOR BATHING: A LITTLE
DRESSING REGULAR LOWER BODY CLOTHING: A LITTLE
STANDING UP FROM CHAIR USING ARMS: A LITTLE
DRESSING REGULAR LOWER BODY CLOTHING: A LITTLE
MOVING TO AND FROM BED TO CHAIR: A LITTLE
MOVING TO AND FROM BED TO CHAIR: A LITTLE
DAILY ACTIVITIY SCORE: 19
TOILETING: A LITTLE
TOILETING: A LITTLE
WALKING IN HOSPITAL ROOM: A LITTLE
CLIMB 3 TO 5 STEPS WITH RAILING: A LOT
MOBILITY SCORE: 18
MOBILITY SCORE: 18
EATING MEALS: A LITTLE
CLIMB 3 TO 5 STEPS WITH RAILING: A LOT
CLIMB 3 TO 5 STEPS WITH RAILING: A LOT
DRESSING REGULAR LOWER BODY CLOTHING: A LITTLE
PERSONAL GROOMING: A LITTLE
HELP NEEDED FOR BATHING: A LITTLE
STANDING UP FROM CHAIR USING ARMS: A LITTLE

## 2025-06-15 ASSESSMENT — PAIN SCALES - GENERAL
PAINLEVEL_OUTOF10: 0 - NO PAIN

## 2025-06-15 ASSESSMENT — PAIN - FUNCTIONAL ASSESSMENT: PAIN_FUNCTIONAL_ASSESSMENT: 0-10

## 2025-06-15 ASSESSMENT — PAIN SCALES - WONG BAKER: WONGBAKER_NUMERICALRESPONSE: NO HURT

## 2025-06-15 NOTE — HH CARE COORDINATION
This referral has been made a Non Admit with  Home Care due to Patient is Active with Another Home Care Agency. If you have further questions, feel free to reach out to our office at [phone]. Thank you, [facility] Intake.    Provider Notification:  Sent In basket to Maye Wallace PA-C, informing that [facility] has not accepted patient referral.

## 2025-06-15 NOTE — CARE PLAN
The clinical goals for the shift include Patient will remain HDS with no acute events      Problem: Safety - Adult  Goal: Free from fall injury  Outcome: Progressing     Problem: Chronic Conditions and Co-morbidities  Goal: Patient's chronic conditions and co-morbidity symptoms are monitored and maintained or improved  Outcome: Progressing     Problem: Nutrition  Goal: Nutrient intake appropriate for maintaining nutritional needs  Outcome: Progressing     Problem: Skin  Goal: Prevent/minimize sheer/friction injuries  Outcome: Progressing     Problem: Fall/Injury  Goal: Not fall by end of shift  Outcome: Progressing

## 2025-06-15 NOTE — PROGRESS NOTES
"Ramón Flores is a 89 y.o. male on day 5 of admission presenting with Hemophilia A (Multi).    Subjective   Ramón is doing fine this morning. We discussed aspiration event yesterday. Pt states he felt like the cough syrup went down the wrong pipe. We discussed making pt NPO until speech eval for risk of continued aspiration. Pt was educated on the risks of aspiration but would like to continue to eat. Pt agreed to easy to chew and mod thick liquids that was rec in Feb by speech. Pt denies chest pain, SOB, N/V/D/C, fever, chills, s/sx of bleeding. ROS: A complete review of systems was performed and is negative except for as mentioned above in the HPI     Objective     Physical Exam  HENT:      Nose: Nose normal.      Mouth/Throat:      Mouth: Mucous membranes are moist.   Eyes:      Pupils: Pupils are equal, round, and reactive to light.   Cardiovascular:      Rate and Rhythm: Normal rate.   Pulmonary:      Effort: Pulmonary effort is normal.      Breath sounds: Wheezing present.      Comments: On 2L supp O2  Abdominal:      General: Abdomen is flat.   Musculoskeletal:         General: Normal range of motion.      Cervical back: Normal range of motion.   Neurological:      General: No focal deficit present.      Mental Status: He is alert.   Psychiatric:         Mood and Affect: Mood normal.         Last Recorded Vitals  Blood pressure 103/65, pulse 88, temperature 35.8 °C (96.4 °F), temperature source Temporal, resp. rate 16, height 1.676 m (5' 6\"), weight 59.4 kg (131 lb), SpO2 95%.  Intake/Output last 3 Shifts:  I/O last 3 completed shifts:  In: 1115 (18.8 mL/kg) [P.O.:1015; IV Piggyback:100]  Out: 1125 (18.9 mL/kg) [Urine:1125 (0.5 mL/kg/hr)]  Weight: 59.4 kg     Relevant Results  XR chest 1 view  Result Date: 6/14/2025  Interpreted By:  Mack Herzog and Sheng Max STUDY: XR CHEST 1 VIEW;  6/14/2025 5:56 pm   INDICATION: Signs/Symptoms:c/f aspiration.     COMPARISON: CT CHEST WO IV CONTRAST 6/13/2025, " XR CHEST 1 VIEW 6/9/2025   ACCESSION NUMBER(S): [id number]   ORDERING CLINICIAN: VALERI PARTIDA   FINDINGS: AP radiograph of the chest:   CARDIOMEDIASTINAL SILHOUETTE: Stable enlargement of the cardiomediastinal silhouette. Aortic knob calcifications are seen. Numerous intact median sternotomy wires.   LUNGS: Worsening patchy airspace opacities predominantly overlying the bilateral lung bases. Similar perihilar congestion with interstitial prominence. Mild worsening in overall lung aeration. Stable small bibasilar pleural effusions. No pneumothorax.   ABDOMEN: No remarkable upper abdominal findings.   BONES: No acute osseous abnormality.       1. Similar patchy airspace opacities predominantly overlying the bilateral lung bases, to be correlated with aspiration and infection. 2. Slight interval worsening in basilar edema/infiltrates and correlate with fluid status or developing pneumonia. 3. Stable small bibasilar pleural effusions.     I personally reviewed the images/study and I agree with the findings as stated by Dr. Latrell Hayden. This study was interpreted at [place], [place].   MACRO: None   Signed by: Mack Herzog 6/14/2025 7:50 PM Dictation workstation:   [id number]    CT chest wo IV contrast  Result Date: 6/13/2025  Interpreted By:  Cally Rosales and Mercado Amiel STUDY: CT CHEST WO IV CONTRAST;  6/13/2025 2:53 pm   INDICATION: Signs/Symptoms:SOB, new cough, neutropenic.     COMPARISON: CT ANGIO CHEST FOR PULMONARY EMBOLISM 2/1/2025   ACCESSION NUMBER(S): [id number]   ORDERING CLINICIAN: CYNTHIA HUANG   TECHNIQUE: Helical data acquisition of the chest was obtained without intravenous contrast. Images were reformatted in axial, coronal, and sagittal planes.   FINDINGS: LUNGS AND AIRWAYS: The trachea and central airways are patent. No endobronchial lesion is seen.   Patchy opacities in the anterior superior bilateral upper lobes. Few mild scattered  ground-glass opacities in the right upper lobe. Mild bilateral lower lobe bronchiectasis. Moderate right and small left pleural effusions with associated compressive atelectasis. Few punctate 1-2 mm nodules in the right lung are stable. Bandlike opacities along the bilateral upper lung lobes radiating from the hilum also seen, favored to represent atelectasis.   MEDIASTINUM AND DANIEL, LOWER NECK AND AXILLA: The visualized thyroid gland is within normal limits. No evidence of thoracic lymphadenopathy by CT criteria. There is a smallsized hiatal hernia. Otherwise, the esophagus is within normal limits.   HEART AND VESSELS: The thoracic aorta normal in course and caliber.There is mild scattered calcified atherosclerosis present. Main pulmonary artery is dilated measuring 3.7 cm. Mild coronary artery calcifications are seen.Please note,the study is not optimized for evaluation of coronary arteries. The mild cardiomegaly.There are minimal aortic valvular califications seen, and correlate with concern for aortic stenosis. There is no pericardial effusion seen.   UPPER ABDOMEN: The visualized subdiaphragmatic structures demonstrate no remarkable findings. Trace perihepatic ascites.     CHEST WALL AND OSSEOUS STRUCTURES: There is diffuse thoracic body wall edema. Stable chronic T3 moderate compression fracture with central height loss. Moderate midthoracic spondylosis with disc height loss and osteophytes. Median sternotomy. No acute osseous pathology. There are no suspicious osseous lesions. Degenerative changes throughout the thoracic spine. The patient is status post median sternotomy.       1. Bilateral upper lobe patchy opacities and mild scattered right upper lobe ground-glass opacities concerning for multifocal infectious process. 2. Findings of pulmonary interstitial and alveolar edema with superimposed right more than left pleural effusions. 3. Dilated main pulmonary artery, which can be seen with pulmonary  hypertension. 4. Mild thoracic body wall edema. 5. Additional incidental non-acute findings as detailed above.     I personally reviewed the images/study and I agree with the findings as stated by Dr. Latrell Hayden. This study was interpreted at [facility], [place], [place].   MACRO: None.   Signed by: Cally Rosales 6/13/2025 3:48 PM Dictation workstation:   [id number]    Assessment & Plan  Hemophilia A (Multi)    Ramón Flores is a 89 y.o. male with PMH Hemophilia, Anal cancer (s/p curative RT 12/2024), CAD s/p CABG >20yrs ago, HFpEF, Afib, BPH, GERD, and CKD who presented to [facility] ED 6/9 with c/o n/v and diarrhea x24hrs. CT a/p with contrast (6/9) with no evidence for intrahepatic or extrahepatic biliary ductal dilation, no evidence of bowel obstruction, colonic diverticulosis with no evidence for acute diverticulitis, prostatomegaly. GI consulted, state s/sx likely gastroenteritis, rec stool path if diarrhea continues. Heme consulted, rec transfer to [facility] for further management of worsening anemia/thrombocytopenia I/s/o acute illness. Pt was transferred to [facility] for further management. Upon admission to [facility], pt reports his s/x have resolved. Heme consulted on admit 6/10, recs extensive hemophilia, pancytopenia workup. (6/11-plan stop 6/15) start IV ceftriaxone and PO azithromycin for CAP treatment. S/p BMBx & Advate infusion 6/13 - results pending. DC pending improvement in anemia and thrombocytopenia.    Update 6/15:   - c/w IV ceftriaxone 1g daily (6/11-current) for CAP and aspiration PNA  - s/p PO azithromycin 500mg daily (6/11-6/15) for 5 days of CAP coverage given cough and supp O2 use  - Pt with witnessed aspiration of cough medicine, CXR showed similar patchy airspace opacities predominantly overlying the bilateral lung bases, to be correlated with aspiration and infection   - started IV metronidazole 500mg q8h (6/14-current) to cover for aspiration PNA  - s/p IV lasix  20mg once  - Discussed continued aspiration risk and rec NPO to patient until SLP eval, pt educated on the risks of aspiration however would still like to eat, pt agreeable to easy to chew diet and mod thick liquids rec by SLP in February   - SLP and MBSS consult placed  - Walking pulse ox pending   - Heme consulted for pancytopenia, full hemophilia workup and pancytopenia workup  - s/p bmbx 6/13 & s/p Advate 30u/kg once after BMBx - results pending  - repeat factor 8 activity pending  - Stool path, Cdiff, lactoferrin, and fecal calprotectin ordered/pending  - Wean O2 as able for dispo (currently on 1-2L)  - PT rec SNF vs Acute rehab, pt would like to go home with home care     # Hemophilia A  # Pancytopenia  - Follows with Dr. Dillon, LOV 4/9/25 - emailed about admission 6/11  - He has had minimal bleeding during life d/t concurrent protein C deficiency  - On Altuviiio weekly (Tuesdays), pt got dose on 6/10 --> next dose 6/17  - Hgb BL ~12; Hgb 7.9 (6/10) -> 8.2 (6/11)--> 8.0 (6/15)  - Plt BL ~ 80, Plt 41 (6/10) -> 42 (6/11)--> 59 (6/15)  - , Haptoglobin 241 (6/9)  - Ddimer 2,617, fibrinogen 540, PT 17.9, INR 1.6, aPTT 57 (6/9)  - Iron 11, TIBC 188, Ferritin 328, haptoglobin 241 (6/10)  - , retic 2.4% (6/10)  - Myeloid malignancies panel sent/pending and MDS panel sent/pending per [facility]  - Factor 8 activity (6/10) 5--> 53 (6/12) (after first infusion on 6/11)  - Continue home folic acid 1mg daily and ferrous sulfate 325mg daily  - Heme consulted 6/10, rec factor assays: FII, FVII, FVIII, FXI; drawing serum copper and zinc level, proceed with altuviio injection, Heme to FU with myeloid malignancy panel, peripheral smear  - FXI 57, FVIII 5, FVII 38, FII 51 (6/10)  - Trough factor VIII level 5% (6/10)   - MDS panel and Myeloid malignancies NGS panel pending (6/11)  - Peripheral smear pending (6/11)  - serum copper 109.7/ zinc 40.5 (6/11)  - start zinc supplement daily (6/13-current)  - s/p BMBX  (6/13) and pt given Advate 30u/kg once after BMBx (6/13)  - Core bx unable to be done  - Flow Cytometry and Bone Marrow Evaluation pending (6/13)  - per heme (6/13), s/p Vit K daily x 3 days (6/11-6/13).   - soluble transferrin receptor pending (6/11)  - Factor 8 activity pending (6/14 & 6/15)  - per Heme: transfuse to maintain Hb > 7 or if symptomatic, PLT > 10 or > 50 if bleeding     # CAP  # Acute respiratory failure (on 1-2L supp O2)  # c/f aspiration  # Bilat Pleural effusions/ pulm edema  - Pt was seen outpt 6/9 for c/o sinus congestion and cough x2 days, was started on Cefdinir and took one dose 6/9  - On admit, pt continues to endorse non-productive cough; no fevers/chills or SOB  - CXR (6/9) showing left basilar atelectasis/scar vs small infiltrate  - worsening SOB, will check CT chest  - Lactate level 5.4 --> 1.8 (6/9)  - s/p 500mL NS bolus and maintenance IVF with NS @ 75mL/hr  - S/p LR @ 75mL/hr x12hrs (6/10-6/11)  - Blood cultures x2 (6/9): NGTD  - COVID (6/9) negative  - Resp viral panel (6/10): neg  - Strep pneumo, legionella neg (6/10)  - MRSA neg (6/10)  - Bps soft on admit (107/62), no c/f significant HoTN  - Presented to [facility] in 2L NC, SpO2 94% on admit, will continue for now and wean as tolerated  - encourage IS  - Started Mucinex 600mg BID scheduled and tessalon pearls TID   - CT Chest w/o (6/13) showed bilat upper lobe patchy and GGOs c/f multifocal infectious process, pulmonary & alveolar edema w r>L pleural effusions, dilated main pulm artery, mold thoracic body wall edema  - c/w IV ceftriaxone 1g daily (6/11-current) for CAP and aspiration PNA  - s/p PO azithromycin 500mg daily (6/11-6/15) for 5 days of CAP coverage given cough and supp O2 use  - s/p 40mg IV lasix once (6/14) for pulm edema, s/p 20mg IV lasix once (6/15)  - 6/14 pt with witness aspiration of cough medicine, VSS on 1L supp O2, afebrile, CXR (6/14) showed Similar patchy airspace opacities predominantly overlying the bilat lung  bases, to be correlated with aspiration and infection, worsening basilar edema  - Added IV metronidazole 500mg q8h (6/14-current) to cover for aspiration PNA  - Discussed continued aspiration risk and rec NPO to patient until SLP eval, pt educated on the risks of aspiration like infection, hypoxia, death, etc.. patient states he understands the risks however would still like to eat, pt agreeable to easy to chew diet and mod thick liquids rec by SLP in February   - SLP and MBSS pending (6/15)  - Walking pulse ox pending    # RONNIE/Hx CKD - improving   - Likely pre-renal 2/2 contrast  - sCr BL ~1.1, sCr 1.19 (6/9)--> s/p IV contrast--> sCr 1.38 (6/11)--> 1.18 (6/15)  - s/p 500mL NS bolus and maintenance IVF with NS @ 75mL/hr  - s/p LR @ 75mL/hr x 12hrs (6/10-6/11)  - Urine lytes WNL 6/10, 6/13 urine lytes WNL  - Fena 0.5% showing prerenal causes (6/14)  - bladder scan with 178ml on 6/13  - encourage increased PO intake, will hold off on IV fluids I/s/o pulm edema/pleural effusions shown on CT     # N/V/D - improved  - Likely viral gastroenteritis vs bacterial  - CT a/p with contrast (6/9) no evidence for intrahepatic or extrahepatic biliary ductal dilation, no evidence of bowel obstruction, colonic diverticulosis with no evidence for acute diverticulitis, prostatomegaly  - s/p 1x dose of cefepime and 1x dose of flagyl at [facility] 6/9  - s/p 500mL NS bolus and maintenance IVF with NS @ 75mL/hr at [facility] 6/9  - No fevers/chills or leukocytosis   - Lactate level 5.4 --> 1.8 (6/9)  - UA (6/9) + protein, neg leuks or nitrites  - Blood cultures x2 (6/9): NGTD  - COVID (6/9) negative  - CXR showing left basilar atelectasis/scar vs small infiltrate  - GI consulted at [facility] (6/10), state s/sx likely gastroenteritis, rec stool path if diarrhea continues  - Stool path, Cdiff, lactoferrin, and fecal calprotectin ordered/pending 6/10  - PO Zofran 4mg q8hrs PRN n/v 1st line, PO Compazine 10mg q8hrs PRN n/v 2nd line  - s/p IVF with LR @  75mL/hr x12hrs (6/10-6/11)     # Troponinemia - downtrending  - Likely 2/2 demand I/s/o active illness with cardiac hx, aflutter seen on EKG  - Hx elevated troponins in the past  - Trop 249->287-->446 (6/9)  - down-trending Trop 285 (6/10)  - EKG at OSH (6/9) showing aflutter with AV block, non-specific T-wave abnormalities  - Admit EKG still pending 6/10  - c/w tele     # Anal Cancer  - Previously followed with Dr. Locke, now follows with Dr. Cochran, followed with Dr. Muro for Rad/Onc  - Initially presented with rectal bleeding in the setting of constipation over several consecutive days and then called EMS. He was brought to [facility] but then transferred to [facility] on 10/20/24 given no factor VIII at [facility]. Colonoscopy performed on 10/22/24 showed single malignant-appearing and friable ulcerated mass in the anal canal. 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   - s/p curative RT to anus 12/2024  - PET (4/18/25): no evidence of distant metastatic disease, small uptake in the prior anal lesion, likely non-specific but residual neoplasm not entirely excluded  - Now only following with med onc (Dr. Cochran) as surveillance if needed  - Has CT c/a/p and Rad Onc FUV on 8/4     # CAD  # Afib  # HFpEF  # HLD  - s/p CABG >20yrs ago  - ECHO (2/1/25): EF 49%, global hypokinesis of LV, mod increased septal and mod increased posterior LV wall thickness, sev dialted LA, mod-sev dilated RA, RVSP 44  - Continue home Jardiance 10mg daily and Bumex 0.5mg daily  - Continue home Lipitor 40mg daily  - BNP (6/10): 580 --> repeat (6/14) 970 --> 6/15 BNP pending  - s/p 60mg total IV lasix (6/14 & 6/15)     # BPH  - Continue home Flomax 0.4mg BID and Finasteride 5mg daily     # GERD  - Home Pantoprazole 40mg daily sub as Pepcid 20mg daily I/s/o thrombocytopenia     DVT prophy: No pharmacologic DVT prophy I/s/o hemophilia A and thrombocytopenia. SCDs, encourage safe ambulation  Will  attempt to get OOB as able to maintain functional status     DISPO:  - DNR, DNI; confirmed on admit  - DC pending improvement in anemia and thrombocytopenia  - NOK (Javier Wallace, relative): [phone]  - CT c/a/p 8/4, Rad Onc 8/4, Dr. Dillon FUV 8/6, Ophthalmology FUV 8/27, Urology FUV 12/1, Ophthalmology FUV 12/3     I spent 60 minutes in the professional and overall care of this patient.    Assessment and plan as above discussed with attending physician Dr. Ulloa.    Maye Sams PA-C

## 2025-06-15 NOTE — CARE PLAN
The patient's goals for the shift include  resting     The clinical goals for the shift include maintain patient safety     Over the shift, the patient did make progress toward the following goals.       Problem: Safety - Adult  Goal: Free from fall injury  Outcome: Progressing

## 2025-06-16 ENCOUNTER — APPOINTMENT (OUTPATIENT)
Dept: RADIOLOGY | Facility: HOSPITAL | Age: OVER 89
DRG: 813 | End: 2025-06-16
Payer: MEDICARE

## 2025-06-16 LAB
ALBUMIN SERPL BCP-MCNC: 3.6 G/DL (ref 3.4–5)
ALP SERPL-CCNC: 72 U/L (ref 33–136)
ALT SERPL W P-5'-P-CCNC: 23 U/L (ref 10–52)
ANION GAP SERPL CALC-SCNC: 13 MMOL/L (ref 10–20)
APTT PPP: 47 SECONDS (ref 26–36)
AST SERPL W P-5'-P-CCNC: 19 U/L (ref 9–39)
BASOPHILS # BLD AUTO: 0.02 X10*3/UL (ref 0–0.1)
BASOPHILS NFR BLD AUTO: 0.5 %
BILIRUB SERPL-MCNC: 0.8 MG/DL (ref 0–1.2)
BUN SERPL-MCNC: 34 MG/DL (ref 6–23)
CALCIUM SERPL-MCNC: 8.3 MG/DL (ref 8.6–10.6)
CHLORIDE SERPL-SCNC: 105 MMOL/L (ref 98–107)
CO2 SERPL-SCNC: 28 MMOL/L (ref 21–32)
CREAT SERPL-MCNC: 1.12 MG/DL (ref 0.5–1.3)
EGFRCR SERPLBLD CKD-EPI 2021: 60 ML/MIN/1.73M*2
EOSINOPHIL # BLD AUTO: 0.01 X10*3/UL (ref 0–0.4)
EOSINOPHIL NFR BLD AUTO: 0.2 %
ERYTHROCYTE [DISTWIDTH] IN BLOOD BY AUTOMATED COUNT: 22.4 % (ref 11.5–14.5)
FACT VIII ACT/NOR PPP: 13 % (ref 55–180)
FACT VIII ACT/NOR PPP: 20 % (ref 55–180)
FACT VIII ACT/NOR PPP: 37 % (ref 55–180)
GLUCOSE FLD-MCNC: 131 MG/DL
GLUCOSE SERPL-MCNC: 108 MG/DL (ref 74–99)
HCT VFR BLD AUTO: 24.7 % (ref 41–52)
HGB BLD-MCNC: 7.9 G/DL (ref 13.5–17.5)
HIV 1+2 AB+HIV1 P24 AG SERPL QL IA: NONREACTIVE
HOLD SPECIMEN: NORMAL
IMM GRANULOCYTES # BLD AUTO: 0.19 X10*3/UL (ref 0–0.5)
IMM GRANULOCYTES NFR BLD AUTO: 4.7 % (ref 0–0.9)
INR PPP: 1.5 (ref 0.9–1.1)
LDH FLD L TO P-CCNC: 100 U/L
LYMPHOCYTES # BLD AUTO: 0.68 X10*3/UL (ref 0.8–3)
LYMPHOCYTES NFR BLD AUTO: 16.7 %
MAGNESIUM SERPL-MCNC: 1.75 MG/DL (ref 1.6–2.4)
MCH RBC QN AUTO: 34.2 PG (ref 26–34)
MCHC RBC AUTO-ENTMCNC: 32 G/DL (ref 32–36)
MCV RBC AUTO: 107 FL (ref 80–100)
MONOCYTES # BLD AUTO: 0.62 X10*3/UL (ref 0.05–0.8)
MONOCYTES NFR BLD AUTO: 15.3 %
NEUTROPHILS # BLD AUTO: 2.54 X10*3/UL (ref 1.6–5.5)
NEUTROPHILS NFR BLD AUTO: 62.6 %
NRBC BLD-RTO: 0.7 /100 WBCS (ref 0–0)
PLATELET # BLD AUTO: 52 X10*3/UL (ref 150–450)
POTASSIUM SERPL-SCNC: 4 MMOL/L (ref 3.5–5.3)
PROCALCITONIN SERPL-MCNC: 0.08 NG/ML
PROT FLD-MCNC: 1.8 G/DL
PROT SERPL-MCNC: 6 G/DL (ref 6.4–8.2)
PROTHROMBIN TIME: 16.3 SECONDS (ref 9.8–12.4)
RBC # BLD AUTO: 2.31 X10*6/UL (ref 4.5–5.9)
SODIUM SERPL-SCNC: 142 MMOL/L (ref 136–145)
WBC # BLD AUTO: 4.1 X10*3/UL (ref 4.4–11.3)

## 2025-06-16 PROCEDURE — 2500000002 HC RX 250 W HCPCS SELF ADMINISTERED DRUGS (ALT 637 FOR MEDICARE OP, ALT 636 FOR OP/ED): Performed by: NURSE PRACTITIONER

## 2025-06-16 PROCEDURE — 85240 CLOT FACTOR VIII AHG 1 STAGE: CPT

## 2025-06-16 PROCEDURE — 84157 ASSAY OF PROTEIN OTHER: CPT

## 2025-06-16 PROCEDURE — 92526 ORAL FUNCTION THERAPY: CPT | Mod: GN | Performed by: SPEECH-LANGUAGE PATHOLOGIST

## 2025-06-16 PROCEDURE — 87205 SMEAR GRAM STAIN: CPT

## 2025-06-16 PROCEDURE — 2500000001 HC RX 250 WO HCPCS SELF ADMINISTERED DRUGS (ALT 637 FOR MEDICARE OP): Performed by: NURSE PRACTITIONER

## 2025-06-16 PROCEDURE — 92611 MOTION FLUOROSCOPY/SWALLOW: CPT | Mod: GN | Performed by: SPEECH-LANGUAGE PATHOLOGIST

## 2025-06-16 PROCEDURE — 87015 SPECIMEN INFECT AGNT CONCNTJ: CPT

## 2025-06-16 PROCEDURE — 2500000001 HC RX 250 WO HCPCS SELF ADMINISTERED DRUGS (ALT 637 FOR MEDICARE OP)

## 2025-06-16 PROCEDURE — 2500000004 HC RX 250 GENERAL PHARMACY W/ HCPCS (ALT 636 FOR OP/ED)

## 2025-06-16 PROCEDURE — 85730 THROMBOPLASTIN TIME PARTIAL: CPT

## 2025-06-16 PROCEDURE — 87389 HIV-1 AG W/HIV-1&-2 AB AG IA: CPT

## 2025-06-16 PROCEDURE — 2500000005 HC RX 250 GENERAL PHARMACY W/O HCPCS: Performed by: HOSPITALIST

## 2025-06-16 PROCEDURE — 74230 X-RAY XM SWLNG FUNCJ C+: CPT

## 2025-06-16 PROCEDURE — 99232 SBSQ HOSP IP/OBS MODERATE 35: CPT

## 2025-06-16 PROCEDURE — 83615 LACTATE (LD) (LDH) ENZYME: CPT

## 2025-06-16 PROCEDURE — 36415 COLL VENOUS BLD VENIPUNCTURE: CPT

## 2025-06-16 PROCEDURE — 36415 COLL VENOUS BLD VENIPUNCTURE: CPT | Mod: TC

## 2025-06-16 PROCEDURE — 88112 CYTOPATH CELL ENHANCE TECH: CPT | Mod: TC,MCY | Performed by: NURSE PRACTITIONER

## 2025-06-16 PROCEDURE — 88185 FLOWCYTOMETRY/TC ADD-ON: CPT | Mod: TC

## 2025-06-16 PROCEDURE — 82945 GLUCOSE OTHER FLUID: CPT

## 2025-06-16 PROCEDURE — 74230 X-RAY XM SWLNG FUNCJ C+: CPT | Performed by: RADIOLOGY

## 2025-06-16 PROCEDURE — 97165 OT EVAL LOW COMPLEX 30 MIN: CPT | Mod: GO

## 2025-06-16 PROCEDURE — 1200000003 HC ONCOLOGY  ROOM WITH TELEMETRY DAILY

## 2025-06-16 PROCEDURE — 6360000002 HC RX 636 FACTOR: Mod: JZ,TB

## 2025-06-16 PROCEDURE — 0W993ZZ DRAINAGE OF RIGHT PLEURAL CAVITY, PERCUTANEOUS APPROACH: ICD-10-PCS | Performed by: NURSE PRACTITIONER

## 2025-06-16 PROCEDURE — 85025 COMPLETE CBC W/AUTO DIFF WBC: CPT

## 2025-06-16 PROCEDURE — 83735 ASSAY OF MAGNESIUM: CPT

## 2025-06-16 PROCEDURE — 80053 COMPREHEN METABOLIC PANEL: CPT

## 2025-06-16 PROCEDURE — 32555 ASPIRATE PLEURA W/ IMAGING: CPT

## 2025-06-16 RX ORDER — AMOXICILLIN 250 MG
1 CAPSULE ORAL NIGHTLY
Status: DISCONTINUED | OUTPATIENT
Start: 2025-06-16 | End: 2025-06-18 | Stop reason: HOSPADM

## 2025-06-16 RX ORDER — POLYETHYLENE GLYCOL 3350 17 G/17G
17 POWDER, FOR SOLUTION ORAL DAILY
Status: DISCONTINUED | OUTPATIENT
Start: 2025-06-16 | End: 2025-06-18 | Stop reason: HOSPADM

## 2025-06-16 RX ADMIN — DOCUSATE SODIUM 50 MG AND SENNOSIDES 8.6 MG 1 TABLET: 8.6; 5 TABLET, FILM COATED ORAL at 20:12

## 2025-06-16 RX ADMIN — BARIUM SULFATE 25 ML: 400 PASTE ORAL at 11:37

## 2025-06-16 RX ADMIN — Medication 3264 UNITS: at 14:28

## 2025-06-16 RX ADMIN — METRONIDAZOLE 500 MG: 500 INJECTION, SOLUTION INTRAVENOUS at 10:14

## 2025-06-16 RX ADMIN — TAMSULOSIN HYDROCHLORIDE 0.4 MG: 0.4 CAPSULE ORAL at 20:12

## 2025-06-16 RX ADMIN — BUMETANIDE 0.5 MG: 0.5 TABLET ORAL at 10:08

## 2025-06-16 RX ADMIN — EMPAGLIFLOZIN 10 MG: 10 TABLET, FILM COATED ORAL at 10:09

## 2025-06-16 RX ADMIN — METRONIDAZOLE 500 MG: 500 INJECTION, SOLUTION INTRAVENOUS at 01:17

## 2025-06-16 RX ADMIN — CEFTRIAXONE SODIUM 1 G: 1 INJECTION, SOLUTION INTRAVENOUS at 11:30

## 2025-06-16 RX ADMIN — ZINC SULFATE 220 MG (50 MG) CAPSULE 1 CAPSULE: CAPSULE at 10:08

## 2025-06-16 RX ADMIN — BENZONATATE 100 MG: 100 CAPSULE ORAL at 14:29

## 2025-06-16 RX ADMIN — FAMOTIDINE 20 MG: 20 TABLET, FILM COATED ORAL at 10:09

## 2025-06-16 RX ADMIN — FLUTICASONE PROPIONATE 2 SPRAY: 50 SPRAY, METERED NASAL at 10:13

## 2025-06-16 RX ADMIN — POLYETHYLENE GLYCOL 3350 17 G: 17 POWDER, FOR SOLUTION ORAL at 11:30

## 2025-06-16 RX ADMIN — ATORVASTATIN CALCIUM 40 MG: 40 TABLET, FILM COATED ORAL at 10:08

## 2025-06-16 RX ADMIN — FOLIC ACID 1 MG: 1 TABLET ORAL at 10:08

## 2025-06-16 RX ADMIN — BENZONATATE 100 MG: 100 CAPSULE ORAL at 20:12

## 2025-06-16 RX ADMIN — BARIUM SULFATE 45 ML: 400 SUSPENSION ORAL at 11:37

## 2025-06-16 RX ADMIN — FINASTERIDE 5 MG: 5 TABLET, FILM COATED ORAL at 10:08

## 2025-06-16 RX ADMIN — FERROUS SULFATE TAB 325 MG (65 MG ELEMENTAL FE) 1 TABLET: 325 (65 FE) TAB at 10:08

## 2025-06-16 RX ADMIN — BENZONATATE 100 MG: 100 CAPSULE ORAL at 10:09

## 2025-06-16 RX ADMIN — BARIUM SULFATE 75 ML: 0.81 POWDER, FOR SUSPENSION ORAL at 11:37

## 2025-06-16 RX ADMIN — TAMSULOSIN HYDROCHLORIDE 0.4 MG: 0.4 CAPSULE ORAL at 10:08

## 2025-06-16 ASSESSMENT — PAIN SCALES - GENERAL
PAINLEVEL_OUTOF10: 0 - NO PAIN
PAINLEVEL_OUTOF10: 0 - NO PAIN

## 2025-06-16 ASSESSMENT — COGNITIVE AND FUNCTIONAL STATUS - GENERAL
TURNING FROM BACK TO SIDE WHILE IN FLAT BAD: A LITTLE
EATING MEALS: A LITTLE
WALKING IN HOSPITAL ROOM: A LITTLE
MOBILITY SCORE: 18
TOILETING: A LITTLE
CLIMB 3 TO 5 STEPS WITH RAILING: A LOT
STANDING UP FROM CHAIR USING ARMS: A LITTLE
TURNING FROM BACK TO SIDE WHILE IN FLAT BAD: A LITTLE
DRESSING REGULAR LOWER BODY CLOTHING: A LITTLE
DAILY ACTIVITIY SCORE: 19
CLIMB 3 TO 5 STEPS WITH RAILING: A LOT
DRESSING REGULAR UPPER BODY CLOTHING: A LITTLE
PERSONAL GROOMING: A LITTLE
DAILY ACTIVITIY SCORE: 20
EATING MEALS: A LITTLE
HELP NEEDED FOR BATHING: A LITTLE
DRESSING REGULAR LOWER BODY CLOTHING: A LITTLE
DRESSING REGULAR LOWER BODY CLOTHING: A LITTLE
HELP NEEDED FOR BATHING: A LITTLE
MOVING TO AND FROM BED TO CHAIR: A LITTLE
MOBILITY SCORE: 18
HELP NEEDED FOR BATHING: A LITTLE
TOILETING: A LITTLE
STANDING UP FROM CHAIR USING ARMS: A LITTLE
PERSONAL GROOMING: A LITTLE
WALKING IN HOSPITAL ROOM: A LITTLE
MOVING TO AND FROM BED TO CHAIR: A LITTLE
TOILETING: A LITTLE
DAILY ACTIVITIY SCORE: 19

## 2025-06-16 ASSESSMENT — PAIN - FUNCTIONAL ASSESSMENT
PAIN_FUNCTIONAL_ASSESSMENT: 0-10

## 2025-06-16 NOTE — POST-PROCEDURE NOTE
INTERVENTIONAL RADIOLOGY ADVANCED PRACTICE PROCEDURE  [facility]    A time out was performed and Right Hemithorax was examined with US and appropriate entry point was confirmed and marked.   The patient was prepped and draped in a sterile manner, 1% lidocaine was used to anesthesize the skin and subcutaneous tissue.   A 5F Centesis needle was then introduced through the skin into the pleural space, the centesis catheter was then threaded without difficulty.   300 ml of yellow fluid was removed without difficulty. The catheter was then removed.   No immediate complications were noted during and immediately following the procedure.

## 2025-06-16 NOTE — PROGRESS NOTES
Physical Therapy                 Therapy Communication Note    Patient Name: Ramón Flores  MRN: [id number]  Department:   Room: [address] [place] [address]  Today's Date: 6/16/2025     Discipline: Physical Therapy    Missed Visit: PT Missed Visit: Yes     Missed Visit Reason: Missed Visit Reason:  (0920. Pt off division. Will reattempt as schedule permits.)    Missed Time: Attempt    Comment:

## 2025-06-16 NOTE — PROCEDURES
Speech-Language Pathology    Inpatient Modified Barium Swallow Study    Patient Name: Ramón Flores  MRN: [id number]  : 1928  Today's Date: 25  Time Calculation  Start Time: 905  Stop Time: 930  Time Calculation (min): 25 min      Modified Barium Swallow Study completed. Informed verbal consent obtained prior to completion of exam. The study was completed per protocol with various liquid barium consistencies, pudding, and solids.  A 1.9 cm or .75 inch (outer diameter) ring was placed on the chin in the lateral view in order to complete objective measurements during swallowing. The anatomic structures and function of the oropharynx, larynx, hypopharynx and cervical esophagus were evaluated. Variation to protocol due to inability to obtain an A-P view. An oblique view of the esophagus was obtained.     SLP: Zeenat Solis MS, CCC-SLP   Contact info: [employer] secure chat; phone: [phone]      Reason for Referral: To r/o aspiration and to ascertain readiness for a safe and efficient PO diet.   Patient Hx: Ramón Flores is a 89 y.o. male with PMH Hemophilia, Anal cancer (s/p curative RT 2024), CAD s/p CABG >20yrs ago, HFpEF, Afib, BPH, GERD, and CKD who presented to [facility] ED  with c/o n/v and diarrhea x24hrs. CT a/p with contrast () with no evidence for intrahepatic or extrahepatic biliary ductal dilation, no evidence of bowel obstruction, colonic diverticulosis with no evidence for acute diverticulitis, prostatomegaly. GI consulted, state s/sx likely gastroenteritis, rec stool path if diarrhea continues. Heme consulted, rec transfer to [facility] for further management of worsening anemia/thrombocytopenia I/s/o acute illness. Pt was transferred to [facility] for further management. Upon admission to [facility], pt reports his s/x have resolved. Heme consulted on admit 6/10, recs extensive hemophilia, pancytopenia workup. (-plan stop 6/15) start IV ceftriaxone and PO azithromycin for CAP treatment.  S/p BMBx & Advate infusion  - results pending. DC pending improvement in anemia and thrombocytopenia.   Respiratory Status: Room air  Current diet: NPO    Pain:  Pain Scale: 0-10  Ratin    DIET RECOMMENDATIONS:   - Easy to Chew (IDDSI Level 7)  - Thin liquids (IDDSI Level 0)      STRATEGIES:  - Small bites  - Small, single sips  - Alternate consistencies  - Upright for all PO intake  - Swallow 2-3 times per bite/sip  - Chin tuck      SLP PLAN:  Skilled SLP Services: Skilled SLP intervention for dysphagia is warranted.  SLP Frequency: 1x per week   Duration:  Treatment/Interventions:   - Compensatory strategy training  - Patient/caregiver education    Discussed POC: Patient  Discussed Risks/Benefits: Yes  Patient/Caregiver Agreeable: Yes    Short term goals   Pt will tolerate least restrictive diet without s/s aspiration, respiratory compromise, or overt difficulty throughout admission.  Start: 25, End: 2 weeks  Status: goal initiated    Pt will accurately utilize/recall recommended swallow strategies/guidelines independently in >90% opportunities.    Start: 25, End: 2 weeks   Status: goal initiated        Long term goals 25:   Patient will tolerate the least restrictive diet without overt difficulty or further pulmonary compromise by time of discharge.      Education Provided: Results and recommendations per MBSS, with video review; recommendations and POC at this time. Verbal understanding and agreement given on all accounts.     Treatment Provided Today: ST provided extensive education and training to pt/pt family regarding anatomy/physiology of swallow function, risk factors of aspiration/aspiration pna & how to mitigate factors, diet modifications, and the use of compensatory swallow strategies to promote pt safety upon PO intake including utilizing a chin tuck, repeat swallow and alternating liquids and solids to reduce post swallow residue within the valleculae and piriforms. Pt  re-verbalized strategies through talk back method with min verbal cueing.     Additional Medical Consults Suggested:   - No new disciplines indicated    Repeat Study:   Repeat study only if there is a deterioration in swallow function.      Mechanics of the Swallow Summary:  ORAL PHASE:  Lip Closure - No labial escape/anterior loss of bolus   Tongue Control During Bolus Hold - Cohesive bolus between tongue to palatal seal   Bolus prep/mastication - Timely and efficient mastication skills   Bolus transport/lingual motion - Brisk tongue motion for A-P movement of the bolus   Oral residue - Complete oral clearance     PHARYNGEAL PHASE:  Initiation of pharyngeal swallow - Bolus head at posterior angle of ramus   Soft palate elevation - No bolus between soft palate/pharyngeal wall   Laryngeal elevation - Complete superior movement of thyroid cartilage with contact of arytenoids to epiglottic petiole   Anterior hyoid excursion - Complete anterior movement   Epiglottic movement - Complete inversion    Laryngeal vestibule closure - Complete - no air/contrast in laryngeal vestibule   Pharyngeal stripping wave - Present, however, diminished   Pharyngeal contraction (A/P view) - Not tested       Pharyngoesophageal segment opening - Complete distension and complete duration/no obstruction of flow of bolus   Tongue base retraction - Trace column of contrast or air between tongue base and pharyngeal wall   Pharyngeal residue - Collection of residue within or on the pharyngeal structures     ESOPHAGEAL PHASE:  Esophageal clearance - Esophageal retention       SLP Impressions with Severity Rating:   Pt presents with mild oropharyngeal dysphagia upon completion of modified barium swallow study this date. Swallowing physiology is detailed above. Impairments most impacting swallowing safety and efficiency include decrease posterior pharyngeal wall stripping wave and base of tongue retraction. This results in post swallow residue in  valleculae < piriforms with increase in valleculae with heavier viscosities. Utilization of chin tuck, repeat swallow and liquid wash aids in clearing majority of residue. Trace aspiration on the inferior aspect of the TVF on 1/11 swallows with large amount which maybe age related. Pt sensate aspiration with immediate cough. Pt ready for a regular diet with thin liquids utilizing the strategies stated above. Upon scanning down the esophagus in the oblique view retention and backflow within the esophagus evident. Pt intermittently coughing throughout exam, however unrelated to any aspiration.     *Of note: The A-P bolus follow-through is not intended to be utilized as a diagnostic assessment of the esophagus, rather a tool to observe the biomechanical aspects of the swallow continuum and to inform the need for further evaluation by medical specialists, as applicable.       OUTCOME MEASURES:  Functional Oral Intake Scale  Functional Oral Intake Scale: Level 7        total oral diet with no restrictions       Rosenbek's Penetration Aspiration Scale  Thin Liquids: 6. ASPIRATION that CLEARS with the swallow - contrast passes glottis, no subglottic residue  During the Swallow  Nectar Thick Liquids: 1. NO ASPIRATION & NO PENETRATION - no aspiration, contrast does not enter airway  Puree: 1. NO ASPIRATION & NO PENETRATION - no aspiration, contrast does not enter airway  Solids: 1. NO ASPIRATION & NO PENETRATION - no aspiration, contrast does not enter airway

## 2025-06-16 NOTE — CARE PLAN
Problem: Pain - Adult  Goal: Verbalizes/displays adequate comfort level or baseline comfort level  Outcome: Progressing     Problem: Safety - Adult  Goal: Free from fall injury  Outcome: Progressing     Problem: Discharge Planning  Goal: Discharge to home or other facility with appropriate resources  Outcome: Progressing     Problem: Chronic Conditions and Co-morbidities  Goal: Patient's chronic conditions and co-morbidity symptoms are monitored and maintained or improved  Outcome: Progressing     Problem: Nutrition  Goal: Nutrient intake appropriate for maintaining nutritional needs  Outcome: Progressing     Problem: Skin  Goal: Decreased wound size/increased tissue granulation at next dressing change  Outcome: Progressing  Flowsheets (Taken 6/16/2025 1827)  Decreased wound size/increased tissue granulation at next dressing change:   Promote sleep for wound healing   Utilize specialty bed per algorithm   Protective dressings over bony prominences  Goal: Participates in plan/prevention/treatment measures  Outcome: Progressing  Flowsheets (Taken 6/16/2025 1827)  Participates in plan/prevention/treatment measures:   Discuss with provider PT/OT consult   Elevate heels   Increase activity/out of bed for meals  Goal: Prevent/manage excess moisture  Outcome: Progressing  Flowsheets (Taken 6/16/2025 1827)  Prevent/manage excess moisture:   Monitor for/manage infection if present   Cleanse incontinence/protect with barrier cream   Use wicking fabric (obtain order)   Follow provider orders for dressing changes   Moisturize dry skin  Goal: Prevent/minimize sheer/friction injuries  Outcome: Progressing  Flowsheets (Taken 6/16/2025 1827)  Prevent/minimize sheer/friction injuries:   Use pull sheet   Increase activity/out of bed for meals   Complete micro-shifts as needed if patient unable. Adjust patient position to relieve pressure points, not a full turn   HOB 30 degrees or less   Turn/reposition every 2 hours/use  positioning/transfer devices   Utilize specialty bed per algorithm  Goal: Promote/optimize nutrition  Outcome: Progressing  Flowsheets (Taken 6/16/2025 1827)  Promote/optimize nutrition:   Monitor/record intake including meals   Assist with feeding   Consume > 50% meals/supplements   Offer water/supplements/favorite foods   Discuss with provider if NPO > 2 days   Reassess MST if dietician not consulted  Goal: Promote skin healing  Outcome: Progressing  Flowsheets (Taken 6/16/2025 1827)  Promote skin healing:   Turn/reposition every 2 hours/use positioning/transfer devices   Protective dressings over bony prominences   Assess skin/pad under line(s)/device(s)   Ensure correct size (line/device) and apply per  instructions   Rotate device position/do not position patient on device     Problem: Fall/Injury  Goal: Not fall by end of shift  Outcome: Progressing  Goal: Be free from injury by end of the shift  Outcome: Progressing  Goal: Verbalize understanding of personal risk factors for fall in the hospital  Outcome: Progressing  Goal: Verbalize understanding of risk factor reduction measures to prevent injury from fall in the home  Outcome: Progressing  Goal: Use assistive devices by end of the shift  Outcome: Progressing  Goal: Pace activities to prevent fatigue by end of the shift  Outcome: Progressing   The patient's goals for the shift include      The clinical goals for the shift include pt will remain safe and free from injury throughout shift    Pt remained safe and free from injury throughout shift. Pt is stable, no complaints of pain, nausea, vomiting.

## 2025-06-16 NOTE — CARE PLAN
Problem: Pain - Adult  Goal: Verbalizes/displays adequate comfort level or baseline comfort level  Outcome: Progressing     Problem: Safety - Adult  Goal: Free from fall injury  Outcome: Progressing     Problem: Discharge Planning  Goal: Discharge to home or other facility with appropriate resources  Outcome: Progressing     Problem: Chronic Conditions and Co-morbidities  Goal: Patient's chronic conditions and co-morbidity symptoms are monitored and maintained or improved  Outcome: Progressing     Problem: Nutrition  Goal: Nutrient intake appropriate for maintaining nutritional needs  Outcome: Progressing     Problem: Skin  Goal: Decreased wound size/increased tissue granulation at next dressing change  Outcome: Progressing  Flowsheets (Taken 6/16/2025 0408)  Decreased wound size/increased tissue granulation at next dressing change: Promote sleep for wound healing  Goal: Participates in plan/prevention/treatment measures  Outcome: Progressing  Flowsheets (Taken 6/16/2025 0408)  Participates in plan/prevention/treatment measures:   Elevate heels   Discuss with provider PT/OT consult  Goal: Prevent/manage excess moisture  Outcome: Progressing  Flowsheets (Taken 6/16/2025 0408)  Prevent/manage excess moisture: Monitor for/manage infection if present  Goal: Prevent/minimize sheer/friction injuries  Outcome: Progressing  Flowsheets (Taken 6/16/2025 0408)  Prevent/minimize sheer/friction injuries: HOB 30 degrees or less  Goal: Promote/optimize nutrition  Outcome: Progressing  Flowsheets (Taken 6/16/2025 0408)  Promote/optimize nutrition: Monitor/record intake including meals  Goal: Promote skin healing  Outcome: Progressing  Flowsheets (Taken 6/16/2025 0408)  Promote skin healing: Rotate device position/do not position patient on device     Problem: Fall/Injury  Goal: Not fall by end of shift  Outcome: Progressing  Goal: Be free from injury by end of the shift  Outcome: Progressing  Goal: Verbalize understanding of  personal risk factors for fall in the hospital  Outcome: Progressing  Goal: Verbalize understanding of risk factor reduction measures to prevent injury from fall in the home  Outcome: Progressing  Goal: Use assistive devices by end of the shift  Outcome: Progressing  Goal: Pace activities to prevent fatigue by end of the shift  Outcome: Progressing   The patient's goals for the shift include      The clinical goals for the shift include pt to remain safe througfh out the night 6/15/25 @0730

## 2025-06-16 NOTE — PROGRESS NOTES
Ramón Flores is a 89 y.o. male on day 6 of admission presenting with Hemophilia A (Multi).    Subjective   Patient seen resting in bed.  is eager to have MBSS done today, feels ready to leave the hospital. Discussed plan for MBSS and possible thoracentesis today or tomorrow for pleural effusions seen on chest CT. Patient states feels that he is breathing well, mild cough, no new shortness of breath. Currently on room air, 93-94% spO2. Denies chest pain.  has had some bruising from blood draws, no bleeding. Last BM 6/14,  feels like he needs to have a bowel movement. Discussed gentle bowel regimen with caution as recently had diarrhea. Denies abdominal pain, n/v, headache, fevers, chills, numbness, tingling, vision change, weakness.       Objective     Physical Exam  Constitutional:       Appearance: Normal appearance.   HENT:      Mouth/Throat:      Mouth: Mucous membranes are moist.      Pharynx: Oropharynx is clear.   Eyes:      Extraocular Movements: Extraocular movements intact.      Conjunctiva/sclera: Conjunctivae normal.      Pupils: Pupils are equal, round, and reactive to light.   Cardiovascular:      Rate and Rhythm: Normal rate. Rhythm irregular.      Pulses: Normal pulses.      Heart sounds: Normal heart sounds.   Pulmonary:      Effort: Pulmonary effort is normal.      Breath sounds: Wheezing present.   Abdominal:      General: Abdomen is flat. Bowel sounds are normal.      Palpations: Abdomen is soft.   Musculoskeletal:         General: Normal range of motion.   Skin:     General: Skin is warm and dry.      Findings: Bruising present.   Neurological:      General: No focal deficit present.      Mental Status: He is alert and oriented to person, place, and time.   Psychiatric:         Mood and Affect: Mood normal.         Behavior: Behavior normal. Behavior is cooperative.         Last Recorded Vitals  Blood pressure 113/67, pulse 59, temperature 36.6 °C (97.9 °F), temperature  "source Temporal, resp. rate 16, height 1.676 m (5' 6\"), weight 59.4 kg (131 lb), SpO2 99%.  Intake/Output last 3 Shifts:  I/O last 3 completed shifts:  In: 1030 (17.3 mL/kg) [P.O.:780; IV Piggyback:250]  Out: 2050 (34.5 mL/kg) [Urine:2050 (1 mL/kg/hr)]  Weight: 59.4 kg     Relevant Results               Scheduled medications  Scheduled Medications[1]  Continuous medications  Continuous Medications[2]  PRN medications  PRN Medications[3]  Results for orders placed or performed during the hospital encounter of 06/10/25 (from the past 24 hours)   Procalcitonin   Result Value Ref Range    Procalcitonin 0.08 (H) <=0.07 ng/mL   CBC and Auto Differential   Result Value Ref Range    WBC 4.1 (L) 4.4 - 11.3 x10*3/uL    nRBC 0.7 (H) 0.0 - 0.0 /100 WBCs    RBC 2.31 (L) 4.50 - 5.90 x10*6/uL    Hemoglobin 7.9 (L) 13.5 - 17.5 g/dL    Hematocrit 24.7 (L) 41.0 - 52.0 %     (H) 80 - 100 fL    MCH 34.2 (H) 26.0 - 34.0 pg    MCHC 32.0 32.0 - 36.0 g/dL    RDW 22.4 (H) 11.5 - 14.5 %    Platelets 52 (L) 150 - 450 x10*3/uL    Neutrophils % 62.6 40.0 - 80.0 %    Immature Granulocytes %, Automated 4.7 (H) 0.0 - 0.9 %    Lymphocytes % 16.7 13.0 - 44.0 %    Monocytes % 15.3 2.0 - 10.0 %    Eosinophils % 0.2 0.0 - 6.0 %    Basophils % 0.5 0.0 - 2.0 %    Neutrophils Absolute 2.54 1.60 - 5.50 x10*3/uL    Immature Granulocytes Absolute, Automated 0.19 0.00 - 0.50 x10*3/uL    Lymphocytes Absolute 0.68 (L) 0.80 - 3.00 x10*3/uL    Monocytes Absolute 0.62 0.05 - 0.80 x10*3/uL    Eosinophils Absolute 0.01 0.00 - 0.40 x10*3/uL    Basophils Absolute 0.02 0.00 - 0.10 x10*3/uL   Factor 8 Activity   Result Value Ref Range    Factor VIII Activity 13 (L) 55 - 180 %   Comprehensive metabolic panel   Result Value Ref Range    Glucose 108 (H) 74 - 99 mg/dL    Sodium 142 136 - 145 mmol/L    Potassium 4.0 3.5 - 5.3 mmol/L    Chloride 105 98 - 107 mmol/L    Bicarbonate 28 21 - 32 mmol/L    Anion Gap 13 10 - 20 mmol/L    Urea Nitrogen 34 (H) 6 - 23 mg/dL    " Creatinine 1.12 0.50 - 1.30 mg/dL    eGFR 60 (L) >60 mL/min/1.73m*2    Calcium 8.3 (L) 8.6 - 10.6 mg/dL    Albumin 3.6 3.4 - 5.0 g/dL    Alkaline Phosphatase 72 33 - 136 U/L    Total Protein 6.0 (L) 6.4 - 8.2 g/dL    AST 19 9 - 39 U/L    Bilirubin, Total 0.8 0.0 - 1.2 mg/dL    ALT 23 10 - 52 U/L   Coagulation Screen   Result Value Ref Range    Protime 16.3 (H) 9.8 - 12.4 seconds    INR 1.5 (H) 0.9 - 1.1    aPTT 47 (H) 26 - 36 seconds   Magnesium   Result Value Ref Range    Magnesium 1.75 1.60 - 2.40 mg/dL     *Note: Due to a large number of results and/or encounters for the requested time period, some results have not been displayed. A complete set of results can be found in Results Review.     XR chest 1 view  Result Date: 6/14/2025  Interpreted By:  Mack Herzog and Sheng Max STUDY: XR CHEST 1 VIEW;  6/14/2025 5:56 pm   INDICATION: Signs/Symptoms:c/f aspiration.     COMPARISON: CT CHEST WO IV CONTRAST 6/13/2025, XR CHEST 1 VIEW 6/9/2025   ACCESSION NUMBER(S): [id number]   ORDERING CLINICIAN: VALERI PARTIDA   FINDINGS: AP radiograph of the chest:   CARDIOMEDIASTINAL SILHOUETTE: Stable enlargement of the cardiomediastinal silhouette. Aortic knob calcifications are seen. Numerous intact median sternotomy wires.   LUNGS: Worsening patchy airspace opacities predominantly overlying the bilateral lung bases. Similar perihilar congestion with interstitial prominence. Mild worsening in overall lung aeration. Stable small bibasilar pleural effusions. No pneumothorax.   ABDOMEN: No remarkable upper abdominal findings.   BONES: No acute osseous abnormality.       1. Similar patchy airspace opacities predominantly overlying the bilateral lung bases, to be correlated with aspiration and infection. 2. Slight interval worsening in basilar edema/infiltrates and correlate with fluid status or developing pneumonia. 3. Stable small bibasilar pleural effusions.     I personally reviewed the images/study and I agree with the  findings as stated by Dr. Latrell Hayden. This study was interpreted at [facility], [place], [place].   MACRO: None   Signed by: Mack Herzog 6/14/2025 7:50 PM Dictation workstation:   [id number]                   Assessment & Plan  Hemophilia A (Multi)    Ramón Flores is a 89 y.o. male with PMH Hemophilia, Anal cancer (s/p curative RT 12/2024), CAD s/p CABG >20yrs ago, HFpEF, Afib, BPH, GERD, and CKD who presented to [facility] ED 6/9 with c/o n/v and diarrhea x24hrs. CT a/p with contrast (6/9) with no evidence for intrahepatic or extrahepatic biliary ductal dilation, no evidence of bowel obstruction, colonic diverticulosis with no evidence for acute diverticulitis, prostatomegaly. GI consulted, state s/sx likely gastroenteritis, rec stool path if diarrhea continues. Heme consulted, rec transfer to [facility] for further management of worsening anemia/thrombocytopenia I/s/o acute illness. Pt was transferred to [facility] for further management. Upon admission to [facility], pt reports his s/x have resolved. Heme consulted on admit 6/10, recs extensive hemophilia, pancytopenia workup. Prior to admission had CXR showing left basilar atelectasis/scar vs small infiltrate, s/p Azithromycin PO (6/11-6/15); continuing IV ceftriaxone (6/11-6/16) for CAP treatment. S/p BMBx & Advate infusion 6/13 - results pending. CT chest 6/13 showed bilat upper lobe patchy and GGOs c/f multifocal infectious process, pulmonary & alveolar edema w r>L pleural effusions, dilated main pulm artery, mold thoracic body wall edema.  CXR (6/14) showed Similar patchy airspace opacities predominantly overlying the bilat lung bases, to be correlated with aspiration and infection, worsening basilar edema, witnessed aspiration event, initiated metronidazole to cover possible aspiration PNA (6/14-6/16). Plan for diagnostic/therapeutic thoracentesis 6/16 for bilateral pleural effusions seen on CT 6/13 and persistent oxygen requirements.  DC pending improvement in anemia and thrombocytopenia. Discharge pending workup of anemia, thrombocytopenia, and improvement in pulmonary symptoms.     Update 6/16:   - Discontinued ceftriaxone and metronidazole today, will monitor for fevers, s/s of infection off antibiotics  - s/p PO azithromycin 500mg daily (6/11-6/15) for 5 days of CAP coverage given cough and supp O2 use  - Discussed continued aspiration risk and rec NPO to patient until SLP eval, pt educated on the risks of aspiration however would still like to eat, pt agreeable to easy to chew diet and mod thick liquids rec by SLP in February   - SLP and MBSS done 6/16, rec stay upright at least 45-60 min after eating  - Patient desatting to low 80s on room air during walking pulse ox, 93-94% on room air while resting; plan for diagnostic/therapeutic thoracentesis today at 1500 with altuviiio given 1 hour beforehand per heme recs  - Heme consulted for pancytopenia, full hemophilia workup and pancytopenia workup  - s/p bmbx 6/13 & s/p Advate 30u/kg once after BMBx - results pending  - factor 8 activity 13 (6/16)  - Wean O2 as able for dispo (currently on 1-2L) after thoracentesis  - PT rec SNF vs Acute rehab, pt would like to go home with home care     # Hemophilia A  # Pancytopenia  - Follows with Dr. Dillon, LOV 4/9/25 - emailed about admission 6/11  - He has had minimal bleeding during life d/t concurrent protein C deficiency  - On Altuviiio weekly (Tuesdays), pt got dose on 6/10 --> next dose 6/16 prior to diagnostic thoracentesis  - Hgb BL ~12; Hgb 7.9 (6/10) -> 8.2 (6/11)--> 8.0 (6/15) ->7.9 (6/16)  - Plt BL ~ 80, Plt 41 (6/10) -> 42 (6/11)--> 59 (6/15) -> 52 (6/16)  - , Haptoglobin 241 (6/9)  - Ddimer 2,617, fibrinogen 540, PT 17.9, INR 1.6, aPTT 57 (6/9)  - Iron 11, TIBC 188, Ferritin 328, haptoglobin 241 (6/10)  - , retic 2.4% (6/10)  - Myeloid malignancies panel sent/pending and MDS panel sent/pending per heme at [facility]  - Factor 8  activity (6/10) 5--> 53 (6/12) (after first infusion on 6/11) -> 13 (6/16)  - Continue home folic acid 1mg daily and ferrous sulfate 325mg daily  - Heme consulted 6/10, rec factor assays: FII, FVII, FVIII, FXI; drawing serum copper and zinc level, proceed with altuviio injection, Heme to FU with myeloid malignancy panel, peripheral smear  - FXI 57, FVIII 5, FVII 38, FII 51 (6/10)  - Trough factor VIII level 5% (6/10)   - MDS panel and Myeloid malignancies NGS panel pending (6/11)  - Peripheral smear pending (6/11)  - serum copper 109.7/ zinc 40.5 (6/11)  - start zinc supplement daily (6/13-current)  - s/p BMBX (6/13) and pt given Advate 30u/kg once after BMBx (6/13)  - Core bx unable to be done  - Flow Cytometry and Bone Marrow Evaluation pending (6/13)  - per heme (6/13), s/p Vit K daily x 3 days (6/11-6/13).   - soluble transferrin receptor 2.5 (6/11)  - Factor 8 activity 20 (6/15) ->13 (6/16)  - Per heme on 6/16, give regularly scheduled Altuviiio (originally due 6/17) on 6/16 prior to diagnostic/therapeutic thoracentesis for bilateral pleural effusions 1 hour prior to procedure; also request HIV, FOX, immunoglobulins, FLC, SPEP, and peripheral flow cytometry drawn (6/16)  - per Heme: transfuse to maintain Hb > 7 or if symptomatic, PLT > 10 or > 50 if bleeding      # CAP  # Acute respiratory failure (on 1-2L supp O2)  # c/f aspiration  # Bilat Pleural effusions/ pulm edema  - Pt was seen outpt 6/9 for c/o sinus congestion and cough x2 days, was started on Cefdinir and took one dose 6/9  - On admit, pt continues to endorse non-productive cough; no fevers/chills or SOB  - CXR (6/9) showing left basilar atelectasis/scar vs small infiltrate  - worsening SOB, will check CT chest  - Lactate level 5.4 --> 1.8 (6/9)  - s/p 500mL NS bolus and maintenance IVF with NS @ 75mL/hr  - S/p LR @ 75mL/hr x12hrs (6/10-6/11)  - Blood cultures x2 (6/9): NGTD  - COVID (6/9) negative  - Resp viral panel (6/10): neg  - Strep pneumo,  legionella neg (6/10)  - MRSA neg (6/10)  - Bps soft on admit (107/62), no c/f significant HoTN  - Presented to [facility] in 2L [place], SpO2 94% on admit, will continue for now and wean as tolerated  - Started Mucinex 600mg BID scheduled and tessalon pearls TID   - CT Chest w/o (6/13) showed bilat upper lobe patchy and GGOs c/f multifocal infectious process, pulmonary & alveolar edema w r>L pleural effusions, dilated main pulm artery, mold thoracic body wall edema  - s/p 40mg IV lasix once (6/14) for pulm edema, s/p 20mg IV lasix once (6/15)  - 6/14 pt with witness aspiration of cough medicine, VSS on 1L supp O2, afebrile, CXR (6/14) showed Similar patchy airspace opacities predominantly overlying the bilat lung bases, to be correlated with aspiration and infection, worsening basilar edema  - s/p bumex 0.5 mg IV x 1 (6/15)  - procalcitonin 0.08 (6/15), plan to discontinue antibiotics today and monitor patient for s/s infection off antibiotics  - s/p PO azithromycin 500mg daily (6/11-6/15) for 5 days of CAP, IV ceftriaxone 1g daily (6/11-6/16), and IV metronidazole 500 mg q8hr (6/14-6/16) for CAP and aspiration PNA  - SLP consulted and MBSS (6/16) for c/f aspiration, rec stay upright at least 45-60 min after eating, patient requesting regular diet  - Walking pulse ox 6/16: spO2 93% on room air while sitting, down to 83% while ambulating on room air  - Plan for bilateral therapeutic thoracentesis on 6/16 with cultures/fluid studies, per heme, give regularly scheduled Altuviiio (originally planned for 6/17) 1 hour prior to proceedure  - encourage IS    # RONNIE/Hx CKD - improving   - Likely pre-renal 2/2 contrast  - sCr BL ~1.1, sCr 1.19 (6/9)--> s/p IV contrast--> sCr 1.38 (6/11)--> 1.18 (6/15) ->1.12 (6/16)  - s/p 500mL NS bolus and maintenance IVF with NS @ 75mL/hr  - s/p LR @ 75mL/hr x 12hrs (6/10-6/11)  - Urine lytes WNL 6/10, 6/13 urine lytes WNL  - Fena 0.5% showing prerenal causes (6/14)  - bladder scan with 178ml on  6/13  - encourage increased PO intake, will hold off on IV fluids I/s/o pulm edema/pleural effusions shown on CT     # N/V/D - improved  - Likely viral gastroenteritis vs bacterial  - CT a/p with contrast (6/9) no evidence for intrahepatic or extrahepatic biliary ductal dilation, no evidence of bowel obstruction, colonic diverticulosis with no evidence for acute diverticulitis, prostatomegaly  - s/p 1x dose of cefepime and 1x dose of flagyl at [facility] 6/9  - s/p 500mL NS bolus and maintenance IVF with NS @ 75mL/hr at [facility] 6/9  - No fevers/chills or leukocytosis   - Lactate level 5.4 --> 1.8 (6/9)  - UA (6/9) + protein, neg leuks or nitrites  - Blood cultures x2 (6/9): NGTD  - COVID (6/9) negative  - CXR showing left basilar atelectasis/scar vs small infiltrate  - GI consulted at [facility] (6/10), state s/sx likely gastroenteritis, rec stool path if diarrhea continues  - Stool path and Cdiff canceled by lab, will not re-order as diarrhea has resolved;, lactoferrin, and fecal calprotectin pending 6/10  - PO Zofran 4mg q8hrs PRN n/v 1st line, PO Compazine 10mg q8hrs PRN n/v 2nd line  - s/p IVF with LR @ 75mL/hr x12hrs (6/10-6/11)     # Troponinemia - downtrending  - Likely 2/2 demand I/s/o active illness with cardiac hx, aflutter seen on EKG  - Hx elevated troponins in the past  - Trop 249->287-->446 (6/9)  - down-trending Trop 285 (6/10)  - EKG at OSH (6/9) showing aflutter with AV block, non-specific T-wave abnormalities  - Admit EKG still pending 6/10  - c/w tele     # Anal Cancer  - Previously followed with Dr. Locke, now follows with Dr. Cochran, followed with Dr. Muro for Rad/Onc  - Initially presented with rectal bleeding in the setting of constipation over several consecutive days and then called EMS. He was brought to [facility] but then transferred to [facility] on 10/20/24 given no factor VIII at [facility]. Colonoscopy performed on 10/22/24 showed single malignant-appearing and friable ulcerated mass in the anal canal.  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   - s/p curative RT to anus 12/2024  - PET (4/18/25): no evidence of distant metastatic disease, small uptake in the prior anal lesion, likely non-specific but residual neoplasm not entirely excluded  - Now only following with med onc (Dr. Cochran) as surveillance if needed  - Has CT c/a/p and Rad Onc FUV on 8/4     # CAD  # Afib  # HFpEF  # HLD  - s/p CABG >20yrs ago  - ECHO (2/1/25): EF 49%, global hypokinesis of LV, mod increased septal and mod increased posterior LV wall thickness, sev dialted LA, mod-sev dilated RA, RVSP 44  - Continue home Jardiance 10mg daily and Bumex 0.5mg daily  - Continue home Lipitor 40mg daily  - BNP (6/10): 580 --> repeat (6/14) 970 --> 6/15 640  - s/p 60mg total IV lasix (6/14 & 6/15)  - s/p extra 0.5 mg bumex IV (6/15)     # BPH  - Continue home Flomax 0.4mg BID and Finasteride 5mg daily     # GERD  - Home Pantoprazole 40mg daily sub as Pepcid 20mg daily I/s/o thrombocytopenia     DVT prophy: No pharmacologic DVT prophy I/s/o hemophilia A and thrombocytopenia. SCDs, encourage safe ambulation  Will attempt to get OOB as able to maintain functional status     DISPO:  - DNR, DNI; confirmed on admit  - DC pending improvement in anemia and thrombocytopenia and improvement in pulmonary symptoms  - NOK (Javier Wallace, relative): [phone]  - CT c/a/p 8/4, Rad Onc 8/4, Dr. Dillon FUV 8/6, Ophthalmology FUV 8/27, Urology FUV 12/1, Ophthalmology FUV 12/3    Assessment and plan as above discussed with attending physician Dr. Ulloa.      I spent 90 minutes in the professional and overall care of this patient.      Nena Lopez, APRN-CNP         [1] antihemophilic factor VIII (recombinant), Fc-VWF-XTEN fusion protein-ehtl, 3,263 Units, intravenous, q7 days  atorvastatin, 40 mg, oral, Daily  benzonatate, 100 mg, oral, TID  bumetanide, 0.5 mg, oral, Daily  cefTRIAXone, 1 g, intravenous,  q24h  empagliflozin, 10 mg, oral, Daily  famotidine, 20 mg, oral, Daily  ferrous sulfate, 65 mg of elemental iron, oral, Daily  finasteride, 5 mg, oral, Daily  fluticasone, 2 spray, Each Nostril, Daily  folic acid, 1 mg, oral, Daily  metroNIDAZOLE, 500 mg, intravenous, q8h  polyethylene glycol, 17 g, oral, Daily  sennosides-docusate sodium, 1 tablet, oral, Nightly  tamsulosin, 0.4 mg, oral, BID  zinc sulfate, 50 mg of elemental zinc, oral, Daily  [2]    [3] PRN medications: albuterol, guaiFENesin, ondansetron, prochlorperazine, sodium chloride

## 2025-06-16 NOTE — PROGRESS NOTES
Occupational Therapy    Evaluation    Patient Name: Ramón Flores  MRN: [id number]  Today's Date: 6/16/2025  Time Calculation  Start Time: 1256  Stop Time: 1312  Time Calculation (min): 16 min    Assessment  IP OT Assessment  OT Assessment: Pt required CGA with functional transfers and short distance ambulation using a wheeled walker, pt required CGA with sit to supine bed mobility. Anticipate min assist with lower body ADLs.  Prognosis: Good  Barriers to Discharge Home: No anticipated barriers  Evaluation/Treatment Tolerance: Patient tolerated treatment well  Medical Staff Made Aware: Yes  End of Session Communication: Bedside nurse  End of Session Patient Position: Bed, 3 rail up, Alarm on  Plan:  Treatment Interventions: ADL retraining, Functional transfer training, Endurance training  OT Frequency: 3 times per week  OT Discharge Recommendations: Low intensity level of continued care  OT Recommended Transfer Status:  (CGA)  OT - OK to Discharge: Yes    Subjective   Current Problem:  1. Hemophilia A (Multi)  Referral to Home Health    CANCELED: Referral to Home Health      2. Factor VIII deficiency (Multi)  zinc sulfate (Zincate) 220 mg (50 mg elemental) capsule        General:  Reason for Referral: Heme consulted, rec transfer to [facility] for further management of worsening anemia/thrombocytopenia I/s/o acute illness. Pt was transferred to [facility] for further management. Upon admission to [facility], pt reports his s/x have resolved. Heme consulted on admit 6/10, recs extensive hemophilia, pancytopenia workup.  Past Medical History Relevant to Rehab: Hemophilia, Anal cancer (s/p curative RT 12/2024), CAD s/p CABG >20yrs ago, HFpEF, Afib, BPH, GERD, and CKD  Prior to Session Communication: Bedside nurse  Patient Position Received:  (standing in the room with RN present at bedside.)  Family/Caregiver Present: No   Precautions:  Medical Precautions: Fall precautions, Oxygen therapy device and L/min    Pain:  Pain  Assessment  Pain Assessment: 0-10  0-10 (Numeric) Pain Score:  (complain of chronic B knee pain due to OA, not rated.)  Pain Interventions: Repositioned        Objective   Cognition:  Overall Cognitive Status: Within Functional Limits  Arousal/Alertness: Appropriate responses to stimuli  Orientation Level: Oriented X4           Home Living:  Type of Home: House  Lives With: Alone  Home Adaptive Equipment: Walker rolling or standard, Wheelchair-manual (stairs lift)  Home Layout:  (4 level split level house, has a chair lift from the main floor to the 2nd floor to his bedroom.)  Home Access: Ramped entrance  Bathroom Shower/Tub: Tub/shower unit  Bathroom Equipment: Grab bars in shower, Tub transfer bench   Prior Function:  Level of [place]: Independent with ADLs and functional transfers  Receives Help From:  (pt reported his family assist with doing laundry and shopping. has meals on wheels.)  Ambulatory Assistance: Independent (uses a wheeled walker for short distance ambulation, uses his w/c inside and outside of his house.)    ADL:  Eating Assistance: Independent  UE Dressing Assistance: Stand by  UE Dressing Deficit:  (anticipate)  LE Dressing Assistance: Minimal  LE Dressing Deficit: Don/doff R sock, Don/doff L sock (anticipate)  Activity Tolerance:  Endurance: Tolerates less than 10 min exercise with changes in vital signs  Balance:  Dynamic Standing Balance  Dynamic Standing-Balance Support: Bilateral upper extremity supported  Dynamic Standing-Level of Assistance: Contact guard (short distance ambulation by taking 2-3 side steps using a wheeled walker.)  Bed Mobility/Transfers: Bed Mobility  Bed Mobility: Yes  Bed Mobility 1  Bed Mobility 1: Sitting to supine  Level of Assistance 1: Contact guard, Minimal verbal cues  Bed Mobility Comments 1: HOB elevated   and Transfers  Transfer: Yes  Transfer 1  Transfer From 1: Bed to  Transfer to 1: Stand  Technique 1: Sit to stand, Stand to sit  Transfer Device 1:  Walker  Transfer Level of Assistance 1: Contact guard, Minimal verbal cues    Vision: Vision - Basic Assessment  Current Vision: Wears glasses all the time   and Vision - Complex Assessment  Ocular Range of Motion: Within Functional Limits  Sensation:  Light Touch: No apparent deficits    Perception:  Inattention/Neglect: Appears intact  Coordination:  Movements are Fluid and Coordinated: Yes   Hand Function:  Hand Function  Gross Grasp: Functional  Coordination: Functional  Extremities: RUE   RUE : Within Functional Limits, LUE   LUE: Within Functional Limits,    Outcome Measures: [facility] [address] Daily Activity  Putting on and taking off regular lower body clothing: A little  Bathing (including washing, rinsing, drying): A little  Putting on and taking off regular upper body clothing: A little  Toileting, which includes using toilet, bedpan or urinal: A little  Taking care of personal grooming such as brushing teeth: None  Eating Meals: None  Daily Activity - Total Score: 20         ,     OT Adult Other Outcome Measures  4AT: negative    Education Documentation  Body Mechanics, taught by Elidia Gutierrez OT at 6/16/2025  1:56 PM.  Learner: Patient  Readiness: Acceptance  Method: Explanation  Response: Verbalizes Understanding  Comment: safety    Precautions, taught by Elidia Gutierrez OT at 6/16/2025  1:56 PM.  Learner: Patient  Readiness: Acceptance  Method: Explanation  Response: Verbalizes Understanding  Comment: safety    ADL Training, taught by Elidia Gutierrez OT at 6/16/2025  1:56 PM.  Learner: Patient  Readiness: Acceptance  Method: Explanation  Response: Verbalizes Understanding  Comment: safety    Education Comments  No comments found.        Goals:   Encounter Problems       Encounter Problems (Active)       ADLs       Patient will perform UB and LB bathing  with modified independent level of assistance and grab bars. (Progressing)       Start:  06/16/25    Expected End:  07/07/25            Patient with complete  lower body dressing with modified independent level of assistance donning and doffing all LE clothes  with PRN adaptive equipment while edge of bed  (Progressing)       Start:  06/16/25    Expected End:  07/07/25            Patient will complete toileting including hygiene clothing management/hygiene with modified independent level of assistance and grab bars. (Progressing)       Start:  06/16/25    Expected End:  07/07/25               BALANCE       Pt will maintain dynamic standing balance during ADL task with modified independent level of assistance in order to demonstrate decreased risk of falling and improved postural control. (Progressing)       Start:  06/16/25    Expected End:  07/07/25               EXERCISE/STRENGTHENING       Patient will complete BUE exercises for 15 reps in order to improve strength and activity for ADL performance.  (Progressing)       Start:  06/16/25    Expected End:  07/07/25               MOBILITY       Patient will perform Functional mobility min Household distances/Community Distances with modified independent level of assistance and least restrictive device in order to improve safety and functional mobility. (Progressing)       Start:  06/16/25    Expected End:  07/07/25               TRANSFERS       Patient will perform bed mobility modified independent level of assistance and bed rails in order to improve safety and independence with mobility (Progressing)       Start:  06/16/25    Expected End:  07/07/25            Patient will complete sit to stand transfer with modified independent level of assistance and least restrictive device in order to improve safety and prepare for out of bed mobility. (Progressing)       Start:  06/16/25    Expected End:  07/07/25 06/16/25 at 2:01 PM   Elidia Gutierrez OTR/OTD  Rehab Office: [phone]

## 2025-06-16 NOTE — NURSING NOTE
Walking pulse ox results    At rest: SpO2 94% RA  Standing: SpO2 94% RA  Walking: SpO2 83%     Pt walked back to bed and placed in 2L O2.

## 2025-06-17 LAB
ALBUMIN SERPL BCP-MCNC: 3.6 G/DL (ref 3.4–5)
ALP SERPL-CCNC: 74 U/L (ref 33–136)
ALT SERPL W P-5'-P-CCNC: 20 U/L (ref 10–52)
ANION GAP SERPL CALC-SCNC: 13 MMOL/L (ref 10–20)
APTT PPP: 36 SECONDS (ref 26–36)
AST SERPL W P-5'-P-CCNC: 19 U/L (ref 9–39)
BASOPHILS # BLD AUTO: 0.01 X10*3/UL (ref 0–0.1)
BASOPHILS NFR BLD AUTO: 0.4 %
BILIRUB SERPL-MCNC: 1 MG/DL (ref 0–1.2)
BUN SERPL-MCNC: 33 MG/DL (ref 6–23)
CALCIUM SERPL-MCNC: 8.5 MG/DL (ref 8.6–10.6)
CHLORIDE SERPL-SCNC: 105 MMOL/L (ref 98–107)
CO2 SERPL-SCNC: 25 MMOL/L (ref 21–32)
CREAT SERPL-MCNC: 1.01 MG/DL (ref 0.5–1.3)
EGFRCR SERPLBLD CKD-EPI 2021: 68 ML/MIN/1.73M*2
ELECTRONICALLY SIGNED BY: NORMAL
EOSINOPHIL # BLD AUTO: 0.01 X10*3/UL (ref 0–0.4)
EOSINOPHIL NFR BLD AUTO: 0.4 %
ERYTHROCYTE [DISTWIDTH] IN BLOOD BY AUTOMATED COUNT: 23 % (ref 11.5–14.5)
FACT VIII ACT/NOR PPP: 60 % (ref 55–180)
GLUCOSE SERPL-MCNC: 91 MG/DL (ref 74–99)
HCT VFR BLD AUTO: 24.4 % (ref 41–52)
HGB BLD-MCNC: 7.7 G/DL (ref 13.5–17.5)
IGA SERPL-MCNC: 398 MG/DL (ref 70–400)
IGG SERPL-MCNC: 693 MG/DL (ref 700–1600)
IGM SERPL-MCNC: 86 MG/DL (ref 40–230)
IMM GRANULOCYTES # BLD AUTO: 0.08 X10*3/UL (ref 0–0.5)
IMM GRANULOCYTES NFR BLD AUTO: 2.9 % (ref 0–0.9)
INR PPP: 1.5 (ref 0.9–1.1)
KAPPA LC SERPL-MCNC: 3.14 MG/DL (ref 0.33–1.94)
KAPPA LC/LAMBDA SER: 1.27 {RATIO} (ref 0.26–1.65)
LAMBDA LC SERPL-MCNC: 2.48 MG/DL (ref 0.57–2.63)
LYMPHOCYTES # BLD AUTO: 0.64 X10*3/UL (ref 0.8–3)
LYMPHOCYTES NFR BLD AUTO: 23 %
MCH RBC QN AUTO: 33.8 PG (ref 26–34)
MCHC RBC AUTO-ENTMCNC: 31.6 G/DL (ref 32–36)
MCV RBC AUTO: 107 FL (ref 80–100)
MONOCYTES # BLD AUTO: 0.66 X10*3/UL (ref 0.05–0.8)
MONOCYTES NFR BLD AUTO: 23.7 %
MYELOID NGS RESULTS: NORMAL
NEUTROPHILS # BLD AUTO: 1.38 X10*3/UL (ref 1.6–5.5)
NEUTROPHILS NFR BLD AUTO: 49.6 %
NRBC BLD-RTO: 0.7 /100 WBCS (ref 0–0)
PATH REPORT.COMMENTS IMP SPEC: NORMAL
PATH REPORT.FINAL DX SPEC: NORMAL
PATH REPORT.GROSS SPEC: NORMAL
PATH REPORT.MICROSCOPIC SPEC OTHER STN: NORMAL
PATH REPORT.RELEVANT HX SPEC: NORMAL
PATH REPORT.TOTAL CANCER: NORMAL
PLATELET # BLD AUTO: 59 X10*3/UL (ref 150–450)
POTASSIUM SERPL-SCNC: 4.2 MMOL/L (ref 3.5–5.3)
PROT SERPL-MCNC: 6.1 G/DL (ref 6.4–8.2)
PROT SERPL-MCNC: 6.1 G/DL (ref 6.4–8.2)
PROTHROMBIN TIME: 16.5 SECONDS (ref 9.8–12.4)
RBC # BLD AUTO: 2.28 X10*6/UL (ref 4.5–5.9)
SODIUM SERPL-SCNC: 139 MMOL/L (ref 136–145)
WBC # BLD AUTO: 2.8 X10*3/UL (ref 4.4–11.3)

## 2025-06-17 PROCEDURE — 84155 ASSAY OF PROTEIN SERUM: CPT

## 2025-06-17 PROCEDURE — 2500000001 HC RX 250 WO HCPCS SELF ADMINISTERED DRUGS (ALT 637 FOR MEDICARE OP)

## 2025-06-17 PROCEDURE — 92526 ORAL FUNCTION THERAPY: CPT | Mod: GN | Performed by: SPEECH-LANGUAGE PATHOLOGIST

## 2025-06-17 PROCEDURE — 85025 COMPLETE CBC W/AUTO DIFF WBC: CPT

## 2025-06-17 PROCEDURE — 97116 GAIT TRAINING THERAPY: CPT | Mod: GP,CQ

## 2025-06-17 PROCEDURE — 99233 SBSQ HOSP IP/OBS HIGH 50: CPT

## 2025-06-17 PROCEDURE — 86334 IMMUNOFIX E-PHORESIS SERUM: CPT

## 2025-06-17 PROCEDURE — RXMED WILLOW AMBULATORY MEDICATION CHARGE

## 2025-06-17 PROCEDURE — 2500000004 HC RX 250 GENERAL PHARMACY W/ HCPCS (ALT 636 FOR OP/ED)

## 2025-06-17 PROCEDURE — 85240 CLOT FACTOR VIII AHG 1 STAGE: CPT

## 2025-06-17 PROCEDURE — 85730 THROMBOPLASTIN TIME PARTIAL: CPT

## 2025-06-17 PROCEDURE — 97110 THERAPEUTIC EXERCISES: CPT | Mod: GP,CQ

## 2025-06-17 PROCEDURE — 82784 ASSAY IGA/IGD/IGG/IGM EACH: CPT

## 2025-06-17 PROCEDURE — 83521 IG LIGHT CHAINS FREE EACH: CPT

## 2025-06-17 PROCEDURE — 2500000001 HC RX 250 WO HCPCS SELF ADMINISTERED DRUGS (ALT 637 FOR MEDICARE OP): Performed by: NURSE PRACTITIONER

## 2025-06-17 PROCEDURE — 86038 ANTINUCLEAR ANTIBODIES: CPT

## 2025-06-17 PROCEDURE — 1200000003 HC ONCOLOGY  ROOM WITH TELEMETRY DAILY

## 2025-06-17 PROCEDURE — 86235 NUCLEAR ANTIGEN ANTIBODY: CPT

## 2025-06-17 PROCEDURE — 36415 COLL VENOUS BLD VENIPUNCTURE: CPT

## 2025-06-17 PROCEDURE — 2500000002 HC RX 250 W HCPCS SELF ADMINISTERED DRUGS (ALT 637 FOR MEDICARE OP, ALT 636 FOR OP/ED): Performed by: NURSE PRACTITIONER

## 2025-06-17 PROCEDURE — 80053 COMPREHEN METABOLIC PANEL: CPT

## 2025-06-17 RX ORDER — GUAIFENESIN 600 MG/1
600 TABLET, EXTENDED RELEASE ORAL 2 TIMES DAILY PRN
Qty: 10 TABLET | Refills: 0 | Status: SHIPPED | OUTPATIENT
Start: 2025-06-17 | End: 2025-06-18

## 2025-06-17 RX ORDER — AMOXICILLIN 250 MG
1 CAPSULE ORAL NIGHTLY
Qty: 30 TABLET | Refills: 11 | Status: SHIPPED | OUTPATIENT
Start: 2025-06-17 | End: 2025-06-18

## 2025-06-17 RX ORDER — BUMETANIDE 0.5 MG/1
0.5 TABLET ORAL ONCE
Status: COMPLETED | OUTPATIENT
Start: 2025-06-17 | End: 2025-06-17

## 2025-06-17 RX ORDER — BUMETANIDE 1 MG/1
1 TABLET ORAL DAILY
Qty: 30 TABLET | Refills: 11 | Status: SHIPPED | OUTPATIENT
Start: 2025-06-18 | End: 2026-06-18

## 2025-06-17 RX ORDER — BISACODYL 5 MG
5 TABLET, DELAYED RELEASE (ENTERIC COATED) ORAL DAILY PRN
Status: DISCONTINUED | OUTPATIENT
Start: 2025-06-17 | End: 2025-06-18 | Stop reason: HOSPADM

## 2025-06-17 RX ORDER — POLYETHYLENE GLYCOL 3350 17 G/17G
17 POWDER, FOR SOLUTION ORAL DAILY
Qty: 3 PACKET | Refills: 0 | Status: SHIPPED | OUTPATIENT
Start: 2025-06-18 | End: 2025-06-18

## 2025-06-17 RX ORDER — BUMETANIDE 1 MG/1
1 TABLET ORAL DAILY
Status: DISCONTINUED | OUTPATIENT
Start: 2025-06-18 | End: 2025-06-18 | Stop reason: HOSPADM

## 2025-06-17 RX ADMIN — ATORVASTATIN CALCIUM 40 MG: 40 TABLET, FILM COATED ORAL at 09:00

## 2025-06-17 RX ADMIN — BENZONATATE 100 MG: 100 CAPSULE ORAL at 09:00

## 2025-06-17 RX ADMIN — ZINC SULFATE 220 MG (50 MG) CAPSULE 1 CAPSULE: CAPSULE at 09:01

## 2025-06-17 RX ADMIN — BENZONATATE 100 MG: 100 CAPSULE ORAL at 15:21

## 2025-06-17 RX ADMIN — FINASTERIDE 5 MG: 5 TABLET, FILM COATED ORAL at 09:00

## 2025-06-17 RX ADMIN — TAMSULOSIN HYDROCHLORIDE 0.4 MG: 0.4 CAPSULE ORAL at 09:00

## 2025-06-17 RX ADMIN — FOLIC ACID 1 MG: 1 TABLET ORAL at 09:00

## 2025-06-17 RX ADMIN — TAMSULOSIN HYDROCHLORIDE 0.4 MG: 0.4 CAPSULE ORAL at 20:16

## 2025-06-17 RX ADMIN — EMPAGLIFLOZIN 10 MG: 10 TABLET, FILM COATED ORAL at 09:00

## 2025-06-17 RX ADMIN — FAMOTIDINE 20 MG: 20 TABLET, FILM COATED ORAL at 09:00

## 2025-06-17 RX ADMIN — FERROUS SULFATE TAB 325 MG (65 MG ELEMENTAL FE) 1 TABLET: 325 (65 FE) TAB at 09:00

## 2025-06-17 RX ADMIN — BUMETANIDE 0.5 MG: 0.5 TABLET ORAL at 17:44

## 2025-06-17 RX ADMIN — BENZONATATE 100 MG: 100 CAPSULE ORAL at 20:16

## 2025-06-17 RX ADMIN — BUMETANIDE 0.5 MG: 0.5 TABLET ORAL at 09:01

## 2025-06-17 RX ADMIN — POLYETHYLENE GLYCOL 3350 17 G: 17 POWDER, FOR SOLUTION ORAL at 09:01

## 2025-06-17 RX ADMIN — DOCUSATE SODIUM 50 MG AND SENNOSIDES 8.6 MG 1 TABLET: 8.6; 5 TABLET, FILM COATED ORAL at 20:16

## 2025-06-17 RX ADMIN — FLUTICASONE PROPIONATE 2 SPRAY: 50 SPRAY, METERED NASAL at 09:01

## 2025-06-17 ASSESSMENT — COGNITIVE AND FUNCTIONAL STATUS - GENERAL
STANDING UP FROM CHAIR USING ARMS: A LITTLE
PERSONAL GROOMING: A LITTLE
WALKING IN HOSPITAL ROOM: A LITTLE
MOVING TO AND FROM BED TO CHAIR: A LITTLE
MOBILITY SCORE: 16
PERSONAL GROOMING: A LITTLE
TOILETING: A LITTLE
STANDING UP FROM CHAIR USING ARMS: A LITTLE
MOVING TO AND FROM BED TO CHAIR: A LITTLE
CLIMB 3 TO 5 STEPS WITH RAILING: A LITTLE
MOBILITY SCORE: 20
DRESSING REGULAR UPPER BODY CLOTHING: A LITTLE
DAILY ACTIVITIY SCORE: 20
TOILETING: A LITTLE
MOBILITY SCORE: 23
EATING MEALS: A LITTLE
DAILY ACTIVITIY SCORE: 20
MOVING TO AND FROM BED TO CHAIR: A LITTLE
TURNING FROM BACK TO SIDE WHILE IN FLAT BAD: A LITTLE
CLIMB 3 TO 5 STEPS WITH RAILING: TOTAL
HELP NEEDED FOR BATHING: A LITTLE
EATING MEALS: A LITTLE
MOVING FROM LYING ON BACK TO SITTING ON SIDE OF FLAT BED WITH BEDRAILS: A LITTLE
WALKING IN HOSPITAL ROOM: A LITTLE

## 2025-06-17 ASSESSMENT — PAIN SCALES - GENERAL
PAINLEVEL_OUTOF10: 0 - NO PAIN

## 2025-06-17 ASSESSMENT — PAIN - FUNCTIONAL ASSESSMENT
PAIN_FUNCTIONAL_ASSESSMENT: 0-10
PAIN_FUNCTIONAL_ASSESSMENT: 0-10

## 2025-06-17 ASSESSMENT — PAIN SCALES - WONG BAKER: WONGBAKER_NUMERICALRESPONSE: NO HURT

## 2025-06-17 NOTE — PROGRESS NOTES
"Ramón Flores is a 89 y.o. male on day 7 of admission presenting with Hemophilia A (Multi).    Subjective   Patient seen resting in bed. States he had a great night. States tap went well yesterday, no issues. We discussed getting final heme recs for dispo. Also discussed needing a walking pulse ox to see if supp O2 is needed for discharge. Plan DC tomorrow. Denies SOB, CP, abdominal pain, n/v, headache, fevers, chills, numbness, tingling, vision change, weakness. ROS: A complete review of systems was performed and is negative except for as mentioned above in the HPI     Objective     Physical Exam  Constitutional:       Appearance: Normal appearance.   HENT:      Mouth/Throat:      Mouth: Mucous membranes are moist.      Pharynx: Oropharynx is clear.   Eyes:      Extraocular Movements: Extraocular movements intact.      Conjunctiva/sclera: Conjunctivae normal.      Pupils: Pupils are equal, round, and reactive to light.   Cardiovascular:      Rate and Rhythm: Normal rate. Rhythm irregular.      Pulses: Normal pulses.      Heart sounds: Normal heart sounds.   Pulmonary:      Effort: Pulmonary effort is normal.      Breath sounds: Wheezing present.   Abdominal:      General: Abdomen is flat. Bowel sounds are normal.      Palpations: Abdomen is soft.   Musculoskeletal:         General: Normal range of motion.   Skin:     General: Skin is warm and dry.      Findings: Bruising present.   Neurological:      General: No focal deficit present.      Mental Status: He is alert and oriented to person, place, and time.   Psychiatric:         Mood and Affect: Mood normal.         Behavior: Behavior normal. Behavior is cooperative.         Last Recorded Vitals  Blood pressure 118/72, pulse 80, temperature 36 °C (96.8 °F), temperature source Temporal, resp. rate 18, height 1.676 m (5' 6\"), weight 59.4 kg (131 lb), SpO2 93%.  Intake/Output last 3 Shifts:  I/O last 3 completed shifts:  In: - (0 mL/kg)   Out: 725 (12.2 mL/kg) " [Urine:725 (0.3 mL/kg/hr)]  Weight: 59.4 kg     Relevant Results  FL modified barium swallow study  Result Date: 6/16/2025  Interpreted By:  Edmond Tyson and Greenfield Ellen STUDY: FL MODIFIED BARIUM SWALLOW STUDY;; 6/16/2025 11:18 am   INDICATION: Signs/Symptoms:c/f aspiration risk.     COMPARISON: None.   ACCESSION NUMBER(S): [id number]   ORDERING CLINICIAN: VALERI PARTIDA   TECHNIQUE: Modified Barium Swallow Study completed. Informed verbal consent obtained prior to completion of exam. The study was completed per protocol with various liquid barium consistencies, pudding, and solids.  A 1.9 cm or .75 inch (outer diameter) ring was placed on the chin in the lateral view in order to complete objective measurements during swallowing. The anatomic structures and function of the oropharynx, larynx, hypopharynx and cervical esophagus were evaluated. Variation to protocol due to inability to obtain an A-P view. An oblique view of the esophagus was obtained. Fluoroscopy time: 4.2 minutes.   SLP: Zeenat Solis MS, CCC-SLP Contact info: Haiku secure chat; phone: [phone]   SPEECH FINDINGS: Reason for Referral: To r/o aspiration and to ascertain readiness for a safe and efficient PO diet. Patient Hx: Ramón Flores is a 89 y.o. male with PMH Hemophilia, Anal cancer (s/p curative RT 12/2024), CAD s/p CABG >20yrs ago, HFpEF, Afib, BPH, GERD, and CKD who presented to [facility] ED 6/9 with c/o n/v and diarrhea x24hrs. CT a/p with contrast (6/9) with no evidence for intrahepatic or extrahepatic biliary ductal dilation, no evidence of bowel obstruction, colonic diverticulosis with no evidence for acute diverticulitis, prostatomegaly. GI consulted, state s/sx likely gastroenteritis, rec stool path if diarrhea continues. Heme consulted, rec transfer to [facility] for further management of worsening anemia/thrombocytopenia I/s/o acute illness. Pt was transferred to [facility] for further management. Upon admission to [facility], pt  reports his s/x have resolved. Heme consulted on admit 6/10, recs extensive hemophilia, pancytopenia workup. (-plan stop 6/15) start IV ceftriaxone and PO azithromycin for CAP treatment. S/p BMBx & Advate infusion  - results pending. DC pending improvement in anemia and thrombocytopenia. Respiratory Status: Room air Current diet: NPO   Pain: Pain Scale: 0-10 Ratin   DIET RECOMMENDATIONS: - Easy to Chew (IDDSI Level 7) - Thin liquids (IDDSI Level 0)     STRATEGIES: - Small bites - Small, single sips - Alternate consistencies - Upright for all PO intake - Swallow 2-3 times per bite/sip - Chin hollyck     SLP PLAN: Skilled SLP Services: Skilled SLP intervention for dysphagia is warranted. SLP Frequency: 1x per week Duration: Treatment/Interventions: - Compensatory strategy training - Patient/caregiver education   Discussed POC: Patient Discussed Risks/Benefits: Yes Patient/Caregiver Agreeable: Yes   Short term goals Pt will tolerate least restrictive diet without s/s aspiration, respiratory compromise, or overt difficulty throughout admission. Start: 25, End: 2 weeks Status: goal initiated   Pt will accurately utilize/recall recommended swallow strategies/guidelines independently in >90% opportunities. Start: 25, End: 2 weeks Status: goal initiated       Long term goals 25: Patient will tolerate the least restrictive diet without overt difficulty or further pulmonary compromise by time of discharge.     Education Provided: Results and recommendations per MBSS, with video review; recommendations and POC at this time. Verbal understanding and agreement given on all accounts.   Treatment Provided Today: ST provided extensive education and training to pt/pt family regarding anatomy/physiology of swallow function, risk factors of aspiration/aspiration pna & how to mitigate factors, diet modifications, and the use of compensatory swallow strategies to promote pt safety upon PO intake including  utilizing a chin tuck, repeat swallow and alternating liquids and solids to reduce post swallow residue within the valleculae and piriforms. Pt re-verbalized strategies through talk back method with min verbal cueing.   Additional Medical Consults Suggested: - No new disciplines indicated   Repeat Study: Repeat study only if there is a deterioration in swallow function.   Mechanics of the Swallow Summary: ORAL PHASE: Lip Closure - No labial escape/anterior loss of bolus Tongue Control During Bolus Hold - Cohesive bolus between tongue to palatal seal Bolus prep/mastication - Timely and efficient mastication skills Bolus transport/lingual motion - Brisk tongue motion for A-P movement of the bolus Oral residue - Complete oral clearance   PHARYNGEAL PHASE: Initiation of pharyngeal swallow - Bolus head at posterior angle of ramus Soft palate elevation - No bolus between soft palate/pharyngeal wall Laryngeal elevation - Complete superior movement of thyroid cartilage with contact of arytenoids to epiglottic petiole Anterior hyoid excursion - Complete anterior movement Epiglottic movement - Complete inversion Laryngeal vestibule closure - Complete - no air/contrast in laryngeal vestibule Pharyngeal stripping wave - Present, however, diminished Pharyngeal contraction (A/P view) - Not tested Pharyngoesophageal segment opening - Complete distension and complete duration/no obstruction of flow of bolus Tongue base retraction - Trace column of contrast or air between tongue base and pharyngeal wall Pharyngeal residue - Collection of residue within or on the pharyngeal structures   ESOPHAGEAL PHASE: Esophageal clearance - Esophageal retention     SLP Impressions with Severity Rating: Pt presents with mild oropharyngeal dysphagia upon completion of modified barium swallow study this date. Swallowing physiology is detailed above. Impairments most impacting swallowing safety and efficiency include decrease posterior pharyngeal wall  stripping wave and base of tongue retraction. This results in post swallow residue in valleculae < piriforms with increase in valleculae with heavier viscosities. Utilization of chin tuck, repeat swallow and liquid wash aids in clearing majority of residue. Trace aspiration on the inferior aspect of the TVF on 1/11 swallows with large amount which maybe age related. Pt sensate aspiration with immediate cough. Pt ready for a regular diet with thin liquids utilizing the strategies stated above. Upon scanning down the esophagus in the oblique view retention and backflow within the esophagus evident. Pt intermittently coughing throughout exam, however unrelated to any aspiration.   *Of note: The A-P bolus follow-through is not intended to be utilized as a diagnostic assessment of the esophagus, rather a tool to observe the biomechanical aspects of the swallow continuum and to inform the need for further evaluation by medical specialists, as applicable.     OUTCOME MEASURES: Functional Oral Intake Scale Functional Oral Intake Scale: Level 7        total oral diet with no restrictions     Rosenbek's Penetration Aspiration Scale Thin Liquids: 6. ASPIRATION that CLEARS with the swallow - contrast passes glottis, no subglottic residue During the Swallow Nectar Thick Liquids: 1. NO ASPIRATION & NO PENETRATION - no aspiration, contrast does not enter airway Puree: 1. NO ASPIRATION & NO PENETRATION - no aspiration, contrast does not enter airway Solids: 1. NO ASPIRATION & NO PENETRATION - no aspiration, contrast does not enter airway   Speech Therapy section of this report signed by Zeenat Solis MS,CCC-SLP on 6/16/2025 at 2:43 pm.   RADIOLOGY FINDINGS: Radiopaque contrast seen coursing through the esophagus. Degenerative changes of the included lateral cervical spine.   Radiology section of this report signed by Dr. Edmond Tyson.       1. Please refer to detailed swallow study evaluation by speech pathologist.   I  personally reviewed the images/study and I agree with the findings as stated by Maximus Madrigal MD. This study was interpreted at [facility], [place], [place].   MACRO: None   Signed by: Edmond Tyson 6/16/2025 3:12 PM Dictation workstation:   [id number]        Scheduled medications  Scheduled Medications[1]  Continuous medications  Continuous Medications[2]  PRN medications  PRN Medications[3]  Results for orders placed or performed during the hospital encounter of 06/10/25 (from the past 24 hours)   Lactate Dehydrogenase, Body Fluid   Result Value Ref Range    LD, Fluid 100 Not established. U/L   Protein, Total, Body Fluid   Result Value Ref Range    Protein, Total Fluid 1.8 Not established g/dL   Glucose, Body Fluid   Result Value Ref Range    Glucose, Fluid 131 Not established mg/dL   AFB Processed   Result Value Ref Range    Extra Tube Hold for add-ons.    CBC and Auto Differential   Result Value Ref Range    WBC 2.8 (L) 4.4 - 11.3 x10*3/uL    nRBC 0.7 (H) 0.0 - 0.0 /100 WBCs    RBC 2.28 (L) 4.50 - 5.90 x10*6/uL    Hemoglobin 7.7 (L) 13.5 - 17.5 g/dL    Hematocrit 24.4 (L) 41.0 - 52.0 %     (H) 80 - 100 fL    MCH 33.8 26.0 - 34.0 pg    MCHC 31.6 (L) 32.0 - 36.0 g/dL    RDW 23.0 (H) 11.5 - 14.5 %    Platelets 59 (L) 150 - 450 x10*3/uL    Neutrophils % 49.6 40.0 - 80.0 %    Immature Granulocytes %, Automated 2.9 (H) 0.0 - 0.9 %    Lymphocytes % 23.0 13.0 - 44.0 %    Monocytes % 23.7 2.0 - 10.0 %    Eosinophils % 0.4 0.0 - 6.0 %    Basophils % 0.4 0.0 - 2.0 %    Neutrophils Absolute 1.38 (L) 1.60 - 5.50 x10*3/uL    Immature Granulocytes Absolute, Automated 0.08 0.00 - 0.50 x10*3/uL    Lymphocytes Absolute 0.64 (L) 0.80 - 3.00 x10*3/uL    Monocytes Absolute 0.66 0.05 - 0.80 x10*3/uL    Eosinophils Absolute 0.01 0.00 - 0.40 x10*3/uL    Basophils Absolute 0.01 0.00 - 0.10 x10*3/uL   Comprehensive metabolic panel   Result Value Ref Range    Glucose 91 74 - 99 mg/dL    Sodium  139 136 - 145 mmol/L    Potassium 4.2 3.5 - 5.3 mmol/L    Chloride 105 98 - 107 mmol/L    Bicarbonate 25 21 - 32 mmol/L    Anion Gap 13 10 - 20 mmol/L    Urea Nitrogen 33 (H) 6 - 23 mg/dL    Creatinine 1.01 0.50 - 1.30 mg/dL    eGFR 68 >60 mL/min/1.73m*2    Calcium 8.5 (L) 8.6 - 10.6 mg/dL    Albumin 3.6 3.4 - 5.0 g/dL    Alkaline Phosphatase 74 33 - 136 U/L    Total Protein 6.1 (L) 6.4 - 8.2 g/dL    AST 19 9 - 39 U/L    Bilirubin, Total 1.0 0.0 - 1.2 mg/dL    ALT 20 10 - 52 U/L   Coagulation Screen   Result Value Ref Range    Protime 16.5 (H) 9.8 - 12.4 seconds    INR 1.5 (H) 0.9 - 1.1    aPTT 36 26 - 36 seconds   Immunoglobulin free LT chains blood   Result Value Ref Range    Ig Kappa Free Light Chain 3.14 (H) 0.33 - 1.94 mg/dL    Ig Lambda Free Light Chain 2.48 0.57 - 2.63 mg/dL    Kappa/Lambda Ratio 1.27 0.26 - 1.65   Factor 8 Activity   Result Value Ref Range    Factor VIII Activity 60 55 - 180 %   Protein, Total   Result Value Ref Range    Total Protein 6.1 (L) 6.4 - 8.2 g/dL     *Note: Due to a large number of results and/or encounters for the requested time period, some results have not been displayed. A complete set of results can be found in Results Review.     FL modified barium swallow study  Result Date: 6/16/2025  Interpreted By:  Edmond Tyson and Greenfield Ellen STUDY: FL MODIFIED BARIUM SWALLOW STUDY;; 6/16/2025 11:18 am   INDICATION: Signs/Symptoms:c/f aspiration risk.     COMPARISON: None.   ACCESSION NUMBER(S): [id number]   ORDERING CLINICIAN: VALERI PARTIDA   TECHNIQUE: Modified Barium Swallow Study completed. Informed verbal consent obtained prior to completion of exam. The study was completed per protocol with various liquid barium consistencies, pudding, and solids.  A 1.9 cm or .75 inch (outer diameter) ring was placed on the chin in the lateral view in order to complete objective measurements during swallowing. The anatomic structures and function of the oropharynx, larynx,  hypopharynx and cervical esophagus were evaluated. Variation to protocol due to inability to obtain an A-P view. An oblique view of the esophagus was obtained. Fluoroscopy time: 4.2 minutes.   SLP: Zeenat Solis MS, CCC-SLP Contact info: Haiku secure chat; phone: [phone]   SPEECH FINDINGS: Reason for Referral: To r/o aspiration and to ascertain readiness for a safe and efficient PO diet. Patient Hx: Ramón Flores is a 89 y.o. male with PMH Hemophilia, Anal cancer (s/p curative RT 2024), CAD s/p CABG >20yrs ago, HFpEF, Afib, BPH, GERD, and CKD who presented to [facility] ED  with c/o n/v and diarrhea x24hrs. CT a/p with contrast () with no evidence for intrahepatic or extrahepatic biliary ductal dilation, no evidence of bowel obstruction, colonic diverticulosis with no evidence for acute diverticulitis, prostatomegaly. GI consulted, state s/sx likely gastroenteritis, rec stool path if diarrhea continues. Heme consulted, rec transfer to [facility] for further management of worsening anemia/thrombocytopenia I/s/o acute illness. Pt was transferred to [facility] for further management. Upon admission to [facility], pt reports his s/x have resolved. Heme consulted on admit 6/10, recs extensive hemophilia, pancytopenia workup. (-plan stop 6/15) start IV ceftriaxone and PO azithromycin for CAP treatment. S/p BMBx & Advate infusion  - results pending. DC pending improvement in anemia and thrombocytopenia. Respiratory Status: Room air Current diet: NPO   Pain: Pain Scale: 0-10 Ratin   DIET RECOMMENDATIONS: - Easy to Chew (IDDSI Level 7) - Thin liquids (IDDSI Level 0)     STRATEGIES: - Small bites - Small, single sips - Alternate consistencies - Upright for all PO intake - Swallow 2-3 times per bite/sip - Chin giovanni     SLP PLAN: Skilled SLP Services: Skilled SLP intervention for dysphagia is warranted. SLP Frequency: 1x per week Duration: Treatment/Interventions: - Compensatory strategy training - Patient/caregiver  education   Discussed POC: Patient Discussed Risks/Benefits: Yes Patient/Caregiver Agreeable: Yes   Short term goals Pt will tolerate least restrictive diet without s/s aspiration, respiratory compromise, or overt difficulty throughout admission. Start: 06/16/25, End: 2 weeks Status: goal initiated   Pt will accurately utilize/recall recommended swallow strategies/guidelines independently in >90% opportunities. Start: 06/16/25, End: 2 weeks Status: goal initiated       Long term goals 06/16/25: Patient will tolerate the least restrictive diet without overt difficulty or further pulmonary compromise by time of discharge.     Education Provided: Results and recommendations per MBSS, with video review; recommendations and POC at this time. Verbal understanding and agreement given on all accounts.   Treatment Provided Today: ST provided extensive education and training to pt/pt family regarding anatomy/physiology of swallow function, risk factors of aspiration/aspiration pna & how to mitigate factors, diet modifications, and the use of compensatory swallow strategies to promote pt safety upon PO intake including utilizing a chin tuck, repeat swallow and alternating liquids and solids to reduce post swallow residue within the valleculae and piriforms. Pt re-verbalized strategies through talk back method with min verbal cueing.   Additional Medical Consults Suggested: - No new disciplines indicated   Repeat Study: Repeat study only if there is a deterioration in swallow function.   Mechanics of the Swallow Summary: ORAL PHASE: Lip Closure - No labial escape/anterior loss of bolus Tongue Control During Bolus Hold - Cohesive bolus between tongue to palatal seal Bolus prep/mastication - Timely and efficient mastication skills Bolus transport/lingual motion - Brisk tongue motion for A-P movement of the bolus Oral residue - Complete oral clearance   PHARYNGEAL PHASE: Initiation of pharyngeal swallow - Bolus head at posterior  angle of ramus Soft palate elevation - No bolus between soft palate/pharyngeal wall Laryngeal elevation - Complete superior movement of thyroid cartilage with contact of arytenoids to epiglottic petiole Anterior hyoid excursion - Complete anterior movement Epiglottic movement - Complete inversion Laryngeal vestibule closure - Complete - no air/contrast in laryngeal vestibule Pharyngeal stripping wave - Present, however, diminished Pharyngeal contraction (A/P view) - Not tested Pharyngoesophageal segment opening - Complete distension and complete duration/no obstruction of flow of bolus Tongue base retraction - Trace column of contrast or air between tongue base and pharyngeal wall Pharyngeal residue - Collection of residue within or on the pharyngeal structures   ESOPHAGEAL PHASE: Esophageal clearance - Esophageal retention     SLP Impressions with Severity Rating: Pt presents with mild oropharyngeal dysphagia upon completion of modified barium swallow study this date. Swallowing physiology is detailed above. Impairments most impacting swallowing safety and efficiency include decrease posterior pharyngeal wall stripping wave and base of tongue retraction. This results in post swallow residue in valleculae < piriforms with increase in valleculae with heavier viscosities. Utilization of chin tuck, repeat swallow and liquid wash aids in clearing majority of residue. Trace aspiration on the inferior aspect of the TVF on 1/11 swallows with large amount which maybe age related. Pt sensate aspiration with immediate cough. Pt ready for a regular diet with thin liquids utilizing the strategies stated above. Upon scanning down the esophagus in the oblique view retention and backflow within the esophagus evident. Pt intermittently coughing throughout exam, however unrelated to any aspiration.   *Of note: The A-P bolus follow-through is not intended to be utilized as a diagnostic assessment of the esophagus, rather a tool to  observe the biomechanical aspects of the swallow continuum and to inform the need for further evaluation by medical specialists, as applicable.     OUTCOME MEASURES: Functional Oral Intake Scale Functional Oral Intake Scale: Level 7        total oral diet with no restrictions     Rosenbek's Penetration Aspiration Scale Thin Liquids: 6. ASPIRATION that CLEARS with the swallow - contrast passes glottis, no subglottic residue During the Swallow Nectar Thick Liquids: 1. NO ASPIRATION & NO PENETRATION - no aspiration, contrast does not enter airway Puree: 1. NO ASPIRATION & NO PENETRATION - no aspiration, contrast does not enter airway Solids: 1. NO ASPIRATION & NO PENETRATION - no aspiration, contrast does not enter airway   Speech Therapy section of this report signed by Zeenat Solis MS,CCC-SLP on 6/16/2025 at 2:43 pm.   RADIOLOGY FINDINGS: Radiopaque contrast seen coursing through the esophagus. Degenerative changes of the included lateral cervical spine.   Radiology section of this report signed by Dr. Edmond Tyson.       1. Please refer to detailed swallow study evaluation by speech pathologist.   I personally reviewed the images/study and I agree with the findings as stated by Maximus Madrigal MD. This study was interpreted at [place], [place].   MACRO: None   Signed by: Edmond Tyson 6/16/2025 3:12 PM Dictation workstation:   [id number]                   Assessment & Plan  Hemophilia A (Multi)    Ramón Flores is a 89 y.o. male with PMH Hemophilia, Anal cancer (s/p curative RT 12/2024), CAD s/p CABG >20yrs ago, HFpEF, Afib, BPH, GERD, and CKD who presented to [facility] ED 6/9 with c/o n/v and diarrhea x24hrs. CT a/p with contrast (6/9) with no evidence for intrahepatic or extrahepatic biliary ductal dilation, no evidence of bowel obstruction, colonic diverticulosis with no evidence for acute diverticulitis, prostatomegaly. GI consulted, state s/sx likely  gastroenteritis, rec stool path if diarrhea continues. Heme consulted, rec transfer to [facility] for further management of worsening anemia/thrombocytopenia I/s/o acute illness. Pt was transferred to [facility] for further management. Upon admission to [facility], pt reports his s/x have resolved. Heme consulted on admit 6/10, recs extensive hemophilia, pancytopenia workup. Prior to admission had CXR showing left basilar atelectasis/scar vs small infiltrate, s/p Azithromycin PO (6/11-6/15); continuing IV ceftriaxone (6/11-6/16) for CAP treatment. S/p BMBx & Advate infusion 6/13 - results pending. CT chest 6/13 showed bilat upper lobe patchy and GGOs c/f multifocal infectious process, pulmonary & alveolar edema w r>L pleural effusions, dilated main pulm artery, mold thoracic body wall edema.  CXR (6/14) showed Similar patchy airspace opacities predominantly overlying the bilat lung bases, to be correlated with aspiration and infection, worsening basilar edema, witnessed aspiration event, initiated metronidazole to cover possible aspiration PNA (6/14-6/16). Plan for diagnostic/therapeutic thoracentesis 6/16 for bilateral pleural effusions seen on CT 6/13 and persistent oxygen requirements. DC pending improvement in anemia and thrombocytopenia. Discharge pending workup of anemia, thrombocytopenia, and improvement in pulmonary symptoms.    Update 6/17:   - Discontinued ceftriaxone and metronidazole today, will monitor for fevers, s/s of infection off antibiotics, no s.sx of infectious process today  - s/p PO azithromycin 500mg daily (6/11-6/15) for 5 days of CAP coverage given cough and supp O2 use  - SLP rec easy to chew diet and thin liquids s/p MBSS  - Patient desatting to low 80s on room air during walking pulse ox, 93-94% on room air while resting; s/p diagnostic/therapeutic thoracentesis (6/16) at 1500 with altuviiio given 1 hour beforehand per heme recs  - Lights criteria showed transudative effusion, likely due to HF   - Heme  consulted for pancytopenia, full hemophilia workup and pancytopenia workup  - s/p bmbx 6/13 & s/p Advate 30u/kg once after BMBx - results pending  - factor 8 activity: 60   - Plan walking pulse ox today  - PT rec SNF vs Acute rehab, pt would like to go home with home care     # Hemophilia A  # Pancytopenia  - Follows with Dr. Dillon, LOV 4/9/25 - emailed about admission 6/11  - He has had minimal bleeding during life d/t concurrent protein C deficiency  - On Altuviiio weekly (Tuesdays), pt got dose on 6/10 --> next dose 6/16 prior to diagnostic thoracentesis (given)  - Hgb BL ~12; Hgb 7.9 (6/10) -> 8.2 (6/11)--> 8.0 (6/15) ->7.7 (6/17)  - Plt BL ~ 80, Plt 41 (6/10) -> 42 (6/11)--> 59 (6/15) -> 59 (6/17)  - , Haptoglobin 241 (6/9)  - Ddimer 2,617, fibrinogen 540, PT 17.9, INR 1.6, aPTT 57 (6/9)  - Iron 11, TIBC 188, Ferritin 328, haptoglobin 241 (6/10)  - , retic 2.4% (6/10)  - Myeloid malignancies panel sent/pending and MDS panel sent/pending per [facility]  - Factor 8 activity (6/10) 5--> 53 (6/12) (after first infusion on 6/11) -> 13 (6/16) -> 60 (6/17)  - Continue home folic acid 1mg daily and ferrous sulfate 325mg daily  - Heme consulted 6/10, rec factor assays: FII, FVII, FVIII, FXI; drawing serum copper and zinc level, proceed with altuviio injection, Heme to FU with myeloid malignancy panel, peripheral smear  - FXI 57, FVIII 5, FVII 38, FII 51 (6/10)  - Trough factor VIII level 5% (6/10)   - MDS panel and Myeloid malignancies NGS panel pending (6/11)  - Peripheral smear pending (6/11)  - serum copper 109.7/ zinc 40.5 (6/11)  - start zinc supplement daily (6/13-current)  - s/p BMBX (6/13) and pt given Advate 30u/kg once after BMBx (6/13)  - Core bx unable to be done  - Flow Cytometry and Bone Marrow Evaluation pending (6/13)  - per heme (6/13), s/p Vit K daily x 3 days (6/11-6/13).   - soluble transferrin receptor 2.5 (6/11)  - Factor 8 activity 20 (6/15) ->13 (6/16)  - Per heme on 6/16,  give regularly scheduled Altuviiio (originally due 6/17) on 6/16 prior to diagnostic/therapeutic thoracentesis for bilateral pleural effusions 1 hour prior to procedure; also request HIV, FOX, immunoglobulins, FLC, SPEP, and peripheral flow cytometry drawn (6/16)  - per Heme: transfuse to maintain Hb > 7 or if symptomatic, PLT > 10 or > 50 if bleeding      # CAP  # Acute respiratory failure (on 1-2L supp O2)  # c/f aspiration  # Bilat Pleural effusions/ pulm edema  - Pt was seen outpt 6/9 for c/o sinus congestion and cough x2 days, was started on Cefdinir and took one dose 6/9  - On admit, pt continues to endorse non-productive cough; no fevers/chills or SOB  - CXR (6/9) showing left basilar atelectasis/scar vs small infiltrate  - worsening SOB, will check CT chest  - Lactate level 5.4 --> 1.8 (6/9)  - s/p 500mL NS bolus and maintenance IVF with NS @ 75mL/hr  - S/p LR @ 75mL/hr x12hrs (6/10-6/11)  - Blood cultures x2 (6/9): NGTD  - COVID (6/9) negative  - Resp viral panel (6/10): neg  - Strep pneumo, legionella neg (6/10)  - MRSA neg (6/10)  - Bps soft on admit (107/62), no c/f significant HoTN  - Presented to [facility] in 2L NC, SpO2 94% on admit, will continue for now and wean as tolerated  - Started Mucinex 600mg BID scheduled and tessalon pearls TID   - CT Chest w/o (6/13) showed bilat upper lobe patchy and GGOs c/f multifocal infectious process, pulmonary & alveolar edema w r>L pleural effusions, dilated main pulm artery, mold thoracic body wall edema  - s/p 40mg IV lasix once (6/14) for pulm edema, s/p 20mg IV lasix once (6/15)  - 6/14 pt with witness aspiration of cough medicine, VSS on 1L supp O2, afebrile, CXR (6/14) showed Similar patchy airspace opacities predominantly overlying the bilat lung bases, to be correlated with aspiration and infection, worsening basilar edema  - s/p bumex 0.5 mg IV x 1 (6/15)  - procalcitonin 0.08 (6/15), plan to discontinue antibiotics today and monitor patient for s/s infection  off antibiotics  - s/p PO azithromycin 500mg daily (6/11-6/15) for 5 days of CAP, IV ceftriaxone 1g daily (6/11-6/16), and IV metronidazole 500 mg q8hr (6/14-6/16) for CAP and aspiration PNA  - SLP consulted and MBSS (6/16) for c/f aspiration, rec stay upright at least 45-60 min after eating  - s/p MBSS, SLP rec reg diet and thin liquids  - Walking pulse ox 6/16: spO2 93% on room air while sitting, down to 83% while ambulating on room air  - s/p bilateral therapeutic thoracentesis on 6/16 with cultures/fluid studies, per heme, give regularly scheduled Altuviiio (originally planned for 6/17) 1 hour prior to procedure  - Lights criteria showed transudative effusion, likely d/t hx of heart failure  - encourage IS    # RONNIE/Hx CKD - improving   - Likely pre-renal 2/2 contrast  - sCr BL ~1.1, sCr 1.19 (6/9)--> s/p IV contrast--> sCr 1.38 (6/11)--> 1.18 (6/15) ->1.12 (6/16)  - s/p 500mL NS bolus and maintenance IVF with NS @ 75mL/hr  - s/p LR @ 75mL/hr x 12hrs (6/10-6/11)  - Urine lytes WNL 6/10, 6/13 urine lytes WNL  - Fena 0.5% showing prerenal causes (6/14)  - bladder scan with 178ml on 6/13  - encourage increased PO intake, will hold off on IV fluids I/s/o pulm edema/pleural effusions shown on CT     # N/V/D - improved  - Likely viral gastroenteritis vs bacterial  - CT a/p with contrast (6/9) no evidence for intrahepatic or extrahepatic biliary ductal dilation, no evidence of bowel obstruction, colonic diverticulosis with no evidence for acute diverticulitis, prostatomegaly  - s/p 1x dose of cefepime and 1x dose of flagyl at [facility] 6/9  - s/p 500mL NS bolus and maintenance IVF with NS @ 75mL/hr at [facility] 6/9  - No fevers/chills or leukocytosis   - Lactate level 5.4 --> 1.8 (6/9)  - UA (6/9) + protein, neg leuks or nitrites  - Blood cultures x2 (6/9): NGTD  - COVID (6/9) negative  - CXR showing left basilar atelectasis/scar vs small infiltrate  - GI consulted at [facility] (6/10), state s/sx likely gastroenteritis, rec stool  path if diarrhea continues  - Stool path and Cdiff canceled by lab, will not re-order as diarrhea has resolved;, lactoferrin, and fecal calprotectin pending 6/10  - PO Zofran 4mg q8hrs PRN n/v 1st line, PO Compazine 10mg q8hrs PRN n/v 2nd line  - s/p IVF with LR @ 75mL/hr x12hrs (6/10-6/11)     # Troponinemia - downtrending  - Likely 2/2 demand I/s/o active illness with cardiac hx, aflutter seen on EKG  - Hx elevated troponins in the past  - Trop 249->287-->446 (6/9)  - down-trending Trop 285 (6/10)  - EKG at OSH (6/9) showing aflutter with AV block, non-specific T-wave abnormalities  - Admit EKG still pending 6/10  - c/w tele     # Anal Cancer  - Previously followed with Dr. Locke, now follows with Dr. Cochran, followed with Dr. Muro for Rad/Onc  - Initially presented with rectal bleeding in the setting of constipation over several consecutive days and then called EMS. He was brought to [facility] but then transferred to [facility] on 10/20/24 given no factor VIII at [facility]. Colonoscopy performed on 10/22/24 showed single malignant-appearing and friable ulcerated mass in the anal canal. 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   - s/p curative RT to anus 12/2024  - PET (4/18/25): no evidence of distant metastatic disease, small uptake in the prior anal lesion, likely non-specific but residual neoplasm not entirely excluded  - Now only following with med onc (Dr. Cochran) as surveillance if needed  - Has CT c/a/p and Rad Onc FUV on 8/4     # CAD  # Afib  # HFpEF  # HLD  - s/p CABG >20yrs ago  - ECHO (2/1/25): EF 49%, global hypokinesis of LV, mod increased septal and mod increased posterior LV wall thickness, sev dialted LA, mod-sev dilated RA, RVSP 44  - Continue home Jardiance 10mg daily and Bumex 0.5mg daily  - Continue home Lipitor 40mg daily  - BNP (6/10): 580 --> repeat (6/14) 970 --> 6/15 640  - s/p 60mg total IV lasix (6/14 & 6/15)  - s/p extra 0.5 mg bumex IV  (6/15)  - Pending walking pulse ox today, if supp O2 is required for home going, plan to increase home bumex to 1mg daily     # BPH  - Continue home Flomax 0.4mg BID and Finasteride 5mg daily     # GERD  - Home Pantoprazole 40mg daily sub as Pepcid 20mg daily I/s/o thrombocytopenia     DVT prophy: No pharmacologic DVT prophy I/s/o hemophilia A and thrombocytopenia. SCDs, encourage safe ambulation  Will attempt to get OOB as able to maintain functional status     DISPO:  - DNR, DNI; confirmed on admit  - DC pending improvement in anemia and thrombocytopenia and improvement in pulmonary symptoms  - NOK (Javier Wallace, relative): [phone]  - CT c/a/p 8/4, Rad Onc 8/4, Dr. Dillon FUV 8/6, Ophthalmology FUV 8/27, Urology FUV 12/1, Ophthalmology FUV 12/3    Assessment and plan as above discussed with attending physician Dr. Ulloa.    I spent 60 minutes in the professional and overall care of this patient.      Maye Sams PA-C         [1] antihemophilic factor VIII (recombinant), Fc-VWF-XTEN fusion protein-ehtl, 3,264 Units, intravenous, q7 days  atorvastatin, 40 mg, oral, Daily  benzonatate, 100 mg, oral, TID  bumetanide, 0.5 mg, oral, Daily  empagliflozin, 10 mg, oral, Daily  famotidine, 20 mg, oral, Daily  ferrous sulfate, 65 mg of elemental iron, oral, Daily  finasteride, 5 mg, oral, Daily  fluticasone, 2 spray, Each Nostril, Daily  folic acid, 1 mg, oral, Daily  polyethylene glycol, 17 g, oral, Daily  sennosides-docusate sodium, 1 tablet, oral, Nightly  tamsulosin, 0.4 mg, oral, BID  zinc sulfate, 50 mg of elemental zinc, oral, Daily     [2]    [3] PRN medications: albuterol, bisacodyl, guaiFENesin, ondansetron, prochlorperazine, sodium chloride

## 2025-06-17 NOTE — CARE PLAN
Problem: Pain - Adult  Goal: Verbalizes/displays adequate comfort level or baseline comfort level  Outcome: Progressing     Problem: Safety - Adult  Goal: Free from fall injury  Outcome: Progressing     Problem: Discharge Planning  Goal: Discharge to home or other facility with appropriate resources  Outcome: Progressing     Problem: Chronic Conditions and Co-morbidities  Goal: Patient's chronic conditions and co-morbidity symptoms are monitored and maintained or improved  Outcome: Progressing     Problem: Nutrition  Goal: Nutrient intake appropriate for maintaining nutritional needs  Outcome: Progressing     Problem: Skin  Goal: Decreased wound size/increased tissue granulation at next dressing change  Outcome: Progressing  Goal: Participates in plan/prevention/treatment measures  Outcome: Progressing  Goal: Prevent/manage excess moisture  Outcome: Progressing  Goal: Prevent/minimize sheer/friction injuries  Outcome: Progressing  Goal: Promote/optimize nutrition  Outcome: Progressing  Goal: Promote skin healing  Outcome: Progressing     Problem: Fall/Injury  Goal: Not fall by end of shift  Outcome: Progressing  Goal: Be free from injury by end of the shift  Outcome: Progressing  Goal: Verbalize understanding of personal risk factors for fall in the hospital  Outcome: Progressing  Goal: Verbalize understanding of risk factor reduction measures to prevent injury from fall in the home  Outcome: Progressing  Goal: Use assistive devices by end of the shift  Outcome: Progressing  Goal: Pace activities to prevent fatigue by end of the shift  Outcome: Progressing   The patient's goals for the shift include      The clinical goals for the shift include pt to remain safe throughout the night 6/16/25 @0730

## 2025-06-17 NOTE — CARE PLAN
Problem: Pain - Adult  Goal: Verbalizes/displays adequate comfort level or baseline comfort level  Outcome: Progressing     Problem: Safety - Adult  Goal: Free from fall injury  Outcome: Progressing     Problem: Discharge Planning  Goal: Discharge to home or other facility with appropriate resources  Outcome: Progressing     Problem: Chronic Conditions and Co-morbidities  Goal: Patient's chronic conditions and co-morbidity symptoms are monitored and maintained or improved  Outcome: Progressing     Problem: Nutrition  Goal: Nutrient intake appropriate for maintaining nutritional needs  Outcome: Progressing     Problem: Skin  Goal: Decreased wound size/increased tissue granulation at next dressing change  Outcome: Progressing  Flowsheets (Taken 6/16/2025 1827 by Beverly Harding, RN)  Decreased wound size/increased tissue granulation at next dressing change:   Promote sleep for wound healing   Utilize specialty bed per algorithm   Protective dressings over bony prominences  Goal: Participates in plan/prevention/treatment measures  Outcome: Progressing  Flowsheets (Taken 6/16/2025 1827 by Beverly Harding, RN)  Participates in plan/prevention/treatment measures:   Discuss with provider PT/OT consult   Elevate heels   Increase activity/out of bed for meals  Goal: Prevent/manage excess moisture  Outcome: Progressing  Flowsheets (Taken 6/16/2025 1827 by Beverly Harding, RN)  Prevent/manage excess moisture:   Monitor for/manage infection if present   Cleanse incontinence/protect with barrier cream   Use wicking fabric (obtain order)   Follow provider orders for dressing changes   Moisturize dry skin  Goal: Prevent/minimize sheer/friction injuries  Outcome: Progressing  Flowsheets (Taken 6/16/2025 1827 by Beverly Harding, GAB)  Prevent/minimize sheer/friction injuries:   Use pull sheet   Increase activity/out of bed for meals   Complete micro-shifts as needed if patient unable. Adjust patient position to  relieve pressure points, not a full turn   HOB 30 degrees or less   Turn/reposition every 2 hours/use positioning/transfer devices   Utilize specialty bed per algorithm  Goal: Promote/optimize nutrition  Outcome: Progressing  Flowsheets (Taken 6/16/2025 1827 by Beverly Harding, RN)  Promote/optimize nutrition:   Monitor/record intake including meals   Assist with feeding   Consume > 50% meals/supplements   Offer water/supplements/favorite foods   Discuss with provider if NPO > 2 days   Reassess MST if dietician not consulted  Goal: Promote skin healing  Outcome: Progressing  Flowsheets (Taken 6/16/2025 1827 by Beverly Harding, RN)  Promote skin healing:   Turn/reposition every 2 hours/use positioning/transfer devices   Protective dressings over bony prominences   Assess skin/pad under line(s)/device(s)   Ensure correct size (line/device) and apply per  instructions   Rotate device position/do not position patient on device     Problem: Fall/Injury  Goal: Not fall by end of shift  Outcome: Progressing  Goal: Be free from injury by end of the shift  Outcome: Progressing  Goal: Verbalize understanding of personal risk factors for fall in the hospital  Outcome: Progressing  Goal: Verbalize understanding of risk factor reduction measures to prevent injury from fall in the home  Outcome: Progressing  Goal: Use assistive devices by end of the shift  Outcome: Progressing  Goal: Pace activities to prevent fatigue by end of the shift  Outcome: Progressing   The patient's goals for the shift include      The clinical goals for the shift include pt to remain safe throughout the night 6/16/25 @0730    Pt able to rest throughout the night without complaints .

## 2025-06-17 NOTE — PROGRESS NOTES
Speech-Language Pathology  Adult Inpatient Swallow Treatment    Patient Name: Ramón Flores  MRN: [id number]  Today's Date: 6/17/2025   Start Time: 858  Stop Time: 920  Time Calculation (min): 22    Impression:   Dysphagia therapy completed. Reviewed results from a MBSS and the need to utilize safe swallow strategies. Pt demonstrates use of chin tuck, alternating liquids and solids with repeat swallow. Pt demonstrates strategies taking pill and consuming solids with min verbal cueing. RN aware of strategies and will ensure follow through during meals. SLP to continue one more time to ensure compliance.      Recommendations:  - Easy to Chew (IDDSI Level 7)  - Thin liquids (IDDSI Level 0)        STRATEGIES:  - Small bites  - Small, single sips  - Alternate consistencies  - Upright for all PO intake  - Swallow 2-3 times per bite/sip  - Chin tuck    Goal:   Pt will tolerate least restrictive diet without s/s aspiration, respiratory compromise, or overt difficulty throughout admission.  Start: 06/16/25, End: 2 weeks  Status: goal initiated     Pt will accurately utilize/recall recommended swallow strategies/guidelines independently in >90% opportunities.               Start: 06/16/25, End: 2 weeks              Status: goal initiated       Plan:  SLP Services Indicated: Yes  Frequency: x1/week  Discussed POC with patient  SLP - OK to Discharge    Pain:   0-10  0 = No pain.     Inpatient Education:  Extensive education provided to patient regarding current swallow function, recommendations/results, and POC.      Consultations/Referrals/Coordination of Services:   N/A

## 2025-06-17 NOTE — PROGRESS NOTES
Physical Therapy    Physical Therapy Treatment    Patient Name: Ramón Flores  MRN: [id number]  Department: [facility]  Room: [address] [place], [place] [address]  Today's Date: 6/17/2025  Time Calculation  Start Time: 1422  Stop Time: 1452  Time Calculation (min): 30 min         Assessment/Plan   PT Assessment  PT Assessment Results: Decreased strength, Decreased range of motion, Decreased endurance, Impaired balance, Decreased mobility  Rehab Prognosis: Good  Barriers to Discharge Home: Caregiver assistance, Physical needs  Caregiver Assistance: Caregiver assistance needed per identified barriers - however, level of patient's required assistance exceeds assistance available at home  Physical Needs: Ambulating household distances limited by function/safety, Intermittent mobility assistance needed, Intermittent ADL assistance needed, High falls risk due to function or environment  Evaluation/Treatment Tolerance: Patient tolerated treatment well  End of Session Communication: Bedside nurse  Assessment Comment: Pt. tolerated session well. Pt. is a falls risk due to knee flexion contractures to the extent of pt. being unable to plant heel on the ground. Pt. has limited home health care and would be alone for the majority of the day/night. Pt. has been recommended for mod intensity post acute due to risk of falls. (Focused on pulse 02 study today - will attempt outcome measures next session.)  End of Session Patient Position: Bed, 3 rail up, Alarm on     PT Plan  Treatment/Interventions: Bed mobility, Transfer training, Gait training, Therapeutic exercise, Therapeutic activity  PT Plan: Ongoing PT  PT Frequency: 4 times per week  PT Discharge Recommendations:  (mod/low)  Equipment Recommended upon Discharge:  (n/a)  PT Recommended Transfer Status: Assist x1, Assistive device  PT - OK to Discharge: Yes (DC rec made)    PT Visit Info:  PT Received On: 06/17/25     General Visit Information:   General  Reason for Referral: Heme consulted, rec  transfer to [facility] for further management of worsening anemia/thrombocytopenia I/s/o acute illness. Pt was transferred to [facility] for further management. Upon admission to [facility], pt reports his s/x have resolved. Heme consulted on admit 6/10, recs extensive hemophilia, pancytopenia workup.  Past Medical History Relevant to Rehab: Hemophilia, Anal cancer (s/p curative RT 12/2024), CAD s/p CABG >20yrs ago, HFpEF, Afib, BPH, GERD, and CKD  Prior to Session Communication: Bedside nurse  General Comment: Pt. supine in bed upon arrival. Pt. is pleasant and cooperative.  Noted knee flexion contractures while lying supine in bed. Pt. is on 1.5 L of 02 - RN to assist with a pulse 02 study see Vitals    Subjective   Precautions:  Precautions  Hearing/Visual Limitations: appears mildly [place], glasses  Medical Precautions: Fall precautions, Oxygen therapy device and L/min     Date/Time Vitals Session Patient Position Pulse Resp SpO2 BP MAP (mmHg)    06/17/25 1342 --  --  73  18  97 %  118/73  88           Vital Signs Comment: RN to chart levels. (Pt. did not drop below 88% with 02 and room air.)     Objective   Pain:  Pain Assessment  Pain Assessment: 0-10  0-10 (Numeric) Pain Score: 0 - No pain  Cognition:  Cognition  Overall Cognitive Status: Within Functional Limits  Orientation Level: Oriented X4  Following Commands: Follows one step commands without difficulty  Coordination:  Movements are Fluid and Coordinated: Yes  Postural Control:  Postural Control  Postural Control: Impaired  Posture Comment: Fwd flexed at trunk and excessive knee flexion therefore hip flexion.  Static Sitting Balance  Static Sitting-Balance Support: Feet supported, Bilateral upper extremity supported  Static Sitting-Level of Assistance: Distant supervision  Static Standing Balance  Static Standing-Balance Support: Bilateral upper extremity supported (ww)  Static Standing-Level of Assistance:  (CGA due to inability to plant full foot on floor ( due to flexion   contractures at knees. ))  Extremity/Trunk Assessments:                      Activity Tolerance:  Activity Tolerance  Endurance: Tolerates 10 - 20 min exercise with multiple rests  Activity Tolerance Comments: Audible wheezing noted - Pt. reports wheezing being a normal occurence.  Treatments:  Therapeutic Exercise  Therapeutic Exercise Performed: Yes  Therapeutic Exercise Activity 1: Supine bilat le ex AP'S, QS, SAQ'S, HS, AND ABD X 15 REPS. Pt. had difficulty with SAQ's and QS bilat'ly.         Bed Mobility  Bed Mobility: Yes  Bed Mobility 1  Bed Mobility 1: Supine to sitting  Level of Assistance 1: Close supervision  Bed Mobility 2  Bed Mobility  2: Sitting to supine  Level of Assistance 2:  (SBA)  Bed Mobility 3  Bed Mobility 3: Scooting  Level of Assistance 3: Close supervision    Ambulation/Gait Training  Ambulation/Gait Training Performed: Yes  Ambulation/Gait Training 1  Surface 1:  (Pt. amb. 45' using a wh. walker and close sba- INitially on 1L and then room air- Pulse 02 study vitals will be charted by RN. Pt. did not drop below 88% however.)  Transfers  Transfer: Yes  Transfer 1  Transfer From 1: Bed to  Transfer to 1: Stand  Transfer Level of Assistance 1: Contact guard  Trials/Comments 1: Increased time and slight struggle - Pt. required cga to steady    Outcome Measures:  [facility] Basic Mobility  Turning from your back to your side while in a flat bed without using bedrails: A little  Moving from lying on your back to sitting on the side of a flat bed without using bedrails: A little  Moving to and from bed to chair (including a wheelchair): A little  Standing up from a chair using your arms (e.g. wheelchair or bedside chair): A little  To walk in hospital room: A little  Climbing 3-5 steps with railing: Total  Basic Mobility - Total Score: 16    Education Documentation  Body Mechanics, taught by Alla Sol PTA at 6/17/2025  3:17 PM.  Learner: Patient  Readiness: Acceptance  Method: Explanation,  Demonstration  Response: Needs Reinforcement  Comment: Cues for upright posture- Reminders to keep walker close to avoid it getting away from pt.    Mobility Training, taught by Alla Sol PTA at 6/17/2025  3:17 PM.  Learner: Patient  Readiness: Acceptance  Method: Explanation, Demonstration  Response: Needs Reinforcement  Comment: Cues for upright posture- Reminders to keep walker close to avoid it getting away from pt.    Education Comments  No comments found.        OP EDUCATION:       Encounter Problems       Encounter Problems (Active)       PT Problem       Patient will complete bed mobility with close supervision with HOB flat to mimic home set-up  (Progressing)       Start:  06/12/25    Expected End:  07/03/25            Patient will complete STS with close supervision using LRAD without acute LOB   (Progressing)       Start:  06/12/25    Expected End:  07/03/25            Patient will ambulate >/=50' with LRAD with close supervision without acute LOB  (Progressing)       Start:  06/12/25    Expected End:  07/03/25            Patient will participate in BLE there-ex program in order to assist in improving strength and to assist with the completion of functional mobility tasks.  (Progressing)       Start:  06/12/25    Expected End:  07/03/25            Patient will score >25/28 on the [place]etti Mobility Outcome Assessment (combined gait and balance) in order to demonstrate low fall risk.        Start:  06/12/25    Expected End:  07/03/25               Pain - Adult

## 2025-06-17 NOTE — NURSING NOTE
Oxygen saturation on room air at rest 92% 80bpm  Oxygen saturation on 1.5L NC at rest 96% 77bpm  Oxygen saturation ambulating on room air 88% 106bpm  Oxygen saturation on 2LNC ambulating 92% 110 bpm

## 2025-06-18 ENCOUNTER — PHARMACY VISIT (OUTPATIENT)
Dept: PHARMACY | Facility: CLINIC | Age: OVER 89
End: 2025-06-18
Payer: COMMERCIAL

## 2025-06-18 VITALS
WEIGHT: 131 LBS | SYSTOLIC BLOOD PRESSURE: 104 MMHG | DIASTOLIC BLOOD PRESSURE: 59 MMHG | HEIGHT: 66 IN | RESPIRATION RATE: 18 BRPM | OXYGEN SATURATION: 95 % | HEART RATE: 74 BPM | BODY MASS INDEX: 21.05 KG/M2 | TEMPERATURE: 98.8 F

## 2025-06-18 LAB
ALBUMIN SERPL BCP-MCNC: 3.7 G/DL (ref 3.4–5)
ALP SERPL-CCNC: 75 U/L (ref 33–136)
ALT SERPL W P-5'-P-CCNC: 20 U/L (ref 10–52)
ANA PATTERN: ABNORMAL
ANA SER QL HEP2 SUBST: POSITIVE
ANA TITR SER IF: ABNORMAL {TITER}
ANION GAP SERPL CALC-SCNC: 14 MMOL/L (ref 10–20)
APTT PPP: 38 SECONDS (ref 26–36)
AST SERPL W P-5'-P-CCNC: 20 U/L (ref 9–39)
BASOPHILS # BLD AUTO: 0.01 X10*3/UL (ref 0–0.1)
BASOPHILS NFR BLD AUTO: 0.4 %
BILIRUB SERPL-MCNC: 1.6 MG/DL (ref 0–1.2)
BUN SERPL-MCNC: 30 MG/DL (ref 6–23)
CALCIUM SERPL-MCNC: 9 MG/DL (ref 8.6–10.6)
CELL COUNT (BLOOD): 4.1 X10*3/UL
CELL COUNT (BLOOD): 7.09 X10*3/UL
CELL POPULATIONS: NORMAL
CELL POPULATIONS: NORMAL
CENTROMERE B AB SER-ACNC: <0.2 AI
CHLORIDE SERPL-SCNC: 103 MMOL/L (ref 98–107)
CHROMATIN AB SERPL-ACNC: <0.2 AI
CO2 SERPL-SCNC: 28 MMOL/L (ref 21–32)
CREAT SERPL-MCNC: 0.96 MG/DL (ref 0.5–1.3)
DIAGNOSIS: NORMAL
DIAGNOSIS: NORMAL
DSDNA AB SER-ACNC: <1 IU/ML
EGFRCR SERPLBLD CKD-EPI 2021: 72 ML/MIN/1.73M*2
ENA JO1 AB SER QL IA: <0.2 AI
ENA RNP AB SER IA-ACNC: <0.2 AI
ENA SCL70 AB SER QL IA: <0.2 AI
ENA SM AB SER IA-ACNC: <0.2 AI
ENA SM+RNP AB SER QL IA: <0.2 AI
ENA SS-A AB SER IA-ACNC: <0.2 AI
ENA SS-B AB SER IA-ACNC: 0.9 AI
EOSINOPHIL # BLD AUTO: 0.01 X10*3/UL (ref 0–0.4)
EOSINOPHIL NFR BLD AUTO: 0.4 %
ERYTHROCYTE [DISTWIDTH] IN BLOOD BY AUTOMATED COUNT: 22.7 % (ref 11.5–14.5)
FACT VIII ACT/NOR PPP: 50 % (ref 55–180)
FLOW DIFFERENTIAL: NORMAL
FLOW DIFFERENTIAL: NORMAL
FLOW TEST ORDERED: NORMAL
FLOW TEST ORDERED: NORMAL
GLUCOSE SERPL-MCNC: 93 MG/DL (ref 74–99)
HCT VFR BLD AUTO: 25.6 % (ref 41–52)
HGB BLD-MCNC: 8.1 G/DL (ref 13.5–17.5)
IMM GRANULOCYTES # BLD AUTO: 0.14 X10*3/UL (ref 0–0.5)
IMM GRANULOCYTES NFR BLD AUTO: 5 % (ref 0–0.9)
INR PPP: 1.6 (ref 0.9–1.1)
LAB TEST METHOD: NORMAL
LAB TEST METHOD: NORMAL
LYMPHOCYTES # BLD AUTO: 0.69 X10*3/UL (ref 0.8–3)
LYMPHOCYTES NFR BLD AUTO: 24.6 %
MCH RBC QN AUTO: 33.3 PG (ref 26–34)
MCHC RBC AUTO-ENTMCNC: 31.6 G/DL (ref 32–36)
MCV RBC AUTO: 105 FL (ref 80–100)
MONOCYTES # BLD AUTO: 0.51 X10*3/UL (ref 0.05–0.8)
MONOCYTES NFR BLD AUTO: 18.2 %
NEUTROPHILS # BLD AUTO: 1.44 X10*3/UL (ref 1.6–5.5)
NEUTROPHILS NFR BLD AUTO: 51.4 %
NRBC BLD-RTO: 0 /100 WBCS (ref 0–0)
NUMBER OF CELLS COLLECTED: NORMAL PER TUBE
NUMBER OF CELLS COLLECTED: NORMAL PER TUBE
PATH REPORT.TOTAL CANCER: NORMAL
PATH REPORT.TOTAL CANCER: NORMAL
PLATELET # BLD AUTO: 64 X10*3/UL (ref 150–450)
POTASSIUM SERPL-SCNC: 4.5 MMOL/L (ref 3.5–5.3)
PROT SERPL-MCNC: 6.3 G/DL (ref 6.4–8.2)
PROTHROMBIN TIME: 17.7 SECONDS (ref 9.8–12.4)
RBC # BLD AUTO: 2.43 X10*6/UL (ref 4.5–5.9)
RIBOSOMAL P AB SER-ACNC: <0.2 AI
SIGNATURE COMMENT: NORMAL
SIGNATURE COMMENT: NORMAL
SODIUM SERPL-SCNC: 140 MMOL/L (ref 136–145)
SPECIMEN VIABILITY: NORMAL
SPECIMEN VIABILITY: NORMAL
WBC # BLD AUTO: 2.8 X10*3/UL (ref 4.4–11.3)

## 2025-06-18 PROCEDURE — 2500000001 HC RX 250 WO HCPCS SELF ADMINISTERED DRUGS (ALT 637 FOR MEDICARE OP): Performed by: NURSE PRACTITIONER

## 2025-06-18 PROCEDURE — 85610 PROTHROMBIN TIME: CPT

## 2025-06-18 PROCEDURE — 80053 COMPREHEN METABOLIC PANEL: CPT

## 2025-06-18 PROCEDURE — 99239 HOSP IP/OBS DSCHRG MGMT >30: CPT

## 2025-06-18 PROCEDURE — 36415 COLL VENOUS BLD VENIPUNCTURE: CPT

## 2025-06-18 PROCEDURE — 85240 CLOT FACTOR VIII AHG 1 STAGE: CPT

## 2025-06-18 PROCEDURE — RXMED WILLOW AMBULATORY MEDICATION CHARGE

## 2025-06-18 PROCEDURE — 2500000001 HC RX 250 WO HCPCS SELF ADMINISTERED DRUGS (ALT 637 FOR MEDICARE OP)

## 2025-06-18 PROCEDURE — 2500000002 HC RX 250 W HCPCS SELF ADMINISTERED DRUGS (ALT 637 FOR MEDICARE OP, ALT 636 FOR OP/ED): Performed by: NURSE PRACTITIONER

## 2025-06-18 PROCEDURE — 85025 COMPLETE CBC W/AUTO DIFF WBC: CPT

## 2025-06-18 RX ORDER — BENZONATATE 100 MG/1
100 CAPSULE ORAL 3 TIMES DAILY PRN
Qty: 20 CAPSULE | Refills: 0 | Status: SHIPPED | OUTPATIENT
Start: 2025-06-18 | End: 2025-06-25

## 2025-06-18 RX ORDER — GUAIFENESIN 600 MG/1
600 TABLET, EXTENDED RELEASE ORAL 2 TIMES DAILY PRN
Qty: 10 TABLET | Refills: 0 | Status: SHIPPED | OUTPATIENT
Start: 2025-06-18 | End: 2026-06-18

## 2025-06-18 RX ORDER — AMOXICILLIN 250 MG
1 CAPSULE ORAL NIGHTLY
Qty: 30 TABLET | Refills: 11 | Status: SHIPPED | OUTPATIENT
Start: 2025-06-18 | End: 2026-06-18

## 2025-06-18 RX ORDER — POLYETHYLENE GLYCOL 3350 17 G/17G
17 POWDER, FOR SOLUTION ORAL DAILY
Qty: 3 PACKET | Refills: 0 | Status: SHIPPED | OUTPATIENT
Start: 2025-06-18 | End: 2025-06-21

## 2025-06-18 RX ADMIN — FOLIC ACID 1 MG: 1 TABLET ORAL at 08:51

## 2025-06-18 RX ADMIN — BENZONATATE 100 MG: 100 CAPSULE ORAL at 14:37

## 2025-06-18 RX ADMIN — FLUTICASONE PROPIONATE 2 SPRAY: 50 SPRAY, METERED NASAL at 08:51

## 2025-06-18 RX ADMIN — EMPAGLIFLOZIN 10 MG: 10 TABLET, FILM COATED ORAL at 08:51

## 2025-06-18 RX ADMIN — FINASTERIDE 5 MG: 5 TABLET, FILM COATED ORAL at 08:51

## 2025-06-18 RX ADMIN — ZINC SULFATE 220 MG (50 MG) CAPSULE 1 CAPSULE: CAPSULE at 08:51

## 2025-06-18 RX ADMIN — FAMOTIDINE 20 MG: 20 TABLET, FILM COATED ORAL at 08:51

## 2025-06-18 RX ADMIN — TAMSULOSIN HYDROCHLORIDE 0.4 MG: 0.4 CAPSULE ORAL at 08:51

## 2025-06-18 RX ADMIN — BUMETANIDE 1 MG: 1 TABLET ORAL at 08:51

## 2025-06-18 RX ADMIN — BENZONATATE 100 MG: 100 CAPSULE ORAL at 08:51

## 2025-06-18 RX ADMIN — FERROUS SULFATE TAB 325 MG (65 MG ELEMENTAL FE) 1 TABLET: 325 (65 FE) TAB at 08:51

## 2025-06-18 RX ADMIN — ATORVASTATIN CALCIUM 40 MG: 40 TABLET, FILM COATED ORAL at 08:51

## 2025-06-18 ASSESSMENT — PAIN SCALES - GENERAL: PAINLEVEL_OUTOF10: 0 - NO PAIN

## 2025-06-18 ASSESSMENT — PAIN SCALES - WONG BAKER: WONGBAKER_NUMERICALRESPONSE: NO HURT

## 2025-06-18 NOTE — DISCHARGE SUMMARY
Discharge Diagnosis  Hemophilia A (Multi)    Issues Requiring Follow-Up  Increased bumex to 1mg daily - cardiology referral sent for FU  Pulmonology referral sent for hypoxia    Test Results Pending At Discharge  Pending Labs       Order Current Status    Calprotectin Stool Collected (06/12/25 1019)    Lactoferrin, fecal, quantitative Collected (06/12/25 1019)    AFB Culture/Smear In process    FOX with Reflex to GIANNI In process    Bone Marrow Evaluation In process    Chromosome Analysis, Bone Marrow In process    Flow Cytometry Test In process    Flow Cytometry Test In process    Flow Cytometry Test In process    Serum Protein Electrophoresis + Immunofixation In process    Serum Protein Electrophoresis + Immunofixation In process    Slide Request In process    AFB Processed Preliminary result    Sterile Fluid Culture/Smear Preliminary result            Hospital Course  Ramón Flores is a 89 y.o. male with PMH Hemophilia, Anal cancer (s/p curative RT 12/2024), CAD s/p CABG >20yrs ago, HFpEF, Afib, BPH, GERD, and CKD who presented to [facility] ED 6/9 with c/o n/v and diarrhea x24hrs. CT a/p with contrast (6/9) with no evidence for intrahepatic or extrahepatic biliary ductal dilation, no evidence of bowel obstruction, colonic diverticulosis with no evidence for acute diverticulitis, prostatomegaly. GI consulted, state s/sx likely gastroenteritis, rec stool path if diarrhea continues. Heme consulted, [place] [facility] [place] of worsening anemia/thrombocytopenia I/s/o acute illness. Pt was transferred to [facility] for further management. Upon admission to [facility], pt reports his s/x have resolved. Heme consulted on admit 6/10, recs extensive hemophilia, pancytopenia workup. Prior to admission had CXR showing left basilar atelectasis/scar vs small infiltrate, s/p Azithromycin PO (6/11-6/15); s/p IV ceftriaxone (6/11-6/16) for CAP treatment. S/p BMBx & Advate infusion 6/13 - results showing hypocellular bone  marrow, approx 3-5% plasma cell with skewed light chain expression toward lambda consistent with low level marrow involvement by plasma cell neoplasm. Core bx unable to be performed. Pt to FU with Dr. Bajwa in regards to these results and pending results listed above to determine if second BMBx is needed. CT chest 6/13 showed bilat upper lobe patchy and GGOs c/f multifocal infectious process, pulmonary & alveolar edema w r>L pleural effusions, dilated main pulm artery, mold thoracic body wall edema. CXR (6/14) showed Similar patchy airspace opacities predominantly overlying the bilat lung bases, to be correlated with aspiration and infection, worsening basilar edema, witnessed aspiration event, initiated metronidazole to cover possible aspiration PNA (6/14-6/16). Pt rec NPO until SLP eval, pt refused. Pt was educated on the risks of continued aspiration, pt stated he understands the risk but would still like to eat. SLP consulted, s/p MBSS and rec easy to chew diet and thin liquids. S/p diagnostic/therapeutic thoracentesis 6/16 for bilateral pleural effusions seen on CT 6/13 and persistent oxygen requirements, pt was given home advate prior to procedure with no issues. Lights criteria showed transudative effusions, likely I/s/o of patient's known HF. Increased pt's home bumex to 1mg daily. Most recent F8 activity 60 (6/17), per hematology, patient is safe to discharge home. Pancytopenia still noted on day of discharge however overall improved, per hematology, no prophylactic medications/antibiotics needed for discharge. PT/OT rec SNF vs acute rehab, however pt refused and would like to go home with resumed home care, rx of home PT/OT/SLP given to patient. Walking pulse ox done 6/17, pt qualifies for 2L supp O2. Pt educated on the importance of taking home medications as prescribed and attending outpt appointments. FUV listed below. Referral to cardiology and pulmonology sent. Rx of guaifenesin, miralax, senna,  zinc, bumetanide sent MTB. Pt's home advate rx sent to [facility] specialty pharmacy.     Visit Vitals  /77 (BP Location: Right arm, Patient Position: Lying)   Pulse 74   Temp 36.9 °C (98.4 °F) (Temporal)   Resp 18     Vitals:    06/10/25 1811   Weight: 59.4 kg (131 lb)       Immunization History   Administered Date(s) Administered    Flu vaccine, trivalent, preservative free, HIGH-DOSE, age 65y+ (Fluzone) 09/10/2020, 09/17/2021    Flu vaccine, trivalent, preservative free, age 6 months and greater (Fluarix/Fluzone/Flulaval) 10/28/2004, 10/06/2005    Influenza, Unspecified 10/10/2006    Influenza, seasonal, injectable 10/16/2007, 10/03/2008, 11/14/2013, 10/06/2014, 09/12/2017, 10/17/2018, 10/04/2022    Pfizer Purple Cap SARS-CoV-2 01/24/2021, 02/21/2021, 09/24/2021, 06/09/2022, 10/14/2022    Pneumococcal, Unspecified 12/28/2019       Results  US thoracentesis  Result Date: 6/18/2025  Interpreted By:  Chintan Morocho, STUDY: US THORACENTESIS; 6/18/20258:41 am   INDICATION: Signs/Symptoms:bilateral pleural effusions, thoracentesis.   COMPARISON: None.   ACCESSION NUMBER(S): [id number]   ORDERING CLINICIAN: CARMENCITA JONES   TECHNIQUE: INTERVENTIONALIST(S): Chintan Morocho   CONSENT: The patient/patient's POA/next of kin was informed of the nature of the proposed procedure. The purposes, alternatives, risks, and benefits were explained and discussed. All questions were answered and consent was obtained.   SEDATION: None   MEDICATION/CONTRAST: No additional   TIME OUT: A time out was performed immediately prior to procedure start with the interventional team, correctly identifying the patient name, date of birth, MRN, procedure, anatomy (including marking of site and side), patient position, procedure consent form, relevant laboratory and imaging test results, antibiotic administration, safety precautions, and procedure-specific equipment needs.   FINDINGS: The patient was placed in the sitting position.   The  pleural space was examined with grey scale ultrasound, and the most accessible fluid identified and marked for thoracentesis.   The skin was prepped and draped in usual manner. Local anesthesia with Lidocaine was administered and a right-sided thoracentesis was performed.  A 5 Khmer One-Step Valved thoracentesis needle/catheter was then placed where marked.  Approximately 300 mL of yellowish colored fluid was removed.  The needle/catheter was then withdrawn.   The patient tolerated the procedure well and there were no immediate complications. Specimen(s) sent to the laboratory and pathology for further evaluation, per the requesting team.       Uneventful thoracentesis, as detailed above. Right Pleural space, 300 mL   I personally performed and/or directly supervised this study and was present for the entire procedure.   Performed and dictated at [facility].   Signed by: Chintan Morocho 6/18/2025 8:41 AM Dictation workstation:   [id number]    FL modified barium swallow study  Result Date: 6/16/2025  Interpreted By:  Edmond Tyson and Greenfield Ellen STUDY: FL MODIFIED BARIUM SWALLOW STUDY;; 6/16/2025 11:18 am   INDICATION: Signs/Symptoms:c/f aspiration risk.     COMPARISON: None.   ACCESSION NUMBER(S): [id number]   ORDERING CLINICIAN: VALERI PARTIDA   TECHNIQUE: Modified Barium Swallow Study completed. Informed verbal consent obtained prior to completion of exam. The study was completed per protocol with various liquid barium consistencies, pudding, and solids.  A 1.9 cm or .75 inch (outer diameter) ring was placed on the chin in the lateral view in order to complete objective measurements during swallowing. The anatomic structures and function of the oropharynx, larynx, hypopharynx and cervical esophagus were evaluated. Variation to protocol due to inability to obtain an A-P view. An oblique view of the esophagus was obtained. Fluoroscopy time: 4.2 minutes.   SLP:  Zeenat Solis MS, CCC-SLP Contact info: [employer] secure chat; phone: [phone]   SPEECH FINDINGS: Reason for Referral: To r/o aspiration and to ascertain readiness for a safe and efficient PO diet. Patient Hx: Ramón Flores is a 89 y.o. male with PMH Hemophilia, Anal cancer (s/p curative RT 2024), CAD s/p CABG >20yrs ago, HFpEF, Afib, BPH, GERD, and CKD who presented to [facility] ED  with c/o n/v and diarrhea x24hrs. CT a/p with contrast () with no evidence for intrahepatic or extrahepatic biliary ductal dilation, no evidence of bowel obstruction, colonic diverticulosis with no evidence for acute diverticulitis, prostatomegaly. GI consulted, state s/sx likely gastroenteritis, rec stool path if diarrhea continues. Heme consulted, [place] [facility] [place] of worsening anemia/thrombocytopenia I/s/o acute illness. Pt was transferred to [facility] for further management. Upon admission to [facility], pt reports his s/x have resolved. Heme consulted on admit 6/10, recs extensive hemophilia, pancytopenia workup. (-plan stop 6/15) start IV ceftriaxone and PO azithromycin for CAP treatment. S/p BMBx & Advate infusion  - results pending. DC pending improvement in anemia and thrombocytopenia. Respiratory Status: Room air Current diet: NPO   Pain: Pain Scale: 0-10 Ratin   DIET RECOMMENDATIONS: - Easy to Chew (IDDSI Level 7) - Thin liquids (IDDSI Level 0)     STRATEGIES: - Small bites - Small, single sips - Alternate consistencies - Upright for all PO intake - Swallow 2-3 times per bite/sip - Chin giovanni     SLP PLAN: Skilled SLP Services: Skilled SLP intervention for dysphagia is warranted. SLP Frequency: 1x per week Duration: Treatment/Interventions: - Compensatory strategy training - Patient/caregiver education   Discussed POC: Patient Discussed Risks/Benefits: Yes Patient/Caregiver Agreeable: Yes   Short term goals Pt will tolerate least restrictive diet without s/s aspiration, respiratory  compromise, or overt difficulty throughout admission. Start: 06/16/25, End: 2 weeks Status: goal initiated   Pt will accurately utilize/recall recommended swallow strategies/guidelines independently in >90% opportunities. Start: 06/16/25, End: 2 weeks Status: goal initiated       Long term goals 06/16/25: Patient will tolerate the least restrictive diet without overt difficulty or further pulmonary compromise by time of discharge.     Education Provided: Results and recommendations per MBSS, with video review; recommendations and POC at this time. Verbal understanding and agreement given on all accounts.   Treatment Provided Today: ST provided extensive education and training to pt/pt family regarding anatomy/physiology of swallow function, risk factors of aspiration/aspiration pna & how to mitigate factors, diet modifications, and the use of compensatory swallow strategies to promote pt safety upon PO intake including utilizing a chin tuck, repeat swallow and alternating liquids and solids to reduce post swallow residue within the valleculae and piriforms. Pt re-verbalized strategies through talk back method with min verbal cueing.   Additional Medical Consults Suggested: - No new disciplines indicated   Repeat Study: Repeat study only if there is a deterioration in swallow function.   Mechanics of the Swallow Summary: ORAL PHASE: Lip Closure - No labial escape/anterior loss of bolus Tongue Control During Bolus Hold - Cohesive bolus between tongue to palatal seal Bolus prep/mastication - Timely and efficient mastication skills Bolus transport/lingual motion - Brisk tongue motion for A-P movement of the bolus Oral residue - Complete oral clearance   PHARYNGEAL PHASE: Initiation of pharyngeal swallow - Bolus head at posterior angle of ramus Soft palate elevation - No bolus between soft palate/pharyngeal wall Laryngeal elevation - Complete superior movement of thyroid cartilage with contact of arytenoids to epiglottic  petiole Anterior hyoid excursion - Complete anterior movement Epiglottic movement - Complete inversion Laryngeal vestibule closure - Complete - no air/contrast in laryngeal vestibule Pharyngeal stripping wave - Present, however, diminished Pharyngeal contraction (A/P view) - Not tested Pharyngoesophageal segment opening - Complete distension and complete duration/no obstruction of flow of bolus Tongue base retraction - Trace column of contrast or air between tongue base and pharyngeal wall Pharyngeal residue - Collection of residue within or on the pharyngeal structures   ESOPHAGEAL PHASE: Esophageal clearance - Esophageal retention     SLP Impressions with Severity Rating: Pt presents with mild oropharyngeal dysphagia upon completion of modified barium swallow study this date. Swallowing physiology is detailed above. Impairments most impacting swallowing safety and efficiency include decrease posterior pharyngeal wall stripping wave and base of tongue retraction. This results in post swallow residue in valleculae < piriforms with increase in valleculae with heavier viscosities. Utilization of chin tuck, repeat swallow and liquid wash aids in clearing majority of residue. Trace aspiration on the inferior aspect of the TVF on 1/11 swallows with large amount which maybe age related. Pt sensate aspiration with immediate cough. Pt ready for a regular diet with thin liquids utilizing the strategies stated above. Upon scanning down the esophagus in the oblique view retention and backflow within the esophagus evident. Pt intermittently coughing throughout exam, however unrelated to any aspiration.   *Of note: The A-P bolus follow-through is not intended to be utilized as a diagnostic assessment of the esophagus, rather a tool to observe the biomechanical aspects of the swallow continuum and to inform the need for further evaluation by medical specialists, as applicable.     OUTCOME MEASURES: Functional Oral Intake Scale  Functional Oral Intake Scale: Level 7        total oral diet with no restrictions     Rosenbek's Penetration Aspiration Scale Thin Liquids: 6. ASPIRATION that CLEARS with the swallow - contrast passes glottis, no subglottic residue During the Swallow Nectar Thick Liquids: 1. NO ASPIRATION & NO PENETRATION - no aspiration, contrast does not enter airway Puree: 1. NO ASPIRATION & NO PENETRATION - no aspiration, contrast does not enter airway Solids: 1. NO ASPIRATION & NO PENETRATION - no aspiration, contrast does not enter airway   Speech Therapy section of this report signed by Zeenat Solis MS,CCC-SLP on 6/16/2025 at 2:43 pm.   RADIOLOGY FINDINGS: Radiopaque contrast seen coursing through the esophagus. Degenerative changes of the included lateral cervical spine.   Radiology section of this report signed by Dr. Edmond Tyson.       1. Please refer to detailed swallow study evaluation by speech pathologist.   I personally reviewed the images/study and I agree with the findings as stated by Maximus Madrigal MD. This study was interpreted at [place], [place].   MACRO: None   Signed by: Edmond Tyson 6/16/2025 3:12 PM Dictation workstation:   [id number]        Pertinent Physical Exam At Time of Discharge  On the day of discharge, the patient reported feeling well and pain was controlled. Vitals and labs were stable. Pt denies chest pain, SOB, N/V/D/C, fever, chills. ROS: A complete review of systems was performed and is negative except for as mentioned above in the HP   On exam:  Constitutional: Awake, NAD  ENMT: mucous membranes moist, no apparent injury, no lesions seen  Head/Neck: NCAT  Respiratory/Thorax: CTAB, no wheezing  Cardiovascular: RRR, S1+S2, no murmur  Gastrointestinal: Soft, NT, nondistended, +BS  Extremities: No LE edema  Psychological: Appropriate mood and behavior  Skin: bruising noted I/s/o hemophilia     30 minutes was spent on the discharge and  planning of this patient.    Assessment and plan as above discussed with attending physician Dr. Ulloa    Home Medications     Medication List      START taking these medications     guaiFENesin 600 mg 12 hr tablet; Commonly known as: Mucinex; Take 1   tablet (600 mg) by mouth 2 times a day as needed for cough. Do not crush,   chew, or split.; Replaces: Chest Congestion Relief 100 mg/5 mL syrup   polyethylene glycol 17 gram packet; Commonly known as: Glycolax,   Miralax; Take 17 g by mouth once daily for 3 days.   sennosides-docusate sodium 8.6-50 mg tablet; Commonly known as:   Kinsey-Colace; Take 1 tablet by mouth once daily at bedtime.   zinc sulfate 220 mg (50 mg elemental) capsule; Commonly known as:   Zincate; Take 1 capsule by mouth once daily.     CHANGE how you take these medications     bumetanide 1 mg tablet; Commonly known as: Bumex; Take 1 tablet (1 mg)   by mouth once daily. Do not fill before June 18, 2025.; What changed:   medication strength, how much to take     CONTINUE taking these medications     acetaminophen 500 mg tablet; Commonly known as: Tylenol Extra Strength;   Take 2 tablets (1,000 mg) by mouth every 8 hours if needed for mild pain   (1 - 3).   antihemophilic factor VIII (recombinant), Fc-VWF-XTEN fusion   protein-ehtl 3,000 unit recon soln; Commonly known as: Altuviiio; Infuse   3,030 Units into a venous catheter every 7 days. 3030 units +/-10% dose   every 7 days.  Additionally,  prn when directed.   atorvastatin 40 mg tablet; Commonly known as: Lipitor; TAKE 1 TABLET BY   MOUTH EVERY DAY   calcium carbonate 600 mg calcium (1,500 mg) tablet   ferrous sulfate 325 (65 Fe) mg EC tablet   finasteride 5 mg tablet; Commonly known as: Proscar; Take 1 tablet (5   mg) by mouth once daily.   folic acid 1 mg tablet; Commonly known as: Folvite   Jardiance 10 mg tablet; Generic drug: empagliflozin; Take 1 tablet (10   mg) by mouth once daily.   lactobacillus acidophilus tablet tablet    multivitamin tablet   pantoprazole 40 mg EC tablet; Commonly known as: ProtoNix; Take 1 tablet   (40 mg) by mouth once daily in the morning. Take before meals. Do not   crush, chew, or split.   PRESERVISION AREDS ORAL   spironolactone 25 mg tablet; Commonly known as: Aldactone; Take 0.5   tablets (12.5 mg) by mouth once daily as needed (for shortness of breath   and increaed swelling).   tamsulosin 0.4 mg 24 hr capsule; Commonly known as: Flomax; Take 1   capsule (0.4 mg) by mouth 2 times a day.     STOP taking these medications     cefdinir 300 mg capsule; Commonly known as: Omnicef   Chest Congestion Relief 100 mg/5 mL syrup; Generic drug: guaiFENesin;   Replaced by: guaiFENesin 600 mg 12 hr tablet   nystatin cream; Commonly known as: Mycostatin       Outpatient Follow-Up  Future Appointments   Date Time Provider Department Center   8/4/2025 10:00 AM [facility]   8/4/2025 11:00 AM Deidra Muro MD [id number] [facility]   8/6/2025 10:00 AM Rufino Dillon MD [id number] [facility]   8/21/2025  2:00 PM Merrill Lyman MD [id number] [facility]   8/27/2025 10:15 AM Ubaldo Smith OD [id number] [facility]   9/19/2025 10:40 AM Lashonda Dugan MD MPH [id number] [facility]   12/1/2025 10:50 AM Raisa Sierra MD [id number] [facility]   12/3/2025 10:45 AM Lito Ackerman MD [id number] [facility]       Maye Sams PA-C

## 2025-06-18 NOTE — PROGRESS NOTES
06/18/25 0900   Discharge Planning   Living Arrangements Alone   Support Systems Family members;Home care staff   Type of Residence Private residence   Number of Stairs to Enter Residence 2   Number of Stairs Within Residence 0   Do you have animals or pets at home? No   Home or Post Acute Services In home services   Type of Home Care Services Home nursing visits;Home health aide   Expected Discharge Disposition Home Health   Does the patient need discharge transport arranged? No     Care Transitions Note  06/18/25  Patient to discharge home today with resumed [facility]. Pt's nephew David will pick him up at [place]. Patient discharging with new O2 through [employer], tank given at bedside, brief education provided on tank usage. Made pt aware that he needs to call [employer] on his way home. SW also made David aware of this, David states he is able to call on the way home. PORFIRIO sent message in [employer] with info for liaison as well.   Pt shared concerns about the cleanliness of his house d/t his n/v prior to admission. SW provided [employer] resource and phone number. Pt stated his niece cleaned up his place but he still was receptive of info. SW available as additional needs arise.     [facility] orders and notes faxed to [facility] at [phone] per their request.  Alisha Bertrand, MSW, LSW

## 2025-06-18 NOTE — CARE PLAN
Problem: Pain - Adult  Goal: Verbalizes/displays adequate comfort level or baseline comfort level  Outcome: Progressing     Problem: Safety - Adult  Goal: Free from fall injury  Outcome: Progressing     Problem: Discharge Planning  Goal: Discharge to home or other facility with appropriate resources  Outcome: Progressing     Problem: Chronic Conditions and Co-morbidities  Goal: Patient's chronic conditions and co-morbidity symptoms are monitored and maintained or improved  Outcome: Progressing     Problem: Nutrition  Goal: Nutrient intake appropriate for maintaining nutritional needs  Outcome: Progressing     Problem: Skin  Goal: Decreased wound size/increased tissue granulation at next dressing change  Outcome: Progressing  Goal: Participates in plan/prevention/treatment measures  Outcome: Progressing  Goal: Prevent/manage excess moisture  Outcome: Progressing  Goal: Prevent/minimize sheer/friction injuries  Outcome: Progressing  Goal: Promote/optimize nutrition  Outcome: Progressing  Goal: Promote skin healing  Outcome: Progressing     Problem: Fall/Injury  Goal: Not fall by end of shift  Outcome: Progressing  Goal: Be free from injury by end of the shift  Outcome: Progressing  Goal: Verbalize understanding of personal risk factors for fall in the hospital  Outcome: Progressing  Goal: Verbalize understanding of risk factor reduction measures to prevent injury from fall in the home  Outcome: Progressing  Goal: Use assistive devices by end of the shift  Outcome: Progressing  Goal: Pace activities to prevent fatigue by end of the shift  Outcome: Progressing   The patient's goals for the shift include      The clinical goals for the shift include pt to remain safe throughout the night 6/17/25 @0730    Pt able to have a bowel movement. Pt O2 levels dropped when up to the bedside commode. Pt on 2 L O2 nasal cannula.

## 2025-06-19 LAB
ACID FAST STN SPEC: NORMAL
MYCOBACTERIUM SPEC CULT: NORMAL

## 2025-06-20 LAB
ALBUMIN: 3.4 G/DL (ref 3.4–5)
ALPHA 1 GLOBULIN: 0.4 G/DL (ref 0.2–0.6)
ALPHA 2 GLOBULIN: 0.8 G/DL (ref 0.4–1.1)
ATRIAL RATE: 366 BPM
BETA GLOBULIN: 0.8 G/DL (ref 0.5–1.2)
CHROM ANALY OVERALL INTERP-IMP: NORMAL
ELECTRONICALLY COSIGNED BY CYTOGENETICS: NORMAL
ELECTRONICALLY SIGNED BY CYTOGENETICS: NORMAL
FISH ISCN RESULTS: NORMAL
GAMMA GLOBULIN: 0.8 G/DL (ref 0.5–1.4)
IMMUNOFIXATION COMMENT: ABNORMAL
LABORATORY COMMENT REPORT: NORMAL
LABORATORY COMMENT REPORT: NORMAL
M-PROTEIN 1: 0.2 G/DL (ref ?–0)
PATH REPORT.FINAL DX SPEC: NORMAL
PATH REPORT.GROSS SPEC: NORMAL
PATH REPORT.RELEVANT HX SPEC: NORMAL
PATH REPORT.TOTAL CANCER: NORMAL
PATH REVIEW - SERUM IMMUNOFIXATION: ABNORMAL
PATH REVIEW-SERUM PROTEIN ELECTROPHORESIS: ABNORMAL
PROTEIN ELECTROPHORESIS COMMENT: ABNORMAL
Q ONSET: 214 MS
QRS COUNT: 14 BEATS
QRS DURATION: 94 MS
QT INTERVAL: 384 MS
QTC CALCULATION(BAZETT): 453 MS
QTC FREDERICIA: 429 MS
R AXIS: -48 DEGREES
RESIDENT REVIEW: NORMAL
T AXIS: 92 DEGREES
T OFFSET: 406 MS
VENTRICULAR RATE: 84 BPM

## 2025-06-22 LAB
BACTERIA FLD CULT: NORMAL
GRAM STN SPEC: NORMAL
GRAM STN SPEC: NORMAL

## 2025-06-23 LAB
BACTERIA FLD CULT: NORMAL
GRAM STN SPEC: NORMAL
GRAM STN SPEC: NORMAL

## 2025-06-23 NOTE — DOCUMENTATION CLARIFICATION NOTE
"    PATIENT:               KRISTIN DAY  ACCT #:                  [id number]  MRN:                       [id number]  :                       1928  ADMIT DATE:       2025 2:22 PM  DISCH DATE:        6/10/2025 4:14 PM  RESPONDING PROVIDER #:        [id number]          PROVIDER RESPONSE TEXT:    Sepsis with cardiac organ dysfunction of acute myocardial injury.    CDI QUERY TEXT:    Clarification        Instruction:    Based on your assessment of the patient and the clinical information, please provide the requested documentation by clicking on the appropriate radio button and enter any additional information if prompted.    Question: Please further clarify the diagnosis of Sepsis    When answering this query, please exercise your independent professional judgment. The fact that a question is being asked, does not imply that any particular answer is desired or expected.    The patient's clinical indicators include:  Clinical Information: 89 yr old man with nausea, vomiting, and diarrhea. Diagnoses include acute viral gastroenteritis and dehydration. PMH includes severe hemophila  A.    Clinical Indicators:   H/P: Dr. Mcnamara  \"Sepsis with elevated lactate 5.4 recheck 1.8...Rising troponin, suspect demand secondary to sepsis\"    6/10: DC Summary: Dr. Lieberman  \"...had numerous episodes of nausea vomiting and diarrhea since 9:00 yesterday morning, feeling better following fluid resuscitation in the setting of gastroenteritis\"    -Labs(): WBC: 10.8      Trop I, high sensitivity: 249    287    446      Lactate: 5.4    1.8  -Vitals(ER ): 37.9-88-18     129/73     SaO2 RA: 93    Treatment:  -LR 1L iv bolus:   -NS 500cc iv bolus:   -Cefepime 2gm iv/Flagyl 500mg iv:   -Cefepime 1gm iv every 12 hours: 6/10  -Flagyl 500mg iv every 8 hours: 6/10    Risk Factors: age, Hemophilia A  Options provided:  -- Sepsis with cardiac organ dysfunction of acute myocardial injury.  -- Sepsis with metabolic organ " dysfunction of elevated lactate.  -- Sepsis with multi-system organ dysfunction of elevated lactate and acute myocardial injury.  -- Sepsis with other organ dysfunction, Please specify sepsis associated organ dysfunction below  -- Acute viral gastroenteritis without Sepsis.  -- Other - I will add my own diagnosis  -- Refer to Clinical Documentation Reviewer    Query created by: Katya Aguilar on 6/16/2025 2:24 PM      Electronically signed by:  RYLEE BELTRÁN DO 6/23/2025 6:45 PM

## 2025-06-25 LAB
ACID FAST STN SPEC: NORMAL
MYCOBACTERIUM SPEC CULT: NORMAL

## 2025-06-25 NOTE — DOCUMENTATION CLARIFICATION NOTE
"    PATIENT:               KRISTIN DAY  ACCT #:                  [id number]  MRN:                       [id number]  :                       1928  ADMIT DATE:       2025 2:22 PM  DISCH DATE:        6/10/2025 4:14 PM  RESPONDING PROVIDER #:        [id number]          PROVIDER RESPONSE TEXT:    Aspiration pneumonia is ruled out after study.    CDI QUERY TEXT:    Clarification        Instruction:    Based on your assessment of the patient and the clinical information, please provide the requested documentation by clicking on the appropriate radio button and enter any additional information if prompted.    Question: Please further clarify the diagnosis of aspiration pneumonia    When answering this query, please exercise your independent professional judgment. The fact that a question is being asked, does not imply that any particular answer is desired or expected.    The patient's clinical indicators include:  Clinical Information: 89 yr old man with nausea, vomiting, and diarrhea. Diagnoses include acute viral gastroenteritis and dehydration. PMH includes severe hemophilia  A.    Clinical Indicators:   H/P: Dr. Mcnamara  \"Possible aspiration pneumonia...On cefepime and metronidazole\"    6/10 DC Summary: Dr. Lieberman  \"...was seen by his geriatrics NP on ... for acute cough/congestion, poor appetite, temp 100.2 on arrival to ED...PCP prescribed him cefdinir, guaifenesin and afrin yesterday d/t his c/o acute cough and congestion...\"    CXR(): \"Irregular interstitial thickening with increased consolidation and/or volume loss in the right infrahilar region...Left basilar atelectasis/scar versus small infiltrate.\"    Treatment:  -Cefepime 2gm iv/Flagyl 500mg iv:   -Cefepime 1gm iv every 12 hours: 6/10  -Flagyl 500mg iv every 8 hours: 6/10    Risk Factors: age, Hemophilia A, vomiting  Options provided:  -- Aspiration pneumonia is ruled out after study.  -- Aspiration pneumonia is confirmed diagnosis for " this admission.  -- Other - I will add my own diagnosis  -- Refer to Clinical Documentation Reviewer    Query created by: Katya Aguilar on 6/16/2025 2:36 PM      Electronically signed by:  NUBIA BARRETT MD 6/25/2025 2:57 PM

## 2025-07-02 LAB
ACID FAST STN SPEC: NORMAL
MYCOBACTERIUM SPEC CULT: NORMAL

## 2025-07-09 LAB
ACID FAST STN SPEC: NORMAL
MYCOBACTERIUM SPEC CULT: NORMAL

## 2025-07-16 LAB
ACID FAST STN SPEC: NORMAL
MYCOBACTERIUM SPEC CULT: NORMAL

## 2025-07-23 ENCOUNTER — APPOINTMENT (OUTPATIENT)
Dept: OPHTHALMOLOGY | Facility: CLINIC | Age: OVER 89
End: 2025-07-23
Payer: MEDICARE

## 2025-07-23 LAB
ACID FAST STN SPEC: NORMAL
MYCOBACTERIUM SPEC CULT: NORMAL

## 2025-07-29 DIAGNOSIS — I50.30 HEART FAILURE WITH PRESERVED EJECTION FRACTION, UNSPECIFIED HF CHRONICITY: ICD-10-CM

## 2025-07-30 ENCOUNTER — PHARMACY VISIT (OUTPATIENT)
Dept: PHARMACY | Facility: CLINIC | Age: OVER 89
End: 2025-07-30
Payer: MEDICARE

## 2025-07-30 LAB
ACID FAST STN SPEC: NORMAL
MYCOBACTERIUM SPEC CULT: NORMAL

## 2025-07-30 PROCEDURE — RXMED WILLOW AMBULATORY MEDICATION CHARGE

## 2025-07-31 ENCOUNTER — OFFICE VISIT (OUTPATIENT)
Dept: GERIATRIC MEDICINE | Facility: CLINIC | Age: OVER 89
End: 2025-07-31
Payer: MEDICARE

## 2025-07-31 DIAGNOSIS — I48.20 ATRIAL FIBRILLATION, CHRONIC (MULTI): Primary | ICD-10-CM

## 2025-07-31 DIAGNOSIS — C21.0 MALIGNANT NEOPLASM OF ANUS, UNSPECIFIED: Primary | ICD-10-CM

## 2025-07-31 DIAGNOSIS — I50.32 CHRONIC HEART FAILURE WITH PRESERVED EJECTION FRACTION: ICD-10-CM

## 2025-07-31 DIAGNOSIS — E46 PROTEIN-CALORIE MALNUTRITION, UNSPECIFIED SEVERITY (MULTI): ICD-10-CM

## 2025-07-31 DIAGNOSIS — N40.0 BENIGN PROSTATIC HYPERPLASIA WITHOUT LOWER URINARY TRACT SYMPTOMS: ICD-10-CM

## 2025-07-31 DIAGNOSIS — D66 HEMOPHILIA A (MULTI): ICD-10-CM

## 2025-07-31 PROCEDURE — 1159F MED LIST DOCD IN RCRD: CPT | Performed by: CLINICAL NURSE SPECIALIST

## 2025-07-31 PROCEDURE — 99348 HOME/RES VST EST LOW MDM 30: CPT | Performed by: CLINICAL NURSE SPECIALIST

## 2025-07-31 PROCEDURE — G2211 COMPLEX E/M VISIT ADD ON: HCPCS | Performed by: CLINICAL NURSE SPECIALIST

## 2025-07-31 PROCEDURE — 3078F DIAST BP <80 MM HG: CPT | Performed by: CLINICAL NURSE SPECIALIST

## 2025-07-31 PROCEDURE — 3074F SYST BP LT 130 MM HG: CPT | Performed by: CLINICAL NURSE SPECIALIST

## 2025-08-01 ENCOUNTER — TELEPHONE (OUTPATIENT)
Dept: RADIATION ONCOLOGY | Facility: HOSPITAL | Age: OVER 89
End: 2025-08-01
Payer: MEDICARE

## 2025-08-01 VITALS
TEMPERATURE: 97.8 F | RESPIRATION RATE: 20 BRPM | DIASTOLIC BLOOD PRESSURE: 72 MMHG | HEART RATE: 80 BPM | SYSTOLIC BLOOD PRESSURE: 110 MMHG

## 2025-08-01 NOTE — PATIENT INSTRUCTIONS
Next visit September; flu shot if needed  Recommend Boost or Ensure 2 times a day to prevent further weight loss

## 2025-08-01 NOTE — PROGRESS NOTES
"Reason for visit: routine follow up    Reason for homebound status: BLE weakness and instability of knees and inability to do steps makes leaving his home quite taxing on Ramón and his caregivers    HPI: Ramón was seen today at his home  for a routine follow up. He was admitted to the hospital in June with n/v/d and cough. The GI symptoms resolved, his labs were stable, and it was found he had a small right pleural effusion. He had a thoracentesis and the fluid so far has been negative for infection or malignant cells. He ws sent home on oxygen but had only used it maybe a week. It was sent back.     Chief Complaint: \"I feel great. I dont feel 89!\"    Review of Systems   General: feels fine today, appetite is good but does not like the food he gets from meals on wheels and feels he may have lost a little wt lately; sleeping all night and stays awake all day  Skin: no skin problems  EENT: glasses, adequate vision with no recent vision changes; hearing is good; no sinus drainage or congestion; no oral pain or sore throat; no lesions or sores; no loose teeth, no trouble chewing or swallowing  Respiratory: no cough, congestion or shortness of breath   Cardiac: no chest pain or palpitations;  Gastrointestinal: no abdominal pain, no nausea, vomiting, heartburn, diarrhea or constipation. BM soft every day, making sure he takes prune juice daily; no rectal pain or bleeding; no hemorrhoids  Urinary: continent; no discomfort with urination; urgency and frequency that is annoying at times  Musculoskeletal: mild bilateral knee pain but tylenol is enough; knees are weak and relies on wheelchair; can stand by himself and transfer independently; arms are strong but overall feels weaker since the radiation finished; has not had any swelling in legs for few weeks; was able to walk a short distance with help and walker this week  Neurologic: long and short term memory intact; no headaches, dizziness or lightheadedness; no tremors " or involuntary movements; no altered sensation; no unilateral weakness  Psychiatric: managing fine, has plenty of help, no depression or anxiety, feels he nora well    Physical Examination   Vitals: Blood pressure 110/72, pulse 80, temperature 36.6 °C (97.8 °F), resp. rate 20.  Pulse ox 96%  General: sitting in wheelchair in his kitchen, dressed in personal clothes, very neat; good historian and conversationalist;, not able to stand to weigh, appears comfortable  Neurologic: alert, oriented x4 with good executive function; adequate and equal arm strength; positive sensation in all extremities; no involuntary movements  Skin: no abnormalities of hair or nails; right lower cheek with large mole, no discolorations, or excessive dryness   EENT: vision and hearing seem adequate; teeth in good condition; drank iced tea and coughed once after; aide said he coughed few times after coffee this am; no sinus congestion or drainage  Respiratory: unlabored; chest symmetrical w/ good rise and fall; regular rhythm and depth, breath sounds equal and clear throughout, specifically strong and equal in bases  Heart: irregular rhythm, rate controlled; normal heart sounds w/ no murmurs, rubs and gallops  Abdomen: nondistended, nontenderness, bowel sounds active x4, no masses  Extremities: no deformities, tenderness; palpable peripheral pulses, no cyanosis, clubbing; normal temperature; trace edema left ankle  Musculoskeletal: no limited joint mobility with good ROM of upper extremities but about 70% ROM of knees., not able to stand upright completly, no tenderness, effusion, erythema, minimal kyphosis and no scoliosis  Psych: pleasant, good attitude, good conversationalist; calm    Diagnoses and Plan:  HFpEF - bumex 0.5mg daily; also on Jardiance; has prn spironolactone but does not c/o sob and swelling seems to stay mild at all times; recent thoracentesis right but lungs very clear in that area today  Chronic atrial fibrillation - no  rate controlling drugs, was irregular today; no blood thinners due to hemophilia  Loss of appetite -gets meals on wheels and he reports they are not usually appetizing; does not like supplements but has used on occasion  Primary OA knees and increased weakness - takes tylenol routinely which is effective; relies on wheelchair  BPH w/ lower urinary symptoms - controlled with flomax and finasteride typically but diuretics posing problems for derik and he insists on going upstairs to urinate due to not wanting to use a urinal on the first floor so has to go up often  Hemophilia - factor VIII injection routinely; infusion yesterday  GERD w/o esophagitis - stable on pantoprazole  CKD 3a - gfr 50s  Unspecified constipation - contine prune juice daily per his choice; effective currently  Will follow up in 2 months as he is very stable.

## 2025-08-03 DIAGNOSIS — J18.9 PNEUMONIA OF BOTH LOWER LOBES DUE TO INFECTIOUS ORGANISM: Primary | ICD-10-CM

## 2025-08-03 NOTE — PROGRESS NOTES
Staff Physician: Deidra Muro MD, MSCI  Resident Physician: Joe Reyes MD, MPH PGY-5  Referring Physician: Deidar Muro MD  Date of Service: 8/4/2025  RADIATION ONCOLOGY FOLLOW UP NOTE  IDENTIFYING DATA:   Cancer Staging   Anal cancer (Multi)  Staging form: Anus, AJCC V9  - Clinical: cT2, cNX - Unsigned    Problem List Items Addressed This Visit       Anal cancer (Multi) - Primary    Relevant Orders    CBC and Auto Differential    Comprehensive Metabolic Panel    CT chest abdomen pelvis w IV contrast    Clinic Appointment Request Follow Up; DEIDRA MURO       Mr. 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 He completed definitive radiation (not a chemo candidate) to 54Gy/30fx on 1/27/25.  He presents today for routine interval follow-up.    INTERVAL HISTORY  Since we last saw Ramón Flores in clinic on 4/29/25, he has felt well.  His weight is stable from last visit and his appetite is unchanged. He denies any significant symptoms at this time. Specifically, he denies abdominal pain, diarrhea, blood or mucus in stool, fecal urgency, stool leakage, constipation, pain with bowel movements, dysuria, or change in urinary frequency. The patient recently had an inpatient hospital stay due to n/v and diarrhea x 1d. He had cocnern for pancytopenia in which further evaluation revealed hypocellular bone marrow with 3-5% plasma cell with slight skewed light chain expression toward lambda concerning for a plasma cell neoplasm. His CT Chest revealed patchy airspace opacities in the lung base, concerned for aspiration PNA and started on broad-spectrum Abrx. He sees Dr. Dillon (Heme/Onc) on 8/6/25.     His last imaging, comprising CT CAP completed today, pending final radiology read, shows no evidence of locoregional or distant metastatic disease.     PAST MEDICAL, SURGICAL, FAMILY, AND SOCIAL HISTORY:  Medical History[1]  Surgical  History[2]  ALLERGIES:  Allergies[3]  MEDICATIONS:  Current Medications[4]     REVIEW OF SYSTEMS:  Except for the symptoms described in the interval history, the review of systems is negative. Specifically, except as noted, when asked the patient expressed no complaints relative to constitutional (fever, weight loss), eyes, ears, nose, mouth, throat, neurologic, cardiovascular, pulmonary, breast, GI, , skin, musculoskeletal, endocrine, hematologic/lymphatic, or immunologic systems.    PERFORMANCE STATUS:  Karnofsky Performance Score/ECO, Requires occasional assistance, but is able to care for most of his personal needs (ECOG equivalent 2)    PHYSICAL EXAMINATION:  /85   Pulse 96   Temp 36 °C (96.8 °F) (Skin)   Resp 18   Wt 62.1 kg (136 lb 14.4 oz)   SpO2 94%   BMI 22.10 kg/m²   Pain score: 0/10  General: no acute distress, engaged in conversation.   HEENT: Normocephalic, atraumatic. Extraocular movements are intact.   Neck: supple with trachea at midline, no palpable adenopathy.   Pulmonary: Breathing comfortably on room air, no respiratory distress  Cardiovascular: Regular rate, no cyanosis, well-perfused  Abdomen: Soft, nontender, nondistended.   Extremities: No lower extremity edema or cyanosis. Normal range of motion.  Skin: Without rash or obvious lesions.   Musculoskeletal: Normal range of motion. Able to raise both arms above head without issues  Neurologic: Alert and oriented x3. Cranial nerves grossly intact.   Anorectal exam: External exam without concerning skin lesions or visible disease, he has a stable skin tag on the left lateral area of the  Upon ROBBY, he has a palpable defect at the left lateran canal that is about 5mm from the anal verge, without any new nodularity noted. Exam completed without any bleeding or pain endorsed.     DIAGNOSTIC REPORTS REVIEWED:  Imaging: All imaging was personally reviewed and interpreted in clinic. Findings as per interval history and  EMR.  Laboratory/Pathology:  All pertinent labs and pathology were personally reviewed and interpreted in clinic. Findings as per interval history and EMR.     IMPRESSION:  Mr. Flores has complete clinical response of his anal cancer without residual adverse RT effects at this time.     PLAN:  I have asked Ramón Flores to please return to clinic in 3 month's time with repeat CT-CAP with CBC, and CMP at that time, to be coordinated with the multidisciplinary team. He knows to call with any questions or concerns in the interim.    PAIN PLAN: The patient reports their pain is well-controlled on their current regimen.  Their pain regimen is currently managed by Primary Care.      DISEASE STATUS: Controlled  NEW METACHRONOUS CANCER: No    Thank you for the opportunity to participate in the ongoing care of this pleasant man.    The patient was evaluated by and discussed with attending physician, Dr. Deidra Muro, who repeated all mariano aspects of the HPI and physical exam.    Joe Reyes MD, MPH  PGY-5 Radiation Oncology         [1]   Past Medical History:  Diagnosis Date    Acute deep vein thrombosis (DVT) of left femoral vein (Multi) 09/10/2023    ACUTE DEEP VEIN THROMBOSIS, L FEMORAL, HEMOPHILIA    Acute diastolic heart failure 04/16/2023    Benign prostatic hyperplasia without lower urinary tract symptoms 01/07/2016    Enlarged prostate without lower urinary tract symptoms (luts)    Bleeding hemorrhoids 03/01/2023    CAP (community acquired pneumonia) 01/18/2024    Closed nondisplaced fracture of head of left radius 09/05/2023    COVID-19 05/21/2023    Enlarged prostate with lower urinary tract symptoms (LUTS) 03/01/2023    Fracture of bone of hip (Multi) 09/05/2023    Fracture of femoral neck, right 03/01/2023    Gastrointestinal hemorrhage 09/05/2023    LOWER GI BLEED    Gingiva hemorrhage 09/05/2023    Gross hematuria 03/01/2023    Hemarthrosis of ankle joint 04/18/2019    Hemarthrosis of knee 05/09/2022     Hematochezia 09/05/2023    Hematoma of lower extremity 09/05/2023    Hematoma of right thigh 03/01/2023    History of recent fall 05/31/2023    Knee effusion 09/05/2023    Knee effusion, left 03/01/2023    Lumbar pain 03/01/2023    Obstructive uropathy 04/28/2023    Orthostatic syncope 09/05/2023    Personal history of other endocrine, nutritional and metabolic disease     History of hyperlipidemia    Pneumonia of left lower lobe due to infectious organism 03/19/2024    Pneumonia of right lung due to infectious organism 03/01/2023    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    Subdural hematoma (Multi) 04/09/2023    Syncope 05/12/2023    Urinary retention 03/01/2023    UTI (urinary tract infection) 03/01/2023    Viral gastroenteritis 03/01/2023   [2]   Past Surgical History:  Procedure Laterality Date    CORONARY ARTERY BYPASS GRAFT     [3]   Allergies  Allergen Reactions    Aspirin Bleeding     hemophiliac    Penicillins Rash     Tolerated cefdinir, cefepime   [4]   Current Outpatient Medications:     acetaminophen (Tylenol Extra Strength) 500 mg tablet, Take 2 tablets (1,000 mg) by mouth every 8 hours if needed for mild pain (1 - 3)., Disp: 60 tablet, Rfl: 11    antihemophilic factor VIII, recombinant,, Fc-VWF-XTEN fusion protein-ehtl (Altuviiio) 3,000 unit recon soln, Infuse 3,030 Units into a venous catheter every 7 days. 3030 units +/-10% dose every 7 days.  Additionally,  prn when directed., Disp: 5 each, Rfl: 12    atorvastatin (Lipitor) 40 mg tablet, TAKE 1 TABLET BY MOUTH EVERY DAY, Disp: 90 tablet, Rfl: 1    bumetanide (Bumex) 1 mg tablet, Take 1 tablet (1 mg) by mouth once daily. Do not fill before June 18, 2025., Disp: 30 tablet, Rfl: 11    calcium carbonate 600 mg calcium (1,500 mg) tablet, Take 1 tablet (1,500 mg) by mouth 2 times daily (morning and late afternoon)., Disp: , Rfl:     empagliflozin (Jardiance) 10 mg tablet, Take 1 tablet (10 mg) by mouth once daily., Disp: 90 tablet, Rfl: 0    ferrous sulfate 325  (65 Fe) MG EC tablet, Take 1 tablet by mouth once daily with breakfast. Do not crush, chew, or split., Disp: , Rfl:     finasteride (Proscar) 5 mg tablet, Take 1 tablet (5 mg) by mouth once daily., Disp: 90 tablet, Rfl: 3    folic acid (Folvite) 1 mg tablet, Take 1 tablet (1 mg) by mouth once daily., Disp: , Rfl:     Lactobacillus acidoph-L.bulgar 1 million cell tablet tablet, Take 1 tablet by mouth once daily., Disp: , Rfl:     multivitamin tablet, Take 1 tablet by mouth once daily., Disp: , Rfl:     pantoprazole (ProtoNix) 40 mg EC tablet, Take 1 tablet (40 mg) by mouth once daily in the morning. Take before meals. Do not crush, chew, or split., Disp: 90 tablet, Rfl: 3    sennosides-docusate sodium (Kinsey-Colace) 8.6-50 mg tablet, Take 1 tablet by mouth once daily at bedtime., Disp: 30 tablet, Rfl: 11    spironolactone (Aldactone) 25 mg tablet, Take 0.5 tablets (12.5 mg) by mouth once daily as needed (for shortness of breath and increaed swelling)., Disp: 23 tablet, Rfl: 3    tamsulosin (Flomax) 0.4 mg 24 hr capsule, Take 1 capsule (0.4 mg) by mouth 2 times a day., Disp: 180 capsule, Rfl: 3    vit A/vit C/vit E/zinc/copper (PRESERVISION AREDS ORAL), Take 1 capsule by mouth once daily., Disp: , Rfl:     zinc sulfate (Zincate) 220 mg (50 mg elemental) capsule, Take 1 capsule by mouth once daily., Disp: 30 capsule, Rfl: 11    guaiFENesin (Mucinex) 600 mg 12 hr tablet, Take 1 tablet (600 mg) by mouth 2 times a day as needed for cough. Do not crush, chew, or split. (Patient not taking: Reported on 8/4/2025), Disp: 10 tablet, Rfl: 0

## 2025-08-04 ENCOUNTER — HOSPITAL ENCOUNTER (OUTPATIENT)
Dept: RADIATION ONCOLOGY | Facility: HOSPITAL | Age: OVER 89
Setting detail: RADIATION/ONCOLOGY SERIES
Discharge: HOME | End: 2025-08-04
Payer: MEDICARE

## 2025-08-04 ENCOUNTER — HOSPITAL ENCOUNTER (OUTPATIENT)
Dept: RADIOLOGY | Facility: HOSPITAL | Age: OVER 89
Discharge: HOME | End: 2025-08-04
Payer: MEDICARE

## 2025-08-04 ENCOUNTER — LAB (OUTPATIENT)
Dept: LAB | Facility: HOSPITAL | Age: OVER 89
End: 2025-08-04
Payer: MEDICARE

## 2025-08-04 VITALS
BODY MASS INDEX: 22.1 KG/M2 | TEMPERATURE: 96.8 F | WEIGHT: 136.9 LBS | HEART RATE: 96 BPM | RESPIRATION RATE: 18 BRPM | DIASTOLIC BLOOD PRESSURE: 85 MMHG | SYSTOLIC BLOOD PRESSURE: 164 MMHG | OXYGEN SATURATION: 94 %

## 2025-08-04 DIAGNOSIS — D75.89 MACROCYTOSIS: ICD-10-CM

## 2025-08-04 DIAGNOSIS — D66 HEMOPHILIA A (MULTI): ICD-10-CM

## 2025-08-04 DIAGNOSIS — C21.0 ANAL CANCER (MULTI): Primary | ICD-10-CM

## 2025-08-04 DIAGNOSIS — C21.0 ANAL CANCER (MULTI): ICD-10-CM

## 2025-08-04 DIAGNOSIS — D46.9 MDS (MYELODYSPLASTIC SYNDROME) (MULTI): ICD-10-CM

## 2025-08-04 DIAGNOSIS — C21.0 MALIGNANT NEOPLASM OF ANUS, UNSPECIFIED: ICD-10-CM

## 2025-08-04 LAB
ALBUMIN SERPL BCP-MCNC: 4.3 G/DL (ref 3.4–5)
ALP SERPL-CCNC: 71 U/L (ref 33–136)
ALT SERPL W P-5'-P-CCNC: 18 U/L (ref 10–52)
ANION GAP SERPL CALC-SCNC: 13 MMOL/L (ref 10–20)
AST SERPL W P-5'-P-CCNC: 20 U/L (ref 9–39)
BASOPHILS # BLD AUTO: 0.01 X10*3/UL (ref 0–0.1)
BASOPHILS NFR BLD AUTO: 0.3 %
BILIRUB SERPL-MCNC: 2 MG/DL (ref 0–1.2)
BUN SERPL-MCNC: 30 MG/DL (ref 6–23)
CALCIUM SERPL-MCNC: 9.5 MG/DL (ref 8.6–10.3)
CHLORIDE SERPL-SCNC: 102 MMOL/L (ref 98–107)
CO2 SERPL-SCNC: 27 MMOL/L (ref 21–32)
CREAT SERPL-MCNC: 1.21 MG/DL (ref 0.5–1.3)
EGFRCR SERPLBLD CKD-EPI 2021: 54 ML/MIN/1.73M*2
EOSINOPHIL # BLD AUTO: 0 X10*3/UL (ref 0–0.4)
EOSINOPHIL NFR BLD AUTO: 0 %
ERYTHROCYTE [DISTWIDTH] IN BLOOD BY AUTOMATED COUNT: 28.1 % (ref 11.5–14.5)
GLUCOSE SERPL-MCNC: 95 MG/DL (ref 74–99)
HCT VFR BLD AUTO: 27.9 % (ref 41–52)
HGB BLD-MCNC: 9.2 G/DL (ref 13.5–17.5)
IMM GRANULOCYTES # BLD AUTO: 0.12 X10*3/UL (ref 0–0.5)
IMM GRANULOCYTES NFR BLD AUTO: 4 % (ref 0–0.9)
LYMPHOCYTES # BLD AUTO: 0.74 X10*3/UL (ref 0.8–3)
LYMPHOCYTES NFR BLD AUTO: 24.9 %
MCH RBC QN AUTO: 34.5 PG (ref 26–34)
MCHC RBC AUTO-ENTMCNC: 33 G/DL (ref 32–36)
MCV RBC AUTO: 105 FL (ref 80–100)
MONOCYTES # BLD AUTO: 0.45 X10*3/UL (ref 0.05–0.8)
MONOCYTES NFR BLD AUTO: 15.2 %
NEUTROPHILS # BLD AUTO: 1.65 X10*3/UL (ref 1.6–5.5)
NEUTROPHILS NFR BLD AUTO: 55.6 %
NRBC BLD-RTO: 0 /100 WBCS (ref 0–0)
PLATELET # BLD AUTO: 51 X10*3/UL (ref 150–450)
POTASSIUM SERPL-SCNC: 3.9 MMOL/L (ref 3.5–5.3)
PROT SERPL-MCNC: 7 G/DL (ref 6.4–8.2)
RBC # BLD AUTO: 2.67 X10*6/UL (ref 4.5–5.9)
SODIUM SERPL-SCNC: 138 MMOL/L (ref 136–145)
WBC # BLD AUTO: 3 X10*3/UL (ref 4.4–11.3)

## 2025-08-04 PROCEDURE — 71260 CT THORAX DX C+: CPT

## 2025-08-04 PROCEDURE — 36415 COLL VENOUS BLD VENIPUNCTURE: CPT

## 2025-08-04 PROCEDURE — 2550000001 HC RX 255 CONTRASTS: Performed by: RADIOLOGY

## 2025-08-04 PROCEDURE — 74177 CT ABD & PELVIS W/CONTRAST: CPT

## 2025-08-04 PROCEDURE — 85025 COMPLETE CBC W/AUTO DIFF WBC: CPT

## 2025-08-04 PROCEDURE — 80053 COMPREHEN METABOLIC PANEL: CPT

## 2025-08-04 PROCEDURE — G2211 COMPLEX E/M VISIT ADD ON: HCPCS | Performed by: RADIOLOGY

## 2025-08-04 PROCEDURE — 99213 OFFICE O/P EST LOW 20 MIN: CPT | Performed by: RADIOLOGY

## 2025-08-04 RX ADMIN — IOHEXOL 75 ML: 350 INJECTION, SOLUTION INTRAVENOUS at 11:23

## 2025-08-04 ASSESSMENT — ENCOUNTER SYMPTOMS
APPETITE CHANGE: 0
NERVOUS/ANXIOUS: 1
FEVER: 0
FLANK PAIN: 0
DIAPHORESIS: 0
UNEXPECTED WEIGHT CHANGE: 0
FATIGUE: 0
DIFFICULTY URINATING: 0
DEPRESSION: 0
OCCASIONAL FEELINGS OF UNSTEADINESS: 1
RECTAL PAIN: 0
DIARRHEA: 0
CARDIOVASCULAR NEGATIVE: 1
LOSS OF SENSATION IN FEET: 0
SLEEP DISTURBANCE: 1
CHILLS: 0
DIZZINESS: 0
TROUBLE SWALLOWING: 0
RESPIRATORY NEGATIVE: 1
HEMATURIA: 0
SORE THROAT: 0
CONSTIPATION: 1
FREQUENCY: 1
NECK PAIN: 0
MYALGIAS: 0
DYSURIA: 0
NUMBNESS: 0
ABDOMINAL PAIN: 0
NECK STIFFNESS: 0
LIGHT-HEADEDNESS: 0
BLOOD IN STOOL: 0
BACK PAIN: 0
NAUSEA: 0
EYE PROBLEMS: 1
ARTHRALGIAS: 1
VOICE CHANGE: 0
DECREASED CONCENTRATION: 0
VOMITING: 0
CONFUSION: 0
HEADACHES: 0
ABDOMINAL DISTENTION: 0
BRUISES/BLEEDS EASILY: 1

## 2025-08-04 NOTE — PROGRESS NOTES
Radiation Oncology Nursing Note    Pain: The patient's current pain level was assessed.  They report currently having a pain of 0 out of 10.  They feel their pain is under control without the use of pain medications.    Review of Systems:  Review of Systems   Constitutional:  Negative for appetite change, chills, diaphoresis, fatigue, fever and unexpected weight change.   HENT:   Negative for hearing loss, mouth sores, nosebleeds, sore throat, tinnitus, trouble swallowing and voice change.    Eyes:  Positive for eye problems (wears glasses).   Respiratory: Negative.     Cardiovascular: Negative.    Gastrointestinal:  Positive for constipation (prune juice). Negative for abdominal distention, abdominal pain, blood in stool, diarrhea, nausea, rectal pain and vomiting.   Genitourinary:  Positive for frequency. Negative for bladder incontinence, difficulty urinating, dyspareunia, dysuria, hematuria, nocturia, pelvic pain and penile discharge.    Musculoskeletal:  Positive for arthralgias. Negative for back pain, flank pain, myalgias, neck pain and neck stiffness.        Arthritis in knees   Skin: Negative.    Neurological:  Negative for dizziness, headaches, light-headedness and numbness.   Hematological:  Bruises/bleeds easily.   Psychiatric/Behavioral:  Positive for sleep disturbance (just last night). Negative for confusion, decreased concentration, depression and suicidal ideas. The patient is nervous/anxious (anxious with rides today).

## 2025-08-05 LAB
ACID FAST STN SPEC: NORMAL
MYCOBACTERIUM SPEC CULT: NORMAL

## 2025-08-06 ENCOUNTER — LAB (OUTPATIENT)
Dept: LAB | Facility: HOSPITAL | Age: OVER 89
End: 2025-08-06
Payer: MEDICARE

## 2025-08-06 ENCOUNTER — OFFICE VISIT (OUTPATIENT)
Dept: HEMATOLOGY/ONCOLOGY | Facility: HOSPITAL | Age: OVER 89
End: 2025-08-06
Payer: MEDICARE

## 2025-08-06 VITALS
SYSTOLIC BLOOD PRESSURE: 121 MMHG | DIASTOLIC BLOOD PRESSURE: 59 MMHG | TEMPERATURE: 97 F | HEART RATE: 83 BPM | RESPIRATION RATE: 18 BRPM | WEIGHT: 136.91 LBS | OXYGEN SATURATION: 98 % | BODY MASS INDEX: 22.1 KG/M2

## 2025-08-06 DIAGNOSIS — D75.89 MACROCYTOSIS: ICD-10-CM

## 2025-08-06 DIAGNOSIS — K76.9 LIVER DISEASE: ICD-10-CM

## 2025-08-06 DIAGNOSIS — D66 FACTOR VIII DEFICIENCY (MULTI): ICD-10-CM

## 2025-08-06 DIAGNOSIS — E88.09 PLASMA CELL DYSCRASIA: ICD-10-CM

## 2025-08-06 DIAGNOSIS — E88.09 PLASMA CELL DYSCRASIA: Primary | ICD-10-CM

## 2025-08-06 DIAGNOSIS — D66 HEMOPHILIA A (MULTI): ICD-10-CM

## 2025-08-06 LAB
APTT PPP: 36 SECONDS (ref 26–36)
BILIRUB DIRECT SERPL-MCNC: 0.5 MG/DL (ref 0–0.3)
FACT VIII ACT/NOR PPP: 64 % (ref 55–180)
FIBRINOGEN PPP-MCNC: 246 MG/DL (ref 200–400)
GGT SERPL-CCNC: 66 U/L (ref 5–64)
IGA SERPL-MCNC: 462 MG/DL (ref 70–400)
IGG SERPL-MCNC: 884 MG/DL (ref 700–1600)
IGM SERPL-MCNC: 103 MG/DL (ref 40–230)
INR PPP: 1.3 (ref 0.9–1.1)
LDH SERPL L TO P-CCNC: 197 U/L (ref 84–246)
PROT SERPL-MCNC: 7.1 G/DL (ref 6.4–8.2)
PROTHROMBIN TIME: 14.5 SECONDS (ref 9.8–12.4)
THROMBIN TIME: 17.4 SECONDS (ref 10.3–16.6)
VIT B12 SERPL-MCNC: 727 PG/ML (ref 211–911)

## 2025-08-06 PROCEDURE — 85385 FIBRINOGEN ANTIGEN: CPT

## 2025-08-06 PROCEDURE — 85670 THROMBIN TIME PLASMA: CPT

## 2025-08-06 PROCEDURE — 1036F TOBACCO NON-USER: CPT | Performed by: INTERNAL MEDICINE

## 2025-08-06 PROCEDURE — 99215 OFFICE O/P EST HI 40 MIN: CPT | Performed by: INTERNAL MEDICINE

## 2025-08-06 PROCEDURE — 85610 PROTHROMBIN TIME: CPT

## 2025-08-06 PROCEDURE — 1126F AMNT PAIN NOTED NONE PRSNT: CPT | Performed by: INTERNAL MEDICINE

## 2025-08-06 PROCEDURE — 82248 BILIRUBIN DIRECT: CPT

## 2025-08-06 PROCEDURE — 1159F MED LIST DOCD IN RCRD: CPT | Performed by: INTERNAL MEDICINE

## 2025-08-06 PROCEDURE — 82784 ASSAY IGA/IGD/IGG/IGM EACH: CPT

## 2025-08-06 PROCEDURE — 85635 REPTILASE TEST: CPT

## 2025-08-06 PROCEDURE — 1160F RVW MEDS BY RX/DR IN RCRD: CPT | Performed by: INTERNAL MEDICINE

## 2025-08-06 PROCEDURE — 85730 THROMBOPLASTIN TIME PARTIAL: CPT

## 2025-08-06 PROCEDURE — 84155 ASSAY OF PROTEIN SERUM: CPT

## 2025-08-06 PROCEDURE — 3078F DIAST BP <80 MM HG: CPT | Performed by: INTERNAL MEDICINE

## 2025-08-06 PROCEDURE — 85384 FIBRINOGEN ACTIVITY: CPT

## 2025-08-06 PROCEDURE — 83615 LACTATE (LD) (LDH) ENZYME: CPT

## 2025-08-06 PROCEDURE — 36415 COLL VENOUS BLD VENIPUNCTURE: CPT

## 2025-08-06 PROCEDURE — 82977 ASSAY OF GGT: CPT

## 2025-08-06 PROCEDURE — 83921 ORGANIC ACID SINGLE QUANT: CPT

## 2025-08-06 PROCEDURE — 82607 VITAMIN B-12: CPT

## 2025-08-06 PROCEDURE — 85240 CLOT FACTOR VIII AHG 1 STAGE: CPT

## 2025-08-06 PROCEDURE — 3074F SYST BP LT 130 MM HG: CPT | Performed by: INTERNAL MEDICINE

## 2025-08-06 PROCEDURE — 83521 IG LIGHT CHAINS FREE EACH: CPT

## 2025-08-06 ASSESSMENT — PAIN SCALES - GENERAL: PAINLEVEL_OUTOF10: 0-NO PAIN

## 2025-08-07 LAB
HOLD SPECIMEN: NORMAL
HOLD SPECIMEN: NORMAL
KAPPA LC SERPL-MCNC: 3.07 MG/DL (ref 0.33–1.94)
KAPPA LC/LAMBDA SER: 1.36 {RATIO} (ref 0.26–1.65)
LAMBDA LC SERPL-MCNC: 2.26 MG/DL (ref 0.57–2.63)

## 2025-08-07 NOTE — PROGRESS NOTES
"Patient ID: Ramón Flores is a 89 y.o. male.  Referring Physician: Rufino Dillon MD  [id number] [id number]  Primary Care Provider: CHAY Mckeon  Visit Type: Follow Up      Subjective    HPI  Mr. 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  He is better groomed than the last visit, but he still is slimmer than he should be.  He has no specific complaints.  His anal cancer that was treated with XRT appears to be completely contained and there does not appear to any active disease in the area of his rectum.  The patient is quite happy about this.      No breakthrough bleeding.  Home care weekly with [facility] is working out well.  He is not getting bevacizumab anymore for his adult onset macular degeneration.  No more eye injections.    PMHx\": born in [place]; left 1938 at age of 9 yo with twin sister.  Spent WWII in [place] as a [employer] [place]; emigrated to [place]  with sister.  Worked for 50 years as .  ; wife  .  1 adopted daughter with schizophrenia.      Social hx: still lives in his home.  He has a care worker daily who prepares a breakfast.  Evening meals are meals on wheels.  His nephew and niece shop for him twice weekly for fresh items.  He spends his evening alone.    Review of Systems - Oncology     Constitutional:         Overall feeling well.  Ambulation is his biggest problem, at present. Good mentation.  He feels he would be able to drive a car.    HENT:  Negative.     Eyes:         Getting Rx'd for age-related macular degeneration.   Respiratory: Negative.     Cardiovascular:         Recent short hospitalization for heart failure.  He was in atrial fibrillation.  Remains such.  No anticoagulation.  Need to consider Watchman procedure.     Gastrointestinal: Negative.    Genitourinary: Negative.     Musculoskeletal:         Knees are major arthritic problem; right knee worse than left knee.   Skin: Negative.  "   Neurological: Negative.    Hematological:         Hemostasis: under control for now.   Psychiatric/Behavioral: Negative.          Objective   BSA: 1.7 meters squared  /59   Pulse 83   Temp 36.1 °C (97 °F) (Core)   Resp 18   Wt 62.1 kg (136 lb 14.5 oz)   SpO2 98%   BMI 22.10 kg/m²      has a past medical history of Acute deep vein thrombosis (DVT) of left femoral vein (Multi) (09/10/2023), Acute diastolic heart failure (04/16/2023), Benign prostatic hyperplasia without lower urinary tract symptoms (01/07/2016), Bleeding hemorrhoids (03/01/2023), CAP (community acquired pneumonia) (01/18/2024), Closed nondisplaced fracture of head of left radius (09/05/2023), COVID-19 (05/21/2023), Enlarged prostate with lower urinary tract symptoms (LUTS) (03/01/2023), Fracture of bone of hip (Multi) (09/05/2023), Fracture of femoral neck, right (03/01/2023), Gastrointestinal hemorrhage (09/05/2023), Gingiva hemorrhage (09/05/2023), Gross hematuria (03/01/2023), Hemarthrosis of ankle joint (04/18/2019), Hemarthrosis of knee (05/09/2022), Hematochezia (09/05/2023), Hematoma of lower extremity (09/05/2023), Hematoma of right thigh (03/01/2023), History of recent fall (05/31/2023), Knee effusion (09/05/2023), Knee effusion, left (03/01/2023), Lumbar pain (03/01/2023), Obstructive uropathy (04/28/2023), Orthostatic syncope (09/05/2023), Personal history of other endocrine, nutritional and metabolic disease, Pneumonia of left lower lobe due to infectious organism (03/19/2024), Pneumonia of right lung due to infectious organism (03/01/2023), 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   has a past surgical history that includes Coronary artery bypass graft.  Family History[1]  Oncology History    No history exists.       aRmón Flores  reports that he has  never smoked. He has never been exposed to tobacco smoke. He has never used smokeless tobacco.  He Alcohol use questions deferred to the physician.  He  reports no history of drug use.    Physical Exam  Vitals reviewed. Exam conducted with a chaperone present.   Constitutional:       Appearance: Normal appearance. He is normal weight.   HENT:      Head: Normocephalic and atraumatic.      Nose: Nose normal.      Mouth/Throat:      Mouth: Mucous membranes are moist.     Eyes:      Extraocular Movements: Extraocular movements intact.      Conjunctiva/sclera: Conjunctivae normal.      Pupils: Pupils are equal, round, and reactive to light.       Cardiovascular:      Rate and Rhythm: Normal rate. Rhythm irregular.      Pulses: Normal pulses.      Heart sounds: Normal heart sounds.   Pulmonary:      Effort: Pulmonary effort is normal.      Breath sounds: Normal breath sounds.   Abdominal:      General: Abdomen is flat.     Musculoskeletal:         General: Normal range of motion.      Cervical back: Normal range of motion and neck supple.     Skin:     General: Skin is warm and dry.     Neurological:      General: No focal deficit present.      Mental Status: He is alert and oriented to person, place, and time.     Psychiatric:         Mood and Affect: Mood normal.         Behavior: Behavior normal.         Thought Content: Thought content normal.     Labs:  Total bilirubin=2.0; BUN=30; creatinine-1.21; GFR=54; Thrombin time=17.4 sec; PT=13.5, aPTT 36; Fb 246, FVIII=68%  GDN=3562 with 55% PMNs and 25% lymphs; Hgb/PCV=9.2/27.9; HZS=530; platelets=51,000.    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  He got [facility] yesterday.  He has evidence, however, of liver disease with a long PT, long thrombin time (suggesting a dysfibrinogenemia).  His total bilirubin is increase to 2.0.  I orders a direct bili, LDH, and GTT to evaluate the liver disease further.    Also, a BM in 6/2025 shows  hypocellularity with a small number of presumed clonal plasma cells (<10%). Suggests a smoldering plasma cell dyscrasia.  His low WBC could be related to this.  His Hgb is down to 9.2 and KSW=370 (may be related to liver disease).  He has a small M protein that is an IgA consistent with his M protein theat also is a IgA.  I am evaluating a Vit B12 and MMA as well.  The macrocytosis could be related to liver disease.    In sum, there is some new hematology: M protein, smoldering plasma cell dyscrasia, liver disease, dysfibrinogenemia.  These findings are being better evaluated.    RTC in 4 months.    WBC   Date/Time Value Ref Range Status   08/04/2025 10:29 AM 3.0 (L) 4.4 - 11.3 x10*3/uL Final   06/18/2025 08:19 AM 2.8 (L) 4.4 - 11.3 x10*3/uL Final   06/17/2025 08:08 AM 2.8 (L) 4.4 - 11.3 x10*3/uL Final     nRBC   Date Value Ref Range Status   08/04/2025 0.0 0.0 - 0.0 /100 WBCs Final   06/18/2025 0.0 0.0 - 0.0 /100 WBCs Final   06/17/2025 0.7 (H) 0.0 - 0.0 /100 WBCs Final     RBC   Date Value Ref Range Status   08/04/2025 2.67 (L) 4.50 - 5.90 x10*6/uL Final   06/18/2025 2.43 (L) 4.50 - 5.90 x10*6/uL Final   06/17/2025 2.28 (L) 4.50 - 5.90 x10*6/uL Final     Hemoglobin   Date Value Ref Range Status   08/04/2025 9.2 (L) 13.5 - 17.5 g/dL Final   06/18/2025 8.1 (L) 13.5 - 17.5 g/dL Final   06/17/2025 7.7 (L) 13.5 - 17.5 g/dL Final     Hematocrit   Date Value Ref Range Status   08/04/2025 27.9 (L) 41.0 - 52.0 % Final   06/18/2025 25.6 (L) 41.0 - 52.0 % Final   06/17/2025 24.4 (L) 41.0 - 52.0 % Final     MCV   Date/Time Value Ref Range Status   08/04/2025 10:29  (H) 80 - 100 fL Final   06/18/2025 08:19  (H) 80 - 100 fL Final   06/17/2025 08:08  (H) 80 - 100 fL Final     MCH   Date/Time Value Ref Range Status   08/04/2025 10:29 AM 34.5 (H) 26.0 - 34.0 pg Final   06/18/2025 08:19 AM 33.3 26.0 - 34.0 pg Final   06/17/2025 08:08 AM 33.8 26.0 - 34.0 pg Final     MCHC   Date/Time Value Ref Range Status  "  08/04/2025 10:29 AM 33.0 32.0 - 36.0 g/dL Final   06/18/2025 08:19 AM 31.6 (L) 32.0 - 36.0 g/dL Final   06/17/2025 08:08 AM 31.6 (L) 32.0 - 36.0 g/dL Final     RDW   Date/Time Value Ref Range Status   08/04/2025 10:29 AM 28.1 (H) 11.5 - 14.5 % Final   06/18/2025 08:19 AM 22.7 (H) 11.5 - 14.5 % Final   06/17/2025 08:08 AM 23.0 (H) 11.5 - 14.5 % Final     Platelets   Date/Time Value Ref Range Status   08/04/2025 10:29 AM 51 (L) 150 - 450 x10*3/uL Final   06/18/2025 08:19 AM 64 (L) 150 - 450 x10*3/uL Final   06/17/2025 08:08 AM 59 (L) 150 - 450 x10*3/uL Final     No results found for: \"MPV\"  Neutrophils %   Date/Time Value Ref Range Status   08/04/2025 10:29 AM 55.6 40.0 - 80.0 % Final   06/18/2025 08:19 AM 51.4 40.0 - 80.0 % Final   06/17/2025 08:08 AM 49.6 40.0 - 80.0 % Final     Immature Granulocytes %, Automated   Date/Time Value Ref Range Status   08/04/2025 10:29 AM 4.0 (H) 0.0 - 0.9 % Final     Comment:     Immature Granulocyte Count (IG) includes promyelocytes, myelocytes and metamyelocytes but does not include bands. Percent differential counts (%) should be interpreted in the context of the absolute cell counts (cells/UL).   06/18/2025 08:19 AM 5.0 (H) 0.0 - 0.9 % Final     Comment:     Immature Granulocyte Count (IG) includes promyelocytes, myelocytes and metamyelocytes but does not include bands. Percent differential counts (%) should be interpreted in the context of the absolute cell counts (cells/UL).   06/17/2025 08:08 AM 2.9 (H) 0.0 - 0.9 % Final     Comment:     Immature Granulocyte Count (IG) includes promyelocytes, myelocytes and metamyelocytes but does not include bands. Percent differential counts (%) should be interpreted in the context of the absolute cell counts (cells/UL).     Lymphocytes %, Manual   Date/Time Value Ref Range Status   06/14/2025 06:14 AM 19.7 13.0 - 44.0 % Final   04/09/2025 01:16 PM 31.0 13.0 - 44.0 % Final   02/02/2025 09:09 AM 19.0 13.0 - 44.0 % Final     Lymphocytes % "   Date/Time Value Ref Range Status   08/04/2025 10:29 AM 24.9 13.0 - 44.0 % Final   06/18/2025 08:19 AM 24.6 13.0 - 44.0 % Final   06/17/2025 08:08 AM 23.0 13.0 - 44.0 % Final     Monocytes %, Manual   Date/Time Value Ref Range Status   06/14/2025 06:14 AM 10.3 2.0 - 10.0 % Final   04/09/2025 01:16 PM 11.0 2.0 - 10.0 % Final   02/02/2025 09:09 AM 5.0 2.0 - 10.0 % Final     Monocytes %   Date/Time Value Ref Range Status   08/04/2025 10:29 AM 15.2 2.0 - 10.0 % Final   06/18/2025 08:19 AM 18.2 2.0 - 10.0 % Final   06/17/2025 08:08 AM 23.7 2.0 - 10.0 % Final     Eosinophils %, Manual   Date/Time Value Ref Range Status   06/14/2025 06:14 AM 0.0 0.0 - 6.0 % Final   04/09/2025 01:16 PM 0.0 0.0 - 6.0 % Final   02/02/2025 09:09 AM 4.0 0.0 - 6.0 % Final     Eosinophils %   Date/Time Value Ref Range Status   08/04/2025 10:29 AM 0.0 0.0 - 6.0 % Final   06/18/2025 08:19 AM 0.4 0.0 - 6.0 % Final   06/17/2025 08:08 AM 0.4 0.0 - 6.0 % Final     Basophils %, Manual   Date/Time Value Ref Range Status   06/14/2025 06:14 AM 0.8 0.0 - 2.0 % Final   04/09/2025 01:16 PM 0.0 0.0 - 2.0 % Final   02/02/2025 09:09 AM 0.0 0.0 - 2.0 % Final     Basophils %   Date/Time Value Ref Range Status   08/04/2025 10:29 AM 0.3 0.0 - 2.0 % Final   06/18/2025 08:19 AM 0.4 0.0 - 2.0 % Final   06/17/2025 08:08 AM 0.4 0.0 - 2.0 % Final     Neutrophils Absolute   Date/Time Value Ref Range Status   08/04/2025 10:29 AM 1.65 1.60 - 5.50 x10*3/uL Final     Comment:     Percent differential counts (%) should be interpreted in the context of the absolute cell counts (cells/uL).   06/18/2025 08:19 AM 1.44 (L) 1.60 - 5.50 x10*3/uL Final     Comment:     Percent differential counts (%) should be interpreted in the context of the absolute cell counts (cells/uL).   06/17/2025 08:08 AM 1.38 (L) 1.60 - 5.50 x10*3/uL Final     Comment:     Percent differential counts (%) should be interpreted in the context of the absolute cell counts (cells/uL).     Immature Granulocytes  "Absolute, Automated   Date/Time Value Ref Range Status   08/04/2025 10:29 AM 0.12 0.00 - 0.50 x10*3/uL Final   06/18/2025 08:19 AM 0.14 0.00 - 0.50 x10*3/uL Final   06/17/2025 08:08 AM 0.08 0.00 - 0.50 x10*3/uL Final     Lymphocytes Absolute   Date/Time Value Ref Range Status   08/04/2025 10:29 AM 0.74 (L) 0.80 - 3.00 x10*3/uL Final   06/18/2025 08:19 AM 0.69 (L) 0.80 - 3.00 x10*3/uL Final   06/17/2025 08:08 AM 0.64 (L) 0.80 - 3.00 x10*3/uL Final     Monocytes Absolute   Date/Time Value Ref Range Status   08/04/2025 10:29 AM 0.45 0.05 - 0.80 x10*3/uL Final   06/18/2025 08:19 AM 0.51 0.05 - 0.80 x10*3/uL Final   06/17/2025 08:08 AM 0.66 0.05 - 0.80 x10*3/uL Final     Eosinophils Absolute   Date/Time Value Ref Range Status   08/04/2025 10:29 AM 0.00 0.00 - 0.40 x10*3/uL Final   06/18/2025 08:19 AM 0.01 0.00 - 0.40 x10*3/uL Final   06/17/2025 08:08 AM 0.01 0.00 - 0.40 x10*3/uL Final     Eosinophils Absolute, Manual   Date/Time Value Ref Range Status   06/14/2025 06:14 AM 0.00 0.00 - 0.40 x10*3/uL Final   04/09/2025 01:16 PM 0.00 0.00 - 0.40 x10*3/uL Final   02/02/2025 09:09 AM 0.11 0.00 - 0.40 x10*3/uL Final     Basophils Absolute   Date/Time Value Ref Range Status   08/04/2025 10:29 AM 0.01 0.00 - 0.10 x10*3/uL Final   06/18/2025 08:19 AM 0.01 0.00 - 0.10 x10*3/uL Final   06/17/2025 08:08 AM 0.01 0.00 - 0.10 x10*3/uL Final     Basophils Absolute, Manual   Date/Time Value Ref Range Status   06/14/2025 06:14 AM 0.03 0.00 - 0.10 x10*3/uL Final   04/09/2025 01:16 PM 0.00 0.00 - 0.10 x10*3/uL Final   02/02/2025 09:09 AM 0.00 0.00 - 0.10 x10*3/uL Final       No components found for: \"PT\"  aPTT   Date/Time Value Ref Range Status   08/06/2025 11:26 AM 36 26 - 36 seconds Final   06/18/2025 08:19 AM 38 (H) 26 - 36 seconds Final   06/17/2025 08:08 AM 36 26 - 36 seconds Final       Assessment/Plan         Diagnoses and all orders for this visit:    Plasma cell dyscrasia  -     Serum Protein Electrophoresis; Future  -     " [place]/Lambda Free Light Chain, Serum; Future  -     Immunoglobulins (IgG, IgA, IgM); Future  Hemophilia A (Multi)  -     Clinic Appointment Request Follow Up; [facility]  -     Clinic Appointment Request Follow Up; [facility]; Future  Factor VIII deficiency (Multi)  -     Clinic Appointment Request Follow Up; [facility]  -     Factor 8 Activity; Future  -     Protime-INR; Future  -     aPTT; Future  -     Fibrinogen Antigen; Future  -     Fibrinogen; Future  -     Thrombin Time; Future  -     Reptilase Time; Future  -     Serum Protein Electrophoresis; Future  -     [place]/Lambda Free Light Chain, Serum; Future  -     Immunoglobulins (IgG, IgA, IgM); Future  -     Clinic Appointment Request Follow Up; [facility]; Future         Lisbeth Dillon MD                              [1]   Family History  Problem Relation Name Age of Onset    Hemophilia Mother

## 2025-08-08 LAB — FIBRINOGEN AG PPP IA-MCNC: 260 MG/DL (ref 149–353)

## 2025-08-11 LAB
ALBUMIN: 4.3 G/DL (ref 3.4–5)
ALPHA 1 GLOBULIN: 0.3 G/DL (ref 0.2–0.6)
ALPHA 2 GLOBULIN: 0.7 G/DL (ref 0.4–1.1)
BETA GLOBULIN: 1 G/DL (ref 0.5–1.2)
GAMMA GLOBULIN: 0.9 G/DL (ref 0.5–1.4)
M-PROTEIN 1: 0.2 G/DL (ref ?–0)
PATH REVIEW-SERUM PROTEIN ELECTROPHORESIS: ABNORMAL
PROTEIN ELECTROPHORESIS COMMENT: ABNORMAL
REPTILASE TIME: 17.8 SEC (ref 14–23.9)

## 2025-08-12 LAB — METHYLMALONATE SERPL-SCNC: 0.28 UMOL/L (ref 0–0.4)

## 2025-08-13 DIAGNOSIS — I50.30 HEART FAILURE WITH PRESERVED EJECTION FRACTION, UNSPECIFIED HF CHRONICITY: ICD-10-CM

## 2025-08-14 LAB
CHROM ANALY OVERALL INTERP-IMP: NORMAL
ELECTRONICALLY COSIGNED BY CYTOGENETICS: NORMAL
ELECTRONICALLY SIGNED BY CYTOGENETICS: NORMAL
FISH ISCN RESULTS: NORMAL

## 2025-08-19 ENCOUNTER — HOSPITAL ENCOUNTER (EMERGENCY)
Facility: HOSPITAL | Age: OVER 89
Discharge: HOME | End: 2025-08-19
Attending: EMERGENCY MEDICINE
Payer: MEDICARE

## 2025-08-19 ENCOUNTER — CLINICAL SUPPORT (OUTPATIENT)
Dept: EMERGENCY MEDICINE | Facility: HOSPITAL | Age: OVER 89
End: 2025-08-19
Payer: MEDICARE

## 2025-08-19 VITALS
HEIGHT: 63 IN | SYSTOLIC BLOOD PRESSURE: 120 MMHG | BODY MASS INDEX: 23.74 KG/M2 | DIASTOLIC BLOOD PRESSURE: 80 MMHG | RESPIRATION RATE: 16 BRPM | OXYGEN SATURATION: 95 % | HEART RATE: 69 BPM | WEIGHT: 134 LBS | TEMPERATURE: 97.6 F

## 2025-08-19 DIAGNOSIS — R04.0 EPISTAXIS: Primary | ICD-10-CM

## 2025-08-19 LAB
ALBUMIN SERPL BCP-MCNC: 4.2 G/DL (ref 3.4–5)
ALP SERPL-CCNC: 73 U/L (ref 33–136)
ALT SERPL W P-5'-P-CCNC: 16 U/L (ref 10–52)
ANION GAP SERPL CALC-SCNC: 13 MMOL/L (ref 10–20)
AST SERPL W P-5'-P-CCNC: 17 U/L (ref 9–39)
BASOPHILS # BLD AUTO: 0.01 X10*3/UL (ref 0–0.1)
BASOPHILS NFR BLD AUTO: 0.5 %
BILIRUB SERPL-MCNC: 1.9 MG/DL (ref 0–1.2)
BUN SERPL-MCNC: 25 MG/DL (ref 6–23)
CALCIUM SERPL-MCNC: 8.8 MG/DL (ref 8.6–10.6)
CHLORIDE SERPL-SCNC: 101 MMOL/L (ref 98–107)
CO2 SERPL-SCNC: 27 MMOL/L (ref 21–32)
CREAT SERPL-MCNC: 1.22 MG/DL (ref 0.5–1.3)
EGFRCR SERPLBLD CKD-EPI 2021: 54 ML/MIN/1.73M*2
EOSINOPHIL # BLD AUTO: 0.01 X10*3/UL (ref 0–0.4)
EOSINOPHIL NFR BLD AUTO: 0.5 %
ERYTHROCYTE [DISTWIDTH] IN BLOOD BY AUTOMATED COUNT: 27.4 % (ref 11.5–14.5)
GLUCOSE SERPL-MCNC: 98 MG/DL (ref 74–99)
HCT VFR BLD AUTO: 24.8 % (ref 41–52)
HGB BLD-MCNC: 8.6 G/DL (ref 13.5–17.5)
IMM GRANULOCYTES # BLD AUTO: 0.05 X10*3/UL (ref 0–0.5)
IMM GRANULOCYTES NFR BLD AUTO: 2.5 % (ref 0–0.9)
LYMPHOCYTES # BLD AUTO: 0.53 X10*3/UL (ref 0.8–3)
LYMPHOCYTES NFR BLD AUTO: 26.4 %
MCH RBC QN AUTO: 34.4 PG (ref 26–34)
MCHC RBC AUTO-ENTMCNC: 34.7 G/DL (ref 32–36)
MCV RBC AUTO: 99 FL (ref 80–100)
MONOCYTES # BLD AUTO: 0.24 X10*3/UL (ref 0.05–0.8)
MONOCYTES NFR BLD AUTO: 11.9 %
NEUTROPHILS # BLD AUTO: 1.17 X10*3/UL (ref 1.6–5.5)
NEUTROPHILS NFR BLD AUTO: 58.2 %
NRBC BLD-RTO: 0 /100 WBCS (ref 0–0)
PLATELET # BLD AUTO: 38 X10*3/UL (ref 150–450)
POTASSIUM SERPL-SCNC: 3.7 MMOL/L (ref 3.5–5.3)
PROT SERPL-MCNC: 6.7 G/DL (ref 6.4–8.2)
RBC # BLD AUTO: 2.5 X10*6/UL (ref 4.5–5.9)
SODIUM SERPL-SCNC: 137 MMOL/L (ref 136–145)
WBC # BLD AUTO: 2 X10*3/UL (ref 4.4–11.3)

## 2025-08-19 PROCEDURE — 36415 COLL VENOUS BLD VENIPUNCTURE: CPT

## 2025-08-19 PROCEDURE — 93010 ELECTROCARDIOGRAM REPORT: CPT | Performed by: EMERGENCY MEDICINE

## 2025-08-19 PROCEDURE — 80053 COMPREHEN METABOLIC PANEL: CPT

## 2025-08-19 PROCEDURE — 99285 EMERGENCY DEPT VISIT HI MDM: CPT | Performed by: EMERGENCY MEDICINE

## 2025-08-19 PROCEDURE — 99284 EMERGENCY DEPT VISIT MOD MDM: CPT | Performed by: EMERGENCY MEDICINE

## 2025-08-19 PROCEDURE — 93005 ELECTROCARDIOGRAM TRACING: CPT

## 2025-08-19 PROCEDURE — 85025 COMPLETE CBC W/AUTO DIFF WBC: CPT

## 2025-08-19 ASSESSMENT — LIFESTYLE VARIABLES
HAVE YOU EVER FELT YOU SHOULD CUT DOWN ON YOUR DRINKING: NO
EVER HAD A DRINK FIRST THING IN THE MORNING TO STEADY YOUR NERVES TO GET RID OF A HANGOVER: NO
TOTAL SCORE: 0
EVER FELT BAD OR GUILTY ABOUT YOUR DRINKING: NO
HAVE PEOPLE ANNOYED YOU BY CRITICIZING YOUR DRINKING: NO

## 2025-08-19 ASSESSMENT — PAIN SCALES - GENERAL: PAINLEVEL_OUTOF10: 0 - NO PAIN

## 2025-08-19 ASSESSMENT — PAIN - FUNCTIONAL ASSESSMENT: PAIN_FUNCTIONAL_ASSESSMENT: 0-10

## 2025-08-20 DIAGNOSIS — D66 FACTOR VIII DEFICIENCY (MULTI): ICD-10-CM

## 2025-08-20 DIAGNOSIS — D69.6 THROMBOCYTOPENIA: ICD-10-CM

## 2025-08-20 DIAGNOSIS — D66 HEMOPHILIA A (MULTI): ICD-10-CM

## 2025-08-20 DIAGNOSIS — I50.30 HEART FAILURE WITH PRESERVED EJECTION FRACTION, UNSPECIFIED HF CHRONICITY: ICD-10-CM

## 2025-08-20 DIAGNOSIS — B37.49 CANDIDIASIS OF GENITALIA: Primary | ICD-10-CM

## 2025-08-20 LAB
ATRIAL RATE: 300 BPM
Q ONSET: 215 MS
QRS COUNT: 12 BEATS
QRS DURATION: 104 MS
QT INTERVAL: 450 MS
QTC CALCULATION(BAZETT): 502 MS
QTC FREDERICIA: 484 MS
R AXIS: -62 DEGREES
T AXIS: 92 DEGREES
T OFFSET: 440 MS
VENTRICULAR RATE: 75 BPM

## 2025-08-20 PROCEDURE — RXMED WILLOW AMBULATORY MEDICATION CHARGE

## 2025-08-20 RX ORDER — NYSTATIN 100000 U/G
CREAM TOPICAL 2 TIMES DAILY
Qty: 30 G | Refills: 2 | Status: SHIPPED | OUTPATIENT
Start: 2025-08-20 | End: 2025-08-28

## 2025-08-21 ENCOUNTER — PHARMACY VISIT (OUTPATIENT)
Dept: PHARMACY | Facility: CLINIC | Age: OVER 89
End: 2025-08-21
Payer: COMMERCIAL

## 2025-08-21 ENCOUNTER — CLINICAL SUPPORT (OUTPATIENT)
Dept: PHARMACY | Facility: HOSPITAL | Age: OVER 89
End: 2025-08-21
Payer: MEDICARE

## 2025-08-21 ENCOUNTER — APPOINTMENT (OUTPATIENT)
Dept: PULMONOLOGY | Facility: CLINIC | Age: OVER 89
End: 2025-08-21
Payer: MEDICARE

## 2025-08-21 DIAGNOSIS — I50.9 CONGESTIVE HEART FAILURE, UNSPECIFIED HF CHRONICITY, UNSPECIFIED HEART FAILURE TYPE: Primary | ICD-10-CM

## 2025-08-21 DIAGNOSIS — I50.32 CHRONIC HEART FAILURE WITH PRESERVED EJECTION FRACTION: Primary | ICD-10-CM

## 2025-08-21 DIAGNOSIS — I50.30 HEART FAILURE WITH PRESERVED EJECTION FRACTION, UNSPECIFIED HF CHRONICITY: ICD-10-CM

## 2025-08-22 ENCOUNTER — OFFICE VISIT (OUTPATIENT)
Dept: GERIATRIC MEDICINE | Facility: CLINIC | Age: OVER 89
End: 2025-08-22
Payer: MEDICARE

## 2025-08-22 VITALS
TEMPERATURE: 96.9 F | DIASTOLIC BLOOD PRESSURE: 64 MMHG | SYSTOLIC BLOOD PRESSURE: 100 MMHG | RESPIRATION RATE: 16 BRPM | HEART RATE: 72 BPM

## 2025-08-22 DIAGNOSIS — I50.32 CHRONIC HEART FAILURE WITH PRESERVED EJECTION FRACTION: ICD-10-CM

## 2025-08-22 DIAGNOSIS — D69.6 THROMBOCYTOPENIA, ACQUIRED: ICD-10-CM

## 2025-08-22 DIAGNOSIS — R06.02 SHORTNESS OF BREATH: Primary | ICD-10-CM

## 2025-08-22 DIAGNOSIS — I48.20 ATRIAL FIBRILLATION, CHRONIC (MULTI): ICD-10-CM

## 2025-08-22 DIAGNOSIS — D66 HEMOPHILIA A (MULTI): ICD-10-CM

## 2025-08-22 LAB
BAND RESOLUTION: 400 BANDS
CHROM ANALY OVERALL INTERP-IMP: NORMAL
CHROMOSOME ANALYSIS CELLS ANALYZED: 20 CELLS
CHROMOSOME ANALYSIS CELLS IMAGED: 2 CELLS
CHROMOSOME ANALYSIS HYPERMODAL CELL COUNT: 0 CELLS
CHROMOSOME ANALYSIS HYPOMODAL CELL COUNT: 0 CELLS
CHROMOSOME ANALYSIS MODAL CHROMOSOME NO: 46 CHROMOSOMES
CHROMOSOME ANALYSIS STAINING METHOD: NORMAL
ELECTRONICALLY SIGNED BY CYTOGENETICS: NORMAL
KARYOTYP MAR: 2 CELLS
TOTAL CELLS COUNTED MAR: 20 CELLS

## 2025-08-24 LAB
PATH REPORT.ADDENDUM SPEC: NORMAL
PATH REPORT.COMMENTS IMP SPEC: NORMAL
PATH REPORT.FINAL DX SPEC: NORMAL
PATH REPORT.GROSS SPEC: NORMAL
PATH REPORT.MICROSCOPIC SPEC OTHER STN: NORMAL
PATH REPORT.RELEVANT HX SPEC: NORMAL
PATH REPORT.TOTAL CANCER: NORMAL

## 2025-08-25 PROCEDURE — RXMED WILLOW AMBULATORY MEDICATION CHARGE

## 2025-08-26 ENCOUNTER — PHARMACY VISIT (OUTPATIENT)
Dept: PHARMACY | Facility: CLINIC | Age: OVER 89
End: 2025-08-26
Payer: COMMERCIAL

## 2025-08-26 PROBLEM — H35.3122 INTERMEDIATE STAGE NONEXUDATIVE AGE-RELATED MACULAR DEGENERATION OF LEFT EYE: Status: ACTIVE | Noted: 2023-03-01

## 2025-08-26 PROBLEM — Z96.1 PSEUDOPHAKIA OF BOTH EYES: Status: ACTIVE | Noted: 2025-08-26

## 2025-08-26 PROBLEM — H43.813 PVD (POSTERIOR VITREOUS DETACHMENT), BOTH EYES: Status: ACTIVE | Noted: 2025-08-26

## 2025-08-27 ENCOUNTER — APPOINTMENT (OUTPATIENT)
Dept: OPHTHALMOLOGY | Facility: CLINIC | Age: OVER 89
End: 2025-08-27
Payer: MEDICARE

## 2025-08-27 DIAGNOSIS — Z96.1 PSEUDOPHAKIA OF BOTH EYES: ICD-10-CM

## 2025-08-27 DIAGNOSIS — H35.3122 INTERMEDIATE STAGE NONEXUDATIVE AGE-RELATED MACULAR DEGENERATION OF LEFT EYE: ICD-10-CM

## 2025-08-27 DIAGNOSIS — D31.32 CHOROIDAL NEVUS OF LEFT EYE: ICD-10-CM

## 2025-08-27 DIAGNOSIS — H52.4 HYPEROPIA WITH PRESBYOPIA OF BOTH EYES: ICD-10-CM

## 2025-08-27 DIAGNOSIS — H52.03 HYPEROPIA WITH PRESBYOPIA OF BOTH EYES: ICD-10-CM

## 2025-08-27 DIAGNOSIS — H35.3211 EXUDATIVE AGE-RELATED MACULAR DEGENERATION OF RIGHT EYE WITH ACTIVE CHOROIDAL NEOVASCULARIZATION: Primary | ICD-10-CM

## 2025-08-27 PROCEDURE — 92015 DETERMINE REFRACTIVE STATE: CPT | Performed by: STUDENT IN AN ORGANIZED HEALTH CARE EDUCATION/TRAINING PROGRAM

## 2025-08-27 PROCEDURE — 99213 OFFICE O/P EST LOW 20 MIN: CPT | Performed by: STUDENT IN AN ORGANIZED HEALTH CARE EDUCATION/TRAINING PROGRAM

## 2025-08-27 ASSESSMENT — CONF VISUAL FIELD
OD_SUPERIOR_TEMPORAL_RESTRICTION: 0
OS_SUPERIOR_NASAL_RESTRICTION: 0
OS_INFERIOR_TEMPORAL_RESTRICTION: 0
OD_SUPERIOR_NASAL_RESTRICTION: 0
OD_INFERIOR_TEMPORAL_RESTRICTION: 0
OS_INFERIOR_NASAL_RESTRICTION: 0
OD_NORMAL: 1
OD_INFERIOR_NASAL_RESTRICTION: 0
OS_SUPERIOR_TEMPORAL_RESTRICTION: 0
OS_NORMAL: 1

## 2025-08-27 ASSESSMENT — VISUAL ACUITY
CORRECTION_TYPE: GLASSES
OS_CC: 20/100
OD_CC: 20/100-2
OD_PH_CC: 20/80-2
METHOD: SNELLEN - LINEAR

## 2025-08-27 ASSESSMENT — ENCOUNTER SYMPTOMS
CARDIOVASCULAR NEGATIVE: 0
MUSCULOSKELETAL NEGATIVE: 0
HEMATOLOGIC/LYMPHATIC NEGATIVE: 0
NEUROLOGICAL NEGATIVE: 0
RESPIRATORY NEGATIVE: 0
ENDOCRINE NEGATIVE: 0
ALLERGIC/IMMUNOLOGIC NEGATIVE: 0
GASTROINTESTINAL NEGATIVE: 0
CONSTITUTIONAL NEGATIVE: 0
PSYCHIATRIC NEGATIVE: 0
EYES NEGATIVE: 1

## 2025-08-27 ASSESSMENT — REFRACTION_WEARINGRX
OS_ADD: +2.50
OS_CYLINDER: -0.75
OS_SPHERE: +3.50
OD_CYLINDER: -0.75
OD_AXIS: 125
OD_ADD: +2.50
OS_AXIS: 031
OD_SPHERE: +1.00

## 2025-08-27 ASSESSMENT — REFRACTION_MANIFEST
OD_ADD: +3.00
OS_SPHERE: +3.00
OS_CYLINDER: -0.75
OD_SPHERE: +1.50
OD_CYLINDER: -1.00
OS_AXIS: 031
OS_ADD: +3.00
OD_AXIS: 125

## 2025-08-27 ASSESSMENT — TONOMETRY
OD_IOP_MMHG: 15
IOP_METHOD: GOLDMANN APPLANATION
OS_IOP_MMHG: 15

## 2025-08-27 ASSESSMENT — CUP TO DISC RATIO
OS_RATIO: .30
OD_RATIO: .30

## 2025-08-27 ASSESSMENT — EXTERNAL EXAM - LEFT EYE: OS_EXAM: NORMAL

## 2025-08-27 ASSESSMENT — EXTERNAL EXAM - RIGHT EYE: OD_EXAM: NORMAL

## 2025-08-28 DIAGNOSIS — B37.49 CANDIDIASIS OF GENITALIA: ICD-10-CM

## 2025-08-28 RX ORDER — NYSTATIN 100000 U/G
CREAM TOPICAL 2 TIMES DAILY
Qty: 30 G | Refills: 2 | Status: SHIPPED | OUTPATIENT
Start: 2025-08-28 | End: 2025-09-04

## 2025-09-19 ENCOUNTER — APPOINTMENT (OUTPATIENT)
Dept: CARDIOLOGY | Facility: CLINIC | Age: OVER 89
End: 2025-09-19
Payer: MEDICARE

## 2025-12-01 ENCOUNTER — APPOINTMENT (OUTPATIENT)
Dept: UROLOGY | Facility: CLINIC | Age: OVER 89
End: 2025-12-01
Payer: MEDICARE

## 2025-12-03 ENCOUNTER — APPOINTMENT (OUTPATIENT)
Dept: OPHTHALMOLOGY | Facility: CLINIC | Age: OVER 89
End: 2025-12-03
Payer: MEDICARE

## 2026-08-31 ENCOUNTER — APPOINTMENT (OUTPATIENT)
Dept: OPHTHALMOLOGY | Facility: CLINIC | Age: OVER 89
End: 2026-08-31
Payer: MEDICARE